# Patient Record
Sex: FEMALE | Race: BLACK OR AFRICAN AMERICAN | NOT HISPANIC OR LATINO | Employment: OTHER | URBAN - METROPOLITAN AREA
[De-identification: names, ages, dates, MRNs, and addresses within clinical notes are randomized per-mention and may not be internally consistent; named-entity substitution may affect disease eponyms.]

---

## 2017-01-01 RX ORDER — SPIRONOLACTONE 25 MG/1
TABLET ORAL
Qty: 90 TABLET | Refills: 4 | Status: SHIPPED | OUTPATIENT
Start: 2017-01-01 | End: 2017-01-10

## 2017-01-01 RX ORDER — LOSARTAN POTASSIUM 50 MG/1
TABLET ORAL
Qty: 90 TABLET | Refills: 3 | Status: SHIPPED | OUTPATIENT
Start: 2017-01-01 | End: 2017-02-13

## 2017-01-03 ENCOUNTER — TELEPHONE (OUTPATIENT)
Dept: INTERNAL MEDICINE | Facility: CLINIC | Age: 72
End: 2017-01-03

## 2017-01-06 ENCOUNTER — SURGERY (OUTPATIENT)
Age: 72
End: 2017-01-06

## 2017-01-06 ENCOUNTER — TELEPHONE (OUTPATIENT)
Dept: ENDOSCOPY | Facility: HOSPITAL | Age: 72
End: 2017-01-06

## 2017-01-06 ENCOUNTER — ANESTHESIA (OUTPATIENT)
Dept: ENDOSCOPY | Facility: HOSPITAL | Age: 72
End: 2017-01-06
Payer: MEDICARE

## 2017-01-06 DIAGNOSIS — D50.9 IRON DEFICIENCY ANEMIA: Primary | ICD-10-CM

## 2017-01-06 PROCEDURE — 63600175 PHARM REV CODE 636 W HCPCS: Performed by: NURSE ANESTHETIST, CERTIFIED REGISTERED

## 2017-01-06 PROCEDURE — 25000003 PHARM REV CODE 250: Performed by: NURSE ANESTHETIST, CERTIFIED REGISTERED

## 2017-01-06 PROCEDURE — D9220A PRA ANESTHESIA: Mod: CRNA,,, | Performed by: NURSE ANESTHETIST, CERTIFIED REGISTERED

## 2017-01-06 PROCEDURE — D9220A PRA ANESTHESIA: Mod: ANES,,, | Performed by: ANESTHESIOLOGY

## 2017-01-06 RX ORDER — SODIUM CHLORIDE 9 MG/ML
INJECTION, SOLUTION INTRAVENOUS CONTINUOUS PRN
Status: DISCONTINUED | OUTPATIENT
Start: 2017-01-06 | End: 2017-01-06

## 2017-01-06 RX ORDER — GLYCOPYRROLATE 0.2 MG/ML
INJECTION INTRAMUSCULAR; INTRAVENOUS
Status: DISCONTINUED | OUTPATIENT
Start: 2017-01-06 | End: 2017-01-06

## 2017-01-06 RX ORDER — ONDANSETRON 2 MG/ML
INJECTION INTRAMUSCULAR; INTRAVENOUS
Status: DISCONTINUED | OUTPATIENT
Start: 2017-01-06 | End: 2017-01-06

## 2017-01-06 RX ORDER — LIDOCAINE HCL/PF 100 MG/5ML
SYRINGE (ML) INTRAVENOUS
Status: DISCONTINUED | OUTPATIENT
Start: 2017-01-06 | End: 2017-01-06

## 2017-01-06 RX ORDER — PROPOFOL 10 MG/ML
VIAL (ML) INTRAVENOUS CONTINUOUS PRN
Status: DISCONTINUED | OUTPATIENT
Start: 2017-01-06 | End: 2017-01-06

## 2017-01-06 RX ORDER — PROPOFOL 10 MG/ML
VIAL (ML) INTRAVENOUS
Status: DISCONTINUED | OUTPATIENT
Start: 2017-01-06 | End: 2017-01-06

## 2017-01-06 RX ADMIN — GLYCOPYRROLATE 0.2 MG: 0.2 INJECTION, SOLUTION INTRAMUSCULAR; INTRAVENOUS at 08:01

## 2017-01-06 RX ADMIN — PROPOFOL 150 MCG/KG/MIN: 10 INJECTION, EMULSION INTRAVENOUS at 08:01

## 2017-01-06 RX ADMIN — BENZOCAINE 1 EACH: 220 SPRAY, METERED PERIODONTAL at 08:01

## 2017-01-06 RX ADMIN — PROPOFOL 60 MG: 10 INJECTION, EMULSION INTRAVENOUS at 08:01

## 2017-01-06 RX ADMIN — ONDANSETRON 4 MG: 2 INJECTION INTRAMUSCULAR; INTRAVENOUS at 08:01

## 2017-01-06 RX ADMIN — LIDOCAINE HYDROCHLORIDE 50 MG: 20 INJECTION, SOLUTION INTRAVENOUS at 08:01

## 2017-01-09 ENCOUNTER — LAB VISIT (OUTPATIENT)
Dept: LAB | Facility: OTHER | Age: 72
End: 2017-01-09
Attending: INTERNAL MEDICINE
Payer: MEDICARE

## 2017-01-09 DIAGNOSIS — D50.9 IRON DEFICIENCY ANEMIA: ICD-10-CM

## 2017-01-09 LAB
FERRITIN SERPL-MCNC: 24 NG/ML
HGB BLD-MCNC: 10.6 G/DL
IGA SERPL-MCNC: 150 MG/DL
IRON SERPL-MCNC: 88 UG/DL
SATURATED IRON: 25 %
TOTAL IRON BINDING CAPACITY: 346 UG/DL
TRANSFERRIN SERPL-MCNC: 234 MG/DL

## 2017-01-09 PROCEDURE — 82784 ASSAY IGA/IGD/IGG/IGM EACH: CPT

## 2017-01-09 PROCEDURE — 83516 IMMUNOASSAY NONANTIBODY: CPT

## 2017-01-09 PROCEDURE — 83540 ASSAY OF IRON: CPT

## 2017-01-09 PROCEDURE — 82728 ASSAY OF FERRITIN: CPT

## 2017-01-09 PROCEDURE — 85018 HEMOGLOBIN: CPT

## 2017-01-09 PROCEDURE — 36415 COLL VENOUS BLD VENIPUNCTURE: CPT

## 2017-01-09 RX ORDER — ALLOPURINOL 100 MG/1
TABLET ORAL
Qty: 60 TABLET | Refills: 12 | Status: SHIPPED | OUTPATIENT
Start: 2017-01-09 | End: 2017-12-19 | Stop reason: SDUPTHER

## 2017-01-09 NOTE — TELEPHONE ENCOUNTER
----- Message from Sushila Diaz sent at 1/9/2017  9:43 AM CST -----  Contact: [phone]  Pt called to request refill on allopurinol (ZYLOPRIM) 100 MG tablet. Pt still uses [facility] on [facility]    Thank you

## 2017-01-10 ENCOUNTER — OFFICE VISIT (OUTPATIENT)
Dept: GASTROENTEROLOGY | Facility: CLINIC | Age: 72
End: 2017-01-10
Payer: MEDICARE

## 2017-01-10 ENCOUNTER — LAB VISIT (OUTPATIENT)
Dept: LAB | Facility: HOSPITAL | Age: 72
End: 2017-01-10
Attending: INTERNAL MEDICINE
Payer: MEDICARE

## 2017-01-10 VITALS
BODY MASS INDEX: 28.99 KG/M2 | HEART RATE: 90 BPM | SYSTOLIC BLOOD PRESSURE: 149 MMHG | DIASTOLIC BLOOD PRESSURE: 72 MMHG | HEIGHT: 65 IN | WEIGHT: 174 LBS

## 2017-01-10 DIAGNOSIS — Z83.79 FAMILY HISTORY OF CROHN'S DISEASE: ICD-10-CM

## 2017-01-10 DIAGNOSIS — R10.9 ABDOMINAL CRAMPS: ICD-10-CM

## 2017-01-10 DIAGNOSIS — Z92.89 HISTORY OF BLOOD TRANSFUSION: ICD-10-CM

## 2017-01-10 DIAGNOSIS — D50.9 IRON DEFICIENCY ANEMIA, UNSPECIFIED IRON DEFICIENCY ANEMIA TYPE: ICD-10-CM

## 2017-01-10 DIAGNOSIS — D50.9 IRON DEFICIENCY ANEMIA, UNSPECIFIED IRON DEFICIENCY ANEMIA TYPE: Primary | ICD-10-CM

## 2017-01-10 LAB
ALBUMIN SERPL BCP-MCNC: 3.7 G/DL
ALP SERPL-CCNC: 99 U/L
ALT SERPL W/O P-5'-P-CCNC: 18 U/L
ANION GAP SERPL CALC-SCNC: 7 MMOL/L
AST SERPL-CCNC: 20 U/L
BILIRUB SERPL-MCNC: 0.4 MG/DL
BUN SERPL-MCNC: 14 MG/DL
CALCIUM SERPL-MCNC: 10.2 MG/DL
CHLORIDE SERPL-SCNC: 104 MMOL/L
CO2 SERPL-SCNC: 30 MMOL/L
CREAT SERPL-MCNC: 0.9 MG/DL
EST. GFR  (AFRICAN AMERICAN): >60 ML/MIN/1.73 M^2
EST. GFR  (NON AFRICAN AMERICAN): >60 ML/MIN/1.73 M^2
GLUCOSE SERPL-MCNC: 78 MG/DL
LIPASE SERPL-CCNC: 50 U/L
POTASSIUM SERPL-SCNC: 4.1 MMOL/L
PROT SERPL-MCNC: 7.2 G/DL
SODIUM SERPL-SCNC: 141 MMOL/L

## 2017-01-10 PROCEDURE — 99214 OFFICE O/P EST MOD 30 MIN: CPT | Mod: S$PBB,,, | Performed by: INTERNAL MEDICINE

## 2017-01-10 PROCEDURE — 36415 COLL VENOUS BLD VENIPUNCTURE: CPT

## 2017-01-10 PROCEDURE — 80053 COMPREHEN METABOLIC PANEL: CPT

## 2017-01-10 PROCEDURE — 99999 PR PBB SHADOW E&M-EST. PATIENT-LVL III: CPT | Mod: PBBFAC,,, | Performed by: INTERNAL MEDICINE

## 2017-01-10 PROCEDURE — 83690 ASSAY OF LIPASE: CPT

## 2017-01-10 RX ORDER — SPIRONOLACTONE 25 MG/1
25 TABLET ORAL DAILY
COMMUNITY
End: 2018-04-07 | Stop reason: SDUPTHER

## 2017-01-11 LAB — TTG IGA SER IA-ACNC: 4 UNITS

## 2017-01-11 NOTE — PROGRESS NOTES
CHIEF COMPLAINT:  Iron deficiency anemia.    HISTORY OF PRESENT ILLNESS:  A 71-year-old female who had an EGD and colonoscopy   unrevealing for the cause of her iron deficiency anemia.  She does take 81 mg   aspirin a day.  She does take Mobic occasionally.  She is on Prilosec 20 mg once   daily.  Recent EGD and colonoscopy unrevealing.  Biopsies of the stomach showed   no H. pylori, no dysplasia.  The patient is brought in to talk about the risks,   benefits and alternatives to small bowel video capsule endoscopy.    REVIEW OF SYSTEMS:  CONSTITUTIONAL:  No fever, fatigue or weight loss.  ENT:  No difficulty swallowing or sore throat.  CARDIOVASCULAR:  No chest pain or palpitations.  RESPIRATORY:  No shortness of breath or cough.  GENITOURINARY:  No dysuria, urgency, frequency or hematuria.  MUSCULOSKELETAL:  No arthritis.  SKIN:  No itching or rash.  NEUROLOGIC:  No syncope, but she has had a TIA in the past on 81 mg aspirin.  PSYCHIATRIC:  No uncontrolled depression or anxiety.  ENDOCRINE:  No cold or heat intolerance.  LYMPHATICS:  No lymphadenopathy.  ALLERGIES:  Latex, penicillin, sulfa, Cipro, Avelox, codeine and Norco.  GASTROINTESTINAL:  No nausea or vomiting.  Heartburn well controlled on her PPI,   Prilosec.  No bloating, no early satiety.  No constipation, no diarrhea, no   blood in stool.  Averages one bowel movement every other day.    RISK FACTORS FOR LIVER DISEASE:  History of blood transfusion in the past.  No   IV drugs.  No tattoos.  No nasal drug use.    PAST MEDICAL HISTORY:  Positive for hyperlipidemia, hypertension and TIA.    PAST SURGICAL HISTORY:  Hysterectomy, breast reduction and right carotid   endarterectomy.    FAMILY HISTORY:  Brother with Crohn disease.  Mom with pancreatic cancer at age   89.  Brother with bone cancer at 77.  Nobody else with pancreatic cancer.    Nobody with colon cancer.  Nobody with advanced colon adenomatous polyps before   the age of 60.  No FAP, no attenuated  FAP, no MAP and no Jenkins syndrome.  Nobody   with inflammatory bowel disease or celiac sprue.    SOCIAL HISTORY:  Quit smoking in .  She does not drink.  She is a retired   manager of [employer].  They have 40 horses or mules.  Only the   mules are allowed to pull carriages in the [place] due to the heat.  She   is on her first marriage for 53 years.  Her  is a mortician at [employer].  She has one daughter of 52 years of age who works in the   business in the  home.    PHYSICAL EXAMINATION:  VITAL SIGNS:  With chaperone in the room, Yanira, blood pressure is 149/72,   pulse 90, 5 feet 5 inches tall, 174 pounds.  GENERAL APPEARANCE:  Well nourished, well developed, not in any acute distress.  ABDOMEN:  Soft, no guarding, no rebound.  No tenderness.  No palpable   organomegaly.  No bruits.  No pulsatile masses.  No stigmata of chronic liver   disease.  No appreciative ascites or hernias.  Normoactive bowel sounds.  EYES:  Conjunctivae are nonicteric.  CARDIOVASCULAR:  S1 and S2 without murmurs.  RESPIRATORY:  Clear to auscultation bilaterally.  SKIN:  No petechiae or rash on exposed skin areas.  NEUROLOGIC:  Alert and oriented x4.  PSYCHIATRIC:  Normal speech, mentation and affect.  LYMPHATICS:  No cervical or supraclavicular lymphadenopathy.    MEDICAL DECISION MAKING:  As above.  Labs have been reviewed.  The patient's   ferritin is currently 24.  Hemoglobin is 10.6.  Lipase is normal.  Comprehensive   metabolic panel is normal except for mildly elevated serum CO2 of 30, upper   limits normal 29.  EGD and colonoscopy reports have been reviewed.  Images were   reviewed.  Risks, benefits and alternatives of small bowel video capsule   endoscopy including bowel prep and retained video capsule needing surgical or   endoscopic removal discussed with the patient.    IMPRESSION AND PLAN:  Iron deficiency anemia in a brother with Crohn disease.    We will set up the  patient for small bowel imaging to rule out stricture or   lesion or IBD.  If negative, the patient has signed informed written consent in   clinic for video capsule procedure.  Call for study results.  Return to GI   Clinic in three months.  She will continue to take her iron and follow up with   her primary care doctor.      SEC/HN  dd: 01/10/2017 19:13:37 (CST)  td: 01/11/2017 14:07:58 (CST)  Doc ID   #[id number]  Job ID #[id number]    CC:

## 2017-01-13 ENCOUNTER — HOSPITAL ENCOUNTER (OUTPATIENT)
Dept: RADIOLOGY | Facility: HOSPITAL | Age: 72
Discharge: HOME | End: 2017-01-13
Attending: INTERNAL MEDICINE
Payer: MEDICARE

## 2017-01-13 DIAGNOSIS — R10.9 ABDOMINAL CRAMPS: ICD-10-CM

## 2017-01-13 DIAGNOSIS — D50.9 IRON DEFICIENCY ANEMIA, UNSPECIFIED IRON DEFICIENCY ANEMIA TYPE: ICD-10-CM

## 2017-01-13 DIAGNOSIS — Z83.79 FAMILY HISTORY OF CROHN'S DISEASE: ICD-10-CM

## 2017-01-13 LAB
CREAT SERPL-MCNC: 1.1 MG/DL (ref 0.5–1.4)
SAMPLE: NORMAL

## 2017-01-13 PROCEDURE — 74177 CT ABD & PELVIS W/CONTRAST: CPT | Mod: TC

## 2017-01-13 PROCEDURE — 74177 CT ABD & PELVIS W/CONTRAST: CPT | Mod: 26,GC,, | Performed by: RADIOLOGY

## 2017-01-13 PROCEDURE — 25500020 PHARM REV CODE 255: Performed by: INTERNAL MEDICINE

## 2017-01-13 RX ADMIN — IOHEXOL 125 ML: 350 INJECTION, SOLUTION INTRAVENOUS at 03:01

## 2017-01-16 ENCOUNTER — TELEPHONE (OUTPATIENT)
Dept: OPHTHALMOLOGY | Facility: CLINIC | Age: 72
End: 2017-01-16

## 2017-01-17 ENCOUNTER — PATIENT MESSAGE (OUTPATIENT)
Dept: INTERNAL MEDICINE | Facility: CLINIC | Age: 72
End: 2017-01-17

## 2017-01-17 ENCOUNTER — OFFICE VISIT (OUTPATIENT)
Dept: OPHTHALMOLOGY | Facility: CLINIC | Age: 72
End: 2017-01-17
Payer: MEDICARE

## 2017-01-17 DIAGNOSIS — G45.3 AMAUROSIS FUGAX: Primary | ICD-10-CM

## 2017-01-17 PROCEDURE — 92014 COMPRE OPH EXAM EST PT 1/>: CPT | Mod: S$PBB,,, | Performed by: OPHTHALMOLOGY

## 2017-01-17 PROCEDURE — 99212 OFFICE O/P EST SF 10 MIN: CPT | Mod: PBBFAC | Performed by: OPHTHALMOLOGY

## 2017-01-17 PROCEDURE — 99999 PR PBB SHADOW E&M-EST. PATIENT-LVL II: CPT | Mod: PBBFAC,,, | Performed by: OPHTHALMOLOGY
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
## 2017-01-17 NOTE — PATIENT INSTRUCTIONS
Follow up with Dr. Young regarding stroke risk.  Blood tests today; I will call you with the results.

## 2017-01-17 NOTE — MR AVS SNAPSHOT
[facility] - Ophthalmology  [address] [facility] [address]  Phone: [phone]  Fax: [phone]                  Isael Salmon   2017 1:30 PM   Office Visit    Description:  Female : 1945   Provider:  Justin An MD   Department:  [facility] - Ophthalmology           Reason for Visit     Concerns About Ocular Health           Diagnoses this Visit        Comments    Amaurosis fugax    -  Primary            To Do List           Future Appointments        Provider Department Dept Phone    2017 2:45 PM Lani Young MD [facility] - Neurology [phone]    3/28/2017 1:30 PM Janett Montoya MD [facility] - Internal Medicine [phone]    2017 10:30 AM Manuel Alanis MD [facility] - Gastroenterology [phone]      Goals (5 Years of Data)     None      Follow-Up and Disposition     Return if symptoms worsen or fail to improve.      [facility] On Call     [facility] On Call Nurse [facility] Line -  Assistance  Registered nurses in the [facility] On Call Center provide clinical advisement, health education, appointment booking, and other advisory services.  Call for this free service at [phone].             Medications           Message regarding Medications     Verify the changes and/or additions to your medication regime listed below are the same as discussed with your clinician today.  If any of these changes or additions are incorrect, please notify your healthcare provider.             Verify that the below list of medications is an accurate representation of the medications you are currently taking.  If none reported, the list may be blank. If incorrect, please contact your healthcare provider. Carry this list with you in case of emergency.           Current Medications     allopurinol (ZYLOPRIM) 100 MG tablet take 2 tablets by mouth once daily    ascorbic acid (VITAMIN C) 500 MG tablet Take 500 mg by mouth. 1 Tablet Oral Every day.  Take with fergon     aspirin (ECOTRIN) 81 MG EC tablet Take 81 mg by mouth once daily.    atorvastatin (LIPITOR) 10 MG tablet Take 1 tablet (10 mg total) by mouth once daily.    betamethasone dipropionate (DIPROLENE) 0.05 % cream     cholecalciferol, vitamin D3, 1,000 unit capsule Take 1 capsule (1,000 Units total) by mouth once daily.    co-enzyme Q-10 30 mg capsule Take 200 mg by mouth once daily.    colchicine 0.6 mg tablet One tablet every hour during gouty flare until have diarrhea. Max 6 tablets daily    dicyclomine (BENTYL) 10 MG capsule Take 1 capsule (10 mg total) by mouth 4 (four) times daily as needed. 1 Capsule Oral Four times a day    escitalopram oxalate (LEXAPRO) 10 MG tablet take 1/2 tablet by mouth once daily    estradiol (ESTRACE) 0.01 % (0.1 mg/gram) vaginal cream Place vaginally once daily.    fluticasone (FLONASE) 50 mcg/actuation nasal spray 2 sprays by Each Nare route daily as needed. 2 Aerosol, Spray Nasal Every day    FOLBIC 2.5-25-2 mg Tab take 1 tablet by mouth once daily    indomethacin (INDOCIN) 50 MG capsule Take 1 capsule (50 mg total) by mouth 3 (three) times daily as needed.    losartan (COZAAR) 50 MG tablet take 1 tablet by mouth once daily    MULTIVITS-MIN/FA/CA CARB/VIT K (ONE-A-DAY WOMEN'S 50+ ORAL) Take 1 tablet by mouth once daily.    neomycin-polymyxin-hydrocortisone (CORTISPORIN) 3.5-10,000-1 mg/mL-unit/mL-% otic suspension Use bid for nails    omeprazole (PRILOSEC) 20 MG capsule take 1 capsule by mouth twice a day    spironolactone (ALDACTONE) 25 MG tablet Take 25 mg by mouth once daily.    meloxicam (MOBIC) 7.5 MG tablet Take 1 tablet (7.5 mg total) by mouth once daily.           Clinical Reference Information           Vital Signs - Last Recorded     LMP                   (LMP Unknown)           Allergies as of 1/17/2017     Latex    Pcn [Penicillins]    Sulfa (Sulfonamide Antibiotics)    Avelox [Moxifloxacin]    Ciprofloxacin    Codeine    Norco [Hydrocodone-acetaminophen]      Immunizations  Administered on Date of Encounter - 1/17/2017     None      Orders Placed During Today's Visit     Future Labs/Procedures Expected by Expires    C-REACTIVE PROTEIN  1/17/2017 3/18/2018    CBC auto differential  1/17/2017 3/18/2018    SEDIMENTATION RATE, MANUAL  1/17/2017 3/18/2018      Instructions    Follow up with Dr. Young regarding stroke risk.  Blood tests today; I will call you with the results.

## 2017-01-18 ENCOUNTER — TELEPHONE (OUTPATIENT)
Dept: OPHTHALMOLOGY | Facility: CLINIC | Age: 72
End: 2017-01-18

## 2017-01-18 ENCOUNTER — TELEPHONE (OUTPATIENT)
Dept: GASTROENTEROLOGY | Facility: CLINIC | Age: 72
End: 2017-01-18

## 2017-01-18 DIAGNOSIS — G45.3 AMAUROSIS FUGAX: Primary | ICD-10-CM

## 2017-01-18 NOTE — TELEPHONE ENCOUNTER
----- Message from Katy Meeks sent at 1/18/2017  1:29 PM CST -----  Contact: Pt# [phone]  Pt states she was returning the nurse phone call back

## 2017-01-18 NOTE — TELEPHONE ENCOUNTER
Platelet count, ESR, and CRP all normal for age. The results militate against giant cell arteritis. I informed Ms. Salmon of the results. She indicated that she understood my explanation. She will check in with her PCP regarding need for additional diagnostic testing or therapy.

## 2017-01-20 DIAGNOSIS — D64.9 LOW HEMOGLOBIN: Primary | ICD-10-CM

## 2017-01-20 RX ORDER — FERROUS SULFATE 325(65) MG
325 TABLET ORAL EVERY 12 HOURS
Qty: 60 TABLET | Refills: 4 | Status: SHIPPED | OUTPATIENT
Start: 2017-01-20 | End: 2017-05-23 | Stop reason: SDUPTHER

## 2017-01-25 ENCOUNTER — TELEPHONE (OUTPATIENT)
Dept: GASTROENTEROLOGY | Facility: CLINIC | Age: 72
End: 2017-01-25

## 2017-01-25 NOTE — TELEPHONE ENCOUNTER
----- Message from Manuel Alanis MD sent at 1/20/2017  5:11 PM CST -----  Yanira - please tell patient that they are iron deficient and anaemic and recommend that she take ferrous sulfate one 325mg pill every 12 hours for next 3 months and repeat fasting hemoglobin and Ferritin in 8 weeks - Orders placed.    Please schedule Isael for GI clinic apt to discuss small bowel VCE

## 2017-01-25 NOTE — TELEPHONE ENCOUNTER
Pr informed of test results and lab appt scheduled and mailed. Pt will call when she is ready to schedule VCE.

## 2017-02-07 ENCOUNTER — TELEPHONE (OUTPATIENT)
Dept: GASTROENTEROLOGY | Facility: CLINIC | Age: 72
End: 2017-02-07

## 2017-02-07 NOTE — TELEPHONE ENCOUNTER
----- Message from Manuel Alanis MD sent at 2/5/2017  2:11 PM CST -----  Yanira - please order small bowel VCE for iron deficiency anemia.    Please tell Isael that her CT scan was read as follows:    Impression                1.  No bowel abnormality is identified to suggest evidence for small bowel Crohn's disease.    2. Scattered colonic diverticula without evidence for diverticulitis.    3. Probable left hepatic lobe cyst and a nonspecific subcentimeter blush of arterial enhancement along the anterior peripheral edge of the liver possibly a small vascular malformation or flash filling hemangioma.    4. Right renal cysts and multiple subcentimeter hypodensities in the left kidney that are too small to characterize but may represent additional cysts.    5. Hysterectomy.    6. Ectatic abdominal aorta with significant calcific atherosclerotic plaque.

## 2017-02-08 ENCOUNTER — OFFICE VISIT (OUTPATIENT)
Dept: NEUROLOGY | Facility: CLINIC | Age: 72
End: 2017-02-08
Payer: MEDICARE

## 2017-02-08 ENCOUNTER — LAB VISIT (OUTPATIENT)
Dept: LAB | Facility: HOSPITAL | Age: 72
End: 2017-02-08
Attending: INTERNAL MEDICINE
Payer: MEDICARE

## 2017-02-08 VITALS
DIASTOLIC BLOOD PRESSURE: 61 MMHG | SYSTOLIC BLOOD PRESSURE: 121 MMHG | WEIGHT: 170 LBS | HEART RATE: 86 BPM | BODY MASS INDEX: 27.32 KG/M2 | HEIGHT: 66 IN

## 2017-02-08 DIAGNOSIS — D50.9 IRON DEFICIENCY ANEMIA, UNSPECIFIED IRON DEFICIENCY ANEMIA TYPE: ICD-10-CM

## 2017-02-08 DIAGNOSIS — R10.9 ABDOMINAL CRAMPS: ICD-10-CM

## 2017-02-08 DIAGNOSIS — Z92.89 HISTORY OF BLOOD TRANSFUSION: ICD-10-CM

## 2017-02-08 DIAGNOSIS — Z83.79 FAMILY HISTORY OF CROHN'S DISEASE: ICD-10-CM

## 2017-02-08 DIAGNOSIS — H34.11 CENTRAL RETINAL ARTERY OCCLUSION OF RIGHT EYE: Primary | ICD-10-CM

## 2017-02-08 LAB — HCV AB SERPL QL IA: NEGATIVE

## 2017-02-08 PROCEDURE — 99213 OFFICE O/P EST LOW 20 MIN: CPT | Mod: PBBFAC | Performed by: STUDENT IN AN ORGANIZED HEALTH CARE EDUCATION/TRAINING PROGRAM

## 2017-02-08 PROCEDURE — 99213 OFFICE O/P EST LOW 20 MIN: CPT | Mod: S$PBB,GC,, | Performed by: STUDENT IN AN ORGANIZED HEALTH CARE EDUCATION/TRAINING PROGRAM

## 2017-02-08 PROCEDURE — 99999 PR PBB SHADOW E&M-EST. PATIENT-LVL III: CPT | Mod: PBBFAC,GC,, | Performed by: STUDENT IN AN ORGANIZED HEALTH CARE EDUCATION/TRAINING PROGRAM

## 2017-02-08 RX ORDER — ROSUVASTATIN CALCIUM 20 MG/1
20 TABLET, COATED ORAL DAILY
Qty: 90 TABLET | Refills: 3 | Status: SHIPPED | OUTPATIENT
Start: 2017-02-08 | End: 2018-02-22 | Stop reason: SDUPTHER
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
## 2017-02-24 ENCOUNTER — HOSPITAL ENCOUNTER (OUTPATIENT)
Dept: RADIOLOGY | Facility: HOSPITAL | Age: 72
Discharge: HOME | End: 2017-02-24
Attending: STUDENT IN AN ORGANIZED HEALTH CARE EDUCATION/TRAINING PROGRAM
Payer: MEDICARE

## 2017-02-24 ENCOUNTER — HOSPITAL ENCOUNTER (OUTPATIENT)
Dept: CARDIOLOGY | Facility: CLINIC | Age: 72
Discharge: HOME | End: 2017-02-24
Payer: MEDICARE

## 2017-02-24 DIAGNOSIS — H34.11 CENTRAL RETINAL ARTERY OCCLUSION OF RIGHT EYE: ICD-10-CM

## 2017-02-24 LAB
DIASTOLIC DYSFUNCTION: YES
ESTIMATED PA SYSTOLIC PRESSURE: 39
RETIRED EF AND QEF - SEE NOTES: 65 (ref 55–65)
TRICUSPID VALVE REGURGITATION: ABNORMAL

## 2017-02-24 PROCEDURE — 70551 MRI BRAIN STEM W/O DYE: CPT | Mod: TC

## 2017-02-24 PROCEDURE — 93306 TTE W/DOPPLER COMPLETE: CPT | Mod: 26,S$PBB,, | Performed by: INTERNAL MEDICINE

## 2017-02-24 PROCEDURE — 70551 MRI BRAIN STEM W/O DYE: CPT | Mod: 26,GC,, | Performed by: RADIOLOGY

## 2017-03-07 DIAGNOSIS — D50.9 IRON DEFICIENCY ANEMIA, UNSPECIFIED IRON DEFICIENCY ANEMIA TYPE: Primary | ICD-10-CM

## 2017-03-08 ENCOUNTER — OFFICE VISIT (OUTPATIENT)
Dept: NEUROLOGY | Facility: CLINIC | Age: 72
End: 2017-03-08
Payer: MEDICARE

## 2017-03-08 VITALS
BODY MASS INDEX: 29.02 KG/M2 | HEART RATE: 83 BPM | DIASTOLIC BLOOD PRESSURE: 65 MMHG | SYSTOLIC BLOOD PRESSURE: 138 MMHG | HEIGHT: 65 IN | WEIGHT: 174.19 LBS

## 2017-03-08 DIAGNOSIS — G45.3 AMAUROSIS FUGAX: Primary | ICD-10-CM

## 2017-03-08 PROCEDURE — 3078F DIAST BP <80 MM HG: CPT | Mod: GC,,, | Performed by: STUDENT IN AN ORGANIZED HEALTH CARE EDUCATION/TRAINING PROGRAM

## 2017-03-08 PROCEDURE — 1125F AMNT PAIN NOTED PAIN PRSNT: CPT | Mod: GC,,, | Performed by: STUDENT IN AN ORGANIZED HEALTH CARE EDUCATION/TRAINING PROGRAM

## 2017-03-08 PROCEDURE — 3075F SYST BP GE 130 - 139MM HG: CPT | Mod: GC,,, | Performed by: STUDENT IN AN ORGANIZED HEALTH CARE EDUCATION/TRAINING PROGRAM

## 2017-03-08 PROCEDURE — 1159F MED LIST DOCD IN RCRD: CPT | Mod: GC,,, | Performed by: STUDENT IN AN ORGANIZED HEALTH CARE EDUCATION/TRAINING PROGRAM

## 2017-03-08 PROCEDURE — 99214 OFFICE O/P EST MOD 30 MIN: CPT | Mod: PBBFAC | Performed by: STUDENT IN AN ORGANIZED HEALTH CARE EDUCATION/TRAINING PROGRAM

## 2017-03-08 PROCEDURE — 99213 OFFICE O/P EST LOW 20 MIN: CPT | Mod: S$PBB,GC,, | Performed by: STUDENT IN AN ORGANIZED HEALTH CARE EDUCATION/TRAINING PROGRAM

## 2017-03-08 PROCEDURE — 99999 PR PBB SHADOW E&M-EST. PATIENT-LVL IV: CPT | Mod: PBBFAC,GC,, | Performed by: STUDENT IN AN ORGANIZED HEALTH CARE EDUCATION/TRAINING PROGRAM
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
## 2017-03-09 ENCOUNTER — PATIENT MESSAGE (OUTPATIENT)
Dept: RHEUMATOLOGY | Facility: CLINIC | Age: 72
End: 2017-03-09

## 2017-03-10 ENCOUNTER — PATIENT MESSAGE (OUTPATIENT)
Dept: INTERNAL MEDICINE | Facility: CLINIC | Age: 72
End: 2017-03-10

## 2017-03-10 DIAGNOSIS — M79.606 PAIN OF LOWER EXTREMITY, UNSPECIFIED LATERALITY: Primary | ICD-10-CM

## 2017-03-14 ENCOUNTER — PATIENT MESSAGE (OUTPATIENT)
Dept: INTERNAL MEDICINE | Facility: CLINIC | Age: 72
End: 2017-03-14

## 2017-03-17 ENCOUNTER — HOSPITAL ENCOUNTER (OUTPATIENT)
Dept: RADIOLOGY | Facility: HOSPITAL | Age: 72
Discharge: HOME | End: 2017-03-17
Attending: NURSE PRACTITIONER
Payer: MEDICARE

## 2017-03-17 ENCOUNTER — OFFICE VISIT (OUTPATIENT)
Dept: ORTHOPEDICS | Facility: CLINIC | Age: 72
End: 2017-03-17
Payer: MEDICARE

## 2017-03-17 VITALS — HEIGHT: 65 IN | BODY MASS INDEX: 28.83 KG/M2 | WEIGHT: 173.06 LBS

## 2017-03-17 DIAGNOSIS — M25.551 PAIN OF RIGHT HIP JOINT: ICD-10-CM

## 2017-03-17 DIAGNOSIS — R10.31 GROIN PAIN, RIGHT: ICD-10-CM

## 2017-03-17 DIAGNOSIS — T14.8XXA MUSCLE STRAIN: Primary | ICD-10-CM

## 2017-03-17 DIAGNOSIS — M79.604 RIGHT LEG PAIN: Primary | ICD-10-CM

## 2017-03-17 DIAGNOSIS — M89.8X5 PAIN OF RIGHT FEMUR: ICD-10-CM

## 2017-03-17 DIAGNOSIS — Z91.81 STATUS POST FALL: ICD-10-CM

## 2017-03-17 DIAGNOSIS — M25.551 RIGHT HIP PAIN: ICD-10-CM

## 2017-03-17 DIAGNOSIS — M79.604 RIGHT LEG PAIN: ICD-10-CM

## 2017-03-17 DIAGNOSIS — M79.661 PAIN IN RIGHT LOWER LEG: ICD-10-CM

## 2017-03-17 PROCEDURE — 73590 X-RAY EXAM OF LOWER LEG: CPT | Mod: TC,RT

## 2017-03-17 PROCEDURE — 99999 PR PBB SHADOW E&M-EST. PATIENT-LVL IV: CPT | Mod: PBBFAC,,, | Performed by: NURSE PRACTITIONER

## 2017-03-17 PROCEDURE — 73502 X-RAY EXAM HIP UNI 2-3 VIEWS: CPT | Mod: 26,RT,, | Performed by: RADIOLOGY

## 2017-03-17 PROCEDURE — 73552 X-RAY EXAM OF FEMUR 2/>: CPT | Mod: TC,RT

## 2017-03-17 PROCEDURE — 72195 MRI PELVIS W/O DYE: CPT | Mod: 26,GC,, | Performed by: RADIOLOGY

## 2017-03-17 PROCEDURE — 99213 OFFICE O/P EST LOW 20 MIN: CPT | Mod: S$PBB,,, | Performed by: NURSE PRACTITIONER

## 2017-03-17 PROCEDURE — 72195 MRI PELVIS W/O DYE: CPT | Mod: TC

## 2017-03-17 PROCEDURE — 73590 X-RAY EXAM OF LOWER LEG: CPT | Mod: 26,RT,, | Performed by: RADIOLOGY

## 2017-03-17 PROCEDURE — 73502 X-RAY EXAM HIP UNI 2-3 VIEWS: CPT | Mod: TC,RT

## 2017-03-17 PROCEDURE — 73552 X-RAY EXAM OF FEMUR 2/>: CPT | Mod: 26,RT,, | Performed by: RADIOLOGY

## 2017-03-17 RX ORDER — DICLOFENAC SODIUM 75 MG/1
75 TABLET, DELAYED RELEASE ORAL 2 TIMES DAILY PRN
Qty: 30 TABLET | Refills: 0 | Status: SHIPPED | OUTPATIENT
Start: 2017-03-17 | End: 2017-04-12 | Stop reason: ALTCHOICE

## 2017-03-17 NOTE — PROGRESS NOTES
SUBJECTIVE:     Chief Complaint & History of Present Illness:  Isael Salmon is a 71 y.o. year old female presenting today for intermittent right hip pain, groin pain which radiates to the lower leg which started a few weeks ago after slipped in the bathroom. She states that she doesn't recall hitting anything specifically but that she stretched the leg really far. The pain is located in the groin aspect of the hip and anterior leg from femur to mid-tib/fib.  The pain is described as achy. She states that the pain is getting worse. It is is worse with weight bearing. Previous treatments include rest and Mobic which have provided minimal relief.  There is not a history of previous injury or surgery to the hip.  The patient does not use an assistive device.     Past Medical History:   Diagnosis Date    Adjustment disorder 7/26/2012    Anemia 7/26/2012    AR (allergic rhinitis) 9/5/2014    Bronchitis     Fibrocystic breast disease in female 7/26/2012    GERD (gastroesophageal reflux disease) 7/26/2012    Gout     Gout, arthritis 7/26/2012    History of blood transfusion 7/26/2012    History of colonic polyps 7/26/2012    Hypercalcemia 9/20/2012    Hyperlipidemia 7/26/2012    Hypertension 7/26/2012    Nuclear sclerosis 5/2/2014    Osteoarthritis 7/26/2012    Other hyperparathyroidism 9/20/2012    Postmenopausal status 7/26/2012    PVD (peripheral vascular disease) 7/26/2012    left leg laser of vein with injection    Stroke 1997    TIA    Superficial vein thrombosis     Transient ischemic attack 7/26/2012    Varicose veins 7/26/2012       Past Surgical History:   Procedure Laterality Date    BREAST SURGERY      breast reduction    broken second finger Right 12/2015    pins placed    carotid endarterectomy  1997    right    CAROTID ENDARTERECTOMY Right 1997    COLONOSCOPY N/A 10/26/2015    Procedure: COLONOSCOPY;  Surgeon: Manuel Alanis MD;  Location: [facility] ([address] [place] [address]);  Service:  Endoscopy;  Laterality: N/A;    HYSTERECTOMY      ORIF FINGER FRACTURE  12/18/15    TIA      VASCULAR SURGERY      left leg vein laser       Family History   Problem Relation Age of Onset    Heart failure Father     Cancer Mother      pancreas    Cancer Brother      bladder    Bone cancer Brother     Breast cancer Neg Hx     Ovarian cancer Neg Hx     Allergies Neg Hx     Asthma Neg Hx     Eczema Neg Hx     Cervical cancer Neg Hx     Endometrial cancer Neg Hx     Vaginal cancer Neg Hx        Review of patient's allergies indicates:   Allergen Reactions    Latex      Other reaction(s): Rash    Pcn [penicillins]      Other reaction(s): rash    Sulfa (sulfonamide antibiotics)      Other reaction(s): blisters    Avelox [moxifloxacin]      Other reaction(s): racing heart  Other reaction(s): shakes    Ciprofloxacin Other (See Comments)     Room starts to spin , nausea and dizziness    Codeine      Other reaction(s): nausea  Other reaction(s): weakness    Norco [hydrocodone-acetaminophen] Other (See Comments)         Current Outpatient Prescriptions:     allopurinol (ZYLOPRIM) 100 MG tablet, take 2 tablets by mouth once daily (Patient taking differently: take 3 tablets by mouth once daily), Disp: 60 tablet, Rfl: 12    aspirin (ECOTRIN) 81 MG EC tablet, Take 325 mg by mouth once daily., Disp: , Rfl:     co-enzyme Q-10 30 mg capsule, Take 200 mg by mouth once daily., Disp: , Rfl:     colchicine 0.6 mg tablet, One tablet every hour during gouty flare until have diarrhea. Max 6 tablets daily (Patient taking differently: as needed. One tablet every hour during gouty flare until have diarrhea. Max 6 tablets daily), Disp: 30 tablet, Rfl: 11    dicyclomine (BENTYL) 10 MG capsule, Take 1 capsule (10 mg total) by mouth 4 (four) times daily as needed. 1 Capsule Oral Four times a day, Disp: 60 capsule, Rfl: 3    escitalopram oxalate (LEXAPRO) 10 MG tablet, take 1/2 tablet by mouth once daily, Disp: 45  tablet, Rfl: 3    estradiol (ESTRACE) 0.01 % (0.1 mg/gram) vaginal cream, Place vaginally twice a week. , Disp: , Rfl:     ferrous sulfate 325 mg (65 mg iron) Tab tablet, Take 1 tablet (325 mg total) by mouth every 12 (twelve) hours., Disp: 60 tablet, Rfl: 4    fluticasone (FLONASE) 50 mcg/actuation nasal spray, 2 sprays by Each Nare route daily as needed. 2 Aerosol, Spray Nasal Every day, Disp: 16 g, Rfl: 2    FOLBIC 2.5-25-2 mg Tab, take 1 tablet by mouth once daily, Disp: 30 tablet, Rfl: 11    indomethacin (INDOCIN) 50 MG capsule, Take 1 capsule (50 mg total) by mouth 3 (three) times daily as needed., Disp: 90 capsule, Rfl: 6    losartan (COZAAR) 50 MG tablet, Take 1 tablet (50 mg total) by mouth 2 (two) times daily., Disp: 60 tablet, Rfl: 3    omeprazole (PRILOSEC) 20 MG capsule, take 1 capsule by mouth twice a day, Disp: 60 capsule, Rfl: 6    rosuvastatin (CRESTOR) 20 MG tablet, Take 1 tablet (20 mg total) by mouth once daily., Disp: 90 tablet, Rfl: 3    spironolactone (ALDACTONE) 25 MG tablet, Take 25 mg by mouth once daily., Disp: , Rfl:     diclofenac (VOLTAREN) 75 MG EC tablet, Take 1 tablet (75 mg total) by mouth 2 (two) times daily as needed. For pain., Disp: 30 tablet, Rfl: 0    [DISCONTINUED] salsalate (DISALCID) 750 MG Tab, Take 1 tablet (750 mg total) by mouth 3 (three) times daily., Disp: 90 tablet, Rfl: 9    Current Facility-Administered Medications:     albuterol nebulizer solution 2.5 mg, 2.5 mg, Nebulization, 1 time in Clinic/HOD, Kimberlee Duque PA-C    Review of Systems:  Review of Systems   Constitution: Negative for chills, decreased appetite, fever, weakness and malaise/fatigue.   Eyes: Negative for visual disturbance.   Cardiovascular: Negative for chest pain and palpitations.   Hematologic/Lymphatic: Negative for bleeding problem. Does not bruise/bleed easily.   Skin: Negative for color change, dry skin, flushing, itching, poor wound healing and rash.   Musculoskeletal:  "Positive for arthritis, falls, joint pain and stiffness. Negative for gout, joint swelling, muscle cramps, muscle weakness and myalgias.   Neurological: Negative for dizziness, light-headedness, loss of balance, numbness and paresthesias.   Psychiatric/Behavioral: Negative for altered mental status.         OBJECTIVE:     PHYSICAL EXAM:  Height: 5' 5" (165.1 cm) Weight: 78.5 kg (173 lb 1 oz)  General    Nursing note reviewed.  Constitutional: She is oriented to person, place, and time. She appears well-developed and well-nourished. No distress.   HENT:   Head: Atraumatic.   Nose: Nose normal.   Eyes: Conjunctivae and EOM are normal. Right eye exhibits no discharge. Left eye exhibits no discharge.   Cardiovascular: Normal rate.    Pulmonary/Chest: Effort normal. No respiratory distress.   Neurological: She is alert and oriented to person, place, and time.   Psychiatric: She has a normal mood and affect. Her behavior is normal. Judgment and thought content normal.           Right Knee Exam     Inspection   Erythema: absent  Scars: absent  Swelling: absent  Effusion: effusion  Deformity: deformity  Bruising: absent    Range of Motion   Extension: normal   Flexion: normal     Other   Sensation: normal    Comments:  No tenderness at the knee    Right Hip Exam     Inspection   Scars: absent  Swelling: absent  Bruising: absent  No deformity of hip.  Erythema: absent    Tests   Pain w/ forced internal rotation (GINA): present  Pain w/ forced external rotation (FADIR): present  Circumduction test: positive  Log Roll: positive    Other   Sensation: normal    Comments:  Dec. Strength s/t to pain  No tenderness to palpation   Pain with resisted straight leg raise      Vascular Exam     Right Pulses    Posterior Tibial:      2+        Capillary Refill  Right Hand: normal capillary refill    Edema  Right Upper Leg: absent    RADIOGRAPHS:  Xray of right tib/fib no fracture or dislocation- DJD at ankle  Xray of the right femur no " fracture or dislocation  Xray of the right hip showing mild DJD of the right hip, there is slightly elongated femoral neck with sclerosis line vs. Possible fx     ASSESSMENT/PLAN:     Plan:   - Urgent MRI to r/o fracture  - Non weightbearing- wheelchair to MRI  - If MRI with fx, pt to be transported to ED for trtmt  - If MRI with no fx, pending results, consider R.I.C.E, PT and/or NSAID

## 2017-03-17 NOTE — PROGRESS NOTES
Spoke with pt re: MRI results, showing greater troch gluteal strain. No Fx. She states mobic not working. Recommend changing to short term Diclofenac BID, rest, Ice/heat alternating, and start PT. RTC/call if symptoms to not resolve, worsen, or any new sx. Pt is agreement with plan.

## 2017-03-17 NOTE — LETTER
March 20, 2017      Janett Montoya MD  [address]  [place] [address]           [facility]  [address]  [place] [address]  Phone: [phone]          Patient: Isael Salmon   MR Number: [id number]   YOB: 1945   Date of Visit: 3/17/2017       Dear Dr. Janett Montoya:    Thank you for referring Isael Salmon to me for evaluation. Attached you will find relevant portions of my assessment and plan of care.    If you have questions, please do not hesitate to call me. I look forward to following Isael Salmon along with you.    Sincerely,    Sania Mittal, NP    Enclosure  CC:  No Recipients    If you would like to receive this communication electronically, please contact [email or web address] or [phone] to request more information on [employer] Link access.    For providers and/or their staff who would like to refer a patient to [facility], please contact us through our one-stop-shop provider referral line, [facility] , at [phone].    If you feel you have received this communication in error or would no longer like to receive these types of communications, please e-mail [email or web address]

## 2017-03-24 ENCOUNTER — LAB VISIT (OUTPATIENT)
Dept: LAB | Facility: OTHER | Age: 72
End: 2017-03-24
Attending: INTERNAL MEDICINE
Payer: MEDICARE

## 2017-03-24 DIAGNOSIS — D64.9 LOW HEMOGLOBIN: ICD-10-CM

## 2017-03-24 LAB
FERRITIN SERPL-MCNC: 45 NG/ML
HGB BLD-MCNC: 10.8 G/DL

## 2017-03-24 PROCEDURE — 36415 COLL VENOUS BLD VENIPUNCTURE: CPT

## 2017-03-24 PROCEDURE — 85018 HEMOGLOBIN: CPT

## 2017-03-24 PROCEDURE — 82728 ASSAY OF FERRITIN: CPT

## 2017-03-28 ENCOUNTER — PATIENT MESSAGE (OUTPATIENT)
Dept: INTERNAL MEDICINE | Facility: CLINIC | Age: 72
End: 2017-03-28

## 2017-03-29 ENCOUNTER — TELEPHONE (OUTPATIENT)
Dept: GASTROENTEROLOGY | Facility: CLINIC | Age: 72
End: 2017-03-29

## 2017-03-29 NOTE — TELEPHONE ENCOUNTER
----- Message from Iram Dong sent at 3/29/2017  3:02 PM CDT -----  Contact: self - [phone]  fara - is asking to reschedule vegd - please call patient at

## 2017-04-05 ENCOUNTER — PATIENT MESSAGE (OUTPATIENT)
Dept: INTERNAL MEDICINE | Facility: CLINIC | Age: 72
End: 2017-04-05

## 2017-04-09 RX ORDER — ESCITALOPRAM OXALATE 10 MG/1
TABLET ORAL
Qty: 45 TABLET | Refills: 3 | Status: SHIPPED | OUTPATIENT
Start: 2017-04-09 | End: 2018-05-13 | Stop reason: SDUPTHER

## 2017-04-11 ENCOUNTER — TELEPHONE (OUTPATIENT)
Dept: GASTROENTEROLOGY | Facility: CLINIC | Age: 72
End: 2017-04-11

## 2017-04-11 ENCOUNTER — CLINICAL SUPPORT (OUTPATIENT)
Dept: GASTROENTEROLOGY | Facility: CLINIC | Age: 72
End: 2017-04-11
Payer: MEDICARE

## 2017-04-11 DIAGNOSIS — D50.9 IRON DEFICIENCY ANEMIA, UNSPECIFIED IRON DEFICIENCY ANEMIA TYPE: ICD-10-CM

## 2017-04-11 PROCEDURE — 91110 GI TRC IMG INTRAL ESOPH-ILE: CPT

## 2017-04-11 PROCEDURE — 99212 OFFICE O/P EST SF 10 MIN: CPT | Mod: PBBFAC

## 2017-04-11 PROCEDURE — 99999 PR PBB SHADOW E&M-EST. PATIENT-LVL II: CPT | Mod: PBBFAC,,,

## 2017-04-12 ENCOUNTER — OFFICE VISIT (OUTPATIENT)
Dept: ORTHOPEDICS | Facility: CLINIC | Age: 72
End: 2017-04-12
Payer: MEDICARE

## 2017-04-12 ENCOUNTER — HOSPITAL ENCOUNTER (OUTPATIENT)
Dept: RADIOLOGY | Facility: HOSPITAL | Age: 72
Discharge: HOME | End: 2017-04-12
Attending: NURSE PRACTITIONER
Payer: MEDICARE

## 2017-04-12 VITALS — HEIGHT: 65 IN | BODY MASS INDEX: 28.54 KG/M2 | WEIGHT: 171.31 LBS

## 2017-04-12 DIAGNOSIS — M54.31 SCIATICA OF RIGHT SIDE: ICD-10-CM

## 2017-04-12 DIAGNOSIS — M79.604 RIGHT LEG PAIN: ICD-10-CM

## 2017-04-12 DIAGNOSIS — M79.604 RIGHT LEG PAIN: Primary | ICD-10-CM

## 2017-04-12 DIAGNOSIS — M54.41 RIGHT-SIDED LOW BACK PAIN WITH RIGHT-SIDED SCIATICA, UNSPECIFIED CHRONICITY: ICD-10-CM

## 2017-04-12 DIAGNOSIS — M54.50 LUMBAR SPINE PAIN: Primary | ICD-10-CM

## 2017-04-12 DIAGNOSIS — R10.31 RIGHT GROIN PAIN: ICD-10-CM

## 2017-04-12 PROCEDURE — 73502 X-RAY EXAM HIP UNI 2-3 VIEWS: CPT | Mod: 26,RT,, | Performed by: RADIOLOGY

## 2017-04-12 PROCEDURE — 73552 X-RAY EXAM OF FEMUR 2/>: CPT | Mod: TC,RT

## 2017-04-12 PROCEDURE — 99999 PR PBB SHADOW E&M-EST. PATIENT-LVL IV: CPT | Mod: PBBFAC,,, | Performed by: NURSE PRACTITIONER

## 2017-04-12 PROCEDURE — 73552 X-RAY EXAM OF FEMUR 2/>: CPT | Mod: 26,RT,, | Performed by: RADIOLOGY

## 2017-04-12 PROCEDURE — 99213 OFFICE O/P EST LOW 20 MIN: CPT | Mod: S$PBB,,, | Performed by: NURSE PRACTITIONER

## 2017-04-12 PROCEDURE — 73502 X-RAY EXAM HIP UNI 2-3 VIEWS: CPT | Mod: TC,RT

## 2017-04-12 RX ORDER — METHYLPREDNISOLONE 4 MG/1
TABLET ORAL
Qty: 1 PACKAGE | Refills: 0 | Status: SHIPPED | OUTPATIENT
Start: 2017-04-12 | End: 2017-05-23

## 2017-04-12 NOTE — LETTER
April 12, 2017      Janett Montoya MD  [address]  [place] [address]           [facility]  [address]  [place] [address]  Phone: [phone]          Patient: Isael Salmon   MR Number: [id number]   YOB: 1945   Date of Visit: 4/12/2017       Dear Dr. Janett Montoya:    Thank you for referring Isael Salmon to me for evaluation. Attached you will find relevant portions of my assessment and plan of care.    If you have questions, please do not hesitate to call me. I look forward to following Isael Salmon along with you.    Sincerely,    Sania Mittal, NP    Enclosure  CC:  No Recipients    If you would like to receive this communication electronically, please contact [email or web address] or [phone] to request more information on [employer] Link access.    For providers and/or their staff who would like to refer a patient to [facility], please contact us through our one-stop-shop provider referral line, [facility] , at [phone].    If you feel you have received this communication in error or would no longer like to receive these types of communications, please e-mail [email or web address]

## 2017-04-12 NOTE — PROGRESS NOTES
SUBJECTIVE:     Chief Complaint & History of Present Illness:  Isael Salmon is a 71 y.o. year old female presenting today for improved but not resolved intermittent right hip pain, groin pain which radiates to the lower leg which started a few weeks ago after slipped in the bathroom. She reports however that what really brings her in is that she woke up last night with 10/10 shooting pain down the right leg starting at the lower back/hip area. She states she couldn't even walk this morning but now- still terrible pain but able to walk. She states that she did slip at [employer] yesterday, didn't hit the floor she caught herself. She states she did feel pain then but nothing like this. She reports tingling down the leg as well. She states that the diclofenac and mobic did not fully resolve the previous pain and she missed her PT apt. She states that she is rescheduled for next week.    Past Medical History:   Diagnosis Date    Adjustment disorder 7/26/2012    Anemia 7/26/2012    AR (allergic rhinitis) 9/5/2014    Bronchitis     Fibrocystic breast disease in female 7/26/2012    GERD (gastroesophageal reflux disease) 7/26/2012    Gout     Gout, arthritis 7/26/2012    History of blood transfusion 7/26/2012    History of colonic polyps 7/26/2012    Hypercalcemia 9/20/2012    Hyperlipidemia 7/26/2012    Hypertension 7/26/2012    Nuclear sclerosis 5/2/2014    Osteoarthritis 7/26/2012    Other hyperparathyroidism 9/20/2012    Postmenopausal status 7/26/2012    PVD (peripheral vascular disease) 7/26/2012    left leg laser of vein with injection    Stroke 1997    TIA    Superficial vein thrombosis     Transient ischemic attack 7/26/2012    Varicose veins 7/26/2012       Past Surgical History:   Procedure Laterality Date    BREAST SURGERY      breast reduction    broken second finger Right 12/2015    pins placed    carotid endarterectomy  1997    right    CAROTID ENDARTERECTOMY Right 1997     COLONOSCOPY N/A 10/26/2015    Procedure: COLONOSCOPY;  Surgeon: Manuel Alanis MD;  Location: [facility] ([address] [place] [address]);  Service: Endoscopy;  Laterality: N/A;    HYSTERECTOMY      ORIF FINGER FRACTURE  12/18/15    TIA      VASCULAR SURGERY      left leg vein laser       Family History   Problem Relation Age of Onset    Heart failure Father     Cancer Mother      pancreas    Cancer Brother      bladder    Bone cancer Brother     Breast cancer Neg Hx     Ovarian cancer Neg Hx     Allergies Neg Hx     Asthma Neg Hx     Eczema Neg Hx     Cervical cancer Neg Hx     Endometrial cancer Neg Hx     Vaginal cancer Neg Hx        Review of patient's allergies indicates:   Allergen Reactions    Latex      Other reaction(s): Rash    Pcn [penicillins]      Other reaction(s): rash    Sulfa (sulfonamide antibiotics)      Other reaction(s): blisters    Avelox [moxifloxacin]      Other reaction(s): racing heart  Other reaction(s): shakes    Ciprofloxacin Other (See Comments)     Room starts to spin , nausea and dizziness    Codeine      Other reaction(s): nausea  Other reaction(s): weakness    Norco [hydrocodone-acetaminophen] Other (See Comments)         Current Outpatient Prescriptions:     allopurinol (ZYLOPRIM) 100 MG tablet, take 2 tablets by mouth once daily (Patient taking differently: take 3 tablets by mouth once daily), Disp: 60 tablet, Rfl: 12    aspirin (ECOTRIN) 81 MG EC tablet, Take 325 mg by mouth once daily., Disp: , Rfl:     co-enzyme Q-10 30 mg capsule, Take 200 mg by mouth once daily., Disp: , Rfl:     colchicine 0.6 mg tablet, One tablet every hour during gouty flare until have diarrhea. Max 6 tablets daily (Patient taking differently: as needed. One tablet every hour during gouty flare until have diarrhea. Max 6 tablets daily), Disp: 30 tablet, Rfl: 11    dicyclomine (BENTYL) 10 MG capsule, Take 1 capsule (10 mg total) by mouth 4 (four) times daily as needed. 1 Capsule Oral Four times  a day, Disp: 60 capsule, Rfl: 3    escitalopram oxalate (LEXAPRO) 10 MG tablet, take 1/2 tablet by mouth once daily, Disp: 45 tablet, Rfl: 3    estradiol (ESTRACE) 0.01 % (0.1 mg/gram) vaginal cream, Place vaginally twice a week. , Disp: , Rfl:     ferrous sulfate 325 mg (65 mg iron) Tab tablet, Take 1 tablet (325 mg total) by mouth every 12 (twelve) hours., Disp: 60 tablet, Rfl: 4    fluticasone (FLONASE) 50 mcg/actuation nasal spray, 2 sprays by Each Nare route daily as needed. 2 Aerosol, Spray Nasal Every day, Disp: 16 g, Rfl: 2    FOLBIC 2.5-25-2 mg Tab, take 1 tablet by mouth once daily, Disp: 30 tablet, Rfl: 11    indomethacin (INDOCIN) 50 MG capsule, Take 1 capsule (50 mg total) by mouth 3 (three) times daily as needed., Disp: 90 capsule, Rfl: 6    losartan (COZAAR) 50 MG tablet, Take 1 tablet (50 mg total) by mouth 2 (two) times daily., Disp: 60 tablet, Rfl: 3    omeprazole (PRILOSEC) 20 MG capsule, take 1 capsule by mouth twice a day, Disp: 60 capsule, Rfl: 6    rosuvastatin (CRESTOR) 20 MG tablet, Take 1 tablet (20 mg total) by mouth once daily., Disp: 90 tablet, Rfl: 3    spironolactone (ALDACTONE) 25 MG tablet, Take 25 mg by mouth once daily., Disp: , Rfl:     methylPREDNISolone (MEDROL DOSEPACK) 4 mg tablet, use as directed, Disp: 1 Package, Rfl: 0    [DISCONTINUED] salsalate (DISALCID) 750 MG Tab, Take 1 tablet (750 mg total) by mouth 3 (three) times daily., Disp: 90 tablet, Rfl: 9    Current Facility-Administered Medications:     albuterol nebulizer solution 2.5 mg, 2.5 mg, Nebulization, 1 time in Clinic/HOD, Kimberlee Duque PA-C    Review of Systems:  Review of Systems   Constitution: Negative for chills, decreased appetite, fever, weakness and malaise/fatigue.   Eyes: Negative for visual disturbance.   Cardiovascular: Negative for chest pain and palpitations.   Hematologic/Lymphatic: Negative for bleeding problem. Does not bruise/bleed easily.   Skin: Negative for color change, dry  "skin, flushing, itching, poor wound healing and rash.   Musculoskeletal: Positive for arthritis, falls, joint pain and stiffness. Negative for gout, joint swelling, muscle cramps, muscle weakness and myalgias.   Neurological: Negative for dizziness, light-headedness, loss of balance, numbness and paresthesias.   Psychiatric/Behavioral: Negative for altered mental status.         OBJECTIVE:     PHYSICAL EXAM:  Height: 5' 5" (165.1 cm) Weight: 77.7 kg (171 lb 4.8 oz)  General    Nursing note reviewed.  Constitutional: She is oriented to person, place, and time. She appears well-developed and well-nourished. No distress.   HENT:   Head: Atraumatic.   Nose: Nose normal.   Eyes: Conjunctivae and EOM are normal. Right eye exhibits no discharge. Left eye exhibits no discharge.   Cardiovascular: Normal rate.    Pulmonary/Chest: Effort normal. No respiratory distress.   Neurological: She is alert and oriented to person, place, and time.   Psychiatric: She has a normal mood and affect. Her behavior is normal. Judgment and thought content normal.           Right Knee Exam     Inspection   Erythema: absent  Scars: absent  Swelling: absent  Effusion: effusion  Deformity: deformity  Bruising: absent    Range of Motion   Extension: normal   Flexion: normal     Other   Sensation: normal    Comments:  No tenderness at the knee    Right Hip Exam     Inspection   Scars: absent  Swelling: absent  Bruising: absent  No deformity of hip.  Erythema: absent    Range of Motion   The patient has normal right hip ROM.    Muscle Strength   The patient has normal right hip strength.    Tests   Pain w/ forced internal rotation (GINA): absent  Pain w/ forced external rotation (FADIR): absent  Circumduction test: negative  Log Roll: negative    Other   Sensation: normal    Comments:  No tenderness to palpation   Pain with resisted straight leg raise at the low back      Vascular Exam     Right Pulses    Posterior Tibial:      2+        Capillary " Refill  Right Hand: normal capillary refill    Edema  Right Upper Leg: absent    RADIOGRAPHS:  Xray of the right femur no fracture or dislocation  Xray of the right hip showing mild DJD of the right hip  MRI 3/17 shows muscle strain of the gluteus medius muscle involving the attachment upon the right greater trochanter.    ASSESSMENT/PLAN:     Plan:   - I do not suspect hip involved based on her negative hip exam and HPI of shooting tingling pain down the leg with leg weakness (although the weakness improved now). She has low back pain on exam.  - Suspect sciatic/low back etiology  - Medrol dose pack   - F/U with spine specialist for eval   - Still start PT next week  - Pt in agreement with plan

## 2017-04-13 ENCOUNTER — DOCUMENTATION ONLY (OUTPATIENT)
Dept: GASTROENTEROLOGY | Facility: CLINIC | Age: 72
End: 2017-04-13

## 2017-04-13 ENCOUNTER — TELEPHONE (OUTPATIENT)
Dept: GASTROENTEROLOGY | Facility: CLINIC | Age: 72
End: 2017-04-13

## 2017-04-13 DIAGNOSIS — D50.9 IRON DEFICIENCY ANEMIA, UNSPECIFIED IRON DEFICIENCY ANEMIA TYPE: Primary | ICD-10-CM

## 2017-04-13 RX ORDER — FERROUS SULFATE 325(65) MG
325 TABLET ORAL EVERY 12 HOURS
Qty: 60 TABLET | Refills: 4 | Status: SHIPPED | OUTPATIENT
Start: 2017-04-13 | End: 2020-03-29

## 2017-04-13 NOTE — PROGRESS NOTES
Yanira - please tell Isael that her small bowel Video Capsule study was complete to cecum and no bleeding seen.    There was one small non-bleeding AVM (collection of superficial blood vessles) which could be the cause of her anemia and could bleed if she take mobic or other NSAIDs not needed for cardiovascular protection.    Two options since no evidence of bleeding and can;t prove that her AVM is the cause of her anemia and since not needing blood transfusions.    1. Stay on iron and avoid NSAIDs not needed for cardiovascular protection - apirin 81mg ok and get periodic hemoglobin and iron check with GI or your primary care MD. If bleeding or hemoglobin dropping  Refer to Dr. Oral Kingsley who can do an deep insertion ante-grade small bowel enteroscopy to try to find your small AVM (not guaranteed that he will be able to find this) to try to cauterize it and ablate the blood vessels.    2. Refer now  to Dr. Oral Kingsley who can do an deep insertion ante-grade small bowel enteroscopy to try to find your small AVM (not guaranteed that he will be able to find this) to try to cauterize it and ablate the blood vessels.

## 2017-04-18 ENCOUNTER — CLINICAL SUPPORT (OUTPATIENT)
Dept: REHABILITATION | Facility: HOSPITAL | Age: 72
End: 2017-04-18
Attending: INTERNAL MEDICINE
Payer: MEDICARE

## 2017-04-18 ENCOUNTER — TELEPHONE (OUTPATIENT)
Dept: GASTROENTEROLOGY | Facility: CLINIC | Age: 72
End: 2017-04-18

## 2017-04-18 DIAGNOSIS — M54.41 ACUTE RIGHT-SIDED LOW BACK PAIN WITH RIGHT-SIDED SCIATICA: ICD-10-CM

## 2017-04-18 PROBLEM — M54.40 ACUTE RIGHT-SIDED LOW BACK PAIN WITH SCIATICA: Status: ACTIVE | Noted: 2017-04-18

## 2017-04-18 PROCEDURE — G8978 MOBILITY CURRENT STATUS: HCPCS | Mod: CL,PO

## 2017-04-18 PROCEDURE — 97112 NEUROMUSCULAR REEDUCATION: CPT | Mod: PO

## 2017-04-18 PROCEDURE — G8979 MOBILITY GOAL STATUS: HCPCS | Mod: CJ,PO

## 2017-04-18 PROCEDURE — 97140 MANUAL THERAPY 1/> REGIONS: CPT | Mod: PO

## 2017-04-18 PROCEDURE — 97161 PT EVAL LOW COMPLEX 20 MIN: CPT | Mod: PO

## 2017-04-18 NOTE — PROGRESS NOTES
Name: Isael Salmon  Clinic Number: [id number]  04/18/2017.     Diagnosis: hip pain  Physician: Sania Mittal NP  Treatment Orders: PT Eval and Treat    Past Medical History:   Diagnosis Date    Adjustment disorder 7/26/2012    Anemia 7/26/2012    AR (allergic rhinitis) 9/5/2014    Bronchitis     Fibrocystic breast disease in female 7/26/2012    GERD (gastroesophageal reflux disease) 7/26/2012    Gout     Gout, arthritis 7/26/2012    History of blood transfusion 7/26/2012    History of colonic polyps 7/26/2012    Hypercalcemia 9/20/2012    Hyperlipidemia 7/26/2012    Hypertension 7/26/2012    Nuclear sclerosis 5/2/2014    Osteoarthritis 7/26/2012    Other hyperparathyroidism 9/20/2012    Postmenopausal status 7/26/2012    PVD (peripheral vascular disease) 7/26/2012    left leg laser of vein with injection    Stroke 1997    TIA    Superficial vein thrombosis     Transient ischemic attack 7/26/2012    Varicose veins 7/26/2012     Current Outpatient Prescriptions   Medication Sig    allopurinol (ZYLOPRIM) 100 MG tablet take 2 tablets by mouth once daily (Patient taking differently: take 3 tablets by mouth once daily)    aspirin (ECOTRIN) 81 MG EC tablet Take 325 mg by mouth once daily.    co-enzyme Q-10 30 mg capsule Take 200 mg by mouth once daily.    colchicine 0.6 mg tablet One tablet every hour during gouty flare until have diarrhea. Max 6 tablets daily (Patient taking differently: as needed. One tablet every hour during gouty flare until have diarrhea. Max 6 tablets daily)    dicyclomine (BENTYL) 10 MG capsule Take 1 capsule (10 mg total) by mouth 4 (four) times daily as needed. 1 Capsule Oral Four times a day    escitalopram oxalate (LEXAPRO) 10 MG tablet take 1/2 tablet by mouth once daily    estradiol (ESTRACE) 0.01 % (0.1 mg/gram) vaginal cream Place vaginally twice a week.     ferrous sulfate 325 mg (65 mg iron) Tab tablet Take 1 tablet (325 mg total) by mouth every 12  (twelve) hours.    ferrous sulfate 325 mg (65 mg iron) Tab tablet Take 1 tablet (325 mg total) by mouth every 12 (twelve) hours.    fluticasone (FLONASE) 50 mcg/actuation nasal spray 2 sprays by Each Nare route daily as needed. 2 Aerosol, Spray Nasal Every day    FOLBIC 2.5-25-2 mg Tab take 1 tablet by mouth once daily    indomethacin (INDOCIN) 50 MG capsule Take 1 capsule (50 mg total) by mouth 3 (three) times daily as needed.    losartan (COZAAR) 50 MG tablet Take 1 tablet (50 mg total) by mouth 2 (two) times daily.    methylPREDNISolone (MEDROL DOSEPACK) 4 mg tablet use as directed    omeprazole (PRILOSEC) 20 MG capsule take 1 capsule by mouth twice a day    rosuvastatin (CRESTOR) 20 MG tablet Take 1 tablet (20 mg total) by mouth once daily.    spironolactone (ALDACTONE) 25 MG tablet Take 25 mg by mouth once daily.    [DISCONTINUED] salsalate (DISALCID) 750 MG Tab Take 1 tablet (750 mg total) by mouth 3 (three) times daily.     Current Facility-Administered Medications   Medication    albuterol nebulizer solution 2.5 mg     Review of patient's allergies indicates:   Allergen Reactions    Latex      Other reaction(s): Rash    Pcn [penicillins]      Other reaction(s): rash    Sulfa (sulfonamide antibiotics)      Other reaction(s): blisters    Avelox [moxifloxacin]      Other reaction(s): racing heart  Other reaction(s): shakes    Ciprofloxacin Other (See Comments)     Room starts to spin , nausea and dizziness    Codeine      Other reaction(s): nausea  Other reaction(s): weakness    Norco [hydrocodone-acetaminophen] Other (See Comments)     Precautions: fall risk      Subjective:    Previous PT: yes; for bladder incontinence;   Work history: retired; 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 female referred to PT with back/hip pain. About 6 weeks ago was slipping on  the bathroom floor, but foot was stopped by the bathtub. Went to orthopedics, they did imagining, think it is a pinched nerve.  Reports sxs in leg, even with not walking. Last week had pain that prevented her from walking. Pain is located on the back of the leg. Current sxs are to the knee, not below. sxs will increase when she tries to stand after prolonged sitting. Uses a cane in case knee gives out. Cannot sit in the bathtub because it is so low. Then had trouble getting up from the tub. Can decrease sxs with advil. Tried oitments that haven't helped. sxs seem to decrease after walking for awhile.     Pain is rated on a 0-10 scale with 0 being no pain and 10 being pain requiring emergency room visit.    Diagnostic Tests: n/a    Patient Goals for PT: dec pain,       Objective:    FOTO Low back Survey 60% limitations  Current -XT Goal -PY    Category: Mobility     G-Code Modifiers  CH  0% Impaired, limited, or restricted    CI  19% - 1%  Impaired, limited, or restricted    CJ  20% - 39% Impaired, limited, or restricted    CK  40% - 59% Impaired, limited, or restricted    CL  60% - 79% Impaired, limited, or restricted    CM  80% - 99% Impaired, limited, or restricted    CN  100% Impaired, limited, or restricted        Visit # 1  Time In: 1020  Time Out: 1110  Treatment time: 60  Date of eval/re-eval: 4/18/2017    DTR R/L: Biceps C5/6 ABS/ NL Patella L4 aBS / NL Achilles S2 NL / NL DF NL / NL   NL 1+, 2+, 3+; ABS 0; HYP 4+; ISAI 5+    Light touch R/L: Ant thigh L2 NL / NL Med knee L3 NL / NL Med malleolus L4 NL / NL Med heel L5 NL / NL Lat foot S1 NL / NL Med foot S2 NL / NL    NL intact; DIM diminished     Alignment/appearance:   Thoracic spine:  --> nl --> inc   Lumbar spine:  --> nl --> inc  Pelvic tilt:  --> nl --> ant    Pelvic rotation:   nl     Strength R/L wfl throughout except    Hip ABD L5 nt / nt   Hip ER L5 nt / nt    Hip ext S2 nt / nt    AROM tests R/L   Multi-segmental flexion -    Return from  flexion -  Multi-segmental extension +    Multi-segmental rotation + / +  dec sxs with block to hips   Trunk side-bend + / + dec sxs with block to iliac crest    Single leg balance nt / nt   Deep squat nt  SL squat nt / nt        Findings R/L    Slump test + / -   SLR test - / -    ASLR/ Hamstring mobility nl / nl    GINA nt / nt   FADIR nt / nt   Modified Westley Test nt / nt   Hip ER  Supine 90E/90E  AROM nl / nl     Prone    AROM nl / nl   Hip IR   Supine 90E/90E  AROM comer / comer    Prone    AROM comer / comer  Hip ext ROM nt / nt Prone hip extension test nt / nt   L/S PA pressures nt   Prone instability test nt  Multifidus lift test (modified)  nt        PT Evaluation Complete? YES  Discussed Plan of Care with patient: YES    [1] Manual therapy x 15'   Lumbar gapping R in SL with overpressure   PPT PNF in quadruped      [1] Neuromuscular re-ed x 15'  Bed mobility - rolling, SL <> sitting  Quadruped rock back 10x   Lumbar rotation 5x 5 breath cycles   RLE sciatic n glides 2 x 5      Written HEP Provided: yes  Patient education: HEP  Isael demo good understanding of the education provided. Patient demo good return demo of skill of exercises.    Problem List: muscle length impairments, decreased motor control and mobility, impaired ADLs, activity and participation restrictions.       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  More flexible into lumbar motion than hip motion. Worked on log rolling to help reduce irritation in l/s.    Isael will benefit from skilled PT services to address these impairments and progress towards the below listed functional goals.  Isael is in agreement with the goals that will be addressed  in the treatment plan.Isael demonstrates no additional cultural, spiritual or educational needs and currently has no barriers to learning.      Medical necessity: see problem list -  indicates low / mod / high complexity based on clinical presentation that is stable / evolving / unstable.       Functional Goals  Time frame     1.   The pt will ambulate > 300' without increase in sxs for improved community ambulation.   6 weeks     2.   The pt will perform AROM of l/s with 50% reported reduction in sxs for improved functional mobility.   6 weeks     3.            4.   The pt will be independent in performance and progression of HEP.     2-3 visits            Plan:      Proceed/Continue with POC.     Pt will be treated by physical therapy 1-2x/week during the certification period. Treatment will consist of therapeutic exercise, manual therapy, neuromuscular re-education, heat and cold modalities, electrical stimulation for pain relief and/or muscle activation, and any other types of treatment hat may be deemed medically necessary. Isael may at times be seen by a PTA as part of the Rehab Team.       Physical Therapist: DAVID Arcos, PT, DPT, CSCS    I certify the need for these services furnished under this plan of treatment and while under my care.       ___________________________________  Physician/Referring Practitioner        _________________  Date of Signature

## 2017-04-18 NOTE — TELEPHONE ENCOUNTER
----- Message from Manuel Alanis MD sent at 4/13/2017  7:57 AM CDT -----  Yanira - please tell patient that they are iron deficient and anaemic and recommend that she take ferrous sulfate one 325mg pill every 12 hours for next 4 months and repeat fasting hemoglobin and Ferritin in 8 weeks - Orders placed.

## 2017-04-19 ENCOUNTER — TELEPHONE (OUTPATIENT)
Dept: ORTHOPEDICS | Facility: CLINIC | Age: 72
End: 2017-04-19

## 2017-04-19 NOTE — TELEPHONE ENCOUNTER
Pt did not answer. Left message regarding appt Thursday 4/20/17 at 130p with Sasha Chiang PA-C. Pt was informed to arrive on the 2nd floor at 1p, then up to floor 5 to see Sasha. Phone number was provided for any further questions.     Romi LAMBERT

## 2017-04-20 ENCOUNTER — HOSPITAL ENCOUNTER (OUTPATIENT)
Dept: RADIOLOGY | Facility: HOSPITAL | Age: 72
Discharge: HOME | End: 2017-04-20
Attending: ORTHOPAEDIC SURGERY
Payer: MEDICARE

## 2017-04-20 ENCOUNTER — OFFICE VISIT (OUTPATIENT)
Dept: ORTHOPEDICS | Facility: CLINIC | Age: 72
End: 2017-04-20
Payer: MEDICARE

## 2017-04-20 VITALS
SYSTOLIC BLOOD PRESSURE: 129 MMHG | WEIGHT: 171.44 LBS | BODY MASS INDEX: 28.56 KG/M2 | DIASTOLIC BLOOD PRESSURE: 63 MMHG | HEART RATE: 95 BPM | HEIGHT: 65 IN

## 2017-04-20 DIAGNOSIS — M25.551 RIGHT HIP PAIN: Primary | ICD-10-CM

## 2017-04-20 DIAGNOSIS — M54.50 LUMBAR SPINE PAIN: ICD-10-CM

## 2017-04-20 DIAGNOSIS — M54.41 RIGHT-SIDED LOW BACK PAIN WITH RIGHT-SIDED SCIATICA, UNSPECIFIED CHRONICITY: ICD-10-CM

## 2017-04-20 DIAGNOSIS — S76.011A STRAIN OF GLUTEUS MEDIUS, RIGHT, INITIAL ENCOUNTER: ICD-10-CM

## 2017-04-20 PROCEDURE — 72100 X-RAY EXAM L-S SPINE 2/3 VWS: CPT | Mod: 26,,, | Performed by: RADIOLOGY

## 2017-04-20 PROCEDURE — 99999 PR PBB SHADOW E&M-EST. PATIENT-LVL IV: CPT | Mod: PBBFAC,,, | Performed by: PHYSICIAN ASSISTANT

## 2017-04-20 PROCEDURE — 99214 OFFICE O/P EST MOD 30 MIN: CPT | Mod: PBBFAC | Performed by: PHYSICIAN ASSISTANT

## 2017-04-20 PROCEDURE — 99214 OFFICE O/P EST MOD 30 MIN: CPT | Mod: S$PBB,,, | Performed by: PHYSICIAN ASSISTANT

## 2017-04-20 PROCEDURE — 72120 X-RAY BEND ONLY L-S SPINE: CPT | Mod: 26,,, | Performed by: RADIOLOGY

## 2017-04-20 RX ORDER — METHOCARBAMOL 750 MG/1
750 TABLET, FILM COATED ORAL 3 TIMES DAILY
Qty: 60 TABLET | Refills: 0 | Status: SHIPPED | OUTPATIENT
Start: 2017-04-20 | End: 2017-05-10

## 2017-04-20 RX ORDER — POLYETHYLENE GLYCOL 3350, SODIUM SULFATE, SODIUM CHLORIDE, POTASSIUM CHLORIDE, ASCORBIC ACID, SODIUM ASCORBATE 7.5-2.691G
KIT ORAL
Refills: 0 | COMMUNITY
Start: 2017-03-09 | End: 2017-05-23

## 2017-04-20 RX ORDER — MELOXICAM 7.5 MG/1
TABLET ORAL
Refills: 0 | COMMUNITY
Start: 2017-03-13 | End: 2017-05-23

## 2017-04-20 NOTE — PROGRESS NOTES
DATE: 4/20/2017  PATIENT: Isael Salmon    Supervising Physician: Rajiv Razo M.D.    CHIEF COMPLAINT: back pain and right hip pain    HISTORY:  Isael Salmon is a 71 y.o. 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  The pain has been present for about a month.  She was seen by Jennifer Mittal NP for similar sym,ptoms 3/17/2017.  She slipped and fell in her bathroom and says she landed in a split.  She has pain in the right buttock and right groin that radiates into the leg.  She was prescribed a medrol dose pack which helped until she stopped taking it a few days ago.  Now the pain is back and it is worse.  The patient describes the pain as throbbing.  The pain is worse with sitting, standing and walking and improved by nothing in particular. There is no associated numbness and tingling. There is subjective weakness. Prior treatments have included a steroid pack, advil and she started physical therapy a few days ago, but no ESIs or surgery.    The patient denies myelopathic symptoms such as handwriting changes or difficulty with buttons/coins/keys. Denies perineal paresthesias, bowel/bladder dysfunction.    PAST MEDICAL/SURGICAL HISTORY:  Past Medical History:   Diagnosis Date    Adjustment disorder 7/26/2012    Anemia 7/26/2012    AR (allergic rhinitis) 9/5/2014    Bronchitis     Fibrocystic breast disease in female 7/26/2012    GERD (gastroesophageal reflux disease) 7/26/2012    Gout     Gout, arthritis 7/26/2012    History of blood transfusion 7/26/2012    History of colonic polyps 7/26/2012    Hypercalcemia 9/20/2012    Hyperlipidemia 7/26/2012    Hypertension 7/26/2012    Nuclear sclerosis 5/2/2014    Osteoarthritis 7/26/2012    Other hyperparathyroidism 9/20/2012    Postmenopausal status 7/26/2012    PVD (peripheral vascular disease) 7/26/2012    left leg laser of vein with  injection    Stroke 1997    TIA    Superficial vein thrombosis     Transient ischemic attack 7/26/2012    Varicose veins 7/26/2012     Past Surgical History:   Procedure Laterality Date    BREAST SURGERY      breast reduction    broken second finger Right 12/2015    pins placed    carotid endarterectomy  1997    right    CAROTID ENDARTERECTOMY Right 1997    COLONOSCOPY N/A 10/26/2015    Procedure: COLONOSCOPY;  Surgeon: Manuel Alanis MD;  Location: [address]);  Service: Endoscopy;  Laterality: N/A;    HYSTERECTOMY      ORIF FINGER FRACTURE  12/18/15    TIA      VASCULAR SURGERY      left leg vein laser       Medications:   Current Outpatient Prescriptions on File Prior to Visit   Medication Sig Dispense Refill    allopurinol (ZYLOPRIM) 100 MG tablet take 2 tablets by mouth once daily (Patient taking differently: take 3 tablets by mouth once daily) 60 tablet 12    aspirin (ECOTRIN) 81 MG EC tablet Take 325 mg by mouth once daily.      co-enzyme Q-10 30 mg capsule Take 200 mg by mouth once daily.      colchicine 0.6 mg tablet One tablet every hour during gouty flare until have diarrhea. Max 6 tablets daily (Patient taking differently: as needed. One tablet every hour during gouty flare until have diarrhea. Max 6 tablets daily) 30 tablet 11    dicyclomine (BENTYL) 10 MG capsule Take 1 capsule (10 mg total) by mouth 4 (four) times daily as needed. 1 Capsule Oral Four times a day 60 capsule 3    escitalopram oxalate (LEXAPRO) 10 MG tablet take 1/2 tablet by mouth once daily 45 tablet 3    estradiol (ESTRACE) 0.01 % (0.1 mg/gram) vaginal cream Place vaginally twice a week.       ferrous sulfate 325 mg (65 mg iron) Tab tablet Take 1 tablet (325 mg total) by mouth every 12 (twelve) hours. 60 tablet 4    ferrous sulfate 325 mg (65 mg iron) Tab tablet Take 1 tablet (325 mg total) by mouth every 12 (twelve) hours. 60 tablet 4    fluticasone (FLONASE) 50 mcg/actuation nasal spray 2 sprays by  Each Nare route daily as needed. 2 Aerosol, Spray Nasal Every day 16 g 2    FOLBIC 2.5-25-2 mg Tab take 1 tablet by mouth once daily 30 tablet 11    indomethacin (INDOCIN) 50 MG capsule Take 1 capsule (50 mg total) by mouth 3 (three) times daily as needed. 90 capsule 6    losartan (COZAAR) 50 MG tablet Take 1 tablet (50 mg total) by mouth 2 (two) times daily. 60 tablet 3    methylPREDNISolone (MEDROL DOSEPACK) 4 mg tablet use as directed 1 Package 0    omeprazole (PRILOSEC) 20 MG capsule take 1 capsule by mouth twice a day 60 capsule 6    rosuvastatin (CRESTOR) 20 MG tablet Take 1 tablet (20 mg total) by mouth once daily. 90 tablet 3    spironolactone (ALDACTONE) 25 MG tablet Take 25 mg by mouth once daily.      [DISCONTINUED] salsalate (DISALCID) 750 MG Tab Take 1 tablet (750 mg total) by mouth 3 (three) times daily. 90 tablet 9     Current Facility-Administered Medications on File Prior to Visit   Medication Dose Route Frequency Provider Last Rate Last Dose    albuterol nebulizer solution 2.5 mg  2.5 mg Nebulization 1 time in Clinic/HOD Kimberlee Duque PA-C           Social History:   Social History     Social History    Marital status:      Spouse name: N/A    Number of children: 1    Years of education: N/A     Occupational History    Retired      Social History Main Topics    Smoking status: Former Smoker     Quit date: 12/20/1997    Smokeless tobacco: Never Used      Comment: quit 1997 - 1 pack per day since age 16    Alcohol use No    Drug use: No    Sexual activity: Not Currently     Birth control/ protection: Surgical     Other Topics Concern    Not on file     Social History Narrative       REVIEW OF SYSTEMS:  Constitution: Negative. Negative for chills, fever and night sweats.   Cardiovascular: Negative for chest pain and syncope.   Respiratory: Negative for cough and shortness of breath.   Gastrointestinal: See HPI. Negative for nausea/vomiting. Negative for abdominal  "pain.  Genitourinary: See HPI. Negative for discoloration or dysuria.  Skin: Negative for dry skin, itching and rash.   Hematologic/Lymphatic: Negative for bleeding problem. Does not bruise/bleed easily.   Musculoskeletal: Negative for falls and muscle weakness.   Neurological: See HPI. No seizures.   Endocrine: Negative for polydipsia, polyphagia and polyuria.   Allergic/Immunologic: Negative for hives and persistent infections.     EXAM:  /63 (BP Location: Right arm, Patient Position: Sitting, BP Method: Automatic)  Pulse 95  Ht 5' 5" (1.651 m)  Wt 77.7 kg (171 lb 6.5 oz)  LMP  (LMP Unknown)  BMI 28.52 kg/m2    General: The patient is a very pleasant 71 y.o. female in no apparent distress, the patient is oriented to person, place and time.  Psych: Normal mood and affect  HEENT: Vision grossly intact, hearing intact to the spoken word.  Lungs: Respirations unlabored.  Gait: Normal station and gait, no difficulty with toe or heel walk.   Skin: Dorsal lumbar skin negative for rashes, lesions, hairy patches and surgical scars. There is mild right sided gluteus tenderness to palpation.  Range of motion: Lumbar range of motion is acceptable.  Spinal Balance: Global saggital and coronal spinal balance acceptable, not significant for scoliosis and kyphosis.  Musculoskeletal: Mild pain with the range of motion of the right hip. No trochanteric tenderness to palpation.    Vascular: Bilateral lower extremities warm and well perfused, dorsalis pedis pulses 2+ bilaterally.  Neurological: Normal strength and tone in all major motor groups in the bilateral lower extremities. Normal sensation to light touch in the L2-S1 dermatomes bilaterally.  Deep tendon reflexes symmetric 2+ in the bilateral lower extremities.  Negative Babinski bilaterally. Straight leg raise negative bilaterally.    IMAGING:      Today I personally reviewed AP, Lat and Flex/Ex  upright L-spine films that demonstrate mild degenerative changes of " the lumbar spine.       MRI pelvis shows a muscle strain of the gluteus medius muscle involving the attachment upon the right greater trochanter.      ASSESSMENT/PLAN:    Diagnoses and all orders for this visit:    Right hip pain  -     Procedure Order to [facility]; Future    Strain of gluteus medius, right, initial encounter  -     Procedure Order to [facility]; Future    Right-sided low back pain with right-sided sciatica, unspecified chronicity  -     Procedure Order to [facility]; Future    Other orders  -     methocarbamol (ROBAXIN) 750 MG Tab; Take 1 tablet (750 mg total) by mouth 3 (three) times daily. As needed for muscle spasms    The patient is having right hip pain and right sided low back pain.  Order placed for diagnostic/therapeutic right hip joint injection.  Follow up after injection if symptoms persist.  Will consider MRI lumbar spine if no relief with injection.        Return if symptoms worsen or fail to improve.

## 2017-04-20 NOTE — LETTER
April 20, 2017      Sania Mittal, NP  [address] [facility] [address]           [facility]  [address]  [place] [address]  Phone: [phone]          Patient: Isael Salmon   MR Number: [id number]   YOB: 1945   Date of Visit: 4/20/2017       Dear Sania Mittal:    Thank you for referring Isael Salmon to me for evaluation. Attached you will find relevant portions of my assessment and plan of care.    If you have questions, please do not hesitate to call me. I look forward to following Isael Salmon along with you.    Sincerely,    Sasha Chiang PA-C    Enclosure  CC:  No Recipients    If you would like to receive this communication electronically, please contact [email or web address] or [phone] to request more information on [employer] Link access.    For providers and/or their staff who would like to refer a patient to [facility], please contact us through our one-stop-shop provider referral line, [facility] , at [phone].    If you feel you have received this communication in error or would no longer like to receive these types of communications, please e-mail [email or web address]

## 2017-04-24 NOTE — PROGRESS NOTES
I have personally seen and examined the patient along with Dr. Young , and agree with assessment and recommendations.    Sandra Pappas MD

## 2017-04-28 ENCOUNTER — OFFICE VISIT (OUTPATIENT)
Dept: GASTROENTEROLOGY | Facility: CLINIC | Age: 72
End: 2017-04-28
Payer: MEDICARE

## 2017-04-28 VITALS
HEART RATE: 90 BPM | SYSTOLIC BLOOD PRESSURE: 119 MMHG | DIASTOLIC BLOOD PRESSURE: 58 MMHG | WEIGHT: 171.5 LBS | BODY MASS INDEX: 28.57 KG/M2 | HEIGHT: 65 IN

## 2017-04-28 DIAGNOSIS — K55.20 AVM (ARTERIOVENOUS MALFORMATION) OF SMALL BOWEL, ACQUIRED: Primary | ICD-10-CM

## 2017-04-28 PROCEDURE — 99213 OFFICE O/P EST LOW 20 MIN: CPT | Mod: S$PBB,,, | Performed by: INTERNAL MEDICINE

## 2017-04-28 PROCEDURE — 99213 OFFICE O/P EST LOW 20 MIN: CPT | Mod: PBBFAC | Performed by: INTERNAL MEDICINE

## 2017-04-28 PROCEDURE — 99999 PR PBB SHADOW E&M-EST. PATIENT-LVL III: CPT | Mod: PBBFAC,,, | Performed by: INTERNAL MEDICINE

## 2017-04-28 NOTE — MR AVS SNAPSHOT
[facility] - Gastroenterology  [address]  [facility] [address]  Phone: [phone]  Fax: [phone]                  Isael Salmon   2017 1:30 PM   Office Visit    Description:  Female : 1945   Provider:  Manuel Alanis MD   Department:  [facility]           Reason for Visit     Follow-up                To Do List           Future Appointments        Provider Department Dept Phone    2017 1:30 PM Andrea Arcos, PT [facility] [phone]    2017 8:30 AM LAB, APPOINTMENT [place] [facility] [phone]      Your Future Surgeries/Procedures     May 12, 2017   Surgery with Calvin Saleh MD   [facility] ([facility])    [address]  [facility] [address] [phone]              Goals (5 Years of Data)     None      [facility]     [facility] Nurse Care Line -  Assistance  Unless otherwise directed by your provider, please contact [facility], our nurse care line that is available for  assistance.     Registered nurses in the [facility] provide: appointment scheduling, clinical advisement, health education, and other advisory services.  Call: [phone] (toll free)               Medications           Message regarding Medications     Verify the changes and/or additions to your medication regime listed below are the same as discussed with your clinician today.  If any of these changes or additions are incorrect, please notify your healthcare provider.             Verify that the below list of medications is an accurate representation of the medications you are currently taking.  If none reported, the list may be blank. If incorrect, please contact your healthcare provider. Carry this list with you in case of emergency.           Current Medications     allopurinol (ZYLOPRIM) 100 MG tablet take 2 tablets by mouth once  "daily    aspirin (ECOTRIN) 81 MG EC tablet Take 325 mg by mouth once daily.    co-enzyme Q-10 30 mg capsule Take 200 mg by mouth once daily.    colchicine 0.6 mg tablet One tablet every hour during gouty flare until have diarrhea. Max 6 tablets daily    dicyclomine (BENTYL) 10 MG capsule Take 1 capsule (10 mg total) by mouth 4 (four) times daily as needed. 1 Capsule Oral Four times a day    escitalopram oxalate (LEXAPRO) 10 MG tablet take 1/2 tablet by mouth once daily    estradiol (ESTRACE) 0.01 % (0.1 mg/gram) vaginal cream Place vaginally twice a week.     ferrous sulfate 325 mg (65 mg iron) Tab tablet Take 1 tablet (325 mg total) by mouth every 12 (twelve) hours.    ferrous sulfate 325 mg (65 mg iron) Tab tablet Take 1 tablet (325 mg total) by mouth every 12 (twelve) hours.    fluticasone (FLONASE) 50 mcg/actuation nasal spray 2 sprays by Each Nare route daily as needed. 2 Aerosol, Spray Nasal Every day    FOLBIC 2.5-25-2 mg Tab take 1 tablet by mouth once daily    indomethacin (INDOCIN) 50 MG capsule Take 1 capsule (50 mg total) by mouth 3 (three) times daily as needed.    losartan (COZAAR) 50 MG tablet Take 1 tablet (50 mg total) by mouth 2 (two) times daily.    meloxicam (MOBIC) 7.5 MG tablet     methocarbamol (ROBAXIN) 750 MG Tab Take 1 tablet (750 mg total) by mouth 3 (three) times daily. As needed for muscle spasms    methylPREDNISolone (MEDROL DOSEPACK) 4 mg tablet use as directed    MOVIPREP 100-7.5-2.691 gram solution     omeprazole (PRILOSEC) 20 MG capsule take 1 capsule by mouth twice a day    ranitidine (ZANTAC) 300 MG tablet     rosuvastatin (CRESTOR) 20 MG tablet Take 1 tablet (20 mg total) by mouth once daily.    spironolactone (ALDACTONE) 25 MG tablet Take 25 mg by mouth once daily.           Clinical Reference Information           Your Vitals Were     BP Pulse Height Weight Last Period BMI    119/58 90 5' 5" (1.651 m) 77.8 kg (171 lb 8.3 oz) (LMP Unknown) 28.54 kg/m2      Blood Pressure       "    Most Recent Value    BP  (!)  119/58      Allergies as of 4/28/2017     Latex    Pcn [Penicillins]    Sulfa (Sulfonamide Antibiotics)    Avelox [Moxifloxacin]    Ciprofloxacin    Codeine    Norco [Hydrocodone-acetaminophen]      Immunizations Administered on Date of Encounter - 4/28/2017     None      Language Assistance Services     ATTENTION: Language assistance services are available, free of charge. Please call [phone].      ATENCIÓN: Si habla español, tiene a bolton disposición servicios gratuitos de asistencia lingüística. Llame al [phone].     CHÚ Ý: N?u b?n nói Ti?ng Vi?t, có các d?ch v? h? tr? ngôn ng? mi?n phí dành cho b?n. G?i s? [phone].         [facility] complies with applicable Federal civil rights laws and does not discriminate on the basis of race, color, national origin, age, disability, or sex.

## 2017-04-28 NOTE — PROGRESS NOTES
CHIEF COMPLAINT: Iron deficiency anemia.     HISTORY OF PRESENT ILLNESS: A 71-year-old female who had an EGD and colonoscopy  unrevealing for the cause of her iron deficiency anemia. She does take 81 mg   aspirin a day. She does take Mobic occasionally. She is on Prilosec 20 mg once  daily. Recent EGD and colonoscopy unrevealing. Biopsies of the stomach showed  no H. pylori, no dysplasia. The patient is brought in to talk VCE results and options.     REVIEW OF SYSTEMS:  CONSTITUTIONAL: No fever, fatigue or weight loss.  ENT: No difficulty swallowing or sore throat.  CARDIOVASCULAR: No chest pain or palpitations.  RESPIRATORY: No shortness of breath or cough.  GENITOURINARY: No dysuria, urgency, frequency or hematuria.  MUSCULOSKELETAL: No arthritis.  SKIN: No itching or rash.  NEUROLOGIC: No syncope, but she has had a TIA in the past on 81 mg aspirin.  PSYCHIATRIC: No uncontrolled depression or anxiety.  ENDOCRINE: No cold or heat intolerance.  LYMPHATICS: No lymphadenopathy.  ALLERGIES: Latex, penicillin, sulfa, Cipro, Avelox, codeine and Norco.  GASTROINTESTINAL: No nausea or vomiting. Heartburn well controlled on her PPI,  Prilosec. No bloating, no early satiety. No constipation, no diarrhea, no   blood in stool. Averages one bowel movement every other day.     RISK FACTORS FOR LIVER DISEASE: History of blood transfusion in the past. No   IV drugs. No tattoos. No nasal drug use.     PAST MEDICAL HISTORY: Positive for hyperlipidemia, hypertension and TIA.     PAST SURGICAL HISTORY: Hysterectomy, breast reduction and right carotid   endarterectomy.     FAMILY HISTORY: Brother with Crohn disease. Mom with pancreatic cancer at age   89. Brother with bone cancer at 77. Nobody else with pancreatic cancer.   Nobody with colon cancer. Nobody with advanced colon adenomatous polyps before   the age of 60. No FAP, no attenuated FAP, no MAP and no Jenkins syndrome. Nobody  with inflammatory bowel disease or celiac  "zeynep.     SOCIAL HISTORY: Quit smoking in . She does not drink. 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 Only the   mules are allowed to pull carriages in the [place] due to the heat. She   is on her first marriage for 53.5 years. Her  is a mortician at [employer]. She has one daughter of 52.5 years of age who works in the   business in the  home.     PHYSICAL EXAMINATION:  BP (!) 119/58  Pulse 90  Ht 5' 5" (1.651 m)  Wt 77.8 kg (171 lb 8.3 oz)  LMP  (LMP Unknown)  BMI 28.54 kg/m2  GENERAL APPEARANCE: Well nourished, well developed, not in any acute distress.  ABDOMEN: Soft, no guarding, no rebound. No tenderness. No palpable   organomegaly. No bruits. No pulsatile masses. No stigmata of chronic liver   disease. No appreciative ascites or hernias. Normoactive bowel sounds.  EYES: Conjunctivae are nonicteric.  CARDIOVASCULAR: S1 and S2 without murmurs.  RESPIRATORY: Clear to auscultation bilaterally.  SKIN: No petechiae or rash on exposed skin areas.  NEUROLOGIC: Alert and oriented x4.  PSYCHIATRIC: Normal speech, mentation and affect.  LYMPHATICS: No cervical or supraclavicular lymphadenopathy.     MEDICAL DECISION MAKING: As above. Labs have been reviewed. The patient's   ferritin is currently 24. Hemoglobin is 10.6. Lipase is normal. Comprehensive  metabolic panel is normal except for mildly elevated serum CO2 of 30, upper   limits normal 29. EGD and colonoscopy reports have been reviewed. Images were   reviewed. Risks, benefits and alternatives of small bowel video capsule   endoscopy including bowel prep and retained video capsule needing surgical or   endoscopic removal discussed with the patient.    Findings:       Images of the esophagus, stomach and small bowel were obtained from        the swallowed capsule and reviewed.       On review of the recorded study, it was determined that the study        lasted 7-hours " 59-minutes (total recording time). The capsule        enteroscope entered the cecum at 0-hours 55-minutes.       complete study to cecum       No bleeding seen       No old blood       FGI: 2m 10s       FDI: 12m 47s       Non-bleeding small bowel AVM: 19m 53s       halfway: 55m 4s       LRI: 7h 59m 42s    Two options since no evidence of bleeding and can't                         prove that her AVM is the cause of her anemia and                         since she is not needing blood transfusions:                        1. Stay on iron and avoid NSAIDs not needed for                         cardiovascular protection - apirin 81mg ok and get                         periodic hemoglobin and iron check with GI or your                         primary care MD. If bleeding or hemoglobin dropping                         Refer to Dr. Oral Kingsley who can do an deep insertion                         ante-grade small bowel enteroscopy to try to find                         your small AVM (not guaranteed that he will be able                         to find this) to try to cauterize it and ablate the                         blood vessels.                        2. Refer now to Dr. Oral Kingsley who can do an deep                         insertion ante-grade small bowel enteroscopy to try                         find your small AVM (not guaranteed that he will be                         able to find this) to try to cauterize it and                         ablate the blood vessels     IMPRESSION AND PLAN:    1.  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  2. RTC in 6 months.

## 2017-05-12 ENCOUNTER — HOSPITAL ENCOUNTER (OUTPATIENT)
Facility: OTHER | Age: 72
Discharge: HOME | End: 2017-05-12
Attending: ANESTHESIOLOGY | Admitting: ANESTHESIOLOGY
Payer: MEDICARE

## 2017-05-12 ENCOUNTER — SURGERY (OUTPATIENT)
Age: 72
End: 2017-05-12

## 2017-05-12 VITALS
WEIGHT: 172 LBS | HEIGHT: 65 IN | SYSTOLIC BLOOD PRESSURE: 144 MMHG | RESPIRATION RATE: 18 BRPM | BODY MASS INDEX: 28.66 KG/M2 | DIASTOLIC BLOOD PRESSURE: 63 MMHG | HEART RATE: 79 BPM | OXYGEN SATURATION: 99 % | TEMPERATURE: 98 F

## 2017-05-12 DIAGNOSIS — G89.29 CHRONIC PAIN: ICD-10-CM

## 2017-05-12 DIAGNOSIS — M25.551 RIGHT HIP PAIN: ICD-10-CM

## 2017-05-12 DIAGNOSIS — M16.11 PRIMARY OSTEOARTHRITIS OF RIGHT HIP: Primary | ICD-10-CM

## 2017-05-12 PROCEDURE — 25500020 PHARM REV CODE 255: Performed by: ANESTHESIOLOGY

## 2017-05-12 PROCEDURE — 77002 NEEDLE LOCALIZATION BY XRAY: CPT | Mod: 26,59,, | Performed by: ANESTHESIOLOGY

## 2017-05-12 PROCEDURE — 20610 DRAIN/INJ JOINT/BURSA W/O US: CPT | Performed by: ANESTHESIOLOGY

## 2017-05-12 PROCEDURE — 25000003 PHARM REV CODE 250: Performed by: ANESTHESIOLOGY

## 2017-05-12 PROCEDURE — 63600175 PHARM REV CODE 636 W HCPCS: Performed by: ANESTHESIOLOGY

## 2017-05-12 PROCEDURE — 20610 DRAIN/INJ JOINT/BURSA W/O US: CPT | Mod: RT,,, | Performed by: ANESTHESIOLOGY

## 2017-05-12 PROCEDURE — 77002 NEEDLE LOCALIZATION BY XRAY: CPT | Performed by: ANESTHESIOLOGY

## 2017-05-12 RX ORDER — ALPRAZOLAM 0.5 MG/1
0.5 TABLET, ORALLY DISINTEGRATING ORAL
Status: DISCONTINUED | OUTPATIENT
Start: 2017-05-12 | End: 2017-05-12 | Stop reason: HOSPADM

## 2017-05-12 RX ORDER — LIDOCAINE HYDROCHLORIDE 10 MG/ML
INJECTION INFILTRATION; PERINEURAL
Status: DISCONTINUED | OUTPATIENT
Start: 2017-05-12 | End: 2017-05-12 | Stop reason: HOSPADM

## 2017-05-12 RX ORDER — TRIAMCINOLONE ACETONIDE 40 MG/ML
INJECTION, SUSPENSION INTRA-ARTICULAR; INTRAMUSCULAR
Status: DISCONTINUED | OUTPATIENT
Start: 2017-05-12 | End: 2017-05-12 | Stop reason: HOSPADM

## 2017-05-12 RX ORDER — BUPIVACAINE HYDROCHLORIDE 2.5 MG/ML
INJECTION, SOLUTION EPIDURAL; INFILTRATION; INTRACAUDAL
Status: DISCONTINUED | OUTPATIENT
Start: 2017-05-12 | End: 2017-05-12 | Stop reason: HOSPADM

## 2017-05-12 RX ADMIN — TRIAMCINOLONE ACETONIDE 40 MG: 40 INJECTION, SUSPENSION INTRA-ARTICULAR; INTRAMUSCULAR at 10:05

## 2017-05-12 RX ADMIN — IOHEXOL 50 ML: 300 INJECTION, SOLUTION INTRAVENOUS at 10:05

## 2017-05-12 RX ADMIN — BUPIVACAINE HYDROCHLORIDE 10 ML: 2.5 INJECTION, SOLUTION EPIDURAL; INFILTRATION; INTRACAUDAL; PERINEURAL at 10:05

## 2017-05-12 RX ADMIN — LIDOCAINE HYDROCHLORIDE 10 ML: 10 INJECTION, SOLUTION INFILTRATION; PERINEURAL at 10:05

## 2017-05-12 RX ADMIN — ALPRAZOLAM 1 MG: 0.5 TABLET, ORALLY DISINTEGRATING ORAL at 10:05

## 2017-05-12 NOTE — IP AVS SNAPSHOT
[facility] Location ([place])  [address] [place], [place] [address]  Phone: [phone]           Patient Discharge Instructions   Our goal is to set you up for success. This packet includes information on your condition, medications, and your home care.  It will help you care for yourself to prevent having to return to the hospital.     Please ask your nurse if you have any questions.      There are many details to remember when preparing to leave the hospital. Here is what you will need to do:    1. Take your medicine. If you are prescribed medications, review your Medication List on the following pages. You may have new medications to  at the pharmacy and others that you'll need to stop taking. Review the instructions for how and when to take your medications. Talk with your doctor or nurses if you are unsure of what to do.     2. Go to your follow-up appointments. Specific follow-up information is listed in the following pages. Your may be contacted by a nurse or clinical provider about future appointments. Be sure we have all of the phone numbers to reach you. Please contact your provider's office if you are unable to make an appointment.     3. Watch for warning signs. Your doctor or nurse will give you detailed warning signs to watch for and when to call for assistance. These instructions may also include educational information about your condition. If you experience any of warning signs to your health, call your doctor.           [facility]  Unless otherwise directed by your provider, please   contact [facility], our nurse care line   that is available for 24/7 assistance.     [phone] (toll-free)     Registered nurses in the [facility]   provide: appointment scheduling, clinical advisement, health education, and other advisory services.                  ** Verify the list of medication(s) below is accurate and up to date. Carry this with you in case of  emergency. If your medications have changed, please notify your healthcare provider.             Medication List      ASK your doctor about these medications        Additional Info                      allopurinol 100 MG tablet   Commonly known as:  ZYLOPRIM   Quantity:  60 tablet   Refills:  12    Instructions:  take 2 tablets by mouth once daily     Begin Date    AM    Noon    PM    Bedtime       aspirin 81 MG EC tablet   Commonly known as:  ECOTRIN   Refills:  0   Dose:  325 mg    Instructions:  Take 325 mg by mouth once daily.     Begin Date    AM    Noon    PM    Bedtime       co-enzyme Q-10 30 mg capsule   Refills:  0   Dose:  200 mg    Instructions:  Take 200 mg by mouth once daily.     Begin Date    AM    Noon    PM    Bedtime       colchicine 0.6 mg tablet   Quantity:  30 tablet   Refills:  11    Instructions:  One tablet every hour during gouty flare until have diarrhea. Max 6 tablets daily     Begin Date    AM    Noon    PM    Bedtime       dicyclomine 10 MG capsule   Commonly known as:  BENTYL   Quantity:  60 capsule   Refills:  3   Dose:  10 mg    Instructions:  Take 1 capsule (10 mg total) by mouth 4 (four) times daily as needed. 1 Capsule Oral Four times a day     Begin Date    AM    Noon    PM    Bedtime       escitalopram oxalate 10 MG tablet   Commonly known as:  LEXAPRO   Quantity:  45 tablet   Refills:  3    Instructions:  take 1/2 tablet by mouth once daily     Begin Date    AM    Noon    PM    Bedtime       estradiol 0.01 % (0.1 mg/gram) vaginal cream   Commonly known as:  ESTRACE   Refills:  0    Instructions:  Place vaginally twice a week.     Begin Date    AM    Noon    PM    Bedtime       * ferrous sulfate 325 mg (65 mg iron) Tab tablet   Quantity:  60 tablet   Refills:  4   Dose:  325 mg    Instructions:  Take 1 tablet (325 mg total) by mouth every 12 (twelve) hours.     Begin Date    AM    Noon    PM    Bedtime       * ferrous sulfate 325 mg (65 mg iron) Tab tablet   Quantity:  60 tablet    Refills:  4   Dose:  325 mg    Instructions:  Take 1 tablet (325 mg total) by mouth every 12 (twelve) hours.     Begin Date    AM    Noon    PM    Bedtime       fluticasone 50 mcg/actuation nasal spray   Commonly known as:  FLONASE   Quantity:  16 g   Refills:  2   Dose:  2 spray    Instructions:  2 sprays by Each Nare route daily as needed. 2 Aerosol, Spray Nasal Every day     Begin Date    AM    Noon    PM    Bedtime       FOLBIC 2.5-25-2 mg Tab   Quantity:  30 tablet   Refills:  11   Generic drug:  folic acid-vit B6-vit B12 2.5-25-2 mg    Instructions:  take 1 tablet by mouth once daily     Begin Date    AM    Noon    PM    Bedtime       indomethacin 50 MG capsule   Commonly known as:  INDOCIN   Quantity:  90 capsule   Refills:  6   Dose:  50 mg    Instructions:  Take 1 capsule (50 mg total) by mouth 3 (three) times daily as needed.     Begin Date    AM    Noon    PM    Bedtime       losartan 50 MG tablet   Commonly known as:  COZAAR   Quantity:  60 tablet   Refills:  3   Dose:  50 mg    Instructions:  Take 1 tablet (50 mg total) by mouth 2 (two) times daily.     Begin Date    AM    Noon    PM    Bedtime       meloxicam 7.5 MG tablet   Commonly known as:  MOBIC   Refills:  0      Begin Date    AM    Noon    PM    Bedtime       methylPREDNISolone 4 mg tablet   Commonly known as:  MEDROL DOSEPACK   Quantity:  1 Package   Refills:  0    Instructions:  use as directed     Begin Date    AM    Noon    PM    Bedtime       MOVIPREP 100-7.5-2.691 gram solution   Refills:  0   Generic drug:  polyethylene glycol      Begin Date    AM    Noon    PM    Bedtime       omeprazole 20 MG capsule   Commonly known as:  PRILOSEC   Quantity:  60 capsule   Refills:  6    Instructions:  take 1 capsule by mouth twice a day     Begin Date    AM    Noon    PM    Bedtime       ranitidine 300 MG tablet   Commonly known as:  ZANTAC   Refills:  0      Begin Date    AM    Noon    PM    Bedtime       rosuvastatin 20 MG tablet   Commonly known  as:  CRESTOR   Quantity:  90 tablet   Refills:  3   Dose:  20 mg    Instructions:  Take 1 tablet (20 mg total) by mouth once daily.     Begin Date    AM    Noon    PM    Bedtime       salsalate 750 MG Tab   Commonly known as:  DISALCID   Quantity:  90 tablet   Refills:  9   Dose:  750 mg    Instructions:  Take 1 tablet (750 mg total) by mouth 3 (three) times daily.     Begin Date    AM    Noon    PM    Bedtime       spironolactone 25 MG tablet   Commonly known as:  ALDACTONE   Refills:  0   Dose:  25 mg    Instructions:  Take 25 mg by mouth once daily.     Begin Date    AM    Noon    PM    Bedtime       * Notice:  This list has 2 medication(s) that are the same as other medications prescribed for you. Read the directions carefully, and ask your doctor or other care provider to review them with you.               Please bring to all follow up appointments:    1. A copy of your discharge instructions.  2. All medicines you are currently taking in their original bottles.  3. Identification and insurance card.    Please arrive 15 minutes ahead of scheduled appointment time.    Please call 24 hours in advance if you must reschedule your appointment and/or time.        Your Scheduled Appointments     May 23, 2017 10:45 AM CDT   Return with Sukhdeep Aguilar MD   [facility] ([facility])    [address] [place] [id number]   [phone]            Jun 13, 2017  8:30 AM CDT   Fasting Lab with LAB, APPOINTMENT [place] [facility] ([facility])    [address] [address]   [phone]            Jun 13, 2017 11:00 AM CDT   Established Patient Visit with MD [facility] ([facility])    [address]  [place] [address]   [phone]                  Discharge Instructions       Home Care Instructions Pain Management:    1. DIET:   You may resume your normal diet today.  "  2. BATHING:   You may shower with luke warm water. No soaking in tub.  3. DRESSING:   You may remove your bandage today.   4. ACTIVITY LEVEL:   You may resume your normal activities 24 hrs after your procedure.  5. MEDICATIONS:   You may resume your normal medications today.   6. SPECIAL INSTRUCTIONS:   No heat to the injection site for 24 hrs including, bath or shower, heating pad, moist heat, or hot tubs.    Use ice pack to injection site for any pain or discomfort.  Apply ice packs for 20 minute intervals as needed.   If you have received any sedatives by mouth today you may not drive for 12 hours.    If you have received any sedation through your IV, you may not drive for 24 hrs.     PLEASE CALL YOUR DOCTOR IF:  1. Redness or swelling around the injection site.  2. Fever of 101 degrees  3. Drainage (pus) from the injection site.  4. For any continuous bleeding (some dried blood over the incision is normal.)  5. For severe headache that is relieved when lying flat.    FOR EMERGENCIES:   If any unusual problems or difficulties occur during clinic hours, call [phone] or 361.         Admission Information     Date & Time Provider Department CSN    5/12/2017  9:01 AM Calvin Saleh MD [facility] [id number]      Care Providers     Provider Role Specialty Primary office phone    Calvin Saleh MD Attending Provider Pain Medicine [phone]    Calvin Saleh MD Surgeon  Pain Medicine [phone]      Your Vitals Were     BP Pulse Temp Resp Height Weight    154/71 90 97.6 °F (36.4 °C) (Oral) 18 5' 5" (1.651 m) 78 kg (172 lb)    Last Period SpO2 BMI          (LMP Unknown) 99% 28.62 kg/m2        Recent Lab Values        4/15/2010 11/3/2016                        9:14 AM  5:21 AM          A1C 6.0 5.8          Comment for A1C at  5:21 AM on 11/3/2016:  According to ADA guidelines, hemoglobin A1C <7.0% represents  optimal control in non-pregnant diabetic patients.  Different  metrics " may apply to specific populations.   Standards of Medical Care in Diabetes - 2016.  For the purpose of screening for the presence of diabetes:  <5.7%     Consistent with the absence of diabetes  5.7-6.4%  Consistent with increasing risk for diabetes   (prediabetes)  >or=6.5%  Consistent with diabetes  Currently no consensus exists for use of hemoglobin A1C  for diagnosis of diabetes for children.        Allergies as of 5/12/2017        Reactions    Latex     Other reaction(s): Rash    Pcn [Penicillins]     Other reaction(s): rash    Sulfa (Sulfonamide Antibiotics)     Other reaction(s): blisters    Avelox [Moxifloxacin]     Other reaction(s): racing heart  Other reaction(s): shakes    Ciprofloxacin Other (See Comments)    Room starts to spin , nausea and dizziness    Codeine     Other reaction(s): nausea  Other reaction(s): weakness    Norco [Hydrocodone-acetaminophen] Other (See Comments)      Advance Directives     An advance directive is a document which, in the event you are no longer able to make decisions for yourself, tells your healthcare team what kind of treatment you do or do not want to receive, or who you would like to make those decisions for you.  If you do not currently have an advance directive, [facility] encourages you to create one.  For more information call:  [phone]), [phone] ([phone]),  or log on to [email or web address].        Language Assistance Services     ATTENTION: Language assistance services are available, free of charge. Please call [phone].      ATENCIÓN: Si habla español, tiene a bolton disposición servicios gratuitos de asistencia lingüística. Llame al [phone].     [facility] Ý: N?u b?n nói Ti?ng Vi?t, có các d?ch v? h? tr? ngôn ng? mi?n phí dành cho b?n. G?i s? [phone].         [facility] complies with applicable Federal civil rights laws and does not discriminate on the basis of race, color, national origin, age,  disability, or sex.

## 2017-05-12 NOTE — DISCHARGE INSTRUCTIONS

## 2017-05-12 NOTE — OP NOTE
Patient Name: Isael Salmon  MRN: [id number]    INFORMED CONSENT: The procedure, risks, benefits and options were discussed with patient. There are no contraindications to the procedure. The patient expressed understanding and agreed to proceed. The personnel performing the procedure was discussed. I verify that I personally obtained Isael's consent prior to the start of the procedure and the signed consent can be found on the patient's chart.    Procedure Date: 05/12/2017    Anesthesia: Topical    Pre Procedure diagnosis:   1. Primary osteoarthritis of right hip    2. Right hip pain    3. Chronic pain      Post-Procedure diagnosis: same      Sedation: None    PROCEDURE: right ACETABULOFEMORAL JOINT INJECTION (HIP)      DESCRIPTION OF PROCEDURE: The patient was brought to the procedure room.  After performing time out. IV access was obtained prior to the procedure.  The patient was positioned rt on the fluoroscopy table.  Continuous hemodynamic monitoring was initiated including blood pressure, EKG, and pulse oximetry. .  The skin overlying the hip was prepped with chlorhexidine three times and draped in a sterile fashion.  The skin and subcutaneous tissue was anesthetized using 3 cc of lidocaine 1%.  A 22 gauge, 3 1/2 spinal needle was slowly advanced through under fluoroscopic guidance into the acetabulofemoral joint. The needle position was confirmed using oblique, AP and lateral fluoroscopic imaging.  Negative aspiration was confirmed. 2 cc of Omnipaque 300 was injected confirming intra-articular contrast spread. A combination of 5 cc of bupivacaine 0.25% and 40 mg kenalog was easily injected. The needle was removed and bleeding was nill.  A sterile dressing was applied. Isael was taken back to the recovery room for further observation.     Blood Loss: Nill  Specimen: None    Calvin Saleh MD\

## 2017-05-12 NOTE — DISCHARGE SUMMARY
Discharge Note  Short Stay      SUMMARY     Admit Date: 5/12/2017    Attending Physician: Calvin Saleh      Discharge Physician: Calvin Saleh      Discharge Date: 5/12/2017 10:44 AM    Final Diagnosis:   1. Primary osteoarthritis of right hip    2. Right hip pain    3. Chronic pain        Disposition: Home or self care    Patient Instructions:   Current Discharge Medication List      CONTINUE these medications which have NOT CHANGED    Details   aspirin (ECOTRIN) 81 MG EC tablet Take 325 mg by mouth once daily.      escitalopram oxalate (LEXAPRO) 10 MG tablet take 1/2 tablet by mouth once daily  Qty: 45 tablet, Refills: 3      fluticasone (FLONASE) 50 mcg/actuation nasal spray 2 sprays by Each Nare route daily as needed. 2 Aerosol, Spray Nasal Every day  Qty: 16 g, Refills: 2    Associated Diagnoses: Asthmatic bronchitis      FOLBIC 2.5-25-2 mg Tab take 1 tablet by mouth once daily  Qty: 30 tablet, Refills: 11      losartan (COZAAR) 50 MG tablet Take 1 tablet (50 mg total) by mouth 2 (two) times daily.  Qty: 60 tablet, Refills: 3    Associated Diagnoses: Transient cerebral ischemia, unspecified type; PVD (peripheral vascular disease); Gout, arthritis; History of hyperlipidemia      meloxicam (MOBIC) 7.5 MG tablet Refills: 0      omeprazole (PRILOSEC) 20 MG capsule take 1 capsule by mouth twice a day  Qty: 60 capsule, Refills: 6      ranitidine (ZANTAC) 300 MG tablet Refills: 0      rosuvastatin (CRESTOR) 20 MG tablet Take 1 tablet (20 mg total) by mouth once daily.  Qty: 90 tablet, Refills: 3      spironolactone (ALDACTONE) 25 MG tablet Take 25 mg by mouth once daily.      allopurinol (ZYLOPRIM) 100 MG tablet take 2 tablets by mouth once daily  Qty: 60 tablet, Refills: 12      co-enzyme Q-10 30 mg capsule Take 200 mg by mouth once daily.      colchicine 0.6 mg tablet One tablet every hour during gouty flare until have diarrhea. Max 6 tablets daily  Qty: 30 tablet, Refills: 11      dicyclomine (BENTYL) 10 MG  capsule Take 1 capsule (10 mg total) by mouth 4 (four) times daily as needed. 1 Capsule Oral Four times a day  Qty: 60 capsule, Refills: 3      estradiol (ESTRACE) 0.01 % (0.1 mg/gram) vaginal cream Place vaginally twice a week.       !! ferrous sulfate 325 mg (65 mg iron) Tab tablet Take 1 tablet (325 mg total) by mouth every 12 (twelve) hours.  Qty: 60 tablet, Refills: 4    Associated Diagnoses: Low hemoglobin      !! ferrous sulfate 325 mg (65 mg iron) Tab tablet Take 1 tablet (325 mg total) by mouth every 12 (twelve) hours.  Qty: 60 tablet, Refills: 4    Associated Diagnoses: Iron deficiency anemia, unspecified iron deficiency anemia type      indomethacin (INDOCIN) 50 MG capsule Take 1 capsule (50 mg total) by mouth 3 (three) times daily as needed.  Qty: 90 capsule, Refills: 6      methylPREDNISolone (MEDROL DOSEPACK) 4 mg tablet use as directed  Qty: 1 Package, Refills: 0    Associated Diagnoses: Right leg pain; Sciatica of right side; Right-sided low back pain with right-sided sciatica, unspecified chronicity; Right groin pain      MOVIPREP 100-7.5-2.691 gram solution Refills: 0       !! - Potential duplicate medications found. Please discuss with provider.      STOP taking these medications       salsalate (DISALCID) 750 MG Tab Comments:   Reason for Stopping:                   Discharge Diagnosis: Same as Pre and Post Procedure  Condition on Discharge: Stable.  Diet on Discharge: Same as before.  Activity: as per instruction sheet.  Discharge to: Home with a responsible adult.  Follow up: as per Discharge instructions.

## 2017-06-21 ENCOUNTER — TELEPHONE (OUTPATIENT)
Dept: ORTHOPEDICS | Facility: CLINIC | Age: 72
End: 2017-06-21

## 2017-06-21 NOTE — TELEPHONE ENCOUNTER
Spoke to pt and she states after injection, she is still having pain and it's radiating down her leg. Pt was given an appt to follow up with Sasha Chiang PA-C 6/23 at 1045a. Pt verbalized understanding.

## 2017-06-21 NOTE — TELEPHONE ENCOUNTER
Left message for pt regarding getting an appt set up with Sania Mittal NP per her request. Name and number provided.

## 2017-06-23 ENCOUNTER — OFFICE VISIT (OUTPATIENT)
Dept: ORTHOPEDICS | Facility: CLINIC | Age: 72
End: 2017-06-23
Payer: MEDICARE

## 2017-06-23 VITALS — HEIGHT: 65 IN | BODY MASS INDEX: 27.85 KG/M2 | WEIGHT: 167.13 LBS

## 2017-06-23 DIAGNOSIS — M79.604 RIGHT LEG PAIN: Primary | ICD-10-CM

## 2017-06-23 PROCEDURE — 1125F AMNT PAIN NOTED PAIN PRSNT: CPT | Mod: ,,, | Performed by: PHYSICIAN ASSISTANT

## 2017-06-23 PROCEDURE — 99214 OFFICE O/P EST MOD 30 MIN: CPT | Mod: PBBFAC | Performed by: PHYSICIAN ASSISTANT

## 2017-06-23 PROCEDURE — 1159F MED LIST DOCD IN RCRD: CPT | Mod: ,,, | Performed by: PHYSICIAN ASSISTANT

## 2017-06-23 PROCEDURE — 99999 PR PBB SHADOW E&M-EST. PATIENT-LVL IV: CPT | Mod: PBBFAC,,, | Performed by: PHYSICIAN ASSISTANT

## 2017-06-23 PROCEDURE — 99213 OFFICE O/P EST LOW 20 MIN: CPT | Mod: S$PBB,,, | Performed by: PHYSICIAN ASSISTANT

## 2017-06-23 RX ORDER — CELECOXIB 100 MG/1
100 CAPSULE ORAL 2 TIMES DAILY
Qty: 60 CAPSULE | Refills: 0 | Status: SHIPPED | OUTPATIENT
Start: 2017-06-23 | End: 2017-12-19

## 2017-06-23 NOTE — PROGRESS NOTES
DATE: 6/23/2017  PATIENT: Isael Salmon    Attending Physician: Rajiv Razo M.D.    HISTORY:  Isael Salmon is a 71 y.o. female who returns to me today for follow of right leg pain.  She was last seen by me 4/20/2017.  Today she is doing well but notes the diagnostic/therapeutic right hip injection gave her relief for a day.  She does not have any more groin pain.  She reports right leg pain that starts in the buttock and radiates to the knee and foot.  She says she started taking indomethacin with good relief but it is upsetting her stomach.     The Patient denies myelopathic symptoms such as handwriting changes or difficulty with buttons/coins/keys. Denies perineal paresthesias, bowel/bladder dysfunction.    PMH/PSH/FamHx/SocHx:  Unchanged from prior visit    ROS:  REVIEW OF SYSTEMS:  Constitution: Negative. Negative for chills, fever and night sweats.   HENT: Negative for congestion and headaches.    Eyes: Negative for blurred vision, left vision loss and right vision loss.   Cardiovascular: Negative for chest pain and syncope.   Respiratory: Negative for cough and shortness of breath.    Endocrine: Negative for polydipsia, polyphagia and polyuria.   Hematologic/Lymphatic: Negative for bleeding problem. Does not bruise/bleed easily.   Skin: Negative for dry skin, itching and rash.   Musculoskeletal: Negative for falls and muscle weakness.   Gastrointestinal: Negative for abdominal pain and bowel incontinence.   Allergic/Immunologic: Negative for hives and persistent infections.  Genitourinary: Negative for urinary retention/incontinence and nocturia.   Neurological: Negative for disturbances in coordination, no myelopathic symptoms such as handwriting changes or difficulty with buttons, coins, keys or small objects. No loss of balance and seizures.   Psychiatric/Behavioral: Negative for depression. The patient does not have insomnia.   Denies perineal paresthesias, bowel or bladder  "incontinence    EXAM:  Ht 5' 5" (1.651 m)   Wt 75.8 kg (167 lb 1.7 oz)   LMP  (LMP Unknown)   BMI 27.81 kg/m²     My physical examination was notable for the following findings:     Normal station and gait, no difficulty with toe or heel walk.   Dorsal lumbar skin negative for rashes, lesions, hairy patches and surgical scars. There is no lumbar tenderness to palpation.  Lumbar range of motion is acceptable.  Global saggital and coronal spinal balance acceptable, not significant for scoliosis and kyphosis.  No pain with the range of motion of the bilateral hips. No trochanteric tenderness to palpation.  Bilateral lower extremities warm and well perfused, dorsalis pedis pulses 2+ bilaterally.  Normal strength and tone in all major motor groups in the bilateral lower extremities. Normal sensation to light touch in the L2-S1 dermatomes bilaterally.  Deep tendon reflexes symmetric 2+ in the bilateral lower extremities.  Negative Babinski bilaterally. Straight leg raise negative bilaterally.      IMAGING:  No new imaging today.    Today I personally re- reviewed AP, Lat and Flex/Ex  upright L-spine that demonstrate mild degenerative changes.      MRI pelvis shows a muscle strain of the gluteus medius muscle involving the attachment upon the right greater trochanter.      ASSESSMENT/PLAN:    Diagnoses and all orders for this visit:    Right leg pain  -     Ambulatory Referral to Physical/Occupational Therapy    Other orders  -     celecoxib (CELEBREX) 100 MG capsule; Take 1 capsule (100 mg total) by mouth 2 (two) times daily.    It is difficult to determine if the patient's symptoms are originating from the back or the hip.  She had relief with the hip injection for a day.  She denies groin pain today but continues to have pain in the right buttock to the knee.  She denies any back pain.  She does not want to have an MRI of the lumbar spine at this time.  She would like to try some PT.  Referral placed.  Will prescribe " celebrex to see if that is better on her stomach than indomethacin.    She will follow up with me in 3-4 weeks to re-evaluate.       No Follow-up on file.

## 2017-07-06 ENCOUNTER — CLINICAL SUPPORT (OUTPATIENT)
Dept: REHABILITATION | Facility: HOSPITAL | Age: 72
End: 2017-07-06
Attending: ORTHOPAEDIC SURGERY
Payer: MEDICARE

## 2017-07-06 DIAGNOSIS — G57.01 PIRIFORMIS SYNDROME OF RIGHT SIDE: Primary | ICD-10-CM

## 2017-07-06 PROCEDURE — 97164 PT RE-EVAL EST PLAN CARE: CPT | Mod: PO

## 2017-07-06 PROCEDURE — G8979 MOBILITY GOAL STATUS: HCPCS | Mod: CK,PO

## 2017-07-06 PROCEDURE — G8978 MOBILITY CURRENT STATUS: HCPCS | Mod: CK,PO

## 2017-07-06 PROCEDURE — 97110 THERAPEUTIC EXERCISES: CPT | Mod: PO

## 2017-07-06 NOTE — PROGRESS NOTES
PHYSICAL THERAPY Re-EVALUATION      Date of Service: 7/6/2017  Name: Isael RAM [facility] #: [id number]  Date of initial evaluation:  Orders:    Priority Start Date Expiration Date Referral Entered By   Routine 06/23/2017 12/31/2017 Sasha Chiang PA-C   Visits Requested Visits Authorized Visits Completed Visits Scheduled   1 20 1 0   Procedure Information   Diagnosis   M79.604 (ICD-10-CM) - Right leg pain     Start Time:1018  End [id number]  Actual treatment time 57 minutes  Group therapy time:  Na    HISTORY:72yo WF   Isael is a 71 y.o. female referred to PT with right side buttock and RLE pain to the knee in posterior thigh.  . About 6 weeks ago slipped on the bathroom floor, but foot was stopped by the bathtub. Went to orthopedics, they did imagining,  She was seen once in PT for evaluation 4/18/17 but was in severe pain after session and did not return.    Surgery: no   Recent injection   yes  Previous Physical Therapy treatment for current condition:  1x  Past Medical History:   Diagnosis Date    Adjustment disorder 7/26/2012    Anemia 7/26/2012    AR (allergic rhinitis) 9/5/2014    Bronchitis     Fibrocystic breast disease in female 7/26/2012    GERD (gastroesophageal reflux disease) 7/26/2012    Gout     Gout, arthritis 7/26/2012    History of blood transfusion 7/26/2012    History of colonic polyps 7/26/2012    Hypercalcemia 9/20/2012    Hyperlipidemia 7/26/2012    Hypertension 7/26/2012    Nuclear sclerosis 5/2/2014    Osteoarthritis 7/26/2012    Other hyperparathyroidism 9/20/2012    Postmenopausal status 7/26/2012    PVD (peripheral vascular disease) 7/26/2012    left leg laser of vein with injection    Stroke 1997    TIA    Superficial vein thrombosis     Transient ischemic attack 7/26/2012    Varicose veins 7/26/2012     [unfilled]                                                                               Review of patient's allergies indicates:   Allergen  Reactions    Latex      Other reaction(s): Rash    Pcn [penicillins]      Other reaction(s): rash    Sulfa (sulfonamide antibiotics)      Other reaction(s): blisters    Avelox [moxifloxacin]      Other reaction(s): racing heart  Other reaction(s): shakes    Ciprofloxacin Other (See Comments)     Room starts to spin , nausea and dizziness    Codeine      Other reaction(s): nausea  Other reaction(s): weakness    Norco [hydrocodone-acetaminophen] Other (See Comments)         Precautions: Fall    Imaging reports: reviewed    Minimal multilevel marginal vertebral endplate spurring.  No evidence of compression fracture or of translational instability.  No significant detrimental interval change since the examination of 8/13/08 is appreciated.   Impression      No acute abnormality. Mild DJD of the hips or femur           SOCIAL SITUATION:   Assistance at home:yes spouse  Stairs to navigate at work or home:no  Work status:na  Occupation:na  Envoirnmental concerns: none  DME: na  Cultural, Spiritual, Developmental and Educational concerns:none  Abuse/Neglect, Nutritional concerns: none     SUBJECTIVE:    Current symptoms/Chief Complaints: Pt states she is in terrible pain most of the time since the fall.  She cannot tolerate sitting and has great difficulty getting comfortable at night.  Pain is in the right buttock with sharp pain down the posterior of the right leg to the knee   Symptoms are: not changing   The following activities increase symptoms:  Sitting , bed   The following activities decrease symptoms: standing and walking    Current exercise program:  None   Patient's sleep disturbed. yes when awakens in pain with roll over   Dizziness: no   Headaches: no     Coughing, sneezing, and straining do not affect symptoms    Bowel and Bladder function is normal   Numbness or tingling denies       BACK     Pt rates pain as5  out of 10 at best and 8  out of 10 at worst.       Currently pain is 8/10 pre-therapy and  0/10 post therapy     PATIENT'S GOALS: Decrease pain, be able to sit comfortably, be able to get comfortable in bed     OBJECTIVE:  General appearance:  Moderately obese F in NAD  Body regions effected: right buttock RLE, lumbar region  Does body habitus affect function and or movement ability?  no    MUSCULOSKELETAL  Postural examination in standing:rounded shoulders, decreased lumbar lordosis, Left PSIS slightly higher than righ    LUMBAR symmetry:  equal  Symmetry of LEs equal  AROM Hips: WFL   AROM Knees WFL   AROM Ankles WFL       LUMBAR  ROM: 100% of normal range                         LE MMT                    Not tested due to pain    FLEXIBILITY:  Piriformis:tight R>L     SPECIAL TESTS  SLR: neg  Bilateral bridge  Cannot tolerate  Unilateral bridge  Cannot tolerate    PALPATION: Tender to palpation at right PSIS< SIJ, piriformis mm                        Muscle spasms noted  In right piriformis  SENSATION: intact to light touch BUE       \  NEUROMUSCULAR  GAIT:normal without any deviation    TRANSFERS:   Sit<> stand painful and slow                            Sipine<>roll with min assist and pain                            sidelying to sit mod assist and pain      TREATMENT: Re-evaluation  Moist heat for 10 minutes prior tot herex to relax piriformis  THEREX: 15 minutes of one to one instruction , manual and/or verbal cues for exercises designed to stretch, strengthen and build endurance of the affected body parts  with manual and verbal cues for technique and to advance program    Shot gun maneuver adduction 3 x 30sec hold  Shot gun maneuver abduction 3 x 30 second hold  R LE AP glide hip  distraction 3 x 30 second hold  Piriformis stretch 3 x 30 second hold  AROM hips  In future will add:  5-10  reps of each with a 5 second hold time  In supine:  Pelvic tilt  Pelvic tilt > bridge   Pelvic tilt set > single knee to chest  Pelvic tilt > double knee to chest  Pelvic tilt > lateral lumbar rotation  Pelvic  tilt > unilateral clam  Sciatic nerve floss  Pelvic tilt > bridge with adduction ball  Pelvic tilt > single LE heal taps  Pelvic tilt > unilateral clam with theraband  Achilles stretch on step 5 x 10 sec  Gastroc stretch on step 5 x 10 sec          EDUCATION:   No barriers to learning were identified. No social or cultural barriers to therapy participation were identified.   Written Home Exercises Provided:   Isael ding good understanding of the education provided. Patient toña good return demo of skill of exercises.  Pt instructed in proper body mechanics and postures for decreasing lumbar pain and safe use of ice and heat for pain relief. Pt expressed understanding of above education.      ASSESSMENT:  Pt felt complete relief in symptoms post therapy and suffered no adverse effects with treatment.   She was able to sit without any pain and was extremely excited about this.  She has a poor core strength so return of symptoms is likely.      Pt presents with evolving clinical presentation with changing clinical characteristicsthat include:   Pain in right buttock  with radiation,  decreased strength, decreased ROM, decreased ability to perform normal activity such as sitting and sleeping without pain,  decreased tolerance to functional activity due to pain.       Patient can benefit from outpatient physical therapy and a home program  Prognosis to achieve below goals is good provided pt is compliant with home program and PT appointments..       G CODE TOOL:    CMS Impairment/Limitation/Restriction for FOTO Hip Survey  Status Limitation G-Code CMS Severity Modifier  Intake 50% 50% Current Status CK - At least 40 percent but less than 60 percent  Predicted 57% 43% Goal Status+ CK - At least 40 percent but less than 60 percent  D/C Status CK **only report if this is a one time visit    The following goals were discussed with the patient and patient  is in agreement with them as to be addressed in the treatment  plan.    SHORT TERM GOALS:( 8 weeks)  Decrease subjective complaints of pain by 2 points  0-10 scale 5/7 days  Perform home exercise program independently   Pt to be able to perform at least one technique for self management of symptoms        Pt to display increased flexibility in piriformis to allow improved posture during ADLS  Pt to display ability to maintain neutral pelvis in supine, sit and stand for postural/core control  Pt to display ability to maintain neutral pelvis with transverse abdominus activation in dynamic situations replicating normal ADL    Additional goals to be set at 8 weeks if therapy is still indicated.    PLAN:  Outpatient physical therapy 1-2  times weekly based on patient's schedule for up to  90 days may  include: Patient education, Home Exercise Program,  therapeutic exercises, neuromuscular re-education/ balance, manual therapy, ultra sound,   PTA may be involved in patient care as part of the Rehab team.

## 2017-07-10 DIAGNOSIS — L60.1 ONYCHOLYSIS: ICD-10-CM

## 2017-07-11 RX ORDER — NEOMYCIN SULFATE, POLYMYXIN B SULFATE AND HYDROCORTISONE 10; 3.5; 1 MG/ML; MG/ML; [USP'U]/ML
SUSPENSION/ DROPS AURICULAR (OTIC)
Qty: 10 ML | Refills: 6 | Status: SHIPPED | OUTPATIENT
Start: 2017-07-11 | End: 2020-03-29

## 2017-07-12 ENCOUNTER — CLINICAL SUPPORT (OUTPATIENT)
Dept: REHABILITATION | Facility: HOSPITAL | Age: 72
End: 2017-07-12
Attending: ORTHOPAEDIC SURGERY
Payer: MEDICARE

## 2017-07-12 DIAGNOSIS — G57.01 PIRIFORMIS SYNDROME OF RIGHT SIDE: Primary | ICD-10-CM

## 2017-07-12 PROCEDURE — 97014 ELECTRIC STIMULATION THERAPY: CPT | Mod: PO

## 2017-07-12 PROCEDURE — 97110 THERAPEUTIC EXERCISES: CPT | Mod: PO

## 2017-07-12 NOTE — PROGRESS NOTES
PHYSICAL THERAPY Date of Service: 7/12/2017  Name: Isael RAM [facility] #: [id number]  Date of initial evaluation:  Orders:    Priority Start Date Expiration Date Referral Entered By   Routine 06/23/2017 12/31/2017 Sasha Chiang PA-C   Visits Requested Visits Authorized Visits Completed Visits Scheduled   1 20 3 0   Procedure Information   Diagnosis   M79.604 (ICD-10-CM) - Right leg pain     Start Time:1000  End [id number]  Actual treatment time 65 minutes  Group therapy time:  Na    HISTORY:72yo WF   Isael is a 71 y.o. female referred to PT with right side buttock and RLE pain to the knee in posterior thigh.  . About 6 weeks ago slipped on the bathroom floor, but foot was stopped by the bathtub. Went to orthopedics, they did imagining,  She was seen once in PT for evaluation 4/18/17 but was in severe pain after session and did not return.      Precautions: Fall  SUBJECTIVE:    Current symptoms/Chief Complaints: Pt states she had 2 full days of no pain following least session..But then pain returned and She cannot tolerate sitting and has great difficulty getting comfortable at night.  Pain is in the right buttock with sharp pain down the posterior of the right leg to the knee     The following activities increase symptoms:  Sitting , bed   The following activities decrease symptoms: standing and walking     Patient's sleep disturbed. yes when awakens in pain with roll over     BACK     Pt rates pain as5  out of 10 at best and 8  out of 10 at worst.       Currently pain is 8/10 pre-therapy and 1/10 post therapy     PATIENT'S GOALS: Decrease pain, be able to sit comfortably, be able to get comfortable in bed     OBJECTIVE:  General appearance:  Moderately obese F in NAD  MUSCULOSKELETAL  Postural examination in standing:rounded shoulders, decreased lumbar lordosis, Left PSIS slightly higher than right initially but at neutral after session.    Piriformis:tight R>L     PALPATION: Tender to palpation  at right PSIS< SIJ, piriformis mm                        Muscle spasms noted  In right piriformis    TREATMENT:   Moist heat for 30 minutes p to relax piriformis while receiving TENS  Patient was instructed in use of TENS, precautions with use of TENS, contraindications to use of TENS and expressed understanding and agreed to treatment.  Patient received TENS for pain control applied to right SIJ, right piriformis mm, right posterior medial lower thigh, pulse rate = 80 Hz, pulse width = 220 uS, modulated current for 30 minutes while on moist heat in supine..    During MH/TENs treatment pt performed  THEREX: 30 minutes of one to one instruction , manual and/or verbal cues for exercises designed to stretch, strengthen and build endurance of the   with manual and verbal cues for technique and to advance program  Shot gun maneuver adduction 3 x 30sec hold  Shot gun maneuver abduction 3 x 30 second hold  R LE AP glide hip  distraction 3 x 30 second hold  Piriformis stretch 3 x 30 second hold  AROM hips  X10 each:  Pelvic tilt  Pelvic tilt > bridge ---aborted due to pain  Pelvic tilt set > single knee to chest  Pelvic tilt > double knee to chest  Pelvic tilt > lateral lumbar rotation  5 minutes on recumbant bike prior to onset of all treatment for gentle pelvic rotation    Will add in successive treatment  Pelvic tilt > unilateral clam  Sciatic nerve floss  Pelvic tilt > bridge with adduction ball  Pelvic tilt > single LE heal taps  Pelvic tilt > unilateral clam with theraband  Achilles stretch on step 5 x 10 sec  Gastroc stretch on step 5 x 10 sec  Reverse trendelenberg          EDUCATION:   No barriers to learning were identified. No social or cultural barriers to therapy participation were identified.   Updated Written Home Exercises:  Pt left before receiving this.  I showed pt where she can order a TENSunit for home from [employer].  She is eager to purchase  Isael ding good understanding of the education provided.  Patient demo good return demo of skill of exercises.  Pt instructed in proper body mechanics and postures for decreasing lumbar pain and safe use of ice and heat for pain relief. Pt expressed understanding of above education.      ASSESSMENT:  Pt felt almost complete relief in symptoms post therapy and suffered no adverse effects with treatment.   She was able to sit without any pain.after session.  She is eager to purchase a TENS and this will offer her one method of self management of pain.  .      PLAN:Advance therex as tolerated

## 2017-07-19 ENCOUNTER — CLINICAL SUPPORT (OUTPATIENT)
Dept: REHABILITATION | Facility: HOSPITAL | Age: 72
End: 2017-07-19
Attending: ORTHOPAEDIC SURGERY
Payer: MEDICARE

## 2017-07-19 DIAGNOSIS — G57.01 PIRIFORMIS SYNDROME OF RIGHT SIDE: Primary | ICD-10-CM

## 2017-07-19 DIAGNOSIS — M54.41 ACUTE RIGHT-SIDED LOW BACK PAIN WITH RIGHT-SIDED SCIATICA: ICD-10-CM

## 2017-07-19 PROCEDURE — 97110 THERAPEUTIC EXERCISES: CPT | Mod: PO

## 2017-07-19 NOTE — PROGRESS NOTES
Physical Therapy Daily Note     Name: Isael RAM [facility] Number: [id number]  Diagnosis:   Encounter Diagnoses   Name Primary?    Piriformis syndrome of right side Yes    Acute right-sided low back pain with right-sided sciatica      Physician: Rajiv Razo MD     Visit #: 3 of 20     Time In: 10:05  Time Out: 11:00  Treatment time: 55    Subjective     Pt reports: continued c/o (R)  si/glute pain that extends to her (R) knee. She states that she has purchased a TENS unit for home use that is temporarily helpful. She reports increased pain with prolonged sitting.  Pain Scale: Isael rates pain on a scale of 0-10 to be 7 currently.    Objective    Pelvic alignment/Leg length: (R)LE ~ 1/4 longer in supine    Isael received individual therapeutic exercises to develop lumbar/core/LE strength, endurance, ROM, flexibility and core stabilization for 45 minutes including:  Recumbent stationary bike x 5 minutes level 1  Patient instructed in and performs Muscle energy technique with resisted hip ext on(R)/Hip flex on (L)  ( 2 bouts 5 x 5 sec each) followed by shotgun maneuver with Resisted Hip adduction 5 x 5 sec. ( Slight improvement in leg length post MET )   Supine sciatic nerve glide on (R) x 20  Piriformis stretch 3 x 30 sec  SKTC stretch 3 x 30 sec  Pelvic tilt/TA set x 20 with 5 sec hold  Pelvic tilt with (U) clam ( bent knee fall out) x 10 each  Pelvic tilt with Hooklying clam with YTB x 15  Pelvic tilt with Hooklying hip add isometric x 15     Isael received the following manual therapy techniques: Soft tissue Mobilization were applied to the: (R) piriformis for 10 minutes including:  Long axis distraction (R)LE 5 x 15 sec, soft tissue massage (R) lower back/ piriformis ,       Patient receives moist heat applied to (R) piriformis (R) lateral leg/knee x 10 minutes for pain control/muscle relaxation.    Patient education: Patient educated in and  provided with an updated copy of HEP to include: sciatic nerve glide, piriformis stretch 3 x 20 sec, pelvic tilt, Bent knee fall out.   No spiritual or educational barriers to learning      Assessment     Patient tolerated shea Tx fairly well. Min report of (R) knee pain on stationary bike that resolved. She was able to perform the above ex's/activities without an increase in symptoms. Leg length/pelvic alignment appeared slight improved after muscle energy technique. Patient noting some relief of (R)LE symptoms with long axis distraction and reports improved pain control to slight post tx. SHe has purchased a TENS unit for home and will continue to use prn.  This is a 71 y.o. female referred to outpatient physical therapy and presents with a medical diagnosis of   Encounter Diagnoses   Name Primary?    Piriformis syndrome of right side Yes    Acute right-sided low back pain with right-sided sciatica     and demonstrates limitations as described in the problem list.   Pt will continue to benefit from skilled outpatient physical therapy to address the deficits listed in the problem list, provide pt/family education and to maximize pt's level of independence in the home and community environment.     SHORT TERM GOALS:( 8 weeks)  Decrease subjective complaints of pain by 2 points  0-10 scale 5/7 days  Perform home exercise program independently   Pt to be able to perform at least one technique for self management of symptoms        Pt to display increased flexibility in piriformis to allow improved posture during ADLS  Pt to display ability to maintain neutral pelvis in supine, sit and stand for postural/core control  Pt to display ability to maintain neutral pelvis with transverse abdominus activation in dynamic situations replicating normal ADL   Plan     Continue with established Plan of Care towards PT goals.    Therapist: Andrea Zhou, PTA  7/19/2017

## 2017-07-21 ENCOUNTER — CLINICAL SUPPORT (OUTPATIENT)
Dept: REHABILITATION | Facility: HOSPITAL | Age: 72
End: 2017-07-21
Attending: ORTHOPAEDIC SURGERY
Payer: MEDICARE

## 2017-07-21 DIAGNOSIS — G57.01 PIRIFORMIS SYNDROME OF RIGHT SIDE: Primary | ICD-10-CM

## 2017-07-21 DIAGNOSIS — M54.41 ACUTE RIGHT-SIDED LOW BACK PAIN WITH RIGHT-SIDED SCIATICA: ICD-10-CM

## 2017-07-21 PROCEDURE — 97140 MANUAL THERAPY 1/> REGIONS: CPT | Mod: PO

## 2017-07-21 PROCEDURE — 97110 THERAPEUTIC EXERCISES: CPT | Mod: PO

## 2017-07-21 NOTE — PROGRESS NOTES
Physical Therapy Daily Note     Name: Isael RAM [facility] Number: [id number]  Diagnosis:   Encounter Diagnoses   Name Primary?    Piriformis syndrome of right side Yes    Acute right-sided low back pain with right-sided sciatica      Physician: Rajiv Razo MD     Visit #: 4 of 20     Time In: 10:00  Time Out: 11:05  Treatment time: 55    Subjective     Pt reports: that she is not having much (R)  si/glute pain . Her chief c/o is related to her (R) knee ( lateral aspect).    She states that she feels that that pain moves up from her knee. She states that she felt pretty good after last Tx but some increased pain last night and with driving here.    Pain Scale: Isael rates pain on a scale of 0-10 to be 7 currently.    Objective    Pelvic alignment/Leg length: (R)LE ~ 1/4 longer in supine    Isael received individual therapeutic exercises to develop lumbar/core/LE strength, endurance, ROM, flexibility and core stabilization for 45 minutes including:  Recumbent stationary bike x 5 minutes level 1  Patient instructed in and performs Muscle energy technique with resisted hip ext on(R)/Hip flex on (L)  ( 2 bouts 5 x 5 sec each) followed by shotgun maneuver with Resisted Hip adduction 5 x 5 sec. ( Slight improvement in leg length post MET )   Supine sciatic nerve glide on (R) x 20  Piriformis stretch 3 x 30 sec  SKTC stretch 3 x 30 sec  Pelvic tilt/TA set x 20 with 5 sec hold  Pelvic tilt with (U) clam ( bent knee fall out) x 12 each  Pelvic tilt with Hooklying clam with YTB x 20  Pelvic tilt with Hooklying hip add isometric x 20     Isael received the following manual therapy techniques: Soft tissue Mobilization were applied to the: (R) piriformis for 10 minutes including:  Long axis distraction (R)LE several times during Tx with some relief, soft tissue massage (R) lower back/ piriformis/ (R) knee,       Patient receives moist heat applied to (R)  piriformis (R) lateral leg/knee x 10 minutes for pain control/muscle relaxation.    She receives application of biofreeze applied to (R) knee for pain control.    Patient education: Patient educated to continue with previously issued HEP to tolerance which she voices understanding.    No spiritual or educational barriers to learning      Assessment     Patient tolerated shea Tx fair . Patient presenting with increased (R) knee pain today which is actually more bothersome than lower back/piriformis pain..Patient noting some relief of (R)LE/Knee symptoms with long axis distraction. She will plan to contact MD office to discuss possible (R) knee pathology as primary source of knee pain vs radicular component or a combination of both. Leg length/pelvic alignment appeared slight improved after muscle energy technique.  No c/o piriformis or back pain post Tx, (R) knee pain = 3/10 after application of cryotherapy. Will monitor and attempt to progress as tolerated.  This is a 71 y.o. female referred to outpatient physical therapy and presents with a medical diagnosis of   Encounter Diagnoses   Name Primary?    Piriformis syndrome of right side Yes    Acute right-sided low back pain with right-sided sciatica     and demonstrates limitations as described in the problem list.   Pt will continue to benefit from skilled outpatient physical therapy to address the deficits listed in the problem list, provide pt/family education and to maximize pt's level of independence in the home and community environment.     SHORT TERM GOALS:( 8 weeks)  Decrease subjective complaints of pain by 2 points  0-10 scale 5/7 days  Perform home exercise program independently   Pt to be able to perform at least one technique for self management of symptoms        Pt to display increased flexibility in piriformis to allow improved posture during ADLS  Pt to display ability to maintain neutral pelvis in supine, sit and stand for postural/core  control  Pt to display ability to maintain neutral pelvis with transverse abdominus activation in dynamic situations replicating normal ADL   Plan     Continue with established Plan of Care towards PT goals.    Therapist: Andrea Zhou, PTA  7/21/2017

## 2017-08-29 ENCOUNTER — DOCUMENTATION ONLY (OUTPATIENT)
Dept: REHABILITATION | Facility: HOSPITAL | Age: 72
End: 2017-08-29

## 2017-08-29 NOTE — PROGRESS NOTES
PHYSICAL THERAPY  Discharge  Date of Discharge 8/29/2017    Name: Isael RAM [facility] #: [id number]    Pt was seen in Physical Therapy for initial evaluation on:7/6/17  Pt's last date of treatment in Physical therapy was: 7/21/17  Number of treatment sessions completed: 4  Reason for discharge:    self discharge/reason unnown  Status at Discharge:  Goals not met     Discharge update in functional G code not available due to unplanned discharge.

## 2017-09-20 ENCOUNTER — TELEPHONE (OUTPATIENT)
Dept: OPTOMETRY | Facility: CLINIC | Age: 72
End: 2017-09-20

## 2017-09-21 ENCOUNTER — OFFICE VISIT (OUTPATIENT)
Dept: OPTOMETRY | Facility: CLINIC | Age: 72
End: 2017-09-21
Payer: MEDICARE

## 2017-09-21 DIAGNOSIS — H25.13 NUCLEAR SCLEROSIS, BILATERAL: ICD-10-CM

## 2017-09-21 DIAGNOSIS — G45.3 AMAUROSIS FUGAX: ICD-10-CM

## 2017-09-21 DIAGNOSIS — H52.209 ASTIGMATISM WITH PRESBYOPIA, UNSPECIFIED LATERALITY: ICD-10-CM

## 2017-09-21 DIAGNOSIS — H52.4 ASTIGMATISM WITH PRESBYOPIA, UNSPECIFIED LATERALITY: ICD-10-CM

## 2017-09-21 DIAGNOSIS — H04.123 BILATERAL DRY EYES: Primary | ICD-10-CM

## 2017-09-21 PROCEDURE — 92014 COMPRE OPH EXAM EST PT 1/>: CPT | Mod: S$PBB,,, | Performed by: OPTOMETRIST

## 2017-09-21 PROCEDURE — 99212 OFFICE O/P EST SF 10 MIN: CPT | Mod: PBBFAC,PO | Performed by: OPTOMETRIST

## 2017-09-21 PROCEDURE — 99999 PR PBB SHADOW E&M-EST. PATIENT-LVL II: CPT | Mod: PBBFAC,,, | Performed by: OPTOMETRIST

## 2017-09-21 PROCEDURE — 92015 DETERMINE REFRACTIVE STATE: CPT | Mod: ,,, | Performed by: OPTOMETRIST

## 2017-09-21 RX ORDER — FLUOROMETHOLONE 1 MG/ML
1 SUSPENSION/ DROPS OPHTHALMIC 3 TIMES DAILY
Qty: 5 ML | Refills: 0 | Status: SHIPPED | OUTPATIENT
Start: 2017-09-21 | End: 2017-10-01

## 2017-09-21 NOTE — PROGRESS NOTES
HPI     ERYN 02/2016 OD ugalde more than OS, worse past few weeks.  Using ultra   systane 1-2 times a day.  Doing a lot of close work with numbers, trouble   distinguishing them.    Last edited by Guerda Baker on 9/21/2017  2:19 PM. (History)            Assessment /Plan     For exam results, see Encounter Report.    Bilateral dry eyes  -     fluorometholone 0.1% (FML) 0.1 % DrpS; Place 1 drop into both eyes 3 (three) times daily.  Dispense: 5 mL; Refill: 0    Nuclear sclerosis, bilateral    Amaurosis fugax    Astigmatism with presbyopia, unspecified laterality      1. Start FML drops 1 drop 3x/day x 10 days. If better can restart systane, IF no better, call back.  2. Educated pt on presence of cataracts and effects on vision. No surgery at this time. Recheck in one year.  3. On aspirin, no new symptoms.  4. Spec Rx given. Different lens options discussed with patient. RTC 1 year full exam.

## 2017-10-10 ENCOUNTER — PATIENT MESSAGE (OUTPATIENT)
Dept: OPTOMETRY | Facility: CLINIC | Age: 72
End: 2017-10-10

## 2017-10-13 ENCOUNTER — PATIENT MESSAGE (OUTPATIENT)
Dept: DERMATOLOGY | Facility: CLINIC | Age: 72
End: 2017-10-13

## 2017-11-03 ENCOUNTER — OFFICE VISIT (OUTPATIENT)
Dept: DERMATOLOGY | Facility: CLINIC | Age: 72
End: 2017-11-03
Payer: MEDICARE

## 2017-11-03 VITALS — WEIGHT: 167 LBS | BODY MASS INDEX: 27.79 KG/M2

## 2017-11-03 DIAGNOSIS — L53.1 ERYTHEMA ANNULARE CENTRIFUGUM: Primary | ICD-10-CM

## 2017-11-03 PROCEDURE — 11100 PR BIOPSY OF SKIN LESION: CPT | Mod: PBBFAC,PO | Performed by: DERMATOLOGY

## 2017-11-03 PROCEDURE — 99213 OFFICE O/P EST LOW 20 MIN: CPT | Mod: PBBFAC,PO | Performed by: DERMATOLOGY

## 2017-11-03 PROCEDURE — 11100 PR BIOPSY OF SKIN LESION: CPT | Mod: S$PBB,,, | Performed by: DERMATOLOGY

## 2017-11-03 PROCEDURE — 88312 SPECIAL STAINS GROUP 1: CPT | Mod: 26,,, | Performed by: PATHOLOGY

## 2017-11-03 PROCEDURE — 99999 PR PBB SHADOW E&M-EST. PATIENT-LVL III: CPT | Mod: PBBFAC,,, | Performed by: DERMATOLOGY

## 2017-11-03 PROCEDURE — 99213 OFFICE O/P EST LOW 20 MIN: CPT | Mod: 25,S$PBB,, | Performed by: DERMATOLOGY

## 2017-11-03 PROCEDURE — 88305 TISSUE EXAM BY PATHOLOGIST: CPT | Performed by: PATHOLOGY

## 2017-11-03 NOTE — PROGRESS NOTES
Subjective:       Patient ID:  Isael Salmon is a 72 y.o. female who presents for   Chief Complaint   Patient presents with    Rash     Bilateral arms     History of Present Illness: The patient presents with chief complaint of rash.  Location: arms and shoulder   Duration: off and on for years  Signs/Symptoms: none    Prior treatments: betamethasone    Relates had large lesions on arms several months ago used the diprolene and better, new lesion on left shoulder  Has some actinic damage/atrophy on dorsal arms now in area of former lesions.         Review of Systems   Constitutional: Negative for fever.   Skin: Negative for itching and rash.   Hematologic/Lymphatic: Does not bruise/bleed easily.        Objective:    Physical Exam   Constitutional: She appears well-developed and well-nourished. No distress.   Neurological: She is alert and oriented to person, place, and time. She is not disoriented.   Psychiatric: She has a normal mood and affect.   Skin:   Areas Examined (abnormalities noted in diagram):   Head / Face Inspection Performed  Neck Inspection Performed  Chest / Axilla Inspection Performed  RUE Inspected  LUE Inspection Performed  Nails and Digits Inspection Performed              Diagram Legend     Erythematous scaling macule/papule c/w actinic keratosis       Vascular papule c/w angioma      Pigmented verrucoid papule/plaque c/w seborrheic keratosis      Yellow umbilicated papule c/w sebaceous hyperplasia      Irregularly shaped tan macule c/w lentigo     1-2 mm smooth white papules consistent with Milia      Movable subcutaneous cyst with punctum c/w epidermal inclusion cyst      Subcutaneous movable cyst c/w pilar cyst      Firm pink to brown papule c/w dermatofibroma      Pedunculated fleshy papule(s) c/w skin tag(s)      Evenly pigmented macule c/w junctional nevus     Mildly variegated pigmented, slightly irregular-bordered macule c/w mildly atypical nevus      Flesh colored to evenly  pigmented papule c/w intradermal nevus       Pink pearly papule/plaque c/w basal cell carcinoma      Erythematous hyperkeratotic cursted plaque c/w SCC      Surgical scar with no sign of skin cancer recurrence      Open and closed comedones      Inflammatory papules and pustules      Verrucoid papule consistent consistent with wart     Erythematous eczematous patches and plaques     Dystrophic onycholytic nail with subungual debris c/w onychomycosis     Umbilicated papule    Erythematous-base heme-crusted tan verrucoid plaque consistent with inflamed seborrheic keratosis     Erythematous Silvery Scaling Plaque c/w Psoriasis     See annotation      Assessment / Plan:      Pathology Orders:      Normal Orders This Visit    Tissue Specimen To Pathology, Dermatology     Questions:    Directional Terms:  Other(comment)    Clinical information:  erythema annulare centrifugum    Specific Site:  left chest        Erythema annulare centrifugum  -     Tissue Specimen To Pathology, Dermatology  Punch bx performed after anesthesia 1% xylocaine with epi, patient tolerated procedure well.  Directions for post bx care given.    olux foam for new lesion on left shoulder  Dc diprolene on arms, use cetaphil lotion after baths and sunscreen am

## 2017-11-21 ENCOUNTER — PATIENT MESSAGE (OUTPATIENT)
Dept: INTERNAL MEDICINE | Facility: CLINIC | Age: 72
End: 2017-11-21

## 2017-11-21 RX ORDER — DOXYCYCLINE 100 MG/1
100 CAPSULE ORAL 2 TIMES DAILY
Qty: 20 CAPSULE | Refills: 0 | Status: SHIPPED | OUTPATIENT
Start: 2017-11-21 | End: 2017-12-01

## 2017-12-19 ENCOUNTER — OFFICE VISIT (OUTPATIENT)
Dept: RHEUMATOLOGY | Facility: CLINIC | Age: 72
End: 2017-12-19
Payer: MEDICARE

## 2017-12-19 ENCOUNTER — LAB VISIT (OUTPATIENT)
Dept: LAB | Facility: HOSPITAL | Age: 72
End: 2017-12-19
Attending: INTERNAL MEDICINE
Payer: MEDICARE

## 2017-12-19 VITALS
HEIGHT: 65 IN | BODY MASS INDEX: 28.66 KG/M2 | DIASTOLIC BLOOD PRESSURE: 64 MMHG | WEIGHT: 172 LBS | SYSTOLIC BLOOD PRESSURE: 139 MMHG | HEART RATE: 73 BPM

## 2017-12-19 DIAGNOSIS — Z79.899 LONG-TERM USE OF HIGH-RISK MEDICATION: ICD-10-CM

## 2017-12-19 DIAGNOSIS — M15.9 PRIMARY OSTEOARTHRITIS INVOLVING MULTIPLE JOINTS: ICD-10-CM

## 2017-12-19 DIAGNOSIS — M1A.09X0 IDIOPATHIC CHRONIC GOUT OF MULTIPLE SITES WITHOUT TOPHUS: ICD-10-CM

## 2017-12-19 DIAGNOSIS — M1A.09X0 IDIOPATHIC CHRONIC GOUT OF MULTIPLE SITES WITHOUT TOPHUS: Primary | ICD-10-CM

## 2017-12-19 LAB
ALBUMIN SERPL BCP-MCNC: 3.8 G/DL
ALP SERPL-CCNC: 89 U/L
ALT SERPL W/O P-5'-P-CCNC: 18 U/L
ANION GAP SERPL CALC-SCNC: 6 MMOL/L
AST SERPL-CCNC: 22 U/L
BASOPHILS # BLD AUTO: 0.03 K/UL
BASOPHILS NFR BLD: 0.6 %
BILIRUB SERPL-MCNC: 0.6 MG/DL
BUN SERPL-MCNC: 19 MG/DL
CALCIUM SERPL-MCNC: 10.6 MG/DL
CHLORIDE SERPL-SCNC: 103 MMOL/L
CO2 SERPL-SCNC: 30 MMOL/L
CREAT SERPL-MCNC: 0.9 MG/DL
DIFFERENTIAL METHOD: ABNORMAL
EOSINOPHIL # BLD AUTO: 0.1 K/UL
EOSINOPHIL NFR BLD: 2.8 %
ERYTHROCYTE [DISTWIDTH] IN BLOOD BY AUTOMATED COUNT: 13.2 %
EST. GFR  (AFRICAN AMERICAN): >60 ML/MIN/1.73 M^2
EST. GFR  (NON AFRICAN AMERICAN): >60 ML/MIN/1.73 M^2
GLUCOSE SERPL-MCNC: 84 MG/DL
HCT VFR BLD AUTO: 33 %
HGB BLD-MCNC: 10.8 G/DL
IMM GRANULOCYTES # BLD AUTO: 0.02 K/UL
IMM GRANULOCYTES NFR BLD AUTO: 0.4 %
LYMPHOCYTES # BLD AUTO: 1.1 K/UL
LYMPHOCYTES NFR BLD: 22.7 %
MCH RBC QN AUTO: 30.2 PG
MCHC RBC AUTO-ENTMCNC: 32.7 G/DL
MCV RBC AUTO: 92 FL
MONOCYTES # BLD AUTO: 0.6 K/UL
MONOCYTES NFR BLD: 11.2 %
NEUTROPHILS # BLD AUTO: 3.1 K/UL
NEUTROPHILS NFR BLD: 62.3 %
NRBC BLD-RTO: 0 /100 WBC
PLATELET # BLD AUTO: 208 K/UL
PMV BLD AUTO: 11.2 FL
POTASSIUM SERPL-SCNC: 4.2 MMOL/L
PROT SERPL-MCNC: 7.4 G/DL
RBC # BLD AUTO: 3.58 M/UL
SODIUM SERPL-SCNC: 139 MMOL/L
URATE SERPL-MCNC: 5.1 MG/DL
WBC # BLD AUTO: 4.93 K/UL

## 2017-12-19 PROCEDURE — 99213 OFFICE O/P EST LOW 20 MIN: CPT | Mod: S$PBB,,, | Performed by: INTERNAL MEDICINE

## 2017-12-19 PROCEDURE — 36415 COLL VENOUS BLD VENIPUNCTURE: CPT

## 2017-12-19 PROCEDURE — 84550 ASSAY OF BLOOD/URIC ACID: CPT

## 2017-12-19 PROCEDURE — 99999 PR PBB SHADOW E&M-EST. PATIENT-LVL III: CPT | Mod: PBBFAC,,, | Performed by: INTERNAL MEDICINE

## 2017-12-19 PROCEDURE — 99213 OFFICE O/P EST LOW 20 MIN: CPT | Mod: PBBFAC | Performed by: INTERNAL MEDICINE

## 2017-12-19 PROCEDURE — 85025 COMPLETE CBC W/AUTO DIFF WBC: CPT

## 2017-12-19 PROCEDURE — 80053 COMPREHEN METABOLIC PANEL: CPT

## 2017-12-19 RX ORDER — ALLOPURINOL 100 MG/1
200 TABLET ORAL DAILY
Qty: 60 TABLET | Refills: 12 | Status: SHIPPED | OUTPATIENT
Start: 2017-12-19 | End: 2019-01-23 | Stop reason: SDUPTHER

## 2017-12-21 NOTE — PROGRESS NOTES
History of present illness: 72-year-old female has a 20 year history of gout.  She has been on allopurinol 200 mg daily.  She has had no recent gout attacks.  She has no history of tophi.    In addition she has osteoarthritis.  This is affected primarily the back and legs.  I had placed her on meloxicam but this had not been helping.  She has been taking Aleve with some response.  She takes indomethacin only if she has a gout attack.  The pain is radiating down the leg.  She went to therapy but this did not help.  She has been seeing a chiropractor and this has been helping she states her knee nicole on occasion.  She has also had some pain in the thumbs.  She has no other peripheral joint complaints.    Physical examination:  Musculoskeletal: She has bony hypertrophy of the first CMC bilaterally.  She has no evidence of active synovitis.  She has no tophi.    Assessment:  1.  Intercritical gout  2.  Osteoarthritis    Plans: I will review her laboratory studies from today and adjust her allopurinol dosage if necessary.  I have no therapeutic recommendations as far as her osteoarthritis is concerned.  I will see her in follow-up in 12 months.

## 2018-02-17 RX ORDER — ROSUVASTATIN CALCIUM 20 MG/1
TABLET, COATED ORAL
Qty: 90 TABLET | Refills: 3 | Status: CANCELLED | OUTPATIENT
Start: 2018-02-17

## 2018-02-22 ENCOUNTER — PATIENT MESSAGE (OUTPATIENT)
Dept: INTERNAL MEDICINE | Facility: CLINIC | Age: 73
End: 2018-02-22

## 2018-02-22 DIAGNOSIS — G45.9 TRANSIENT CEREBRAL ISCHEMIA, UNSPECIFIED TYPE: ICD-10-CM

## 2018-02-22 DIAGNOSIS — I73.9 PVD (PERIPHERAL VASCULAR DISEASE): ICD-10-CM

## 2018-02-22 DIAGNOSIS — M10.9 GOUT, ARTHRITIS: ICD-10-CM

## 2018-02-22 DIAGNOSIS — Z86.39 HISTORY OF HYPERLIPIDEMIA: ICD-10-CM

## 2018-02-23 RX ORDER — LOSARTAN POTASSIUM 50 MG/1
50 TABLET ORAL 2 TIMES DAILY
Qty: 180 TABLET | Refills: 3 | Status: SHIPPED | OUTPATIENT
Start: 2018-02-23 | End: 2019-04-08 | Stop reason: SDUPTHER

## 2018-02-23 RX ORDER — LOSARTAN POTASSIUM 50 MG/1
50 TABLET ORAL 2 TIMES DAILY
Qty: 180 TABLET | Refills: 3 | Status: SHIPPED | OUTPATIENT
Start: 2018-02-23 | End: 2018-02-23 | Stop reason: SDUPTHER

## 2018-02-23 RX ORDER — ROSUVASTATIN CALCIUM 20 MG/1
20 TABLET, COATED ORAL DAILY
Qty: 90 TABLET | Refills: 3 | Status: SHIPPED | OUTPATIENT
Start: 2018-02-23 | End: 2019-04-08 | Stop reason: SDUPTHER

## 2018-02-23 RX ORDER — ROSUVASTATIN CALCIUM 20 MG/1
20 TABLET, COATED ORAL DAILY
Qty: 90 TABLET | Refills: 3 | Status: SHIPPED | OUTPATIENT
Start: 2018-02-23 | End: 2018-02-23 | Stop reason: SDUPTHER

## 2018-03-08 ENCOUNTER — OFFICE VISIT (OUTPATIENT)
Dept: INTERNAL MEDICINE | Facility: CLINIC | Age: 73
End: 2018-03-08
Payer: MEDICARE

## 2018-03-08 VITALS
WEIGHT: 174.38 LBS | OXYGEN SATURATION: 97 % | SYSTOLIC BLOOD PRESSURE: 128 MMHG | HEIGHT: 65 IN | BODY MASS INDEX: 29.05 KG/M2 | HEART RATE: 86 BPM | DIASTOLIC BLOOD PRESSURE: 70 MMHG

## 2018-03-08 DIAGNOSIS — K21.9 GASTROESOPHAGEAL REFLUX DISEASE WITHOUT ESOPHAGITIS: ICD-10-CM

## 2018-03-08 DIAGNOSIS — M10.9 GOUT, ARTHRITIS: ICD-10-CM

## 2018-03-08 DIAGNOSIS — G89.29 CHRONIC NECK PAIN: Primary | ICD-10-CM

## 2018-03-08 DIAGNOSIS — I67.2 CEREBRAL ATHEROSCLEROSIS: ICD-10-CM

## 2018-03-08 DIAGNOSIS — I77.9 BILATERAL CAROTID ARTERY DISEASE: ICD-10-CM

## 2018-03-08 DIAGNOSIS — I10 ESSENTIAL HYPERTENSION: ICD-10-CM

## 2018-03-08 DIAGNOSIS — M54.2 CHRONIC NECK PAIN: Primary | ICD-10-CM

## 2018-03-08 DIAGNOSIS — Z12.31 SCREENING MAMMOGRAM, ENCOUNTER FOR: ICD-10-CM

## 2018-03-08 PROCEDURE — 96372 THER/PROPH/DIAG INJ SC/IM: CPT | Mod: PBBFAC

## 2018-03-08 PROCEDURE — 99214 OFFICE O/P EST MOD 30 MIN: CPT | Mod: S$PBB,,, | Performed by: INTERNAL MEDICINE

## 2018-03-08 PROCEDURE — 99999 PR PBB SHADOW E&M-EST. PATIENT-LVL III: CPT | Mod: PBBFAC,,, | Performed by: INTERNAL MEDICINE

## 2018-03-08 PROCEDURE — 99213 OFFICE O/P EST LOW 20 MIN: CPT | Mod: PBBFAC,25 | Performed by: INTERNAL MEDICINE

## 2018-03-08 RX ORDER — PREDNISONE 10 MG/1
TABLET ORAL
Qty: 20 TABLET | Refills: 0 | Status: ON HOLD | OUTPATIENT
Start: 2018-03-08 | End: 2018-05-01 | Stop reason: CLARIF

## 2018-03-08 RX ORDER — KETOROLAC TROMETHAMINE 30 MG/ML
30 INJECTION, SOLUTION INTRAMUSCULAR; INTRAVENOUS
Status: COMPLETED | OUTPATIENT
Start: 2018-03-08 | End: 2018-03-08

## 2018-03-08 RX ORDER — DIAZEPAM 10 MG/1
TABLET ORAL
Refills: 0 | COMMUNITY
Start: 2018-02-21 | End: 2019-07-08

## 2018-03-08 RX ORDER — TIZANIDINE 2 MG/1
4 TABLET ORAL EVERY 6 HOURS PRN
Qty: 30 TABLET | Refills: 3 | Status: SHIPPED | OUTPATIENT
Start: 2018-03-08 | End: 2018-03-18

## 2018-03-08 RX ADMIN — KETOROLAC TROMETHAMINE 30 MG: 30 INJECTION, SOLUTION INTRAMUSCULAR; INTRAVENOUS at 12:03

## 2018-03-08 NOTE — PROGRESS NOTES
Two pt identifiers verified (name & ). Pt tolerated well.  Pt rated pain 10/10. Pt advised to remain in clinic for 15 minutes and report any difficulties. Pt will call back in 30 minutes for pain reassessment.

## 2018-03-08 NOTE — PROGRESS NOTES
CHIEF COMPLAINT: Neck pain     HISTORY OF PRESENT ILLNESS: 71-year-old woman presents due to 3 day history of neck pain. She woke up with neck pain 3 days ago. She has trouble moving her neck due to the pain. She has to keep her head still. Pain radiates to the back of her head.  Heat helps a little. She went to the chiropractor yesterday which helped a little. She has spasms in her neck. She took one tylenol extra strength and 2 advil every 8 hours for the pain which has not helped much.  Pain is 10/10.  Pain kept her awake last night.  She took one muscle relaxer which made her nauseated.  No numbness or weakness in arms or legs.        No headaches, visual issues, fever, chills, sinus congestion, sore throat, ear pain,      She is now taking Crestor 20 mg daily with co enzyme 10 200 mg daily for her hyperlipidemia. Rare muscle spasm        She has seen heme onc for her anemia. She is taking her iron supplement two times daily.      She had a normal cystoscope 12/17/14 due to recurrent UTI. No dysuria or hematuria now. She is using estrogen vaginal cream for vaginal atrophy three times weekly        Reflux is controlled on omeprazole 20 mg daily. No nausea, vomiting, constipation, diarrhea, emilio, bloody stools. She continues to take losartan 25 mg daily and spironolactone 25 mg 1 tablet daily for hypertension.      She continues to take lexapro 10 mg 1/2 tablet daily which controlls her mood with her stressors.        .She continues allopurinol 200 mg daily. No gouty flares      PAST MEDICAL HISTORY:   1. Hypertension.   2. Hyperlipidemia.   3. Gout.   4. History of rashes.   5. TIA in 1997  6. Fibrocystic breast disease.   7. Postmenopausal.   8. Osteoarthritis.   9. History of tobacco usage; quit in 1997.   10. History of colon polyps; normal colonoscopy in 10/07 and normal 4/2010, due 2015   11. Normal bone mineral density scan in 6/06.   12. Anxiety and depression - controlled on lorazepam very rarely.  "  13. Varicose veins.   14. Intermittent GERD.   15. Iron deficiency anemia 2010 - due to erosive gastropathy on EGD 2010     PAST SURGICAL HISTORY:   1. Right carotid endarterectomy in .   2. Breast reduction surgery.   3. Total abdominal hysterectomy.   4. Blood transfusion prior to .     SOCIAL HISTORY: Quit smoking in ; smoked 1 PPD since age 16. No   alcohol use.     FAMILY HISTORY:   Father had gout, hypertension and heart failure; is . Mother had hypertension and pancreatic cancer; is also . Has five brothers, one of whom  in a motor vehicle accident. All have gout and hypertension. One brother has Crohn's disease; another has bladder cancer.     MEDICATIONS and ALLERGIES: Updated on epic.     PHYSICAL EXAMINATION:     /70 (BP Location: Left arm, Patient Position: Sitting, BP Method: Medium (Manual))   Pulse 86   Ht 5' 5" (1.651 m)   Wt 79.1 kg (174 lb 6.4 oz)   LMP  (LMP Unknown)   SpO2 97%   BMI 29.02 kg/m²     General: Alert, oriented. No apparent distress. Affect within normal   limits.   Conjunctivae anicteric. Tympanic membranes red fluid. Oropharynx clear.   Neck supple.   Respiratory effort normal. Lungs clear  Heart: Regular rate and rhythm without murmurs, gallops or rubs.   No lower extremity edema.            ASSESSMENT AND PLAN:   1. Spasms of neck - toradol 30 mg IM.  Steroid taper. Tizanidine 2 mg three times daily as needed  4. Hyperlipidemia - On lipitor 10  mg daily and Co Enzyme Q 10 200 mg daily.    5. Erosive gastropathy - on omeprazole 20 mg daily. Off NSAIDS. EGD  6. Hypertension - Continue losartan 25 mg one tablet daily and spironolactone to 25 mg daily.    7. Hypercalcemia - stable  6. Gout - asymptomatic on allopurinol.   7. Situational stress - continue lexapro   8. Anemia - stable  9. Carotid artery disease - on risk factor modification. Check ultrasound  Screening - Colonscopy 10/15 with 4 polyps - it was incomplete. Repeat 16 " - divericulosis otherwise normal - due 2021. MMG 9/16, BMD 7/11.  I will see her 3 month, sooner if problems arise

## 2018-03-15 ENCOUNTER — HOSPITAL ENCOUNTER (OUTPATIENT)
Dept: RADIOLOGY | Facility: HOSPITAL | Age: 73
Discharge: HOME | End: 2018-03-15
Attending: INTERNAL MEDICINE
Payer: MEDICARE

## 2018-03-15 DIAGNOSIS — Z12.31 SCREENING MAMMOGRAM, ENCOUNTER FOR: ICD-10-CM

## 2018-03-15 PROCEDURE — 77067 SCR MAMMO BI INCL CAD: CPT | Mod: TC

## 2018-03-15 PROCEDURE — 77063 BREAST TOMOSYNTHESIS BI: CPT | Mod: 26,,, | Performed by: RADIOLOGY

## 2018-03-15 PROCEDURE — 77067 SCR MAMMO BI INCL CAD: CPT | Mod: 26,,, | Performed by: RADIOLOGY

## 2018-03-16 ENCOUNTER — HOSPITAL ENCOUNTER (OUTPATIENT)
Dept: RADIOLOGY | Facility: HOSPITAL | Age: 73
Discharge: HOME | End: 2018-03-16
Attending: INTERNAL MEDICINE
Payer: MEDICARE

## 2018-03-16 DIAGNOSIS — I67.2 CEREBRAL ATHEROSCLEROSIS: ICD-10-CM

## 2018-03-16 DIAGNOSIS — I77.9 BILATERAL CAROTID ARTERY DISEASE: ICD-10-CM

## 2018-03-16 PROCEDURE — 93880 EXTRACRANIAL BILAT STUDY: CPT | Mod: TC

## 2018-03-16 PROCEDURE — 93880 EXTRACRANIAL BILAT STUDY: CPT | Mod: 26,,, | Performed by: INTERNAL MEDICINE

## 2018-04-03 ENCOUNTER — TELEPHONE (OUTPATIENT)
Dept: OPTOMETRY | Facility: CLINIC | Age: 73
End: 2018-04-03

## 2018-04-05 ENCOUNTER — OFFICE VISIT (OUTPATIENT)
Dept: OPTOMETRY | Facility: CLINIC | Age: 73
End: 2018-04-05
Payer: MEDICARE

## 2018-04-05 DIAGNOSIS — H43.813 POSTERIOR VITREOUS DETACHMENT OF BOTH EYES: Primary | ICD-10-CM

## 2018-04-05 DIAGNOSIS — H25.13 NUCLEAR SCLEROSIS, BILATERAL: ICD-10-CM

## 2018-04-05 PROCEDURE — 99213 OFFICE O/P EST LOW 20 MIN: CPT | Mod: PBBFAC,PO | Performed by: OPTOMETRIST

## 2018-04-05 PROCEDURE — 92014 COMPRE OPH EXAM EST PT 1/>: CPT | Mod: S$PBB,,, | Performed by: OPTOMETRIST

## 2018-04-05 PROCEDURE — 99999 PR PBB SHADOW E&M-EST. PATIENT-LVL III: CPT | Mod: PBBFAC,,, | Performed by: OPTOMETRIST

## 2018-04-05 NOTE — PROGRESS NOTES
HPI     Floaters ou x 1 week, no flashes, stable, no increase  No eye pain  No eye drops  No assoc blur but has had difficulty seeing small print in last few mos.     Last edited by Jonathan Juarez, OD on 4/5/2018  1:50 PM. (History)            Assessment /Plan     For exam results, see Encounter Report.    Posterior vitreous detachment of both eyes    Nuclear sclerosis, bilateral      1. No evidence of holes, tears or detachment of retina. Negative Shafers sign in vitreous. 90 diopter lens exam negative in all quadrants. Patient educated to signs and symptoms of retinal detachment and return to clinic immediately if signs or symptoms arise.  2. Refer to Dr. Lester for cataract evaluation and possible removal.

## 2018-04-07 RX ORDER — SPIRONOLACTONE 25 MG/1
TABLET ORAL
Qty: 90 TABLET | Refills: 4 | Status: SHIPPED | OUTPATIENT
Start: 2018-04-07 | End: 2019-04-08 | Stop reason: SDUPTHER

## 2018-04-16 ENCOUNTER — OFFICE VISIT (OUTPATIENT)
Dept: OPHTHALMOLOGY | Facility: CLINIC | Age: 73
End: 2018-04-16
Payer: MEDICARE

## 2018-04-16 DIAGNOSIS — H43.813 VITREOUS DETACHMENT OF BOTH EYES: ICD-10-CM

## 2018-04-16 DIAGNOSIS — H25.13 NUCLEAR SCLEROSIS OF BOTH EYES: Primary | ICD-10-CM

## 2018-04-16 DIAGNOSIS — H52.7 REFRACTIVE ERROR: ICD-10-CM

## 2018-04-16 PROCEDURE — 92014 COMPRE OPH EXAM EST PT 1/>: CPT | Mod: S$PBB,,, | Performed by: OPHTHALMOLOGY

## 2018-04-16 PROCEDURE — 92136 OPHTHALMIC BIOMETRY: CPT | Mod: PBBFAC,PO | Performed by: OPHTHALMOLOGY

## 2018-04-16 PROCEDURE — 99999 PR PBB SHADOW E&M-EST. PATIENT-LVL II: CPT | Mod: PBBFAC,,, | Performed by: OPHTHALMOLOGY

## 2018-04-16 PROCEDURE — 99212 OFFICE O/P EST SF 10 MIN: CPT | Mod: PBBFAC,PO,25 | Performed by: OPHTHALMOLOGY

## 2018-04-16 RX ORDER — DIFLUPREDNATE OPHTHALMIC 0.5 MG/ML
1 EMULSION OPHTHALMIC 4 TIMES DAILY
Qty: 5 ML | Refills: 1 | Status: SHIPPED | OUTPATIENT
Start: 2018-05-01 | End: 2018-05-31

## 2018-04-16 RX ORDER — OFLOXACIN 3 MG/ML
1 SOLUTION/ DROPS OPHTHALMIC 4 TIMES DAILY
Qty: 5 ML | Refills: 1 | Status: SHIPPED | OUTPATIENT
Start: 2018-04-28 | End: 2018-05-08

## 2018-04-16 RX ORDER — NEPAFENAC 3 MG/ML
1 SUSPENSION/ DROPS OPHTHALMIC DAILY
Qty: 3 ML | Refills: 1 | Status: SHIPPED | OUTPATIENT
Start: 2018-04-28 | End: 2018-05-28

## 2018-04-16 NOTE — PROGRESS NOTES
Subjective:       Patient ID: Isael Salmon is a 72 y.o. female.    Chief Complaint: Cataract (Eval)    HPI     Cataract    Additional comments: Eval           Comments   DSL- 4/5/18     Pt is here for Cataract Eval per . Pt states Va is blurry OU.   Glare is a bother at night. Pt has teary eyes. Pt has floaters OU.    Eyemeds  Allergy Gtts OU  At's OU PRN       Last edited by Elvira Parker on 4/16/2018 10:46 AM. (History)             Assessment:       1. Nuclear sclerosis of both eyes    2. Vitreous detachment of both eyes    3. Refractive error        Plan:       Visually significant cataract OU -Pt. Wants Sx.     PVD's OU-Stable.  RE        Cataract Surgery Consent: Patient with a visually significant cataract with difficulties of ADLs, reading, driving, night vision, glare (any and all).  Discussed with Patient/Family/Caregiver: options, risks and benefits, expectations of cataract surgery, utilized an eye model with questions and answers to facilitate discussion.  Discussed lens options and patient understands that glasses may be required for optimal vision for distance and/or near vision after cataract surgery.  The Patient/Family/Caregiver  voice good understanding and patient wishes to proceed with surgery.  The patient will likely benefit from surgery and patient signed consent for Right Eye.  CE OD 5/1/18 SN60WF 20.0,        OS 5/15/18 SN60WF 20.0.

## 2018-04-17 ENCOUNTER — TELEPHONE (OUTPATIENT)
Dept: OPTOMETRY | Facility: CLINIC | Age: 73
End: 2018-04-17

## 2018-04-17 DIAGNOSIS — H25.11 NS (NUCLEAR SCLEROSIS), RIGHT: Primary | ICD-10-CM

## 2018-04-24 ENCOUNTER — TELEPHONE (OUTPATIENT)
Dept: OPTOMETRY | Facility: CLINIC | Age: 73
End: 2018-04-24

## 2018-04-24 DIAGNOSIS — H25.12 NUCLEAR SCLEROSIS, LEFT: Primary | ICD-10-CM

## 2018-04-29 NOTE — H&P
[facility]  History & Physical    Subjective:      Chief Complaint/Reason for Admission:     Isael Salmon is a 72 y.o. female.    Past Medical History:   Diagnosis Date    Adjustment disorder 7/26/2012    Anemia 7/26/2012    AR (allergic rhinitis) 9/5/2014    Bronchitis     Fibrocystic breast disease in female 7/26/2012    GERD (gastroesophageal reflux disease) 7/26/2012    Gout     Gout, arthritis 7/26/2012    History of blood transfusion 7/26/2012    History of colonic polyps 7/26/2012    Hypercalcemia 9/20/2012    Hyperlipidemia 7/26/2012    Hypertension 7/26/2012    Nuclear sclerosis 5/2/2014    Osteoarthritis 7/26/2012    Other hyperparathyroidism 9/20/2012    Postmenopausal status 7/26/2012    PVD (peripheral vascular disease) 7/26/2012    left leg laser of vein with injection    Stroke 1997    TIA    Superficial vein thrombosis     Transient ischemic attack 7/26/2012    Varicose veins 7/26/2012     Past Surgical History:   Procedure Laterality Date    BREAST SURGERY      breast reduction    broken second finger Right 12/2015    pins placed    carotid endarterectomy  1997    right    CAROTID ENDARTERECTOMY Right 1997    COLONOSCOPY N/A 10/26/2015    Procedure: COLONOSCOPY;  Surgeon: Manuel Alanis MD;  Location: [facility] ([address] [place] [address]);  Service: Endoscopy;  Laterality: N/A;    HYSTERECTOMY      ORIF FINGER FRACTURE  12/18/15    TIA      VASCULAR SURGERY      left leg vein laser     Family History   Problem Relation Age of Onset    Heart failure Father     Cancer Mother      pancreas    Cataracts Mother     Hypertension Mother     Cancer Brother      bladder    Cataracts Brother     Hypertension Brother     Cataracts Brother     Hypertension Brother     Cataracts Brother     Hypertension Brother     Bone cancer Brother     Cataracts Brother     Hypertension Brother     Breast cancer Neg Hx     Ovarian cancer Neg Hx     Allergies Neg Hx      Asthma Neg Hx     Eczema Neg Hx     Cervical cancer Neg Hx     Endometrial cancer Neg Hx     Vaginal cancer Neg Hx     Amblyopia Neg Hx     Blindness Neg Hx     Diabetes Neg Hx     Glaucoma Neg Hx     Macular degeneration Neg Hx     Retinal detachment Neg Hx     Strabismus Neg Hx     Stroke Neg Hx     Thyroid disease Neg Hx      Social History   Substance Use Topics    Smoking status: Former Smoker     Quit date: 12/20/1997    Smokeless tobacco: Never Used      Comment: quit 1997 - 1 pack per day since age 16    Alcohol use No       No prescriptions prior to admission.     Review of patient's allergies indicates:   Allergen Reactions    Latex      Other reaction(s): Rash    Pcn [penicillins]      Other reaction(s): rash    Sulfa (sulfonamide antibiotics)      Other reaction(s): blisters    Avelox [moxifloxacin]      Other reaction(s): racing heart  Other reaction(s): shakes    Ciprofloxacin Other (See Comments)     Room starts to spin , nausea and dizziness    Codeine      Other reaction(s): nausea  Other reaction(s): weakness    Norco [hydrocodone-acetaminophen] Other (See Comments)        Review of Systems   Eyes: Positive for blurred vision.   All other systems reviewed and are negative.      Objective:      Vital Signs (Most Recent)       Vital Signs Range (Last 24H):       Physical Exam   Constitutional: She is oriented to person, place, and time. She appears well-developed and well-nourished.   HENT:   Head: Normocephalic.   Eyes: Conjunctivae and EOM are normal. Pupils are equal, round, and reactive to light.   Neck: Normal range of motion. Neck supple.   Cardiovascular: Normal rate, regular rhythm and normal heart sounds.    Pulmonary/Chest: Effort normal and breath sounds normal.   Abdominal: Soft. Bowel sounds are normal.   Musculoskeletal: Normal range of motion.   Neurological: She is alert and oriented to person, place, and time.   Skin: Skin is warm.   Psychiatric: She has a  normal mood and affect.       Data Review:   ECG:      Assessment:      Cataract OD.    Plan:    CE OD.

## 2018-04-30 ENCOUNTER — ANESTHESIA EVENT (OUTPATIENT)
Dept: SURGERY | Facility: HOSPITAL | Age: 73
End: 2018-04-30
Payer: MEDICARE

## 2018-04-30 NOTE — PRE-PROCEDURE INSTRUCTIONS
PreOp Instructions given:     - Verbal medication information (what to hold and what to take)   - NPO guidelines   - Arrival place directions given;      - Bathing with antibacterial soap   - Don't wear any jewelry or bring any valuables AM of surgery   - No makeup or moisturizer to face   - No perfume/cologne, powder, lotions or aftershave     Pt. verbalized understanding.    Denies any history of side effects or issues with anesthesia or sedation.    INFORMED PATIENT THAT INSTRUCTIONS APPLY TO SECOND EYE SURGERY SCHEDULED 5/15/2018. PT VERBALIZED AN UNDERSTANDING.

## 2018-04-30 NOTE — ANESTHESIA PREPROCEDURE EVALUATION
04/30/2018  Isael Salmon is a 72 y.o., female.  Past Medical History:   Diagnosis Date    Adjustment disorder 7/26/2012    Anemia 7/26/2012    AR (allergic rhinitis) 9/5/2014    Bronchitis     Fibrocystic breast disease in female 7/26/2012    GERD (gastroesophageal reflux disease) 7/26/2012    Gout     Gout, arthritis 7/26/2012    History of blood transfusion 7/26/2012    History of colonic polyps 7/26/2012    Hypercalcemia 9/20/2012    Hyperlipidemia 7/26/2012    Hypertension 7/26/2012    Nuclear sclerosis 5/2/2014    Osteoarthritis 7/26/2012    Other hyperparathyroidism 9/20/2012    Postmenopausal status 7/26/2012    PVD (peripheral vascular disease) 7/26/2012    left leg laser of vein with injection    Stroke 1997    TIA    Superficial vein thrombosis     Transient ischemic attack 7/26/2012    Varicose veins 7/26/2012     Patient Active Problem List   Diagnosis    Essential hypertension    Hyperlipidemia    Gout, arthritis    Gastroesophageal reflux disease without esophagitis    Anemia, blood loss    Transient ischemic attack    Fibrocystic breast disease in female    Postmenopausal status    Osteoarthritis    History of colonic polyps    Adjustment disorder    Varicose veins    PVD (peripheral vascular disease)    History of blood transfusion    Hypercalcemia    Other hyperparathyroidism    Anemia, chronic disease    Rectocele    Mixed incontinence urge and stress (male)(female)    Urethral hypermobility    Chronic constipation    Chronic cough    Nuclear sclerosis    AR (allergic rhinitis)    Disorder of muscle    Recurrent UTI    Kidney stones    History of colon polyps    Idiopathic chronic gout of multiple sites without tophus    Primary osteoarthritis involving multiple joints    Crushing injury of right middle finger    Traumatic closed  displaced mallet fracture    Pain of right middle finger    Mallet finger    Mild chronic anemia    Screening for colon cancer    Post concussive syndrome    Chest pain    History of hyperlipidemia    Non-intractable vomiting with nausea    Iron deficiency anemia    Amaurosis fugax    Acute right-sided low back pain with sciatica    Chronic pain    Bilateral carotid artery disease    Vitreous detachment of both eyes    Refractive error    Senile nuclear sclerosis     Past Surgical History:   Procedure Laterality Date    BREAST SURGERY      breast reduction    broken second finger Right 12/2015    pins placed    carotid endarterectomy  1997    right    CAROTID ENDARTERECTOMY Right 1997    COLONOSCOPY N/A 10/26/2015    Procedure: COLONOSCOPY;  Surgeon: Manuel Alanis MD;  Location: [facility] ([address] [place] [address]);  Service: Endoscopy;  Laterality: N/A;    HYSTERECTOMY      ORIF FINGER FRACTURE  12/18/15    TIA      VASCULAR SURGERY      left leg vein laser     Review of patient's allergies indicates:   Allergen Reactions    Latex      Other reaction(s): Rash    Pcn [penicillins]      Other reaction(s): rash    Sulfa (sulfonamide antibiotics)      Other reaction(s): blisters    Avelox [moxifloxacin]      Other reaction(s): racing heart  Other reaction(s): shakes    Ciprofloxacin Other (See Comments)     Room starts to spin , nausea and dizziness    Codeine      Other reaction(s): nausea  Other reaction(s): weakness    Norco [hydrocodone-acetaminophen] Other (See Comments)       Anesthesia Evaluation    I have reviewed the Patient Summary Reports.    I have reviewed the Nursing Notes.   I have reviewed the Medications.     Review of Systems  Anesthesia Hx:  Personal Hx of Anesthesia complications, Post-Operative Nausea/Vomiting, with every anesthetic, treatment not known   Cardiovascular:   Hypertension    Renal/:   renal calculi    Hepatic/GI:   GERD    Neurological:   CVA        Physical  Exam  General:  Well nourished    Airway/Jaw/Neck:  Airway Findings:           Mental Status:  Mental Status Findings:  Cooperative         Anesthesia Plan  Type of Anesthesia, risks & benefits discussed:  Anesthesia Type:  general, MAC  Patient's Preference:   Intra-op Monitoring Plan:   Intra-op Monitoring Plan Comments:   Post Op Pain Control Plan: per primary service following discharge from PACU  Post Op Pain Control Plan Comments:   Induction:   IV  Beta Blocker:  Patient is not currently on a Beta-Blocker (No further documentation required).       Informed Consent: Patient understands risks and agrees with Anesthesia plan.  Questions answered. Anesthesia consent signed with patient.  ASA Score: 2     Day of Surgery Review of History & Physical:    H&P update referred to the surgeon.         Ready For Surgery From Anesthesia Perspective.

## 2018-05-01 ENCOUNTER — ANESTHESIA (OUTPATIENT)
Dept: SURGERY | Facility: HOSPITAL | Age: 73
End: 2018-05-01
Payer: MEDICARE

## 2018-05-01 ENCOUNTER — SURGERY (OUTPATIENT)
Age: 73
End: 2018-05-01

## 2018-05-01 ENCOUNTER — HOSPITAL ENCOUNTER (OUTPATIENT)
Facility: HOSPITAL | Age: 73
Discharge: HOME | End: 2018-05-01
Attending: OPHTHALMOLOGY | Admitting: OPHTHALMOLOGY
Payer: MEDICARE

## 2018-05-01 VITALS
RESPIRATION RATE: 16 BRPM | HEIGHT: 65 IN | TEMPERATURE: 98 F | BODY MASS INDEX: 28.66 KG/M2 | HEART RATE: 74 BPM | OXYGEN SATURATION: 100 % | WEIGHT: 172 LBS | DIASTOLIC BLOOD PRESSURE: 49 MMHG | SYSTOLIC BLOOD PRESSURE: 134 MMHG

## 2018-05-01 DIAGNOSIS — H25.10 SENILE NUCLEAR SCLEROSIS: ICD-10-CM

## 2018-05-01 PROCEDURE — 63600175 PHARM REV CODE 636 W HCPCS: Performed by: NURSE ANESTHETIST, CERTIFIED REGISTERED

## 2018-05-01 PROCEDURE — 71000015 HC POSTOP RECOV 1ST HR: Performed by: OPHTHALMOLOGY

## 2018-05-01 PROCEDURE — V2632 POST CHMBR INTRAOCULAR LENS: HCPCS | Performed by: OPHTHALMOLOGY

## 2018-05-01 PROCEDURE — D9220A PRA ANESTHESIA: Mod: CRNA,,, | Performed by: NURSE ANESTHETIST, CERTIFIED REGISTERED

## 2018-05-01 PROCEDURE — 66984 XCAPSL CTRC RMVL W/O ECP: CPT | Mod: RT,,, | Performed by: OPHTHALMOLOGY

## 2018-05-01 PROCEDURE — 37000009 HC ANESTHESIA EA ADD 15 MINS: Performed by: OPHTHALMOLOGY

## 2018-05-01 PROCEDURE — 36000707: Performed by: OPHTHALMOLOGY

## 2018-05-01 PROCEDURE — 36000706: Performed by: OPHTHALMOLOGY

## 2018-05-01 PROCEDURE — D9220A PRA ANESTHESIA: Mod: ANES,,, | Performed by: ANESTHESIOLOGY

## 2018-05-01 PROCEDURE — 25000003 PHARM REV CODE 250: Performed by: ANESTHESIOLOGY

## 2018-05-01 PROCEDURE — 37000008 HC ANESTHESIA 1ST 15 MINUTES: Performed by: OPHTHALMOLOGY

## 2018-05-01 PROCEDURE — 25000003 PHARM REV CODE 250: Performed by: OPHTHALMOLOGY

## 2018-05-01 PROCEDURE — 63600175 PHARM REV CODE 636 W HCPCS: Performed by: OPHTHALMOLOGY

## 2018-05-01 DEVICE — IMPLANTABLE DEVICE: Type: IMPLANTABLE DEVICE | Site: EYE | Status: FUNCTIONAL

## 2018-05-01 RX ORDER — LIDOCAINE HYDROCHLORIDE 40 MG/ML
INJECTION, SOLUTION RETROBULBAR
Status: DISCONTINUED | OUTPATIENT
Start: 2018-05-01 | End: 2018-05-01 | Stop reason: HOSPADM

## 2018-05-01 RX ORDER — EPINEPHRINE 1 MG/ML
INJECTION, SOLUTION INTRACARDIAC; INTRAMUSCULAR; INTRAVENOUS; SUBCUTANEOUS
Status: DISCONTINUED
Start: 2018-05-01 | End: 2018-05-01 | Stop reason: HOSPADM

## 2018-05-01 RX ORDER — PHENYLEPHRINE HYDROCHLORIDE 25 MG/ML
1 SOLUTION/ DROPS OPHTHALMIC
Status: DISCONTINUED | OUTPATIENT
Start: 2018-05-01 | End: 2018-05-01 | Stop reason: HOSPADM

## 2018-05-01 RX ORDER — CYCLOPENTOLATE HYDROCHLORIDE 10 MG/ML
1 SOLUTION/ DROPS OPHTHALMIC
Status: COMPLETED | OUTPATIENT
Start: 2018-05-01 | End: 2018-05-01

## 2018-05-01 RX ORDER — OFLOXACIN 3 MG/ML
1 SOLUTION/ DROPS OPHTHALMIC
Status: COMPLETED | OUTPATIENT
Start: 2018-05-01 | End: 2018-05-01

## 2018-05-01 RX ORDER — TROPICAMIDE 10 MG/ML
1 SOLUTION/ DROPS OPHTHALMIC
Status: DISCONTINUED | OUTPATIENT
Start: 2018-05-01 | End: 2018-05-01 | Stop reason: HOSPADM

## 2018-05-01 RX ORDER — EPINEPHRINE 1 MG/ML
INJECTION, SOLUTION INTRACARDIAC; INTRAMUSCULAR; INTRAVENOUS; SUBCUTANEOUS
Status: DISCONTINUED | OUTPATIENT
Start: 2018-05-01 | End: 2018-05-01 | Stop reason: HOSPADM

## 2018-05-01 RX ORDER — PROPARACAINE HYDROCHLORIDE 5 MG/ML
1 SOLUTION/ DROPS OPHTHALMIC
Status: DISCONTINUED | OUTPATIENT
Start: 2018-05-01 | End: 2018-05-01 | Stop reason: HOSPADM

## 2018-05-01 RX ORDER — PREDNISOLONE ACETATE 10 MG/ML
SUSPENSION/ DROPS OPHTHALMIC
Status: DISCONTINUED
Start: 2018-05-01 | End: 2018-05-01 | Stop reason: HOSPADM

## 2018-05-01 RX ORDER — HYDROCODONE BITARTRATE AND ACETAMINOPHEN 5; 325 MG/1; MG/1
1 TABLET ORAL EVERY 4 HOURS PRN
Status: DISCONTINUED | OUTPATIENT
Start: 2018-05-01 | End: 2018-05-01 | Stop reason: HOSPADM

## 2018-05-01 RX ORDER — OFLOXACIN 3 MG/ML
SOLUTION/ DROPS OPHTHALMIC
Status: DISCONTINUED | OUTPATIENT
Start: 2018-05-01 | End: 2018-05-01 | Stop reason: HOSPADM

## 2018-05-01 RX ORDER — PREDNISOLONE ACETATE 10 MG/ML
SUSPENSION/ DROPS OPHTHALMIC
Status: DISCONTINUED | OUTPATIENT
Start: 2018-05-01 | End: 2018-05-01 | Stop reason: HOSPADM

## 2018-05-01 RX ORDER — MIDAZOLAM HYDROCHLORIDE 1 MG/ML
INJECTION, SOLUTION INTRAMUSCULAR; INTRAVENOUS
Status: DISCONTINUED | OUTPATIENT
Start: 2018-05-01 | End: 2018-05-01

## 2018-05-01 RX ORDER — SODIUM CHLORIDE 9 MG/ML
INJECTION, SOLUTION INTRAVENOUS CONTINUOUS
Status: DISCONTINUED | OUTPATIENT
Start: 2018-05-01 | End: 2018-05-01 | Stop reason: HOSPADM

## 2018-05-01 RX ORDER — ACETAMINOPHEN 325 MG/1
650 TABLET ORAL EVERY 4 HOURS PRN
Status: DISCONTINUED | OUTPATIENT
Start: 2018-05-01 | End: 2018-05-01 | Stop reason: HOSPADM

## 2018-05-01 RX ORDER — LIDOCAINE HYDROCHLORIDE 40 MG/ML
INJECTION, SOLUTION RETROBULBAR
Status: DISCONTINUED
Start: 2018-05-01 | End: 2018-05-01 | Stop reason: HOSPADM

## 2018-05-01 RX ORDER — FENTANYL CITRATE 50 UG/ML
INJECTION, SOLUTION INTRAMUSCULAR; INTRAVENOUS
Status: DISCONTINUED | OUTPATIENT
Start: 2018-05-01 | End: 2018-05-01

## 2018-05-01 RX ORDER — LIDOCAINE HYDROCHLORIDE 10 MG/ML
1 INJECTION, SOLUTION EPIDURAL; INFILTRATION; INTRACAUDAL; PERINEURAL ONCE
Status: COMPLETED | OUTPATIENT
Start: 2018-05-01 | End: 2018-05-01

## 2018-05-01 RX ADMIN — MIDAZOLAM HYDROCHLORIDE 1 MG: 1 INJECTION, SOLUTION INTRAMUSCULAR; INTRAVENOUS at 08:05

## 2018-05-01 RX ADMIN — OFLOXACIN 1 DROP: 3 SOLUTION OPHTHALMIC at 07:05

## 2018-05-01 RX ADMIN — EPINEPHRINE 0.3 MG: 1 INJECTION, SOLUTION INTRAMUSCULAR; SUBCUTANEOUS at 08:05

## 2018-05-01 RX ADMIN — TROPICAMIDE 1 DROP: 10 SOLUTION/ DROPS OPHTHALMIC at 07:05

## 2018-05-01 RX ADMIN — LIDOCAINE HYDROCHLORIDE 5 ML: 40 INJECTION, SOLUTION RETROBULBAR; TOPICAL at 08:05

## 2018-05-01 RX ADMIN — CYCLOPENTOLATE HYDROCHLORIDE 1 DROP: 10 SOLUTION/ DROPS OPHTHALMIC at 07:05

## 2018-05-01 RX ADMIN — LIDOCAINE HYDROCHLORIDE 0.2 MG: 10 INJECTION, SOLUTION EPIDURAL; INFILTRATION; INTRACAUDAL; PERINEURAL at 07:05

## 2018-05-01 RX ADMIN — PHENYLEPHRINE HYDROCHLORIDE 1 DROP: 25 SOLUTION/ DROPS OPHTHALMIC at 07:05

## 2018-05-01 RX ADMIN — OFLOXACIN 2 DROP: 3 SOLUTION/ DROPS OPHTHALMIC at 08:05

## 2018-05-01 RX ADMIN — FENTANYL CITRATE 50 MCG: 50 INJECTION, SOLUTION INTRAMUSCULAR; INTRAVENOUS at 08:05

## 2018-05-01 RX ADMIN — PREDNISOLONE ACETATE 2 DROP: 10 SUSPENSION/ DROPS OPHTHALMIC at 08:05

## 2018-05-01 RX ADMIN — SODIUM CHLORIDE 1000 ML: 0.9 INJECTION, SOLUTION INTRAVENOUS at 07:05

## 2018-05-01 RX ADMIN — PROPARACAINE HYDROCHLORIDE 1 DROP: 5 SOLUTION/ DROPS OPHTHALMIC at 07:05

## 2018-05-01 NOTE — DISCHARGE INSTRUCTIONS
Discharge Instructions for Cataract Surgery  A surgeon removed the cloudy lens in your eye and replaced it with a clear man-made lens. Be sure to have an adult family member or friend drive you home after surgery. Heres what you can expect following surgery and tips for a healthy recovery.  It is normal to have the following:  · Bruised or bloodshot eye for 7 days  · Itching and mild discomfort for several days  · Some fluid discharge  · Sensitivity to light  · Scratchy, sandlike feeling in the eye for 2 weeks  · Feeling tired, especially during the first 24 hours  Activity level  · Do not drive for 2 days or as instructed by your doctor.  · Do not drink alcohol for at least 24 hours.  · Avoid bending at the waist to  objects or lifting anything heavy for 2 days.  · Relax for the first 24 hours after surgery. Watching TV and reading are OK and wont harm your eye.  Eye protection  · Do not rub or press on your eye.  · Sleep on your back or on your unoperated side for 2 nights.  · If instructed, wear a bandage over your eye for 2 days and 2 nights.  · If instructed, wear a shield to protect your eye for 2 days and 2 nights.  Using eyedrops  You may be given special eyedrops or ointment. Here is one way to use eyedrops:  · Tilt your head back.  · Pull your bottom eyelid down.  · Squeeze one drop into your eye. Do not touch your eye with the bottle tip.  · Close your eyes for a few seconds.  · If you need more than one drop, wait a few minutes before adding the next one.   Call your doctor right away if you have any of the following:  · Bleeding or discharge from the eye  · Your vision suddenly becomes worse  · Pain medicine you are told to take does not ease your pain  · Nausea or vomiting  · Chills or fever over 100.4°F (39.1°C)   Date Last Reviewed: 5/30/2015  © 6239-4155 [employer]. [address] [place] [address], [place] [address]. All rights reserved. This information is not intended as a  substitute for professional medical care. Always follow your healthcare professional's instructions.

## 2018-05-01 NOTE — ANESTHESIA POSTPROCEDURE EVALUATION
"Anesthesia Post Evaluation    Patient: Isael Salmon    Procedure(s) Performed: Procedure(s) (LRB):  PHACOEMULSIFICATION-ASPIRATION-CATARACT (Right)  INSERTION-INTRAOCULAR LENS (IOL) (Right)    Final Anesthesia Type: MAC  Patient location during evaluation: PACU  Patient participation: Yes- Able to Participate  Level of consciousness: awake and alert  Post-procedure vital signs: reviewed and stable  Pain management: adequate  Airway patency: patent  PONV status at discharge: No PONV  Anesthetic complications: no      Cardiovascular status: hemodynamically stable  Respiratory status: unassisted and spontaneous ventilation  Hydration status: euvolemic  Follow-up not needed.        Visit Vitals  BP (!) 131/54   Pulse 74   Temp 36.5 °C (97.7 °F) (Skin)   Resp 16   Ht 5' 5" (1.651 m)   Wt 78 kg (172 lb)   LMP  (LMP Unknown)   SpO2 98%   Breastfeeding? No   BMI 28.62 kg/m²       Pain/Alfonzo Score: Pain Assessment Performed: Yes (5/1/2018  8:56 AM)  Presence of Pain: complains of pain/discomfort (5/1/2018  8:56 AM)  Alfonzo Score: 10 (5/1/2018  8:56 AM)      "

## 2018-05-01 NOTE — ANESTHESIA RELEASE NOTE
"Anesthesia Release from PACU Note    Patient: Isael Salmon    Procedure(s) Performed: Procedure(s) (LRB):  PHACOEMULSIFICATION-ASPIRATION-CATARACT (Right)  INSERTION-INTRAOCULAR LENS (IOL) (Right)    Anesthesia type: MAC    Post pain: Adequate analgesia    Post assessment: no apparent anesthetic complications    Last Vitals:   Visit Vitals  BP (!) 131/54   Pulse 74   Temp 36.5 °C (97.7 °F) (Skin)   Resp 16   Ht 5' 5" (1.651 m)   Wt 78 kg (172 lb)   LMP  (LMP Unknown)   SpO2 98%   Breastfeeding? No   BMI 28.62 kg/m²       Post vital signs: stable    Level of consciousness: awake    Nausea/Vomiting: no nausea/no vomiting    Complications: none    Airway Patency: patent    Respiratory: unassisted    Cardiovascular: stable and blood pressure at baseline    Hydration: euvolemic  "

## 2018-05-01 NOTE — TRANSFER OF CARE
"Anesthesia Transfer of Care Note    Patient: Isael Salmon    Procedure(s) Performed: Procedure(s) (LRB):  PHACOEMULSIFICATION-ASPIRATION-CATARACT (Right)  INSERTION-INTRAOCULAR LENS (IOL) (Right)    Patient location: PACU    Anesthesia Type: MAC    Transport from OR: Transported from OR on room air with adequate spontaneous ventilation    Post pain: adequate analgesia    Post assessment: no apparent anesthetic complications and tolerated procedure well    Post vital signs: stable    Level of consciousness: awake, alert and oriented    Nausea/Vomiting: no nausea/vomiting    Complications: none    Transfer of care protocol was followed      Last vitals:   Visit Vitals  BP (!) 139/58   Pulse 76   Temp 36.5 °C (97.7 °F) (Skin)   Resp 16   Ht 5' 5" (1.651 m)   Wt 78 kg (172 lb)   LMP  (LMP Unknown)   SpO2 99%   Breastfeeding? No   BMI 28.62 kg/m²     "

## 2018-05-01 NOTE — PLAN OF CARE
Discharge instructions reviewed w/ pt, verbalized understanding. Pt in NADN. States burning tolerable and would not like pain medicine, tolerated liquids w/ no issues. To be d/c'd home w/ family.

## 2018-05-01 NOTE — OP NOTE
DATE OF PROCEDURE:  05/01/2018    SURGEON:  Jonathan Lester M.D.    PREOPERATIVE DIAGNOSIS:  Nuclear sclerotic cataract, right eye.     POSTOPERATIVE DIAGNOSIS:  Nuclear sclerotic cataract, right eye.     PROCEDURE:  Clear corneal phacoemulsification with posterior chamber intraocular   lens implant, right eye.     ANESTHESIA:  Monitored anesthesia care with 2% Xylocaine jelly topically, 1%   free lidocaine topically and intrachamberly.     PROCEDURE IN DETAIL:  After the appropriate preoperative consent was obtained,   the patient had the 2% Xylocaine jelly applied to the right cornea.  The patient   was then brought to the operating room and placed into the supine position.    The right periorbit was then prepped and draped in the usual sterile ophthalmic   fashion.  A lid speculum was then inserted into the right eye.  Several drops of   the 1% lidocaine were placed onto the right cornea.  The 1% lidocaine was also   applied to the perilimbal region with the Weck-ruby sponge.  Using the 0.12-mm   forceps and the Super Sharp blade, a paracentesis site was made at the 12   o'clock position.  Approximately 0.5 mL of the 1% lidocaine was injected into   the anterior chamber.  Next, Viscoat was injected into the anterior chamber   through the paracentesis site.  The 2.75-mm keratome and the cyclodialysis   spatula were used to create a 2.75-mm clear corneal temporal groove.  The   cystitomy needle and Utrata forceps were then used to create a continuous tear   360-degree capsulorrhexis.  BSS in a cannula was then used for hydrodissection.    Phacoemulsification then proceeded in a stop-and-chop fashion without any   complications.  Another 0.5 mL of the 1% lidocaine was injected into the   anterior chamber.  The curved tip irrigation aspiration handpiece was then used   to remove the residual cortical material from the capsular bag.  Again, the 1%   lidocaine was applied to the perilimbal region with the Weck-ruby  sponge.  Healon   GV was then injected into the anterior chamber and capsular bag.  An [employer] intraocular lens model AU00T0, 20.0 diopters in power, and serial   number [id number] was injected into the capsular bag with the lens injector.    The Sinskey hook was used to position the lens into its proper place.  The   residual viscoelastic material was removed from the anterior chamber and   capsular bag with the curved tip irrigation aspiration handpiece.  Both wounds   were hydrated with BSS on a cannula.  Both wounds were checked and found to be   watertight.  The lid speculum was then removed from the right eye.  The patient   then had 1 drop of Vigamox ophthalmic drop and 1 drop of Econopred +1%   ophthalmic drop instilled onto the right cornea.  The eye was then shielded.    The patient tolerated the procedure well and was then brought to the recovery   room in good condition.      MADI  dd: 05/01/2018 14:13:27 (CDT)  td: 05/01/2018 14:21:44 (CDT)  Doc ID   #[id number]  Job ID #[id number]    CC:

## 2018-05-01 NOTE — BRIEF OP NOTE
Operative Note     SUMMARY     Surgery Date: 5/1/2018    Surgeon(s) and Role:      Jonathan Lester MD - Primary    Pre-op Diagnosis:  Nuclear sclerosis     Post-op/ Diagnosis:  Same    Final Diagnosis: Cataract    Procedure(s) (LRB):  PHACOEMULSIFICATION-ASPIRATION-CATARACT   INSERTION-INTRAOCULAR LENS (IOL)     Anesthesia: Monitored Anesthesia Care    Findings/Key Components:  Cataract    Outcome: Tolerated procedure well    Estimated Blood Loss: None         Specimens     None          Follow-up:  Tomorrow in clinic      Discharge Note      SUMMARY     Admit Date: 5/1/2018    Attending Physician: Jonathan Lester MD    Discharge Physician: Jonathan Lester MD    Discharge Date: 5/1/2018    Final Diagnosis: Cataract    Outcome: Tolerated procedure well    Disposition: Discharge to Home.    Medications:       Isael Salmon   Home Medication Instructions JAYLEEN:[id number]    Printed on:05/01/18 0854   Medication Information                      allopurinol (ZYLOPRIM) 100 MG tablet  Take 2 tablets (200 mg total) by mouth once daily.             aspirin (ECOTRIN) 81 MG EC tablet  Take 81 mg by mouth once daily.              BIOTIN ORAL  Take 1 tablet by mouth once daily.             co-enzyme Q-10 30 mg capsule  Take 200 mg by mouth once daily.             colchicine 0.6 mg tablet  One tablet every hour during gouty flare until have diarrhea. Max 6 tablets daily             diazePAM (VALIUM) 10 MG Tab  take 1 tablet by mouth ONE HOUR BEFORE APPOINTMENT             diclofenac sodium 1 % Gel  Apply 2 g topically once daily.             dicyclomine (BENTYL) 10 MG capsule  Take 1 capsule (10 mg total) by mouth 4 (four) times daily as needed. 1 Capsule Oral Four times a day             difluprednate (DUREZOL) 0.05 % Drop ophthalmic solution  Place 1 drop into the right eye 4 (four) times daily. For 30 days             escitalopram oxalate (LEXAPRO) 10 MG tablet  take 1/2 tablet by mouth once daily              estradiol (ESTRACE) 0.01 % (0.1 mg/gram) vaginal cream  Place vaginally twice a week.              ferrous sulfate 325 mg (65 mg iron) Tab tablet  Take 1 tablet (325 mg total) by mouth every 12 (twelve) hours.             fluticasone (FLONASE) 50 mcg/actuation nasal spray  2 sprays by Each Nare route daily as needed. 2 Aerosol, Spray Nasal Every day             folic acid-vit B6-vit B12 2.5-25-2 mg (FOLBIC) 2.5-25-2 mg Tab  Take 1 tablet by mouth once daily.             ILEVRO 0.3 % DrpS  Place 1 drop into both eyes once daily. For 30 days             indomethacin (INDOCIN) 50 MG capsule  Take 1 capsule (50 mg total) by mouth 3 (three) times daily as needed.             L gasseri/B bifidum/B longum ([facility] ORAL)  Take by mouth.             losartan (COZAAR) 50 MG tablet  Take 1 tablet (50 mg total) by mouth 2 (two) times daily.             neomycin-polymyxin-hydrocortisone (CORTISPORIN) 3.5-10,000-1 mg/mL-unit/mL-% otic suspension  apply to affected area OF NAILS twice a day             ofloxacin (OCUFLOX) 0.3 % ophthalmic solution  Place 1 drop into the right eye 4 (four) times daily.             omeprazole (PRILOSEC) 20 MG capsule  take 1 capsule by mouth twice a day             ranitidine (ZANTAC) 300 MG tablet               rosuvastatin (CRESTOR) 20 MG tablet  Take 1 tablet (20 mg total) by mouth once daily.             spironolactone (ALDACTONE) 25 MG tablet  take 1 tablet by mouth once daily                   Patient Discharge Instructions:     Keep Connors Shield over operated eye when not using drops.     DIET:  Regular    Activity: No heavy lifting or bending X 1 week.    Follow-up:  Tomorrow in clinic

## 2018-05-02 ENCOUNTER — OFFICE VISIT (OUTPATIENT)
Dept: OPHTHALMOLOGY | Facility: CLINIC | Age: 73
End: 2018-05-02
Payer: MEDICARE

## 2018-05-02 VITALS — HEART RATE: 77 BPM | DIASTOLIC BLOOD PRESSURE: 71 MMHG | SYSTOLIC BLOOD PRESSURE: 143 MMHG

## 2018-05-02 DIAGNOSIS — Z98.890 POST-OPERATIVE STATE: Primary | ICD-10-CM

## 2018-05-02 PROCEDURE — 99999 PR PBB SHADOW E&M-EST. PATIENT-LVL III: CPT | Mod: PBBFAC,,, | Performed by: OPHTHALMOLOGY

## 2018-05-02 PROCEDURE — 99024 POSTOP FOLLOW-UP VISIT: CPT | Mod: POP,,, | Performed by: OPHTHALMOLOGY

## 2018-05-02 PROCEDURE — 99213 OFFICE O/P EST LOW 20 MIN: CPT | Mod: PBBFAC,PO | Performed by: OPHTHALMOLOGY

## 2018-05-02 NOTE — PROGRESS NOTES
Subjective:       Patient ID: Isael Salmon is a 72 y.o. female.    Chief Complaint: Post-op Evaluation (1 day po od)    HPI     Post-op Evaluation    Additional comments: 1 day po od           Comments   S/p Phaco w/IOL OD - 5/1/18    1 day po od    Pt states sx went well. Pt denies pain and discomfort.     Eyemeds  Ofloxacin QID OD  Ilevro QD OD  Durezol QID OD        Last edited by Elvira Parker on 5/2/2018 10:41 AM. (History)             Assessment:       1. Post-operative state        Plan:       S/p CE OD- Doing well.           CPM OD.  RTC 1 wk.

## 2018-05-09 ENCOUNTER — OFFICE VISIT (OUTPATIENT)
Dept: OPHTHALMOLOGY | Facility: CLINIC | Age: 73
End: 2018-05-09
Payer: MEDICARE

## 2018-05-09 DIAGNOSIS — Z98.890 POST-OPERATIVE STATE: Primary | ICD-10-CM

## 2018-05-09 DIAGNOSIS — H25.12 NUCLEAR SCLEROSIS OF LEFT EYE: ICD-10-CM

## 2018-05-09 DIAGNOSIS — H43.813 VITREOUS DETACHMENT OF BOTH EYES: ICD-10-CM

## 2018-05-09 PROCEDURE — 99024 POSTOP FOLLOW-UP VISIT: CPT | Mod: POP,,, | Performed by: OPHTHALMOLOGY

## 2018-05-09 PROCEDURE — 99212 OFFICE O/P EST SF 10 MIN: CPT | Mod: PBBFAC,PO | Performed by: OPHTHALMOLOGY

## 2018-05-09 PROCEDURE — 99999 PR PBB SHADOW E&M-EST. PATIENT-LVL II: CPT | Mod: PBBFAC,,, | Performed by: OPHTHALMOLOGY

## 2018-05-09 PROCEDURE — 92136 OPHTHALMIC BIOMETRY: CPT | Mod: 26,S$PBB,LT, | Performed by: OPHTHALMOLOGY

## 2018-05-09 PROCEDURE — 92136 OPHTHALMIC BIOMETRY: CPT | Mod: PBBFAC,PO | Performed by: OPHTHALMOLOGY

## 2018-05-09 NOTE — PROGRESS NOTES
Subjective:       Patient ID: Isael Salmon is a 72 y.o. female.    Chief Complaint: Post-op Evaluation (1 week PO OD )    HPI     Post-op Evaluation    Additional comments: 1 week PO OD            Comments   1 week PO OD   Denies eye pain and flashes. Increase in floaters which stated on Friday.   No blurred vision right eye. Slight itching right eye     Meds: No gtt on today           Last edited by LISA Florez on 5/9/2018 10:45 AM. (History)             Assessment:       1. Post-operative state    2. Vitreous detachment of both eyes    3. Nuclear sclerosis of left eye        Plan:       S/p CE OD- Doing well.     PVD OD-Causing floaters OD.  Visually significant cataract OS -Pt. Wants Sx.          Taper gtts OD.  Cataract Surgery Consent: Patient with a visually significant cataract with difficulties of ADLs, reading, driving, night vision, glare (any and all).  Discussed with Patient/Family/Caregiver: options, risks and benefits, expectations of cataract surgery, utilized an eye model with questions and answers to facilitate discussion.  Discussed lens options and patient understands that glasses may be required for optimal vision for distance and/or near vision after cataract surgery.  The Patient/Family/Caregiver  voice good understanding and patient wishes to proceed with surgery.  The patient will likely benefit from surgery and patient signed consent for Left Eye.  CE OS 5/15/18.

## 2018-05-10 ENCOUNTER — TELEPHONE (OUTPATIENT)
Dept: OPHTHALMOLOGY | Facility: CLINIC | Age: 73
End: 2018-05-10

## 2018-05-12 NOTE — H&P
[facility]  History & Physical    Subjective:      Chief Complaint/Reason for Admission:     Isael Salmon is a 72 y.o. female.    Past Medical History:   Diagnosis Date    Adjustment disorder 7/26/2012    Anemia 7/26/2012    AR (allergic rhinitis) 9/5/2014    Bronchitis     Fibrocystic breast disease in female 7/26/2012    GERD (gastroesophageal reflux disease) 7/26/2012    Gout     Gout, arthritis 7/26/2012    History of blood transfusion 7/26/2012    History of colonic polyps 7/26/2012    Hypercalcemia 9/20/2012    Hyperlipidemia 7/26/2012    Hypertension 7/26/2012    Nuclear sclerosis 5/2/2014    Osteoarthritis 7/26/2012    Other hyperparathyroidism 9/20/2012    Postmenopausal status 7/26/2012    PVD (peripheral vascular disease) 7/26/2012    left leg laser of vein with injection    Stroke 1997    TIA    Superficial vein thrombosis     Transient ischemic attack 7/26/2012    Varicose veins 7/26/2012     Past Surgical History:   Procedure Laterality Date    BREAST SURGERY      breast reduction    broken second finger Right 12/2015    pins placed    carotid endarterectomy  1997    right    CAROTID ENDARTERECTOMY Right 1997    CATARACT EXTRACTION W/  INTRAOCULAR LENS IMPLANT Right 05/01/2018    Dr. Yost    COLONOSCOPY N/A 10/26/2015    Procedure: COLONOSCOPY;  Surgeon: Manuel Alanis MD;  Location: [facility] ([address] [place] [address]);  Service: Endoscopy;  Laterality: N/A;    HYSTERECTOMY      ORIF FINGER FRACTURE  12/18/15    TIA      VASCULAR SURGERY      left leg vein laser     Family History   Problem Relation Age of Onset    Heart failure Father     Cancer Mother         pancreas    Cataracts Mother     Hypertension Mother     Cancer Brother         bladder    Cataracts Brother     Hypertension Brother     Cataracts Brother     Hypertension Brother     Cataracts Brother     Hypertension Brother     Bone cancer Brother     Cataracts Brother      Hypertension Brother     Breast cancer Neg Hx     Ovarian cancer Neg Hx     Allergies Neg Hx     Asthma Neg Hx     Eczema Neg Hx     Cervical cancer Neg Hx     Endometrial cancer Neg Hx     Vaginal cancer Neg Hx     Amblyopia Neg Hx     Blindness Neg Hx     Diabetes Neg Hx     Glaucoma Neg Hx     Macular degeneration Neg Hx     Retinal detachment Neg Hx     Strabismus Neg Hx     Stroke Neg Hx     Thyroid disease Neg Hx      Social History   Substance Use Topics    Smoking status: Former Smoker     Quit date: 12/20/1997    Smokeless tobacco: Never Used      Comment: quit 1997 - 1 pack per day since age 16    Alcohol use 0.6 oz/week     1 Glasses of wine per week      Comment: ocassional       No prescriptions prior to admission.     Review of patient's allergies indicates:   Allergen Reactions    Latex      Other reaction(s): Rash    Pcn [penicillins]      Other reaction(s): rash    Sulfa (sulfonamide antibiotics)      Other reaction(s): blisters    Avelox [moxifloxacin]      Other reaction(s): racing heart  Other reaction(s): shakes    Ciprofloxacin Other (See Comments)     Room starts to spin , nausea and dizziness    Codeine      Other reaction(s): nausea  Other reaction(s): weakness    Norco [hydrocodone-acetaminophen] Other (See Comments)        Review of Systems   Eyes: Positive for blurred vision.   All other systems reviewed and are negative.      Objective:      Vital Signs (Most Recent)       Vital Signs Range (Last 24H):       Physical Exam   Constitutional: She is oriented to person, place, and time. She appears well-developed and well-nourished.   HENT:   Head: Normocephalic.   Eyes: Conjunctivae and EOM are normal. Pupils are equal, round, and reactive to light.   Neck: Normal range of motion. Neck supple.   Cardiovascular: Normal rate, regular rhythm and normal heart sounds.    Pulmonary/Chest: Effort normal and breath sounds normal.   Abdominal: Soft. Bowel sounds are  normal.   Musculoskeletal: Normal range of motion.   Neurological: She is alert and oriented to person, place, and time.   Skin: Skin is warm.   Psychiatric: She has a normal mood and affect.       Data Review:    ECG:      Assessment:      Cataract OS.    Plan:    CE OS.

## 2018-05-13 RX ORDER — ESCITALOPRAM OXALATE 10 MG/1
TABLET ORAL
Qty: 45 TABLET | Refills: 3 | Status: SHIPPED | OUTPATIENT
Start: 2018-05-13 | End: 2019-06-14 | Stop reason: SDUPTHER

## 2018-05-15 ENCOUNTER — ANESTHESIA (OUTPATIENT)
Dept: SURGERY | Facility: HOSPITAL | Age: 73
End: 2018-05-15
Payer: MEDICARE

## 2018-05-15 ENCOUNTER — HOSPITAL ENCOUNTER (OUTPATIENT)
Facility: HOSPITAL | Age: 73
Discharge: HOME | End: 2018-05-15
Attending: OPHTHALMOLOGY | Admitting: OPHTHALMOLOGY
Payer: MEDICARE

## 2018-05-15 ENCOUNTER — ANESTHESIA EVENT (OUTPATIENT)
Dept: SURGERY | Facility: HOSPITAL | Age: 73
End: 2018-05-15
Payer: MEDICARE

## 2018-05-15 VITALS
OXYGEN SATURATION: 97 % | RESPIRATION RATE: 16 BRPM | HEIGHT: 66 IN | TEMPERATURE: 98 F | SYSTOLIC BLOOD PRESSURE: 135 MMHG | DIASTOLIC BLOOD PRESSURE: 52 MMHG | WEIGHT: 172 LBS | HEART RATE: 76 BPM | BODY MASS INDEX: 27.64 KG/M2

## 2018-05-15 DIAGNOSIS — H25.10 SENILE NUCLEAR SCLEROSIS: ICD-10-CM

## 2018-05-15 PROCEDURE — 63600175 PHARM REV CODE 636 W HCPCS: Performed by: NURSE ANESTHETIST, CERTIFIED REGISTERED

## 2018-05-15 PROCEDURE — 37000009 HC ANESTHESIA EA ADD 15 MINS: Performed by: OPHTHALMOLOGY

## 2018-05-15 PROCEDURE — 36000707: Performed by: OPHTHALMOLOGY

## 2018-05-15 PROCEDURE — 71000015 HC POSTOP RECOV 1ST HR: Performed by: OPHTHALMOLOGY

## 2018-05-15 PROCEDURE — D9220A PRA ANESTHESIA: Mod: CRNA,,, | Performed by: NURSE ANESTHETIST, CERTIFIED REGISTERED

## 2018-05-15 PROCEDURE — 63600175 PHARM REV CODE 636 W HCPCS: Performed by: OPHTHALMOLOGY

## 2018-05-15 PROCEDURE — 37000008 HC ANESTHESIA 1ST 15 MINUTES: Performed by: OPHTHALMOLOGY

## 2018-05-15 PROCEDURE — 25000003 PHARM REV CODE 250: Performed by: OPHTHALMOLOGY

## 2018-05-15 PROCEDURE — 36000706: Performed by: OPHTHALMOLOGY

## 2018-05-15 PROCEDURE — D9220A PRA ANESTHESIA: Mod: ANES,,, | Performed by: ANESTHESIOLOGY

## 2018-05-15 PROCEDURE — V2632 POST CHMBR INTRAOCULAR LENS: HCPCS | Performed by: OPHTHALMOLOGY

## 2018-05-15 PROCEDURE — 66984 XCAPSL CTRC RMVL W/O ECP: CPT | Mod: 79,LT,, | Performed by: OPHTHALMOLOGY

## 2018-05-15 DEVICE — LENS 20.0: Type: IMPLANTABLE DEVICE | Site: EYE | Status: FUNCTIONAL

## 2018-05-15 RX ORDER — LIDOCAINE HYDROCHLORIDE 40 MG/ML
INJECTION, SOLUTION RETROBULBAR
Status: DISCONTINUED
Start: 2018-05-15 | End: 2018-05-15 | Stop reason: HOSPADM

## 2018-05-15 RX ORDER — FENTANYL CITRATE 50 UG/ML
INJECTION, SOLUTION INTRAMUSCULAR; INTRAVENOUS
Status: DISCONTINUED | OUTPATIENT
Start: 2018-05-15 | End: 2018-05-15

## 2018-05-15 RX ORDER — EPINEPHRINE 1 MG/ML
INJECTION, SOLUTION INTRACARDIAC; INTRAMUSCULAR; INTRAVENOUS; SUBCUTANEOUS
Status: DISCONTINUED
Start: 2018-05-15 | End: 2018-05-15 | Stop reason: HOSPADM

## 2018-05-15 RX ORDER — SODIUM CHLORIDE 0.9 % (FLUSH) 0.9 %
3 SYRINGE (ML) INJECTION
Status: DISCONTINUED | OUTPATIENT
Start: 2018-05-15 | End: 2018-05-15 | Stop reason: HOSPADM

## 2018-05-15 RX ORDER — PROPARACAINE HYDROCHLORIDE 5 MG/ML
1 SOLUTION/ DROPS OPHTHALMIC
Status: DISCONTINUED | OUTPATIENT
Start: 2018-05-15 | End: 2018-05-15 | Stop reason: HOSPADM

## 2018-05-15 RX ORDER — LIDOCAINE HYDROCHLORIDE 40 MG/ML
INJECTION, SOLUTION RETROBULBAR
Status: DISCONTINUED | OUTPATIENT
Start: 2018-05-15 | End: 2018-05-15 | Stop reason: HOSPADM

## 2018-05-15 RX ORDER — SODIUM CHLORIDE 9 MG/ML
INJECTION, SOLUTION INTRAVENOUS CONTINUOUS
Status: DISCONTINUED | OUTPATIENT
Start: 2018-05-15 | End: 2018-05-15 | Stop reason: HOSPADM

## 2018-05-15 RX ORDER — CYCLOPENTOLATE HYDROCHLORIDE 10 MG/ML
1 SOLUTION/ DROPS OPHTHALMIC
Status: DISCONTINUED | OUTPATIENT
Start: 2018-05-15 | End: 2018-05-15 | Stop reason: HOSPADM

## 2018-05-15 RX ORDER — EPINEPHRINE 1 MG/ML
INJECTION, SOLUTION INTRACARDIAC; INTRAMUSCULAR; INTRAVENOUS; SUBCUTANEOUS
Status: DISCONTINUED | OUTPATIENT
Start: 2018-05-15 | End: 2018-05-15 | Stop reason: HOSPADM

## 2018-05-15 RX ORDER — HYDROCODONE BITARTRATE AND ACETAMINOPHEN 5; 325 MG/1; MG/1
1 TABLET ORAL EVERY 4 HOURS PRN
Status: DISCONTINUED | OUTPATIENT
Start: 2018-05-15 | End: 2018-05-15 | Stop reason: HOSPADM

## 2018-05-15 RX ORDER — PHENYLEPHRINE HYDROCHLORIDE 25 MG/ML
1 SOLUTION/ DROPS OPHTHALMIC
Status: DISCONTINUED | OUTPATIENT
Start: 2018-05-15 | End: 2018-05-15 | Stop reason: HOSPADM

## 2018-05-15 RX ORDER — ACETAMINOPHEN 325 MG/1
650 TABLET ORAL EVERY 4 HOURS PRN
Status: DISCONTINUED | OUTPATIENT
Start: 2018-05-15 | End: 2018-05-15 | Stop reason: HOSPADM

## 2018-05-15 RX ORDER — MIDAZOLAM HYDROCHLORIDE 1 MG/ML
INJECTION INTRAMUSCULAR; INTRAVENOUS
Status: DISCONTINUED | OUTPATIENT
Start: 2018-05-15 | End: 2018-05-15

## 2018-05-15 RX ORDER — TROPICAMIDE 10 MG/ML
1 SOLUTION/ DROPS OPHTHALMIC
Status: DISCONTINUED | OUTPATIENT
Start: 2018-05-15 | End: 2018-05-15 | Stop reason: HOSPADM

## 2018-05-15 RX ORDER — PREDNISOLONE ACETATE 10 MG/ML
SUSPENSION/ DROPS OPHTHALMIC
Status: DISCONTINUED
Start: 2018-05-15 | End: 2018-05-15 | Stop reason: HOSPADM

## 2018-05-15 RX ORDER — PREDNISOLONE ACETATE 10 MG/ML
SUSPENSION/ DROPS OPHTHALMIC
Status: DISCONTINUED | OUTPATIENT
Start: 2018-05-15 | End: 2018-05-15 | Stop reason: HOSPADM

## 2018-05-15 RX ORDER — OFLOXACIN 3 MG/ML
1 SOLUTION/ DROPS OPHTHALMIC
Status: COMPLETED | OUTPATIENT
Start: 2018-05-15 | End: 2018-05-15

## 2018-05-15 RX ADMIN — PHENYLEPHRINE HYDROCHLORIDE 1 DROP: 25 SOLUTION/ DROPS OPHTHALMIC at 06:05

## 2018-05-15 RX ADMIN — TROPICAMIDE 1 DROP: 10 SOLUTION/ DROPS OPHTHALMIC at 06:05

## 2018-05-15 RX ADMIN — FENTANYL CITRATE 50 MCG: 50 INJECTION, SOLUTION INTRAMUSCULAR; INTRAVENOUS at 07:05

## 2018-05-15 RX ADMIN — CYCLOPENTOLATE HYDROCHLORIDE 1 DROP: 10 SOLUTION/ DROPS OPHTHALMIC at 06:05

## 2018-05-15 RX ADMIN — OFLOXACIN 1 DROP: 3 SOLUTION OPHTHALMIC at 06:05

## 2018-05-15 RX ADMIN — SODIUM CHLORIDE 1000 ML: 0.9 INJECTION, SOLUTION INTRAVENOUS at 06:05

## 2018-05-15 RX ADMIN — MIDAZOLAM HYDROCHLORIDE 1 MG: 1 INJECTION, SOLUTION INTRAMUSCULAR; INTRAVENOUS at 07:05

## 2018-05-15 RX ADMIN — PROPARACAINE HYDROCHLORIDE 1 DROP: 5 SOLUTION/ DROPS OPHTHALMIC at 06:05

## 2018-05-15 RX ADMIN — FENTANYL CITRATE 50 MCG: 50 INJECTION, SOLUTION INTRAMUSCULAR; INTRAVENOUS at 08:05

## 2018-05-15 RX ADMIN — FENTANYL CITRATE 25 MCG: 50 INJECTION, SOLUTION INTRAMUSCULAR; INTRAVENOUS at 07:05

## 2018-05-15 NOTE — BRIEF OP NOTE
Operative Note     SUMMARY     Surgery Date: 5/15/2018    Surgeon(s) and Role:      Jonathan Lester MD - Primary    Pre-op Diagnosis:  Nuclear sclerosis     Post-op/ Diagnosis:  Same    Final Diagnosis: Cataract    Procedure(s) (LRB):  PHACOEMULSIFICATION-ASPIRATION-CATARACT   INSERTION-INTRAOCULAR LENS (IOL)     Anesthesia: Monitored Anesthesia Care    Findings/Key Components:  Cataract    Outcome: Tolerated procedure well    Estimated Blood Loss: None         Specimens     None          Follow-up:  Tomorrow in clinic      Discharge Note      SUMMARY     Admit Date: 5/15/2018    Attending Physician: Jonathan Lester MD    Discharge Physician: Jonathan Lester MD    Discharge Date: 5/15/2018    Final Diagnosis: Cataract    Outcome: Tolerated procedure well    Disposition: Discharge to Home.    Medications:       Isael Salmon   Home Medication Instructions JAYLEEN:[id number]    Printed on:05/15/18 0829   Medication Information                      allopurinol (ZYLOPRIM) 100 MG tablet  Take 2 tablets (200 mg total) by mouth once daily.             aspirin (ECOTRIN) 81 MG EC tablet  Take 81 mg by mouth once daily.              BIOTIN ORAL  Take 1 tablet by mouth once daily.             co-enzyme Q-10 30 mg capsule  Take 200 mg by mouth once daily.             colchicine 0.6 mg tablet  One tablet every hour during gouty flare until have diarrhea. Max 6 tablets daily             diazePAM (VALIUM) 10 MG Tab  take 1 tablet by mouth ONE HOUR BEFORE APPOINTMENT             diclofenac sodium 1 % Gel  Apply 2 g topically once daily.             dicyclomine (BENTYL) 10 MG capsule  Take 1 capsule (10 mg total) by mouth 4 (four) times daily as needed. 1 Capsule Oral Four times a day             difluprednate (DUREZOL) 0.05 % Drop ophthalmic solution  Place 1 drop into the right eye 4 (four) times daily. For 30 days             escitalopram oxalate (LEXAPRO) 10 MG tablet  take 1/2 tablet by mouth once  daily             estradiol (ESTRACE) 0.01 % (0.1 mg/gram) vaginal cream  Place vaginally twice a week.              ferrous sulfate 325 mg (65 mg iron) Tab tablet  Take 1 tablet (325 mg total) by mouth every 12 (twelve) hours.             fluticasone (FLONASE) 50 mcg/actuation nasal spray  2 sprays by Each Nare route daily as needed. 2 Aerosol, Spray Nasal Every day             folic acid-vit B6-vit B12 2.5-25-2 mg (FOLBIC) 2.5-25-2 mg Tab  Take 1 tablet by mouth once daily.             ILEVRO 0.3 % DrpS  Place 1 drop into both eyes once daily. For 30 days             indomethacin (INDOCIN) 50 MG capsule  Take 1 capsule (50 mg total) by mouth 3 (three) times daily as needed.             L gasseri/B bifidum/B longum ([facility] ORAL)  Take by mouth.             losartan (COZAAR) 50 MG tablet  Take 1 tablet (50 mg total) by mouth 2 (two) times daily.             neomycin-polymyxin-hydrocortisone (CORTISPORIN) 3.5-10,000-1 mg/mL-unit/mL-% otic suspension  apply to affected area OF NAILS twice a day             omeprazole (PRILOSEC) 20 MG capsule  take 1 capsule by mouth twice a day             ranitidine (ZANTAC) 300 MG tablet               rosuvastatin (CRESTOR) 20 MG tablet  Take 1 tablet (20 mg total) by mouth once daily.             spironolactone (ALDACTONE) 25 MG tablet  take 1 tablet by mouth once daily                   Patient Discharge Instructions:     Keep Connors Shield over operated eye when not using drops.     DIET:  Regular    Activity: No heavy lifting or bending X 1 week.    Follow-up:  Tomorrow in clinic

## 2018-05-15 NOTE — ANESTHESIA PREPROCEDURE EVALUATION
05/15/2018  Isael Salmon is a 72 y.o., female.  Pre-operative evaluation for Procedure(s) (LRB):  PHACOEMULSIFICATION-ASPIRATION-CATARACT (Left)  INSERTION-INTRAOCULAR LENS (IOL) (Left)    Isael Salmon is a 72 y.o. female     Patient Active Problem List   Diagnosis    Essential hypertension    Hyperlipidemia    Gout, arthritis    Gastroesophageal reflux disease without esophagitis    Anemia, blood loss    Transient ischemic attack    Fibrocystic breast disease in female    Postmenopausal status    Osteoarthritis    History of colonic polyps    Adjustment disorder    Varicose veins    PVD (peripheral vascular disease)    History of blood transfusion    Hypercalcemia    Other hyperparathyroidism    Anemia, chronic disease    Rectocele    Mixed incontinence urge and stress (male)(female)    Urethral hypermobility    Chronic constipation    Chronic cough    Nuclear sclerosis    AR (allergic rhinitis)    Disorder of muscle    Recurrent UTI    Kidney stones    History of colon polyps    Idiopathic chronic gout of multiple sites without tophus    Primary osteoarthritis involving multiple joints    Crushing injury of right middle finger    Traumatic closed displaced mallet fracture    Pain of right middle finger    Mallet finger    Mild chronic anemia    Screening for colon cancer    Post concussive syndrome    Chest pain    History of hyperlipidemia    Non-intractable vomiting with nausea    Iron deficiency anemia    Amaurosis fugax    Acute right-sided low back pain with sciatica    Chronic pain    Bilateral carotid artery disease    Vitreous detachment of both eyes    Refractive error    Senile nuclear sclerosis    Post-operative state       Review of patient's allergies indicates:   Allergen Reactions    Latex      Other reaction(s): Rash    Pcn  [penicillins]      Other reaction(s): rash    Sulfa (sulfonamide antibiotics)      Other reaction(s): blisters    Avelox [moxifloxacin]      Other reaction(s): racing heart  Other reaction(s): shakes    Ciprofloxacin Other (See Comments)     Room starts to spin , nausea and dizziness    Codeine      Other reaction(s): nausea  Other reaction(s): weakness    Norco [hydrocodone-acetaminophen] Other (See Comments)       No current facility-administered medications on file prior to encounter.      Current Outpatient Prescriptions on File Prior to Encounter   Medication Sig Dispense Refill    allopurinol (ZYLOPRIM) 100 MG tablet Take 2 tablets (200 mg total) by mouth once daily. 60 tablet 12    aspirin (ECOTRIN) 81 MG EC tablet Take 81 mg by mouth once daily.       BIOTIN ORAL Take 1 tablet by mouth once daily.      co-enzyme Q-10 30 mg capsule Take 200 mg by mouth once daily.      difluprednate (DUREZOL) 0.05 % Drop ophthalmic solution Place 1 drop into the right eye 4 (four) times daily. For 30 days 5 mL 1    estradiol (ESTRACE) 0.01 % (0.1 mg/gram) vaginal cream Place vaginally twice a week.       folic acid-vit B6-vit B12 2.5-25-2 mg (FOLBIC) 2.5-25-2 mg Tab Take 1 tablet by mouth once daily. 30 tablet 11    ILEVRO 0.3 % DrpS Place 1 drop into both eyes once daily. For 30 days 3 mL 1    losartan (COZAAR) 50 MG tablet Take 1 tablet (50 mg total) by mouth 2 (two) times daily. 180 tablet 3    rosuvastatin (CRESTOR) 20 MG tablet Take 1 tablet (20 mg total) by mouth once daily. 90 tablet 3    spironolactone (ALDACTONE) 25 MG tablet take 1 tablet by mouth once daily 90 tablet 4    colchicine 0.6 mg tablet One tablet every hour during gouty flare until have diarrhea. Max 6 tablets daily (Patient taking differently: as needed. One tablet every hour during gouty flare until have diarrhea. Max 6 tablets daily) 30 tablet 11    diazePAM (VALIUM) 10 MG Tab take 1 tablet by mouth ONE HOUR BEFORE APPOINTMENT  0     diclofenac sodium 1 % Gel Apply 2 g topically once daily. 1 Tube 1    dicyclomine (BENTYL) 10 MG capsule Take 1 capsule (10 mg total) by mouth 4 (four) times daily as needed. 1 Capsule Oral Four times a day 60 capsule 3    ferrous sulfate 325 mg (65 mg iron) Tab tablet Take 1 tablet (325 mg total) by mouth every 12 (twelve) hours. 60 tablet 4    fluticasone (FLONASE) 50 mcg/actuation nasal spray 2 sprays by Each Nare route daily as needed. 2 Aerosol, Spray Nasal Every day 16 g 2    indomethacin (INDOCIN) 50 MG capsule Take 1 capsule (50 mg total) by mouth 3 (three) times daily as needed. 90 capsule 6    L gasseri/B bifidum/B longum ([employer] ORAL) Take by mouth.      neomycin-polymyxin-hydrocortisone (CORTISPORIN) 3.5-10,000-1 mg/mL-unit/mL-% otic suspension apply to affected area OF NAILS twice a day 10 mL 6    omeprazole (PRILOSEC) 20 MG capsule take 1 capsule by mouth twice a day 60 capsule 6    ranitidine (ZANTAC) 300 MG tablet   0    [DISCONTINUED] salsalate (DISALCID) 750 MG Tab Take 1 tablet (750 mg total) by mouth 3 (three) times daily. 90 tablet 9       Past Surgical History:   Procedure Laterality Date    BREAST SURGERY      breast reduction    broken second finger Right 12/2015    pins placed    carotid endarterectomy  1997    right    CAROTID ENDARTERECTOMY Right 1997    CATARACT EXTRACTION W/  INTRAOCULAR LENS IMPLANT Right 05/01/2018    Dr. Yost    COLONOSCOPY N/A 10/26/2015    Procedure: COLONOSCOPY;  Surgeon: Manuel Alanis MD;  Location: [address];  Service: Endoscopy;  Laterality: N/A;    HYSTERECTOMY      ORIF FINGER FRACTURE  12/18/15    TIA      VASCULAR SURGERY      left leg vein laser       Social History     Social History    Marital status:      Spouse name: N/A    Number of children: 1    Years of education: N/A     Occupational History    Retired      Social History Main Topics    Smoking status: Former Smoker     Quit date:  12/20/1997    Smokeless tobacco: Never Used      Comment: quit 1997 - 1 pack per day since age 16    Alcohol use 0.6 oz/week     1 Glasses of wine per week      Comment: ocassional    Drug use: No    Sexual activity: Not Currently     Partners: Male     Birth control/ protection: Surgical     Other Topics Concern    Not on file     Social History Narrative    No narrative on file             2D Echo:  Results for orders placed or performed during the hospital encounter of 02/24/17   Echo doppler color flow   Result Value Ref Range    EF 65 55 - 65    Diastolic Dysfunction Yes (A)     Est. PA Systolic Pressure 39     Tricuspid Valve Regurgitation MILD        Anesthesia Evaluation    I have reviewed the Patient Summary Reports.     I have reviewed the Medications.     Review of Systems  Anesthesia Hx:  No problems with previous Anesthesia  History of prior surgery of interest to airway management or planning:  Denies Personal Hx of Anesthesia complications.   Social:  Former Smoker    Cardiovascular:   Hypertension    Pulmonary:  Pulmonary Normal    Renal/:  Renal/ Normal     Hepatic/GI:   GERD, well controlled    Musculoskeletal:   Arthritis     Neurological:   CVA    Endocrine:  Endocrine Normal        Physical Exam  General:  Well nourished    Airway/Jaw/Neck:  Airway Findings: Mouth Opening: Normal Tongue: Normal  General Airway Assessment: Adult  Mallampati: II  TM Distance: Normal, at least 6 cm      Dental:  Dental Findings: In tact        Mental Status:  Mental Status Findings:  Cooperative, Alert and Oriented         Anesthesia Plan  Type of Anesthesia, risks & benefits discussed:  Anesthesia Type:  MAC  Patient's Preference:   Intra-op Monitoring Plan: standard ASA monitors  Intra-op Monitoring Plan Comments:   Post Op Pain Control Plan:   Post Op Pain Control Plan Comments:   Induction:   IV  Beta Blocker:  Patient is not currently on a Beta-Blocker (No further documentation required).        Informed Consent: Patient understands risks and agrees with Anesthesia plan.  Questions answered. Anesthesia consent signed with patient.  ASA Score: 3     Day of Surgery Review of History & Physical:    H&P update referred to the surgeon.         Ready For Surgery From Anesthesia Perspective.

## 2018-05-15 NOTE — TRANSFER OF CARE
"Anesthesia Transfer of Care Note    Patient: Isael Salmon    Procedure(s) Performed: Procedure(s) (LRB):  PHACOEMULSIFICATION-ASPIRATION-CATARACT (Left)  INSERTION-INTRAOCULAR LENS (IOL) (Left)    Patient location: [facility]    Anesthesia Type: MAC    Transport from OR: Transported from OR on room air with adequate spontaneous ventilation    Post pain: adequate analgesia    Post assessment: no apparent anesthetic complications    Post vital signs: stable    Level of consciousness: awake    Nausea/Vomiting: no nausea/vomiting    Complications: none    Transfer of care protocol was followed      Last vitals:   Visit Vitals  BP (!) 134/50 (BP Location: Left arm, Patient Position: Sitting)   Pulse 76   Temp 36.3 °C (97.3 °F) (Temporal)   Resp 16   Ht 5' 5.5" (1.664 m)   Wt 78 kg (172 lb)   LMP  (LMP Unknown)   SpO2 99%   Breastfeeding? No   BMI 28.19 kg/m²     "

## 2018-05-15 NOTE — ANESTHESIA POSTPROCEDURE EVALUATION
"Anesthesia Post Evaluation    Patient: Isael Salmon    Procedure(s) Performed: Procedure(s) (LRB):  PHACOEMULSIFICATION-ASPIRATION-CATARACT (Left)  INSERTION-INTRAOCULAR LENS (IOL) (Left)    Final Anesthesia Type: MAC  Patient location during evaluation: PACU  Patient participation: Yes- Able to Participate  Level of consciousness: awake and alert  Post-procedure vital signs: reviewed and stable  Pain management: adequate  Airway patency: patent  PONV status at discharge: No PONV  Anesthetic complications: no      Cardiovascular status: stable  Respiratory status: unassisted and spontaneous ventilation  Hydration status: euvolemic  Follow-up not needed.        Visit Vitals  BP (!) 135/52 (BP Location: Left arm, Patient Position: Sitting)   Pulse 76   Temp 36.5 °C (97.7 °F) (Temporal)   Resp 16   Ht 5' 5.5" (1.664 m)   Wt 78 kg (172 lb)   LMP  (LMP Unknown)   SpO2 97%   Breastfeeding? No   BMI 28.19 kg/m²       Pain/Alfonzo Score: Pain Assessment Performed: Yes (5/15/2018  9:04 AM)  Presence of Pain: denies (5/15/2018  9:04 AM)  Alfonzo Score: 10 (5/15/2018  9:04 AM)      "

## 2018-05-16 ENCOUNTER — OFFICE VISIT (OUTPATIENT)
Dept: OPHTHALMOLOGY | Facility: CLINIC | Age: 73
End: 2018-05-16
Payer: MEDICARE

## 2018-05-16 VITALS — HEART RATE: 79 BPM | SYSTOLIC BLOOD PRESSURE: 113 MMHG | DIASTOLIC BLOOD PRESSURE: 61 MMHG

## 2018-05-16 DIAGNOSIS — Z98.890 POST-OPERATIVE STATE: Primary | ICD-10-CM

## 2018-05-16 PROCEDURE — 99213 OFFICE O/P EST LOW 20 MIN: CPT | Mod: PBBFAC,PO | Performed by: OPHTHALMOLOGY

## 2018-05-16 PROCEDURE — 99024 POSTOP FOLLOW-UP VISIT: CPT | Mod: POP,,, | Performed by: OPHTHALMOLOGY

## 2018-05-16 PROCEDURE — 99999 PR PBB SHADOW E&M-EST. PATIENT-LVL III: CPT | Mod: PBBFAC,,, | Performed by: OPHTHALMOLOGY

## 2018-05-16 RX ORDER — OFLOXACIN 3 MG/ML
SOLUTION/ DROPS OPHTHALMIC
Refills: 0 | COMMUNITY
Start: 2018-05-12 | End: 2018-06-13

## 2018-05-16 NOTE — PROGRESS NOTES
Subjective:       Patient ID: Isael Salmon is a 72 y.o. female.    Chief Complaint: Post-op Evaluation (1 day PO OS. CE IOL 5/15/2018 OS. )    HPI     Post-op Evaluation    Additional comments: 1 day PO OS. CE IOL 5/15/2018 OS.            Comments   72 y.o. Female is here today for 1 day PO OS. CE IOL 5/15/2018 OS. Denies   eye pain on today, but felt eye pain on yesterday. No f/f. Still notice   floaters right eye, floaters has decreased since last visit. No   discomfort.     Meds: Ofloxacin QID OS             Durezol QID OS             Ilevro qd OS        Last edited by LISA Florez on 5/16/2018  9:41 AM. (History)             Assessment:       1. Post-operative state        Plan:       S/p CE OU- Doing well.        CPM OS.  Taper gtts OD.  RTC 1 wk.

## 2018-05-16 NOTE — OP NOTE
DATE OF PROCEDURE:  05/15/2018.    SURGEON:  Jonathan Lester M.D.    PREOPERATIVE DIAGNOSIS:  Nuclear sclerotic cataract, left eye.     POSTOPERATIVE DIAGNOSIS:  Nuclear sclerotic cataract, left eye.     PROCEDURE:  Clear corneal phacoemulsification with posterior chamber intraocular   lens implant, left eye.     ANESTHESIA:  Monitored anesthesia care with 2% Xylocaine jelly topically, 1%   free lidocaine topically and intrachamberly.     PROCEDURE IN DETAIL:  After the appropriate preoperative consent was obtained,   the patient had the 2% Xylocaine jelly applied to the cornea.  The patient was   then brought to the operating room and placed into the supine position.  The   left periorbit was then prepped and draped in the usual sterile ophthalmic   fashion.  A lid speculum was then inserted into the left eye.  Several drops of   the 1% lidocaine were placed onto the left cornea.  The 1% lidocaine was also   applied to the perilimbal region with the Weck-ruby sponge.  Using the 0.12-mm   forceps and the Super Sharp blade, a paracentesis site was made at the 6 o'clock   position.  Approximately 0.5 mL of the 1% lidocaine was injected into the   anterior chamber.  Next, Viscoat was injected into the anterior chamber through   the paracentesis site.  The 2.75-mm keratome and the cyclodialysis spatula were   used to create a 2.75-mm clear corneal temporal groove.  The cystitomy needle   and Utrata forceps were then used to create a continuous tear 360-degree   capsulorrhexis.  BSS in a cannula was then used for hydrodissection.    Phacoemulsification then proceeded in a stop-and-chop fashion without any   complications.  Another 0.5 mL of the 1% lidocaine was injected into the   anterior chamber.  The curved tip irrigation aspiration handpiece was then used   to remove the residual cortical material from the capsular bag.  Again, the 1%   lidocaine was applied to the perilimbal region with the Weck-ruby sponge.   Healon   GV was then injected into the anterior chamber and capsular bag.  An [employer] intraocular lens model SN60WF, 20.0 diopters in power, and serial   #[id number] was injected into the capsular bag with the lens injector.  The   Sinskey hook was used to position the lens into its proper place.  The residual   viscoelastic material was removed from the anterior chamber and capsular bag   with the curved tip irrigation aspiration handpiece.  Both wounds were hydrated   with BSS on a cannula.  Both wounds were checked and found to be watertight.    The lid speculum was then removed from the left eye.  The patient then had 1   drop of Vigamox ophthalmic drop and 1 drop of Econopred +1% ophthalmic drop   instilled onto the left cornea.  The eye was then shielded.  The patient   tolerated the procedure well and was then brought to the recovery room in good   condition.      MADI  dd: 05/15/2018 17:16:26 (CDT)  td: 05/15/2018 19:10:54 (CDT)  Doc ID   #[id number]  Job ID #[id number]    CC:

## 2018-05-23 ENCOUNTER — OFFICE VISIT (OUTPATIENT)
Dept: OPHTHALMOLOGY | Facility: CLINIC | Age: 73
End: 2018-05-23
Payer: MEDICARE

## 2018-05-23 DIAGNOSIS — Z98.890 POST-OPERATIVE STATE: Primary | ICD-10-CM

## 2018-05-23 PROCEDURE — 99999 PR PBB SHADOW E&M-EST. PATIENT-LVL II: CPT | Mod: PBBFAC,,, | Performed by: OPHTHALMOLOGY

## 2018-05-23 PROCEDURE — 99024 POSTOP FOLLOW-UP VISIT: CPT | Mod: POP,,, | Performed by: OPHTHALMOLOGY

## 2018-05-23 PROCEDURE — 99212 OFFICE O/P EST SF 10 MIN: CPT | Mod: PBBFAC,PO | Performed by: OPHTHALMOLOGY

## 2018-05-23 NOTE — PROGRESS NOTES
Subjective:       Patient ID: Isael Salmon is a 72 y.o. female.    Chief Complaint: Post-op Evaluation    HPI     S/P Phaco w/IOL OS on 5/15/2018.    Pt states that the left eye is doing well, no problems or complaints. C/O   FB sensation, and tearing OD which is causing some irritation/pain (scale   2) starting yesterday.     Durezol BID OS  Ilevro QHS OS      Last edited by Dolores Beltran, PCT on 5/23/2018  8:36 AM. (History)             Assessment:       1. Post-operative state        Plan:       S/p CE OU- Doing well.        Taper gtts OU.  RTC 3 wks.

## 2018-05-29 ENCOUNTER — TELEPHONE (OUTPATIENT)
Dept: OPTOMETRY | Facility: CLINIC | Age: 73
End: 2018-05-29

## 2018-05-29 NOTE — TELEPHONE ENCOUNTER
----- Message from Abi Christy sent at 5/29/2018 12:06 PM CDT -----  Contact: Isael  Pt calling to inform that she is having problems with her OD after surgery and that she spoke to Dr. Lester and he told her to come in on tomorrow.  Please contact her to give her a time to come in.  She can be reached at [phone]

## 2018-05-30 ENCOUNTER — OFFICE VISIT (OUTPATIENT)
Dept: OPHTHALMOLOGY | Facility: CLINIC | Age: 73
End: 2018-05-30
Payer: MEDICARE

## 2018-05-30 DIAGNOSIS — Z98.890 POST-OPERATIVE STATE: Primary | ICD-10-CM

## 2018-05-30 PROCEDURE — 99212 OFFICE O/P EST SF 10 MIN: CPT | Mod: PBBFAC,PO | Performed by: OPHTHALMOLOGY

## 2018-05-30 PROCEDURE — 99024 POSTOP FOLLOW-UP VISIT: CPT | Mod: POP,,, | Performed by: OPHTHALMOLOGY

## 2018-05-30 PROCEDURE — 99999 PR PBB SHADOW E&M-EST. PATIENT-LVL II: CPT | Mod: PBBFAC,,, | Performed by: OPHTHALMOLOGY

## 2018-05-31 NOTE — PROGRESS NOTES
Subjective:       Patient ID: Isael Salmon is a 72 y.o. female.    Chief Complaint: Post-op Evaluation (Iritis )    HPI     Post-op Evaluation    Additional comments: Iritis            Comments   DSL- 5/23/18     Pt states OD has been red, irritated, painful , and light sensitive X 1   week.     Eyemeds  Durezol QID OD        Last edited by Elvira Parker on 5/30/2018  1:11 PM. (History)             Assessment:       1. Post-operative state        Plan:       S/p CE OU- Doing well butwith residual iritis OD.             PF qid OD & taper off over 4 wks.  D/c Durezol OD.  Cont taper OS.  RTC 2 wks.

## 2018-06-13 ENCOUNTER — OFFICE VISIT (OUTPATIENT)
Dept: OPHTHALMOLOGY | Facility: CLINIC | Age: 73
End: 2018-06-13
Payer: MEDICARE

## 2018-06-13 DIAGNOSIS — Z98.890 POST-OPERATIVE STATE: Primary | ICD-10-CM

## 2018-06-13 PROCEDURE — 99999 PR PBB SHADOW E&M-EST. PATIENT-LVL II: CPT | Mod: PBBFAC,,, | Performed by: OPHTHALMOLOGY

## 2018-06-13 PROCEDURE — 99212 OFFICE O/P EST SF 10 MIN: CPT | Mod: PBBFAC,PO | Performed by: OPHTHALMOLOGY

## 2018-06-13 PROCEDURE — 99024 POSTOP FOLLOW-UP VISIT: CPT | Mod: POP,,, | Performed by: OPHTHALMOLOGY

## 2018-06-13 NOTE — PROGRESS NOTES
Subjective:       Patient ID: Isael Salmon is a 72 y.o. female.    Chief Complaint: Post-op Evaluation (3 weeks po ou)    HPI     Post-op Evaluation    Additional comments: 3 weeks po ou           Comments   DSL- 5/30/18     Pt states Va OU has improved. Pt denies pain and discomfort.     Eyemeds  PF bid OD       Last edited by Jonathan Lester MD on 6/13/2018 11:34 AM. (History)               Assessment:       1. Post-operative state        Plan:       S/p CE OU- Doing well with resolved iritis OU.           Taper PF OD.  RTC 2 wks.

## 2018-06-27 ENCOUNTER — OFFICE VISIT (OUTPATIENT)
Dept: OPHTHALMOLOGY | Facility: CLINIC | Age: 73
End: 2018-06-27
Payer: MEDICARE

## 2018-06-27 DIAGNOSIS — H52.7 REFRACTIVE ERROR: ICD-10-CM

## 2018-06-27 DIAGNOSIS — Z98.890 POST-OPERATIVE STATE: Primary | ICD-10-CM

## 2018-06-27 PROCEDURE — 99999 PR PBB SHADOW E&M-EST. PATIENT-LVL II: CPT | Mod: PBBFAC,,, | Performed by: OPHTHALMOLOGY

## 2018-06-27 PROCEDURE — 99212 OFFICE O/P EST SF 10 MIN: CPT | Mod: PBBFAC,PO | Performed by: OPHTHALMOLOGY

## 2018-06-27 PROCEDURE — 99024 POSTOP FOLLOW-UP VISIT: CPT | Mod: POP,,, | Performed by: OPHTHALMOLOGY

## 2018-06-27 NOTE — PROGRESS NOTES
Subjective:       Patient ID: Isael Salmon is a 72 y.o. female.    Chief Complaint: Post-op Evaluation    HPI     DLS: 6/13/18    Pt here for Iritis follow up OD;  Pt states OD is feeling much better and she has finished all the drops   yesterday. Pt states vision seems to be doing fine.      Last edited by Erika Sheppard on 6/27/2018  9:39 AM. (History)             Assessment:       1. Post-operative state    2. Refractive error        Plan:       S/p CE OU- Doing well with resolved iritis OD.     RE-Pt wants MRx for computer/reders.      Give MRx for computer/readers.  RTC 6 mos with Dr Juarez.

## 2018-07-05 ENCOUNTER — PATIENT MESSAGE (OUTPATIENT)
Dept: DERMATOLOGY | Facility: CLINIC | Age: 73
End: 2018-07-05

## 2018-07-05 RX ORDER — BETAMETHASONE DIPROPIONATE 0.5 MG/G
CREAM TOPICAL 2 TIMES DAILY
COMMUNITY
End: 2018-07-05 | Stop reason: SDUPTHER

## 2018-07-05 NOTE — Clinical Note
History     Chief Complaint   Patient presents with     Pharyngitis     exposed     HPI  Romana Stafford is a 26 year old female who presents with 3 days sore throat and throat clearing. Slight headache yesterday. No fever at this time. Can chew, speak, and swallow. Known strep exposure, prompting visit.     Problem List:    Patient Active Problem List    Diagnosis Date Noted     Encounter for smoking cessation counseling 10/09/2017     Priority: Medium     Almost done       NO SHOW 12/13/2016     Priority: Medium     No showed Dr. Noel 12/13/16       ACP (advance care planning) 05/11/2016     Priority: Medium     Advance Care Planning 5/11/2016: ACP Review of Chart / Resources Provided:  Reviewed chart for advance care plan.  Romana Stafford has no plan or code status on file. Discussed available resources and provided with information. Confirmed code status reflects current choices pending further ACP discussions.  Confirmed/documented legally designated decision makers.  Added by Khalida Man             Well female exam with routine gynecological exam 11/17/2015     Priority: Medium     Smoker 11/17/2015     Priority: Medium     Family planning 10/15/2014     Priority: Medium     Status post vaginal delivery 10/01/2014     Priority: Medium     Noel  Atony and  sidewall       Need for prophylactic vaccination and inoculation against other viral diseases(V04.89) 02/17/2014     Priority: Medium     Gardasil #2 scheduled 12/15/14 @ 4pm. Gardasil #3 is due 4 months after #2          Past Medical History:    Past Medical History:   Diagnosis Date     Dermatitis 09/14/2012     Insomnia 11/14/2002     Unspecified disorder of the teeth and supporting s 03/08/2007       Past Surgical History:    No past surgical history on file.    Family History:    Family History   Problem Relation Age of Onset     HEART DISEASE Mother      heart valve prolapse       Social History:  Marital Status:  Single [1]  Social  Yanira - please order Hepatitis C Ab History   Substance Use Topics     Smoking status: Former Smoker     Packs/day: 0.30     Years: 5.00     Types: Cigarettes     Smokeless tobacco: Never Used      Comment: tried to quit yes, yr quit 2010 passive exposure yes     Alcohol use 0.0 oz/week     0 Standard drinks or equivalent per week      Comment: ocacsinally        Medications:      predniSONE (DELTASONE) 20 MG tablet         Review of Systems   Constitutional: Negative.  Negative for chills and fever.   HENT: Positive for sore throat. Negative for congestion, ear pain and sinus pressure.    Eyes: Negative.    Respiratory: Negative.  Negative for cough.    Cardiovascular: Negative.    Skin: Negative.    Neurological: Positive for headaches.   Psychiatric/Behavioral: Negative.        Physical Exam   Heart Rate: 99  Temp: 98.3  F (36.8  C)  Resp: 16  SpO2: 97 %      Physical Exam   Constitutional: She appears well-developed and well-nourished.   HENT:   Head: Normocephalic and atraumatic.   Right Ear: External ear normal. A middle ear effusion is present.   Left Ear: External ear normal. A middle ear effusion is present.   Nose: Nose normal.   Mouth/Throat: Posterior oropharyngeal erythema present. No posterior oropharyngeal edema.   Eyes: Conjunctivae are normal.   Cardiovascular: Normal rate and normal heart sounds.    Pulmonary/Chest: Effort normal and breath sounds normal.   Lymphadenopathy:     She has no cervical adenopathy.   Nursing note and vitals reviewed.      ED Course     ED Course     Procedures    Results for orders placed or performed during the hospital encounter of 07/05/18 (from the past 24 hour(s))   Rapid strep screen   Result Value Ref Range    Specimen Description Throat     Rapid Strep A Screen       NEGATIVE: No Group A streptococcal antigen detected by immunoassay, await culture report.       Medications - No data to display    Assessments & Plan (with Medical Decision Making)     I have reviewed the nursing notes.  I have  reviewed the findings, diagnosis, plan and need for follow up with the patient.      New Prescriptions    PREDNISONE (DELTASONE) 20 MG TABLET    Take 2 tablets (40 mg) by mouth daily for 2 days       Final diagnoses:   Pharyngitis, unspecified etiology   Rapid strep negative. Prednisone for pain/congestion.   Supportive Tx recommended.   Take OTC motrin or tylenol as directed on the bottle as needed.  Gargle, coat throat with oatmeal or honey/tea.   Patient/family verbally educated and given appropriate education sheets for each of the diagnoses and has no questions.    Follow up with your provider if symptoms increase or if concerns develop, return to the ER.      7/5/2018   [facility]     Rah Dickey PA  07/05/18 1139

## 2018-07-06 RX ORDER — BETAMETHASONE DIPROPIONATE 0.5 MG/G
CREAM TOPICAL 2 TIMES DAILY
Qty: 60 G | Refills: 3 | Status: SHIPPED | OUTPATIENT
Start: 2018-07-06 | End: 2019-07-08 | Stop reason: SDUPTHER

## 2018-07-18 ENCOUNTER — TELEPHONE (OUTPATIENT)
Dept: DERMATOLOGY | Facility: CLINIC | Age: 73
End: 2018-07-18

## 2018-07-18 ENCOUNTER — PATIENT MESSAGE (OUTPATIENT)
Dept: INTERNAL MEDICINE | Facility: CLINIC | Age: 73
End: 2018-07-18

## 2018-07-18 NOTE — TELEPHONE ENCOUNTER
----- Message from Rickie George sent at 7/18/2018 10:44 AM CDT -----  Contact:  Pt @ [phone]  Patient Requesting Sooner Appointment.     Reason for sooner appt.: Rash that's getting worse  When is the first available appointment? 09/14  Communication Preference: Call @ # Provided  Additional Information:

## 2018-07-26 ENCOUNTER — OFFICE VISIT (OUTPATIENT)
Dept: ORTHOPEDICS | Facility: CLINIC | Age: 73
End: 2018-07-26
Payer: MEDICARE

## 2018-07-26 ENCOUNTER — HOSPITAL ENCOUNTER (OUTPATIENT)
Dept: RADIOLOGY | Facility: HOSPITAL | Age: 73
Discharge: HOME | End: 2018-07-26
Attending: PHYSICIAN ASSISTANT
Payer: MEDICARE

## 2018-07-26 VITALS — BODY MASS INDEX: 29.82 KG/M2 | HEIGHT: 65 IN | WEIGHT: 179 LBS

## 2018-07-26 DIAGNOSIS — M17.11 PRIMARY OSTEOARTHRITIS OF RIGHT KNEE: Primary | ICD-10-CM

## 2018-07-26 DIAGNOSIS — M25.561 RIGHT KNEE PAIN, UNSPECIFIED CHRONICITY: ICD-10-CM

## 2018-07-26 PROCEDURE — 20610 DRAIN/INJ JOINT/BURSA W/O US: CPT | Mod: PBBFAC | Performed by: PHYSICIAN ASSISTANT

## 2018-07-26 PROCEDURE — 99999 PR PBB SHADOW E&M-EST. PATIENT-LVL IV: CPT | Mod: PBBFAC,,, | Performed by: PHYSICIAN ASSISTANT

## 2018-07-26 PROCEDURE — 20610 DRAIN/INJ JOINT/BURSA W/O US: CPT | Mod: S$PBB,RT,, | Performed by: PHYSICIAN ASSISTANT

## 2018-07-26 PROCEDURE — 73562 X-RAY EXAM OF KNEE 3: CPT | Mod: 26,XS,LT, | Performed by: RADIOLOGY

## 2018-07-26 PROCEDURE — 73564 X-RAY EXAM KNEE 4 OR MORE: CPT | Mod: 26,RT,, | Performed by: RADIOLOGY

## 2018-07-26 PROCEDURE — 73564 X-RAY EXAM KNEE 4 OR MORE: CPT | Mod: TC,LT

## 2018-07-26 PROCEDURE — 99214 OFFICE O/P EST MOD 30 MIN: CPT | Mod: PBBFAC,25 | Performed by: PHYSICIAN ASSISTANT

## 2018-07-26 PROCEDURE — 99213 OFFICE O/P EST LOW 20 MIN: CPT | Mod: 25,S$PBB,, | Performed by: PHYSICIAN ASSISTANT

## 2018-07-26 RX ORDER — MELOXICAM 15 MG/1
15 TABLET ORAL DAILY
Qty: 30 TABLET | Refills: 0 | Status: SHIPPED | OUTPATIENT
Start: 2018-07-26 | End: 2018-08-21 | Stop reason: SDUPTHER

## 2018-07-26 RX ORDER — METHYLPREDNISOLONE ACETATE 80 MG/ML
80 INJECTION, SUSPENSION INTRA-ARTICULAR; INTRALESIONAL; INTRAMUSCULAR; SOFT TISSUE
Status: COMPLETED | OUTPATIENT
Start: 2018-07-26 | End: 2018-07-26

## 2018-07-26 RX ADMIN — METHYLPREDNISOLONE ACETATE 80 MG: 80 INJECTION, SUSPENSION INTRALESIONAL; INTRAMUSCULAR; INTRASYNOVIAL; SOFT TISSUE at 11:07

## 2018-07-26 NOTE — PROGRESS NOTES
Subjective:      Patient ID: Isael Salmon is a 72 y.o. female.    Chief Complaint: No chief complaint on file.    HPI  72 year old female presents with chief complaint of right knee pain x several weeks. She denies trauma. Pain is worse with driving and sitting. She has difficulty going from sit to stand. She takes advil and tylenol with no relief. Her knee feels like it is going to give way. She sometimes uses a cane.   Review of Systems   Constitution: Negative for chills, fever and night sweats.   Cardiovascular: Negative for chest pain.   Respiratory: Negative for cough and shortness of breath.    Hematologic/Lymphatic: Does not bruise/bleed easily.   Skin: Negative for color change.   Gastrointestinal: Negative for heartburn.   Genitourinary: Negative for dysuria.   Neurological: Negative for numbness and paresthesias.   Psychiatric/Behavioral: Negative for altered mental status.   Allergic/Immunologic: Negative for persistent infections.         Objective:            General    Vitals reviewed.  Constitutional: She is oriented to person, place, and time. She appears well-developed and well-nourished.   Cardiovascular: Normal rate.    Neurological: She is alert and oriented to person, place, and time.             Right Knee Exam:  ROM 0-120 degrees  +crepitus  No effusion  TTP medial joint line      X-ray: ordered and reviewed by myself. Bilateral tibiofemoral arthritic changes, with medial tibiofemoral compartment joint space narrowing and bilateral chondrocalcinosis.      Assessment:       Encounter Diagnosis   Name Primary?    Primary osteoarthritis of right knee Yes          Plan:       Discussed treatment options with patient. She will take mobic x 2 weeks. She would like an injection. RTC prn.     PROCEDURE:  I have explained the risks, benefits, and alternatives of the procedure in detail.  The patient voices understanding and all questions have been answered.  The patient agrees to proceed as  planned. So after I performed a sterile prep of the skin in the normal fashion the right knee is injected using a 22 gauge needle from the anterolateral approach with a combination of 4cc 1% plain lidocaine and 80 mg of depo medrol.  The patient is cautioned and immediate relief of pain is secondary to the local anesthetic and will be temporary.  After the anesthetic wears off there may be a increase in pain that may last for a few hours or a few days and they should use ice to help alleviate this flair up of pain.

## 2018-08-16 ENCOUNTER — OFFICE VISIT (OUTPATIENT)
Dept: URGENT CARE | Facility: CLINIC | Age: 73
End: 2018-08-16
Payer: MEDICARE

## 2018-08-16 VITALS
OXYGEN SATURATION: 98 % | HEART RATE: 80 BPM | DIASTOLIC BLOOD PRESSURE: 60 MMHG | RESPIRATION RATE: 18 BRPM | TEMPERATURE: 98 F | WEIGHT: 179 LBS | SYSTOLIC BLOOD PRESSURE: 136 MMHG | HEIGHT: 65 IN | BODY MASS INDEX: 29.82 KG/M2

## 2018-08-16 DIAGNOSIS — M25.531 RIGHT WRIST PAIN: ICD-10-CM

## 2018-08-16 DIAGNOSIS — S63.501A RIGHT WRIST SPRAIN, INITIAL ENCOUNTER: Primary | ICD-10-CM

## 2018-08-16 PROCEDURE — 99214 OFFICE O/P EST MOD 30 MIN: CPT | Mod: S$GLB,,, | Performed by: NURSE PRACTITIONER

## 2018-08-16 NOTE — PATIENT INSTRUCTIONS
Wrist Sprain  A sprain is an injury to the ligaments or capsule that holds a joint together. There are no broken bones. Most sprains take about 3 to 6 weeks to heal. If it a severe sprain where the ligament is completely torn, it can take months to recover.     Most wrist sprains are treated with a splint, wrist brace, or elastic wrap for support. Severe sprains may require surgery.  Home care  · Keep your arm elevated to reduce pain and swelling. This is very important during the first 48 hours.  · Apply an ice pack over the injured area for 15 to 20 minutes every 3 to 6 hours. You should do this for the first 24 to 48 hours. You can make an ice pack by filling a plastic bag that seals at the top with ice cubes and then wrapping it with a thin towel. Continue to use ice packs for relief of pain and swelling as needed. As the ice melts, be careful to avoid getting your wrap, splint, or cast wet. After 48 hours, apply heat (warm shower or warm bath) for 15 to 20 minutes several times a day, or alternate ice and heat.   · You may use over-the-counter pain medicine to control pain, unless another pain medicine was prescribed. If you have chronic liver or kidney disease or ever had a stomach ulcer or GI bleeding, talk with your doctor before using these medicines.  · If you were given a splint or brace, wear it for the time advised by your doctor.  Follow-up care  Follow up with your healthcare provider as advised. Any X-rays you had today dont show any broken bones, breaks, or fractures. Sometimes fractures dont show up on the first X-ray. Bruises and sprains can sometimes hurt as much as a fracture. These injuries can take time to heal completely. If your symptoms dont improve or they get worse, talk with your doctor. You may need a repeat X-ray. If X-rays were taken, you will be told of any new findings that may affect your care.  When to seek medical advice  Call your healthcare provider right away if any of  these occur:  · Pain or swelling increases  · Fingers or hand becomes cold, blue, numb, or tingly  Date Last Reviewed: 11/20/2015  © 7926-3358 [employer]. [address] [place] [address], [place] [address]. All rights reserved. This information is not intended as a substitute for professional medical care. Always follow your healthcare professional's instructions.

## 2018-08-16 NOTE — PROGRESS NOTES
"Subjective:       Patient ID: Isael Salmon is a 72 y.o. female.    Vitals:  height is 5' 5" (1.651 m) and weight is 81.2 kg (179 lb). Her oral temperature is 98 °F (36.7 °C). Her blood pressure is 136/60 and her pulse is 80. Her respiration is 18 and oxygen saturation is 98%.     Chief Complaint: Arm Pain (right arm )    Right wrist pain after falling at home last night       Arm Pain    The incident occurred 12 to 24 hours ago. The incident occurred at home. The injury mechanism was a fall. The pain is present in the right wrist. The quality of the pain is described as aching. The pain radiates to the right arm. The pain is at a severity of 5/10. The pain is mild. The pain has been fluctuating since the incident. Pertinent negatives include no chest pain or numbness. The symptoms are aggravated by movement. She has tried nothing for the symptoms.     Review of Systems   Constitution: Negative for weakness and malaise/fatigue.   HENT: Negative for nosebleeds.    Cardiovascular: Negative for chest pain and syncope.   Respiratory: Negative for shortness of breath.    Musculoskeletal: Positive for joint pain. Negative for back pain and neck pain.        Right wrist pain      Gastrointestinal: Negative for abdominal pain.   Genitourinary: Negative for hematuria.   Neurological: Negative for dizziness and numbness.       Objective:      Physical Exam   Constitutional: She is oriented to person, place, and time. She appears well-developed and well-nourished. She is cooperative.  Non-toxic appearance. She does not appear ill. No distress.   HENT:   Head: Normocephalic and atraumatic.   Right Ear: Hearing, tympanic membrane, external ear and ear canal normal.   Left Ear: Hearing, tympanic membrane, external ear and ear canal normal.   Nose: Nose normal. No mucosal edema, rhinorrhea or nasal deformity. No epistaxis. Right sinus exhibits no maxillary sinus tenderness and no frontal sinus tenderness. Left sinus exhibits " no maxillary sinus tenderness and no frontal sinus tenderness.   Mouth/Throat: Uvula is midline, oropharynx is clear and moist and mucous membranes are normal. No trismus in the jaw. Normal dentition. No uvula swelling. No posterior oropharyngeal erythema.   Eyes: Conjunctivae and lids are normal. Right eye exhibits no discharge. Left eye exhibits no discharge. No scleral icterus.   Sclera clear bilat   Neck: Trachea normal, normal range of motion, full passive range of motion without pain and phonation normal. Neck supple.   Cardiovascular: Normal rate, regular rhythm, normal heart sounds, intact distal pulses and normal pulses.   Pulmonary/Chest: Effort normal and breath sounds normal. No respiratory distress.   Abdominal: Soft. Normal appearance and bowel sounds are normal. She exhibits no distension, no pulsatile midline mass and no mass. There is no tenderness.   Musculoskeletal: She exhibits edema and tenderness. She exhibits no deformity.        Right wrist: She exhibits tenderness.   Neurological: She is alert and oriented to person, place, and time. She exhibits normal muscle tone. Coordination normal.   Skin: Skin is warm, dry and intact. She is not diaphoretic. No pallor.   Psychiatric: She has a normal mood and affect. Her speech is normal and behavior is normal. Judgment and thought content normal. Cognition and memory are normal.   Nursing note and vitals reviewed.      Assessment:       1. Right wrist sprain, initial encounter    2. Right wrist pain        Plan:       Patient Instructions     Wrist Sprain  A sprain is an injury to the ligaments or capsule that holds a joint together. There are no broken bones. Most sprains take about 3 to 6 weeks to heal. If it a severe sprain where the ligament is completely torn, it can take months to recover.     Most wrist sprains are treated with a splint, wrist brace, or elastic wrap for support. Severe sprains may require surgery.  Home care  · Keep  your arm elevated to reduce pain and swelling. This is very important during the first 48 hours.  · Apply an ice pack over the injured area for 15 to 20 minutes every 3 to 6 hours. You should do this for the first 24 to 48 hours. You can make an ice pack by filling a plastic bag that seals at the top with ice cubes and then wrapping it with a thin towel. Continue to use ice packs for relief of pain and swelling as needed. As the ice melts, be careful to avoid getting your wrap, splint, or cast wet. After 48 hours, apply heat (warm shower or warm bath) for 15 to 20 minutes several times a day, or alternate ice and heat.   · You may use over-the-counter pain medicine to control pain, unless another pain medicine was prescribed. If you have chronic liver or kidney disease or ever had a stomach ulcer or GI bleeding, talk with your doctor before using these medicines.  · If you were given a splint or brace, wear it for the time advised by your doctor.  Follow-up care  Follow up with your healthcare provider as advised. Any X-rays you had today dont show any broken bones, breaks, or fractures. Sometimes fractures dont show up on the first X-ray. Bruises and sprains can sometimes hurt as much as a fracture. These injuries can take time to heal completely. If your symptoms dont improve or they get worse, talk with your doctor. You may need a repeat X-ray. If X-rays were taken, you will be told of any new findings that may affect your care.  When to seek medical advice  Call your healthcare provider right away if any of these occur:  · Pain or swelling increases  · Fingers or hand becomes cold, blue, numb, or tingly  Date Last Reviewed: 11/20/2015  © 6346-8552 [employer]. [address] [place] [address], [place] [address]. All rights reserved. This information is not intended as a substitute for professional medical care. Always follow your healthcare professional's instructions.            Right wrist sprain,  initial encounter  -     HME - OTHER    Right wrist pain  -     X-Ray Wrist Complete Right; Future; Expected date: 08/16/2018

## 2018-08-21 DIAGNOSIS — M17.11 PRIMARY OSTEOARTHRITIS OF RIGHT KNEE: ICD-10-CM

## 2018-08-21 RX ORDER — MELOXICAM 15 MG/1
15 TABLET ORAL DAILY
Qty: 30 TABLET | Refills: 1 | Status: SHIPPED | OUTPATIENT
Start: 2018-08-21 | End: 2018-09-17

## 2018-08-30 ENCOUNTER — OFFICE VISIT (OUTPATIENT)
Dept: ORTHOPEDICS | Facility: CLINIC | Age: 73
End: 2018-08-30
Payer: MEDICARE

## 2018-08-30 ENCOUNTER — HOSPITAL ENCOUNTER (OUTPATIENT)
Dept: RADIOLOGY | Facility: HOSPITAL | Age: 73
Discharge: HOME | End: 2018-08-30
Attending: PHYSICIAN ASSISTANT
Payer: MEDICARE

## 2018-08-30 VITALS
SYSTOLIC BLOOD PRESSURE: 115 MMHG | DIASTOLIC BLOOD PRESSURE: 62 MMHG | WEIGHT: 175.19 LBS | BODY MASS INDEX: 29.19 KG/M2 | HEIGHT: 65 IN

## 2018-08-30 DIAGNOSIS — S52.501A CLOSED FRACTURE OF DISTAL END OF RIGHT RADIUS, UNSPECIFIED FRACTURE MORPHOLOGY, INITIAL ENCOUNTER: Primary | ICD-10-CM

## 2018-08-30 DIAGNOSIS — M25.531 RIGHT WRIST PAIN: ICD-10-CM

## 2018-08-30 PROCEDURE — 99999 PR PBB SHADOW E&M-EST. PATIENT-LVL IV: CPT | Mod: PBBFAC,,, | Performed by: PHYSICIAN ASSISTANT

## 2018-08-30 PROCEDURE — 99213 OFFICE O/P EST LOW 20 MIN: CPT | Mod: S$PBB,,, | Performed by: PHYSICIAN ASSISTANT

## 2018-08-30 PROCEDURE — 73110 X-RAY EXAM OF WRIST: CPT | Mod: 26,RT,, | Performed by: RADIOLOGY

## 2018-08-30 PROCEDURE — 73110 X-RAY EXAM OF WRIST: CPT | Mod: TC,RT

## 2018-08-30 PROCEDURE — 99214 OFFICE O/P EST MOD 30 MIN: CPT | Mod: PBBFAC,25 | Performed by: PHYSICIAN ASSISTANT

## 2018-08-30 NOTE — PROGRESS NOTES
Subjective:      Patient ID: Isael Salmon is a 72 y.o. female.    Chief Complaint: No chief complaint on file.    HPI  72 year old female presents with chief complaint of right wrist pain x 2 weeks. She is RHD. Her dog knocked her down and she fell backwards in her yard. She had pain, swelling, and bruising at the wrist. She went to urgent care and x-rays were taken. It showed a possible distal radius fracture at the radial styloid region. She has been wearing a velcro wrist brace on and off. She says it helps. Pain is worse with overuse. She can't  any weight. She takes tylenol with some relief. She denies N/T. Her pain has gotten a little better.   Review of Systems   Constitution: Negative for chills, fever and night sweats.   Cardiovascular: Negative for chest pain.   Respiratory: Negative for cough and shortness of breath.    Hematologic/Lymphatic: Does not bruise/bleed easily.   Gastrointestinal: Negative for heartburn.   Genitourinary: Negative for dysuria.   Neurological: Negative for numbness and paresthesias.   Psychiatric/Behavioral: Negative for altered mental status.   Allergic/Immunologic: Negative for persistent infections.         Objective:            General    Vitals reviewed.  Constitutional: She is oriented to person, place, and time. She appears well-developed and well-nourished.   Cardiovascular: Normal rate.    Neurological: She is alert and oriented to person, place, and time.             Right Hand/Wrist Exam     Inspection   Bruising: Wrist - present     Tenderness   The patient is tender to palpation of the radial area and ulnar area.    Range of Motion     Wrist   Extension: abnormal   Flexion: abnormal   Pronation: abnormal   Supination: abnormal     Other     Neuorologic Exam    Median Distribution: normal  Ulnar Distribution: normal  Radial Distribution: normal    Comments:  Mild swelling at wrist.           Vascular Exam       Capillary Refill  Right Hand: normal  capillary refill      X-ray: ordered and reviewed by myself. Joint space narrowing, sclerosis and bony overgrowth is again demonstrated at the 1st metacarpal-carpal articulation along with soft tissue calcification between the scaphoid and lunate as well as between the ulna and carpal bones is again demonstrated.  There is distorted appearance of the distal radius in the region of the radial styloid process again demonstrated which may be related to prior healed fracture but clinical correlation is recommended.  There is overall stable radiographic appearance of the included osseous structures, joint spaces and the included soft tissues.        Assessment:       Encounter Diagnosis   Name Primary?    Closed fracture of distal end of right radius, unspecified fracture morphology, initial encounter Yes          Plan:       Discussed treatment options with patient. She was placed in a thumb spica exos splint. She will wear this full time, but she may remove for showering. Remain NWB right UE. Continue tylenol as needed. RTC in 4 weeks for repeat x-ray.

## 2018-09-17 ENCOUNTER — HOSPITAL ENCOUNTER (OUTPATIENT)
Dept: RADIOLOGY | Facility: HOSPITAL | Age: 73
Discharge: HOME | End: 2018-09-17
Attending: INTERNAL MEDICINE
Payer: MEDICARE

## 2018-09-17 ENCOUNTER — OFFICE VISIT (OUTPATIENT)
Dept: INTERNAL MEDICINE | Facility: CLINIC | Age: 73
End: 2018-09-17
Payer: MEDICARE

## 2018-09-17 VITALS
DIASTOLIC BLOOD PRESSURE: 66 MMHG | BODY MASS INDEX: 30.05 KG/M2 | HEIGHT: 65 IN | WEIGHT: 180.38 LBS | SYSTOLIC BLOOD PRESSURE: 124 MMHG | HEART RATE: 81 BPM

## 2018-09-17 DIAGNOSIS — I10 ESSENTIAL HYPERTENSION: ICD-10-CM

## 2018-09-17 DIAGNOSIS — J30.1 ALLERGIC RHINITIS DUE TO POLLEN, UNSPECIFIED SEASONALITY: ICD-10-CM

## 2018-09-17 DIAGNOSIS — K21.9 GASTROESOPHAGEAL REFLUX DISEASE WITHOUT ESOPHAGITIS: ICD-10-CM

## 2018-09-17 DIAGNOSIS — G44.53 HEADACHE, PRIMARY THUNDERCLAP: ICD-10-CM

## 2018-09-17 DIAGNOSIS — G44.53 HEADACHE, PRIMARY THUNDERCLAP: Primary | ICD-10-CM

## 2018-09-17 DIAGNOSIS — M10.9 GOUT, ARTHRITIS: ICD-10-CM

## 2018-09-17 DIAGNOSIS — E53.8 VITAMIN B12 DEFICIENCY: ICD-10-CM

## 2018-09-17 DIAGNOSIS — E55.9 VITAMIN D DEFICIENCY DISEASE: ICD-10-CM

## 2018-09-17 PROCEDURE — 70450 CT HEAD/BRAIN W/O DYE: CPT | Mod: 26,,, | Performed by: RADIOLOGY

## 2018-09-17 PROCEDURE — 70450 CT HEAD/BRAIN W/O DYE: CPT | Mod: TC

## 2018-09-17 PROCEDURE — 99999 PR PBB SHADOW E&M-EST. PATIENT-LVL III: CPT | Mod: PBBFAC,,, | Performed by: INTERNAL MEDICINE

## 2018-09-17 PROCEDURE — 99213 OFFICE O/P EST LOW 20 MIN: CPT | Mod: PBBFAC,25 | Performed by: INTERNAL MEDICINE

## 2018-09-17 PROCEDURE — 99214 OFFICE O/P EST MOD 30 MIN: CPT | Mod: S$PBB,,, | Performed by: INTERNAL MEDICINE

## 2018-09-17 RX ORDER — ERGOCALCIFEROL 1.25 MG/1
50000 CAPSULE ORAL
Qty: 24 CAPSULE | Refills: 4 | Status: SHIPPED | OUTPATIENT
Start: 2018-09-17 | End: 2020-01-07

## 2018-09-17 RX ORDER — PREDNISONE 10 MG/1
TABLET ORAL
Qty: 10 TABLET | Refills: 0 | Status: SHIPPED | OUTPATIENT
Start: 2018-09-17 | End: 2018-11-06

## 2018-09-17 RX ORDER — TIZANIDINE 4 MG/1
TABLET ORAL
Qty: 30 TABLET | Refills: 1 | Status: SHIPPED | OUTPATIENT
Start: 2018-09-17 | End: 2019-01-10 | Stop reason: SDUPTHER

## 2018-09-17 NOTE — PROGRESS NOTES
CHIEF COMPLAINT: headache     HISTORY OF PRESENT ILLNESS: 72-year-old woman presents due to headaches for the last 3 weeks. Her [place] knocked her down 3 weeks ago. She fell back and hit the back of her head.  Since then she has had a frontal headache. Pain is moderate in intensity.  She denies blurred vision, dizziness, nausea, vomiting, balance issues. She fractured her right wrist. The right wrist is in a brace.      No visual issues, fever, chills, sinus congestion, sore throat, ear pain,      She is now taking Crestor 20 mg daily with co enzyme 10 200 mg daily for her hyperlipidemia. Rare muscle spasm        She has seen heme onc for her anemia. She is taking her iron supplement two times daily.      She had a normal cystoscope 12/17/14 due to recurrent UTI. No dysuria or hematuria now. She is using estrogen vaginal cream for vaginal atrophy three times weekly        Reflux is controlled on omeprazole 20 mg daily. No nausea, vomiting, constipation, diarrhea, emilio, bloody stools. She continues to take losartan 50 mg twice daily and spironolactone 25 mg 1 tablet daily for hypertension.      She continues to take lexapro 10 mg 1/2 tablet daily which controlls her mood with her stressors.        .She continues allopurinol 200 mg daily. No gouty flares      PAST MEDICAL HISTORY:   1. Hypertension.   2. Hyperlipidemia.   3. Gout.   4. History of rashes.   5. TIA in 1997  6. Fibrocystic breast disease.   7. Postmenopausal.   8. Osteoarthritis.   9. History of tobacco usage; quit in 1997.   10. History of colon polyps; normal colonoscopy in 10/07 and normal 4/2010, due 2015   11. Normal bone mineral density scan in 6/06.   12. Anxiety and depression - controlled on lorazepam very rarely.   13. Varicose veins.   14. Intermittent GERD.   15. Iron deficiency anemia 4/2010 - due to erosive gastropathy on EGD 5/2010     PAST SURGICAL HISTORY:   1. Right carotid endarterectomy in 1997.   2. Breast reduction  "surgery.   3. Total abdominal hysterectomy.   4. Blood transfusion prior to .     SOCIAL HISTORY: Quit smoking in ; smoked 1 PPD since age 16. No   alcohol use.     FAMILY HISTORY:   Father had gout, hypertension and heart failure; is . Mother had hypertension and pancreatic cancer; is also . Has five brothers, one of whom  in a motor vehicle accident. All have gout and hypertension. One brother has Crohn's disease; another has bladder cancer.     MEDICATIONS and ALLERGIES: Updated on epic.     PHYSICAL EXAMINATION:       /66 (BP Location: Left arm, Patient Position: Sitting, BP Method: Medium (Manual))   Pulse 81   Ht 5' 5" (1.651 m)   Wt 81.8 kg (180 lb 6.4 oz)   LMP  (LMP Unknown)   BMI 30.02 kg/m²     General: Alert, oriented. No apparent distress. Affect within normal   limits.   Conjunctivae anicteric. Tympanic membranes red fluid. Oropharynx clear.   Neck supple.   Respiratory effort normal. Lungs clear  Heart: Regular rate and rhythm without murmurs, gallops or rubs.   No lower extremity edema.    Gait - no abnormality         ASSESSMENT AND PLAN:   1. HEadache- CT head to rule out intracranial pathology.  Could be tension headache or headache from concussion.  She is to rest.  She is to take tizanidine 4 mg 1/2-1 tablet three times daily as needed. If ct scan head, prednisone taper for muscle tension.   2. Fractured right wrist- follow up with ortho  4. Hyperlipidemia - On crestor 20 mg daily and Co Enzyme Q 10 200 mg daily.    5. Erosive gastropathy - on omeprazole 20 mg daily. Off NSAIDS. EGD  6. Hypertension - Continue losartan 25 mg one tablet daily and spironolactone to 25 mg daily.    7. Hypercalcemia - stable  6. Gout - asymptomatic on allopurinol.   7. Situational stress - continue lexapro   8. Anemia - stable  9. Carotid artery disease - on risk factor modification. Check ultrasound  Screening - Colonscopy 10/15 with 4 polyps - it was incomplete. Repeat " 5/26/16 - divericulosis otherwise normal - due 2021. MMG 3/18, BMD 7/11.  I will see her 3 month, sooner if problems arise

## 2018-09-19 ENCOUNTER — OFFICE VISIT (OUTPATIENT)
Dept: OPHTHALMOLOGY | Facility: CLINIC | Age: 73
End: 2018-09-19
Payer: MEDICARE

## 2018-09-19 DIAGNOSIS — Z96.1 PSEUDOPHAKIA: ICD-10-CM

## 2018-09-19 DIAGNOSIS — H04.123 DRY EYE SYNDROME OF BOTH EYES: ICD-10-CM

## 2018-09-19 DIAGNOSIS — H52.7 REFRACTIVE ERROR: ICD-10-CM

## 2018-09-19 DIAGNOSIS — G44.329 CHRONIC POST-TRAUMATIC HEADACHE, NOT INTRACTABLE: Primary | ICD-10-CM

## 2018-09-19 DIAGNOSIS — I10 ESSENTIAL HYPERTENSION: ICD-10-CM

## 2018-09-19 DIAGNOSIS — H43.813 VITREOUS DETACHMENT OF BOTH EYES: ICD-10-CM

## 2018-09-19 PROCEDURE — 99212 OFFICE O/P EST SF 10 MIN: CPT | Mod: PBBFAC,PO | Performed by: OPHTHALMOLOGY

## 2018-09-19 PROCEDURE — 92014 COMPRE OPH EXAM EST PT 1/>: CPT | Mod: S$PBB,,, | Performed by: OPHTHALMOLOGY

## 2018-09-19 PROCEDURE — 99999 PR PBB SHADOW E&M-EST. PATIENT-LVL II: CPT | Mod: PBBFAC,,, | Performed by: OPHTHALMOLOGY

## 2018-09-20 NOTE — PROGRESS NOTES
Subjective:       Patient ID: Isael Salmon is a 72 y.o. female.    Chief Complaint: Eye Problem    HPI     72 y.o. Female is concern about ocular health. Tearing right eye since   Cataract Sx. Sometimes have light sensitivity. Feel like she has blinders   on the side of vision. 4 weeks ago her [place] knock her down and   she hit her.MRI was negative. C/o of headaches every since the fall. H/o   stroke 1989. Both eyes has been twitching bilateral prior to the fall as   well. No blurred vision. Pt states that she has not purchased new glasses   still wear glasses prior to Cataract Sx.     Eye Meds: systane Complete BID to TID OU     Last edited by Jonathan Lester MD on 9/19/2018  7:06 PM. (History)               Assessment:       1. Chronic post-traumatic headache, not intractable    2. Dry eye syndrome of both eyes    3. Vitreous detachment of both eyes    4. Essential hypertension    5. Refractive error    6. Pseudophakia        Plan:       HA s/p head trauma from fall-No ocular pathology OU.  MARCIA-Doing well.  PVD's OU-Stable.  HTN-No retinopathy OU.  RE-Pt does not want MRx.        AT's.  Control HTN.  RTC in 3 mos with Dr Juarez as scheduled.

## 2018-09-27 ENCOUNTER — OFFICE VISIT (OUTPATIENT)
Dept: ORTHOPEDICS | Facility: CLINIC | Age: 73
End: 2018-09-27
Payer: MEDICARE

## 2018-09-27 ENCOUNTER — HOSPITAL ENCOUNTER (OUTPATIENT)
Dept: RADIOLOGY | Facility: HOSPITAL | Age: 73
Discharge: HOME | End: 2018-09-27
Attending: PHYSICIAN ASSISTANT
Payer: MEDICARE

## 2018-09-27 VITALS
DIASTOLIC BLOOD PRESSURE: 70 MMHG | BODY MASS INDEX: 30.03 KG/M2 | HEART RATE: 73 BPM | SYSTOLIC BLOOD PRESSURE: 155 MMHG | WEIGHT: 180.25 LBS | HEIGHT: 65 IN

## 2018-09-27 DIAGNOSIS — S52.501D CLOSED FRACTURE OF DISTAL END OF RIGHT RADIUS WITH ROUTINE HEALING, UNSPECIFIED FRACTURE MORPHOLOGY, SUBSEQUENT ENCOUNTER: Primary | ICD-10-CM

## 2018-09-27 DIAGNOSIS — S52.501D CLOSED FRACTURE OF DISTAL END OF RIGHT RADIUS WITH ROUTINE HEALING, UNSPECIFIED FRACTURE MORPHOLOGY, SUBSEQUENT ENCOUNTER: ICD-10-CM

## 2018-09-27 PROCEDURE — 99215 OFFICE O/P EST HI 40 MIN: CPT | Mod: PBBFAC,25 | Performed by: PHYSICIAN ASSISTANT

## 2018-09-27 PROCEDURE — 73110 X-RAY EXAM OF WRIST: CPT | Mod: 26,RT,, | Performed by: RADIOLOGY

## 2018-09-27 PROCEDURE — 99999 PR PBB SHADOW E&M-EST. PATIENT-LVL V: CPT | Mod: PBBFAC,,, | Performed by: PHYSICIAN ASSISTANT

## 2018-09-27 PROCEDURE — 99213 OFFICE O/P EST LOW 20 MIN: CPT | Mod: S$PBB,,, | Performed by: PHYSICIAN ASSISTANT

## 2018-09-27 PROCEDURE — 73110 X-RAY EXAM OF WRIST: CPT | Mod: TC,RT

## 2018-09-27 NOTE — PROGRESS NOTES
Subjective:      Patient ID: Isael Salmon is a 72 y.o. female.    Chief Complaint: No chief complaint on file.    HPI  Patient returns for 6 week f/u for her right distal radius fracture. She has been wearing the exos splint but removing it for showers. She says she has gotten much better. She reports some soreness at the ulnar forearm. She denies pain. She has been NWB. She denies N/T.   Review of Systems   Constitution: Negative for chills, fever and night sweats.   Cardiovascular: Negative for chest pain.   Respiratory: Negative for cough and shortness of breath.    Hematologic/Lymphatic: Does not bruise/bleed easily.   Skin: Negative for color change.   Gastrointestinal: Negative for heartburn.   Genitourinary: Negative for dysuria.   Neurological: Negative for numbness and paresthesias.   Psychiatric/Behavioral: Negative for altered mental status.   Allergic/Immunologic: Negative for persistent infections.         Objective:            General    Vitals reviewed.  Constitutional: She is oriented to person, place, and time. She appears well-developed and well-nourished.   Cardiovascular: Normal rate.    Neurological: She is alert and oriented to person, place, and time.             Right Hand/Wrist Exam     Tenderness   The patient is tender to palpation of the ulnar area.    Range of Motion     Wrist   Extension: abnormal   Flexion: abnormal   Pronation: normal   Supination: normal     Other     Neuorologic Exam    Median Distribution: normal  Ulnar Distribution: normal  Radial Distribution: normal    Comments:  No tenderness at the distal radius. ROM is a little stiff.           Vascular Exam       Capillary Refill  Right Hand: normal capillary refill      X-ray: ordered and reviewed by myself. Radial metaphyseal fracture in satisfactory position.        Assessment:       Encounter Diagnosis   Name Primary?    Closed fracture of distal end of right radius with routine healing, unspecified fracture  morphology, subsequent encounter Yes          Plan:       Patient will wean out of brace. Remain NWB. Work on ROM. Order placed for PT. RTC in 6 weeks for repeat x-ray.

## 2018-10-16 ENCOUNTER — PATIENT MESSAGE (OUTPATIENT)
Dept: INTERNAL MEDICINE | Facility: CLINIC | Age: 73
End: 2018-10-16

## 2018-10-22 RX ORDER — FOLIC ACID-PYRIDOXINE-CYANOCOBALAMIN TAB 2.5-25-2 MG 2.5-25-2 MG
TAB ORAL
Qty: 30 TABLET | Refills: 9 | Status: SHIPPED | OUTPATIENT
Start: 2018-10-22 | End: 2019-10-11 | Stop reason: SDUPTHER

## 2018-10-29 ENCOUNTER — HOSPITAL ENCOUNTER (OUTPATIENT)
Dept: RADIOLOGY | Facility: HOSPITAL | Age: 73
Discharge: HOME | End: 2018-10-29
Attending: ORTHOPAEDIC SURGERY
Payer: MEDICARE

## 2018-10-29 ENCOUNTER — CLINICAL SUPPORT (OUTPATIENT)
Dept: REHABILITATION | Facility: HOSPITAL | Age: 73
End: 2018-10-29
Payer: MEDICARE

## 2018-10-29 ENCOUNTER — OFFICE VISIT (OUTPATIENT)
Dept: ORTHOPEDICS | Facility: CLINIC | Age: 73
End: 2018-10-29
Payer: MEDICARE

## 2018-10-29 VITALS
DIASTOLIC BLOOD PRESSURE: 70 MMHG | SYSTOLIC BLOOD PRESSURE: 147 MMHG | BODY MASS INDEX: 29.86 KG/M2 | WEIGHT: 179.44 LBS | HEART RATE: 87 BPM

## 2018-10-29 DIAGNOSIS — M79.675 TOE PAIN, LEFT: ICD-10-CM

## 2018-10-29 DIAGNOSIS — M20.12 HALLUX VALGUS, LEFT: ICD-10-CM

## 2018-10-29 DIAGNOSIS — M20.42 HAMMER TOE OF LEFT FOOT: ICD-10-CM

## 2018-10-29 DIAGNOSIS — S52.501D CLOSED FRACTURE OF DISTAL END OF RIGHT RADIUS WITH ROUTINE HEALING, UNSPECIFIED FRACTURE MORPHOLOGY, SUBSEQUENT ENCOUNTER: ICD-10-CM

## 2018-10-29 DIAGNOSIS — M25.531 RIGHT WRIST PAIN: ICD-10-CM

## 2018-10-29 DIAGNOSIS — M79.675 TOE PAIN, LEFT: Primary | ICD-10-CM

## 2018-10-29 PROCEDURE — 73630 X-RAY EXAM OF FOOT: CPT | Mod: 26,LT,, | Performed by: RADIOLOGY

## 2018-10-29 PROCEDURE — 73630 X-RAY EXAM OF FOOT: CPT | Mod: TC,LT

## 2018-10-29 PROCEDURE — 99214 OFFICE O/P EST MOD 30 MIN: CPT | Mod: S$PBB,,, | Performed by: ORTHOPAEDIC SURGERY

## 2018-10-29 PROCEDURE — 99213 OFFICE O/P EST LOW 20 MIN: CPT | Mod: PBBFAC,25 | Performed by: ORTHOPAEDIC SURGERY

## 2018-10-29 PROCEDURE — 99999 PR PBB SHADOW E&M-EST. PATIENT-LVL III: CPT | Mod: PBBFAC,,, | Performed by: ORTHOPAEDIC SURGERY

## 2018-10-29 PROCEDURE — 97018 PARAFFIN BATH THERAPY: CPT | Mod: 59

## 2018-10-29 PROCEDURE — G8985 CARRY GOAL STATUS: HCPCS | Mod: CK

## 2018-10-29 PROCEDURE — G8984 CARRY CURRENT STATUS: HCPCS | Mod: CK

## 2018-10-29 PROCEDURE — 97165 OT EVAL LOW COMPLEX 30 MIN: CPT

## 2018-10-29 NOTE — PATIENT INSTRUCTIONS
[facility], OCCUPATIONAL THERAPY  HOME EXERCISE PROGRAM     Complete the following exercises with 10 repetitions each, 3-4x/day.     AROM: Supination / Pronation   With your elbow by your side, turn your palm up then turn your palm down.     AROM: Wrist Flexion / Extension               Bend your wrist forward and back as far as possible.      AROM: Wrist Radial / Ulnar Deviation  Bend your wrist from side to side as far as possible.    AROM: Wrist Flexion / Extension  Make a fist, then bend your wrist forward then back as far as possible.         AROM: Wrist Radial / Ulnar Deviation   Make a fist then bend your wrist toward your body, then away.         Prayer Stretch      Sitting with elbows on table and palms together, slowly lower wrists toward table until stretch is felt. Be sure to keep palms together throughout stretch. Hold 3-5 seconds. Relax.  Repeat 10 times. Do 3-4x per day.  Copyright © [employer]. All rights reserved.     Therapist: JULIAN Ledbetter CHT.

## 2018-10-29 NOTE — PROGRESS NOTES
"Occupational Therapy -Hand / Wrist  Evaluation    Patient: Isael Salmon  Date of Evaluation: 10/29/2018  Referring Physician:  Dr. ROCKY Bunn, SONIA Moody   Diagnosis: Closed fx of distal end of right radius   1. Closed fracture of distal end of right radius with routine healing, unspecified fracture morphology, subsequent encounter     2. Right wrist pain       MRN: [id number]    TO SEE MD NEXT WEEK    Referral Orders:   Eval and treat     Start Time: 215  End Time: 315  Total Time: 60     Hand dominance: Right    Occupation:  Self-employed -   Working presently:  yes  Workmen's Compensation:  no    Date of onset: 8 weeks   Involved area:  R wrist   Mechanism of Injury:   tripped over dog  Past Medical History/Physical Systems Review: vitreous detachment of both eyes, traumatic closed displaced mallet fx, TIA, senile nuclear sclerosis, PVD, primary osteoarthritis    Living status: unknown     Environmental Concerns/ Fall Risk:  None  Barriers to Learning: None   Cultural/Spiritual : None   Developmental/Education: None   Abuse/ Neglect: none   Nutritional Deficit: None   Language: None   Hearing/Vision Deficit: none  Other:     Subjective:  Pt reports I wear the brace when I type at work , but I have trouble with filing, holding things.     Pain   At rest: 8 (constant pain) out of 10  With work/ Activity: 9-10 out of 10  Sleepin out of 10  Location of Pain : R wrist, forearm     Patients goals for therapy are:  Just want to be able to use my hands like I was before."    Objective:   Observation  :swelling, arthritic deformities     Sensation: WNL  Scar / Wound: none   Edema:    MPs: 18.3 cm   PPC: 19.7 cm   PWC: 17.8 cm        Range of Motion:   Wrist, R    Ext/flex: 52/64       UD/RD: 30/10   Sup/pro: 70/WNL                                           Manual Muscle Test: TBA                                       Strength: (MIKAYLA Dynamometer in lbs.), Average 3 trials, Position II   R) " 15 L) 22        Pinch Strength: (Pinch Gauge in psis), Average 3 trials    Key: R) 1 w/p L) 2   3 pt: R) 0.5 w/p L) 4   2 pt: R) 0.5 w/p  L) 1          Functional Limitations:   Self Care / ADL: Limited use of R wrist for ADLs, grooming, dressing, hygiene, opening things, bathing, toileting  Work/Activities: Limited use of R wrist for gripping, lifting, typing, holding things, filing   Leisure: Limited use of R wrist for N/A    Focus on Therapeutic Outcomes (FOTO) Symptom Scale: Patient score indicates self perception of 50% impairment, limitation or restriction of function upon initial assessment.     Treatment Included:   OT evaluation and instruction in written HEP including  AROM sup/pro, wrist flex/ext making a fist, RD/UD making a fist, RD/UD with hand open, flex/ext with hand open for 10 reps, 3-4x/d and prayer stretch x 10 reps, holding for 3 secs, 3-4x/d. Paraffin bath x 8 min to R hand to increase blood flow, circulation, tissue elasticity and for pain management. Pt given information and instruction in use of paraffin bath at home. Pt instructed in use of MH prior to exercises for flexibility, blood flow, circulation, and for pain management for 5-10 min. Pt instructed in use of ice following exercises to decrease pain, inflammation, and swelling for 5-10 min.     Patient demonstrates good understanding of HEP/modality use for pain management.      Assessment:   Problem List :      Decreased ROM   Decreased  and pinch strength   Decreased muscle strength   Decreased functional hand use   Increased pain   Edema         Profile and History Assessment of Occupational Performance Level of Clinical Decision Making Complexity Score   Occupational Profile:   Isael Salmon is a 72 y.o. female who  is currently self-employed as . Isael Salmon has difficulty with  grooming and dressing, bathing, toileting  finance management, phone/computer use and housework/household chores  affecting  his/her daily functional abilities. His/her main goal for therapy is Just want to be able to use my hands like I was before..     Comorbidities:   vitreous detachment of both eyes, traumatic closed displaced mallet fx, TIA, senile nuclear sclerosis, PVD, primary osteoarthritis    Medical and Therapy History Review:   Brief               Performance Deficits    Physical:  Joint Mobility  Joint Stability  Muscle Power/Strength  Skin Integrity/Scar Formation  Edema   Strength  Pinch Strength  Fine Motor Coordination  Pain    Cognitive:  No Deficits    Psychosocial:    Habits  Routines     Clinical Decision Making:  low    Assessment Process:  Problem-Focused Assessments    Modification/Need for Assistance:  Not Necessary    Intervention Selection:  Limited Treatment Options       low  Based on PMHX, co morbidities , data from assessments and functional level of assistance required with task and clinical presentation directly impacting function.       Goals: ( 6 weeks)   1)  Patient to be IND with HEP and modalities for pain management  2)  Increase  strength 3-5 lbs. to grasp for lifting and holding things  3)   Increase pinch 1-3 psis for  Dressing, grooming, and hygiene  4)   Decrease edema .2-.3 mm to increase joint mobility /flexibility for functional hand use.   5 ) Patient to score at 30-45% or less on FOTO to demonstrate improved perception of functional hand Use.    Plan:   Patient to be treated by Occupational Therapy    1-2    times per week for   6-8                   weeks  during the certification period from   10/29/2018     to  12/31/2018   to achieve the established goals.  Treatment to include :  paraffin, fluidotherapy, manual therapy/joint mobilizations,  modalities for pain management, US 3mhz, therapeutic exercises/activities,        strengthening,    scar and edema management, as well as any other treatments deemed necessary based on the patient's needs or progress.               I  certify the need for these services furnished under this plan of treatment and while under my care    ____________________________________                         __________________  Physician/Referring Practitioner                                               Date of Signature

## 2018-10-29 NOTE — PROGRESS NOTES
DATE: 10/29/2018  PATIENT: Isael Salmon    CHIEF COMPLAINT: left second toe pain   HISTORY:  Isael Salmon is a 72 y.o. female who presents here for initial evaluation of left second toe pain. Toe has began to cross over the great toe and cause her pain. She can no longer wear close toed shoes. The pain has been present for over a year and has been progressive. The patient describes the pain as a dull ache.  The pain is worse with wearing shoes and improved by rest, wearing sandals.. There is no associated numbness and tingling.  Prior treatments have included splinting, toe separators, nsaids.       PAST MEDICAL/SURGICAL HISTORY:  Past Medical History:   Diagnosis Date    Adjustment disorder 7/26/2012    Anemia 7/26/2012    AR (allergic rhinitis) 9/5/2014    Bronchitis     Fibrocystic breast disease in female 7/26/2012    GERD (gastroesophageal reflux disease) 7/26/2012    Gout     Gout, arthritis 7/26/2012    History of blood transfusion 7/26/2012    History of colonic polyps 7/26/2012    Hypercalcemia 9/20/2012    Hyperlipidemia 7/26/2012    Hypertension 7/26/2012    Nuclear sclerosis 5/2/2014    Osteoarthritis 7/26/2012    Other hyperparathyroidism 9/20/2012    Postmenopausal status 7/26/2012    PVD (peripheral vascular disease) 7/26/2012    left leg laser of vein with injection    Stroke 1997    TIA    Superficial vein thrombosis     Transient ischemic attack 7/26/2012    Varicose veins 7/26/2012     Past Surgical History:   Procedure Laterality Date    BREAST SURGERY      breast reduction    broken second finger Right 12/2015    pins placed    carotid endarterectomy  1997    right    CAROTID ENDARTERECTOMY Right 1997    CATARACT EXTRACTION W/  INTRAOCULAR LENS IMPLANT Right 05/01/2018    Dr. Yost    CATARACT EXTRACTION W/  INTRAOCULAR LENS IMPLANT Left 05/15/2018    Dr. Lester    COLONOSCOPY N/A 10/26/2015    Procedure: COLONOSCOPY;  Surgeon: Manuel HOLGUIN  MD Zeke;  Location: [facility] ENDO (4TH FLR);  Service: Endoscopy;  Laterality: N/A;    COLONOSCOPY N/A 10/26/2015    Performed by Manuel Alanis MD at [facility] ENDO (4TH FLR)    COLPORRHAPHY, ANTERIOR N/A 1/24/2014    [facility] at [facility] OR    COLPORRHAPHY, POSTERIOR N/A 1/24/2014    [facility] at [facility] OR    CYSTOSCOPY N/A 1/24/2014    [facility] at [facility] OR    EGD and colonoscopy using a DBE scope N/A 5/26/2016    [facility]    ESOPHAGOGASTRODUODENOSCOPY (EGD) N/A 1/6/2017    Performed by Manuel Alanis MD at [facility] ENDO (2ND FLR)    HYSTERECTOMY      INJECTION-JOINT Right 5/12/2017    Performed by Calvin Saleh MD at [facility] PAIN MGT    INSERTION-INTRAOCULAR LENS (IOL) Left 5/15/2018    Performed by Jonathan Lester MD at [facility] OR 1ST FLR    INSERTION-INTRAOCULAR LENS (IOL) Right 5/1/2018    Performed by Jonathan Lester MD at [facility] OR 1ST FLR    ORIF FINGER FRACTURE  12/18/15    PERINEOPLASTY N/A 1/24/2014    [facility] at [facility]    PHACOEMULSIFICATION-ASPIRATION-CATARACT Left 5/15/2018    Performed by Jonathan Lester MD at [facility] OR 1ST FLR    PHACOEMULSIFICATION-ASPIRATION-CATARACT Right 5/1/2018    Performed by Jonathan Lester MD at [facility] OR 1ST FLR    REDUCTION WITH PINNING-CLOSED-FINGER right middle distal (ADD ON) Right 12/18/2015    Performed by Anastacio Geller Jr., [facility] at [facility] OR    REMOVAL-HARDWARE-HAND right middle finger Right 2/11/2016    Per[facility] by Anastacio [facility]eller Jr[facility] at [facility] OR    TIA      VASCULAR SURGERY      left leg vein laser       Current Medications:   Current Outpatient Medications:     allopurinol (ZYLOPRIM) 100 MG tablet, Take 2 tablets (200 mg total) by mouth once daily., Disp: 60 tablet, Rfl: 12    aspirin (ECOTRIN) 81 MG EC tablet, Take 81 mg by mouth once daily. , Disp: , Rfl:     augmented betamethasone  dipropionate (DIPROLENE-AF) 0.05 % cream, Apply topically 2 (two) times daily., Disp: 60 g, Rfl: 3    BIOTIN ORAL, Take 1 tablet by mouth once daily., Disp: , Rfl:     co-enzyme Q-10 30 mg capsule, Take 200 mg by mouth once daily., Disp: , Rfl:     colchicine 0.6 mg tablet, One tablet every hour during gouty flare until have diarrhea. Max 6 tablets daily (Patient taking differently: as needed. One tablet every hour during gouty flare until have diarrhea. Max 6 tablets daily), Disp: 30 tablet, Rfl: 11    diazePAM (VALIUM) 10 MG Tab, take 1 tablet by mouth ONE HOUR BEFORE APPOINTMENT, Disp: , Rfl: 0    diclofenac sodium 1 % Gel, Apply 2 g topically once daily., Disp: 1 Tube, Rfl: 1    dicyclomine (BENTYL) 10 MG capsule, Take 1 capsule (10 mg total) by mouth 4 (four) times daily as needed. 1 Capsule Oral Four times a day, Disp: 60 capsule, Rfl: 3    ergocalciferol (ERGOCALCIFEROL) 50,000 unit Cap, Take 1 capsule (50,000 Units total) by mouth twice a week., Disp: 24 capsule, Rfl: 4    escitalopram oxalate (LEXAPRO) 10 MG tablet, take 1/2 tablet by mouth once daily, Disp: 45 tablet, Rfl: 3    estradiol (ESTRACE) 0.01 % (0.1 mg/gram) vaginal cream, Place vaginally twice a week. , Disp: , Rfl:     ferrous sulfate 325 mg (65 mg iron) Tab tablet, Take 1 tablet (325 mg total) by mouth every 12 (twelve) hours., Disp: 60 tablet, Rfl: 4    fluticasone (FLONASE) 50 mcg/actuation nasal spray, 2 sprays by Each Nare route daily as needed. 2 Aerosol, Spray Nasal Every day, Disp: 16 g, Rfl: 2    FOLBIC 2.5-25-2 mg Tab, TAKE 1 TABLET BY MOUTH DAILY, Disp: 30 tablet, Rfl: 9    indomethacin (INDOCIN) 50 MG capsule, Take 1 capsule (50 mg total) by mouth 3 (three) times daily as needed., Disp: 90 capsule, Rfl: 6    L gasseri/B bifidum/B longum (Wrnch Roovyn ORAL), Take by mouth., Disp: , Rfl:     losartan (COZAAR) 50 MG tablet, Take 1 tablet (50 mg total) by mouth 2 (two) times daily., Disp: 180 tablet, Rfl: 3     neomycin-polymyxin-hydrocortisone (CORTISPORIN) 3.5-10,000-1 mg/mL-unit/mL-% otic suspension, apply to affected area OF NAILS twice a day, Disp: 10 mL, Rfl: 6    omeprazole (PRILOSEC) 20 MG capsule, take 1 capsule by mouth twice a day, Disp: 60 capsule, Rfl: 6    predniSONE (DELTASONE) 10 MG tablet, 2 tablets daily for 3 days then one tablet daily for 3 days, Disp: 10 tablet, Rfl: 0    rosuvastatin (CRESTOR) 20 MG tablet, Take 1 tablet (20 mg total) by mouth once daily., Disp: 90 tablet, Rfl: 3    spironolactone (ALDACTONE) 25 MG tablet, take 1 tablet by mouth once daily, Disp: 90 tablet, Rfl: 4    tiZANidine (ZANAFLEX) 4 MG tablet, 1/2-1 tablet every 8 hours as needed for muscle spasm, Disp: 30 tablet, Rfl: 1    Social History:   Social History     Socioeconomic History    Marital status:      Spouse name: Not on file    Number of children: 1    Years of education: Not on file    Highest education level: Not on file   Social Needs    Financial resource strain: Not on file    Food insecurity - worry: Not on file    Food insecurity - inability: Not on file    Transportation needs - medical: Not on file    Transportation needs - non-medical: Not on file   Occupational History    Occupation: Retired   Tobacco Use    Smoking status: Former Smoker     Last attempt to quit: 1997     Years since quittin.8    Smokeless tobacco: Never Used    Tobacco comment: quit  - 1 pack per day since age 16   Substance and Sexual Activity    Alcohol use: Yes     Alcohol/week: 0.6 oz     Types: 1 Glasses of wine per week     Comment: ocassional    Drug use: No    Sexual activity: Not Currently     Partners: Male     Birth control/protection: Surgical   Other Topics Concern    Are you pregnant or think you may be? Not Asked    Breast-feeding Not Asked   Social History Narrative    Not on file       REVIEW OF SYSTEMS:  Constitution: Negative. Negative for chills, fever and night sweats.    Cardiovascular: Negative for chest pain and syncope.   Respiratory: Negative for cough and shortness of breath.   Gastrointestinal: See HPI. Negative for nausea/vomiting. Negative for abdominal pain.  Genitourinary: See HPI. Negative for discoloration or dysuria.  Skin: Negative for dry skin, itching and rash.   Hematologic/Lymphatic: Negative for bleeding problem. Does not bruise/bleed easily.   Musculoskeletal: Negative for falls and muscle weakness.   Neurological: See HPI. No seizures.   Endocrine: Negative for polydipsia, polyphagia and polyuria.   Allergic/Immunologic: Negative for hives and persistent infections.    PHYSICAL EXAMINATION:    BP (!) 147/70   Pulse 87   Wt 81.4 kg (179 lb 7.3 oz)   LMP  (LMP Unknown)   BMI 29.86 kg/m²     General: The patient is a pleasant 72 y.o. female in no apparent distress, the patient is orientatied to person, place and time.   Psych: Normal mood and affect  HEENT:  NCAT, sclera nonicteric  Lungs:  Respirations are equal and unlabored.  CV:  2+ bilateral upper and lower extremity pulses.  Skin:  Intact throughout.  Musculoskeletal: No pain with the range of motion of the bilateral hips. No trochanteric tenderness to palpation. No pain with range of motion about the bilateral knees.    LEFT FOOT    Foot lies in normal alignment. There is a moderate hallux valgus deformity of the left great toe, Second toe with hammer toe deformity.   Full painless ROM  TA/EHL/Gastroc/FHL assessed in isolation without deficit  SILT throughout  DP and PT palpated  2+  Capillary Refill <3s      IMAGING:     Radiographs of the left foot were ordered and personally reviewed with the patient today.    ASSESSMENT/PLAN:    1. Toe pain, left  X-Ray Foot Complete Left   2. Hallux valgus, left     3. Hammer toe of left foot, 2nd toe         Isael Salmon is a 72 y.o. female with left hallux valgus and left second toe hammer toe. She is no longer to wear closed toe shoes and has failed non  operative management. She would like to schedule operative management. Would likely require distal osteotomy and second hammer toe correction. She will call to schedule a date when she is ready sometime after the holidays. Will return for pre op visit.       I have personally taken the history and examined this patient and agree with the residents note as stated above.

## 2018-10-29 NOTE — LETTER
October 29, 2018      Sheeba Vargas PA-C  [address]  [facility] [address]           [facility]  [address]  [facility] [address]  Phone: [phone]          Patient: Isael Salmon   MR Number: [id number]   YOB: 1945   Date of Visit: 10/29/2018       Dear Sheeba Vargas:    Thank you for referring Isael Salmon to me for evaluation. Attached you will find relevant portions of my assessment and plan of care.    If you have questions, please do not hesitate to call me. I look forward to following Isael Salmon along with you.    Sincerely,    Silver Bunn MD    Enclosure  CC:  No Recipients    If you would like to receive this communication electronically, please contact [email or web address] or [phone] to request more information on [facility] [employer] access.    For providers and/or their staff who would like to refer a patient to [facility], please contact us through our one-stop-shop provider referral line, [facility], at [phone].    If you feel you have received this communication in error or would no longer like to receive these types of communications, please e-mail [email or web address]          Post Partum      Patient is still struggling with Post partum anxiety  She is worried about everything and finds herself crying more then before  She states that she is worried about cleanliness in the house and cleans up alot    Is returning to work soon and will be able to take the baby with her   Not leaving the baby home is better for her and she feels comfortable returning to work      is very supportive and helps at night and day times as much as possible     Depression screen is 16   Very high risk   No SI/ HI thoughts   Baby is well cared for clean and together as well as linda     Offered some medication to help and pt does not wish to take it yet she states that she feels that things are getting better  The baby me info was given and strongly recommend her to go and get counseling there and the baby can go there so no need to worry about leaving her behind

## 2018-10-30 NOTE — PLAN OF CARE
"Occupational Therapy -Hand / Wrist  Evaluation    Patient: Isael Salmon  Date of Evaluation: 10/29/2018  Referring Physician:  Dr.R Bunn, SONIA Moody   Diagnosis: Closed fx of distal end of right radius   1. Closed fracture of distal end of right radius with routine healing, unspecified fracture morphology, subsequent encounter     2. Right wrist pain       MRN: [id number]    TO SEE MD NEXT WEEK    Referral Orders:   Eval and treat     Start Time: 215  End Time: 315  Total Time: 60     Hand dominance: Right    Occupation:  Self-employed -   Working presently:  yes  Workmen's Compensation:  no    Date of onset: 8 weeks   Involved area:  R wrist   Mechanism of Injury:   tripped over dog  Past Medical History/Physical Systems Review: vitreous detachment of both eyes, traumatic closed displaced mallet fx, TIA, senile nuclear sclerosis, PVD, primary osteoarthritis    Living status: unknown     Environmental Concerns/ Fall Risk:  None  Barriers to Learning: None   Cultural/Spiritual : None   Developmental/Education: None   Abuse/ Neglect: none   Nutritional Deficit: None   Language: None   Hearing/Vision Deficit: none  Other:     Subjective:  Pt reports I wear the brace when I type at work , but I have trouble with filing, holding things.     Pain   At rest: 8 (constant pain) out of 10  With work/ Activity: 9-10 out of 10  Sleepin out of 10  Location of Pain : R wrist, forearm     Patients goals for therapy are:  Just want to be able to use my hands like I was before."    Objective:   Observation  :swelling, arthritic deformities     Sensation: WNL  Scar / Wound: none   Edema:    MPs: 18.3 cm   PPC: 19.7 cm   PWC: 17.8 cm        Range of Motion:   Wrist, R    Ext/flex: 52/64       UD/RD: 30/10   Sup/pro: 70/WNL                                           Manual Muscle Test: TBA                                       Strength: (MIKAYLA Dynamometer in lbs.), Average 3 trials, Position II   R) 15 " L) 22        Pinch Strength: (Pinch Gauge in psis), Average 3 trials    Key: R) 1 w/p L) 2   3 pt: R) 0.5 w/p L) 4   2 pt: R) 0.5 w/p  L) 1          Functional Limitations:   Self Care / ADL: Limited use of R wrist for ADLs, grooming, dressing, hygiene, opening things, bathing, toileting  Work/Activities: Limited use of R wrist for gripping, lifting, typing, holding things, filing   Leisure: Limited use of R wrist for N/A    Focus on Therapeutic Outcomes (FOTO) Symptom Scale: Patient score indicates self perception of 50% impairment, limitation or restriction of function upon initial assessment.     Treatment Included:   OT evaluation and instruction in written HEP including  AROM sup/pro, wrist flex/ext making a fist, RD/UD making a fist, RD/UD with hand open, flex/ext with hand open for 10 reps, 3-4x/d and prayer stretch x 10 reps, holding for 3 secs, 3-4x/d. Paraffin bath x 8 min to R hand to increase blood flow, circulation, tissue elasticity and for pain management. Pt given information and instruction in use of paraffin bath at home. Pt instructed in use of MH prior to exercises for flexibility, blood flow, circulation, and for pain management for 5-10 min. Pt instructed in use of ice following exercises to decrease pain, inflammation, and swelling for 5-10 min.     Patient demonstrates good understanding of HEP/modality use for pain management.      Assessment:   Problem List :      Decreased ROM   Decreased  and pinch strength   Decreased muscle strength   Decreased functional hand use   Increased pain   Edema         Profile and History Assessment of Occupational Performance Level of Clinical Decision Making Complexity Score   Occupational Profile:   Isael Salmon is a 72 y.o. female who  is currently self-employed as . Isael Salmon has difficulty with  grooming and dressing, bathing, toileting  finance management, phone/computer use and housework/household chores  affecting  his/her daily functional abilities. His/her main goal for therapy is Just want to be able to use my hands like I was before..     Comorbidities:   vitreous detachment of both eyes, traumatic closed displaced mallet fx, TIA, senile nuclear sclerosis, PVD, primary osteoarthritis    Medical and Therapy History Review:   Brief               Performance Deficits    Physical:  Joint Mobility  Joint Stability  Muscle Power/Strength  Skin Integrity/Scar Formation  Edema   Strength  Pinch Strength  Fine Motor Coordination  Pain    Cognitive:  No Deficits    Psychosocial:    Habits  Routines     Clinical Decision Making:  low    Assessment Process:  Problem-Focused Assessments    Modification/Need for Assistance:  Not Necessary    Intervention Selection:  Limited Treatment Options       low  Based on PMHX, co morbidities , data from assessments and functional level of assistance required with task and clinical presentation directly impacting function.       Goals: ( 6 weeks)   1)  Patient to be IND with HEP and modalities for pain management  2)  Increase  strength 3-5 lbs. to grasp for lifting and holding things  3)   Increase pinch 1-3 psis for  Dressing, grooming, and hygiene  4)   Decrease edema .2-.3 mm to increase joint mobility /flexibility for functional hand use.   5 ) Patient to score at 30-45% or less on FOTO to demonstrate improved perception of functional hand Use.    Plan:   Patient to be treated by Occupational Therapy    1-2    times per week for   6-8                   weeks  during the certification period from   10/29/2018     to  12/31/2018   to achieve the established goals.  Treatment to include :  paraffin, fluidotherapy, manual therapy/joint mobilizations,  modalities for pain management, US 3mhz, therapeutic exercises/activities,        strengthening,    scar and edema management, as well as any other treatments deemed necessary based on the patient's needs or progress.               I  certify the need for these services furnished under this plan of treatment and while under my care    ____________________________________                         __________________  Physician/Referring Practitioner                                               Date of Signature

## 2018-11-05 ENCOUNTER — PATIENT MESSAGE (OUTPATIENT)
Dept: INTERNAL MEDICINE | Facility: CLINIC | Age: 73
End: 2018-11-05

## 2018-11-05 RX ORDER — DOXYCYCLINE HYCLATE 100 MG
100 TABLET ORAL 2 TIMES DAILY
Qty: 20 TABLET | Refills: 0 | Status: SHIPPED | OUTPATIENT
Start: 2018-11-05 | End: 2018-11-15

## 2018-11-06 ENCOUNTER — HOSPITAL ENCOUNTER (OUTPATIENT)
Dept: RADIOLOGY | Facility: HOSPITAL | Age: 73
Discharge: HOME | End: 2018-11-06
Attending: NURSE PRACTITIONER
Payer: MEDICARE

## 2018-11-06 ENCOUNTER — OFFICE VISIT (OUTPATIENT)
Dept: INTERNAL MEDICINE | Facility: CLINIC | Age: 73
End: 2018-11-06
Payer: MEDICARE

## 2018-11-06 VITALS
HEART RATE: 98 BPM | OXYGEN SATURATION: 95 % | DIASTOLIC BLOOD PRESSURE: 76 MMHG | HEIGHT: 65 IN | TEMPERATURE: 98 F | BODY MASS INDEX: 29.94 KG/M2 | WEIGHT: 179.69 LBS | SYSTOLIC BLOOD PRESSURE: 144 MMHG

## 2018-11-06 DIAGNOSIS — J40 BRONCHITIS: Primary | ICD-10-CM

## 2018-11-06 DIAGNOSIS — J84.10 CALCIFIED GRANULOMA OF LUNG: ICD-10-CM

## 2018-11-06 DIAGNOSIS — J40 BRONCHITIS: ICD-10-CM

## 2018-11-06 PROBLEM — J98.4 CALCIFIED GRANULOMA OF LUNG: Status: ACTIVE | Noted: 2018-11-06

## 2018-11-06 PROCEDURE — 94640 AIRWAY INHALATION TREATMENT: CPT | Mod: PBBFAC

## 2018-11-06 PROCEDURE — 99999 PR PBB SHADOW E&M-EST. PATIENT-LVL IV: CPT | Mod: PBBFAC,,, | Performed by: NURSE PRACTITIONER

## 2018-11-06 PROCEDURE — 71046 X-RAY EXAM CHEST 2 VIEWS: CPT | Mod: 26,,, | Performed by: RADIOLOGY

## 2018-11-06 PROCEDURE — 99214 OFFICE O/P EST MOD 30 MIN: CPT | Mod: PBBFAC,25 | Performed by: NURSE PRACTITIONER

## 2018-11-06 PROCEDURE — 71046 X-RAY EXAM CHEST 2 VIEWS: CPT | Mod: TC

## 2018-11-06 PROCEDURE — 99214 OFFICE O/P EST MOD 30 MIN: CPT | Mod: S$PBB,,, | Performed by: NURSE PRACTITIONER

## 2018-11-06 PROCEDURE — 96372 THER/PROPH/DIAG INJ SC/IM: CPT | Mod: PBBFAC,59

## 2018-11-06 RX ORDER — IPRATROPIUM BROMIDE AND ALBUTEROL SULFATE 2.5; .5 MG/3ML; MG/3ML
3 SOLUTION RESPIRATORY (INHALATION)
Status: COMPLETED | OUTPATIENT
Start: 2018-11-06 | End: 2018-11-06

## 2018-11-06 RX ORDER — ALBUTEROL SULFATE 90 UG/1
2 AEROSOL, METERED RESPIRATORY (INHALATION) EVERY 4 HOURS PRN
Qty: 1 INHALER | Refills: 0 | Status: SHIPPED | OUTPATIENT
Start: 2018-11-06 | End: 2019-09-11 | Stop reason: ALTCHOICE

## 2018-11-06 RX ORDER — BETAMETHASONE SODIUM PHOSPHATE AND BETAMETHASONE ACETATE 3; 3 MG/ML; MG/ML
12 INJECTION, SUSPENSION INTRA-ARTICULAR; INTRALESIONAL; INTRAMUSCULAR; SOFT TISSUE
Status: COMPLETED | OUTPATIENT
Start: 2018-11-06 | End: 2018-11-06

## 2018-11-06 RX ADMIN — IPRATROPIUM BROMIDE AND ALBUTEROL SULFATE 3 ML: .5; 2.5 SOLUTION RESPIRATORY (INHALATION) at 04:11

## 2018-11-06 RX ADMIN — BETAMETHASONE ACETATE AND BETAMETHASONE SODIUM PHOSPHATE 12 MG: 3; 3 INJECTION, SUSPENSION INTRA-ARTICULAR; INTRALESIONAL; INTRAMUSCULAR; SOFT TISSUE at 06:11

## 2018-11-06 NOTE — PROGRESS NOTES
Two identifiers were used Name and D.O.B pt tolerated treatment well pt was instructed to wait for further instructions   Name and  verified client was instructed to stay in clinic for 15 min and report any difficulties for steroid injection

## 2018-11-07 ENCOUNTER — OFFICE VISIT (OUTPATIENT)
Dept: INTERNAL MEDICINE | Facility: CLINIC | Age: 73
End: 2018-11-07
Payer: MEDICARE

## 2018-11-07 ENCOUNTER — TELEPHONE (OUTPATIENT)
Dept: INTERNAL MEDICINE | Facility: CLINIC | Age: 73
End: 2018-11-07

## 2018-11-07 ENCOUNTER — LAB VISIT (OUTPATIENT)
Dept: LAB | Facility: HOSPITAL | Age: 73
End: 2018-11-07
Payer: MEDICARE

## 2018-11-07 VITALS
OXYGEN SATURATION: 95 % | DIASTOLIC BLOOD PRESSURE: 64 MMHG | BODY MASS INDEX: 29.02 KG/M2 | SYSTOLIC BLOOD PRESSURE: 138 MMHG | HEART RATE: 98 BPM | HEIGHT: 66 IN | WEIGHT: 180.56 LBS | TEMPERATURE: 98 F

## 2018-11-07 DIAGNOSIS — R06.02 SOB (SHORTNESS OF BREATH): ICD-10-CM

## 2018-11-07 DIAGNOSIS — J40 BRONCHITIS: ICD-10-CM

## 2018-11-07 DIAGNOSIS — R06.02 SOB (SHORTNESS OF BREATH): Primary | ICD-10-CM

## 2018-11-07 LAB
ALBUMIN SERPL BCP-MCNC: 3.7 G/DL
ALP SERPL-CCNC: 85 U/L
ALT SERPL W/O P-5'-P-CCNC: 21 U/L
ANION GAP SERPL CALC-SCNC: 9 MMOL/L
AST SERPL-CCNC: 35 U/L
BASOPHILS # BLD AUTO: 0.01 K/UL
BASOPHILS NFR BLD: 0.1 %
BILIRUB SERPL-MCNC: 0.2 MG/DL
BNP SERPL-MCNC: 49 PG/ML
BUN SERPL-MCNC: 33 MG/DL
CALCIUM SERPL-MCNC: 10.5 MG/DL
CHLORIDE SERPL-SCNC: 106 MMOL/L
CO2 SERPL-SCNC: 25 MMOL/L
CREAT SERPL-MCNC: 1.1 MG/DL
DIFFERENTIAL METHOD: ABNORMAL
EOSINOPHIL # BLD AUTO: 0 K/UL
EOSINOPHIL NFR BLD: 0 %
ERYTHROCYTE [DISTWIDTH] IN BLOOD BY AUTOMATED COUNT: 13.1 %
EST. GFR  (AFRICAN AMERICAN): 56.8 ML/MIN/1.73 M^2
EST. GFR  (NON AFRICAN AMERICAN): 49.2 ML/MIN/1.73 M^2
GLUCOSE SERPL-MCNC: 125 MG/DL
HCT VFR BLD AUTO: 30.3 %
HGB BLD-MCNC: 9.6 G/DL
LYMPHOCYTES # BLD AUTO: 0.5 K/UL
LYMPHOCYTES NFR BLD: 3.9 %
MCH RBC QN AUTO: 29.7 PG
MCHC RBC AUTO-ENTMCNC: 31.7 G/DL
MCV RBC AUTO: 94 FL
MONOCYTES # BLD AUTO: 0.6 K/UL
MONOCYTES NFR BLD: 4.3 %
NEUTROPHILS # BLD AUTO: 12.2 K/UL
NEUTROPHILS NFR BLD: 91.4 %
NRBC BLD-RTO: 0 /100 WBC
PLATELET # BLD AUTO: 227 K/UL
PMV BLD AUTO: 11.3 FL
POTASSIUM SERPL-SCNC: 4.4 MMOL/L
PROT SERPL-MCNC: 7.4 G/DL
RBC # BLD AUTO: 3.23 M/UL
SODIUM SERPL-SCNC: 140 MMOL/L
WBC # BLD AUTO: 13.35 K/UL

## 2018-11-07 PROCEDURE — 99214 OFFICE O/P EST MOD 30 MIN: CPT | Mod: PBBFAC | Performed by: NURSE PRACTITIONER

## 2018-11-07 PROCEDURE — 85025 COMPLETE CBC W/AUTO DIFF WBC: CPT

## 2018-11-07 PROCEDURE — 83880 ASSAY OF NATRIURETIC PEPTIDE: CPT

## 2018-11-07 PROCEDURE — 99213 OFFICE O/P EST LOW 20 MIN: CPT | Mod: S$PBB,,, | Performed by: NURSE PRACTITIONER

## 2018-11-07 PROCEDURE — 80053 COMPREHEN METABOLIC PANEL: CPT

## 2018-11-07 PROCEDURE — 99999 PR PBB SHADOW E&M-EST. PATIENT-LVL IV: CPT | Mod: PBBFAC,,, | Performed by: NURSE PRACTITIONER

## 2018-11-07 PROCEDURE — 36415 COLL VENOUS BLD VENIPUNCTURE: CPT

## 2018-11-07 RX ORDER — IPRATROPIUM BROMIDE 0.5 MG/2.5ML
0.5 SOLUTION RESPIRATORY (INHALATION)
Status: COMPLETED | OUTPATIENT
Start: 2018-11-07 | End: 2018-11-07

## 2018-11-07 RX ORDER — IPRATROPIUM BROMIDE 0.5 MG/2.5ML
500 SOLUTION RESPIRATORY (INHALATION) 3 TIMES DAILY PRN
Qty: 62.5 ML | Refills: 0 | Status: SHIPPED | OUTPATIENT
Start: 2018-11-07

## 2018-11-07 RX ADMIN — IPRATROPIUM BROMIDE 0.5 MG: 0.5 SOLUTION RESPIRATORY (INHALATION) at 06:11

## 2018-11-07 NOTE — PROGRESS NOTES
Subjective:       Patient ID: Isael Salmon is a 73 y.o. female.    Chief Complaint: URI (cough, nasal chest congestion)    Ms. Salmon presents today with a severe cough since Saturday. She has taken promethazine DM, coricidin, and started doxycycline yesterday. Last night she did not sleep at all due to the cough.       Review of Systems   Constitutional: Positive for chills, diaphoresis and fatigue.   HENT: Positive for postnasal drip. Negative for facial swelling, rhinorrhea, sinus pressure and sore throat.    Eyes: Negative for visual disturbance.   Respiratory: Positive for cough, choking and chest tightness. Negative for shortness of breath.    Cardiovascular: Negative for chest pain.   Gastrointestinal: Negative for diarrhea, nausea and vomiting.   Genitourinary: Negative for dysuria.   Musculoskeletal: Negative for gait problem.   Skin: Negative for rash.   Neurological: Positive for headaches.   Psychiatric/Behavioral: Negative for confusion.       Objective:      Physical Exam   Constitutional: She is oriented to person, place, and time. She appears well-developed and well-nourished. No distress.   HENT:   Head: Atraumatic.   Eyes: No scleral icterus.   Neck: Normal range of motion. Neck supple.   Cardiovascular: Normal rate, regular rhythm and normal heart sounds. Exam reveals no gallop and no friction rub.   No murmur heard.  Pulmonary/Chest: Effort normal. No stridor. No respiratory distress. She has wheezes. She has rhonchi. She has no rales. She exhibits tenderness.   Abdominal: Bowel sounds are normal.   Musculoskeletal: She exhibits no edema.   Lymphadenopathy:     She has no cervical adenopathy.   Neurological: She is alert and oriented to person, place, and time.   Skin: Skin is warm and dry. She is not diaphoretic.   Psychiatric: She has a normal mood and affect. Her behavior is normal.   Nursing note and vitals reviewed.      Assessment:       1. Bronchitis    2. Calcified granuloma of  lung        Plan:   1. Bronchitis  - X-Ray Chest PA And Lateral; Future  - albuterol-ipratropium 2.5 mg-0.5 mg/3 mL nebulizer solution 3 mL  - albuterol-ipratropium 2.5 mg-0.5 mg/3 mL nebulizer solution 3 mL  - albuterol (PROVENTIL/VENTOLIN HFA) 90 mcg/actuation inhaler; Inhale 2 puffs into the lungs every 4 (four) hours as needed for Wheezing (or coughing fit).  Dispense: 1 Inhaler; Refill: 0  - betamethasone acetate-betamethasone sodium phosphate injection 12 mg    2. Calcified granuloma of lung  - Stable chronic finding seen again on xray today.       Pt has been given instructions populated from [employer] database and has verbalized understanding of the after visit summary and information contained wherein.    Follow up with a primary care provider. May go to ER for acute shortness of breath, lightheadedness, fever, or any other emergent complaints or changes in condition.

## 2018-11-08 ENCOUNTER — TELEPHONE (OUTPATIENT)
Dept: INTERNAL MEDICINE | Facility: CLINIC | Age: 73
End: 2018-11-08

## 2018-11-08 NOTE — TELEPHONE ENCOUNTER
----- Message from Lee Espinal sent at 11/7/2018  4:39 PM CST -----  Contact: Amalai  [phone]  Pt  will like speak to the doctor or nurse in regards to pt.

## 2018-11-08 NOTE — PROGRESS NOTES
Subjective:       Patient ID: Isael Salmon is a 73 y.o. female.    Chief Complaint: URI    Ms. Salmon presents today for severe cough. I saw her yesterday and she says she does not feel worse, except when she is having a coughing fit. She has used the albuterol inhaler but it has not helped much. She is on doxycycline.       Review of Systems   Constitutional: Positive for appetite change, chills, diaphoresis and fatigue. Negative for fever.   HENT: Negative for facial swelling.    Eyes: Negative for visual disturbance.   Respiratory: Positive for cough and choking. Negative for shortness of breath.    Cardiovascular: Negative for chest pain and leg swelling.   Gastrointestinal: Negative for diarrhea, nausea and vomiting.        Gagging due to cough   Genitourinary: Negative for dysuria.   Musculoskeletal: Negative for gait problem.   Skin: Negative for rash.   Neurological: Positive for headaches.   Psychiatric/Behavioral: Negative for confusion.       Objective:      Physical Exam   Constitutional: She is oriented to person, place, and time. She appears well-developed and well-nourished. No distress.   Eyes: No scleral icterus.   Neck: Normal range of motion. Neck supple.   Cardiovascular: Normal rate, regular rhythm and normal heart sounds.   Pulmonary/Chest: Effort normal. No respiratory distress. She has wheezes. She has rhonchi.   Abdominal: Soft. Bowel sounds are normal. She exhibits no distension and no mass. There is no tenderness. There is no rebound and no guarding.   Musculoskeletal: She exhibits no edema.   Lymphadenopathy:     She has cervical adenopathy.   Neurological: She is alert and oriented to person, place, and time.   Skin: Skin is warm and dry. She is not diaphoretic.   Psychiatric: She has a normal mood and affect. Her behavior is normal.   Nursing note and vitals reviewed.      Assessment:       1. SOB (shortness of breath)    2. Bronchitis        Plan:   1. SOB (shortness of  breath)  - Brain natriuretic peptide; Future  - CBC auto differential; Future  - Comprehensive metabolic panel; Future    2. Bronchitis  - ipratropium 0.02 % nebulizer solution 0.5 mg  - NEBULIZER FOR HOME USE  - ipratropium (ATROVENT) 0.02 % nebulizer solution; Take 2.5 mLs (500 mcg total) by nebulization 3 (three) times daily as needed for Wheezing.  Dispense: 62.5 mL; Refill: 0  - NEBULIZER KIT (SUPPLIES) FOR HOME USE      Pt has been given instructions populated from [employer] database and has verbalized understanding of the after visit summary and information contained wherein.    Follow up with a primary care provider. May go to ER for acute shortness of breath, lightheadedness, fever, or any other emergent complaints or changes in condition.

## 2018-11-08 NOTE — TELEPHONE ENCOUNTER
Spoke with Ms. Salmon about results. Will follow up in AM re: improvement on abx.  Considering adding azithromycin

## 2018-11-09 ENCOUNTER — OFFICE VISIT (OUTPATIENT)
Dept: INTERNAL MEDICINE | Facility: CLINIC | Age: 73
End: 2018-11-09
Payer: MEDICARE

## 2018-11-09 VITALS
HEIGHT: 65 IN | BODY MASS INDEX: 29.69 KG/M2 | SYSTOLIC BLOOD PRESSURE: 120 MMHG | TEMPERATURE: 98 F | HEART RATE: 65 BPM | DIASTOLIC BLOOD PRESSURE: 70 MMHG | OXYGEN SATURATION: 95 % | WEIGHT: 178.19 LBS

## 2018-11-09 DIAGNOSIS — R06.2 WHEEZING: Primary | ICD-10-CM

## 2018-11-09 DIAGNOSIS — J44.1 COPD EXACERBATION: ICD-10-CM

## 2018-11-09 PROCEDURE — 99215 OFFICE O/P EST HI 40 MIN: CPT | Mod: PBBFAC | Performed by: INTERNAL MEDICINE

## 2018-11-09 PROCEDURE — 94640 AIRWAY INHALATION TREATMENT: CPT | Mod: PBBFAC

## 2018-11-09 PROCEDURE — 99214 OFFICE O/P EST MOD 30 MIN: CPT | Mod: S$PBB,,, | Performed by: INTERNAL MEDICINE

## 2018-11-09 PROCEDURE — 99999 PR PBB SHADOW E&M-EST. PATIENT-LVL V: CPT | Mod: PBBFAC,,, | Performed by: INTERNAL MEDICINE

## 2018-11-09 RX ORDER — PREDNISONE 10 MG/1
TABLET ORAL
Qty: 18 TABLET | Refills: 0 | Status: SHIPPED | OUTPATIENT
Start: 2018-11-09 | End: 2019-01-10

## 2018-11-09 RX ORDER — ALBUTEROL SULFATE 0.83 MG/ML
2.5 SOLUTION RESPIRATORY (INHALATION)
Status: COMPLETED | OUTPATIENT
Start: 2018-11-09 | End: 2018-11-09

## 2018-11-09 RX ADMIN — ALBUTEROL SULFATE 2.5 MG: 2.5 SOLUTION RESPIRATORY (INHALATION) at 11:11

## 2018-11-09 NOTE — PROGRESS NOTES
CHIEF COMPLAINT:Sinus congestion, cough and wheezing     HISTORY OF PRESENT ILLNESS: 73-year-old woman presents due to a week history of sinus congestion, cough and wheezing. She has been taking doxycycline 100 mg twice daily. She has seen Sasha Ying NP twice in urgent care. She has had unremarkable cxr and labs. She has been doing albuterol three times daily with some relif. No fever, chills, chest pain, nausea, vomiting, diarrhea. She has a cough with some mild mucous production. .      She is now taking Crestor 20 mg daily with co enzyme 10 200 mg daily for her hyperlipidemia. Rare muscle spasm         She is taking her iron supplement two times daily.      She had a normal cystoscope 12/17/14 due to recurrent UTI. No dysuria or hematuria now. She is using estrogen vaginal cream for vaginal atrophy three times weekly        Reflux is controlled on omeprazole 20 mg daily. No nausea, vomiting, constipation, diarrhea, emilio, bloody stools. She continues to take losartan 50 mg twice daily and spironolactone 25 mg 1 tablet daily for hypertension.      She continues to take lexapro 10 mg 1/2 tablet daily which controlls her mood with her stressors.        .She continues allopurinol 200 mg daily. No gouty flares      PAST MEDICAL HISTORY:   1. Hypertension.   2. Hyperlipidemia.   3. Gout.   4. History of rashes.   5. TIA in 1997  6. Fibrocystic breast disease.   7. Postmenopausal.   8. Osteoarthritis.   9. History of tobacco usage; quit in 1997.   10. History of colon polyps; normal colonoscopy in 10/07 and normal 4/2010, due 2015   11. Normal bone mineral density scan in 6/06.   12. Anxiety and depression - controlled on lorazepam very rarely.   13. Varicose veins.   14. Intermittent GERD.   15. Iron deficiency anemia 4/2010 - due to erosive gastropathy on EGD 5/2010     PAST SURGICAL HISTORY:   1. Right carotid endarterectomy in 1997.   2. Breast reduction surgery.   3. Total abdominal hysterectomy.   4. Blood  "transfusion prior to .     SOCIAL HISTORY: Quit smoking in ; smoked 1 PPD since age 16. No   alcohol use.     FAMILY HISTORY:   Father had gout, hypertension and heart failure; is . Mother had hypertension and pancreatic cancer; is also . Has five brothers, one of whom  in a motor vehicle accident. All have gout and hypertension. One brother has Crohn's disease; another has bladder cancer.     MEDICATIONS and ALLERGIES: Updated on epic.     PHYSICAL EXAMINATION:      /70 (BP Location: Right arm, Patient Position: Sitting, BP Method: Medium (Manual))   Pulse 65   Temp 98.2 °F (36.8 °C)   Ht 5' 5" (1.651 m)   Wt 80.8 kg (178 lb 3.2 oz)   LMP  (LMP Unknown)   SpO2 95%   BMI 29.65 kg/m²   General: Alert, oriented. No apparent distress. Affect within normal   limits.   Conjunctivae anicteric. Tympanic membranes clear fluid. Oropharynx clear.   Neck supple.   Respiratory effort normal. Lungs expiratiory wheezing bilaterally  Heart: Regular rate and rhythm without murmurs, gallops or rubs.   No lower extremity edema.        ALbuterol neb given in office and wheezing improved.      ASSESSMENT AND PLAN:   1. COPD exacerbation - prednisone taper. Do albuterol 3 times daily at home.  Continue doxycycline  4. Hyperlipidemia - On crestor 20 mg daily and Co Enzyme Q 10 200 mg daily.    5. Erosive gastropathy - on omeprazole 20 mg daily. Off NSAIDS. EGD  6. Hypertension - Continue losartan 25 mg one tablet daily and spironolactone to 25 mg daily.    7. Hypercalcemia - stable  6. Gout - asymptomatic on allopurinol.   7. Situational stress - continue lexapro   8. Anemia - stable  9. Carotid artery disease - on risk factor modification. Check ultrasound  Screening - Colonscopy 10/15 with 4 polyps - it was incomplete. Repeat 16 - divericulosis otherwise normal - due . MMG 3/18, BMD .  I will see her as scheduled, sooner if problems arise   "

## 2018-11-12 ENCOUNTER — OFFICE VISIT (OUTPATIENT)
Dept: INTERNAL MEDICINE | Facility: CLINIC | Age: 73
End: 2018-11-12
Payer: MEDICARE

## 2018-11-12 VITALS
WEIGHT: 178.38 LBS | TEMPERATURE: 99 F | DIASTOLIC BLOOD PRESSURE: 60 MMHG | BODY MASS INDEX: 29.72 KG/M2 | HEIGHT: 65 IN | HEART RATE: 85 BPM | SYSTOLIC BLOOD PRESSURE: 132 MMHG | OXYGEN SATURATION: 96 %

## 2018-11-12 DIAGNOSIS — J44.1 COPD EXACERBATION: ICD-10-CM

## 2018-11-12 DIAGNOSIS — I10 ESSENTIAL HYPERTENSION: ICD-10-CM

## 2018-11-12 DIAGNOSIS — E78.5 HYPERLIPIDEMIA, UNSPECIFIED HYPERLIPIDEMIA TYPE: Primary | ICD-10-CM

## 2018-11-12 PROCEDURE — 99214 OFFICE O/P EST MOD 30 MIN: CPT | Mod: 25,S$PBB,, | Performed by: INTERNAL MEDICINE

## 2018-11-12 PROCEDURE — 99999 PR PBB SHADOW E&M-EST. PATIENT-LVL III: CPT | Mod: PBBFAC,,, | Performed by: INTERNAL MEDICINE

## 2018-11-12 PROCEDURE — 99213 OFFICE O/P EST LOW 20 MIN: CPT | Mod: PBBFAC | Performed by: INTERNAL MEDICINE

## 2018-11-12 RX ORDER — PROMETHAZINE HYDROCHLORIDE AND DEXTROMETHORPHAN HYDROBROMIDE 6.25; 15 MG/5ML; MG/5ML
10 SYRUP ORAL EVERY 8 HOURS PRN
Qty: 240 ML | Refills: 0 | Status: SHIPPED | OUTPATIENT
Start: 2018-11-12 | End: 2018-11-16

## 2018-11-12 RX ORDER — LEVOFLOXACIN 500 MG/1
500 TABLET, FILM COATED ORAL DAILY
Qty: 7 TABLET | Refills: 0 | Status: SHIPPED | OUTPATIENT
Start: 2018-11-12 | End: 2019-01-10

## 2018-11-12 NOTE — PROGRESS NOTES
CHIEF COMPLAINT: Continued cough     HISTORY OF PRESENT ILLNESS: 73-year-old woman presents due to continued cough. She continues to have a cough and wheezing despite prednisone 30 mg daily for the last 3 days and doxycycline 100 mg twice daily. She has some mild shortness of breath and yellow mucous production.   Cough is keeping her awake at night and she has not been able to sleep. No fever, chills, chest pain, nausea, vomiting, diarrhea. .      She is now taking Crestor 20 mg daily with co enzyme 10 200 mg daily for her hyperlipidemia. Rare muscle spasm         She is taking her iron supplement two times daily.      She had a normal cystoscope 12/17/14 due to recurrent UTI. No dysuria or hematuria now. She is using estrogen vaginal cream for vaginal atrophy three times weekly        Reflux is controlled on omeprazole 20 mg daily. No nausea, vomiting, constipation, diarrhea, emilio, bloody stools. She continues to take losartan 50 mg twice daily and spironolactone 25 mg 1 tablet daily for hypertension.      She continues to take lexapro 10 mg 1/2 tablet daily which controlls her mood with her stressors.        .She continues allopurinol 200 mg daily. No gouty flares      PAST MEDICAL HISTORY:   1. Hypertension.   2. Hyperlipidemia.   3. Gout.   4. History of rashes.   5. TIA in 1997  6. Fibrocystic breast disease.   7. Postmenopausal.   8. Osteoarthritis.   9. History of tobacco usage; quit in 1997.   10. History of colon polyps; normal colonoscopy in 10/07 and normal 4/2010, due 2015   11. Normal bone mineral density scan in 6/06.   12. Anxiety and depression - controlled on lorazepam very rarely.   13. Varicose veins.   14. Intermittent GERD.   15. Iron deficiency anemia 4/2010 - due to erosive gastropathy on EGD 5/2010     PAST SURGICAL HISTORY:   1. Right carotid endarterectomy in 1997.   2. Breast reduction surgery.   3. Total abdominal hysterectomy.   4. Blood transfusion prior to 1983.     SOCIAL HISTORY:  "Quit smoking in ; smoked 1 PPD since age 16. No   alcohol use.     FAMILY HISTORY:   Father had gout, hypertension and heart failure; is . Mother had hypertension and pancreatic cancer; is also . Has five brothers, one of whom  in a motor vehicle accident. All have gout and hypertension. One brother has Crohn's disease; another has bladder cancer.     MEDICATIONS and ALLERGIES: Updated on epic.     PHYSICAL EXAMINATION:      /60 (BP Location: Left arm, Patient Position: Sitting, BP Method: Medium (Manual))   Pulse 85   Temp 99.2 °F (37.3 °C) (Oral)   Ht 5' 5" (1.651 m)   Wt 80.9 kg (178 lb 5.6 oz)   LMP  (LMP Unknown)   SpO2 96%   BMI 29.68 kg/m²     General: Alert, oriented. No apparent distress. Affect within normal   limits.   Conjunctivae anicteric. Tympanic membranes clear fluid. Oropharynx clear.   Neck supple.   Respiratory effort normal. Lungs expiratiory wheezing bilaterally  Heart: Regular rate and rhythm without murmurs, gallops or rubs.   No lower extremity edema.          Two ALbuterol neb given in office and wheezing improved.      ASSESSMENT AND PLAN:   1. COPD exacerbation - Continue prednisone taper. Change doxycyline to levaquin 500 mg daily for 7 days. Promethazine DM cough syrup. Continue albuterol nebs at home.  4. Hyperlipidemia - On crestor 20 mg daily and Co Enzyme Q 10 200 mg daily.    5. Erosive gastropathy - on omeprazole 20 mg daily. Off NSAIDS. EGD  6. Hypertension - Continue losartan 25 mg one tablet daily and spironolactone to 25 mg daily.    7. Hypercalcemia - stable  6. Gout - asymptomatic on allopurinol.   7. Situational stress - continue lexapro   8. Anemia - stable  9. Carotid artery disease - on risk factor modification. Check ultrasound  Screening - Colonscopy 10/15 with 4 polyps - it was incomplete. Repeat 16 - divericulosis otherwise normal - due . MMG 3/18, BMD .  I will see her as scheduled, sooner if problems arise   "

## 2018-11-16 ENCOUNTER — TELEPHONE (OUTPATIENT)
Dept: INTERNAL MEDICINE | Facility: CLINIC | Age: 73
End: 2018-11-16

## 2018-11-16 ENCOUNTER — HOSPITAL ENCOUNTER (EMERGENCY)
Facility: HOSPITAL | Age: 73
Discharge: HOME | End: 2018-11-16
Attending: EMERGENCY MEDICINE
Payer: MEDICARE

## 2018-11-16 VITALS
OXYGEN SATURATION: 97 % | TEMPERATURE: 99 F | DIASTOLIC BLOOD PRESSURE: 69 MMHG | SYSTOLIC BLOOD PRESSURE: 139 MMHG | HEART RATE: 92 BPM | BODY MASS INDEX: 29.49 KG/M2 | RESPIRATION RATE: 18 BRPM | WEIGHT: 177 LBS | HEIGHT: 65 IN

## 2018-11-16 DIAGNOSIS — J06.9 URI WITH COUGH AND CONGESTION: ICD-10-CM

## 2018-11-16 DIAGNOSIS — R05.9 COUGH: ICD-10-CM

## 2018-11-16 DIAGNOSIS — J20.9 ACUTE BRONCHITIS, UNSPECIFIED ORGANISM: Primary | ICD-10-CM

## 2018-11-16 LAB
CTP QC/QA: YES
POC MOLECULAR INFLUENZA A AGN: NEGATIVE
POC MOLECULAR INFLUENZA B AGN: NEGATIVE

## 2018-11-16 PROCEDURE — 99283 EMERGENCY DEPT VISIT LOW MDM: CPT

## 2018-11-16 PROCEDURE — 99284 EMERGENCY DEPT VISIT MOD MDM: CPT | Mod: ,,, | Performed by: PHYSICIAN ASSISTANT

## 2018-11-16 PROCEDURE — 25000003 PHARM REV CODE 250: Performed by: PHYSICIAN ASSISTANT

## 2018-11-16 RX ORDER — BENZONATATE 200 MG/1
200 CAPSULE ORAL 3 TIMES DAILY PRN
Qty: 20 CAPSULE | Refills: 0 | Status: SHIPPED | OUTPATIENT
Start: 2018-11-16 | End: 2018-11-26

## 2018-11-16 RX ORDER — BENZONATATE 100 MG/1
200 CAPSULE ORAL
Status: COMPLETED | OUTPATIENT
Start: 2018-11-16 | End: 2018-11-16

## 2018-11-16 RX ADMIN — BENZONATATE 200 MG: 100 CAPSULE ORAL at 03:11

## 2018-11-16 NOTE — ED PROVIDER NOTES
Encounter Date: 11/16/2018       History     Chief Complaint   Patient presents with    Cough     x3 weeks, told to come to ED by PCP for possible bronchitis     The patient presents to the ER for an emergent evaluation due to persistent coughing. She reports having associated symptoms of nasal sinus pressure, and upper respiratory congestion. She states that she has been coughing up thick light green colored sputum or phlegm. She states that she has had this illness for the past 3 weeks. She states that the degree is moderate. She states that the course is constant. She has been to her PCP 4 times for this. She has had a negative chest x ray. She has completed Doxycycline and steroids. She is on her last day of Levaquin and prednisone. She has been using inhalers. She has been prescribed Phenergan DM syrup. She has tried taking Mucinex and Claritin. She states that despite all of the medications she is not well. She states that the inhaler has made her wheezes stop, but she that the steroids are interfering with her sleep and she is awake all night each night coughing. She states that she called her PCP again this morning and was advised to go to the ER. She states that she wants to have another chest x ray to make sure that she has not developed a pneumonia. She also wants to be tested for flu. She denies any additional symptoms or concerns. She denies any known sick contacts. She denies any fever, chills, chest pain or SOB.            Review of patient's allergies indicates:   Allergen Reactions    Latex      Other reaction(s): Rash    Pcn [penicillins]      Other reaction(s): rash    Sulfa (sulfonamide antibiotics)      Other reaction(s): blisters    Avelox [moxifloxacin]      Other reaction(s): racing heart  Other reaction(s): shakes    Ciprofloxacin Other (See Comments)     Room starts to spin , nausea and dizziness    Codeine      Other reaction(s): nausea  Other reaction(s): weakness    Norco  [hydrocodone-acetaminophen] Nausea And Vomiting and Other (See Comments)     Past Medical History:   Diagnosis Date    Adjustment disorder 7/26/2012    Anemia 7/26/2012    AR (allergic rhinitis) 9/5/2014    Bronchitis     Fibrocystic breast disease in female 7/26/2012    GERD (gastroesophageal reflux disease) 7/26/2012    Gout     Gout, arthritis 7/26/2012    History of blood transfusion 7/26/2012    History of colonic polyps 7/26/2012    Hypercalcemia 9/20/2012    Hyperlipidemia 7/26/2012    Hypertension 7/26/2012    Nuclear sclerosis 5/2/2014    Osteoarthritis 7/26/2012    Other hyperparathyroidism 9/20/2012    Postmenopausal status 7/26/2012    PVD (peripheral vascular disease) 7/26/2012    left leg laser of vein with injection    Stroke 1997    TIA    Superficial vein thrombosis     Transient ischemic attack 7/26/2012    Varicose veins 7/26/2012     Past Surgical History:   Procedure Laterality Date    BREAST SURGERY      breast reduction    broken second finger Right 12/2015    pins placed    carotid endarterectomy  1997    right    CAROTID ENDARTERECTOMY Right 1997    CATARACT EXTRACTION W/  INTRAOCULAR LENS IMPLANT Right 05/01/2018    Dr. Yost    CATARACT EXTRACTION W/  INTRAOCULAR LENS IMPLANT Left 05/15/2018    Dr. Lester    COLONOSCOPY N/A 10/26/2015    Procedure: COLONOSCOPY;  Surgeon: Manuel Alanis MD;  Location: [facility] ([facility]  Service: Endoscopy;  Laterality: N/A;    COLONOSCOPY N/A 10/26/2015    Performed by [facility] at [facility]    COLPORRHAPHY, ANTERIOR N/A 1/24/2014    Per[facility]kins [facility] at [facility] OR    COLPORRHAPHY, POSTERIOR N/A 1/24/2014    Per[facility] by [facility] at [facility] OR    CYSTOSCOPY N/A 1/24/2014    Per[facility] by [facility] at [facility] OR    EGD and colonoscopy using a DBE scope N/A 5/26/2016    [facility] at [facility] END[facility]    ESOPHAGOGASTRODUODENOSCOPY (EGD) N/A  1/6/2017    Performed by Manuel [facility]is [facility] at [facility] ENDO (2ND FLR)    HYSTERECTOMY      INJECTION-JOINT Right 5/12/2017    Performed by Calvin Saleh MD at [facility] PAIN MGT    INSERTION-INTRAOCULAR LENS (IOL) Left 5/15/2018    Performed by Jonathan Lester MD at [facility] OR 1ST FLR    INSERTION-INTRAOCULAR LENS (IOL) Right 5/1/2018    Performed by Jonathan [facility]ester MD at [facility] OR 1ST FLR    ORIF FINGER FRACTURE  12/18/15    PERINEOPLASTY N/A 1/24/2014    Performed by Arnaldo Adkins MD at [facility] OR    PHACOEMULSIFICATION-ASPIRATION-CATARACT Left 5/15/2018    Performed by Jonathan Lester MD at [facility] OR 1ST FLR    PHACOEMULSIFICATION-ASPIRATION-CATARACT Right 5/1/2018    Performed by Jonathan [facility]ester MD at [facility] OR 1ST FLR    REDUCTION WITH PINNING-CLOSED-FINGER right middle distal (ADD ON) Right 12/18/2015    Performed by Anastacio Geller Jr., MD at [facility] OR    REMOVAL-HARDWARE-HAND right middle finger Right 2/11/2016    Performed by Anastacio Geller Jr., MD at [facility] OR    TIA      VASCULAR SURGERY      left leg vein laser     Family History   Problem Relation Age of Onset    Heart failure Father     Cancer Mother         pancreas    Cataracts Mother     Hypertension Mother     Cancer Brother         bladder    Cataracts Brother     Hypertension Brother     Cataracts Brother     Hypertension Brother     Cataracts Brother     Hypertension Brother     Bone cancer Brother     Cataracts Brother     Hypertension Brother     Breast cancer Neg Hx     Ovarian cancer Neg Hx     Allergies Neg Hx     Asthma Neg Hx     Eczema Neg Hx     Cervical cancer Neg Hx     Endometrial cancer Neg Hx     Vaginal cancer Neg Hx     Amblyopia Neg Hx     Blindness Neg Hx     Diabetes Neg Hx     Glaucoma Neg Hx     Macular degeneration Neg Hx     Retinal detachment Neg Hx     Strabismus Neg Hx     Stroke Neg Hx     Thyroid disease Neg Hx      Social History     Tobacco Use     Smoking status: Former Smoker     Last attempt to quit: 1997     Years since quittin.9    Smokeless tobacco: Never Used    Tobacco comment: quit  - 1 pack per day since age 16   Substance Use Topics    Alcohol use: Yes     Alcohol/week: 0.6 oz     Types: 1 Glasses of wine per week     Comment: ocassional    Drug use: No     Review of Systems   Constitutional: Negative for activity change, appetite change, chills, diaphoresis, fatigue and fever.   HENT: Positive for congestion and rhinorrhea. Negative for ear pain, facial swelling, sinus pain, sore throat, tinnitus, trouble swallowing and voice change.    Eyes: Negative for discharge and redness.   Respiratory: Positive for cough and wheezing. Negative for apnea, choking, chest tightness, shortness of breath and stridor.    Cardiovascular: Negative for chest pain, palpitations and leg swelling.   Gastrointestinal: Negative for abdominal distention, abdominal pain, blood in stool, constipation, diarrhea, nausea and vomiting.   Genitourinary: Negative for decreased urine volume, dysuria, flank pain and frequency.   Musculoskeletal: Negative for arthralgias, back pain, gait problem, myalgias, neck pain and neck stiffness.   Skin: Negative for color change and rash.   Neurological: Negative for dizziness, seizures, syncope, facial asymmetry, speech difficulty, weakness, light-headedness, numbness and headaches.   Psychiatric/Behavioral: Negative for confusion.       Physical Exam     Initial Vitals [18 1453]   BP Pulse Resp Temp SpO2   (!) 161/70 99 18 97.4 °F (36.3 °C) 95 %      MAP       --         Physical Exam    Nursing note and vitals reviewed.  Constitutional: She appears well-developed and well-nourished. She is not diaphoretic.   She is alert and ambulatory. Non-toxic appearance. She is accompanied by a family member.    HENT:   Swollen nasal mucosa. Rhinorrhea. Clear oropharynx. Bilateral TM bulging w/o erythema. Very mild hoarseness  noted.    Eyes: Conjunctivae are normal. Pupils are equal, round, and reactive to light. Right eye exhibits no discharge. Left eye exhibits no discharge. No scleral icterus.   Neck: Normal range of motion. Neck supple.   Non-tender. No nuchal rigidity.    Cardiovascular: Normal rate and intact distal pulses.   Pulmonary/Chest: No respiratory distress. She has no wheezes. She has no rhonchi. She has no rales.   Frequent cough. Bilateral coarse breath sounds. No wheezes. No tachypnea. No hypoxia. Speaks in complete sentences.    Abdominal: Soft. There is no tenderness. There is no rebound and no guarding.   Benign abdomen.    Musculoskeletal: Normal range of motion. She exhibits no edema or tenderness.   No calf pain. No lower leg edema.    Lymphadenopathy:     She has no cervical adenopathy.   Neurological: She is alert and oriented to person, place, and time. She has normal strength. No sensory deficit.   AAO x 3. Normal speech. Normal gait. No focal deficits.    Skin: Skin is warm and dry. No rash noted.   Psychiatric: She has a normal mood and affect.         ED Course   Procedures  Labs Reviewed   POCT INFLUENZA A/B MOLECULAR     Results for orders placed or performed during the hospital encounter of 11/16/18   POCT Influenza A/B Molecular   Result Value Ref Range    POC Molecular Influenza A Ag Negative Negative, Not Reported    POC Molecular Influenza B Ag Negative Negative, Not Reported     Acceptable Yes             Imaging Results          X-Ray Chest PA And Lateral (Final result)  Result time 11/16/18 16:23:07    Final result by Jarrett Blancas MD (11/16/18 16:23:07)                 Impression:      1. No acute cardiopulmonary process.      Electronically signed by: Jarrett Blancas MD  Date:    11/16/2018  Time:    16:23             Narrative:    EXAMINATION:  XR CHEST PA AND LATERAL    CLINICAL HISTORY:  Cough    TECHNIQUE:  PA and lateral views of the chest were  performed.    COMPARISON:  11/06/2018    FINDINGS:  The cardiomediastinal silhouette is prominent, similar to the previous exam.  There is no pleural effusion.  The trachea is midline.  The lungs are symmetrically expanded bilaterally without evidence of acute parenchymal process. No large focal consolidation seen.  There is no pneumothorax.  There is a right lower lobe calcified granuloma.  The osseous structures are remarkable for degenerative changes..                                 Medical Decision Making:   History:   Old Medical Records: I decided to obtain old medical records.  Initial Assessment:   Persistent cough, congestion, and generalized malaise x 3 weeks despite antibiotic, inhaler, steroids, and cough medications.   Differential Diagnosis:   Influenza, Viral syndrome, Bronchitis, Pleural effusion, CHF, URI, Pneumonia, Etc   Clinical Tests:   Lab Tests: Ordered and Reviewed  Radiological Study: Ordered and Reviewed  ED Management:  Chest x ray unremarkable   Influenza negative   Medications and records reviewed - no ACE inhibitor and recent CHF testing negative   I discussed the case in detail with the ER attending physician, Dr Mann, who requests that the patient be given a Rx for cough medication, instructed to continue Levaquin and Prednisone as prescribed, and advised to follow up with her PCP closely. Pt states that she did get substantial relief of her cough with Tessalon she received in the ER and is requesting a Rx to take at home - provided.   Pt advised to follow up with her PCP in the next 2-3 days for re-evaluation and further management.   Pt advised to return to the ER promptly if worse in any way.   Pt verbalized understanding pf test results, diagnosis, discharge and follow up instructions and is comfortable going home.   Other:   I have discussed this case with another health care provider.                      Clinical Impression:   The primary encounter diagnosis was Acute  bronchitis, unspecified organism. Diagnoses of Cough and URI with cough and congestion were also pertinent to this visit.      Disposition:   Disposition: Discharged  Condition: Stable                        Dakota Farnsworth PA-C  11/16/18 [id number]

## 2018-11-16 NOTE — TELEPHONE ENCOUNTER
Pt is worse that she was earlier this week and 2 weeks ago.  would like something different done for his wife today. Please advise.

## 2018-11-16 NOTE — ED NOTES
"The patient was told to come to the ER by her pcp. States she has "been sick" for 3 weeks with a terrible cough. States she coughs to the point where she cant catch her breath. Having trouble "bringing the phlegm up". Pt is on an abx. Last dose tomorrow. Has been on cough medicine and Z david. Pt had chest xr and was "sent home on a breathing machine". Pt states she cannot taste or smell right now.   "

## 2019-01-07 ENCOUNTER — TELEPHONE (OUTPATIENT)
Dept: INTERNAL MEDICINE | Facility: CLINIC | Age: 74
End: 2019-01-07

## 2019-01-07 NOTE — TELEPHONE ENCOUNTER
Pt has nasal congestion. She states that she is blowing her nose constantly without relief. Pt has been taking chloraseaton   Hct over the counter with no relief. Pt would like a Rx called in for this. Pt has no other symptoms except she feels bad. Please advise.

## 2019-01-09 DIAGNOSIS — Z78.0 POSTMENOPAUSAL: Primary | ICD-10-CM

## 2019-01-10 ENCOUNTER — OFFICE VISIT (OUTPATIENT)
Dept: INTERNAL MEDICINE | Facility: CLINIC | Age: 74
End: 2019-01-10
Payer: MEDICARE

## 2019-01-10 VITALS
OXYGEN SATURATION: 97 % | HEART RATE: 90 BPM | HEIGHT: 65 IN | DIASTOLIC BLOOD PRESSURE: 60 MMHG | BODY MASS INDEX: 29.72 KG/M2 | WEIGHT: 178.38 LBS | SYSTOLIC BLOOD PRESSURE: 114 MMHG

## 2019-01-10 DIAGNOSIS — M10.9 GOUT, ARTHRITIS: ICD-10-CM

## 2019-01-10 DIAGNOSIS — E78.5 HYPERLIPIDEMIA, UNSPECIFIED HYPERLIPIDEMIA TYPE: ICD-10-CM

## 2019-01-10 DIAGNOSIS — K21.9 GASTROESOPHAGEAL REFLUX DISEASE WITHOUT ESOPHAGITIS: ICD-10-CM

## 2019-01-10 DIAGNOSIS — M1A.09X0 IDIOPATHIC CHRONIC GOUT OF MULTIPLE SITES WITHOUT TOPHUS: ICD-10-CM

## 2019-01-10 DIAGNOSIS — R07.81 RIB PAIN ON RIGHT SIDE: ICD-10-CM

## 2019-01-10 DIAGNOSIS — I10 ESSENTIAL HYPERTENSION: ICD-10-CM

## 2019-01-10 DIAGNOSIS — J30.1 ALLERGIC RHINITIS DUE TO POLLEN, UNSPECIFIED SEASONALITY: Primary | ICD-10-CM

## 2019-01-10 PROCEDURE — 99214 PR OFFICE/OUTPT VISIT, EST, LEVL IV, 30-39 MIN: ICD-10-PCS | Mod: S$PBB,,, | Performed by: INTERNAL MEDICINE

## 2019-01-10 PROCEDURE — 99214 OFFICE O/P EST MOD 30 MIN: CPT | Mod: S$PBB,,, | Performed by: INTERNAL MEDICINE

## 2019-01-10 PROCEDURE — 99213 OFFICE O/P EST LOW 20 MIN: CPT | Mod: PBBFAC | Performed by: INTERNAL MEDICINE

## 2019-01-10 PROCEDURE — 99999 PR PBB SHADOW E&M-EST. PATIENT-LVL III: CPT | Mod: PBBFAC,,, | Performed by: INTERNAL MEDICINE

## 2019-01-10 PROCEDURE — 99999 PR PBB SHADOW E&M-EST. PATIENT-LVL III: ICD-10-PCS | Mod: PBBFAC,,, | Performed by: INTERNAL MEDICINE

## 2019-01-10 RX ORDER — ALLOPURINOL 100 MG/1
TABLET ORAL
Qty: 60 TABLET | Refills: 0 | OUTPATIENT
Start: 2019-01-10

## 2019-01-10 RX ORDER — TIZANIDINE 4 MG/1
TABLET ORAL
Qty: 30 TABLET | Refills: 1 | Status: SHIPPED | OUTPATIENT
Start: 2019-01-10 | End: 2019-12-09

## 2019-01-10 NOTE — PROGRESS NOTES
CHIEF COMPLAINT: sinus congestion     HISTORY OF PRESENT ILLNESS: 73-year-old woman presents due to sinus congestion for the last 2 weeks. She has been taking mucinex twice daily and claritin 10 mg daily which has helped but she is still congested.  She has white drainage. No ear pain, sore throat, facial pain, headaches, cough, wheezing, shortness of breath, chest pain, nauseas, vomiting, constipation, diarrhea.      She is now taking Crestor 20 mg daily with co enzyme 10 200 mg daily for her hyperlipidemia. Rare muscle spasm         She is not taking her iron supplement currently      She had a normal cystoscope 12/17/14 due to recurrent UTI. No dysuria or hematuria now. She is using estrogen vaginal cream for vaginal atrophy three times weekly        Reflux is controlled on omeprazole 20 mg daily. No nausea, vomiting, constipation, diarrhea, emilio, bloody stools. She continues to take losartan 50 mg twice daily and spironolactone 25 mg 1 tablet daily for hypertension.      She continues to take lexapro 10 mg 1/2 tablet daily which controlls her mood with her stressors.        She continues allopurinol 200 mg daily. No gouty flares      She is taking vitamin D 50,000 units twice weekly    She has had riight sided back pain in the mid back for the last 5 days. Pain is moderate. Advil 2 tablets helped.     PAST MEDICAL HISTORY:   1. Hypertension.   2. Hyperlipidemia.   3. Gout.   4. History of rashes.   5. TIA in 1997  6. Fibrocystic breast disease.   7. Postmenopausal.   8. Osteoarthritis.   9. History of tobacco usage; quit in 1997.   10. History of colon polyps; normal colonoscopy in 10/07 and normal 4/2010, due 2015   11. Normal bone mineral density scan in 6/06.   12. Anxiety and depression - controlled on lorazepam very rarely.   13. Varicose veins.   14. Intermittent GERD.   15. Iron deficiency anemia 4/2010 - due to erosive gastropathy on EGD 5/2010     PAST SURGICAL HISTORY:   1. Right carotid  "endarterectomy in .   2. Breast reduction surgery.   3. Total abdominal hysterectomy.   4. Blood transfusion prior to .     SOCIAL HISTORY: Quit smoking in ; smoked 1 PPD since age 16. No   alcohol use.     FAMILY HISTORY:   Father had gout, hypertension and heart failure; is . Mother had hypertension and pancreatic cancer; is also . Has five brothers, one of whom  in a motor vehicle accident. All have gout and hypertension. One brother has Crohn's disease; another has bladder cancer.     MEDICATIONS and ALLERGIES: Updated on epic.     PHYSICAL EXAMINATION:      /60 (BP Location: Right arm, Patient Position: Sitting, BP Method: Medium (Manual))   Pulse 90   Ht 5' 5" (1.651 m)   Wt 80.9 kg (178 lb 5.6 oz)   LMP  (LMP Unknown)   SpO2 97%   BMI 29.68 kg/m²   General: Alert, oriented. No apparent distress. Affect within normal   limits.   Conjunctivae anicteric. Tympanic membranes clear fluid. Oropharynx clear.   Neck supple.   Respiratory effort normal. Lungs expiratiory wheezing bilaterally  Heart: Regular rate and rhythm without murmurs, gallops or rubs.   No lower extremity edema.    ABDOMEN: soft, non distended, non tender, bowel sounds present, no hepatosplenomgaly  BREAST: no abn seen, no nodules palpated, no axillary lad  Tender over the right lower ribs in the right mid back.      ASSESSMENT AND PLAN:   1. Allergic rhinitis - continue mucinex twice daily and loratadine.  4. Hyperlipidemia - On crestor 20 mg daily and Co Enzyme Q 10 200 mg daily.    5. Erosive gastropathy - on omeprazole 20 mg daily. Off NSAIDS. EGD  6. Hypertension - Continue losartan 25 mg one tablet daily and spironolactone to 25 mg daily.    7. Hypercalcemia - stable  6. Gout - asymptomatic on allopurinol.   7. Situational stress - continue lexapro   8. Anemia - stable  9. Carotid artery disease - on risk factor modification. Check ultrasound  10. Right sided back pain - could be rib pain, advil 2 " tablets twice daily and tizanidine.   Screening - Colonscopy 10/15 with 4 polyps - it was incomplete. Repeat 5/26/16 - divericulosis otherwise normal - due 2021. MMG 3/18, BMD 7/11.  I will see her as scheduled, sooner if problems arise

## 2019-01-24 ENCOUNTER — OFFICE VISIT (OUTPATIENT)
Dept: ORTHOPEDICS | Facility: CLINIC | Age: 74
End: 2019-01-24
Payer: MEDICARE

## 2019-01-24 VITALS — BODY MASS INDEX: 30.16 KG/M2 | WEIGHT: 181 LBS | HEIGHT: 65 IN

## 2019-01-24 DIAGNOSIS — M17.0 PRIMARY OSTEOARTHRITIS OF BOTH KNEES: Primary | ICD-10-CM

## 2019-01-24 PROCEDURE — 99214 OFFICE O/P EST MOD 30 MIN: CPT | Mod: PBBFAC,25 | Performed by: PHYSICIAN ASSISTANT

## 2019-01-24 PROCEDURE — 99213 OFFICE O/P EST LOW 20 MIN: CPT | Mod: 25,S$PBB,, | Performed by: PHYSICIAN ASSISTANT

## 2019-01-24 PROCEDURE — 20610 DRAIN/INJ JOINT/BURSA W/O US: CPT | Mod: 50,S$PBB,, | Performed by: PHYSICIAN ASSISTANT

## 2019-01-24 PROCEDURE — 99999 PR PBB SHADOW E&M-EST. PATIENT-LVL IV: CPT | Mod: PBBFAC,,, | Performed by: PHYSICIAN ASSISTANT

## 2019-01-24 PROCEDURE — 20610 DRAIN/INJ JOINT/BURSA W/O US: CPT | Mod: 50,PBBFAC | Performed by: PHYSICIAN ASSISTANT

## 2019-01-24 PROCEDURE — 99213 PR OFFICE/OUTPT VISIT, EST, LEVL III, 20-29 MIN: ICD-10-PCS | Mod: 25,S$PBB,, | Performed by: PHYSICIAN ASSISTANT

## 2019-01-24 PROCEDURE — 20610 PR DRAIN/INJECT LARGE JOINT/BURSA: ICD-10-PCS | Mod: 50,S$PBB,, | Performed by: PHYSICIAN ASSISTANT

## 2019-01-24 PROCEDURE — 99999 PR PBB SHADOW E&M-EST. PATIENT-LVL IV: ICD-10-PCS | Mod: PBBFAC,,, | Performed by: PHYSICIAN ASSISTANT

## 2019-01-24 RX ORDER — METHYLPREDNISOLONE ACETATE 80 MG/ML
80 INJECTION, SUSPENSION INTRA-ARTICULAR; INTRALESIONAL; INTRAMUSCULAR; SOFT TISSUE
Status: COMPLETED | OUTPATIENT
Start: 2019-01-24 | End: 2019-01-24

## 2019-01-24 RX ORDER — ALLOPURINOL 100 MG/1
200 TABLET ORAL DAILY
Qty: 60 TABLET | Refills: 0 | Status: SHIPPED | OUTPATIENT
Start: 2019-01-24 | End: 2019-01-24 | Stop reason: SDUPTHER

## 2019-01-24 RX ADMIN — METHYLPREDNISOLONE ACETATE 80 MG: 80 INJECTION, SUSPENSION INTRALESIONAL; INTRAMUSCULAR; INTRASYNOVIAL; SOFT TISSUE at 04:01

## 2019-01-24 NOTE — PROGRESS NOTES
Subjective:      Patient ID: Isael Salmon is a 73 y.o. female.    Chief Complaint: Pain and Swelling of the Left Knee and Pain and Swelling of the Right Knee    HPI  73 year old female presents with chief complaint of bilateral knee pain R>L. She has h/o OA. No trauma. Pain is worse going from sit to stand and vice versa. Advil gives some relief. Mobic gave her mild relief in the past. She had a right knee cortisone injection last July that gave her good relief until recently. She wears a knee sleeve prn.   Review of Systems   Constitution: Negative for chills, fever and night sweats.   Cardiovascular: Negative for chest pain.   Respiratory: Negative for cough and shortness of breath.    Hematologic/Lymphatic: Does not bruise/bleed easily.   Skin: Negative for color change.   Gastrointestinal: Negative for heartburn.   Genitourinary: Negative for dysuria.   Neurological: Negative for numbness and paresthesias.   Psychiatric/Behavioral: Negative for altered mental status.   Allergic/Immunologic: Negative for persistent infections.         Objective:            General    Vitals reviewed.  Constitutional: She is oriented to person, place, and time. She appears well-developed and well-nourished.   Cardiovascular: Normal rate.    Neurological: She is alert and oriented to person, place, and time.             Bilateral Knee Exam:  ROM 0-120 degrees  No effusion  +crepitus  TTP medial joint line      X-ray: reviewed by myself. Bilateral tibiofemoral arthritic changes, with medial tibiofemoral compartment joint space narrowing and bilateral chondrocalcinosis seen.      Assessment:       Encounter Diagnosis   Name Primary?    Primary osteoarthritis of both knees Yes          Plan:       Discussed treatment options with patient. She would like bilateral cortisone injections. RTC prn.     PROCEDURE:  I have explained the risks, benefits, and alternatives of the procedure in detail.  The patient voices understanding  and all questions have been answered.  The patient agrees to proceed as planned. So after I performed a sterile prep of the skin in the normal fashion the bilateral knee is injected using a 22 gauge needle from the anterolateral approach with a combination of 4cc 1% plain lidocaine and 80 mg of depo medrol.  The patient is cautioned and immediate relief of pain is secondary to the local anesthetic and will be temporary.  After the anesthetic wears off there may be a increase in pain that may last for a few hours or a few days and they should use ice to help alleviate this flair up of pain.

## 2019-01-25 RX ORDER — ALLOPURINOL 100 MG/1
TABLET ORAL
Qty: 180 TABLET | Refills: 0 | Status: SHIPPED | OUTPATIENT
Start: 2019-01-25 | End: 2019-04-08 | Stop reason: SDUPTHER

## 2019-01-28 ENCOUNTER — OFFICE VISIT (OUTPATIENT)
Dept: OPTOMETRY | Facility: CLINIC | Age: 74
End: 2019-01-28
Payer: MEDICARE

## 2019-01-28 DIAGNOSIS — H04.123 BILATERAL DRY EYES: ICD-10-CM

## 2019-01-28 DIAGNOSIS — H10.533 CONTACT BLEPHAROCONJUNCTIVITIS OF BOTH EYES: Primary | ICD-10-CM

## 2019-01-28 PROCEDURE — 99212 OFFICE O/P EST SF 10 MIN: CPT | Mod: PBBFAC,PO | Performed by: OPTOMETRIST

## 2019-01-28 PROCEDURE — 92012 INTRM OPH EXAM EST PATIENT: CPT | Mod: S$PBB,,, | Performed by: OPTOMETRIST

## 2019-01-28 PROCEDURE — 99999 PR PBB SHADOW E&M-EST. PATIENT-LVL II: CPT | Mod: PBBFAC,,, | Performed by: OPTOMETRIST

## 2019-01-28 PROCEDURE — 92012 PR EYE EXAM, EST PATIENT,INTERMED: ICD-10-PCS | Mod: S$PBB,,, | Performed by: OPTOMETRIST

## 2019-01-28 PROCEDURE — 99999 PR PBB SHADOW E&M-EST. PATIENT-LVL II: ICD-10-PCS | Mod: PBBFAC,,, | Performed by: OPTOMETRIST

## 2019-01-28 RX ORDER — NEOMYCIN SULFATE, POLYMYXIN B SULFATE AND DEXAMETHASONE 3.5; 10000; 1 MG/ML; [USP'U]/ML; MG/ML
1 SUSPENSION/ DROPS OPHTHALMIC 4 TIMES DAILY
Qty: 5 ML | Refills: 0 | Status: SHIPPED | OUTPATIENT
Start: 2019-01-28 | End: 2019-02-04

## 2019-01-28 NOTE — PROGRESS NOTES
BARI CERNA 09/2018 with Dr. Lester  Eyes have felt irritated and watering,   itching and crusting in the morning for the past week, OD >OS.  Patient   has been using Sysytane QID OU, but relief doesn't last. Patient never had   glasses changed after cataract surgery and would like updated RX today.    Last edited by Guerda Bkaer on 1/28/2019 10:04 AM. (History)            Assessment /Plan     For exam results, see Encounter Report.    Contact blepharoconjunctivitis of both eyes  -     neomycin-polymyxin-dexamethasone (MAXITROL) 3.5mg/mL-10,000 unit/mL-0.1 % DrpS; Place 1 drop into both eyes 4 (four) times daily. for 7 days  Dispense: 5 mL; Refill: 0    Bilateral dry eyes      1. Start Maxitrol drops 4x/day x 1 week, if better can restart tears, if no better call back or RTC,  2. Recommend artificial tears. 1 drop 3-4x per day. Chronicity of disease and treatment discussed.

## 2019-03-13 ENCOUNTER — TELEPHONE (OUTPATIENT)
Dept: INTERNAL MEDICINE | Facility: CLINIC | Age: 74
End: 2019-03-13

## 2019-03-13 DIAGNOSIS — Z12.31 SCREENING MAMMOGRAM, ENCOUNTER FOR: Primary | ICD-10-CM

## 2019-03-15 ENCOUNTER — TELEPHONE (OUTPATIENT)
Dept: INTERNAL MEDICINE | Facility: CLINIC | Age: 74
End: 2019-03-15

## 2019-03-15 RX ORDER — AZITHROMYCIN 250 MG/1
TABLET, FILM COATED ORAL
Qty: 6 TABLET | Refills: 0 | Status: SHIPPED | OUTPATIENT
Start: 2019-03-15 | End: 2019-04-08

## 2019-03-15 RX ORDER — DOXYCYCLINE HYCLATE 100 MG
100 TABLET ORAL 2 TIMES DAILY
Qty: 20 TABLET | Refills: 0 | Status: SHIPPED | OUTPATIENT
Start: 2019-03-15 | End: 2019-03-25

## 2019-03-15 NOTE — TELEPHONE ENCOUNTER
She is to start on doxycycline 100 mg twice daily. She is to continue xyzal and take mucinex twice daily as well

## 2019-03-15 NOTE — TELEPHONE ENCOUNTER
Pt states that she can't take doxycycline, she lizbeth like another rx called in. Preferably the rx that was called in the last time she had the same symptoms.

## 2019-03-15 NOTE — TELEPHONE ENCOUNTER
Pt has persistent cough, she states that she coughs until she vomits. Pt is coughing up thick dark colored mucus x yesterday. Pt has no fever, just coughing up thick mucus. Pt took a xyal yesterday which helped, however she would like to know what to do  Please advise

## 2019-03-15 NOTE — TELEPHONE ENCOUNTER
----- Message from Javon Jones sent at 3/15/2019 11:57 AM CDT -----  Contact: [phone]  Patient requesting a call from the office to discuss  getting an appointment today for upper respiratory infection and coughing . Please call and advise, Thanks

## 2019-04-08 ENCOUNTER — HOSPITAL ENCOUNTER (OUTPATIENT)
Dept: RADIOLOGY | Facility: HOSPITAL | Age: 74
Discharge: HOME | End: 2019-04-08
Attending: INTERNAL MEDICINE
Payer: MEDICARE

## 2019-04-08 ENCOUNTER — OFFICE VISIT (OUTPATIENT)
Dept: INTERNAL MEDICINE | Facility: CLINIC | Age: 74
End: 2019-04-08
Payer: MEDICARE

## 2019-04-08 VITALS
OXYGEN SATURATION: 97 % | HEART RATE: 75 BPM | SYSTOLIC BLOOD PRESSURE: 120 MMHG | DIASTOLIC BLOOD PRESSURE: 60 MMHG | BODY MASS INDEX: 29.32 KG/M2 | HEIGHT: 65 IN | WEIGHT: 176 LBS

## 2019-04-08 DIAGNOSIS — I73.9 PVD (PERIPHERAL VASCULAR DISEASE): ICD-10-CM

## 2019-04-08 DIAGNOSIS — E78.5 HYPERLIPIDEMIA, UNSPECIFIED HYPERLIPIDEMIA TYPE: ICD-10-CM

## 2019-04-08 DIAGNOSIS — R06.09 DYSPNEA ON EXERTION: ICD-10-CM

## 2019-04-08 DIAGNOSIS — G45.9 TRANSIENT CEREBRAL ISCHEMIA, UNSPECIFIED TYPE: ICD-10-CM

## 2019-04-08 DIAGNOSIS — R79.9 ABNORMAL BLOOD CHEMISTRY: ICD-10-CM

## 2019-04-08 DIAGNOSIS — E55.9 VITAMIN D DEFICIENCY DISEASE: ICD-10-CM

## 2019-04-08 DIAGNOSIS — Z12.31 SCREENING MAMMOGRAM, ENCOUNTER FOR: ICD-10-CM

## 2019-04-08 DIAGNOSIS — D50.0 ANEMIA, BLOOD LOSS: ICD-10-CM

## 2019-04-08 DIAGNOSIS — Z86.39 HISTORY OF HYPERLIPIDEMIA: ICD-10-CM

## 2019-04-08 DIAGNOSIS — M10.9 GOUT, ARTHRITIS: ICD-10-CM

## 2019-04-08 DIAGNOSIS — I10 ESSENTIAL HYPERTENSION: Primary | ICD-10-CM

## 2019-04-08 DIAGNOSIS — R07.9 CHEST PAIN, UNSPECIFIED TYPE: ICD-10-CM

## 2019-04-08 DIAGNOSIS — I77.9 BILATERAL CAROTID ARTERY DISEASE, UNSPECIFIED TYPE: ICD-10-CM

## 2019-04-08 PROCEDURE — 77063 BREAST TOMOSYNTHESIS BI: CPT | Mod: 26,,, | Performed by: RADIOLOGY

## 2019-04-08 PROCEDURE — 77067 SCR MAMMO BI INCL CAD: CPT | Mod: 26,,, | Performed by: RADIOLOGY

## 2019-04-08 PROCEDURE — 77067 SCR MAMMO BI INCL CAD: CPT | Mod: TC

## 2019-04-08 PROCEDURE — 77067 MAMMO DIGITAL SCREENING BILAT WITH TOMOSYNTHESIS_CAD: ICD-10-PCS | Mod: 26,,, | Performed by: RADIOLOGY

## 2019-04-08 PROCEDURE — 99214 PR OFFICE/OUTPT VISIT, EST, LEVL IV, 30-39 MIN: ICD-10-PCS | Mod: S$PBB,,, | Performed by: INTERNAL MEDICINE

## 2019-04-08 PROCEDURE — 99999 PR PBB SHADOW E&M-EST. PATIENT-LVL III: ICD-10-PCS | Mod: PBBFAC,,, | Performed by: INTERNAL MEDICINE

## 2019-04-08 PROCEDURE — 99214 OFFICE O/P EST MOD 30 MIN: CPT | Mod: S$PBB,,, | Performed by: INTERNAL MEDICINE

## 2019-04-08 PROCEDURE — 77063 MAMMO DIGITAL SCREENING BILAT WITH TOMOSYNTHESIS_CAD: ICD-10-PCS | Mod: 26,,, | Performed by: RADIOLOGY

## 2019-04-08 PROCEDURE — 99999 PR PBB SHADOW E&M-EST. PATIENT-LVL III: CPT | Mod: PBBFAC,,, | Performed by: INTERNAL MEDICINE

## 2019-04-08 PROCEDURE — 99213 OFFICE O/P EST LOW 20 MIN: CPT | Mod: PBBFAC | Performed by: INTERNAL MEDICINE

## 2019-04-08 RX ORDER — LOSARTAN POTASSIUM 50 MG/1
50 TABLET ORAL 2 TIMES DAILY
Qty: 180 TABLET | Refills: 3 | Status: SHIPPED | OUTPATIENT
Start: 2019-04-08 | End: 2020-04-15 | Stop reason: SDUPTHER

## 2019-04-08 RX ORDER — SPIRONOLACTONE 25 MG/1
25 TABLET ORAL DAILY
Qty: 90 TABLET | Refills: 4 | Status: SHIPPED | OUTPATIENT
Start: 2019-04-08 | End: 2019-07-14 | Stop reason: SDUPTHER

## 2019-04-08 RX ORDER — ROSUVASTATIN CALCIUM 20 MG/1
20 TABLET, COATED ORAL DAILY
Qty: 90 TABLET | Refills: 3 | Status: SHIPPED | OUTPATIENT
Start: 2019-04-08 | End: 2020-04-15 | Stop reason: SDUPTHER

## 2019-04-08 RX ORDER — ALLOPURINOL 100 MG/1
200 TABLET ORAL DAILY
Qty: 180 TABLET | Refills: 3 | Status: SHIPPED | OUTPATIENT
Start: 2019-04-08 | End: 2020-08-01 | Stop reason: SDUPTHER

## 2019-04-08 RX ORDER — OMEPRAZOLE 20 MG/1
20 CAPSULE, DELAYED RELEASE ORAL 2 TIMES DAILY
Qty: 90 CAPSULE | Refills: 4 | Status: SHIPPED | OUTPATIENT
Start: 2019-04-08 | End: 2020-04-15 | Stop reason: SDUPTHER

## 2019-04-08 NOTE — PROGRESS NOTES
CHIEF COMPLAINT: Follow up of allergic rhinitis, hypertension, hyperlipidemia, reflux.     HISTORY OF PRESENT ILLNESS: 73-year-old woman presents for follow up of above    She has had more sinus congestion lately. She took a Z pack and felt better. She has a change in hearing in her ears.      She gets winded and exhausted when she climbs steps or walks a distance. Carina has some mild chest pressure. f she sits down and rests, it passes. She has not been wheezing lately.  She has not been exercising.     She has had more indigestion when she eats. She takes a zantac which controls the in digestion. She has not been taking omeprazole lately.     She has had more stress. She is calculating and adding in her sleep.  She is not sleeping well.  She is taking lexapro 10 mg 1/2 tablet daily.      She is now taking Crestor 20 mg daily with co enzyme 10 200 mg daily for her hyperlipidemia. Rare muscle spasm         She is not taking her iron supplement currently      She had a normal cystoscope 12/17/14 due to recurrent UTI. No dysuria or hematuria now. She is using estrogen vaginal cream for vaginal atrophy three times weekly        No nausea, vomiting, constipation, diarrhea, emilio, bloody stools. She continues to take losartan 50 mg twice daily and spironolactone 25 mg 1 tablet daily for hypertension.      She continues to take lexapro 10 mg 1/2 tablet daily which controlls her mood with her stressors.        She continues allopurinol 200 mg daily. No gouty flares       She is taking vitamin D 50,000 units twice weekly     Back bothers her if she bends over a certain way. She has to watch her position.     PAST MEDICAL HISTORY:   1. Hypertension.   2. Hyperlipidemia.   3. Gout.   4. History of rashes.   5. TIA in 1997  6. Fibrocystic breast disease.   7. Postmenopausal.   8. Osteoarthritis.   9. History of tobacco usage; quit in 1997.   10. History of colon polyps; normal colonoscopy in 10/07 and normal 4/2010, due 2015  "  11. Normal bone mineral density scan in .   12. Anxiety and depression - controlled on lorazepam very rarely.   13. Varicose veins.   14. Intermittent GERD.   15. Iron deficiency anemia 2010 - due to erosive gastropathy on EGD 2010     PAST SURGICAL HISTORY:   1. Right carotid endarterectomy in .   2. Breast reduction surgery.   3. Total abdominal hysterectomy.   4. Blood transfusion prior to .     SOCIAL HISTORY: Quit smoking in ; smoked 1 PPD since age 16. No   alcohol use.     FAMILY HISTORY:   Father had gout, hypertension and heart failure; is . Mother had hypertension and pancreatic cancer; is also . Has five brothers, one of whom  in a motor vehicle accident. All have gout and hypertension. One brother has Crohn's disease; another has bladder cancer.     MEDICATIONS and ALLERGIES: Updated on epic.     PHYSICAL EXAMINATION:     /60 (BP Location: Right arm, Patient Position: Sitting, BP Method: Large (Manual))   Pulse 75   Ht 5' 5" (1.651 m)   Wt 79.8 kg (176 lb)   LMP  (LMP Unknown)   SpO2 97%   BMI 29.29 kg/m²     General: Alert, oriented. No apparent distress. Affect within normal   limits.   Conjunctivae anicteric. Tympanic membranes clear fluid. Oropharynx clear.   Neck supple.   Respiratory effort normal. Lungs expiratiory wheezing bilaterally  Heart: Regular rate and rhythm without murmurs, gallops or rubs.   No lower extremity edema.    ABDOMEN: soft, non distended, non tender, bowel sounds present, no hepatosplenomgaly  BREAST: no abn seen, no nodules palpated, no axillary lad    ASSESSMENT AND PLAN:   1. Allergic rhinitis -take loratadine.  4. Hyperlipidemia - On crestor 20 mg daily and Co Enzyme Q 10 200 mg daily.    5. Erosive gastropathy -get on omeprazole 20 mg daily. Off NSAIDS. EGD  6. Hypertension - Continue losartan 25 mg one tablet daily and spironolactone to 25 mg daily.  Labs and dobutamine stress echo due to dyspnea on exertion.   7. " Hypercalcemia - stable  6. Gout - asymptomatic on allopurinol.   7. Situational stress - continue lexapro   8. Anemia - stable  9. Carotid artery disease - on risk factor modification. US carotids 3/18/18 - 1 - 39% stenosis of the right and left internal carotid artery  10. Right sided back pain - could be rib pain, advil 2 tablets twice daily and tizanidine.   Screening - Colonscopy 10/15 with 4 polyps - it was incomplete. Repeat 5/26/16 - divericulosis otherwise normal - due 2021. MMG 3/18 - ordered, BMD 7/11.  I will see her back in 3 months , sooner if problems arise

## 2019-05-01 ENCOUNTER — HOSPITAL ENCOUNTER (OUTPATIENT)
Dept: CARDIOLOGY | Facility: CLINIC | Age: 74
Discharge: HOME | End: 2019-05-01
Attending: INTERNAL MEDICINE
Payer: MEDICARE

## 2019-05-01 VITALS
RESPIRATION RATE: 18 BRPM | HEIGHT: 65 IN | SYSTOLIC BLOOD PRESSURE: 110 MMHG | DIASTOLIC BLOOD PRESSURE: 64 MMHG | WEIGHT: 172 LBS | BODY MASS INDEX: 28.66 KG/M2

## 2019-05-01 DIAGNOSIS — R06.09 DYSPNEA ON EXERTION: ICD-10-CM

## 2019-05-01 DIAGNOSIS — I10 ESSENTIAL HYPERTENSION: ICD-10-CM

## 2019-05-01 DIAGNOSIS — R07.9 CHEST PAIN, UNSPECIFIED TYPE: ICD-10-CM

## 2019-05-01 LAB
ASCENDING AORTA: 2.33 CM
BSA FOR ECHO PROCEDURE: 1.89 M2
CV ECHO LV RWT: 0.39 CM
CV STRESS BASE HR: 89 BPM
DIASTOLIC BLOOD PRESSURE: 50 MMHG
DOP CALC LVOT AREA: 3.2 CM2
DOP CALC LVOT DIAMETER: 2.02 CM
DOP CALC LVOT PEAK VEL: 1.27 M/S
DOP CALC LVOT STROKE VOLUME: 86.39 CM3
DOP CALCLVOT PEAK VEL VTI: 26.97 CM
E WAVE DECELERATION TIME: 181.8 MSEC
E/A RATIO: 0.97
E/E' RATIO: 15.86
ECHO LV POSTERIOR WALL: 0.8 CM (ref 0.6–1.1)
FRACTIONAL SHORTENING: 30 % (ref 28–44)
INTERVENTRICULAR SEPTUM: 1.13 CM (ref 0.6–1.1)
IVRT: 0.1 MSEC
LA MAJOR: 5.4 CM
LA MINOR: 5.53 CM
LA WIDTH: 3.42 CM
LEFT ATRIUM SIZE: 3.41 CM
LEFT ATRIUM VOLUME INDEX: 29.2 ML/M2
LEFT ATRIUM VOLUME: 54.17 CM3
LEFT INTERNAL DIMENSION IN SYSTOLE: 2.83 CM (ref 2.1–4)
LEFT VENTRICLE DIASTOLIC VOLUME INDEX: 39.39 ML/M2
LEFT VENTRICLE DIASTOLIC VOLUME: 73.08 ML
LEFT VENTRICLE MASS INDEX: 67 G/M2
LEFT VENTRICLE SYSTOLIC VOLUME INDEX: 16.3 ML/M2
LEFT VENTRICLE SYSTOLIC VOLUME: 30.29 ML
LEFT VENTRICULAR INTERNAL DIMENSION IN DIASTOLE: 4.07 CM (ref 3.5–6)
LEFT VENTRICULAR MASS: 124.22 G
LV LATERAL E/E' RATIO: 15.86
LV SEPTAL E/E' RATIO: 15.86
MV PEAK A VEL: 1.14 M/S
MV PEAK E VEL: 1.11 M/S
OHS CV CPX 1 MINUTE RECOVERY HEART RATE: 126 BPM
OHS CV CPX 85 PERCENT MAX PREDICTED HEART RATE MALE: 119
OHS CV CPX MAX PREDICTED HEART RATE: 140
OHS CV CPX PATIENT IS FEMALE: 1
OHS CV CPX PATIENT IS MALE: 0
OHS CV CPX PEAK DIASTOLIC BLOOD PRESSURE: 56 MMHG
OHS CV CPX PEAK HEAR RATE: 131 BPM
OHS CV CPX PEAK RATE PRESSURE PRODUCT: ABNORMAL
OHS CV CPX PEAK SYSTOLIC BLOOD PRESSURE: 134 MMHG
OHS CV CPX PERCENT MAX PREDICTED HEART RATE ACHIEVED: 94
OHS CV CPX PERCENT TARGET HEART RATE ACHIEVED: 110.08
OHS CV CPX RATE PRESSURE PRODUCT PRESENTING: ABNORMAL
OHS CV CPX TARGET HEART RATE: 119
PISA TR MAX VEL: 2.3 M/S
PULM VEIN S/D RATIO: 2.17
PV PEAK D VEL: 0.36 M/S
PV PEAK S VEL: 0.78 M/S
RA MAJOR: 4.68 CM
RA PRESSURE: 3 MMHG
RA WIDTH: 2.75 CM
RIGHT VENTRICULAR END-DIASTOLIC DIMENSION: 2.93 CM
RV TISSUE DOPPLER FREE WALL SYSTOLIC VELOCITY 1 (APICAL 4 CHAMBER VIEW): 137.33 M/S
SINUS: 2.7 CM
STJ: 2.16 CM
SYSTOLIC BLOOD PRESSURE: 125 MMHG
TDI LATERAL: 0.07
TDI SEPTAL: 0.07
TDI: 0.07
TR MAX PG: 21.16 MMHG
TRICUSPID ANNULAR PLANE SYSTOLIC EXCURSION: 2.22 CM
TV REST PULMONARY ARTERY PRESSURE: 24 MMHG

## 2019-05-01 PROCEDURE — 93351 STRESS TTE COMPLETE: CPT | Mod: PBBFAC | Performed by: INTERNAL MEDICINE

## 2019-05-01 PROCEDURE — 93351 ECHOCARDIOGRAM STRESS TEST (CUPID ONLY): ICD-10-PCS | Mod: 26,S$PBB,, | Performed by: INTERNAL MEDICINE

## 2019-05-01 PROCEDURE — 96372 THER/PROPH/DIAG INJ SC/IM: CPT | Mod: PBBFAC

## 2019-05-01 RX ORDER — ATROPINE SULFATE 0.1 MG/ML
0.25 INJECTION INTRAVENOUS
Status: COMPLETED | OUTPATIENT
Start: 2019-05-01 | End: 2019-05-01

## 2019-05-01 RX ORDER — DOBUTAMINE HYDROCHLORIDE 200 MG/100ML
10 INJECTION INTRAVENOUS CONTINUOUS
Status: DISCONTINUED | OUTPATIENT
Start: 2019-05-01 | End: 2019-07-08

## 2019-05-01 RX ADMIN — DOBUTAMINE HYDROCHLORIDE 10 MCG/KG/MIN: 200 INJECTION INTRAVENOUS at 02:05

## 2019-05-01 RX ADMIN — HUMAN ALBUMIN MICROSPHERES AND PERFLUTREN 0.11 MG: 10; .22 INJECTION, SOLUTION INTRAVENOUS at 02:05

## 2019-05-01 RX ADMIN — ATROPINE SULFATE 0.25 MG: 0.1 INJECTION, SOLUTION ENDOTRACHEAL; INTRAMUSCULAR; INTRAVENOUS; SUBCUTANEOUS at 02:05

## 2019-06-14 RX ORDER — ESCITALOPRAM OXALATE 10 MG/1
5 TABLET ORAL DAILY
Qty: 45 TABLET | Refills: 3 | Status: SHIPPED | OUTPATIENT
Start: 2019-06-14 | End: 2020-04-15 | Stop reason: SDUPTHER

## 2019-07-08 ENCOUNTER — OFFICE VISIT (OUTPATIENT)
Dept: INTERNAL MEDICINE | Facility: CLINIC | Age: 74
End: 2019-07-08
Payer: MEDICARE

## 2019-07-08 ENCOUNTER — LAB VISIT (OUTPATIENT)
Dept: LAB | Facility: HOSPITAL | Age: 74
End: 2019-07-08
Attending: INTERNAL MEDICINE
Payer: MEDICARE

## 2019-07-08 VITALS
WEIGHT: 176.38 LBS | OXYGEN SATURATION: 99 % | DIASTOLIC BLOOD PRESSURE: 70 MMHG | TEMPERATURE: 98 F | BODY MASS INDEX: 29.38 KG/M2 | HEIGHT: 65 IN | SYSTOLIC BLOOD PRESSURE: 120 MMHG | HEART RATE: 81 BPM

## 2019-07-08 DIAGNOSIS — M25.569 KNEE PAIN, UNSPECIFIED CHRONICITY, UNSPECIFIED LATERALITY: ICD-10-CM

## 2019-07-08 DIAGNOSIS — D64.9 ANEMIA, UNSPECIFIED TYPE: ICD-10-CM

## 2019-07-08 DIAGNOSIS — I10 ESSENTIAL HYPERTENSION: Primary | ICD-10-CM

## 2019-07-08 DIAGNOSIS — I10 ESSENTIAL HYPERTENSION: ICD-10-CM

## 2019-07-08 DIAGNOSIS — M10.9 GOUT, ARTHRITIS: ICD-10-CM

## 2019-07-08 LAB
ALBUMIN SERPL BCP-MCNC: 3.9 G/DL (ref 3.5–5.2)
ALP SERPL-CCNC: 85 U/L (ref 55–135)
ALT SERPL W/O P-5'-P-CCNC: 14 U/L (ref 10–44)
ANION GAP SERPL CALC-SCNC: 6 MMOL/L (ref 8–16)
AST SERPL-CCNC: 18 U/L (ref 10–40)
BASOPHILS # BLD AUTO: 0.01 K/UL (ref 0–0.2)
BASOPHILS NFR BLD: 0.2 % (ref 0–1.9)
BILIRUB SERPL-MCNC: 0.5 MG/DL (ref 0.1–1)
BUN SERPL-MCNC: 22 MG/DL (ref 8–23)
CALCIUM SERPL-MCNC: 10.8 MG/DL (ref 8.7–10.5)
CHLORIDE SERPL-SCNC: 106 MMOL/L (ref 95–110)
CO2 SERPL-SCNC: 30 MMOL/L (ref 23–29)
CREAT SERPL-MCNC: 1 MG/DL (ref 0.5–1.4)
DIFFERENTIAL METHOD: ABNORMAL
EOSINOPHIL # BLD AUTO: 0.1 K/UL (ref 0–0.5)
EOSINOPHIL NFR BLD: 2.6 % (ref 0–8)
ERYTHROCYTE [DISTWIDTH] IN BLOOD BY AUTOMATED COUNT: 15.6 % (ref 11.5–14.5)
EST. GFR  (AFRICAN AMERICAN): >60 ML/MIN/1.73 M^2
EST. GFR  (NON AFRICAN AMERICAN): 55 ML/MIN/1.73 M^2
FERRITIN SERPL-MCNC: 29 NG/ML (ref 20–300)
GLUCOSE SERPL-MCNC: 89 MG/DL (ref 70–110)
HCT VFR BLD AUTO: 31.7 % (ref 37–48.5)
HGB BLD-MCNC: 10 G/DL (ref 12–16)
LYMPHOCYTES # BLD AUTO: 1.1 K/UL (ref 1–4.8)
LYMPHOCYTES NFR BLD: 19.9 % (ref 18–48)
MCH RBC QN AUTO: 29.7 PG (ref 27–31)
MCHC RBC AUTO-ENTMCNC: 31.5 G/DL (ref 32–36)
MCV RBC AUTO: 94 FL (ref 82–98)
MONOCYTES # BLD AUTO: 0.4 K/UL (ref 0.3–1)
MONOCYTES NFR BLD: 8.1 % (ref 4–15)
NEUTROPHILS # BLD AUTO: 3.8 K/UL (ref 1.8–7.7)
NEUTROPHILS NFR BLD: 69.2 % (ref 38–73)
PLATELET # BLD AUTO: 203 K/UL (ref 150–350)
PMV BLD AUTO: 11.2 FL (ref 9.2–12.9)
POTASSIUM SERPL-SCNC: 4.6 MMOL/L (ref 3.5–5.1)
PROT SERPL-MCNC: 7.1 G/DL (ref 6–8.4)
RBC # BLD AUTO: 3.37 M/UL (ref 4–5.4)
SODIUM SERPL-SCNC: 142 MMOL/L (ref 136–145)
URATE SERPL-MCNC: 4.9 MG/DL (ref 2.4–5.7)
WBC # BLD AUTO: 5.44 K/UL (ref 3.9–12.7)

## 2019-07-08 PROCEDURE — 99215 OFFICE O/P EST HI 40 MIN: CPT | Mod: PBBFAC | Performed by: INTERNAL MEDICINE

## 2019-07-08 PROCEDURE — 99999 PR PBB SHADOW E&M-EST. PATIENT-LVL V: ICD-10-PCS | Mod: PBBFAC,,, | Performed by: INTERNAL MEDICINE

## 2019-07-08 PROCEDURE — 82728 ASSAY OF FERRITIN: CPT

## 2019-07-08 PROCEDURE — 99999 PR PBB SHADOW E&M-EST. PATIENT-LVL V: CPT | Mod: PBBFAC,,, | Performed by: INTERNAL MEDICINE

## 2019-07-08 PROCEDURE — 36415 COLL VENOUS BLD VENIPUNCTURE: CPT

## 2019-07-08 PROCEDURE — 99214 OFFICE O/P EST MOD 30 MIN: CPT | Mod: S$PBB,,, | Performed by: INTERNAL MEDICINE

## 2019-07-08 PROCEDURE — 80053 COMPREHEN METABOLIC PANEL: CPT

## 2019-07-08 PROCEDURE — 84550 ASSAY OF BLOOD/URIC ACID: CPT

## 2019-07-08 PROCEDURE — 99214 PR OFFICE/OUTPT VISIT, EST, LEVL IV, 30-39 MIN: ICD-10-PCS | Mod: S$PBB,,, | Performed by: INTERNAL MEDICINE

## 2019-07-08 PROCEDURE — 85025 COMPLETE CBC W/AUTO DIFF WBC: CPT

## 2019-07-08 RX ORDER — BETAMETHASONE DIPROPIONATE 0.5 MG/G
CREAM TOPICAL 2 TIMES DAILY
Qty: 60 G | Refills: 3 | Status: SHIPPED | OUTPATIENT
Start: 2019-07-08 | End: 2020-11-05 | Stop reason: SDUPTHER

## 2019-07-08 NOTE — PROGRESS NOTES
CHIEF COMPLAINT: Follow up of allergic rhinitis, hypertension, hyperlipidemia, reflux.     HISTORY OF PRESENT ILLNESS: 73-year-old woman presents for follow up of above    She has had right elbow pain for the last week.  The medial aspect of the right elbow is tender to touch.  No pain with movement of the elbow joint.  NO redness. No injury.    Knees have been worse. She needs to see ortho for an injection    She has had some sinus congestion with clear drainage lately.    She had an episode of jitteriness and shakiness around 3 pm. She ate pancakes at 11 am.  She ate and apple and drank juice and the symptoms resolved. No lighteadness.      Reflux has been controlled with omeprazole 20 mg daily.      Mood has been better.  She is not sleeping well.  She is taking lexapro 10 mg 1 tablet daily. She thinks she is doing better.  She will decrease back to 1/2 tablet daily.      She is now taking Crestor 20 mg daily with co enzyme 10 200 mg daily for her hyperlipidemia. Rare muscle spasm         She is not taking her iron supplement currently      She had a normal cystoscope 12/17/14 due to recurrent UTI. No dysuria or hematuria now. She is no longer using estrogen vaginal cream for vaginal atrophy three times weekly        No nausea, vomiting, constipation, diarrhea, emilio, bloody stools. She continues to take losartan 50 mg twice daily and spironolactone 25 mg 1 tablet daily for hypertension.      She continues allopurinol 200 mg daily. No gouty flares       She is taking vitamin D 50,000 units twice weekly     Back bothers her if she bends over a certain way. She has to watch her position. She takes tizanidine as needed    PAST MEDICAL HISTORY:   1. Hypertension.   2. Hyperlipidemia.   3. Gout.   4. History of rashes.   5. TIA in 1997  6. Fibrocystic breast disease.   7. Postmenopausal.   8. Osteoarthritis.   9. History of tobacco usage; quit in 1997.   10. History of colon polyps; normal colonoscopy in 10/07 and  "normal 2010, due    11. Normal bone mineral density scan in .   12. Anxiety and depression - controlled on lorazepam very rarely.   13. Varicose veins.   14. Intermittent GERD.   15. Iron deficiency anemia 2010 - due to erosive gastropathy on EGD 2010     PAST SURGICAL HISTORY:   1. Right carotid endarterectomy in .   2. Breast reduction surgery.   3. Total abdominal hysterectomy.   4. Blood transfusion prior to .     SOCIAL HISTORY: Quit smoking in ; smoked 1 PPD since age 16. No   alcohol use.     FAMILY HISTORY:   Father had gout, hypertension and heart failure; is . Mother had hypertension and pancreatic cancer; is also . Has five brothers, one of whom  in a motor vehicle accident. All have gout and hypertension. One brother has Crohn's disease; another has bladder cancer.     MEDICATIONS and ALLERGIES: Updated on epic.     PHYSICAL EXAMINATION:     /70 (BP Location: Right arm, Patient Position: Sitting, BP Method: Large (Manual))   Pulse 81   Temp 97.7 °F (36.5 °C) (Oral)   Ht 5' 5" (1.651 m)   Wt 80 kg (176 lb 5.9 oz)   LMP  (LMP Unknown)   SpO2 99%   BMI 29.35 kg/m²     General: Alert, oriented. No apparent distress. Affect within normal   limits.   Conjunctivae anicteric. Tympanic membranes clear fluid. Oropharynx clear.   Neck supple.   Respiratory effort normal. Lungs clear  Heart: Regular rate and rhythm without murmurs, gallops or rubs.   No lower extremity edema.         ASSESSMENT AND PLAN:   1. Anemia - labs and stool for blood  2. Allergic rhinitis - stable.  4. Hyperlipidemia - On crestor 20 mg daily and Co Enzyme Q 10 200 mg daily.    5. Erosive gastropathy -get on omeprazole 20 mg daily. Off NSAIDS.  6. Hypertension - Continue losartan 25 mg one tablet daily and spironolactone to 25 mg daily.   7. Hypercalcemia - stable  6. Gout - asymptomatic on allopurinol.   7. Situational stress - continue lexapro   8.  Carotid artery disease - on risk " factor modification. US carotids 3/18/18 - 1 - 39% stenosis of the right and left internal carotid artery.   Screening - Colonscopy 10/15 with 4 polyps - it was incomplete. Repeat 5/26/16 - divericulosis otherwise normal - due 2021. MMG 4/19 BMD 7/11.  I will see her back in 3 months , sooner if problems arise

## 2019-07-14 RX ORDER — SPIRONOLACTONE 25 MG/1
25 TABLET ORAL DAILY
Qty: 90 TABLET | Refills: 4 | Status: SHIPPED | OUTPATIENT
Start: 2019-07-14 | End: 2019-07-25 | Stop reason: SDUPTHER

## 2019-07-16 ENCOUNTER — OFFICE VISIT (OUTPATIENT)
Dept: ORTHOPEDICS | Facility: CLINIC | Age: 74
End: 2019-07-16
Payer: MEDICARE

## 2019-07-16 VITALS
HEART RATE: 82 BPM | DIASTOLIC BLOOD PRESSURE: 60 MMHG | WEIGHT: 171.44 LBS | HEIGHT: 65 IN | SYSTOLIC BLOOD PRESSURE: 125 MMHG | BODY MASS INDEX: 28.56 KG/M2

## 2019-07-16 DIAGNOSIS — M17.0 PRIMARY OSTEOARTHRITIS OF BOTH KNEES: Primary | ICD-10-CM

## 2019-07-16 PROCEDURE — 99999 PR PBB SHADOW E&M-EST. PATIENT-LVL IV: ICD-10-PCS | Mod: PBBFAC,,, | Performed by: PHYSICIAN ASSISTANT

## 2019-07-16 PROCEDURE — 99213 PR OFFICE/OUTPT VISIT, EST, LEVL III, 20-29 MIN: ICD-10-PCS | Mod: 25,S$PBB,, | Performed by: PHYSICIAN ASSISTANT

## 2019-07-16 PROCEDURE — 20610 DRAIN/INJ JOINT/BURSA W/O US: CPT | Mod: 50,PBBFAC | Performed by: PHYSICIAN ASSISTANT

## 2019-07-16 PROCEDURE — 99213 OFFICE O/P EST LOW 20 MIN: CPT | Mod: 25,S$PBB,, | Performed by: PHYSICIAN ASSISTANT

## 2019-07-16 PROCEDURE — 20610 PR DRAIN/INJECT LARGE JOINT/BURSA: ICD-10-PCS | Mod: 50,S$PBB,, | Performed by: PHYSICIAN ASSISTANT

## 2019-07-16 PROCEDURE — 20610 DRAIN/INJ JOINT/BURSA W/O US: CPT | Mod: 50,S$PBB,, | Performed by: PHYSICIAN ASSISTANT

## 2019-07-16 PROCEDURE — 99214 OFFICE O/P EST MOD 30 MIN: CPT | Mod: PBBFAC,25 | Performed by: PHYSICIAN ASSISTANT

## 2019-07-16 PROCEDURE — 99999 PR PBB SHADOW E&M-EST. PATIENT-LVL IV: CPT | Mod: PBBFAC,,, | Performed by: PHYSICIAN ASSISTANT

## 2019-07-16 RX ORDER — METHYLPREDNISOLONE ACETATE 80 MG/ML
80 INJECTION, SUSPENSION INTRA-ARTICULAR; INTRALESIONAL; INTRAMUSCULAR; SOFT TISSUE
Status: COMPLETED | OUTPATIENT
Start: 2019-07-16 | End: 2019-07-16

## 2019-07-16 RX ADMIN — METHYLPREDNISOLONE ACETATE 80 MG: 80 INJECTION, SUSPENSION INTRALESIONAL; INTRAMUSCULAR; INTRASYNOVIAL; SOFT TISSUE at 03:07

## 2019-07-16 NOTE — PROGRESS NOTES
Subjective:      Patient ID: Isael Salmon is a 73 y.o. female.    Chief Complaint: Pain of the Left Knee and Pain of the Right Knee    HPI  Patient returns with chief complaint of bilateral knee pain R>L. No trauma. She has h/o OA. She received cortisone injections in January that gave her good relief until 1 month ago. She uses lidocaine cream and advil prn which helps.   Review of Systems   Constitution: Negative for chills, fever and night sweats.   Cardiovascular: Negative for chest pain.   Respiratory: Negative for cough and shortness of breath.    Hematologic/Lymphatic: Does not bruise/bleed easily.   Skin: Negative for color change.   Gastrointestinal: Negative for heartburn.   Genitourinary: Negative for dysuria.   Neurological: Negative for numbness and paresthesias.   Psychiatric/Behavioral: Negative for altered mental status.   Allergic/Immunologic: Negative for persistent infections.         Objective:            General    Vitals reviewed.  Constitutional: She is oriented to person, place, and time. She appears well-developed and well-nourished.   Cardiovascular: Normal rate.    Neurological: She is alert and oriented to person, place, and time.             Bilateral Knee Exam:  ROM 0-120 degrees  No effusion  +crepitus  No TTP      X-ray: reviewed by myself. Bilateral tibiofemoral arthritic changes, with medial tibiofemoral compartment joint space narrowing and bilateral chondrocalcinosis seen.      Assessment:       Encounter Diagnosis   Name Primary?    Primary osteoarthritis of both knees Yes          Plan:       Patient would like to repeat cortisone injections. Not interested in surgery at this time. RTC prn.     PROCEDURE:  I have explained the risks, benefits, and alternatives of the procedure in detail.  The patient voices understanding and all questions have been answered.  The patient agrees to proceed as planned. So after I performed a sterile prep of the skin in the normal fashion  the bilateral knee is injected using a 22 gauge needle from the anterolateral approach with a combination of 4cc 1% plain lidocaine and 80 mg of depo medrol.  The patient is cautioned and immediate relief of pain is secondary to the local anesthetic and will be temporary.  After the anesthetic wears off there may be a increase in pain that may last for a few hours or a few days and they should use ice to help alleviate this flair up of pain.

## 2019-07-16 NOTE — LETTER
July 16, 2019      Janett Montoya MD  [address]  [facility] [address]           [facility] - Orthoped[facility]  [address], [address]  [facility] [address]  Phone: [phone]          Patient: Isael Salmon   MR Number: [id number]   YOB: 1945   Date of Visit: 7/16/2019       Dear Dr. Janett Montoya:    Thank you for referring Isael Salmon to me for evaluation. Attached you will find relevant portions of my assessment and plan of care.    If you have questions, please do not hesitate to call me. I look forward to following Isael Salmon along with you.    Sincerely,    Sheeba Vargas PA-C    Enclosure  CC:  No Recipients    If you would like to receive this communication electronically, please contact [email or web address] or [phone] to request more information on [facility] [employer] [facility] [employer] access.    For providers and/or their staff who would like to refer a patient to [facility], please contact us through our one-stop-shop provider referral line, [facility], at [phone].    If you feel you have received this communication in error or would no longer like to receive these types of communications, please e-mail [email or web address]

## 2019-07-17 ENCOUNTER — TELEPHONE (OUTPATIENT)
Dept: INTERNAL MEDICINE | Facility: CLINIC | Age: 74
End: 2019-07-17

## 2019-07-17 DIAGNOSIS — R19.5 HEME POSITIVE STOOL: Primary | ICD-10-CM

## 2019-07-17 DIAGNOSIS — K55.20 AVM (ARTERIOVENOUS MALFORMATION) OF SMALL BOWEL, ACQUIRED: ICD-10-CM

## 2019-07-18 NOTE — TELEPHONE ENCOUNTER
"Dr Kingsley  Mrs Salmon has a chronic anemia that has gotten a little worse recently.   She had a full GI work up with DR Alanis in 2016/2017.  Video capsule endoscopy revealed  "one small non-bleeding AVM (collection of superficial blood vessles) which could be the cause of her anemia and could bleed if she takes mobic or other NSAIDs not needed for cardiovascular   Protection"     Dr Alanis recommended that she see you if the anemia becomes a problem.     No apts are available in Epic for scheduling.    Can you see her for further evaluation?  THanks  Janett Montoya M.D.      "

## 2019-07-25 ENCOUNTER — PATIENT MESSAGE (OUTPATIENT)
Dept: INTERNAL MEDICINE | Facility: CLINIC | Age: 74
End: 2019-07-25

## 2019-07-25 RX ORDER — SPIRONOLACTONE 25 MG/1
TABLET ORAL
Qty: 90 TABLET | Refills: 3 | Status: SHIPPED | OUTPATIENT
Start: 2019-07-25 | End: 2019-09-11 | Stop reason: SDUPTHER

## 2019-07-26 RX ORDER — SPIRONOLACTONE 25 MG/1
25 TABLET ORAL DAILY
Qty: 90 TABLET | Refills: 4 | Status: SHIPPED | OUTPATIENT
Start: 2019-07-26 | End: 2020-10-06 | Stop reason: SDUPTHER

## 2019-07-26 NOTE — TELEPHONE ENCOUNTER
"Manuel,  Mrs Salmon has been a little more anemic and her stool for occult blood is positive.    You did a full GI work up in 2016/2017.   She had a Video capsule endoscopy which revealed  "one small non-bleeding AVM (collection of superficial blood vessles) which could be the cause of her anemia and could bleed if she takes mobic or other NSAIDs not needed for cardiovascular   Protection"     You recommended that she see Dr Kingsley if this becomes more of an issue.    I have messaged DR Kignsley and have not heard back from him yet.  NO apts are available in epic for booking.    Can you assist in getting an apt with Dr Kingsley    Thanks    Janett  "

## 2019-07-30 ENCOUNTER — PATIENT OUTREACH (OUTPATIENT)
Dept: ADMINISTRATIVE | Facility: OTHER | Age: 74
End: 2019-07-30

## 2019-07-31 DIAGNOSIS — R52 PAIN: Primary | ICD-10-CM

## 2019-08-01 ENCOUNTER — HOSPITAL ENCOUNTER (OUTPATIENT)
Dept: RADIOLOGY | Facility: OTHER | Age: 74
Discharge: HOME | End: 2019-08-01
Attending: PLASTIC SURGERY
Payer: MEDICARE

## 2019-08-01 ENCOUNTER — OFFICE VISIT (OUTPATIENT)
Dept: ORTHOPEDICS | Facility: CLINIC | Age: 74
End: 2019-08-01
Payer: MEDICARE

## 2019-08-01 VITALS
HEIGHT: 65 IN | BODY MASS INDEX: 28.49 KG/M2 | DIASTOLIC BLOOD PRESSURE: 72 MMHG | SYSTOLIC BLOOD PRESSURE: 142 MMHG | HEART RATE: 71 BPM | WEIGHT: 171 LBS

## 2019-08-01 DIAGNOSIS — M18.11 ARTHRITIS OF CARPOMETACARPAL (CMC) JOINT OF RIGHT THUMB: Primary | ICD-10-CM

## 2019-08-01 DIAGNOSIS — R52 PAIN: ICD-10-CM

## 2019-08-01 PROCEDURE — 99202 PR OFFICE/OUTPT VISIT, NEW, LEVL II, 15-29 MIN: ICD-10-PCS | Mod: 25,S$PBB,, | Performed by: PLASTIC SURGERY

## 2019-08-01 PROCEDURE — 73130 X-RAY EXAM OF HAND: CPT | Mod: TC,FY,RT

## 2019-08-01 PROCEDURE — 73130 XR HAND COMPLETE 3 VIEW RIGHT: ICD-10-PCS | Mod: 26,RT,, | Performed by: RADIOLOGY

## 2019-08-01 PROCEDURE — 99999 PR PBB SHADOW E&M-EST. PATIENT-LVL IV: ICD-10-PCS | Mod: PBBFAC,,, | Performed by: PLASTIC SURGERY

## 2019-08-01 PROCEDURE — 73130 X-RAY EXAM OF HAND: CPT | Mod: 26,RT,, | Performed by: RADIOLOGY

## 2019-08-01 PROCEDURE — 20600 PR DRAIN/INJECT SMALL JOINT/BURSA: ICD-10-PCS | Mod: S$PBB,RT,, | Performed by: PLASTIC SURGERY

## 2019-08-01 PROCEDURE — 20600 DRAIN/INJ JOINT/BURSA W/O US: CPT | Mod: PBBFAC | Performed by: PLASTIC SURGERY

## 2019-08-01 PROCEDURE — 99202 OFFICE O/P NEW SF 15 MIN: CPT | Mod: 25,S$PBB,, | Performed by: PLASTIC SURGERY

## 2019-08-01 PROCEDURE — 99214 OFFICE O/P EST MOD 30 MIN: CPT | Mod: PBBFAC,25 | Performed by: PLASTIC SURGERY

## 2019-08-01 PROCEDURE — 20600 DRAIN/INJ JOINT/BURSA W/O US: CPT | Mod: S$PBB,RT,, | Performed by: PLASTIC SURGERY

## 2019-08-01 PROCEDURE — 99999 PR PBB SHADOW E&M-EST. PATIENT-LVL IV: CPT | Mod: PBBFAC,,, | Performed by: PLASTIC SURGERY

## 2019-08-01 RX ORDER — TRIAMCINOLONE ACETONIDE 40 MG/ML
40 INJECTION, SUSPENSION INTRA-ARTICULAR; INTRAMUSCULAR
Status: COMPLETED | OUTPATIENT
Start: 2019-08-01 | End: 2019-08-01

## 2019-08-01 RX ADMIN — TRIAMCINOLONE ACETONIDE 40 MG: 40 INJECTION, SUSPENSION INTRA-ARTICULAR; INTRAMUSCULAR at 03:08

## 2019-08-01 NOTE — PROGRESS NOTES
Subjective:      Patient ID: Isael Salmon is a 73 y.o. female.    Chief Complaint: Pain of the Right Hand    Isael Salmon returns to clinic today with a several month history of right hand and base of thumb pain. She complains of pain when performing activities such as gripping twisting or lifting objects.  She has attempted to use a brace however it is not very successful at reducing her discomfort.  She was recently treated for a right distal radius fracture in conservatively.  She denies any numbness or tingling no other complaints      Review of Systems   Musculoskeletal: Positive for arthritis, joint pain and joint swelling. Negative for muscle weakness and stiffness.   All other systems reviewed and are negative.        Objective:            General    Vitals reviewed.  Constitutional: She is oriented to person, place, and time. She appears well-nourished. No distress.   Pulmonary/Chest: Effort normal.   Neurological: She is alert and oriented to person, place, and time.   Psychiatric: She has a normal mood and affect.             Right Hand/Wrist Exam     Inspection   Effusion: Wrist - absent Hand -  absent  Deformity: Wrist - deformity Hand -  deformity    Pain   Wrist - The patient exhibits pain of the CMC.    Swelling   Wrist - The patient is swollen on the CMC.    Tenderness   The patient is tender to palpation of the snuff box.    Other     Neuorologic Exam    Median Distribution: normal  Ulnar Distribution: normal  Radial Distribution: normal    Comments:  Crepitus and pain with compression of the CMC joint at the base of the thumb         Radiology impression:  EXAMINATION:  XR HAND COMPLETE 3 VIEW RIGHT    CLINICAL HISTORY:  Pain, unspecified    TECHNIQUE:  PA, lateral, and oblique views of the right hand were performed.    COMPARISON:  None available    FINDINGS:  No acute fracture or dislocation.  No radiopaque foreign bodies.  There is air multifocal degenerative changes.  Moderate  degenerative changes at the basal joint.  Degenerative changes of the interphalangeal joints, most notable at the 2nd DIP joint.  Chondrocalcinosis of the wrist joint.      Impression       Multifocal degenerative change as above.         I have explained the risks, benefits, and alternatives of the procedure in detail.  The patient voices understanding and all questions have been answered.  The patient agrees to proceed as planned. After a sterile prep of the skin the right thumb CMC joint was injected from the dorsal approach using a 25 gauge needle with a combination of 1cc 1% plain xylocaine and 40 mg of Kenalog.  The patient is cautioned and immediate relief of pain is secondary to the local anesthetic and will be temporary.  After the anesthetic wears off there may be a increase in pain that may last for a few hours or a few days and they should use ice to help alleviate this flair up of pain.             Assessment:       Encounter Diagnosis   Name Primary?    Arthritis of carpometacarpal (CMC) joint of right thumb Yes          Plan:       Isael was seen today for pain.    Diagnoses and all orders for this visit:    Arthritis of carpometacarpal (CMC) joint of right thumb    Other orders  -     triamcinolone acetonide injection 40 mg     Based on the patient's complaints and x-ray findings she has right thumb CMC joint arthritis. I discussed the pathophysiology progression treatment of this.  She was advised to purchase a Cool Comfort splint to wear during activities and use NSAIDs for inflammation control.  She was also advised to avoid activities that elicit pain. She was provided with a corticosteroid injection to the CMC joint to help alleviate her symptoms. I discussed that if her symptoms fail improve worsen she may return to clinic otherwise she may follow up p.r.n.

## 2019-08-13 ENCOUNTER — OFFICE VISIT (OUTPATIENT)
Dept: GASTROENTEROLOGY | Facility: CLINIC | Age: 74
End: 2019-08-13
Payer: MEDICARE

## 2019-08-13 VITALS
BODY MASS INDEX: 27.67 KG/M2 | SYSTOLIC BLOOD PRESSURE: 132 MMHG | WEIGHT: 172.19 LBS | DIASTOLIC BLOOD PRESSURE: 76 MMHG | HEIGHT: 66 IN | HEART RATE: 70 BPM

## 2019-08-13 DIAGNOSIS — Z86.010 HISTORY OF COLON POLYPS: ICD-10-CM

## 2019-08-13 DIAGNOSIS — K55.20 ANGIODYSPLASIA OF SMALL INTESTINE: ICD-10-CM

## 2019-08-13 DIAGNOSIS — R19.5 OCCULT BLOOD IN STOOLS: ICD-10-CM

## 2019-08-13 DIAGNOSIS — K59.09 CHRONIC CONSTIPATION: ICD-10-CM

## 2019-08-13 DIAGNOSIS — D50.0 IRON DEFICIENCY ANEMIA DUE TO CHRONIC BLOOD LOSS: Primary | ICD-10-CM

## 2019-08-13 PROCEDURE — 99214 PR OFFICE/OUTPT VISIT, EST, LEVL IV, 30-39 MIN: ICD-10-PCS | Mod: S$PBB,,, | Performed by: INTERNAL MEDICINE

## 2019-08-13 PROCEDURE — 99214 OFFICE O/P EST MOD 30 MIN: CPT | Mod: S$PBB,,, | Performed by: INTERNAL MEDICINE

## 2019-08-13 PROCEDURE — 99999 PR PBB SHADOW E&M-EST. PATIENT-LVL III: ICD-10-PCS | Mod: PBBFAC,,, | Performed by: INTERNAL MEDICINE

## 2019-08-13 PROCEDURE — 99999 PR PBB SHADOW E&M-EST. PATIENT-LVL III: CPT | Mod: PBBFAC,,, | Performed by: INTERNAL MEDICINE

## 2019-08-13 PROCEDURE — 99213 OFFICE O/P EST LOW 20 MIN: CPT | Mod: PBBFAC,PO | Performed by: INTERNAL MEDICINE

## 2019-08-13 NOTE — PATIENT INSTRUCTIONS
Increase metamucil to 3 tabs twice daily.    Can take the Colace stool softener twice daily if still constipated after increasing metamucil.    Try taking your iron pill once daily.

## 2019-08-13 NOTE — PROGRESS NOTES
[facility] Established Patient Visit    Reason for Visit:    Chief Complaint   Patient presents with    GI Problem     postive blood in stool    Abdominal Pain    Anemia       PCP: Janett Montoya    HPI:  Isael Salmon is a 73 y.o. female here for follow-up of anemia.  Previously seen by Dr. Alanis in April of 2017.  More recently seen by her PCP.  She continues to report ongoing dark stools.  She is not taking her iron every day as it causes constipation. She really is only using it once every 2 weeks.  She denies other signs of overt GI bleeding.  She is occult stool positive for blood.  Most recently her ferritin on 07/08/2019 was 29.  Prior evaluation by Dr. Alanis as well as by Dr. Dillon.  Wireless PillCam endoscopy done 04/11/2017 during which a small AVM was seen in the proximal bowel.  Dr. Dillon intended to do a balloon assisted enteroscopy 05/26/2016; however, she had problems with hypoxia during the test therefore it was discontinued.  He was able to complete a device assisted colonoscopy and noted only sigmoid colon diverticulosis.  Dr. Alanis had previously attempted a colonoscopy 10/26/2015 due to a history of polyps, but this was incomplete due to restricted mobility of her colon.    She has had also constipation that has been present for a long time.  She uses Metamucil 2 tablets twice a day.  She also takes Colace.  She has had use glycerin suppositories at times.  She usually has a bowel movement on a daily basis, but lately has been every other day.            PMHX:  has a past medical history of Adjustment disorder (7/26/2012), Anemia (7/26/2012), AR (allergic rhinitis) (9/5/2014), Bronchitis, Fibrocystic breast disease in female (7/26/2012), GERD (gastroesophageal reflux disease) (7/26/2012), Gout, Gout, arthritis (7/26/2012), History of blood transfusion (7/26/2012), History of colonic polyps (7/26/2012), Hypercalcemia (9/20/2012), Hyperlipidemia  (7/26/2012), Hypertension (7/26/2012), Nuclear sclerosis (5/2/2014), Osteoarthritis (7/26/2012), Other hyperparathyroidism (9/20/2012), Postmenopausal status (7/26/2012), PVD (peripheral vascular disease) (7/26/2012), Stroke (1997), Superficial vein thrombosis, Transient ischemic attack (7/26/2012), and Varicose veins (7/26/2012).    PSHX:  has a past surgical history that includes carotid endarterectomy (1997); Carotid endarterectomy (Right, 1997); Vascular surgery; Hysterectomy; Colonoscopy (N/A, 10/26/2015); ORIF finger fracture (12/18/15); Breast surgery; broken second finger (Right, 12/2015); TIA; Cataract extraction w/  intraocular lens implant (Right, 05/01/2018); Cataract extraction w/  intraocular lens implant (Left, 05/15/2018); Breast cyst excision (Left); and Breast cyst aspiration (Bilateral).    The patient's social and family histories were reviewed by me and updated in the appropriate section of the electronic medical record.    Review of patient's allergies indicates:   Allergen Reactions    Latex      Other reaction(s): Rash    Pcn [penicillins]      Other reaction(s): rash    Sulfa (sulfonamide antibiotics)      Other reaction(s): blisters    Avelox [moxifloxacin]      Other reaction(s): racing heart  Other reaction(s): shakes    Ciprofloxacin Other (See Comments)     Room starts to spin , nausea and dizziness    Codeine      Other reaction(s): nausea  Other reaction(s): weakness    Norco [hydrocodone-acetaminophen] Nausea And Vomiting and Other (See Comments)       Prior to Admission medications    Medication Sig Start Date End Date Taking? Authorizing Provider   albuterol (PROVENTIL/VENTOLIN HFA) 90 mcg/actuation inhaler Inhale 2 puffs into the lungs every 4 (four) hours as needed for Wheezing (or coughing fit). 11/6/18  Yes Sasha Ying NP   allopurinol (ZYLOPRIM) 100 MG tablet Take 2 tablets (200 mg total) by mouth once daily. 4/8/19  Yes Janett Montoya MD   aspirin (ECOTRIN) 81  MG EC tablet Take 81 mg by mouth once daily.    Yes Historical Provider, MD   augmented betamethasone dipropionate (DIPROLENE-AF) 0.05 % cream Apply topically 2 (two) times daily. 7/8/19  Yes Janett Montoya MD   BIOTIN ORAL Take 1 tablet by mouth once daily.   Yes Historical Provider, MD   co-enzyme Q-10 30 mg capsule Take 200 mg by mouth once daily.   Yes Historical Provider, MD   colchicine 0.6 mg tablet One tablet every hour during gouty flare until have diarrhea. Max 6 tablets daily  Patient taking differently: as needed. One tablet every hour during gouty flare until have diarrhea. Max 6 tablets daily 8/31/15  Yes Janett Montoya MD   diclofenac sodium 1 % Gel Apply 2 g topically once daily. 5/23/17  Yes Sukhdeep Aguilar MD   dicyclomine (BENTYL) 10 MG capsule Take 1 capsule (10 mg total) by mouth 4 (four) times daily as needed. 1 Capsule Oral Four times a day 1/23/15  Yes Janett Montoya MD   ergocalciferol (ERGOCALCIFEROL) 50,000 unit Cap Take 1 capsule (50,000 Units total) by mouth twice a week. 9/17/18  Yes Janett Montoya MD   escitalopram oxalate (LEXAPRO) 10 MG tablet Take 0.5 tablets (5 mg total) by mouth once daily. 6/14/19  Yes Janett Montoya MD   estradiol (ESTRACE) 0.01 % (0.1 mg/gram) vaginal cream Place vaginally as needed.    Yes Historical Provider, MD   ferrous sulfate 325 mg (65 mg iron) Tab tablet Take 1 tablet (325 mg total) by mouth every 12 (twelve) hours.  Patient taking differently: Take 325 mg by mouth every 12 (twelve) hours.  4/13/17  Yes Manuel Alanis MD   fluticasone (FLONASE) 50 mcg/actuation nasal spray 2 sprays by Each Nare route daily as needed. 2 Aerosol, Spray Nasal Every day 2/24/14  Yes Kimberlee Duque PA-C   FOLBIC 2.5-25-2 mg Tab TAKE 1 TABLET BY MOUTH DAILY 10/22/18  Yes Janett Montoya MD   indomethacin (INDOCIN) 50 MG capsule Take 1 capsule (50 mg total) by mouth 3 (three) times daily as needed. 8/31/15  Yes Janett Montoya MD   ipratropium  "(ATROVENT) 0.02 % nebulizer solution Take 2.5 mLs (500 mcg total) by nebulization 3 (three) times daily as needed for Wheezing. 11/7/18  Yes Sasha Ying NP   losartan (COZAAR) 50 MG tablet Take 1 tablet (50 mg total) by mouth 2 (two) times daily. 4/8/19 4/7/20 Yes Janett Montoya MD   omeprazole (PRILOSEC) 20 MG capsule Take 1 capsule (20 mg total) by mouth 2 (two) times daily. 4/8/19  Yes Janett Montoya MD   rosuvastatin (CRESTOR) 20 MG tablet Take 1 tablet (20 mg total) by mouth once daily. 4/8/19 4/7/20 Yes Janett Montoya MD   spironolactone (ALDACTONE) 25 MG tablet TAKE 1 TABLET BY MOUTH ONCE DAILY 7/25/19  Yes Janett Montoya MD   neomycin-polymyxin-hydrocortisone (CORTISPORIN) 3.5-10,000-1 mg/mL-unit/mL-% otic suspension apply to affected area OF NAILS twice a day 7/11/17   Rebecca Herrera MD   spironolactone (ALDACTONE) 25 MG tablet Take 1 tablet (25 mg total) by mouth once daily. 7/26/19   Janett Montoya MD   tiZANidine (ZANAFLEX) 4 MG tablet 1/2-1 tablet every 8 hours as needed for muscle spasm 1/10/19   Janett Montoya MD   salsalate (DISALCID) 750 MG Tab Take 1 tablet (750 mg total) by mouth 3 (three) times daily. 11/4/13 3/14/16  Anil Gaytan MD         Objective Findings:  Vital Signs:  Ht 5' 5.5" (1.664 m)   Wt 78.1 kg (172 lb 2.9 oz)   LMP  (LMP Unknown)   BMI 28.22 kg/m²  Body mass index is 28.22 kg/m².    Physical Exam:  General Appearance:  Well appearing in no acute distress, appears stated age      Labs:  Lab Results   Component Value Date    WBC 5.44 07/08/2019    HGB 10.0 (L) 07/08/2019    HCT 31.7 (L) 07/08/2019    MCV 94 07/08/2019    RDW 15.6 (H) 07/08/2019     07/08/2019    GRAN 3.8 07/08/2019    GRAN 69.2 07/08/2019    LYMPH 1.1 07/08/2019    LYMPH 19.9 07/08/2019    MONO 0.4 07/08/2019    MONO 8.1 07/08/2019    EOS 0.1 07/08/2019    BASO 0.01 07/08/2019     Lab Results   Component Value Date     07/08/2019    K 4.6 07/08/2019     " 07/08/2019    CO2 30 (H) 07/08/2019    GLU 89 07/08/2019    BUN 22 07/08/2019    CREATININE 1.0 07/08/2019    CALCIUM 10.8 (H) 07/08/2019    PROT 7.1 07/08/2019    ALBUMIN 3.9 07/08/2019    BILITOT 0.5 07/08/2019    ALKPHOS 85 07/08/2019    AST 18 07/08/2019    ALT 14 07/08/2019                   Assessment:  Isael Salmon is a 73 y.o. female here with:  1. Iron deficiency anemia due to chronic blood loss    2. Angiodysplasia of small intestine    3. Occult blood in stools    4. Chronic constipation    5. History of colon polyps      Iron deficiency anemia has been present since around 2006.  She has a noted angiectasia in the proximal small bowel.  She is Hemoccult positive as well. Her blood counts dipped down for short period of time but her back up now.  Prior attempt at deep enteroscopy was unsuccessful due to hypoxia during the procedure. She has had multiple upper and lower endoscopic procedures in the past as well as a complete wireless pill camera endoscopy.  No concerning lesions found other than prior history of colon polyps.  She is not due for colonoscopy until 2021.  Prior colonoscopy was difficult and her last was completed using balloon over tube assisted technique.    Chronic constipation present for many years.        Recommendations:  Risks of endoscopy outweigh benefits at this time given her relatively stable blood counts.  I recommend that she restart taking iron at least once daily.  This may exacerbate her constipation; therefore, I recommend she increase her Metamucil to 3 tablets twice daily and may use Colace stool softener twice a day if needed.  Continue to monitor her blood counts.  If her blood counts do drop significantly, then I would consider antegrade double balloon enteroscopy verses repeat wireless PillCam endoscopy to help localize a treatable lesion.  Repeat colonoscopy in 2021 which may require the use of balloon over tube.    Follow-up as needed in GI  clinic      Medical decision making:  Visit time 30 min with more than 50% of time discussing evaluation and treatment of iron deficiency anemia as well as chronic constipation.        Jaziel Kingsley MD

## 2019-09-10 ENCOUNTER — TELEPHONE (OUTPATIENT)
Dept: INTERNAL MEDICINE | Facility: CLINIC | Age: 74
End: 2019-09-10

## 2019-09-11 ENCOUNTER — OFFICE VISIT (OUTPATIENT)
Dept: INTERNAL MEDICINE | Facility: CLINIC | Age: 74
End: 2019-09-11
Payer: MEDICARE

## 2019-09-11 VITALS
HEART RATE: 79 BPM | HEIGHT: 66 IN | DIASTOLIC BLOOD PRESSURE: 60 MMHG | BODY MASS INDEX: 28.31 KG/M2 | SYSTOLIC BLOOD PRESSURE: 120 MMHG | WEIGHT: 176.13 LBS

## 2019-09-11 DIAGNOSIS — Z86.73 HISTORY OF TRANSIENT ISCHEMIC ATTACK (TIA): ICD-10-CM

## 2019-09-11 DIAGNOSIS — I77.9 BILATERAL CAROTID ARTERY DISEASE, UNSPECIFIED TYPE: ICD-10-CM

## 2019-09-11 DIAGNOSIS — Z00.00 ENCOUNTER FOR PREVENTIVE HEALTH EXAMINATION: Primary | ICD-10-CM

## 2019-09-11 DIAGNOSIS — G44.329 CHRONIC POST-TRAUMATIC HEADACHE, NOT INTRACTABLE: ICD-10-CM

## 2019-09-11 DIAGNOSIS — Z86.010 HISTORY OF COLON POLYPS: ICD-10-CM

## 2019-09-11 DIAGNOSIS — M1A.09X0 IDIOPATHIC CHRONIC GOUT OF MULTIPLE SITES WITHOUT TOPHUS: ICD-10-CM

## 2019-09-11 DIAGNOSIS — I70.0 AORTIC ATHEROSCLEROSIS: ICD-10-CM

## 2019-09-11 DIAGNOSIS — D64.9 MILD CHRONIC ANEMIA: ICD-10-CM

## 2019-09-11 DIAGNOSIS — F43.20 ADJUSTMENT DISORDER, UNSPECIFIED TYPE: ICD-10-CM

## 2019-09-11 DIAGNOSIS — N39.46 MIXED INCONTINENCE URGE AND STRESS (MALE)(FEMALE): ICD-10-CM

## 2019-09-11 DIAGNOSIS — R05.3 CHRONIC COUGH: ICD-10-CM

## 2019-09-11 DIAGNOSIS — I73.9 PVD (PERIPHERAL VASCULAR DISEASE): ICD-10-CM

## 2019-09-11 DIAGNOSIS — E83.52 HYPERCALCEMIA: ICD-10-CM

## 2019-09-11 DIAGNOSIS — J84.10 CALCIFIED GRANULOMA OF LUNG: ICD-10-CM

## 2019-09-11 DIAGNOSIS — I10 ESSENTIAL HYPERTENSION: ICD-10-CM

## 2019-09-11 DIAGNOSIS — G89.29 OTHER CHRONIC PAIN: ICD-10-CM

## 2019-09-11 DIAGNOSIS — D50.8 OTHER IRON DEFICIENCY ANEMIA: ICD-10-CM

## 2019-09-11 DIAGNOSIS — I83.93 ASYMPTOMATIC VARICOSE VEINS OF BOTH LOWER EXTREMITIES: ICD-10-CM

## 2019-09-11 DIAGNOSIS — J44.9 CHRONIC OBSTRUCTIVE PULMONARY DISEASE, UNSPECIFIED COPD TYPE: ICD-10-CM

## 2019-09-11 DIAGNOSIS — K59.09 CHRONIC CONSTIPATION: ICD-10-CM

## 2019-09-11 DIAGNOSIS — E78.5 HYPERLIPIDEMIA, UNSPECIFIED HYPERLIPIDEMIA TYPE: ICD-10-CM

## 2019-09-11 DIAGNOSIS — K21.9 GASTROESOPHAGEAL REFLUX DISEASE WITHOUT ESOPHAGITIS: ICD-10-CM

## 2019-09-11 PROBLEM — Z98.890 POST-OPERATIVE STATE: Status: RESOLVED | Noted: 2018-05-02 | Resolved: 2019-09-11

## 2019-09-11 PROBLEM — M25.531 RIGHT WRIST PAIN: Status: RESOLVED | Noted: 2018-10-29 | Resolved: 2019-09-11

## 2019-09-11 PROCEDURE — G0439 PR MEDICARE ANNUAL WELLNESS SUBSEQUENT VISIT: ICD-10-PCS | Mod: S$GLB,,, | Performed by: NURSE PRACTITIONER

## 2019-09-11 PROCEDURE — 99215 OFFICE O/P EST HI 40 MIN: CPT | Mod: PBBFAC | Performed by: NURSE PRACTITIONER

## 2019-09-11 PROCEDURE — 99999 PR PBB SHADOW E&M-EST. PATIENT-LVL V: CPT | Mod: PBBFAC,,, | Performed by: NURSE PRACTITIONER

## 2019-09-11 PROCEDURE — 99999 PR PBB SHADOW E&M-EST. PATIENT-LVL V: ICD-10-PCS | Mod: PBBFAC,,, | Performed by: NURSE PRACTITIONER

## 2019-09-11 PROCEDURE — G0439 PPPS, SUBSEQ VISIT: HCPCS | Mod: S$GLB,,, | Performed by: NURSE PRACTITIONER

## 2019-09-11 RX ORDER — ASCORBIC ACID 500 MG
500 TABLET ORAL DAILY
COMMUNITY

## 2019-09-11 RX ORDER — VITAMIN B COMPLEX
1 CAPSULE ORAL DAILY
COMMUNITY

## 2019-09-11 NOTE — PROGRESS NOTES
"Isael Salmon presented for a  Medicare AWV and comprehensive Health Risk Assessment today. The following components were reviewed and updated:    · Medical history  · Family History  · Social history  · Allergies and Current Medications  · Health Risk Assessment  · Health Maintenance  · Care Team     ** See Completed Assessments for Annual Wellness Visit within the encounter summary.**       The following assessments were completed:  · Living Situation  · CAGE  · Depression Screening  · Timed Get Up and Go  · Whisper Test  · Cognitive Function Screening  ·   ·   ·   · Nutrition Screening  · ADL Screening  · PAQ Screening    Vitals:    09/11/19 1336   BP: 120/60   Pulse: 79   Weight: 79.9 kg (176 lb 2.4 oz)   Height: 5' 5.5" (1.664 m)     Body mass index is 28.87 kg/m².  Physical Exam   Constitutional: She is oriented to person, place, and time. She appears well-developed and well-nourished.   HENT:   Head: Normocephalic.   Cardiovascular: Normal rate and regular rhythm.   Pulmonary/Chest: Effort normal and breath sounds normal.   Abdominal: Soft. Bowel sounds are normal.   Musculoskeletal: Normal range of motion. She exhibits no edema.   Neurological: She is alert and oriented to person, place, and time.   Skin: Skin is warm and dry.   Psychiatric: She has a normal mood and affect.   Nursing note and vitals reviewed.        Diagnoses and health risks identified today and associated recommendations/orders:    1. Encounter for preventive health examination  Here for Health Risk Assessment/Annual Wellness Visit.  Health maintenance reviewed and updated. Follow up in one year.    2. Essential hypertension  Chronic, stable on current medications. Followed by PCP.    3. Aortic atherosclerosis  Chronic, stable on current medications. Noted CT Enterogram 1/13/17. Followed by PCP.    4. Bilateral carotid artery disease, unspecified type  Chronic, stable on current medications. 1-39% bilaterally noted on U/S 3/16/18. " Followed by PCP.    5. PVD (peripheral vascular disease)  Chronic, stable on current medications. Followed by PCP.    6. History of transient ischemic attack (TIA)  Stable on current medications. Followed by PCP.    7. Hyperlipidemia, unspecified hyperlipidemia type  Chronic, stable on current medications. Followed by PCP.    8. Asymptomatic varicose veins of both lower extremities  Chronic, stable. Followed by PCP.    9. Calcified granuloma of lung  Chronic, stable. Noted RLL CXR 11/16/18. . Followed by PCP.    10. Chronic cough  Chronic, stable. Followed by PCP.    11. Chronic obstructive pulmonary disease, unspecified COPD type  Chronic, stable. Followed by PCP.    12. Chronic post-traumatic headache, not intractable  Chronic, stable. Followed by PCP.    13. Other chronic pain  Chronic, stable. Followed by PCP.    14. Adjustment disorder, unspecified type  Chronic, stable. Followed by PCP.    15. Mixed incontinence urge and stress (male)(female)  Chronic, stable. Followed by PCP.    16. Hypercalcemia  Stable. Last WNL.. Followed by PCP.    17. Mild chronic anemia  Chronic, stable on current medication. Followed by PCP.    18. Other iron deficiency anemia  Chronic, stable on current medication. Followed by PCP.    19. Gastroesophageal reflux disease without esophagitis  Chronic, stable on current medication. Followed by PCP.    20. History of colon polyps  Stable. Followed by PCP.    21. Chronic constipation  Chronic, stable. Followed by PCP.    22. Idiopathic chronic gout of multiple sites without tophus  Chronic, stable on current medication. Followed by PCP.      Provided Isael with a 5-10 year written screening schedule and personal prevention plan. Recommendations were developed using the USPSTF age appropriate recommendations. Education, counseling, and referrals were provided as needed. After Visit Summary printed and given to patient which includes a list of additional screenings\tests needed.    Follow  up in 5 weeks (on 10/15/2019).with PCP    Jinny Martinez NP

## 2019-09-11 NOTE — PATIENT INSTRUCTIONS
Counseling and Referral of Other Preventative  (Italic type indicates deductible and co-insurance are waived)    Patient Name: Isael Salmon  Today's Date: 9/11/2019    Health Maintenance       Date Due Completion Date    Shingles Vaccine (2 of 3) 05/28/2008 4/2/2008 - Obtain new shingles vaccine - SHINGRIX - when available    DEXA SCAN 10/28/2017 10/28/2014 -Need to schedule    Override on 7/14/2011: Done    Pneumococcal Vaccine (65+ Low/Medium Risk) (2 of 2 - PPSV23) 11/03/2017 11/3/2016    TETANUS VACCINE 04/02/2018 4/2/2008    Lipid Panel 09/17/2019 9/17/2018    High Dose Statin 08/25/2020 8/25/2019    Colonoscopy 05/26/2021 5/26/2016        No orders of the defined types were placed in this encounter.    The following information is provided to all patients.  This information is to help you find resources for any of the problems found today that may be affecting your health:                Living healthy guide: www.[facility].[place].gov      Understanding Diabetes: www.diabetes.org      Eating healthy: www.cdc.gov/healthyweight      CDC home safety checklist: www.cdc.gov/steadi/patient.html      Agency on Aging: www.goea.[place].gov      Alcoholics anonymous (AA): www.aa.org      Physical Activity: www.perry.nih.gov/zo7cdgq      Tobacco use: www.quitwithusla.org

## 2019-09-12 ENCOUNTER — OFFICE VISIT (OUTPATIENT)
Dept: INTERNAL MEDICINE | Facility: CLINIC | Age: 74
End: 2019-09-12
Payer: MEDICARE

## 2019-09-12 VITALS
HEIGHT: 65 IN | BODY MASS INDEX: 29.27 KG/M2 | WEIGHT: 175.69 LBS | DIASTOLIC BLOOD PRESSURE: 58 MMHG | OXYGEN SATURATION: 98 % | HEART RATE: 84 BPM | SYSTOLIC BLOOD PRESSURE: 132 MMHG

## 2019-09-12 DIAGNOSIS — J32.9 SINUSITIS, UNSPECIFIED CHRONICITY, UNSPECIFIED LOCATION: Primary | ICD-10-CM

## 2019-09-12 PROCEDURE — 99213 OFFICE O/P EST LOW 20 MIN: CPT | Mod: S$PBB,,, | Performed by: INTERNAL MEDICINE

## 2019-09-12 PROCEDURE — 99999 PR PBB SHADOW E&M-EST. PATIENT-LVL III: CPT | Mod: PBBFAC,,, | Performed by: INTERNAL MEDICINE

## 2019-09-12 PROCEDURE — 99213 PR OFFICE/OUTPT VISIT, EST, LEVL III, 20-29 MIN: ICD-10-PCS | Mod: S$PBB,,, | Performed by: INTERNAL MEDICINE

## 2019-09-12 PROCEDURE — 99213 OFFICE O/P EST LOW 20 MIN: CPT | Mod: PBBFAC | Performed by: INTERNAL MEDICINE

## 2019-09-12 PROCEDURE — 96372 THER/PROPH/DIAG INJ SC/IM: CPT | Mod: PBBFAC

## 2019-09-12 PROCEDURE — 99999 PR PBB SHADOW E&M-EST. PATIENT-LVL III: ICD-10-PCS | Mod: PBBFAC,,, | Performed by: INTERNAL MEDICINE

## 2019-09-12 RX ORDER — LEVOFLOXACIN 500 MG/1
500 TABLET, FILM COATED ORAL DAILY
Qty: 10 TABLET | Refills: 0 | Status: SHIPPED | OUTPATIENT
Start: 2019-09-12 | End: 2019-09-22

## 2019-09-12 RX ORDER — BETAMETHASONE SODIUM PHOSPHATE AND BETAMETHASONE ACETATE 3; 3 MG/ML; MG/ML
6 INJECTION, SUSPENSION INTRA-ARTICULAR; INTRALESIONAL; INTRAMUSCULAR; SOFT TISSUE
Status: COMPLETED | OUTPATIENT
Start: 2019-09-12 | End: 2019-09-12

## 2019-09-12 RX ORDER — BENZONATATE 200 MG/1
200 CAPSULE ORAL 2 TIMES DAILY PRN
Qty: 20 CAPSULE | Refills: 1 | Status: SHIPPED | OUTPATIENT
Start: 2019-09-12 | End: 2019-09-19

## 2019-09-12 RX ADMIN — BETAMETHASONE ACETATE AND BETAMETHASONE SODIUM PHOSPHATE 6 MG: 3; 3 INJECTION, SUSPENSION INTRA-ARTICULAR; INTRALESIONAL; INTRAMUSCULAR; SOFT TISSUE at 11:09

## 2019-09-12 NOTE — PROGRESS NOTES
Subjective:       Patient ID: Isael Salmon is a 73 y.o. female.    Chief Complaint: Sinus Problem    73 year old lady reports that seh started with a cold about a month ago  The sore throat and runny nose are gone but now she has a periorbital headache that is terrible and sense of post nasal drip that makes her cough.  No fever or chills    Review of Systems   Constitutional: Negative for activity change, chills, fatigue and fever.   HENT: Negative for congestion, ear pain, nosebleeds, postnasal drip, sinus pressure and sore throat.    Eyes: Negative.  Negative for visual disturbance.   Respiratory: Negative for cough, chest tightness, shortness of breath and wheezing.    Cardiovascular: Negative for chest pain.   Gastrointestinal: Negative for abdominal pain, diarrhea, nausea and vomiting.   Genitourinary: Negative for difficulty urinating, dysuria, frequency and urgency.   Musculoskeletal: Negative for arthralgias and neck stiffness.   Skin: Negative for rash.   Neurological: Negative for dizziness, weakness and headaches.   Psychiatric/Behavioral: Negative for sleep disturbance. The patient is not nervous/anxious.        Objective:      Physical Exam   Constitutional: She is oriented to person, place, and time. She appears well-developed and well-nourished.  Non-toxic appearance. No distress.   HENT:   Head: Normocephalic and atraumatic.   Right Ear: Tympanic membrane, external ear and ear canal normal.   Left Ear: Tympanic membrane, external ear and ear canal normal.   Nose: Right sinus exhibits frontal sinus tenderness. Right sinus exhibits no maxillary sinus tenderness. Left sinus exhibits frontal sinus tenderness. Left sinus exhibits no maxillary sinus tenderness.   Mouth/Throat:       Eyes: Pupils are equal, round, and reactive to light. EOM are normal. No scleral icterus.   Neck: Normal range of motion. Neck supple. No thyromegaly present.   Cardiovascular: Normal rate, regular rhythm and normal  heart sounds.   Pulmonary/Chest: Effort normal and breath sounds normal.   Abdominal: Soft. Bowel sounds are normal. She exhibits no mass. There is no tenderness. There is no rebound.   Musculoskeletal: Normal range of motion.   Lymphadenopathy:     She has no cervical adenopathy.   Neurological: She is alert and oriented to person, place, and time. She has normal reflexes. She displays normal reflexes. No cranial nerve deficit. She exhibits normal muscle tone. Coordination normal.   Skin: Skin is warm and dry.   Psychiatric: She has a normal mood and affect. Her behavior is normal.       Assessment:       1. Sinusitis, unspecified chronicity, unspecified location        Plan:   Isael was seen today for sinus problem.    Diagnoses and all orders for this visit:    Sinusitis, unspecified chronicity, unspecified location    Other orders  -     levoFLOXacin (LEVAQUIN) 500 MG tablet; Take 1 tablet (500 mg total) by mouth once daily. for 10 days  -     betamethasone acetate-betamethasone sodium phosphate injection 6 mg  -     benzonatate (TESSALON) 200 MG capsule; Take 1 capsule (200 mg total) by mouth 2 (two) times daily as needed for Cough.

## 2019-09-18 ENCOUNTER — OFFICE VISIT (OUTPATIENT)
Dept: OPTOMETRY | Facility: CLINIC | Age: 74
End: 2019-09-18
Payer: MEDICARE

## 2019-09-18 DIAGNOSIS — H04.123 BILATERAL DRY EYES: Primary | ICD-10-CM

## 2019-09-18 DIAGNOSIS — Z96.1 PSEUDOPHAKIA OF BOTH EYES: ICD-10-CM

## 2019-09-18 DIAGNOSIS — G43.109 OCULAR MIGRAINE: ICD-10-CM

## 2019-09-18 DIAGNOSIS — H52.7 REFRACTIVE ERROR: ICD-10-CM

## 2019-09-18 PROCEDURE — 99212 OFFICE O/P EST SF 10 MIN: CPT | Mod: PBBFAC,PO | Performed by: OPTOMETRIST

## 2019-09-18 PROCEDURE — 92015 PR REFRACTION: ICD-10-PCS | Mod: ,,, | Performed by: OPTOMETRIST

## 2019-09-18 PROCEDURE — 92014 PR EYE EXAM, EST PATIENT,COMPREHESV: ICD-10-PCS | Mod: S$PBB,,, | Performed by: OPTOMETRIST

## 2019-09-18 PROCEDURE — 99999 PR PBB SHADOW E&M-EST. PATIENT-LVL II: ICD-10-PCS | Mod: PBBFAC,,, | Performed by: OPTOMETRIST

## 2019-09-18 PROCEDURE — 92014 COMPRE OPH EXAM EST PT 1/>: CPT | Mod: S$PBB,,, | Performed by: OPTOMETRIST

## 2019-09-18 PROCEDURE — 99999 PR PBB SHADOW E&M-EST. PATIENT-LVL II: CPT | Mod: PBBFAC,,, | Performed by: OPTOMETRIST

## 2019-09-18 PROCEDURE — 92015 DETERMINE REFRACTIVE STATE: CPT | Mod: ,,, | Performed by: OPTOMETRIST

## 2019-09-18 RX ORDER — NEOMYCIN SULFATE, POLYMYXIN B SULFATE AND DEXAMETHASONE 3.5; 10000; 1 MG/ML; [USP'U]/ML; MG/ML
1 SUSPENSION/ DROPS OPHTHALMIC 4 TIMES DAILY
Qty: 5 ML | Refills: 0 | Status: SHIPPED | OUTPATIENT
Start: 2019-09-18 | End: 2019-09-25

## 2019-09-18 NOTE — PROGRESS NOTES
BARI CERNA 01/2019 for contact blepharoconjunctivitis.  Here for annual exam   today.  Glasses about 3 yrs. Old and near is not as clear.  Patient states   eyes are dry, water OD>OS and uses AT's TID OU.  Patient had ocular   migraine yesterday and has a history of migraines . Patient is being   treated for a sinus infection and has had headaches for the past 3 weeks.       Last edited by Guerda Baker on 9/18/2019  1:43 PM. (History)            Assessment /Plan     For exam results, see Encounter Report.    Bilateral dry eyes    Ocular migraine    Pseudophakia of both eyes    Refractive error    Other orders  -     neomycin-polymyxin-dexamethasone (MAXITROL) 3.5mg/mL-10,000 unit/mL-0.1 % DrpS; Place 1 drop into both eyes 4 (four) times daily. for 7 days  Dispense: 5 mL; Refill: 0      1. Slight flare up, may restart Maxitrol 4x/day x 1 week. If better after 1 week restart tear drops.   2. Long history, Monitor condition. Patient to report any changes. RTC 1 year recheck.  3,4. Monitor condition. Patient to report any changes. RTC 1 year recheck.

## 2019-10-11 RX ORDER — FOLIC ACID-PYRIDOXINE-CYANOCOBALAMIN TAB 2.5-25-2 MG 2.5-25-2 MG
TAB ORAL
Qty: 30 TABLET | Refills: 4 | Status: SHIPPED | OUTPATIENT
Start: 2019-10-11 | End: 2020-08-01 | Stop reason: SDUPTHER

## 2019-10-14 ENCOUNTER — HOSPITAL ENCOUNTER (EMERGENCY)
Facility: OTHER | Age: 74
Discharge: HOME | End: 2019-10-14
Attending: EMERGENCY MEDICINE
Payer: MEDICARE

## 2019-10-14 VITALS
HEIGHT: 65 IN | RESPIRATION RATE: 17 BRPM | DIASTOLIC BLOOD PRESSURE: 70 MMHG | BODY MASS INDEX: 28.32 KG/M2 | HEART RATE: 98 BPM | OXYGEN SATURATION: 100 % | SYSTOLIC BLOOD PRESSURE: 158 MMHG | TEMPERATURE: 98 F | WEIGHT: 170 LBS

## 2019-10-14 DIAGNOSIS — R05.9 COUGH: ICD-10-CM

## 2019-10-14 DIAGNOSIS — S00.83XA FACIAL CONTUSION, INITIAL ENCOUNTER: ICD-10-CM

## 2019-10-14 DIAGNOSIS — S16.1XXA CERVICAL STRAIN, ACUTE, INITIAL ENCOUNTER: ICD-10-CM

## 2019-10-14 DIAGNOSIS — S09.90XA INJURY OF HEAD, INITIAL ENCOUNTER: Primary | ICD-10-CM

## 2019-10-14 DIAGNOSIS — T14.90XA TRAUMA: ICD-10-CM

## 2019-10-14 DIAGNOSIS — S63.501A RIGHT WRIST SPRAIN, INITIAL ENCOUNTER: ICD-10-CM

## 2019-10-14 DIAGNOSIS — S39.012A LUMBAR STRAIN, INITIAL ENCOUNTER: ICD-10-CM

## 2019-10-14 PROCEDURE — 29125 APPL SHORT ARM SPLINT STATIC: CPT | Mod: RT

## 2019-10-14 PROCEDURE — 90715 TDAP VACCINE 7 YRS/> IM: CPT | Performed by: EMERGENCY MEDICINE

## 2019-10-14 PROCEDURE — 99284 EMERGENCY DEPT VISIT MOD MDM: CPT | Mod: 25

## 2019-10-14 PROCEDURE — 90471 IMMUNIZATION ADMIN: CPT | Performed by: EMERGENCY MEDICINE

## 2019-10-14 PROCEDURE — 63600175 PHARM REV CODE 636 W HCPCS: Performed by: EMERGENCY MEDICINE

## 2019-10-14 RX ORDER — HYDROCODONE BITARTRATE AND ACETAMINOPHEN 5; 325 MG/1; MG/1
1 TABLET ORAL EVERY 6 HOURS PRN
Qty: 18 TABLET | Refills: 0 | Status: SHIPPED | OUTPATIENT
Start: 2019-10-14 | End: 2019-12-09

## 2019-10-14 RX ORDER — CYCLOBENZAPRINE HCL 10 MG
10 TABLET ORAL 3 TIMES DAILY PRN
Qty: 30 TABLET | Refills: 0 | Status: SHIPPED | OUTPATIENT
Start: 2019-10-14 | End: 2019-10-24

## 2019-10-14 RX ORDER — ONDANSETRON 4 MG/1
4 TABLET, ORALLY DISINTEGRATING ORAL EVERY 6 HOURS PRN
Qty: 20 TABLET | Refills: 0 | Status: SHIPPED | OUTPATIENT
Start: 2019-10-14 | End: 2020-03-29

## 2019-10-14 RX ADMIN — CLOSTRIDIUM TETANI TOXOID ANTIGEN (FORMALDEHYDE INACTIVATED), CORYNEBACTERIUM DIPHTHERIAE TOXOID ANTIGEN (FORMALDEHYDE INACTIVATED), BORDETELLA PERTUSSIS TOXOID ANTIGEN (GLUTARALDEHYDE INACTIVATED), BORDETELLA PERTUSSIS FILAMENTOUS HEMAGGLUTININ ANTIGEN (FORMALDEHYDE INACTIVATED), BORDETELLA PERTUSSIS PERTACTIN ANTIGEN, AND BORDETELLA PERTUSSIS FIMBRIAE 2/3 ANTIGEN 0.5 ML: 5; 2; 2.5; 5; 3; 5 INJECTION, SUSPENSION INTRAMUSCULAR at 04:10

## 2019-10-14 NOTE — ED TRIAGE NOTES
Pt reports trip and fall resulting in hitting R side of body on floor. Pt with bruising to R eyebrow and to lip. Pt denies LOC. Denies being on any blood thinners. Pt reports low back pain, R wrist pain, and  severe headache.

## 2019-10-14 NOTE — ED NOTES
Bed: Incoming ED Transfer 1  Expected date:   Expected time:   Means of arrival:   Comments:  Ems sob anxiety attack , coached down .

## 2019-10-14 NOTE — ED PROVIDER NOTES
Encounter Date: 10/14/2019    SCRIBE #1 NOTE: IJenna, am scribing for, and in the presence of, Dr. Novoa.       History     Chief Complaint   Patient presents with    Fall     machanical fall, hit lip and and forehead.  Bleeding from lip controlled.  Also c/o lower R flank pain.  Denies any LOC.  denies being on any blood thinners.     Time seen by provider: 4:25 PM    This is a 73 y.o. female who presents s/p trip and fall today. She states that she landed onto the right side oh her body and hit her head onto the ground. She states that she has pain to the head, right flank, right sided neck pain, and lower back, she also has a bleeding wound to the upper lip. She denies any LOC, numbness, headache, dizziness, weakness, chest pain, and N/V. Pt states that her tetanus is not UTD. She reports adverse reaction of nausea with norco use.    The history is provided by the patient.     Review of patient's allergies indicates:   Allergen Reactions    Latex      Other reaction(s): Rash    Pcn [penicillins]      Other reaction(s): rash    Sulfa (sulfonamide antibiotics)      Other reaction(s): blisters    Avelox [moxifloxacin]      Other reaction(s): racing heart  Other reaction(s): shakes    Ciprofloxacin Other (See Comments)     Room starts to spin , nausea and dizziness    Codeine      Other reaction(s): nausea  Other reaction(s): weakness    Norco [hydrocodone-acetaminophen] Nausea And Vomiting and Other (See Comments)     Past Medical History:   Diagnosis Date    Adjustment disorder 7/26/2012    Anemia 7/26/2012    AR (allergic rhinitis) 9/5/2014    Bronchitis     Cataract     Fibrocystic breast disease in female 7/26/2012    GERD (gastroesophageal reflux disease) 7/26/2012    Gout     Gout, arthritis 7/26/2012    History of blood transfusion 7/26/2012    History of colonic polyps 7/26/2012    Hypercalcemia 9/20/2012    Hyperlipidemia 7/26/2012    Hypertension 7/26/2012    Nuclear  sclerosis 5/2/2014    Osteoarthritis 7/26/2012    Other hyperparathyroidism 9/20/2012    Postmenopausal status 7/26/2012    PVD (peripheral vascular disease) 7/26/2012    left leg laser of vein with injection    Stroke 1997    TIA    Superficial vein thrombosis     Transient ischemic attack 7/26/2012    Varicose veins 7/26/2012     Past Surgical History:   Procedure Laterality Date    BREAST CYST ASPIRATION Bilateral     multiple ones over the years    BREAST CYST EXCISION Left     BREAST SURGERY      breast reduction    broken second finger Right 12/2015    pins placed    carotid endarterectomy  1997    right    CAROTID ENDARTERECTOMY Right 1997    CATARACT EXTRACTION W/  INTRAOCULAR LENS IMPLANT Right 05/01/2018    Dr. Yost    CATARACT EXTRACTION W/  INTRAOCULAR LENS IMPLANT Left 05/15/2018    Dr. Lester    COLONOSCOPY N/A 10/26/2015    Procedure: COLONOSCOPY;  Surgeon: Manuel Alanis MD;  Location: [facility] ([address] [place] [address]);  Service: Endoscopy;  Laterality: N/A;    EYE SURGERY      HYSTERECTOMY      ORIF FINGER FRACTURE  12/18/15    TIA      VASCULAR SURGERY      left leg vein laser     Family History   Problem Relation Age of Onset    Heart failure Father     Cancer Mother         pancreas    Cataracts Mother     Hypertension Mother     Cancer Brother         bladder    Cataracts Brother     Hypertension Brother     Cataracts Brother     Hypertension Brother     Cataracts Brother     Hypertension Brother     Bone cancer Brother     Cataracts Brother     Hypertension Brother     Breast cancer Neg Hx     Ovarian cancer Neg Hx     Allergies Neg Hx     Asthma Neg Hx     Eczema Neg Hx     Cervical cancer Neg Hx     Endometrial cancer Neg Hx     Vaginal cancer Neg Hx     Amblyopia Neg Hx     Blindness Neg Hx     Diabetes Neg Hx     Glaucoma Neg Hx     Macular degeneration Neg Hx     Retinal detachment Neg Hx     Strabismus Neg Hx     Stroke Neg Hx      Thyroid disease Neg Hx      Social History     Tobacco Use    Smoking status: Former Smoker     Last attempt to quit: 1989     Years since quittin.1    Smokeless tobacco: Never Used    Tobacco comment: quit  -  pack per day since age 16   Substance Use Topics    Alcohol use: Yes     Alcohol/week: 1.0 standard drinks     Types: 1 Glasses of wine per week     Comment: maybe one glass of wine monthly    Drug use: No     Review of Systems   HENT: Positive for congestion and rhinorrhea. Negative for ear discharge, ear pain and postnasal drip.         Positive for head pain.   Respiratory: Positive for cough. Negative for shortness of breath and wheezing.    Cardiovascular: Negative for chest pain.   Gastrointestinal: Negative for nausea and vomiting.   Genitourinary: Positive for flank pain (right).   Musculoskeletal: Positive for back pain and neck pain (right sided).   Skin: Positive for wound (upper lip).   Neurological: Negative for dizziness, syncope, weakness, light-headedness, numbness and headaches.   All other systems reviewed and are negative.      Physical Exam     Initial Vitals   BP Pulse Resp Temp SpO2   10/14/19 1610 10/14/19 1610 10/14/19 1610 10/14/19 1614 10/14/19 1610   (!) 177/75 100 18 97.7 °F (36.5 °C) 98 %      MAP       --                Physical Exam    Nursing note and vitals reviewed.  Constitutional: She appears well-developed and well-nourished. She is not diaphoretic. No distress. Cervical collar in place.   HENT:   Head: Normocephalic.   Right Ear: External ear normal.   Left Ear: External ear normal.   Nose: Nose normal.   Right periorbital edema, ecchymosis, and TTP along the superior orbital rim and inferior orbital rim. 0.5 laceration to the oral mucosa. Normal clench. Normal dentition.    Clear rhinorrhea  No TTP over sinuses   Eyes: Conjunctivae and EOM are normal. Pupils are equal, round, and reactive to light.   No hyphema. Pupils equal height.   Neck: Neck  supple. No tracheal deviation present. No JVD present.   No C-spine TTP, but pain with rotation so will leave c-collar on.    Cardiovascular: Normal rate, regular rhythm, normal heart sounds and intact distal pulses. Exam reveals no gallop and no friction rub.    No murmur heard.  Pulmonary/Chest: Breath sounds normal. No respiratory distress. She has no wheezes. She has no rhonchi. She has no rales. She exhibits no tenderness.   No chest wall TTP.   Abdominal: Soft. Bowel sounds are normal. She exhibits no distension and no mass. There is no tenderness. There is no rebound and no guarding.   Right flank TTP.   Musculoskeletal: Normal range of motion. She exhibits no tenderness.   No C, T-spine TTP.   Slight TTP along lumbar spine without stepoff or crepitus  Right wrist: TTP to the right wrist, abrasion distal dorsum of radial surface, NVID   Neurological: She is alert and oriented to person, place, and time. She has normal strength and normal reflexes. She displays normal reflexes. No cranial nerve deficit or sensory deficit. GCS score is 15. GCS eye subscore is 4. GCS verbal subscore is 5. GCS motor subscore is 6.   Skin: Skin is warm and dry. No rash noted. No erythema.   Psychiatric: She has a normal mood and affect. Her behavior is normal.         ED Course   Procedures  Labs Reviewed - No data to display       Imaging Results          CT Lumbar Spine Without Contrast (Final result)  Result time 10/14/19 17:44:31    Final result by Britney Godinez MD (10/14/19 17:44:31)                 Impression:      No acute lumbar spine abnormalities identified.      Electronically signed by: Britney Godinez MD  Date:    10/14/2019  Time:    17:44             Narrative:    EXAMINATION:  CT LUMBAR SPINE WITHOUT CONTRAST    CLINICAL HISTORY:  Spine fracture, traumatic, lumbar;    TECHNIQUE:  Low-dose axial, sagittal and coronal reformations are obtained through the lumbar spine.  Contrast was not  administered.    COMPARISON:  None.    FINDINGS:  Lumbar spine alignment is within normal limits.  No evidence of acute lumbar spine fracture or dislocation.  Mild multilevel degenerative changes and spurring are seen.  There is mild lower lumbar facet arthropathy.  Diffuse disc bulge is seen at the L4-5 level.  Sacrum is normal in appearance.  Partially visualized lower abdominal and intrapelvic contents show no acute abnormalities.  Right renal cysts are visualized with suspected small left hepatic cyst.  There is prominent atherosclerosis.  Colonic diverticulosis is noted.                               CT Cervical Spine Without Contrast (Final result)  Result time 10/14/19 17:52:13    Final result by Natalee Aguilar MD (10/14/19 17:52:13)                 Impression:      No fracture.    Spondylosis of the cervical spine as detailed above.      Electronically signed by: Natalee Aguilar MD  Date:    10/14/2019  Time:    17:52             Narrative:    EXAMINATION:  CT CERVICAL SPINE WITHOUT CONTRAST    CLINICAL HISTORY:  C-spine trauma, NEXUS/CCR positive, low risk;    TECHNIQUE:  Low dose axial images, sagittal and coronal reformations were performed though the cervical spine.  Contrast was not administered.    COMPARISON:  None    FINDINGS:  The alignment of the cervical spine appears normal.  The vertebral body heights are well maintained.    Mild disc space narrowing C4-C5.    Severe disc space narrowing C5-C6.    No definite fracture seen or osseous lesions.    There are sizable osteophyte from C3 through C7 which could cause symptoms of dysphagia.    Partially calcified pannus posterior to the dens do not appear to cause significant canal stenosis.    C2-C3: No canal or foraminal stenosis.    C3-C4: No canal stenosis or foraminal narrowing.    C4-C5: Right facet joint osseous hypertrophy, posterior disc osteophyte complex.  Mild central canal stenosis.  Mild bilateral foraminal narrowing.    C5-C6:  Posterior disc osteophyte complex and bilateral uncovertebral spur, mild central canal stenosis and mild to moderate bilateral foraminal narrowing.    C6-C7: No apparent canal or foraminal stenosis.    The upper thoracic spine appears normal.  The upper lung zones appear normal.  The airways are patent.  The upper and lower neck soft tissues appear normal.  There is atherosclerotic plaque at the bilateral carotid bulb region.  Several surgical clips adjacent to the right carotid.                               CT Maxillofacial Without Contrast (Final result)  Result time 10/14/19 17:40:59    Final result by Britney Godinez MD (10/14/19 17:40:59)                 Impression:      No acute intracranial abnormalities identified.    No acute facial fractures identified.      Electronically signed by: Britney Godinez MD  Date:    10/14/2019  Time:    17:40             Narrative:    EXAMINATION:  CT HEAD WITHOUT CONTRAST; CT MAXILLOFACIAL WITHOUT CONTRAST    CLINICAL HISTORY:  Head trauma, minor, GCS>=13, NOC/NEXUS/CCR positive, first study;; Facial fracture(s);    TECHNIQUE:  Low dose axial images were obtained through the head and maxillofacial region.  Coronal and sagittal reformations were also performed. Contrast was not administered.    COMPARISON:  CT head from September 2018.    FINDINGS:  There is mild generalized cerebral volume loss.  No evidence of acute/recent major vascular distribution cerebral infarction, intraparenchymal hemorrhage, or intra-axial space occupying lesion. The ventricular system is normal in size and configuration with no evidence of hydrocephalus. No effacement of the skull-base cisterns. No abnormal extra-axial fluid collections or blood products.    Right supraorbital and periorbital soft tissue swelling is seen.  Orbits otherwise show no significant abnormalities.  Globes appear symmetric and intact.  No evidence of retrobulbar hematoma.  No acute displaced facial fractures are  identified.  Visualized paranasal sinuses and mastoid air cells are clear. The calvarium shows no significant abnormality.                               CT Head Without Contrast (Final result)  Result time 10/14/19 17:40:59    Final result by Britney Godinez MD (10/14/19 17:40:59)                 Impression:      No acute intracranial abnormalities identified.    No acute facial fractures identified.      Electronically signed by: Britney Godinez MD  Date:    10/14/2019  Time:    17:40             Narrative:    EXAMINATION:  CT HEAD WITHOUT CONTRAST; CT MAXILLOFACIAL WITHOUT CONTRAST    CLINICAL HISTORY:  Head trauma, minor, GCS>=13, NOC/NEXUS/CCR positive, first study;; Facial fracture(s);    TECHNIQUE:  Low dose axial images were obtained through the head and maxillofacial region.  Coronal and sagittal reformations were also performed. Contrast was not administered.    COMPARISON:  CT head from September 2018.    FINDINGS:  There is mild generalized cerebral volume loss.  No evidence of acute/recent major vascular distribution cerebral infarction, intraparenchymal hemorrhage, or intra-axial space occupying lesion. The ventricular system is normal in size and configuration with no evidence of hydrocephalus. No effacement of the skull-base cisterns. No abnormal extra-axial fluid collections or blood products.    Right supraorbital and periorbital soft tissue swelling is seen.  Orbits otherwise show no significant abnormalities.  Globes appear symmetric and intact.  No evidence of retrobulbar hematoma.  No acute displaced facial fractures are identified.  Visualized paranasal sinuses and mastoid air cells are clear. The calvarium shows no significant abnormality.                               X-Ray Chest 1 View (Final result)  Result time 10/14/19 17:20:18    Final result by Mohsen Jj MD (10/14/19 17:20:18)                 Impression:      No detrimental change or radiographic acute intrathoracic process  seen.      Electronically signed by: Mohsen Jj MD  Date:    10/14/2019  Time:    17:20             Narrative:    EXAMINATION:  XR CHEST 1 VIEW    CLINICAL HISTORY:  Cough    TECHNIQUE:  Single frontal view of the chest was performed.    COMPARISON:  Chest radiograph 11/16/2018    FINDINGS:  Resolution is somewhat limited by body habitus with underpenetration.Cardiac silhouette is midline and mildly enlarged similar to prior without evidence of failure.  Mediastinal contours are within normal limits.  Pulmonary vasculature and hilar regions are within normal limits.  Right lung base calcified granuloma is stable.  Few scattered linear opacities consistent with subsegmental scarring versus atelectasis.  The lungs are otherwise symmetrically well expanded without large consolidation, pleural effusion or pneumothorax.  No acute osseous process seen.  PA and lateral views can be obtained.                               X-Ray Wrist Complete Right (Final result)  Result time 10/14/19 17:18:38    Final result by Mohsen Jj MD (10/14/19 17:18:38)                 Impression:      Distal radial interval healed fracture without acute displaced fracture-dislocation identified.      Electronically signed by: Mohsen Jj MD  Date:    10/14/2019  Time:    17:18             Narrative:    EXAMINATION:  XR WRIST COMPLETE 3 VIEWS RIGHT    CLINICAL HISTORY:  Injury, unspecified, initial encounter    TECHNIQUE:  PA, lateral, and oblique views of the right wrist were performed.    COMPARISON:  Right wrist series 09/27/2018    FINDINGS:  Interval healed fracture of the distal radial meta epiphyseal junction.  Carpus appears well aligned and intact.  No displaced fracture, dislocation or destructive osseous process.  Chondrocalcinosis at the radiocarpal and ulnocarpal intervals similar to prior.  Grossly similar degenerative changes noted about the wrist most prominent at the 1st CMC joint.  No subcutaneous emphysema or radiodense  retained foreign body.                              X-Rays:   Independently Interpreted Readings:   Chest X-Ray: No focal infiltrates. No PTX. No pleural effusions.    Other Readings:  X-ray wrist right: No acute fracture. No dislocation. Healing distal radial fracture. No foreign body.    Medical Decision Making:   History:   Old Medical Records: I decided to obtain old medical records.  Differential Diagnosis:   ICH, cerebral edema, TBI  Cervical strain / fracture, ligamentous injury, spinal cord injury / syndrome  Viral URI, pneumonia, aortic dissection, tension pneumothorax, pericardial tamponade, mediastinitis,  esophageal rupture, pericarditis  Lumbar strain / fracture, ligamentous injury, spinal cord injury / syndrome  Wrist fracture / dislocation, compartment syndrome, tendon injury, sprain, vascular injury    Independently Interpreted Test(s):   I have ordered and independently interpreted X-rays - see prior notes.  Clinical Tests:   Radiological Study: Ordered and Reviewed  ED Management:  GCS = 15, CT head ordered due to LOC, age greater than 65.     C-spine cleared after CT report was reviewed, patient had FROM of motion of neck without significant pain. I discussed with her that would provide pain medication which will help her with her cough. She needs to find decongestant that is approved to use with HBP.  We placed a removable / velcro splint over her wrist which she sprained in the fall. I educated patient on the warning signs and symptoms for which she must seek immediate medical attention. Patient improved with treatment in the emergency department and comfortable going home. Discussed reasons to return and importance of followup.  Patient understands that the emergency visit today is primarily to address immediate concerns and to rule out emergent cause of symptoms and that they may require further workup and evaluation as an outpatient. All questions addressed and patient given discharge  instructions and followup information.               Scribe Attestation:   Scribe #1: I performed the above scribed service and the documentation accurately describes the services I performed. I attest to the accuracy of the note.    Attending Attestation:           Physician Attestation for Scribe:  Physician Attestation Statement for Scribe #1: I, Dr. Novoa, reviewed documentation, as scribed by Jenna Slater in my presence, and it is both accurate and complete.                    Clinical Impression:     1. Injury of head, initial encounter    2. Trauma    3. Cough    4. Facial contusion, initial encounter    5. Right wrist sprain, initial encounter    6. Cervical strain, acute, initial encounter    7. Lumbar strain, initial encounter          Disposition:   Disposition: Discharged  Condition: Stable                        Raz Novoa MD  10/16/19 0909

## 2019-11-01 ENCOUNTER — OFFICE VISIT (OUTPATIENT)
Dept: INTERNAL MEDICINE | Facility: CLINIC | Age: 74
End: 2019-11-01
Payer: MEDICARE

## 2019-11-01 ENCOUNTER — HOSPITAL ENCOUNTER (OUTPATIENT)
Dept: RADIOLOGY | Facility: HOSPITAL | Age: 74
Discharge: HOME | End: 2019-11-01
Attending: INTERNAL MEDICINE
Payer: MEDICARE

## 2019-11-01 VITALS
HEIGHT: 65 IN | SYSTOLIC BLOOD PRESSURE: 130 MMHG | OXYGEN SATURATION: 99 % | DIASTOLIC BLOOD PRESSURE: 70 MMHG | HEART RATE: 78 BPM | TEMPERATURE: 98 F | BODY MASS INDEX: 29.29 KG/M2 | WEIGHT: 175.81 LBS

## 2019-11-01 DIAGNOSIS — I67.2 CEREBRAL ATHEROSCLEROSIS: ICD-10-CM

## 2019-11-01 DIAGNOSIS — I10 ESSENTIAL HYPERTENSION: ICD-10-CM

## 2019-11-01 DIAGNOSIS — D50.9 IRON DEFICIENCY ANEMIA, UNSPECIFIED IRON DEFICIENCY ANEMIA TYPE: ICD-10-CM

## 2019-11-01 DIAGNOSIS — K21.9 GASTROESOPHAGEAL REFLUX DISEASE WITHOUT ESOPHAGITIS: ICD-10-CM

## 2019-11-01 DIAGNOSIS — I65.29 STENOSIS OF CAROTID ARTERY, UNSPECIFIED LATERALITY: Primary | ICD-10-CM

## 2019-11-01 DIAGNOSIS — I65.29 STENOSIS OF CAROTID ARTERY, UNSPECIFIED LATERALITY: ICD-10-CM

## 2019-11-01 DIAGNOSIS — E78.5 HYPERLIPIDEMIA, UNSPECIFIED HYPERLIPIDEMIA TYPE: ICD-10-CM

## 2019-11-01 PROBLEM — S52.501D CLOSED FRACTURE OF DISTAL END OF RIGHT RADIUS WITH ROUTINE HEALING: Status: RESOLVED | Noted: 2018-10-29 | Resolved: 2019-11-01

## 2019-11-01 PROCEDURE — 93880 US CAROTID BILATERAL: ICD-10-PCS | Mod: 26,,, | Performed by: INTERNAL MEDICINE

## 2019-11-01 PROCEDURE — 99999 PR PBB SHADOW E&M-EST. PATIENT-LVL III: ICD-10-PCS | Mod: PBBFAC,,, | Performed by: INTERNAL MEDICINE

## 2019-11-01 PROCEDURE — 93880 EXTRACRANIAL BILAT STUDY: CPT | Mod: TC

## 2019-11-01 PROCEDURE — 93880 EXTRACRANIAL BILAT STUDY: CPT | Mod: 26,,, | Performed by: INTERNAL MEDICINE

## 2019-11-01 PROCEDURE — 99214 PR OFFICE/OUTPT VISIT, EST, LEVL IV, 30-39 MIN: ICD-10-PCS | Mod: S$PBB,,, | Performed by: INTERNAL MEDICINE

## 2019-11-01 PROCEDURE — 99999 PR PBB SHADOW E&M-EST. PATIENT-LVL III: CPT | Mod: PBBFAC,,, | Performed by: INTERNAL MEDICINE

## 2019-11-01 PROCEDURE — 99214 OFFICE O/P EST MOD 30 MIN: CPT | Mod: S$PBB,,, | Performed by: INTERNAL MEDICINE

## 2019-11-01 PROCEDURE — 99213 OFFICE O/P EST LOW 20 MIN: CPT | Mod: PBBFAC,25 | Performed by: INTERNAL MEDICINE

## 2019-11-01 NOTE — PROGRESS NOTES
CHIEF COMPLAINT: ER follow up     HISTORY OF PRESENT ILLNESS: 74-year-old woman presents for ER follow up.    She tripped and fell over a door sill and flew forward.   Landed on the right side side of her face. She is not sure if she blacked out.  She came to the ED via ambulance.  She was bleeding from the mouth. CT scans and xrays were negative.      She has had right elbow pain for the last week.  The medial aspect of the right elbow is tender to touch.  No pain with movement of the elbow joint.  NO redness. No injury.    She sprained her right wrist - she wore a brace for awhile and the wrist is better.     She had a bruise and swelling on the right eyebrow that is still swollen and tender. She has 2 black eyes that are slowly getting better.  She has swelling on the right cheek that is slowly getting better.      She had severe constipation from the hydrocodone.  She was impacted and was able to manually disimpact the area. SHe is now taking colace daily and more water.     Knee pain is controlled with diclofenac and brace. She gets an injection fi needed.      She has had some sinus congestion with clear drainage lately.      Reflux has been controlled with omeprazole 20 mg daily.      Mood has been better. She is taking lexapro 10 mg 1/2 tablet daily. She thinks she is doing better.       She is now taking Crestor 20 mg daily with co enzyme 10 200 mg daily for her hyperlipidemia. Rare muscle spasm         She is taking her iron supplement once daily      She had a normal cystoscope 12/17/14 due to recurrent UTI. No dysuria or hematuria now. She is no longer using estrogen vaginal cream for vaginal atrophy three times weekly        No nausea, vomiting, diarrhea, emilio, bloody stools. She continues to take losartan 50 mg twice daily and spironolactone 25 mg 1 tablet daily for hypertension.      She continues allopurinol 200 mg daily. No gouty flares       She is taking vitamin D 50,000 units twice  "weekly     She has some back pain.     PAST MEDICAL HISTORY:   1. Hypertension.   2. Hyperlipidemia.   3. Gout.   4. History of rashes.   5. TIA in   6. Fibrocystic breast disease.   7. Postmenopausal.   8. Osteoarthritis.   9. History of tobacco usage; quit in .   10. History of colon polyps; normal colonoscopy in 10/07 and normal 2010, due 2015   11. Normal bone mineral density scan in .   12. Anxiety and depression - controlled on lorazepam very rarely.   13. Varicose veins.   14. Intermittent GERD.   15. Iron deficiency anemia 2010 - due to erosive gastropathy on EGD 2010     PAST SURGICAL HISTORY:   1. Right carotid endarterectomy in .   2. Breast reduction surgery.   3. Total abdominal hysterectomy.   4. Blood transfusion prior to .     SOCIAL HISTORY: Quit smoking in ; smoked 1 PPD since age 16. No   alcohol use.     FAMILY HISTORY:   Father had gout, hypertension and heart failure; is . Mother had hypertension and pancreatic cancer; is also . Has five brothers, one of whom  in a motor vehicle accident. All have gout and hypertension. One brother has Crohn's disease; another has bladder cancer.     MEDICATIONS and ALLERGIES: Updated on epic.     PHYSICAL EXAMINATION:     BP (!) 130/50 (BP Location: Right arm, Patient Position: Sitting, BP Method: Large (Manual))   Pulse 93   Temp 97.6 °F (36.4 °C) (Oral)   Ht 5' 5" (1.651 m)   Wt 79.8 kg (175 lb 13.1 oz)   LMP  (LMP Unknown)   SpO2 99%   BMI 29.26 kg/m²     General: Alert, oriented. No apparent distress. Affect within normal   limits. Swelling under the right eye brow.   Bruising under both eyes, right greater than left.  Bruising on the right cheek  Conjunctivae anicteric. Tympanic membranes clear. Oropharynx clear.   Neck supple.   Respiratory effort normal. Lungs clear  Heart: Regular rate and rhythm without murmurs, gallops or rubs.   No lower extremity edema.          ASSESSMENT AND PLAN: "   1. Bruising right cheek - heat to the area. Reassured  2 . Anemia - labs   3. Heme positive stools - saw Dr Kingsley 8/13/19 - if blood counts drop, will work up GI tract again  2. Allergic rhinitis - stable.  4. Hyperlipidemia - On crestor 20 mg daily and Co Enzyme Q 10 200 mg daily.    5. Erosive gastropathy - on omeprazole 20 mg daily. Off NSAIDS.  6. Hypertension - Continue losartan 25 mg one tablet daily and spironolactone to 25 mg daily.   7. Hypercalcemia - stable  6. Gout - asymptomatic on allopurinol.   7. Situational stress - continue lexapro   8.  Carotid artery disease - on risk factor modification. US carotids 3/18/18 - 1 - 39% stenosis of the right and left internal carotid artery. US carotids  Screening - Colonscopy 10/15 with 4 polyps - it was incomplete. Repeat 5/26/16 - divericulosis otherwise normal - due 2021. MMG 4/19 BMD 7/11.  I will see her back in 3 months , sooner if problems arise     Answers for HPI/ROS submitted by the patient on 10/30/2019   activity change: No  unexpected weight change: No  neck pain: Yes  hearing loss: No  rhinorrhea: No  trouble swallowing: No  eye discharge: No  visual disturbance: No  chest tightness: No  wheezing: No  chest pain: No  palpitations: No  blood in stool: No  constipation: Yes  vomiting: No  diarrhea: No  polydipsia: No  polyuria: No  difficulty urinating: No  hematuria: No  menstrual problem: No  dysuria: No  joint swelling: Yes  arthralgias: Yes  headaches: Yes  weakness: No  confusion: No  dysphoric mood: No

## 2019-11-06 ENCOUNTER — HOSPITAL ENCOUNTER (EMERGENCY)
Facility: OTHER | Age: 74
Discharge: HOME | End: 2019-11-06
Attending: EMERGENCY MEDICINE
Payer: MEDICARE

## 2019-11-06 VITALS
BODY MASS INDEX: 28.45 KG/M2 | HEIGHT: 66 IN | SYSTOLIC BLOOD PRESSURE: 175 MMHG | HEART RATE: 80 BPM | RESPIRATION RATE: 17 BRPM | WEIGHT: 177 LBS | DIASTOLIC BLOOD PRESSURE: 74 MMHG | OXYGEN SATURATION: 99 % | TEMPERATURE: 98 F

## 2019-11-06 DIAGNOSIS — M54.2 NECK PAIN: ICD-10-CM

## 2019-11-06 DIAGNOSIS — I10 HYPERTENSION, UNSPECIFIED TYPE: ICD-10-CM

## 2019-11-06 DIAGNOSIS — M62.838 MUSCLE SPASM: Primary | ICD-10-CM

## 2019-11-06 PROCEDURE — 63600175 PHARM REV CODE 636 W HCPCS: Performed by: PHYSICIAN ASSISTANT

## 2019-11-06 PROCEDURE — 25000003 PHARM REV CODE 250: Performed by: PHYSICIAN ASSISTANT

## 2019-11-06 PROCEDURE — 96372 THER/PROPH/DIAG INJ SC/IM: CPT

## 2019-11-06 PROCEDURE — 99284 EMERGENCY DEPT VISIT MOD MDM: CPT | Mod: 25

## 2019-11-06 RX ORDER — KETOROLAC TROMETHAMINE 30 MG/ML
10 INJECTION, SOLUTION INTRAMUSCULAR; INTRAVENOUS
Status: COMPLETED | OUTPATIENT
Start: 2019-11-06 | End: 2019-11-06

## 2019-11-06 RX ORDER — NAPROXEN 500 MG/1
500 TABLET ORAL 2 TIMES DAILY WITH MEALS
Qty: 10 TABLET | Refills: 0 | Status: SHIPPED | OUTPATIENT
Start: 2019-11-06 | End: 2019-11-11

## 2019-11-06 RX ORDER — METHOCARBAMOL 750 MG/1
1500 TABLET, FILM COATED ORAL 3 TIMES DAILY
Qty: 42 TABLET | Refills: 0 | Status: SHIPPED | OUTPATIENT
Start: 2019-11-06 | End: 2019-11-13

## 2019-11-06 RX ORDER — DIAZEPAM 2 MG/1
2 TABLET ORAL
Status: DISCONTINUED | OUTPATIENT
Start: 2019-11-06 | End: 2019-11-07 | Stop reason: HOSPADM

## 2019-11-06 RX ORDER — METHOCARBAMOL 750 MG/1
1500 TABLET, FILM COATED ORAL
Status: COMPLETED | OUTPATIENT
Start: 2019-11-06 | End: 2019-11-06

## 2019-11-06 RX ORDER — DIAZEPAM 2 MG/1
2 TABLET ORAL
Status: COMPLETED | OUTPATIENT
Start: 2019-11-06 | End: 2019-11-06

## 2019-11-06 RX ADMIN — METHOCARBAMOL 1500 MG: 750 TABLET, FILM COATED ORAL at 09:11

## 2019-11-06 RX ADMIN — DIAZEPAM 2 MG: 2 TABLET ORAL at 09:11

## 2019-11-06 RX ADMIN — KETOROLAC TROMETHAMINE 10 MG: 30 INJECTION, SOLUTION INTRAMUSCULAR; INTRAVENOUS at 09:11

## 2019-11-07 NOTE — DISCHARGE INSTRUCTIONS
You should take Naproxen and Robaxin for your muscle spasms. It is also important to stay well hydrated. Please contact your PCP tomorrow to see if you can schedule a follow up appointment within the next 3-5 days. Return to the ER if your symptoms worsen or if any other concerning symptoms develop.

## 2019-11-07 NOTE — ED PROVIDER NOTES
Encounter Date: 11/6/2019       History     Chief Complaint   Patient presents with    Spasms     muscle spasms in right side of neck and right shoulder     74-year-old female with PMHx of HTN, hyperlipidemia, gout, GERD, osteoarthritis, PVD, SVT, carotid stenosis (s/p endarterectomy), and TIA who presents to the ED with c/o spasms. Per pt, she developed mild right neck/shoulder pain 2 days ago, which acutely worsened this afternoon and has been persistent since. She also reports associated hoarseness.  She has had similar symptoms in the past and was told it was related to a muscle spasm.  She describes her pain as a constant tightness, preventing her from turning her head to the right, rated 10/10. She took acetaminophen and ibuprofen, which provided temporary relief yesterday but was ineffective today. She took Flexeril this afternoon, but it was also without relief. Denies fevers, chills, chest pain, SOB, numbness, weakness, abdominal pain, n/v/d, dysuria, urinary frequency, congestion, vision changes, sore throat, or any other medical complaints.     The history is provided by the patient.     Review of patient's allergies indicates:   Allergen Reactions    Latex      Other reaction(s): Rash    Pcn [penicillins]      Other reaction(s): rash    Sulfa (sulfonamide antibiotics)      Other reaction(s): blisters    Avelox [moxifloxacin]      Other reaction(s): racing heart  Other reaction(s): shakes    Ciprofloxacin Other (See Comments)     Room starts to spin , nausea and dizziness    Codeine      Other reaction(s): nausea  Other reaction(s): weakness    Norco [hydrocodone-acetaminophen] Nausea And Vomiting and Other (See Comments)     Past Medical History:   Diagnosis Date    Adjustment disorder 7/26/2012    Anemia 7/26/2012    AR (allergic rhinitis) 9/5/2014    Bronchitis     Cataract     Fibrocystic breast disease in female 7/26/2012    GERD (gastroesophageal reflux disease) 7/26/2012    Gout      Gout, arthritis 7/26/2012    History of blood transfusion 7/26/2012    History of colonic polyps 7/26/2012    Hypercalcemia 9/20/2012    Hyperlipidemia 7/26/2012    Hypertension 7/26/2012    Nuclear sclerosis 5/2/2014    Osteoarthritis 7/26/2012    Other hyperparathyroidism 9/20/2012    Postmenopausal status 7/26/2012    PVD (peripheral vascular disease) 7/26/2012    left leg laser of vein with injection    Stroke 1997    TIA    Superficial vein thrombosis     Transient ischemic attack 7/26/2012    Varicose veins 7/26/2012     Past Surgical History:   Procedure Laterality Date    BREAST CYST ASPIRATION Bilateral     multiple ones over the years    BREAST CYST EXCISION Left     BREAST SURGERY      breast reduction    broken second finger Right 12/2015    pins placed    carotid endarterectomy  1997    right    CAROTID ENDARTERECTOMY Right 1997    CATARACT EXTRACTION W/  INTRAOCULAR LENS IMPLANT Right 05/01/2018    Dr. Yost    CATARACT EXTRACTION W/  INTRAOCULAR LENS IMPLANT Left 05/15/2018    Dr. Lester    COLONOSCOPY N/A 10/26/2015    Procedure: COLONOSCOPY;  Surgeon: Manuel Alanis MD;  Location: [facility] ([address] [place] [address]);  Service: Endoscopy;  Laterality: N/A;    EYE SURGERY      HYSTERECTOMY      ORIF FINGER FRACTURE  12/18/15    TIA      VASCULAR SURGERY      left leg vein laser     Family History   Problem Relation Age of Onset    Heart failure Father     Cancer Mother         pancreas    Cataracts Mother     Hypertension Mother     Cancer Brother         bladder    Cataracts Brother     Hypertension Brother     Cataracts Brother     Hypertension Brother     Cataracts Brother     Hypertension Brother     Bone cancer Brother     Cataracts Brother     Hypertension Brother     Breast cancer Neg Hx     Ovarian cancer Neg Hx     Allergies Neg Hx     Asthma Neg Hx     Eczema Neg Hx     Cervical cancer Neg Hx     Endometrial cancer Neg Hx     Vaginal cancer  Neg Hx     Amblyopia Neg Hx     Blindness Neg Hx     Diabetes Neg Hx     Glaucoma Neg Hx     Macular degeneration Neg Hx     Retinal detachment Neg Hx     Strabismus Neg Hx     Stroke Neg Hx     Thyroid disease Neg Hx      Social History     Tobacco Use    Smoking status: Former Smoker     Last attempt to quit: 1989     Years since quittin.1    Smokeless tobacco: Never Used    Tobacco comment: quit  -  pack per day since age 16   Substance Use Topics    Alcohol use: Yes     Alcohol/week: 1.0 standard drinks     Types: 1 Glasses of wine per week     Frequency: Monthly or less     Drinks per session: 1 or 2     Binge frequency: Never     Comment: maybe one glass of wine monthly    Drug use: No     Review of Systems   Constitutional: Negative for chills and fever.   HENT: Positive for voice change. Negative for congestion, ear pain, nosebleeds, rhinorrhea, sinus pressure, sinus pain and sore throat.    Eyes: Negative for visual disturbance.   Respiratory: Negative for shortness of breath.    Cardiovascular: Negative for chest pain.   Gastrointestinal: Negative for abdominal pain, blood in stool, constipation, diarrhea and vomiting.   Genitourinary: Negative for dysuria and frequency.   Musculoskeletal: Positive for neck pain. Negative for back pain.        +right neck/shoulder pain   Neurological: Negative for dizziness, weakness, light-headedness, numbness and headaches.   Psychiatric/Behavioral: Negative for confusion.       Physical Exam     Initial Vitals [19 1945]   BP Pulse Resp Temp SpO2   (!) 204/86 86 16 97.4 °F (36.3 °C) 100 %      MAP       --         Physical Exam    Nursing note and vitals reviewed.  Constitutional: She appears well-developed and well-nourished.   HENT:   Head: Normocephalic and atraumatic.   Eyes: Conjunctivae and EOM are normal. Pupils are equal, round, and reactive to light.   Neck: Normal range of motion. Neck supple.   Cardiovascular: Normal rate,  regular rhythm and normal heart sounds.   Pulses:       Carotid pulses are 2+ on the right side, and 2+ on the left side.  Pulmonary/Chest: Breath sounds normal. She has no wheezes. She has no rhonchi. She has no rales.   Abdominal: Soft. There is no tenderness. There is no rebound and no guarding.   Musculoskeletal: Normal range of motion. She exhibits no edema or tenderness.   Right sided cervical paraspinal and right shoulder TTP Decreased ROM of cervical spine 2/2 to pain. No midline C, T, or L spinal tenderness to palpation.    Neurological: She is alert and oriented to person, place, and time. She has normal strength.   Strength and sensation intact and symmetric in upper and lower extremities. No facial asymmetry. Speech clear. Gait normal.    Skin: Skin is warm.   Psychiatric: She has a normal mood and affect.         ED Course   Procedures  Labs Reviewed - No data to display       Imaging Results    None          Medical Decision Making:   History:   Old Medical Records: I decided to obtain old medical records.  Old Records Summarized: records from clinic visits.       <> Summary of Records: Carotid US done 5 days ago with no evidence of a hemodynamically significant carotid bifurcation stenosis. Calculated stenosis at the bilateral carotid bifurcations measure 1-39%.    Cervical CT 10/14/2019 with spondylosis of the cervical spine.   Initial Assessment:   74-year-old female with PMHx of HTN, hyperlipidemia, gout, GERD, osteoarthritis, PVD, SVT, carotid stenosis (s/p endarterectomy), and TIA who presents to the ED with c/o spasms. Per pt, she developed mild right neck/shoulder pain 2 days ago, which acutely worsened this afternoon and has been persistent since. She also reports associated hoarseness. Elevated BP of 204/86, which the pt contributes to pain. Afebrile. RRR. Lungs CTA bilaterally. Abdomen soft and nontender. Right sided cervical paraspinal and right shoulder TTP Decreased ROM of cervical spine  2/2 to pain. No midline C, T, or L spinal tenderness to palpation. Neurovascularly intact throughout. No focal neurologic deficits.   Differential Diagnosis:   DDx includes but is not limited to musculoskeletal spasm/strain, torticollis. Considered but do no suspect carotid artery dissection or TIA/CVA.   ED Management:  Pt's presentation is consistent with musculoskeletal spasm, which she has a history of in the past. Will give pt 1500 mg of Robaxin, 10 mg IM Toradol, and 2 mg PO Valium.     Upon reassessment, pt reports improved symptoms. She has improved ROM of her cervical spine. Her BP also improved to 175/74. She is stable for discharge with instructions to follow up with her PCP within a week to discuss her visit today and also to have her BP re-evaluated. Strict ER return precautions given. All questions answered. Pt expressed understanding and is agreeable with the plan.     I have discussed the treatment and management of this patient with my supervising physician, and we agree on the plan of care.      Meghna Painting PA-C                                   Clinical Impression:       ICD-10-CM ICD-9-CM   1. Muscle spasm M62.838 728.85   2. Hypertension, unspecified type I10 401.9   3. Neck pain M54.2 723.1         Disposition:   Disposition: Discharged  Condition: Stable                     Meghna Painting PA-C  11/07/19 0037

## 2019-11-07 NOTE — ED NOTES
Pt reporting R sided pain anterior shoulder pain x 3 days, reporting worsening of pain x this PM. Denies recent trauma, injury or heavy lifting. PMH of Carotid atherosclerosis in which she had carotid stents placed per pt report. Pt AAOx4 and appropriate at this time. Respirations even and unlabored. No acute distress noted. Denies SOB, CP, nausea, or vomiting, reporting pain intense with movement and is having spasms to affected location.

## 2019-11-20 ENCOUNTER — OFFICE VISIT (OUTPATIENT)
Dept: INTERNAL MEDICINE | Facility: CLINIC | Age: 74
End: 2019-11-20
Payer: MEDICARE

## 2019-11-20 VITALS
BODY MASS INDEX: 28.81 KG/M2 | OXYGEN SATURATION: 98 % | DIASTOLIC BLOOD PRESSURE: 62 MMHG | HEIGHT: 66 IN | SYSTOLIC BLOOD PRESSURE: 130 MMHG | HEART RATE: 84 BPM | WEIGHT: 179.25 LBS

## 2019-11-20 DIAGNOSIS — E78.5 HYPERLIPIDEMIA, UNSPECIFIED HYPERLIPIDEMIA TYPE: ICD-10-CM

## 2019-11-20 DIAGNOSIS — I10 ESSENTIAL HYPERTENSION: ICD-10-CM

## 2019-11-20 DIAGNOSIS — M62.838 MUSCLE SPASMS OF NECK: Primary | ICD-10-CM

## 2019-11-20 PROCEDURE — 1125F AMNT PAIN NOTED PAIN PRSNT: CPT | Mod: ,,, | Performed by: INTERNAL MEDICINE

## 2019-11-20 PROCEDURE — 99215 OFFICE O/P EST HI 40 MIN: CPT | Mod: PBBFAC | Performed by: INTERNAL MEDICINE

## 2019-11-20 PROCEDURE — 99214 PR OFFICE/OUTPT VISIT, EST, LEVL IV, 30-39 MIN: ICD-10-PCS | Mod: S$PBB,,, | Performed by: INTERNAL MEDICINE

## 2019-11-20 PROCEDURE — 96372 THER/PROPH/DIAG INJ SC/IM: CPT | Mod: PBBFAC

## 2019-11-20 PROCEDURE — 1159F PR MEDICATION LIST DOCUMENTED IN MEDICAL RECORD: ICD-10-PCS | Mod: ,,, | Performed by: INTERNAL MEDICINE

## 2019-11-20 PROCEDURE — 99999 PR PBB SHADOW E&M-EST. PATIENT-LVL V: CPT | Mod: PBBFAC,,, | Performed by: INTERNAL MEDICINE

## 2019-11-20 PROCEDURE — 1159F MED LIST DOCD IN RCRD: CPT | Mod: ,,, | Performed by: INTERNAL MEDICINE

## 2019-11-20 PROCEDURE — 99214 OFFICE O/P EST MOD 30 MIN: CPT | Mod: S$PBB,,, | Performed by: INTERNAL MEDICINE

## 2019-11-20 PROCEDURE — 1125F PR PAIN SEVERITY QUANTIFIED, PAIN PRESENT: ICD-10-PCS | Mod: ,,, | Performed by: INTERNAL MEDICINE

## 2019-11-20 PROCEDURE — 99999 PR PBB SHADOW E&M-EST. PATIENT-LVL V: ICD-10-PCS | Mod: PBBFAC,,, | Performed by: INTERNAL MEDICINE

## 2019-11-20 RX ORDER — TRIAMCINOLONE ACETONIDE 40 MG/ML
40 INJECTION, SUSPENSION INTRA-ARTICULAR; INTRAMUSCULAR ONCE
Status: COMPLETED | OUTPATIENT
Start: 2019-11-20 | End: 2019-11-20

## 2019-11-20 RX ORDER — KETOROLAC TROMETHAMINE 30 MG/ML
30 INJECTION, SOLUTION INTRAMUSCULAR; INTRAVENOUS
Status: COMPLETED | OUTPATIENT
Start: 2019-11-20 | End: 2019-11-20

## 2019-11-20 RX ORDER — PREDNISONE 10 MG/1
TABLET ORAL
Qty: 9 TABLET | Refills: 0 | Status: SHIPPED | OUTPATIENT
Start: 2019-11-20 | End: 2019-12-09

## 2019-11-20 RX ORDER — DIAZEPAM 5 MG/1
TABLET ORAL
Qty: 30 TABLET | Refills: 1 | Status: SHIPPED | OUTPATIENT
Start: 2019-11-20 | End: 2020-03-29

## 2019-11-20 RX ADMIN — TRIAMCINOLONE ACETONIDE 40 MG: 40 INJECTION, SUSPENSION INTRA-ARTICULAR; INTRAMUSCULAR at 01:11

## 2019-11-20 RX ADMIN — KETOROLAC TROMETHAMINE 30 MG: 30 INJECTION, SOLUTION INTRAMUSCULAR at 01:11

## 2019-11-20 NOTE — PROGRESS NOTES
CHIEF COMPLAINT: ER follow up     HISTORY OF PRESENT ILLNESS: 74-year-old woman presents for ER follow up.    She presented to the ED on 11/6/19 due to right neck pain.  She had severe right neck pain for several days.  She took methocarbamol 1500 mg twice daily which helped some. RIght sided neck pain resolved and she is now having left sided neck and shoulder pain for the last 3 days. She has taken tylenol and ibuprofen which has helped some.  Pain is currently 5/10.     No facial pain.  THe bruise on the right cheek is better.     Reflux has been controlled with omeprazole 20 mg daily.      Mood has been stable. She is taking lexapro 10 mg 1/2 tablet daily. She thinks she is doing better.       She is now taking Crestor 20 mg daily with co enzyme 10 200 mg daily for her hyperlipidemia. Rare muscle spasm         She is taking her iron supplement once daily      She had a normal cystoscope 12/17/14 due to recurrent UTI. No dysuria or hematuria now. She is no longer using estrogen vaginal cream for vaginal atrophy three times weekly        No nausea, vomiting, diarrhea, emilio, bloody stools. She continues to take losartan 50 mg twice daily and spironolactone 25 mg 1 tablet daily for hypertension.      She continues allopurinol 200 mg daily. No gouty flares       She is taking vitamin D 50,000 units twice weekly     She has some back pain.     PAST MEDICAL HISTORY:   1. Hypertension.   2. Hyperlipidemia.   3. Gout.   4. History of rashes.   5. TIA in 1997  6. Fibrocystic breast disease.   7. Postmenopausal.   8. Osteoarthritis.   9. History of tobacco usage; quit in 1997.   10. History of colon polyps; normal colonoscopy in 10/07 and normal 4/2010, due 2015   11. Normal bone mineral density scan in 6/06.   12. Anxiety and depression - controlled on lorazepam very rarely.   13. Varicose veins.   14. Intermittent GERD.   15. Iron deficiency anemia 4/2010 - due to erosive gastropathy on EGD 5/2010     PAST SURGICAL  "HISTORY:   1. Right carotid endarterectomy in .   2. Breast reduction surgery.   3. Total abdominal hysterectomy.   4. Blood transfusion prior to .     SOCIAL HISTORY: Quit smoking in ; smoked 1 PPD since age 16. No   alcohol use.     FAMILY HISTORY:   Father had gout, hypertension and heart failure; is . Mother had hypertension and pancreatic cancer; is also . Has five brothers, one of whom  in a motor vehicle accident. All have gout and hypertension. One brother has Crohn's disease; another has bladder cancer.     MEDICATIONS and ALLERGIES: Updated on epic.     PHYSICAL EXAMINATION:      /62   Pulse 84   Ht 5' 5.5" (1.664 m)   Wt 81.3 kg (179 lb 3.7 oz)   LMP  (LMP Unknown)   SpO2 98%   BMI 29.37 kg/m²     General: Alert, oriented. No apparent distress. Affect within normal   limits. Swelling under the right eye brow.   Bruising under both eyes, right greater than left.  Bruising on the right cheek  Conjunctivae anicteric. Tympanic membranes clear. Oropharynx clear.   Neck supple.   Respiratory effort normal. Lungs clear  Heart: Regular rate and rhythm without murmurs, gallops or rubs.   No lower extremity edema.    Muscle spasm of the left trapezius muscle.     Us carotid 19 reviewed  Labs 19 reviewed        ASSESSMENT AND PLAN:   1. muscle spasm of neck - toradol 30 mg IM, kenalog 40 mg IM. Prednisone taper. Diazepam 5 mg 1/2-1 tablet at bedtime. Healthy back program  2 . Anemia - better   3. Heme positive stools - saw Dr Kingsley 19 - if blood counts drop, will work up GI tract again  2. Allergic rhinitis - stable.  4. Hyperlipidemia - On crestor 20 mg daily and Co Enzyme Q 10 200 mg daily.    5. Erosive gastropathy - on omeprazole 20 mg daily. Off NSAIDS.  6. Hypertension - Continue losartan 25 mg one tablet daily and spironolactone to 25 mg daily.   7. Hypercalcemia - stable  6. Gout - asymptomatic on allopurinol.   7. Situational stress - continue " lexapro   8.  Carotid artery disease - on risk factor modification. US carotids 3/18/18 - 1 - 39% stenosis of the right and left internal carotid artery. US carotids  Screening - Colonscopy 10/15 with 4 polyps - it was incomplete. Repeat 5/26/16 - divericulosis otherwise normal - due 2021. MMG 4/19 BMD 7/11.  I will see her back in 3 months , sooner if problems arise

## 2019-11-20 NOTE — PROGRESS NOTES
Patient two identifiers used. Pt tolerated both injections well, asked to wait 15 minutes post injections

## 2019-11-21 ENCOUNTER — TELEPHONE (OUTPATIENT)
Dept: INTERNAL MEDICINE | Facility: CLINIC | Age: 74
End: 2019-11-21

## 2019-11-21 NOTE — TELEPHONE ENCOUNTER
----- Message from Skylar Fox sent at 11/21/2019  8:16 AM CST -----  Contact: Pt request via [facility]  Message     Appointment Request From: Isael Salmon    With Provider: Janett Montoya MD [[facility] - Internal Medicine]    Preferred Date Range: 11/19/2019 - 11/22/2019    Preferred Times: Any time    Reason for visit: still having issues from fall had er visit    Comments:  had severe muscle strain since last visit had to go to er. Still having muscle strain.  May need follow up test or Physical Therapy

## 2019-11-21 NOTE — TELEPHONE ENCOUNTER
Called and left a mssg for pt to call back advised in [facility] to ask for a a same day appt today

## 2019-12-05 ENCOUNTER — PATIENT OUTREACH (OUTPATIENT)
Dept: ADMINISTRATIVE | Facility: OTHER | Age: 74
End: 2019-12-05

## 2019-12-05 NOTE — PROGRESS NOTES
Subjective:      Patient ID: Isael Salmon is a 74 y.o. female.    Chief Complaint: Neck Pain    Ms Salmon is a 73 yo female sent in consultation by Dr. Montoya for evaluation of neck pain.  She went to ER 10/14 s/p trip and fall the same day. She states that she landed onto the right side oh her body and hit her head onto the ground. A couple of weeks after on 11/6 she had some spasms on right side of neck and head and any movement was terrible.  She went to ER and took robaxin and it helped.  Then a couple of weeks later the same thing happened on left on 11/20, she went to Dr. Montoya and then had shots and medrol dose david and valium.  She feels like her neck gets tight with driving.  She has had neck pain prior to the accident. She has had other severe episodes and does not know what causes them.  The pain can be on either side.  She would use heat aleve and tylenol and it usually helps.  The pain 3/10 at worst at night, best 1/10 now today.  She has a ahrd time getting comfortable at night, and hard to pull her head off pillow.      CT cervical 10/14/2019  The alignment of the cervical spine appears normal.  The vertebral body heights are well maintained.    Mild disc space narrowing C4-C5.    Severe disc space narrowing C5-C6.    No definite fracture seen or osseous lesions.    There are sizable osteophyte from C3 through C7 which could cause symptoms of dysphagia.    Partially calcified pannus posterior to the dens do not appear to cause significant canal stenosis.    C2-C3: No canal or foraminal stenosis.    C3-C4: No canal stenosis or foraminal narrowing.    C4-C5: Right facet joint osseous hypertrophy, posterior disc osteophyte complex.  Mild central canal stenosis.  Mild bilateral foraminal narrowing.    C5-C6: Posterior disc osteophyte complex and bilateral uncovertebral spur, mild central canal stenosis and mild to moderate bilateral foraminal narrowing.    C6-C7: No apparent canal or  foraminal stenosis.    The upper thoracic spine appears normal.  The upper lung zones appear normal.  The airways are patent.  The upper and lower neck soft tissues appear normal.  There is atherosclerotic plaque at the bilateral carotid bulb region.  Several surgical clips adjacent to the right carotid.    Impression      No fracture.    Spondylosis of the cervical spine as detailed above.    Ct lumbar 10/14/2019  Lumbar spine alignment is within normal limits.  No evidence of acute lumbar spine fracture or dislocation.  Mild multilevel degenerative changes and spurring are seen.  There is mild lower lumbar facet arthropathy.  Diffuse disc bulge is seen at the L4-5 level.  Sacrum is normal in appearance.  Partially visualized lower abdominal and intrapelvic contents show no acute abnormalities.  Right renal cysts are visualized with suspected small left hepatic cyst.  There is prominent atherosclerosis.  Colonic diverticulosis is noted.    Impression      No acute lumbar spine abnormalities identified.      Past Medical History:  7/26/2012: Adjustment disorder  7/26/2012: Anemia  9/5/2014: AR (allergic rhinitis)  No date: Bronchitis  No date: Cataract  7/26/2012: Fibrocystic breast disease in female  7/26/2012: GERD (gastroesophageal reflux disease)  No date: Gout  7/26/2012: Gout, arthritis  7/26/2012: History of blood transfusion  7/26/2012: History of colonic polyps  9/20/2012: Hypercalcemia  7/26/2012: Hyperlipidemia  7/26/2012: Hypertension  5/2/2014: Nuclear sclerosis  7/26/2012: Osteoarthritis  9/20/2012: Other hyperparathyroidism  7/26/2012: Postmenopausal status  7/26/2012: PVD (peripheral vascular disease)      Comment:  left leg laser of vein with injection  1997: Stroke      Comment:  TIA  No date: Superficial vein thrombosis  7/26/2012: Transient ischemic attack  7/26/2012: Varicose veins    Past Surgical History:  No date: BREAST CYST ASPIRATION; Bilateral      Comment:  multiple ones over the  years  No date: BREAST CYST EXCISION; Left  No date: BREAST SURGERY      Comment:  breast reduction  12/2015: broken second finger; Right      Comment:  pins placed  1997: carotid endarterectomy      Comment:  right  1997: CAROTID ENDARTERECTOMY; Right  05/01/2018: CATARACT EXTRACTION W/  INTRAOCULAR LENS IMPLANT; Right      Comment:  Dr. Yost  05/15/2018: CATARACT EXTRACTION W/  INTRAOCULAR LENS IMPLANT; Left      Comment:  Dr. Lester  10/26/2015: COLONOSCOPY; N/A      Comment:  Procedure: COLONOSCOPY;  Surgeon: Manuel Alanis MD;                Location: [address]);  Service: Endoscopy;                 Laterality: N/A;  No date: EYE SURGERY  No date: HYSTERECTOMY  12/18/15: ORIF FINGER FRACTURE  No date: TIA  No date: VASCULAR SURGERY      Comment:  left leg vein laser    Review of patient's family history indicates:  Problem: Heart failure      Relation: Father          Age of Onset: (Not Specified)  Problem: Cancer      Relation: Mother          Age of Onset: (Not Specified)          Comment: pancreas  Problem: Cataracts      Relation: Mother          Age of Onset: (Not Specified)  Problem: Hypertension      Relation: Mother          Age of Onset: (Not Specified)  Problem: Cancer      Relation: Brother          Age of Onset: (Not Specified)          Comment: bladder  Problem: Cataracts      Relation: Brother          Age of Onset: (Not Specified)  Problem: Hypertension      Relation: Brother          Age of Onset: (Not Specified)  Problem: Cataracts      Relation: Brother          Age of Onset: (Not Specified)  Problem: Hypertension      Relation: Brother          Age of Onset: (Not Specified)  Problem: Cataracts      Relation: Brother          Age of Onset: (Not Specified)  Problem: Hypertension      Relation: Brother          Age of Onset: (Not Specified)  Problem: Bone cancer      Relation: Brother          Age of Onset: (Not Specified)  Problem: Cataracts      Relation: Brother           Age of Onset: (Not Specified)  Problem: Hypertension      Relation: Brother          Age of Onset: (Not Specified)  Problem: Breast cancer      Relation: Neg Hx          Age of Onset: (Not Specified)  Problem: Ovarian cancer      Relation: Neg Hx          Age of Onset: (Not Specified)  Problem: Allergies      Relation: Neg Hx          Age of Onset: (Not Specified)  Problem: Asthma      Relation: Neg Hx          Age of Onset: (Not Specified)  Problem: Eczema      Relation: Neg Hx          Age of Onset: (Not Specified)  Problem: Cervical cancer      Relation: Neg Hx          Age of Onset: (Not Specified)  Problem: Endometrial cancer      Relation: Neg Hx          Age of Onset: (Not Specified)  Problem: Vaginal cancer      Relation: Neg Hx          Age of Onset: (Not Specified)  Problem: Amblyopia      Relation: Neg Hx          Age of Onset: (Not Specified)  Problem: Blindness      Relation: Neg Hx          Age of Onset: (Not Specified)  Problem: Diabetes      Relation: Neg Hx          Age of Onset: (Not Specified)  Problem: Glaucoma      Relation: Neg Hx          Age of Onset: (Not Specified)  Problem: Macular degeneration      Relation: Neg Hx          Age of Onset: (Not Specified)  Problem: Retinal detachment      Relation: Neg Hx          Age of Onset: (Not Specified)  Problem: Strabismus      Relation: Neg Hx          Age of Onset: (Not Specified)  Problem: Stroke      Relation: Neg Hx          Age of Onset: (Not Specified)  Problem: Thyroid disease      Relation: Neg Hx          Age of Onset: (Not Specified)      Social History    Socioeconomic History      Marital status:       Spouse name: Not on file      Number of children: 1      Years of education: Not on file      Highest education level: Not on file    Occupational History      Occupation: Retired    Social Needs      Financial resource strain: Not hard at all      Food insecurity:        Worry: Never true        Inability: Never true       Transportation needs:        Medical: No        Non-medical: No    Tobacco Use      Smoking status: Former Smoker        Quit date: 1989        Years since quittin.2      Smokeless tobacco: Never Used      Tobacco comment: quit  -  pack per day since age 16    Substance and Sexual Activity      Alcohol use: Yes        Alcohol/week: 1.0 standard drinks        Types: 1 Glasses of wine per week        Frequency: Monthly or less        Drinks per session: 1 or 2        Binge frequency: Never        Comment: maybe one glass of wine monthly      Drug use: No      Sexual activity: Not Currently        Partners: Male        Birth control/protection: Surgical    Lifestyle      Physical activity:        Days per week: 0 days        Minutes per session: 0 min      Stress: To some extent    Relationships      Social connections:        Talks on phone: Three times a week        Gets together: Once a week        Attends Zoroastrian service: Not on file        Active member of club or organization: Yes        Attends meetings of clubs or organizations: 1 to 4 times per year        Relationship status:     Other Topics      Concerns:        Are you pregnant or think you may be?: Not Asked        Breast-feeding: Not Asked    Social History Narrative      Not on file      Current Outpatient Medications:  allopurinol (ZYLOPRIM) 100 MG tablet, Take 2 tablets (200 mg total) by mouth once daily., Disp: 180 tablet, Rfl: 3  ascorbic acid, vitamin C, (VITAMIN C) 500 MG tablet, Take 500 mg by mouth once daily., Disp: , Rfl:   aspirin (ECOTRIN) 81 MG EC tablet, Take 81 mg by mouth once daily. , Disp: , Rfl:   augmented betamethasone dipropionate (DIPROLENE-AF) 0.05 % cream, Apply topically 2 (two) times daily. (Patient taking differently: Apply topically 2 (two) times daily as needed. ), Disp: 60 g, Rfl: 3  b complex vitamins capsule, Take 1 capsule by mouth once daily., Disp: , Rfl:   BIOTIN ORAL, Take 1 tablet by mouth  once daily., Disp: , Rfl:   co-enzyme Q-10 30 mg capsule, Take 200 mg by mouth once daily., Disp: , Rfl:   colchicine 0.6 mg tablet, One tablet every hour during gouty flare until have diarrhea. Max 6 tablets daily (Patient taking differently: as needed. One tablet every hour during gouty flare until have diarrhea. Max 6 tablets daily), Disp: 30 tablet, Rfl: 11  diazePAM (VALIUM) 5 MG tablet, 1/2-1 tablet at bedtime, Disp: 30 tablet, Rfl: 1  diclofenac sodium 1 % Gel, Apply 2 g topically once daily., Disp: 1 Tube, Rfl: 1  dicyclomine (BENTYL) 10 MG capsule, Take 1 capsule (10 mg total) by mouth 4 (four) times daily as needed. 1 Capsule Oral Four times a day, Disp: 60 capsule, Rfl: 3  ergocalciferol (ERGOCALCIFEROL) 50,000 unit Cap, Take 1 capsule (50,000 Units total) by mouth twice a week., Disp: 24 capsule, Rfl: 4  escitalopram oxalate (LEXAPRO) 10 MG tablet, Take 0.5 tablets (5 mg total) by mouth once daily., Disp: 45 tablet, Rfl: 3  estradiol (ESTRACE) 0.01 % (0.1 mg/gram) vaginal cream, Place vaginally as needed. , Disp: , Rfl:   ferrous sulfate 325 mg (65 mg iron) Tab tablet, Take 1 tablet (325 mg total) by mouth every 12 (twelve) hours. (Patient taking differently: Take 325 mg by mouth every 12 (twelve) hours. ), Disp: 60 tablet, Rfl: 4  fluticasone (FLONASE) 50 mcg/actuation nasal spray, 2 sprays by Each Nare route daily as needed. 2 Aerosol, Spray Nasal Every day, Disp: 16 g, Rfl: 2  FOLBIC 2.5-25-2 mg Tab, TAKE 1 TABLET BY MOUTH DAILY, Disp: 30 tablet, Rfl: 4  HYDROcodone-acetaminophen (NORCO) 5-325 mg per tablet, Take 1 tablet by mouth every 6 (six) hours as needed for Pain., Disp: 18 tablet, Rfl: 0  indomethacin (INDOCIN) 50 MG capsule, Take 1 capsule (50 mg total) by mouth 3 (three) times daily as needed., Disp: 90 capsule, Rfl: 6  ipratropium (ATROVENT) 0.02 % nebulizer solution, Take 2.5 mLs (500 mcg total) by nebulization 3 (three) times daily as needed for Wheezing., Disp: 62.5 mL, Rfl: 0  losartan  (COZAAR) 50 MG tablet, Take 1 tablet (50 mg total) by mouth 2 (two) times daily., Disp: 180 tablet, Rfl: 3  multivit,calc,mins/iron/folic (ONE-A-DAY WOMENS FORMULA ORAL), Take 1 tablet by mouth once daily., Disp: , Rfl:   neomycin-polymyxin-hydrocortisone (CORTISPORIN) 3.5-10,000-1 mg/mL-unit/mL-% otic suspension, apply to affected area OF NAILS twice a day, Disp: 10 mL, Rfl: 6  omeprazole (PRILOSEC) 20 MG capsule, Take 1 capsule (20 mg total) by mouth 2 (two) times daily., Disp: 90 capsule, Rfl: 4  ondansetron (ZOFRAN-ODT) 4 MG TbDL, Take 1 tablet (4 mg total) by mouth every 6 (six) hours as needed., Disp: 20 tablet, Rfl: 0  predniSONE (DELTASONE) 10 MG tablet, 2 tablets daily for 3 days then one tablet daily for 3 days, Disp: 9 tablet, Rfl: 0  rosuvastatin (CRESTOR) 20 MG tablet, Take 1 tablet (20 mg total) by mouth once daily., Disp: 90 tablet, Rfl: 3  spironolactone (ALDACTONE) 25 MG tablet, Take 1 tablet (25 mg total) by mouth once daily., Disp: 90 tablet, Rfl: 4  tiZANidine (ZANAFLEX) 4 MG tablet, 1/2-1 tablet every 8 hours as needed for muscle spasm, Disp: 30 tablet, Rfl: 1    No current facility-administered medications for this visit.       Review of patient's allergies indicates:   -- Latex     --  Other reaction(s): Rash   -- Pcn (penicillins)     --  Other reaction(s): rash   -- Sulfa (sulfonamide antibiotics)     --  Other reaction(s): blisters   -- Avelox (moxifloxacin)     --  Other reaction(s): racing heart             Other reaction(s): shakes   -- Ciprofloxacin -- Other (See Comments)    --  Room starts to spin , nausea and dizziness   -- Codeine     --  Other reaction(s): nausea             Other reaction(s): weakness   -- Norco (hydrocodone-acetaminophen) -- Nausea And Vomiting and Other (See                            Comments)        Review of Systems   Constitution: Negative for weight gain and weight loss.   Cardiovascular: Negative for chest pain.   Respiratory: Negative for shortness of  breath.    Musculoskeletal: Positive for joint pain and neck pain. Negative for back pain and joint swelling.   Gastrointestinal: Negative for abdominal pain, bowel incontinence, nausea and vomiting.   Genitourinary: Negative for bladder incontinence.   Neurological: Positive for headaches. Negative for numbness and paresthesias.         Objective:        General: Isael is well-developed, well-nourished, appears stated age, in no acute distress, alert and oriented to time, place and person.     General    Vitals reviewed.  Constitutional: She is oriented to person, place, and time. She appears well-developed and well-nourished.   HENT:   Head: Normocephalic and atraumatic.   Pulmonary/Chest: Effort normal.   Neurological: She is alert and oriented to person, place, and time.   Psychiatric: She has a normal mood and affect. Her behavior is normal. Judgment and thought content normal.     General Musculoskeletal Exam   Gait: normal     Right Ankle/Foot Exam     Tests   Heel Walk: able to perform  Tiptoe Walk: able to perform    Left Ankle/Foot Exam     Tests   Heel Walk: able to perform  Tiptoe Walk: able to perform  Back (L-Spine & T-Spine) / Neck (C-Spine) Exam     Tenderness Right paramedian tenderness of the Upper C-Spine and Lower C-Spine. Left paramedian tenderness of the Upper C-Spine and Lower C-Spine.     Neck (C-Spine) Range of Motion   Flexion:     30  Extension: 30Right Lateral Bend: 10 Left Lateral Bend: 10 Right Rotation: 20 Left Rotation: 20     Spinal Sensation   Right Side Sensation  C-Spine Level: normal   L-Spine Level: normal  S-Spine Level: normal  Left Side Sensation  C-Spine Level: normal  L-Spine Level: normal  S-Spine Level: normal    Back (L-Spine & T-Spine) Tests   Right Side Tests  Straight leg raise:      Sitting SLR: > 70 degrees      Left Side Tests  Straight leg raise:     Sitting SLR: > 70 degrees          Other She has no scoliosis .  Spinal Kyphosis:  Absent      Muscle Strength    Right Upper Extremity   Biceps: 5/5/5   Deltoid:  5/5  Triceps:  5/5  Wrist extension: 5/5/5   Finger Flexors:  5/5  Left Upper Extremity  Biceps: 5/5/5   Deltoid:  5/5  Triceps:  5/5  Wrist extension: 5/5/5   Finger Flexors:  5/5  Right Lower Extremity   Hip Flexion: 5/5   Quadriceps:  5/5   Anterior tibial:  5/5/5  EHL:  5/5  Left Lower Extremity   Hip Flexion: 5/5   Quadriceps:  5/5   Anterior tibial:  5/5/5   EHL:  5/5    Reflexes     Left Side  Biceps:  2+  Triceps:  2+  Brachioradialis:  2+  Quadriceps:  2+  Achilles:  2+  Left Ambrocio's Sign:  Absent  Babinski Sign:  absent    Right Side   Biceps:  2+  Triceps:  2+  Brachioradialis:  2+  Quadriceps:  2+  Achilles:  2+  Right Ambrocio's Sign:  absent  Babinski Sign:  absent    Vascular Exam     Right Pulses        Carotid:                  2+    Left Pulses        Carotid:                  2+              Assessment:       1. Neck pain    2. Spondylosis of cervical region without myelopathy or radiculopathy    3. Muscle spasm           Plan:       Orders Placed This Encounter    Ambulatory consult to [facility]    tiZANidine (ZANAFLEX) 2 MG tablet     1. We discussed neck pain and the nature of neck pain.  We discussed that it will likely improve and has improved and that it is not one thing that causes the pain but an accumulation of multiple things that we do.  We discussed Ct scan and arthritis.  We also discussed muscle spasms she has had flares of pain.    2. We discussed posture sitting and the importance of trying to sit better.  We discussed getting head over spine  3. We discussed the benefits of therapy and exercise and continuing to move.  4. We discussed advil as needed, she takes advil and tylenol.  She takes indocin as needed for gout.  She does not take advil while taking indocin  5. Tizanidine 2-4mg po QHs and then she can use the valium if really needs it.  She does not want to take it daily, and so has not taken it.  We discussed  taking at night to help her rest better  6. Healthy back pattern 1 cervical  7. RTC 4 months    More than 50% of the total time  of 45 minutes was spent face to face in counseling on diagnosis and treatment options. I also counseled patient  on common and most usual side effect of prescribed medications.  I reviewed Primary care , and other specialty's notes to better coordinate patient's care. All questions were answered, and patient voiced understanding.       Follow-up: Follow up in about 3 months (around 3/9/2020). If there are any questions prior to this, the patient was instructed to contact the office.

## 2019-12-09 ENCOUNTER — OFFICE VISIT (OUTPATIENT)
Dept: SPINE | Facility: CLINIC | Age: 74
End: 2019-12-09
Attending: PHYSICAL MEDICINE & REHABILITATION
Payer: MEDICARE

## 2019-12-09 VITALS
BODY MASS INDEX: 29.82 KG/M2 | SYSTOLIC BLOOD PRESSURE: 135 MMHG | WEIGHT: 179 LBS | HEART RATE: 79 BPM | DIASTOLIC BLOOD PRESSURE: 72 MMHG | HEIGHT: 65 IN

## 2019-12-09 DIAGNOSIS — M62.838 MUSCLE SPASM: ICD-10-CM

## 2019-12-09 DIAGNOSIS — M54.2 NECK PAIN: Primary | ICD-10-CM

## 2019-12-09 DIAGNOSIS — M47.812 SPONDYLOSIS OF CERVICAL REGION WITHOUT MYELOPATHY OR RADICULOPATHY: ICD-10-CM

## 2019-12-09 PROCEDURE — 99213 OFFICE O/P EST LOW 20 MIN: CPT | Mod: PBBFAC | Performed by: PHYSICAL MEDICINE & REHABILITATION

## 2019-12-09 PROCEDURE — 99999 PR PBB SHADOW E&M-EST. PATIENT-LVL III: CPT | Mod: PBBFAC,,, | Performed by: PHYSICAL MEDICINE & REHABILITATION

## 2019-12-09 PROCEDURE — 1125F AMNT PAIN NOTED PAIN PRSNT: CPT | Mod: ,,, | Performed by: PHYSICAL MEDICINE & REHABILITATION

## 2019-12-09 PROCEDURE — 99204 PR OFFICE/OUTPT VISIT, NEW, LEVL IV, 45-59 MIN: ICD-10-PCS | Mod: S$PBB,,, | Performed by: PHYSICAL MEDICINE & REHABILITATION

## 2019-12-09 PROCEDURE — 1159F PR MEDICATION LIST DOCUMENTED IN MEDICAL RECORD: ICD-10-PCS | Mod: ,,, | Performed by: PHYSICAL MEDICINE & REHABILITATION

## 2019-12-09 PROCEDURE — 1159F MED LIST DOCD IN RCRD: CPT | Mod: ,,, | Performed by: PHYSICAL MEDICINE & REHABILITATION

## 2019-12-09 PROCEDURE — 99999 PR PBB SHADOW E&M-EST. PATIENT-LVL III: ICD-10-PCS | Mod: PBBFAC,,, | Performed by: PHYSICAL MEDICINE & REHABILITATION

## 2019-12-09 PROCEDURE — 99204 OFFICE O/P NEW MOD 45 MIN: CPT | Mod: S$PBB,,, | Performed by: PHYSICAL MEDICINE & REHABILITATION

## 2019-12-09 PROCEDURE — 1125F PR PAIN SEVERITY QUANTIFIED, PAIN PRESENT: ICD-10-PCS | Mod: ,,, | Performed by: PHYSICAL MEDICINE & REHABILITATION

## 2019-12-09 RX ORDER — TIZANIDINE 2 MG/1
2-4 TABLET ORAL NIGHTLY PRN
Qty: 60 TABLET | Refills: 2 | Status: SHIPPED | OUTPATIENT
Start: 2019-12-09 | End: 2020-03-29

## 2019-12-09 NOTE — LETTER
December 9, 2019      Janett Montoya MD  [address]  [facility] [address]           [address]  [address], [address]  [facility] [address]  Phone: [phone]  Fax: [phone]          Patient: Isael Salmon   MR Number: [id number]   YOB: 1945   Date of Visit: 12/9/2019       Dear Dr. Janett Montoya:    Thank you for referring Isael Salmon to me for evaluation. Attached you will find relevant portions of my assessment and plan of care.    If you have questions, please do not hesitate to call me. I look forward to following Isael Salmon along with you.    Sincerely,    Guerda Mercado MD    Enclosure  CC:  No Recipients    If you would like to receive this communication electronically, please contact [email or web address] or [phone] to request more information on [employer] access.    For providers and/or their staff who would like to refer a patient to [facility], please contact us through our one-stop-shop provider referral line, [facility], at [phone].    If you feel you have received this communication in error or would no longer like to receive these types of communications, please e-mail [email or web address]

## 2020-01-07 RX ORDER — ERGOCALCIFEROL 1.25 MG/1
CAPSULE ORAL
Qty: 24 CAPSULE | Refills: 0 | Status: SHIPPED | OUTPATIENT
Start: 2020-01-07 | End: 2020-07-12

## 2020-01-07 NOTE — PROGRESS NOTES
Refill Authorization Note     is requesting a refill authorization.    Brief assessment and rationale for refill: APPROVE: prr  Name and strength of medication: ergocalciferol (ERGOCALCIFEROL) 50,000 unit Cap       Medication Therapy Plan: Ca > 10.5 but near pt baseline,  Hyper calacemia- stable, LCO(LOV); approve 3 more months     Medication reconciliation completed: No                         Comments:   Requested Prescriptions   Pending Prescriptions Disp Refills    ergocalciferol (ERGOCALCIFEROL) 50,000 unit Cap [Pharmacy Med Name: VITAMIN D2 50,000IU (ERGO) CAP RX] 24 capsule 0     Sig: TAKE 1 CAPSULE BY MOUTH 2 TIMES A WEEK       Endocrinology:  Vitamins - Vitamin D Supplementation Failed - 1/7/2020 10:33 AM        Failed - Hypercalcemia is not present on problem list        Failed - Ca in normal range and within 360 days     Calcium   Date Value Ref Range Status   11/01/2019 10.7 (H) 8.7 - 10.5 mg/dL Final   07/08/2019 10.8 (H) 8.7 - 10.5 mg/dL Final   04/08/2019 10.6 (H) 8.7 - 10.5 mg/dL Final              Passed - Patient is at least 18 years old        Passed - Office visit in past 12 months or future 90 days     Recent Outpatient Visits            4 weeks ago Neck pain    [facility][1] [address] Guerda Mercado MD    1 month ago Muscle spasms of neck    [facility] Internal Medicine Janett Montoya MD    2 months ago Stenosis of carotid artery, unspecified laterality    [facility] Medicine Janett Montoya MD    3 months ago Bilateral dry eyes    [place] [facility] Jonathan Juarez, OD    3 months ago Sinusitis, unspecified chronicity, unspecified location    [facility] Internal Medicine Kerrie Priest MD          Future Appointments              In 2 weeks Janett Montoya MD [facility], [facility]    In 3 months [facility]erda Mercado MD [facility] [address], [facility]                Passed - Vit D is 20 or  above and within 360 days     Vit D, 25-Hydroxy   Date Value Ref Range Status   04/08/2019 45 30 - 96 ng/mL Final     Comment:     Vitamin D deficiency.........<10 ng/mL                              Vitamin D insufficiency......10-29 ng/mL       Vitamin D sufficiency........> or equal to 30 ng/mL  Vitamin D toxicity............>100 ng/mL     09/17/2018 15 (L) 30 - 96 ng/mL Final     Comment:     Vitamin D deficiency.........<10 ng/mL                              Vitamin D insufficiency......10-29 ng/mL       Vitamin D sufficiency........> or equal to 30 ng/mL  Vitamin D toxicity............>100 ng/mL     09/05/2014 30 30 - 96 ng/mL Final     Comment:     Vitamin D deficiency.........<10 ng/mL                              Vitamin D insufficiency......10-29 ng/mL       Vitamin D sufficiency........> or equal to 30 ng/mL  Vitamin D toxicity............>100 ng/mL                Electrolytes Ca   Latest Ref Rng & Units 8.7 - 10.5 mg/dL   9/17/2018 10.4   11/7/2018 10.5   4/8/2019 10.6 (H)   7/8/2019 10.8 (H)   11/1/2019 10.7 (H)         Appointments  past 12m or future 3m with PCP    Date Provider   Last Visit   11/20/2019 Janett Montoya MD   Next Visit   1/22/2020 Janett Montoya MD

## 2020-01-14 ENCOUNTER — OFFICE VISIT (OUTPATIENT)
Dept: OPTOMETRY | Facility: CLINIC | Age: 75
End: 2020-01-14
Payer: MEDICARE

## 2020-01-14 ENCOUNTER — PATIENT OUTREACH (OUTPATIENT)
Dept: ADMINISTRATIVE | Facility: OTHER | Age: 75
End: 2020-01-14

## 2020-01-14 DIAGNOSIS — H04.123 BILATERAL DRY EYES: ICD-10-CM

## 2020-01-14 DIAGNOSIS — H10.533 CONTACT BLEPHAROCONJUNCTIVITIS OF BOTH EYES: Primary | ICD-10-CM

## 2020-01-14 PROCEDURE — 92012 INTRM OPH EXAM EST PATIENT: CPT | Mod: S$PBB,,, | Performed by: OPTOMETRIST

## 2020-01-14 PROCEDURE — 99999 PR PBB SHADOW E&M-EST. PATIENT-LVL II: CPT | Mod: PBBFAC,,, | Performed by: OPTOMETRIST

## 2020-01-14 PROCEDURE — 99212 OFFICE O/P EST SF 10 MIN: CPT | Mod: PBBFAC,PO | Performed by: OPTOMETRIST

## 2020-01-14 PROCEDURE — 99999 PR PBB SHADOW E&M-EST. PATIENT-LVL II: ICD-10-PCS | Mod: PBBFAC,,, | Performed by: OPTOMETRIST

## 2020-01-14 PROCEDURE — 92012 PR EYE EXAM, EST PATIENT,INTERMED: ICD-10-PCS | Mod: S$PBB,,, | Performed by: OPTOMETRIST

## 2020-01-14 RX ORDER — NEOMYCIN SULFATE, POLYMYXIN B SULFATE AND DEXAMETHASONE 3.5; 10000; 1 MG/ML; [USP'U]/ML; MG/ML
SUSPENSION/ DROPS OPHTHALMIC
COMMUNITY
Start: 2020-01-07 | End: 2020-01-14 | Stop reason: ALTCHOICE

## 2020-01-14 RX ORDER — TOBRAMYCIN AND DEXAMETHASONE 3; 1 MG/ML; MG/ML
1 SUSPENSION/ DROPS OPHTHALMIC 4 TIMES DAILY
Qty: 2.5 ML | Refills: 0 | Status: SHIPPED | OUTPATIENT
Start: 2020-01-14 | End: 2020-01-24

## 2020-01-14 NOTE — PROGRESS NOTES
BARI CERNA 09/2019 Patient has noticed redness inside RLL, irritation, and stuck   together in the morning for the past 2 weeks.  Patient has used maxitrol   QID for the past with only a slight improvement.   Using AT's BID OU.    Last edited by Guerda Baker on 1/14/2020  2:30 PM. (History)            Assessment /Plan     For exam results, see Encounter Report.    Contact blepharoconjunctivitis of both eyes  -     tobramycin-dexamethasone 0.3-0.1% (TOBRADEX) 0.3-0.1 % DrpS; Place 1 drop into both eyes 4 (four) times daily. for 10 days  Dispense: 2.5 mL; Refill: 0    Bilateral dry eyes      1. No help with Maxitrol drops, stop and start Ocusoft lid wipes 1-2x/day x 1 week and start Tobradex drops 4x/day x 1 week,. RTC or call if no better in 1 week.

## 2020-02-21 ENCOUNTER — PATIENT OUTREACH (OUTPATIENT)
Dept: ADMINISTRATIVE | Facility: HOSPITAL | Age: 75
End: 2020-02-21

## 2020-02-21 DIAGNOSIS — E78.5 HYPERLIPIDEMIA, UNSPECIFIED HYPERLIPIDEMIA TYPE: ICD-10-CM

## 2020-02-21 DIAGNOSIS — M81.0 OSTEOPOROSIS, UNSPECIFIED OSTEOPOROSIS TYPE, UNSPECIFIED PATHOLOGICAL FRACTURE PRESENCE: Primary | ICD-10-CM

## 2020-02-27 ENCOUNTER — TELEPHONE (OUTPATIENT)
Dept: INTERNAL MEDICINE | Facility: CLINIC | Age: 75
End: 2020-02-27

## 2020-02-27 ENCOUNTER — LAB VISIT (OUTPATIENT)
Dept: LAB | Facility: HOSPITAL | Age: 75
End: 2020-02-27
Attending: INTERNAL MEDICINE
Payer: MEDICARE

## 2020-02-27 ENCOUNTER — OFFICE VISIT (OUTPATIENT)
Dept: INTERNAL MEDICINE | Facility: CLINIC | Age: 75
End: 2020-02-27
Payer: MEDICARE

## 2020-02-27 VITALS
DIASTOLIC BLOOD PRESSURE: 64 MMHG | HEIGHT: 65 IN | SYSTOLIC BLOOD PRESSURE: 112 MMHG | WEIGHT: 175.5 LBS | HEART RATE: 82 BPM | BODY MASS INDEX: 29.24 KG/M2 | OXYGEN SATURATION: 98 %

## 2020-02-27 DIAGNOSIS — R10.9 ABDOMINAL CRAMPING: Primary | ICD-10-CM

## 2020-02-27 DIAGNOSIS — R10.9 ABDOMINAL CRAMPING: ICD-10-CM

## 2020-02-27 DIAGNOSIS — R19.4 CHANGE IN BOWEL HABITS: ICD-10-CM

## 2020-02-27 LAB
ALBUMIN SERPL BCP-MCNC: 3.5 G/DL (ref 3.5–5.2)
ALP SERPL-CCNC: 101 U/L (ref 55–135)
ALT SERPL W/O P-5'-P-CCNC: 19 U/L (ref 10–44)
ANION GAP SERPL CALC-SCNC: 6 MMOL/L (ref 8–16)
AST SERPL-CCNC: 20 U/L (ref 10–40)
BASOPHILS # BLD AUTO: 0.02 K/UL (ref 0–0.2)
BASOPHILS NFR BLD: 0.4 % (ref 0–1.9)
BILIRUB SERPL-MCNC: 0.4 MG/DL (ref 0.1–1)
BUN SERPL-MCNC: 22 MG/DL (ref 8–23)
CALCIUM SERPL-MCNC: 10.4 MG/DL (ref 8.7–10.5)
CHLORIDE SERPL-SCNC: 106 MMOL/L (ref 95–110)
CO2 SERPL-SCNC: 32 MMOL/L (ref 23–29)
CREAT SERPL-MCNC: 1.3 MG/DL (ref 0.5–1.4)
DIFFERENTIAL METHOD: ABNORMAL
EOSINOPHIL # BLD AUTO: 0.1 K/UL (ref 0–0.5)
EOSINOPHIL NFR BLD: 1.8 % (ref 0–8)
ERYTHROCYTE [DISTWIDTH] IN BLOOD BY AUTOMATED COUNT: 12.6 % (ref 11.5–14.5)
EST. GFR  (AFRICAN AMERICAN): 46 ML/MIN/1.73 M^2
EST. GFR  (NON AFRICAN AMERICAN): 39.9 ML/MIN/1.73 M^2
GLUCOSE SERPL-MCNC: 103 MG/DL (ref 70–110)
HCT VFR BLD AUTO: 34.1 % (ref 37–48.5)
HGB BLD-MCNC: 10.6 G/DL (ref 12–16)
IMM GRANULOCYTES # BLD AUTO: 0.02 K/UL (ref 0–0.04)
IMM GRANULOCYTES NFR BLD AUTO: 0.4 % (ref 0–0.5)
LYMPHOCYTES # BLD AUTO: 1.1 K/UL (ref 1–4.8)
LYMPHOCYTES NFR BLD: 20.1 % (ref 18–48)
MCH RBC QN AUTO: 31.1 PG (ref 27–31)
MCHC RBC AUTO-ENTMCNC: 31.1 G/DL (ref 32–36)
MCV RBC AUTO: 100 FL (ref 82–98)
MONOCYTES # BLD AUTO: 0.5 K/UL (ref 0.3–1)
MONOCYTES NFR BLD: 8.6 % (ref 4–15)
NEUTROPHILS # BLD AUTO: 3.9 K/UL (ref 1.8–7.7)
NEUTROPHILS NFR BLD: 68.7 % (ref 38–73)
NRBC BLD-RTO: 0 /100 WBC
PLATELET # BLD AUTO: 213 K/UL (ref 150–350)
PMV BLD AUTO: 11 FL (ref 9.2–12.9)
POTASSIUM SERPL-SCNC: 4.2 MMOL/L (ref 3.5–5.1)
PROT SERPL-MCNC: 7.2 G/DL (ref 6–8.4)
RBC # BLD AUTO: 3.41 M/UL (ref 4–5.4)
SODIUM SERPL-SCNC: 144 MMOL/L (ref 136–145)
WBC # BLD AUTO: 5.68 K/UL (ref 3.9–12.7)

## 2020-02-27 PROCEDURE — 99213 PR OFFICE/OUTPT VISIT, EST, LEVL III, 20-29 MIN: ICD-10-PCS | Mod: S$PBB,,, | Performed by: INTERNAL MEDICINE

## 2020-02-27 PROCEDURE — 85025 COMPLETE CBC W/AUTO DIFF WBC: CPT

## 2020-02-27 PROCEDURE — 99999 PR PBB SHADOW E&M-EST. PATIENT-LVL III: CPT | Mod: PBBFAC,,, | Performed by: INTERNAL MEDICINE

## 2020-02-27 PROCEDURE — 99213 OFFICE O/P EST LOW 20 MIN: CPT | Mod: PBBFAC | Performed by: INTERNAL MEDICINE

## 2020-02-27 PROCEDURE — 99213 OFFICE O/P EST LOW 20 MIN: CPT | Mod: S$PBB,,, | Performed by: INTERNAL MEDICINE

## 2020-02-27 PROCEDURE — 80053 COMPREHEN METABOLIC PANEL: CPT

## 2020-02-27 PROCEDURE — 99999 PR PBB SHADOW E&M-EST. PATIENT-LVL III: ICD-10-PCS | Mod: PBBFAC,,, | Performed by: INTERNAL MEDICINE

## 2020-02-27 PROCEDURE — 36415 COLL VENOUS BLD VENIPUNCTURE: CPT

## 2020-02-27 NOTE — TELEPHONE ENCOUNTER
----- Message from Aleja Wiley sent at 2/27/2020  6:34 AM CST -----  Contact: [employer] request  Message     Appointment Request From: Isael Salmon    With Provider[facility]    Preferred Date Range: 2/27/2020 - 3/3/2020    Preferred Times: Any time    Reason for visit: chronic  stomach/bowel upset    Comments:  irregular elimination

## 2020-02-28 NOTE — PROGRESS NOTES
"Subjective:       Patient ID: Isael Salmon is a 74 y.o. female.    Chief Complaint: Constipation    74 year old lady reports she has been having trouble passing stool.  Has become constipated and when she takes Miralax for 3 days she has very small diameter stools "like spaghetti."  Has minimal abd cramping.  No nausea or vomiting.   Stool softeners not helping.  No fever or chills, no urinary symptoms and no nausea or vomiting  Is easily full    Review of Systems   Constitutional: Negative for activity change, chills, fatigue and fever.   HENT: Negative for congestion, ear pain, nosebleeds, postnasal drip, sinus pressure and sore throat.    Eyes: Negative.  Negative for visual disturbance.   Respiratory: Negative for cough, chest tightness, shortness of breath and wheezing.    Cardiovascular: Negative for chest pain.   Gastrointestinal: Negative for abdominal pain, diarrhea, nausea and vomiting.   Genitourinary: Negative for difficulty urinating, dysuria, frequency and urgency.   Musculoskeletal: Negative for arthralgias and neck stiffness.   Skin: Negative for rash.   Neurological: Negative for dizziness, weakness and headaches.   Psychiatric/Behavioral: Negative for sleep disturbance. The patient is not nervous/anxious.        Objective:      Physical Exam   Constitutional: She is oriented to person, place, and time. She appears well-developed and well-nourished.  Non-toxic appearance. No distress.   HENT:   Head: Normocephalic and atraumatic.   Right Ear: Tympanic membrane, external ear and ear canal normal.   Left Ear: Tympanic membrane, external ear and ear canal normal.   Eyes: Pupils are equal, round, and reactive to light. EOM are normal. No scleral icterus.   Neck: Normal range of motion. Neck supple. No thyromegaly present.   Cardiovascular: Normal rate, regular rhythm and normal heart sounds.   Pulmonary/Chest: Effort normal and breath sounds normal.   Abdominal: Soft. Bowel sounds are normal. She " exhibits no mass. There is no tenderness. There is no rebound.   Musculoskeletal: Normal range of motion.   Lymphadenopathy:     She has no cervical adenopathy.   Neurological: She is alert and oriented to person, place, and time. She has normal reflexes. She displays normal reflexes. No cranial nerve deficit. She exhibits normal muscle tone. Coordination normal.   Skin: Skin is warm and dry.   Psychiatric: She has a normal mood and affect. Her behavior is normal.       Assessment:       1. Abdominal cramping    2. Change in bowel habits        Plan:   Isael was seen today for constipation.    Diagnoses and all orders for this visit:    Abdominal cramping  -     CBC auto differential; Future  -     Comprehensive metabolic panel; Future  -     CT Abdomen Pelvis With Contrast; Future  -     Ambulatory referral/consult to Gastroenterology; Future    Change in bowel habits  -     CBC auto differential; Future  -     Comprehensive metabolic panel; Future  -     CT Abdomen Pelvis With Contrast; Future  -     Ambulatory referral/consult to Gastroenterology; Future

## 2020-03-05 ENCOUNTER — OFFICE VISIT (OUTPATIENT)
Dept: GASTROENTEROLOGY | Facility: CLINIC | Age: 75
End: 2020-03-05
Payer: MEDICARE

## 2020-03-05 ENCOUNTER — TELEPHONE (OUTPATIENT)
Dept: GASTROENTEROLOGY | Facility: CLINIC | Age: 75
End: 2020-03-05

## 2020-03-05 VITALS
BODY MASS INDEX: 29.2 KG/M2 | HEART RATE: 77 BPM | DIASTOLIC BLOOD PRESSURE: 73 MMHG | HEIGHT: 65 IN | SYSTOLIC BLOOD PRESSURE: 138 MMHG | WEIGHT: 175.25 LBS

## 2020-03-05 DIAGNOSIS — Z86.010 HISTORY OF COLON POLYPS: ICD-10-CM

## 2020-03-05 DIAGNOSIS — K59.00 CONSTIPATION, UNSPECIFIED CONSTIPATION TYPE: ICD-10-CM

## 2020-03-05 DIAGNOSIS — R19.4 CHANGE IN BOWEL HABITS: ICD-10-CM

## 2020-03-05 DIAGNOSIS — R10.9 ABDOMINAL CRAMPING: Primary | ICD-10-CM

## 2020-03-05 DIAGNOSIS — D50.0 IRON DEFICIENCY ANEMIA DUE TO CHRONIC BLOOD LOSS: ICD-10-CM

## 2020-03-05 DIAGNOSIS — R14.1 ABDOMINAL GAS PAIN: ICD-10-CM

## 2020-03-05 DIAGNOSIS — K55.20 ANGIODYSPLASIA OF SMALL INTESTINE: ICD-10-CM

## 2020-03-05 PROCEDURE — 99999 PR PBB SHADOW E&M-EST. PATIENT-LVL IV: ICD-10-PCS | Mod: PBBFAC,,, | Performed by: NURSE PRACTITIONER

## 2020-03-05 PROCEDURE — 99215 OFFICE O/P EST HI 40 MIN: CPT | Mod: S$PBB,,, | Performed by: NURSE PRACTITIONER

## 2020-03-05 PROCEDURE — 99215 PR OFFICE/OUTPT VISIT, EST, LEVL V, 40-54 MIN: ICD-10-PCS | Mod: S$PBB,,, | Performed by: NURSE PRACTITIONER

## 2020-03-05 PROCEDURE — 99214 OFFICE O/P EST MOD 30 MIN: CPT | Mod: PBBFAC | Performed by: NURSE PRACTITIONER

## 2020-03-05 PROCEDURE — 99999 PR PBB SHADOW E&M-EST. PATIENT-LVL IV: CPT | Mod: PBBFAC,,, | Performed by: NURSE PRACTITIONER

## 2020-03-05 NOTE — PROGRESS NOTES
"    [facility] Consultation Note    Reason for Consult:  The primary encounter diagnosis was Abdominal cramping. Diagnoses of Change in bowel habits, Iron deficiency anemia due to chronic blood loss, Angiodysplasia of small intestine, History of colon polyps, Constipation, unspecified constipation type, and Abdominal gas pain were also pertinent to this visit.    PCP:   Janett Montoya   [address] [place] [address]    Referring MD:  Kerrie Priest Md  [address] [place] [address]    History of Present Illness (HPI):  This is a 74 y.o. female here for evaluation of abdominal cramping and change in bowel habits. She is an established pt last seen by Dr. Kingsley 8/2019 for constipation and POLO.   Clinic note from Dr. Kingsley "Isael Salmon is a 73 y.o. female here for follow-up of anemia.  Previously seen by Dr. Alanis in April of 2017.  More recently seen by her PCP.  She continues to report ongoing dark stools.  She is not taking her iron every day as it causes constipation. She really is only using it once every 2 weeks.  She denies other signs of overt GI bleeding.  She is occult stool positive for blood.  Most recently her ferritin on 07/08/2019 was 29.  Prior evaluation by Dr. Alanis as well as by Dr. Dillon.  Wireless PillCam endoscopy done 04/11/2017 during which a small AVM was seen in the proximal bowel.  Dr. Dillon intended to do a balloon assisted enteroscopy 05/26/2016; however, she had problems with hypoxia during the test therefore it was discontinued.  He was able to complete a device assisted colonoscopy and noted only sigmoid colon diverticulosis.  Dr. Alanis had previously attempted a colonoscopy 10/26/2015 due to a history of polyps, but this was incomplete due to restricted mobility of her colon.     She has had also constipation that has been present for a long time.  She uses Metamucil 2 tablets twice a day.  She also takes Colace.  She has " "had use glycerin suppositories at times.  She usually has a bowel movement on a daily basis, but lately has been every other day."     Interval hx 3/5/2020  New to me.  Referred for a onset change in her bowel habits and abd cramping.  She reports her bowel movements are thin/ribbon like for the past couple of weeks. Denies overt signs of GI bleeding. Has a lot Gas with this. No rectal pain but does report a deep lower pelvic pain. No abd pain  Has had to have manual removal sometimes of stool.  Has tried Miralax, colace, fiber . Fiber use to work but stopped working    ROS:  Constitutional: No fevers, chills, No weight loss  CV: No chest pain, no palpitation  Pulm: No cough, No shortness of breath  GI: see HPI  Derm: + rash, no itching has derm  Heme: No easy bruising  MSK: +GOUT, +arthritis  : No dysuria, No hematuria  Neuro: No syncope, No seizure  Psych: No uncontrolled anxiety, No uncontrolled depression    Medical History:  has a past medical history of Adjustment disorder (7/26/2012), Anemia (7/26/2012), AR (allergic rhinitis) (9/5/2014), Bronchitis, Cataract, Fibrocystic breast disease in female (7/26/2012), GERD (gastroesophageal reflux disease) (7/26/2012), Gout, Gout, arthritis (7/26/2012), History of blood transfusion (7/26/2012), History of colonic polyps (7/26/2012), Hypercalcemia (9/20/2012), Hyperlipidemia (7/26/2012), Hypertension (7/26/2012), Nuclear sclerosis (5/2/2014), Osteoarthritis (7/26/2012), Other hyperparathyroidism (9/20/2012), Postmenopausal status (7/26/2012), PVD (peripheral vascular disease) (7/26/2012), Stroke (1997), Superficial vein thrombosis, Transient ischemic attack (7/26/2012), and Varicose veins (7/26/2012).    Surgical History:  has a past surgical history that includes carotid endarterectomy (1997); Carotid endarterectomy (Right, 1997); Vascular surgery; Hysterectomy; Colonoscopy (N/A, 10/26/2015); ORIF finger fracture (12/18/15); Breast surgery; broken second finger " (Right, 12/2015); TIA; Cataract extraction w/  intraocular lens implant (Right, 05/01/2018); Cataract extraction w/  intraocular lens implant (Left, 05/15/2018); Breast cyst excision (Left); Breast cyst aspiration (Bilateral); and Eye surgery.    Family History: family history includes Bone cancer in her brother; Cancer in her brother and mother; Cataracts in her brother, brother, brother, brother, and mother; Crohn's disease in her brother; Heart failure in her father; Hypertension in her brother, brother, brother, brother, and mother..     Social History:  reports that she quit smoking about 30 years ago. She has never used smokeless tobacco. She reports that she drinks about 1.0 standard drinks of alcohol per week. She reports that she does not use drugs.    Review of patient's allergies indicates:   Allergen Reactions    Latex      Other reaction(s): Rash    Pcn [penicillins]      Other reaction(s): rash    Sulfa (sulfonamide antibiotics)      Other reaction(s): blisters    Avelox [moxifloxacin]      Other reaction(s): racing heart  Other reaction(s): shakes    Ciprofloxacin Other (See Comments)     Room starts to spin , nausea and dizziness    Codeine      Other reaction(s): nausea  Other reaction(s): weakness    Norco [hydrocodone-acetaminophen] Nausea And Vomiting and Other (See Comments)       Medication List with Changes/Refills   New Medications    LINACLOTIDE (LINZESS) 145 MCG CAP CAPSULE    Take 1 capsule (145 mcg total) by mouth once daily.   Current Medications    ALLOPURINOL (ZYLOPRIM) 100 MG TABLET    Take 2 tablets (200 mg total) by mouth once daily.    ASCORBIC ACID, VITAMIN C, (VITAMIN C) 500 MG TABLET    Take 500 mg by mouth once daily.    ASPIRIN (ECOTRIN) 81 MG EC TABLET    Take 81 mg by mouth once daily.     AUGMENTED BETAMETHASONE DIPROPIONATE (DIPROLENE-AF) 0.05 % CREAM    Apply topically 2 (two) times daily.    B COMPLEX VITAMINS CAPSULE    Take 1 capsule by mouth once daily.     BIOTIN ORAL    Take 1 tablet by mouth once daily.    CO-ENZYME Q-10 30 MG CAPSULE    Take 200 mg by mouth once daily.    COLCHICINE 0.6 MG TABLET    One tablet every hour during gouty flare until have diarrhea. Max 6 tablets daily    DIAZEPAM (VALIUM) 5 MG TABLET    1/2-1 tablet at bedtime    DICLOFENAC SODIUM 1 % GEL    Apply 2 g topically once daily.    DICYCLOMINE (BENTYL) 10 MG CAPSULE    Take 1 capsule (10 mg total) by mouth 4 (four) times daily as needed. 1 Capsule Oral Four times a day    ERGOCALCIFEROL (ERGOCALCIFEROL) 50,000 UNIT CAP    TAKE 1 CAPSULE BY MOUTH 2 TIMES A WEEK    ESCITALOPRAM OXALATE (LEXAPRO) 10 MG TABLET    Take 0.5 tablets (5 mg total) by mouth once daily.    ESTRADIOL (ESTRACE) 0.01 % (0.1 MG/GRAM) VAGINAL CREAM    Place vaginally as needed.     FERROUS SULFATE 325 MG (65 MG IRON) TAB TABLET    Take 1 tablet (325 mg total) by mouth every 12 (twelve) hours.    FLUTICASONE (FLONASE) 50 MCG/ACTUATION NASAL SPRAY    2 sprays by Each Nare route daily as needed. 2 Aerosol, Spray Nasal Every day    FOLBIC 2.5-25-2 MG TAB    TAKE 1 TABLET BY MOUTH DAILY    INDOMETHACIN (INDOCIN) 50 MG CAPSULE    Take 1 capsule (50 mg total) by mouth 3 (three) times daily as needed.    IPRATROPIUM (ATROVENT) 0.02 % NEBULIZER SOLUTION    Take 2.5 mLs (500 mcg total) by nebulization 3 (three) times daily as needed for Wheezing.    LOSARTAN (COZAAR) 50 MG TABLET    Take 1 tablet (50 mg total) by mouth 2 (two) times daily.    MULTIVIT,CALC,MINS/IRON/FOLIC (ONE-A-DAY WOMENS FORMULA ORAL)    Take 1 tablet by mouth once daily.    NEOMYCIN-POLYMYXIN-HYDROCORTISONE (CORTISPORIN) 3.5-10,000-1 MG/ML-UNIT/ML-% OTIC SUSPENSION    apply to affected area OF NAILS twice a day    OMEPRAZOLE (PRILOSEC) 20 MG CAPSULE    Take 1 capsule (20 mg total) by mouth 2 (two) times daily.    ONDANSETRON (ZOFRAN-ODT) 4 MG TBDL    Take 1 tablet (4 mg total) by mouth every 6 (six) hours as needed.    ROSUVASTATIN (CRESTOR) 20 MG TABLET    Take 1  "tablet (20 mg total) by mouth once daily.    SPIRONOLACTONE (ALDACTONE) 25 MG TABLET    Take 1 tablet (25 mg total) by mouth once daily.    TIZANIDINE (ZANAFLEX) 2 MG TABLET    Take 1-2 tablets (2-4 mg total) by mouth nightly as needed.         Objective Findings:    Vital Signs:  /73 (BP Location: Right arm)   Pulse 77   Ht 5' 5" (1.651 m)   Wt 79.5 kg (175 lb 4.3 oz)   LMP  (LMP Unknown)   BMI 29.17 kg/m²   Body mass index is 29.17 kg/m².    Physical Exam:  General Appearance: Well appearing, NAD noted  Eyes:    No scleral icterus  ENT:  No lesions or masses   Musculoskeletal:  No major joint deformities  Skin: No petechiae or rash on exposed skin areas  Neurologic:  Alert and oriented x4  Psychiatric:  Normal speech mentation and affect    Labs reviewed:  Lab Results   Component Value Date    WBC 5.68 02/27/2020    HGB 10.6 (L) 02/27/2020    HCT 34.1 (L) 02/27/2020     02/27/2020    CHOL 145 09/17/2018    TRIG 80 09/17/2018    HDL 58 09/17/2018    ALT 19 02/27/2020    AST 20 02/27/2020     02/27/2020    K 4.2 02/27/2020     02/27/2020    CREATININE 1.3 02/27/2020    BUN 22 02/27/2020    CO2 32 (H) 02/27/2020    TSH 1.123 04/08/2019    INR 1.0 01/14/2014    HGBA1C 5.6 04/08/2019           Imaging reviewed:  CT abd pelvis 2/28/2020 Diverticulosis in the colon. No mention of SB abnormalities    Endoscopy reviewed:    EGD 1/2017 Normal examined duodenum.                        - Erythematous mucosa in the cardia. Biopsied.                        - Granular gastric mucosa. Biopsied.                        - Z-line regular, 39 cm from the incisors.                        - Normal esophagus.    Balloon assisted colonoscopy w/ Dr. Dillon 5/2016   - Diverticulosis in the sigmoid colon, in the                         descending colon and in the transverse colon.                        - Complete examination of the entire colon using a                         double balloon enteroscope         "                - History of colon polyps on recent colonoscopy but                         only one was adenomatous.    EGD 5/2016 Incomplete EGD to the esophagus with interuption                         due to hypoxia    Colon 10/2015 incomplete due to restricted mobility of the colon.  Recommended device assisted colonoscopy.  Four polyps removed all less than 10 mm. Only one was a TA. Recommended repeat in 5 years.      Medical Decision Making:    Assessment:    Isael Salmon is a 74 y.o. female here for:    1. Abdominal cramping    2. Change in bowel habits    3. Iron deficiency anemia due to chronic blood loss    4. Angiodysplasia of small intestine    5. History of colon polyps    6. Constipation, unspecified constipation type    7. Abdominal gas pain       Does have chronic constipation since atleast 8/2019. Recently however she reports ribbon like stools and is concerned with this. She did not want rectal exam performed today.  Does have hx of difficult colonoscopy that require balloon scope. She also has issues with hypoxia w/ anesthesia in the past. Higher decision making skills used for closer anesthesia monitoring and need for 2nd floor procedure.    Recommendations:  1. Try Linzess for constipation.  2. Constipation prep for possible balloon colonoscopy w/ Dr. Kingsley.  3. Pt to let me know if she needs dose adjustments to the linzess.     F/u pending scope    Order summary:  Orders Placed This Encounter    linaCLOtide (LINZESS) 145 mcg Cap capsule         Thank you for allowing me to participate in the care of Isael Salmon    Marilee Cesar, JOSE DP-C

## 2020-03-05 NOTE — TELEPHONE ENCOUNTER
MA contacted pt to let her know if she wants to come a few min early she can. Pt said she will come as soon as she can.

## 2020-03-05 NOTE — LETTER
March 5, 2020      Kerrie Priest MD  [address]  [facility] [address]           [facility]  [address]  [facility] [address]  Phone: [phone]  Fax: [phone]          Patient: Isael Salmon   MR Number: [id number]   YOB: 1945   Date of Visit: 3/5/2020       Dear Dr. Kerrie Priest:    Thank you for referring Isael Salmon to me for evaluation. Attached you will find relevant portions of my assessment and plan of care.    If you have questions, please do not hesitate to call me. I look forward to following Isael Salmon along with you.    Sincerely,    Marilee Cesar, TYRELL    Enclosure  CC:  No Recipients    If you would like to receive this communication electronically, please contact [email or web address] or [phone] to request more information on [employer] Link access.    For providers and/or their staff who would like to refer a patient to [facility], please contact us through our one-stop-shop provider referral line, [facility], at [phone].    If you feel you have received this communication in error or would no longer like to receive these types of communications, please e-mail [email or web address]

## 2020-03-06 ENCOUNTER — OFFICE VISIT (OUTPATIENT)
Dept: INTERNAL MEDICINE | Facility: CLINIC | Age: 75
End: 2020-03-06
Payer: MEDICARE

## 2020-03-06 ENCOUNTER — TELEPHONE (OUTPATIENT)
Dept: ENDOSCOPY | Facility: HOSPITAL | Age: 75
End: 2020-03-06

## 2020-03-06 VITALS
HEART RATE: 77 BPM | SYSTOLIC BLOOD PRESSURE: 110 MMHG | DIASTOLIC BLOOD PRESSURE: 60 MMHG | OXYGEN SATURATION: 99 % | WEIGHT: 176.5 LBS | HEIGHT: 65 IN | BODY MASS INDEX: 29.41 KG/M2

## 2020-03-06 DIAGNOSIS — D50.8 OTHER IRON DEFICIENCY ANEMIA: ICD-10-CM

## 2020-03-06 DIAGNOSIS — J84.10 CALCIFIED GRANULOMA OF LUNG: ICD-10-CM

## 2020-03-06 DIAGNOSIS — I10 ESSENTIAL HYPERTENSION: Primary | ICD-10-CM

## 2020-03-06 DIAGNOSIS — J44.9 CHRONIC OBSTRUCTIVE PULMONARY DISEASE, UNSPECIFIED COPD TYPE: ICD-10-CM

## 2020-03-06 DIAGNOSIS — I70.0 AORTIC ATHEROSCLEROSIS: ICD-10-CM

## 2020-03-06 DIAGNOSIS — J30.1 ALLERGIC RHINITIS DUE TO POLLEN, UNSPECIFIED SEASONALITY: ICD-10-CM

## 2020-03-06 DIAGNOSIS — Z12.11 SPECIAL SCREENING FOR MALIGNANT NEOPLASMS, COLON: Primary | ICD-10-CM

## 2020-03-06 PROCEDURE — 99213 OFFICE O/P EST LOW 20 MIN: CPT | Mod: PBBFAC | Performed by: INTERNAL MEDICINE

## 2020-03-06 PROCEDURE — 99999 PR PBB SHADOW E&M-EST. PATIENT-LVL III: CPT | Mod: PBBFAC,,, | Performed by: INTERNAL MEDICINE

## 2020-03-06 PROCEDURE — 99999 PR PBB SHADOW E&M-EST. PATIENT-LVL III: ICD-10-PCS | Mod: PBBFAC,,, | Performed by: INTERNAL MEDICINE

## 2020-03-06 PROCEDURE — 99214 OFFICE O/P EST MOD 30 MIN: CPT | Mod: S$PBB,,, | Performed by: INTERNAL MEDICINE

## 2020-03-06 PROCEDURE — 99214 PR OFFICE/OUTPT VISIT, EST, LEVL IV, 30-39 MIN: ICD-10-PCS | Mod: S$PBB,,, | Performed by: INTERNAL MEDICINE

## 2020-03-06 RX ORDER — POLYETHYLENE GLYCOL 3350, SODIUM CHLORIDE, SODIUM BICARBONATE, POTASSIUM CHLORIDE 420; 11.2; 5.72; 1.48 G/4L; G/4L; G/4L; G/4L
1 POWDER, FOR SOLUTION ORAL ONCE
Qty: 4000 ML | Refills: 0 | Status: SHIPPED | OUTPATIENT
Start: 2020-03-06 | End: 2020-03-06

## 2020-03-06 NOTE — TELEPHONE ENCOUNTER
----- Message from Marilee Cesar NP sent at 3/5/2020 12:43 PM CST -----  Regarding: RE: Balloon colon  Okay thanks.    Bethany, please call pt to get this set up. Order has been placed. Thanks  ----- Message -----  From: Jaziel Kingsley MD  Sent: 3/5/2020  12:28 PM CST  To: Marilee Cesar NP  Subject: RE: Balloon colon                                Yes, I am happy to do this for her.  She had two completed colonoscopies prior to the one with Zeke.  I would schedule this as a colonoscopy, put in comments 60 minute slot and start with pediatric colonoscope, single-balloon scope as back-up.       ----- Message -----  From: Marilee Cesar NP  Sent: 3/5/2020  12:00 PM CST  To: Jaziel Kingsley MD  Subject: Balloon colon                                    Dr. Kingsley,     I saw this pt today for ribbon like stools. This does concern her and would like a colonoscopy.   She had to have a balloon c-scope with Santana in 2017 after incomplete colon w/ Zeke. Is this something you are okay doing?    ThanksAmy

## 2020-03-06 NOTE — PROGRESS NOTES
CHIEF COMPLAINT:  follow up multiple issues     HISTORY OF PRESENT ILLNESS: 74-year-old woman presents for follow up of above    She saw Dr araujo last week for constipation.   She has thin, stringy stools. She had a CT scan on 2/28/20 which reveals diverticulosis of the colon.   She is having a BM every 3 days.  She saw GI yesterday.  She is having a colonoscopy 3/30/20 - balloon colonoscopy with Dr Kingsley.  She had abdominal pain last week that has since resolved.  She was given a prescription of Linzess yesterday. She has not started yet.     She has been constipated the last several week. She has been  Taking stool softness, metamucil and eating prunes without relief. Diet has not been good. She does not drink a lot of water.      Reflux has been controlled with omeprazole 20 mg daily.      Mood has been stable. She is taking lexapro 10 mg 1/2 tablet daily. She thinks she is doing better.       She is now taking Crestor 20 mg daily with co enzyme 10 200 mg daily for her hyperlipidemia. Rare muscle spasm         She is not taking her iron supplement once daily      She had a normal cystoscope 12/17/14 due to recurrent UTI. No dysuria or hematuria now. She is no longer using estrogen vaginal cream for vaginal atrophy three times weekly        No nausea, vomiting, diarrhea, emilio, bloody stools. She continues to take losartan 50 mg twice daily and spironolactone 25 mg 1 tablet daily for hypertension.      She continues allopurinol 200 mg daily. No gouty flares       She is taking vitamin D 50,000 units twice weekly     She has some back pain.     PAST MEDICAL HISTORY:   1. Hypertension.   2. Hyperlipidemia.   3. Gout.   4. History of rashes.   5. TIA in 1997  6. Fibrocystic breast disease.   7. Postmenopausal.   8. Osteoarthritis.   9. History of tobacco usage; quit in 1997.   10. History of colon polyps; normal colonoscopy in 10/07 and normal 4/2010, due 2015   11. Normal bone mineral density scan in 6/06.   12.  "Anxiety and depression - controlled on lorazepam very rarely.   13. Varicose veins.   14. Intermittent GERD.   15. Iron deficiency anemia 2010 - due to erosive gastropathy on EGD 2010     PAST SURGICAL HISTORY:   1. Right carotid endarterectomy in .   2. Breast reduction surgery.   3. Total abdominal hysterectomy.   4. Blood transfusion prior to .     SOCIAL HISTORY: Quit smoking in ; smoked 1 PPD since age 16. No   alcohol use.     FAMILY HISTORY:   Father had gout, hypertension and heart failure; is . Mother had hypertension and pancreatic cancer; is also . Has five brothers, one of whom  in a motor vehicle accident. All have gout and hypertension. One brother has Crohn's disease; another has bladder cancer.     MEDICATIONS and ALLERGIES: Updated on epic.     PHYSICAL EXAMINATION:     /82   Pulse 77   Ht 5' 5" (1.651 m)   Wt 80 kg (176 lb 7.7 oz)   LMP  (LMP Unknown)   SpO2 99%   BMI 29.37 kg/m²        General: Alert, oriented. No apparent distress. Affect within normal   limits. Swelling under the right eye brow.   Bruising under both eyes, right greater than left.  Bruising on the right cheek  Conjunctivae anicteric. Tympanic membranes clear. Oropharynx clear.   Neck supple.   Respiratory effort normal. Lungs clear  Heart: Regular rate and rhythm without murmurs, gallops or rubs.   No lower extremity edema.       CT abdomen and pelvis 20-  reviewed     ASSESSMENT AND PLAN:   1. COnstipation - push fluids.  Warm prune juice. Get colonoscopy  2 . Anemia - better   3. Heme positive stools - saw Dr Kingsley 19 -to have colonoscopy  2. Allergic rhinitis - stable.  4. Hyperlipidemia - On crestor 20 mg daily and Co Enzyme Q 10 200 mg daily.    5. Erosive gastropathy - on omeprazole 20 mg daily. Off NSAIDS.  6. Hypertension - controlled  7. Hypercalcemia - stable  6. Gout - asymptomatic on allopurinol.   7. Situational stress - continue lexapro   8.  Carotid artery " disease - on risk factor modification.   9. 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 Repeat 5/26/16 - divericulosis otherwise normal - . MMG 4/19 BMD 7/11.  I will see her back in 3 months , sooner if problems arise

## 2020-03-09 ENCOUNTER — TELEPHONE (OUTPATIENT)
Dept: GASTROENTEROLOGY | Facility: CLINIC | Age: 75
End: 2020-03-09

## 2020-03-09 NOTE — TELEPHONE ENCOUNTER
MA contacted pt to give information from Marilee. pt did not answer,MA left  to give the clinic a call.           ----- Message from Marilee Cesar NP sent at 3/9/2020  8:38 AM CDT -----  Regarding: FW: CT abdomen  Please let pt know I spoke with radiologist and her bowels look okay  ----- Message -----  From: Bin Queen MD  Sent: 3/8/2020  10:01 AM CDT  To: Marilee Cesar NP  Subject: RE: CT abdomen                                   Diverticlosis of colon as mentioned in report....small bowel looks ok  ----- Message -----  From: Marilee Cesar NP  Sent: 3/5/2020  11:33 AM CDT  To: Bin Queen MD  Subject: CT abdomen                                       Dr. Queen,     Pt is concerned about what her colon and SB looked like on CT. I do not see an impression for that, do you mind looking at the scan and let me know what your impression is so I can relay to the patient as she asked about this in clinic today.

## 2020-03-09 NOTE — TELEPHONE ENCOUNTER
MA contacted pt to give results per Marilee, Please let pt know I spoke with radiologist and her bowels look okay . Pt verbalized understanding . Pt also asked was the Linzess suppose to make her have diarrhea  . MA told pt  Yes it does and usually they would joey for you to be on it a couple of weeks to see if anything changes , but if she goes an excessive amount of times to please reach out to us. Pt verbalized understanding and said she wanted to make sure that was normal.           ----- Message from Rudolph Lewis sent at 3/9/2020  9:01 AM CDT -----  Contact: Patient @ [phone]  Patient returning a missed call from janet Murcia return call

## 2020-03-20 ENCOUNTER — TELEPHONE (OUTPATIENT)
Dept: GASTROENTEROLOGY | Facility: CLINIC | Age: 75
End: 2020-03-20

## 2020-03-20 ENCOUNTER — TELEPHONE (OUTPATIENT)
Dept: ENDOSCOPY | Facility: HOSPITAL | Age: 75
End: 2020-03-20

## 2020-03-20 NOTE — TELEPHONE ENCOUNTER
----- Message from Anais Bright MA sent at 3/20/2020 11:43 AM CDT -----      ----- Message -----  From: Traci Walter  Sent: 3/20/2020  11:03 AM CDT  To: Sancho Gibbons      [place] -- pt is returning your call. [phone]

## 2020-03-20 NOTE — TELEPHONE ENCOUNTER
Patient scheduled for colonoscopy on March 30th to assess for abdominal cramping and constipation.  Seen by my nurse practitioner, Marilee Cesar, on March 5th to evaluate the symptoms at which time the colonoscopy was scheduled.  Recent CT scan did not indicate any specific causes of her pain.  She was given Linzess.    In light of the new guidelines from the Department of Health [place] regarding postponement non urgent procedures for the next 30 days, we will postpone this procedure to a later date.    I will have her scheduled for a video visit follow-up to assess for ongoing symptoms and response to Linzess.

## 2020-03-23 ENCOUNTER — DOCUMENTATION ONLY (OUTPATIENT)
Dept: REHABILITATION | Facility: OTHER | Age: 75
End: 2020-03-23

## 2020-03-23 ENCOUNTER — TELEPHONE (OUTPATIENT)
Dept: ENDOSCOPY | Facility: HOSPITAL | Age: 75
End: 2020-03-23

## 2020-03-23 NOTE — TELEPHONE ENCOUNTER
----- Message from Anais Bright MA sent at 3/23/2020 11:42 AM CDT -----      ----- Message -----  From: Sharmin Alcazar  Sent: 3/23/2020  11:40 AM CDT  To: [facility] Staff    Returning call to [facility] re: Attempted to contact patient to schedule [facility] Colonoscopy    Pt contact [phone]

## 2020-03-29 ENCOUNTER — OFFICE VISIT (OUTPATIENT)
Dept: INTERNAL MEDICINE | Facility: CLINIC | Age: 75
End: 2020-03-29
Payer: MEDICARE

## 2020-03-29 ENCOUNTER — TELEPHONE (OUTPATIENT)
Dept: INTERNAL MEDICINE | Facility: CLINIC | Age: 75
End: 2020-03-29

## 2020-03-29 DIAGNOSIS — R50.9 FEVER AND CHILLS: ICD-10-CM

## 2020-03-29 DIAGNOSIS — B34.9 ACUTE VIRAL DISEASE: ICD-10-CM

## 2020-03-29 DIAGNOSIS — Z71.89 ADVICE GIVEN ABOUT COVID-19 VIRUS BY TELEPHONE: ICD-10-CM

## 2020-03-29 DIAGNOSIS — Z20.822 SUSPECTED COVID-19 VIRUS INFECTION: Primary | ICD-10-CM

## 2020-03-29 PROCEDURE — 99213 PR OFFICE/OUTPT VISIT, EST, LEVL III, 20-29 MIN: ICD-10-PCS | Mod: 95,,, | Performed by: NURSE PRACTITIONER

## 2020-03-29 PROCEDURE — 99213 OFFICE O/P EST LOW 20 MIN: CPT | Mod: 95,,, | Performed by: NURSE PRACTITIONER

## 2020-03-29 NOTE — PROGRESS NOTES
The patient location is: home  The chief complaint leading to consultation is: continued fevers off and on, cough, diarrhea, some improvement with Tylenol and the Zofran and Loperamide however still feels weak. Trying to force fluids.  Visit type: Virtual visit with synchronous audio and video  Total time spent with patient: 15 minutes  Each patient to whom he or she provides medical services by telemedicine is:  (1) informed of the relationship between the physician and patient and the respective role of any other health care provider with respect to management of the patient; and (2) notified that he or she may decline to receive medical services by telemedicine and may withdraw from such care at any time.    Review of patient's allergies indicates:   Allergen Reactions    Latex      Other reaction(s): Rash    Pcn [penicillins]      Other reaction(s): rash    Sulfa (sulfonamide antibiotics)      Other reaction(s): blisters    Avelox [moxifloxacin]      Other reaction(s): racing heart  Other reaction(s): shakes    Ciprofloxacin Other (See Comments)     Room starts to spin , nausea and dizziness    Codeine      Other reaction(s): nausea  Other reaction(s): weakness       Current Outpatient Medications:     allopurinol (ZYLOPRIM) 100 MG tablet, Take 2 tablets (200 mg total) by mouth once daily., Disp: 180 tablet, Rfl: 3    ascorbic acid, vitamin C, (VITAMIN C) 500 MG tablet, Take 500 mg by mouth once daily., Disp: , Rfl:     aspirin (ECOTRIN) 81 MG EC tablet, Take 81 mg by mouth once daily. , Disp: , Rfl:     augmented betamethasone dipropionate (DIPROLENE-AF) 0.05 % cream, Apply topically 2 (two) times daily. (Patient taking differently: Apply topically 2 (two) times daily as needed. ), Disp: 60 g, Rfl: 3    b complex vitamins capsule, Take 1 capsule by mouth once daily., Disp: , Rfl:     BIOTIN ORAL, Take 1 tablet by mouth once daily., Disp: , Rfl:     co-enzyme Q-10 30 mg capsule, Take 200 mg by mouth  once daily., Disp: , Rfl:     colchicine 0.6 mg tablet, One tablet every hour during gouty flare until have diarrhea. Max 6 tablets daily, Disp: 30 tablet, Rfl: 11    ergocalciferol (ERGOCALCIFEROL) 50,000 unit Cap, TAKE 1 CAPSULE BY MOUTH 2 TIMES A WEEK, Disp: 24 capsule, Rfl: 0    escitalopram oxalate (LEXAPRO) 10 MG tablet, Take 0.5 tablets (5 mg total) by mouth once daily., Disp: 45 tablet, Rfl: 3    estradiol (ESTRACE) 0.01 % (0.1 mg/gram) vaginal cream, Place vaginally as needed. , Disp: , Rfl:     fluticasone (FLONASE) 50 mcg/actuation nasal spray, 2 sprays by Each Nare route daily as needed. 2 Aerosol, Spray Nasal Every day, Disp: 16 g, Rfl: 2    FOLBIC 2.5-25-2 mg Tab, TAKE 1 TABLET BY MOUTH DAILY, Disp: 30 tablet, Rfl: 4    indomethacin (INDOCIN) 50 MG capsule, Take 1 capsule (50 mg total) by mouth 3 (three) times daily as needed. (Patient not taking: Reported on 3/6/2020), Disp: 90 capsule, Rfl: 6    ipratropium (ATROVENT) 0.02 % nebulizer solution, Take 2.5 mLs (500 mcg total) by nebulization 3 (three) times daily as needed for Wheezing. (Patient not taking: Reported on 3/6/2020), Disp: 62.5 mL, Rfl: 0    linaCLOtide (LINZESS) 145 mcg Cap capsule, Take 1 capsule (145 mcg total) by mouth once daily., Disp: 30 capsule, Rfl: 1    loperamide (IMODIUM) 2 mg capsule, Take 1 capsule (2 mg total) by mouth 4 (four) times daily as needed for Diarrhea., Disp: 12 capsule, Rfl: 0    losartan (COZAAR) 50 MG tablet, Take 1 tablet (50 mg total) by mouth 2 (two) times daily., Disp: 180 tablet, Rfl: 3    multivit,calc,mins/iron/folic (ONE-A-DAY WOMENS FORMULA ORAL), Take 1 tablet by mouth once daily., Disp: , Rfl:     omeprazole (PRILOSEC) 20 MG capsule, Take 1 capsule (20 mg total) by mouth 2 (two) times daily., Disp: 90 capsule, Rfl: 4    ondansetron (ZOFRAN) 4 MG tablet, Take 1 tablet (4 mg total) by mouth every 6 (six) hours., Disp: 12 tablet, Rfl: 0    rosuvastatin (CRESTOR) 20 MG tablet, Take 1 tablet  (20 mg total) by mouth once daily., Disp: 90 tablet, Rfl: 3    spironolactone (ALDACTONE) 25 MG tablet, Take 1 tablet (25 mg total) by mouth once daily., Disp: 90 tablet, Rfl: 4    Patient Active Problem List   Diagnosis    Essential hypertension    Hyperlipidemia    Gout, arthritis    Gastroesophageal reflux disease without esophagitis    History of transient ischemic attack (TIA)    Fibrocystic breast disease in female    Postmenopausal status    Osteoarthritis    Adjustment disorder    Asymptomatic varicose veins of both lower extremities    PVD (peripheral vascular disease)    History of blood transfusion    Hypercalcemia    Rectocele    Mixed incontinence urge and stress (male)(female)    Urethral hypermobility    Chronic constipation    Chronic cough    Nuclear sclerosis    AR (allergic rhinitis)    Recurrent UTI    Kidney stones    History of colon polyps    Idiopathic chronic gout of multiple sites without tophus    Primary osteoarthritis involving multiple joints    Traumatic closed displaced mallet fracture    Mallet finger    Mild chronic anemia    Post concussive syndrome    Iron deficiency anemia    Amaurosis fugax    Acute right-sided low back pain with sciatica    Chronic pain    Bilateral carotid artery disease    Vitreous detachment of both eyes    Refractive error    Senile nuclear sclerosis    Chronic post-traumatic headache, not intractable    Pseudophakia    Dry eye syndrome of both eyes    Calcified granuloma of lung    Aortic atherosclerosis    COPD (chronic obstructive pulmonary disease)     Past Medical History:   Diagnosis Date    Adjustment disorder 7/26/2012    Anemia 7/26/2012    AR (allergic rhinitis) 9/5/2014    Bronchitis     Cataract     Fibrocystic breast disease in female 7/26/2012    GERD (gastroesophageal reflux disease) 7/26/2012    Gout     Gout, arthritis 7/26/2012    History of blood transfusion 7/26/2012    History of  colonic polyps 2012    Hypercalcemia 2012    Hyperlipidemia 2012    Hypertension 2012    Nuclear sclerosis 2014    Osteoarthritis 2012    Other hyperparathyroidism 2012    Postmenopausal status 2012    PVD (peripheral vascular disease) 2012    left leg laser of vein with injection    Stroke     TIA    Superficial vein thrombosis     Transient ischemic attack 2012    Varicose veins 2012     Past Surgical History:   Procedure Laterality Date    BREAST CYST ASPIRATION Bilateral     multiple ones over the years    BREAST CYST EXCISION Left     BREAST SURGERY      breast reduction    broken second finger Right 2015    pins placed    carotid endarterectomy      right    CAROTID ENDARTERECTOMY Right     CATARACT EXTRACTION W/  INTRAOCULAR LENS IMPLANT Right 2018    Dr. Yost    CATARACT EXTRACTION W/  INTRAOCULAR LENS IMPLANT Left 05/15/2018    Dr. Lester    COLONOSCOPY N/A 10/26/2015    Procedure: COLONOSCOPY;  Surgeon: Manuel Alanis MD;  Location: [address]);  Service: Endoscopy;  Laterality: N/A;    EYE SURGERY      HYSTERECTOMY      ORIF FINGER FRACTURE  12/18/15    TIA      VASCULAR SURGERY      left leg vein laser     Social History     Socioeconomic History    Marital status:      Spouse name: Not on file    Number of children: 1    Years of education: Not on file    Highest education level: Not on file   Occupational History    Occupation: Retired   Social Needs    Financial resource strain: Not hard at all    Food insecurity:     Worry: Never true     Inability: Never true    Transportation needs:     Medical: No     Non-medical: No   Tobacco Use    Smoking status: Former Smoker     Last attempt to quit: 1989     Years since quittin.5    Smokeless tobacco: Never Used    Tobacco comment: quit  - 1 pack per day since age 16   Substance and Sexual Activity     Alcohol use: Yes     Alcohol/week: 1.0 standard drinks     Types: 1 Glasses of wine per week     Frequency: Monthly or less     Drinks per session: 1 or 2     Binge frequency: Never     Comment: maybe one glass of wine monthly    Drug use: No    Sexual activity: Not Currently     Partners: Male     Birth control/protection: Surgical   Lifestyle    Physical activity:     Days per week: 0 days     Minutes per session: 0 min    Stress: To some extent   Relationships    Social connections:     Talks on phone: Three times a week     Gets together: Once a week     Attends [employer] service: Not on file     Active member of club or organization: Yes     Attends meetings of clubs or organizations: 1 to 4 times per year     Relationship status:    Other Topics Concern    Are you pregnant or think you may be? Not Asked    Breast-feeding Not Asked   Social History Narrative    Not on file     Family History   Problem Relation Age of Onset    Heart failure Father     Cancer Mother         pancreas    Cataracts Mother     Hypertension Mother     Cancer Brother         bladder    Cataracts Brother     Hypertension Brother     Crohn's disease Brother     Cataracts Brother     Hypertension Brother     Cataracts Brother     Hypertension Brother     Bone cancer Brother     Cataracts Brother     Hypertension Brother     Breast cancer Neg Hx     Ovarian cancer Neg Hx     Allergies Neg Hx     Asthma Neg Hx     Eczema Neg Hx     Cervical cancer Neg Hx     Endometrial cancer Neg Hx     Vaginal cancer Neg Hx     Amblyopia Neg Hx     Blindness Neg Hx     Diabetes Neg Hx     Glaucoma Neg Hx     Macular degeneration Neg Hx     Retinal detachment Neg Hx     Strabismus Neg Hx     Stroke Neg Hx     Thyroid disease Neg Hx     Celiac disease Neg Hx     Colon cancer Neg Hx     Esophageal cancer Neg Hx     Liver disease Neg Hx     Liver cancer Neg Hx     Rectal cancer Neg Hx     Stomach cancer  Neg Hx         Notes: Pt of Dr. Montoya, here for ER f/u on 3/27/20. Was evaluated in the ER, Dx with Cough Diarrhea, unspecified type, Fever, unspecified fever cause, Viral syndrome, Educated About Covid-19 Virus Infection. Discharged on zofran and loperamide. Mentioned in ER note she was tested for COVID on last Tuesday, results not back yet. Spoke with caretaker daughter Pam who is in home with pt. Pt is still weak, having fevers off and on, highest 100.4, was able to bring down to 99.0 with tylenol. States the Zofran and Loperamide are working however she still feels weak, diarrhea has slowed down somewhat. Still has a slight cough but not as bad, has tessalon and cough syrup on hand as needed. Pt was able to speak to me in full sentences during the call. Does appear pale and a little weak on the sofa. She does not complain of any pain. I have independently reviewed the ER notes from 3-.    Per Protocol to test: Community pt, daughter has tested positive, plus symptoms of fever, cough, lost of taste and smell, weakness, diarrhea. Will order drive through testing    Diagnoses and all orders for this visit:    Suspected Covid-19 Virus Infection  -     SARS- CoV-2 (COVID-19) QUALITATIVE PCR    Fever and chills  -     SARS- CoV-2 (COVID-19) QUALITATIVE PCR    Acute viral disease  -     SARS- CoV-2 (COVID-19) QUALITATIVE PCR    Advice Given About Covid-19 Virus by Telephone  -     SARS- CoV-2 (COVID-19) QUALITATIVE PCR    Continue prn Zofran and loperamide as prescribed    Continue to force liquids    Continue Tylenol as needed for fever    Will schedule drive through COVID testing for tomorrow    [place] [facility]  Preventing the Spread of Coronavirus Disease 2019 in Homes and Residential Communities    Prevention steps for people with confirmed or suspected COVID-19 (including persons under investigation) who do not need to be hospitalized and people with confirmed COVID-19 who  were hospitalized and determined to be medically stable to go home.    Your healthcare provider and public health staff will evaluate whether you can be cared for at home.  Stay home except to get medical care.  Separate yourself from other people and animals in your home  Call ahead before visiting your doctor.  Wear a facemask.  Cover your coughs and sneezes.  Clean your hands often.  Avoid sharing personal household items.  Clean all high-touch surfaces every day.  Monitor your symptoms. Seek prompt medical attention if your illness is worsening (e.g., difficulty breathing). Before seeking care, call your healthcare provider.  If you have a medical emergency and need to call 911, notify the dispatch personnel that you have, or are being evaluated for COVID-19. If possible, put on a facemask before emergency medical services arrive.  Discontinuing home isolation. Call your provider about guidance to discontinue home isolation.    Recommended precautions for household members, intimate partners, and caregivers in a nonhealthcare setting of a patient with symptomatic laboratory-confirmed COVID-19 or a patient under investigation.  Household members, intimate partners, and caregivers in a nonhealthcare setting may have close contact with a person with symptomatic, laboratory-confirmed COVID-19 or a person under investigation. Close contacts should monitor their health; they should call their healthcare provider right away if they develop symptoms suggestive of COVID-19 (e.g., fever, cough, shortness of breath).    Close contacts should also follow these recommendations:  Make sure that you understand and can help the patient follow their healthcare provider's instructions for medication(s) and care. You should help the patient with basic needs in the home and provide support for getting groceries, prescriptions, and other personal needs.  Monitor the patient's symptoms. If the patient is getting sicker, call his or  her healthcare provider and tell them that the patient has laboratory-confirmed COVID-19. This will help the healthcare provider's office take steps to keep other people in the office or waiting room from getting infected. Ask the healthcare provider to call the local or [facility] health department for additional guidance. If the patient has a medical emergency and you need to call 911, notify the dispatch personnel that the patient has, or is being evaluated for COVID-19.  Household members should stay in another room or be  from the patient as much as possible. Household members should use a separate bedroom and bathroom, if available.  Prohibit visitors who do not have an essential need to be in the home.  Household members should care for any pets in the home. Do not handle pets or other animals while sick.  Make sure that shared spaces in the home have good air flow, such as by an air conditioner or an opened window, weather permitting.  Perform hand hygiene frequently. Wash your hands often with soap and water for at least 20 seconds or use an alcohol-based hand  that contains 60 to 95% alcohol, covering all surfaces of your hands and rubbing them together until they feel dry. Soap and water should be used preferentially if hands are visibly dirty.  Avoid touching your eyes, nose, and mouth with unwashed hands.  The patient should wear a facemask when you are around other people. If the patient is not able to wear a facemask (for example, because it causes trouble breathing), you, as the caregiver should wear a mask when you are in the same room as the patient.  Wear a disposable facemask and gloves when you touch or have contact with the patient's blood, stool, or body fluids, such as saliva, sputum, nasal mucus, vomit, urine.  Throw out disposable facemasks and gloves after using them. Do not reuse.  When removing personal protective equipment, first remove and dispose of gloves. Then,  immediately clean your hands with soap and water or alcohol-based hand . Next, remove and dispose of facemask, and immediately clean your hands again with soap and water or alcohol-based hand .  Avoid sharing household items with the patient. You should not share dishes, drinking glasses, cups, eating utensils, towels, bedding, or other items. After the patient uses these items, you should wash them thoroughly (see below Wash laundry thoroughly).  Clean all high-touch surfaces, such as counters, tabletops, doorknobs, bathroom fixtures, toilets, phones, keyboards, tablets, and bedside tables, every day. Also, clean any surfaces that may have blood, stool, or body fluids on them.  Use a household cleaning spray or wipe, according to the label instructions. Labels contain instructions for safe and effective use of the cleaning product including precautions you should take when applying the product, such as wearing gloves and making sure you have good ventilation during use of the product.  Wash laundry thoroughly.  Immediately remove and wash clothes or bedding that have blood, stool, or body fluids on them.  Wear disposable gloves while handling soiled items and keep soiled items away from your body. Clean your hands (with soap and water or an alcohol-based hand ) immediately after removing your gloves.  Read and follow directions on labels of laundry or clothing items and detergent. In general, using a normal laundry detergent according to washing machine instructions and dry thoroughly using the warmest temperatures recommended on the clothing label.  Place all used disposable gloves, facemasks, and other contaminated items in a lined container before disposing of them with other household waste. Clean your hands (with soap and water or an alcohol-based hand ) immediately after handling these items. Soap and water should be used preferentially if hands are visibly  dirty.  Discuss any additional questions with your state or local health department or healthcare provider. Check available hours when contacting your local health department.    For more information see CDC link below.    https://www.cdc.gov/coronavirus/2019-ncov/hcp/guidance-prevent-spread.html#precautions            If your symptoms worsen or if you have any other concerns, please contact [facility] at [phone].

## 2020-03-29 NOTE — PATIENT INSTRUCTIONS
Continue prn Zofran and loperamide as prescribed    Continue to force liquids    Continue Tylenol as needed for fever    Will schedule drive through COVID testing for tomorrow    [place] [facility]  Preventing the Spread of Coronavirus Disease 2019 in Homes and Residential Communities    Prevention steps for people with confirmed or suspected COVID-19 (including persons under investigation) who do not need to be hospitalized and people with confirmed COVID-19 who were hospitalized and determined to be medically stable to go home.    Your healthcare provider and public health staff will evaluate whether you can be cared for at home.  Stay home except to get medical care.  Separate yourself from other people and animals in your home  Call ahead before visiting your doctor.  Wear a facemask.  Cover your coughs and sneezes.  Clean your hands often.  Avoid sharing personal household items.  Clean all high-touch surfaces every day.  Monitor your symptoms. Seek prompt medical attention if your illness is worsening (e.g., difficulty breathing). Before seeking care, call your healthcare provider.  If you have a medical emergency and need to call 911, notify the dispatch personnel that you have, or are being evaluated for COVID-19. If possible, put on a facemask before emergency medical services arrive.  Discontinuing home isolation. Call your provider about guidance to discontinue home isolation.    Recommended precautions for household members, intimate partners, and caregivers in a nonhealthcare setting of a patient with symptomatic laboratory-confirmed COVID-19 or a patient under investigation.  Household members, intimate partners, and caregivers in a nonhealthcare setting may have close contact with a person with symptomatic, laboratory-confirmed COVID-19 or a person under investigation. Close contacts should monitor their health; they should call their healthcare provider right away if they  develop symptoms suggestive of COVID-19 (e.g., fever, cough, shortness of breath).    Close contacts should also follow these recommendations:  Make sure that you understand and can help the patient follow their healthcare provider's instructions for medication(s) and care. You should help the patient with basic needs in the home and provide support for getting groceries, prescriptions, and other personal needs.  Monitor the patient's symptoms. If the patient is getting sicker, call his or her healthcare provider and tell them that the patient has laboratory-confirmed COVID-19. This will help the healthcare provider's office take steps to keep other people in the office or waiting room from getting infected. Ask the healthcare provider to call the local or [facility] health department for additional guidance. If the patient has a medical emergency and you need to call 911, notify the dispatch personnel that the patient has, or is being evaluated for COVID-19.  Household members should stay in another room or be  from the patient as much as possible. Household members should use a separate bedroom and bathroom, if available.  Prohibit visitors who do not have an essential need to be in the home.  Household members should care for any pets in the home. Do not handle pets or other animals while sick.  Make sure that shared spaces in the home have good air flow, such as by an air conditioner or an opened window, weather permitting.  Perform hand hygiene frequently. Wash your hands often with soap and water for at least 20 seconds or use an alcohol-based hand  that contains 60 to 95% alcohol, covering all surfaces of your hands and rubbing them together until they feel dry. Soap and water should be used preferentially if hands are visibly dirty.  Avoid touching your eyes, nose, and mouth with unwashed hands.  The patient should wear a facemask when you are around other people. If the patient is not able to  wear a facemask (for example, because it causes trouble breathing), you, as the caregiver should wear a mask when you are in the same room as the patient.  Wear a disposable facemask and gloves when you touch or have contact with the patient's blood, stool, or body fluids, such as saliva, sputum, nasal mucus, vomit, urine.  Throw out disposable facemasks and gloves after using them. Do not reuse.  When removing personal protective equipment, first remove and dispose of gloves. Then, immediately clean your hands with soap and water or alcohol-based hand . Next, remove and dispose of facemask, and immediately clean your hands again with soap and water or alcohol-based hand .  Avoid sharing household items with the patient. You should not share dishes, drinking glasses, cups, eating utensils, towels, bedding, or other items. After the patient uses these items, you should wash them thoroughly (see below Wash laundry thoroughly).  Clean all high-touch surfaces, such as counters, tabletops, doorknobs, bathroom fixtures, toilets, phones, keyboards, tablets, and bedside tables, every day. Also, clean any surfaces that may have blood, stool, or body fluids on them.  Use a household cleaning spray or wipe, according to the label instructions. Labels contain instructions for safe and effective use of the cleaning product including precautions you should take when applying the product, such as wearing gloves and making sure you have good ventilation during use of the product.  Wash laundry thoroughly.  Immediately remove and wash clothes or bedding that have blood, stool, or body fluids on them.  Wear disposable gloves while handling soiled items and keep soiled items away from your body. Clean your hands (with soap and water or an alcohol-based hand ) immediately after removing your gloves.  Read and follow directions on labels of laundry or clothing items and detergent. In general, using a  normal laundry detergent according to washing machine instructions and dry thoroughly using the warmest temperatures recommended on the clothing label.  Place all used disposable gloves, facemasks, and other contaminated items in a lined container before disposing of them with other household waste. Clean your hands (with soap and water or an alcohol-based hand ) immediately after handling these items. Soap and water should be used preferentially if hands are visibly dirty.  Discuss any additional questions with your state or local health department or healthcare provider. Check available hours when contacting your local health department.    For more information see CDC link below.    https://www.cdc.gov/coronavirus/2019-ncov/hcp/guidance-prevent-spread.html#precautions            If your symptoms worsen or if you have any other concerns, please contact [facility] at [phone].

## 2020-03-29 NOTE — TELEPHONE ENCOUNTER
----- Message from Jolie Woodward DNP sent at 3/29/2020 10:57 AM CDT -----  Regarding: COVID testing  Please schedule pt tomorrow for drive through testing, orders are in.

## 2020-03-30 ENCOUNTER — HOSPITAL ENCOUNTER (INPATIENT)
Facility: OTHER | Age: 75
LOS: 3 days | Discharge: HOME | DRG: 177 | End: 2020-04-02
Attending: EMERGENCY MEDICINE | Admitting: INTERNAL MEDICINE
Payer: MEDICARE

## 2020-03-30 DIAGNOSIS — U07.1 COVID-19 VIRUS INFECTION: ICD-10-CM

## 2020-03-30 DIAGNOSIS — R53.81 DEBILITY: ICD-10-CM

## 2020-03-30 DIAGNOSIS — J96.01 ACUTE HYPOXEMIC RESPIRATORY FAILURE: ICD-10-CM

## 2020-03-30 DIAGNOSIS — Z20.822 SUSPECTED COVID-19 VIRUS INFECTION: Primary | ICD-10-CM

## 2020-03-30 DIAGNOSIS — R06.02 SOB (SHORTNESS OF BREATH): ICD-10-CM

## 2020-03-30 DIAGNOSIS — R09.02 HYPOXIA: ICD-10-CM

## 2020-03-30 DIAGNOSIS — R26.81 GAIT INSTABILITY: ICD-10-CM

## 2020-03-30 DIAGNOSIS — M10.9 GOUT, ARTHRITIS: ICD-10-CM

## 2020-03-30 PROBLEM — F33.0 MILD EPISODE OF RECURRENT MAJOR DEPRESSIVE DISORDER: Status: ACTIVE | Noted: 2020-03-30

## 2020-03-30 PROBLEM — E11.9 TYPE 2 DIABETES MELLITUS WITHOUT COMPLICATION, WITHOUT LONG-TERM CURRENT USE OF INSULIN: Status: ACTIVE | Noted: 2020-03-30

## 2020-03-30 LAB
ALBUMIN SERPL BCP-MCNC: 3.1 G/DL (ref 3.5–5.2)
ALP SERPL-CCNC: 78 U/L (ref 55–135)
ALT SERPL W/O P-5'-P-CCNC: 20 U/L (ref 10–44)
ANION GAP SERPL CALC-SCNC: 11 MMOL/L (ref 8–16)
AST SERPL-CCNC: 40 U/L (ref 10–40)
BASOPHILS # BLD AUTO: 0.01 K/UL (ref 0–0.2)
BASOPHILS NFR BLD: 0.2 % (ref 0–1.9)
BILIRUB SERPL-MCNC: 0.3 MG/DL (ref 0.1–1)
BNP SERPL-MCNC: 84 PG/ML (ref 0–99)
BUN SERPL-MCNC: 15 MG/DL (ref 8–23)
CALCIUM SERPL-MCNC: 9.2 MG/DL (ref 8.7–10.5)
CHLORIDE SERPL-SCNC: 107 MMOL/L (ref 95–110)
CO2 SERPL-SCNC: 21 MMOL/L (ref 23–29)
CREAT SERPL-MCNC: 1 MG/DL (ref 0.5–1.4)
CRP SERPL-MCNC: 183.9 MG/L (ref 0–8.2)
DIFFERENTIAL METHOD: ABNORMAL
EOSINOPHIL # BLD AUTO: 0 K/UL (ref 0–0.5)
EOSINOPHIL NFR BLD: 0 % (ref 0–8)
ERYTHROCYTE [DISTWIDTH] IN BLOOD BY AUTOMATED COUNT: 13.1 % (ref 11.5–14.5)
EST. GFR  (AFRICAN AMERICAN): >60 ML/MIN/1.73 M^2
EST. GFR  (NON AFRICAN AMERICAN): 55 ML/MIN/1.73 M^2
GLUCOSE SERPL-MCNC: 105 MG/DL (ref 70–110)
HCT VFR BLD AUTO: 32.5 % (ref 37–48.5)
HGB BLD-MCNC: 10.6 G/DL (ref 12–16)
IMM GRANULOCYTES # BLD AUTO: 0.05 K/UL (ref 0–0.04)
IMM GRANULOCYTES NFR BLD AUTO: 0.8 % (ref 0–0.5)
LACTATE SERPL-SCNC: 0.9 MMOL/L (ref 0.5–2.2)
LDH SERPL L TO P-CCNC: 342 U/L (ref 110–260)
LYMPHOCYTES # BLD AUTO: 0.5 K/UL (ref 1–4.8)
LYMPHOCYTES NFR BLD: 7.9 % (ref 18–48)
MCH RBC QN AUTO: 30.5 PG (ref 27–31)
MCHC RBC AUTO-ENTMCNC: 32.6 G/DL (ref 32–36)
MCV RBC AUTO: 94 FL (ref 82–98)
MONOCYTES # BLD AUTO: 0.3 K/UL (ref 0.3–1)
MONOCYTES NFR BLD: 5.1 % (ref 4–15)
NEUTROPHILS # BLD AUTO: 5.6 K/UL (ref 1.8–7.7)
NEUTROPHILS NFR BLD: 86 % (ref 38–73)
NRBC BLD-RTO: 0 /100 WBC
PLATELET # BLD AUTO: 236 K/UL (ref 150–350)
PMV BLD AUTO: 10 FL (ref 9.2–12.9)
POTASSIUM SERPL-SCNC: 4.5 MMOL/L (ref 3.5–5.1)
PROCALCITONIN SERPL IA-MCNC: 18.03 NG/ML
PROT SERPL-MCNC: 7.2 G/DL (ref 6–8.4)
RBC # BLD AUTO: 3.47 M/UL (ref 4–5.4)
SARS-COV-2 RNA RESP QL NAA+PROBE: DETECTED
SODIUM SERPL-SCNC: 139 MMOL/L (ref 136–145)
TROPONIN I SERPL DL<=0.01 NG/ML-MCNC: 0.02 NG/ML (ref 0–0.03)
WBC # BLD AUTO: 6.46 K/UL (ref 3.9–12.7)

## 2020-03-30 PROCEDURE — 87040 BLOOD CULTURE FOR BACTERIA: CPT | Mod: 59

## 2020-03-30 PROCEDURE — 99223 PR INITIAL HOSPITAL CARE,LEVL III: ICD-10-PCS | Mod: ,,, | Performed by: INTERNAL MEDICINE

## 2020-03-30 PROCEDURE — 99223 1ST HOSP IP/OBS HIGH 75: CPT | Mod: ,,, | Performed by: INTERNAL MEDICINE

## 2020-03-30 PROCEDURE — 96365 THER/PROPH/DIAG IV INF INIT: CPT

## 2020-03-30 PROCEDURE — 80053 COMPREHEN METABOLIC PANEL: CPT

## 2020-03-30 PROCEDURE — 11000001 HC ACUTE MED/SURG PRIVATE ROOM

## 2020-03-30 PROCEDURE — 99285 EMERGENCY DEPT VISIT HI MDM: CPT | Mod: 25

## 2020-03-30 PROCEDURE — 86140 C-REACTIVE PROTEIN: CPT

## 2020-03-30 PROCEDURE — 83880 ASSAY OF NATRIURETIC PEPTIDE: CPT

## 2020-03-30 PROCEDURE — 84484 ASSAY OF TROPONIN QUANT: CPT

## 2020-03-30 PROCEDURE — 85025 COMPLETE CBC W/AUTO DIFF WBC: CPT

## 2020-03-30 PROCEDURE — 25000003 PHARM REV CODE 250: Performed by: EMERGENCY MEDICINE

## 2020-03-30 PROCEDURE — 84145 PROCALCITONIN (PCT): CPT

## 2020-03-30 PROCEDURE — 63600175 PHARM REV CODE 636 W HCPCS: Performed by: INTERNAL MEDICINE

## 2020-03-30 PROCEDURE — 83615 LACTATE (LD) (LDH) ENZYME: CPT

## 2020-03-30 PROCEDURE — U0002 COVID-19 LAB TEST NON-CDC: HCPCS

## 2020-03-30 PROCEDURE — 83605 ASSAY OF LACTIC ACID: CPT

## 2020-03-30 PROCEDURE — 25000003 PHARM REV CODE 250: Performed by: INTERNAL MEDICINE

## 2020-03-30 PROCEDURE — 63600175 PHARM REV CODE 636 W HCPCS: Performed by: EMERGENCY MEDICINE

## 2020-03-30 RX ORDER — SODIUM CHLORIDE 0.9 % (FLUSH) 0.9 %
10 SYRINGE (ML) INJECTION
Status: DISCONTINUED | OUTPATIENT
Start: 2020-03-30 | End: 2020-04-02 | Stop reason: HOSPADM

## 2020-03-30 RX ORDER — LANOLIN ALCOHOL/MO/W.PET/CERES
800 CREAM (GRAM) TOPICAL
Status: CANCELLED | OUTPATIENT
Start: 2020-03-30

## 2020-03-30 RX ORDER — LOSARTAN POTASSIUM 50 MG/1
50 TABLET ORAL 2 TIMES DAILY
Status: DISCONTINUED | OUTPATIENT
Start: 2020-03-30 | End: 2020-04-02 | Stop reason: HOSPADM

## 2020-03-30 RX ORDER — POTASSIUM CHLORIDE 20 MEQ/15ML
40 SOLUTION ORAL
Status: CANCELLED | OUTPATIENT
Start: 2020-03-30

## 2020-03-30 RX ORDER — LOPERAMIDE HYDROCHLORIDE 2 MG/1
2 CAPSULE ORAL EVERY 6 HOURS PRN
Status: DISCONTINUED | OUTPATIENT
Start: 2020-03-30 | End: 2020-04-02 | Stop reason: HOSPADM

## 2020-03-30 RX ORDER — ENOXAPARIN SODIUM 100 MG/ML
40 INJECTION SUBCUTANEOUS EVERY 24 HOURS
Status: DISCONTINUED | OUTPATIENT
Start: 2020-03-30 | End: 2020-04-02 | Stop reason: HOSPADM

## 2020-03-30 RX ORDER — PANTOPRAZOLE SODIUM 40 MG/1
40 TABLET, DELAYED RELEASE ORAL DAILY PRN
Status: DISCONTINUED | OUTPATIENT
Start: 2020-03-30 | End: 2020-04-02 | Stop reason: HOSPADM

## 2020-03-30 RX ORDER — BENZONATATE 100 MG/1
100 CAPSULE ORAL 3 TIMES DAILY PRN
Status: DISCONTINUED | OUTPATIENT
Start: 2020-03-30 | End: 2020-04-02 | Stop reason: HOSPADM

## 2020-03-30 RX ORDER — ASPIRIN 81 MG/1
81 TABLET ORAL DAILY
Status: DISCONTINUED | OUTPATIENT
Start: 2020-03-31 | End: 2020-04-02 | Stop reason: HOSPADM

## 2020-03-30 RX ORDER — ONDANSETRON 2 MG/ML
4 INJECTION INTRAMUSCULAR; INTRAVENOUS EVERY 8 HOURS PRN
Status: DISCONTINUED | OUTPATIENT
Start: 2020-03-30 | End: 2020-04-02 | Stop reason: HOSPADM

## 2020-03-30 RX ORDER — AZITHROMYCIN 250 MG/1
500 TABLET, FILM COATED ORAL DAILY
Status: DISCONTINUED | OUTPATIENT
Start: 2020-03-31 | End: 2020-04-02 | Stop reason: HOSPADM

## 2020-03-30 RX ORDER — SPIRONOLACTONE 25 MG/1
25 TABLET ORAL DAILY
Status: DISCONTINUED | OUTPATIENT
Start: 2020-03-31 | End: 2020-04-02 | Stop reason: HOSPADM

## 2020-03-30 RX ORDER — SODIUM,POTASSIUM PHOSPHATES 280-250MG
2 POWDER IN PACKET (EA) ORAL
Status: CANCELLED | OUTPATIENT
Start: 2020-03-30

## 2020-03-30 RX ORDER — AZITHROMYCIN 250 MG/1
500 TABLET, FILM COATED ORAL
Status: COMPLETED | OUTPATIENT
Start: 2020-03-30 | End: 2020-03-30

## 2020-03-30 RX ORDER — ACETAMINOPHEN 325 MG/1
650 TABLET ORAL EVERY 8 HOURS PRN
Status: DISCONTINUED | OUTPATIENT
Start: 2020-03-30 | End: 2020-04-02 | Stop reason: HOSPADM

## 2020-03-30 RX ORDER — ACETAMINOPHEN 325 MG/1
650 TABLET ORAL EVERY 4 HOURS PRN
Status: DISCONTINUED | OUTPATIENT
Start: 2020-03-30 | End: 2020-04-02 | Stop reason: HOSPADM

## 2020-03-30 RX ORDER — AMOXICILLIN 250 MG
1 CAPSULE ORAL 2 TIMES DAILY
Status: DISCONTINUED | OUTPATIENT
Start: 2020-03-30 | End: 2020-04-02 | Stop reason: HOSPADM

## 2020-03-30 RX ORDER — ASCORBIC ACID 500 MG
1500 TABLET ORAL DAILY
Status: DISCONTINUED | OUTPATIENT
Start: 2020-03-31 | End: 2020-04-02 | Stop reason: HOSPADM

## 2020-03-30 RX ORDER — ROSUVASTATIN CALCIUM 10 MG/1
20 TABLET, COATED ORAL DAILY
Status: DISCONTINUED | OUTPATIENT
Start: 2020-03-31 | End: 2020-04-02 | Stop reason: HOSPADM

## 2020-03-30 RX ORDER — ALLOPURINOL 100 MG/1
200 TABLET ORAL DAILY
Status: DISCONTINUED | OUTPATIENT
Start: 2020-03-31 | End: 2020-04-02 | Stop reason: HOSPADM

## 2020-03-30 RX ORDER — ESCITALOPRAM OXALATE 5 MG/1
5 TABLET ORAL DAILY
Status: DISCONTINUED | OUTPATIENT
Start: 2020-03-31 | End: 2020-04-02 | Stop reason: HOSPADM

## 2020-03-30 RX ADMIN — ONDANSETRON 4 MG: 2 INJECTION INTRAMUSCULAR; INTRAVENOUS at 08:03

## 2020-03-30 RX ADMIN — SODIUM CHLORIDE 500 ML: 0.9 INJECTION, SOLUTION INTRAVENOUS at 02:03

## 2020-03-30 RX ADMIN — AZITHROMYCIN MONOHYDRATE 500 MG: 250 TABLET ORAL at 02:03

## 2020-03-30 RX ADMIN — STANDARDIZED SENNA CONCENTRATE AND DOCUSATE SODIUM 1 TABLET: 8.6; 5 TABLET, FILM COATED ORAL at 09:03

## 2020-03-30 RX ADMIN — CEFTRIAXONE 1 G: 1 INJECTION, SOLUTION INTRAVENOUS at 02:03

## 2020-03-30 RX ADMIN — LOSARTAN POTASSIUM 50 MG: 50 TABLET ORAL at 09:03

## 2020-03-30 RX ADMIN — ENOXAPARIN SODIUM 40 MG: 100 INJECTION SUBCUTANEOUS at 09:03

## 2020-03-30 NOTE — SUBJECTIVE & OBJECTIVE
Past Medical History:   Diagnosis Date    Adjustment disorder 7/26/2012    Anemia 7/26/2012    AR (allergic rhinitis) 9/5/2014    Bronchitis     Cataract     Fibrocystic breast disease in female 7/26/2012    GERD (gastroesophageal reflux disease) 7/26/2012    Gout     Gout, arthritis 7/26/2012    History of blood transfusion 7/26/2012    History of colonic polyps 7/26/2012    Hypercalcemia 9/20/2012    Hyperlipidemia 7/26/2012    Hypertension 7/26/2012    Nuclear sclerosis 5/2/2014    Osteoarthritis 7/26/2012    Other hyperparathyroidism 9/20/2012    Postmenopausal status 7/26/2012    PVD (peripheral vascular disease) 7/26/2012    left leg laser of vein with injection    Stroke 1997    TIA    Superficial vein thrombosis     Transient ischemic attack 7/26/2012    Type 2 diabetes mellitus without complication, without long-term current use of insulin 3/30/2020    Varicose veins 7/26/2012       Past Surgical History:   Procedure Laterality Date    BREAST CYST ASPIRATION Bilateral     multiple ones over the years    BREAST CYST EXCISION Left     BREAST SURGERY      breast reduction    broken second finger Right 12/2015    pins placed    carotid endarterectomy  1997    right    CAROTID ENDARTERECTOMY Right 1997    CATARACT EXTRACTION W/  INTRAOCULAR LENS IMPLANT Right 05/01/2018    Dr. Yost    CATARACT EXTRACTION W/  INTRAOCULAR LENS IMPLANT Left 05/15/2018    Dr. Lester    COLONOSCOPY N/A 10/26/2015    Procedure: COLONOSCOPY;  Surgeon: Manuel Alanis MD;  Location: [address]);  Service: Endoscopy;  Laterality: N/A;    EYE SURGERY      HYSTERECTOMY      ORIF FINGER FRACTURE  12/18/15    TIA      VASCULAR SURGERY      left leg vein laser       Review of patient's allergies indicates:   Allergen Reactions    Latex      Other reaction(s): Rash    Pcn [penicillins]      Other reaction(s): rash    Sulfa (sulfonamide antibiotics)      Other reaction(s): blisters     Avelox [moxifloxacin]      Other reaction(s): racing heart  Other reaction(s): shakes    Ciprofloxacin Other (See Comments)     Room starts to spin , nausea and dizziness    Codeine      Other reaction(s): nausea  Other reaction(s): weakness       No current facility-administered medications on file prior to encounter.      Current Outpatient Medications on File Prior to Encounter   Medication Sig    allopurinol (ZYLOPRIM) 100 MG tablet Take 2 tablets (200 mg total) by mouth once daily.    ascorbic acid, vitamin C, (VITAMIN C) 500 MG tablet Take 500 mg by mouth once daily.    aspirin (ECOTRIN) 81 MG EC tablet Take 81 mg by mouth once daily.     augmented betamethasone dipropionate (DIPROLENE-AF) 0.05 % cream Apply topically 2 (two) times daily. (Patient taking differently: Apply topically 2 (two) times daily as needed. )    b complex vitamins capsule Take 1 capsule by mouth once daily.    BIOTIN ORAL Take 1 tablet by mouth once daily.    co-enzyme Q-10 30 mg capsule Take 200 mg by mouth once daily.    colchicine 0.6 mg tablet One tablet every hour during gouty flare until have diarrhea. Max 6 tablets daily    ergocalciferol (ERGOCALCIFEROL) 50,000 unit Cap TAKE 1 CAPSULE BY MOUTH 2 TIMES A WEEK    escitalopram oxalate (LEXAPRO) 10 MG tablet Take 0.5 tablets (5 mg total) by mouth once daily.    estradiol (ESTRACE) 0.01 % (0.1 mg/gram) vaginal cream Place vaginally as needed.     fluticasone (FLONASE) 50 mcg/actuation nasal spray 2 sprays by Each Nare route daily as needed. 2 Aerosol, Spray Nasal Every day    FOLBIC 2.5-25-2 mg Tab TAKE 1 TABLET BY MOUTH DAILY    indomethacin (INDOCIN) 50 MG capsule Take 1 capsule (50 mg total) by mouth 3 (three) times daily as needed.    ipratropium (ATROVENT) 0.02 % nebulizer solution Take 2.5 mLs (500 mcg total) by nebulization 3 (three) times daily as needed for Wheezing.    linaCLOtide (LINZESS) 145 mcg Cap capsule Take 1 capsule (145 mcg total) by mouth  once daily.    loperamide (IMODIUM) 2 mg capsule Take 1 capsule (2 mg total) by mouth 4 (four) times daily as needed for Diarrhea.    losartan (COZAAR) 50 MG tablet Take 1 tablet (50 mg total) by mouth 2 (two) times daily.    multivit,calc,mins/iron/folic (ONE-A-DAY WOMENS FORMULA ORAL) Take 1 tablet by mouth once daily.    omeprazole (PRILOSEC) 20 MG capsule Take 1 capsule (20 mg total) by mouth 2 (two) times daily.    ondansetron (ZOFRAN) 4 MG tablet Take 1 tablet (4 mg total) by mouth every 6 (six) hours.    rosuvastatin (CRESTOR) 20 MG tablet Take 1 tablet (20 mg total) by mouth once daily.    spironolactone (ALDACTONE) 25 MG tablet Take 1 tablet (25 mg total) by mouth once daily.    [DISCONTINUED] salsalate (DISALCID) 750 MG Tab Take 1 tablet (750 mg total) by mouth 3 (three) times daily.     Family History     Problem Relation (Age of Onset)    Bone cancer Brother    Cancer Mother, Brother    Cataracts Mother, Brother, Brother, Brother, Brother    Crohn's disease Brother    Heart failure Father    Hypertension Mother, Brother, Brother, Brother, Brother        Tobacco Use    Smoking status: Former Smoker     Last attempt to quit: 1989     Years since quittin.5    Smokeless tobacco: Never Used    Tobacco comment: quit  -  pack per day since age 16   Substance and Sexual Activity    Alcohol use: Yes     Alcohol/week: 1.0 standard drinks     Types: 1 Glasses of wine per week     Frequency: Monthly or less     Drinks per session: 1 or 2     Binge frequency: Never     Comment: maybe one glass of wine monthly    Drug use: No    Sexual activity: Not Currently     Partners: Male     Birth control/protection: Surgical     Review of Systems   Constitutional: Positive for appetite change and fever.   HENT: Negative for rhinorrhea and sneezing.    Eyes: Negative for redness and itching.   Respiratory: Positive for cough. Negative for shortness of breath.    Cardiovascular: Negative for chest  pain and leg swelling.   Gastrointestinal: Positive for diarrhea. Negative for vomiting.   Genitourinary: Negative for dysuria and hematuria.   Musculoskeletal: Negative for gait problem and myalgias.   Skin: Negative for color change and rash.   Neurological: Negative for dizziness and light-headedness.   Hematological: Negative for adenopathy.   Psychiatric/Behavioral: Negative for confusion. The patient is not nervous/anxious.      Objective:     Vital Signs (Most Recent):  Temp: 98.9 °F (37.2 °C) (03/30/20 1456)  Pulse: 80 (03/30/20 1601)  Resp: 20 (03/30/20 1033)  BP: (!) 146/68 (03/30/20 1601)  SpO2: 100 % (03/30/20 1601) Vital Signs (24h Range):  Temp:  [98.9 °F (37.2 °C)-99.6 °F (37.6 °C)] 98.9 °F (37.2 °C)  Pulse:  [] 80  Resp:  [20] 20  SpO2:  [89 %-100 %] 100 %  BP: (131-160)/(60-68) 146/68        There is no height or weight on file to calculate BMI.    Physical Exam   Constitutional: She appears well-developed and well-nourished. No distress.   HENT:   Head: Normocephalic and atraumatic.   Nose: Nose normal.   Mouth/Throat: Oropharynx is clear and moist. No oropharyngeal exudate.   Eyes: Pupils are equal, round, and reactive to light. EOM are normal. Right eye exhibits no discharge. Left eye exhibits no discharge. No scleral icterus.   Neck: Neck supple. No tracheal deviation present.   Cardiovascular: Normal rate, regular rhythm, normal heart sounds and intact distal pulses.   No murmur heard.  Pulmonary/Chest: Effort normal. No respiratory distress. She has no wheezes. She has rales in the right upper field, the right middle field, the right lower field, the left upper field, the left middle field and the left lower field.   Abdominal: Soft. Bowel sounds are normal. She exhibits no distension. There is no tenderness.   Musculoskeletal: She exhibits no edema or deformity.   Neurological: She is alert. No cranial nerve deficit. Gait normal.   Skin: Skin is warm and dry. She is not diaphoretic. No  erythema.   Psychiatric: She has a normal mood and affect. Her behavior is normal.         CRANIAL NERVES     CN III, IV, VI   Pupils are equal, round, and reactive to light.  Extraocular motions are normal.        Significant Labs:   A1C: No results for input(s): HGBA1C in the last 4320 hours.  CBC:   Recent Labs   Lab 03/30/20  1126   WBC 6.46   HGB 10.6*   HCT 32.5*        CMP:   Recent Labs   Lab 03/30/20  1126      K 4.5      CO2 21*      BUN 15   CREATININE 1.0   CALCIUM 9.2   PROT 7.2   ALBUMIN 3.1*   BILITOT 0.3   ALKPHOS 78   AST 40   ALT 20   ANIONGAP 11   EGFRNONAA 55*     Cardiac Markers:   Recent Labs   Lab 03/30/20  1126   BNP 84       Significant Imaging: CXR: I have reviewed all pertinent results/findings within the past 24 hours and my personal findings are:  Worsened right-sided haziness

## 2020-03-30 NOTE — ASSESSMENT & PLAN NOTE
-desatted to 89% on room air.  Currently doing well on 2 L nasal cannula  -started Rocephin, azithromycin due to elevated procalcitonin and worsened right-sided opacity on CXR  -blood cultures pending  -continue isolation pending COVID results

## 2020-03-30 NOTE — ASSESSMENT & PLAN NOTE
-CT  with diverticulosis  -was planning on outpatient C scope 03-; will need to reschedule GI follow-up  -stool softeners p.r.n.

## 2020-03-30 NOTE — HPI
Ms. Salmon is a 73 yo F with PMHx HTN, HLD, well controlled DM2, POLO, constipation (was planning on outpatient cscope with Dr. Kingsley 3/30/20), depression, history of gout, h/o TIA who presented to the ED d/t worsened weakness.  One week ago, she did not have symptoms, but since her daughter was sick, her daughter insisted that she be tested for COVID at the [facility].  She has not received COVID test results yet.  Her daughter was not tested for COVID.  Three days ago, she presented to the ED due to decreased appetite without nausea or vomiting.  +Subjective fever and has been taking Tylenol for malaise.  No myalgia.  She has productive cough without difficulty expectorating sputum.  No SOB, CP.  Was discharged with Zofran and loperamide for diarrhea.  Chest x-ray in the ED was concerning for right middle and lower lobe opacity.  Decreased appetite and weakness has continued to worsen.  She had a virtual visit yesterday and was planning on drive-through COVID testing, but she presented to the ED today due to worsened weakness and decreased appetite.    She was afebrile in the ED, but was 89% on room air with improvement on 2 L nasal cannula.  Procalcitonin was elevated.  Lactic acid was negative.  Repeat CXR with worsened RLL infiltrate.  Repeat COVID testing in process.

## 2020-03-30 NOTE — ED NOTES
Pt reports c/o nausea related to Rocephin, ok's per Dr. Villar, pt given soda crackers with sips of water

## 2020-03-30 NOTE — ED PROVIDER NOTES
"Encounter Date: 3/30/2020       History     Chief Complaint   Patient presents with    Shortness of Breath     generalized weakness with diarrhea and fever     Patient is 74 year old female multiple co-morbidities who presents with significant weakness and diarrhea for one week. She reports self isolation but reports her  has similar symptoms. She was seen at [facility] "sometime last week" and was given meds for diarrhea. She reports these did not work. She has had productive cough and denies SOB but states that the fatigue is limiting her activity. She denies fever greater than 99 at home. She is accompanied by her  who is a patient in the ER as well.         Review of patient's allergies indicates:   Allergen Reactions    Latex      Other reaction(s): Rash    Pcn [penicillins]      Other reaction(s): rash    Sulfa (sulfonamide antibiotics)      Other reaction(s): blisters    Avelox [moxifloxacin]      Other reaction(s): racing heart  Other reaction(s): shakes    Ciprofloxacin Other (See Comments)     Room starts to spin , nausea and dizziness    Codeine      Other reaction(s): nausea  Other reaction(s): weakness     Past Medical History:   Diagnosis Date    Adjustment disorder 7/26/2012    Anemia 7/26/2012    AR (allergic rhinitis) 9/5/2014    Bronchitis     Cataract     Fibrocystic breast disease in female 7/26/2012    GERD (gastroesophageal reflux disease) 7/26/2012    Gout     Gout, arthritis 7/26/2012    History of blood transfusion 7/26/2012    History of colonic polyps 7/26/2012    Hypercalcemia 9/20/2012    Hyperlipidemia 7/26/2012    Hypertension 7/26/2012    Nuclear sclerosis 5/2/2014    Osteoarthritis 7/26/2012    Other hyperparathyroidism 9/20/2012    Postmenopausal status 7/26/2012    PVD (peripheral vascular disease) 7/26/2012    left leg laser of vein with injection    Stroke 1997    TIA    Superficial vein thrombosis     Transient ischemic attack 7/26/2012    " Type 2 diabetes mellitus without complication, without long-term current use of insulin 3/30/2020    Varicose veins 7/26/2012     Past Surgical History:   Procedure Laterality Date    BREAST CYST ASPIRATION Bilateral     multiple ones over the years    BREAST CYST EXCISION Left     BREAST SURGERY      breast reduction    broken second finger Right 12/2015    pins placed    carotid endarterectomy  1997    right    CAROTID ENDARTERECTOMY Right 1997    CATARACT EXTRACTION W/  INTRAOCULAR LENS IMPLANT Right 05/01/2018    Dr. Yost    CATARACT EXTRACTION W/  INTRAOCULAR LENS IMPLANT Left 05/15/2018    Dr. Lester    COLONOSCOPY N/A 10/26/2015    Procedure: COLONOSCOPY;  Surgeon: Manuel Alanis MD;  Location: [facility] ([address] [place] [address]);  Service: Endoscopy;  Laterality: N/A;    EYE SURGERY      HYSTERECTOMY      ORIF FINGER FRACTURE  12/18/15    TIA      VASCULAR SURGERY      left leg vein laser     Family History   Problem Relation Age of Onset    Heart failure Father     Cancer Mother         pancreas    Cataracts Mother     Hypertension Mother     Cancer Brother         bladder    Cataracts Brother     Hypertension Brother     Crohn's disease Brother     Cataracts Brother     Hypertension Brother     Cataracts Brother     Hypertension Brother     Bone cancer Brother     Cataracts Brother     Hypertension Brother     Breast cancer Neg Hx     Ovarian cancer Neg Hx     Allergies Neg Hx     Asthma Neg Hx     Eczema Neg Hx     Cervical cancer Neg Hx     Endometrial cancer Neg Hx     Vaginal cancer Neg Hx     Amblyopia Neg Hx     Blindness Neg Hx     Diabetes Neg Hx     Glaucoma Neg Hx     Macular degeneration Neg Hx     Retinal detachment Neg Hx     Strabismus Neg Hx     Stroke Neg Hx     Thyroid disease Neg Hx     Celiac disease Neg Hx     Colon cancer Neg Hx     Esophageal cancer Neg Hx     Liver disease Neg Hx     Liver cancer Neg Hx     Rectal cancer Neg Hx      Stomach cancer Neg Hx      Social History     Tobacco Use    Smoking status: Former Smoker     Last attempt to quit: 1989     Years since quittin.5    Smokeless tobacco: Never Used    Tobacco comment: quit  -  pack per day since age 16   Substance Use Topics    Alcohol use: Yes     Alcohol/week: 1.0 standard drinks     Types: 1 Glasses of wine per week     Frequency: Monthly or less     Drinks per session: 1 or 2     Binge frequency: Never     Comment: maybe one glass of wine monthly    Drug use: No     Review of Systems   Constitutional: Negative for fever.   HENT: Negative for sore throat.    Respiratory: Negative for shortness of breath.    Cardiovascular: Negative for chest pain.   Gastrointestinal: Positive for diarrhea. Negative for nausea.   Genitourinary: Negative for dysuria.   Musculoskeletal: Negative for back pain.   Skin: Negative for rash.   Neurological: Positive for weakness.   Hematological: Does not bruise/bleed easily.       Physical Exam     Initial Vitals   BP Pulse Resp Temp SpO2   20 1033 20 1033 20 1033 20 1020 20 1033   136/63 86 20 99.6 °F (37.6 °C) (!) 89 %      MAP       --                Physical Exam    Nursing note and vitals reviewed.  Constitutional: She appears well-developed and well-nourished.   Elderly female appears to be ill in NAD. She makes good eye contact, speaks in 2-3 word sentences and is cooperative. No respiratory distress noted though she is 89% on RA at initial presentation.    HENT:   Head: Normocephalic and atraumatic.   Eyes: EOM are normal. Pupils are equal, round, and reactive to light.   Neck: Normal range of motion. Neck supple.   Cardiovascular: Normal rate, regular rhythm, normal heart sounds and intact distal pulses.   Pulmonary/Chest: She has wheezes. She has rhonchi. She has rales.   Abn sounds at bases bilaterally    Abdominal: Soft. There is no tenderness.   Musculoskeletal: She exhibits no edema.    Neurological: She is alert and oriented to person, place, and time.   Skin: No erythema.         ED Course   Procedures  Labs Reviewed   CBC W/ AUTO DIFFERENTIAL - Abnormal; Notable for the following components:       Result Value    RBC 3.47 (*)     Hemoglobin 10.6 (*)     Hematocrit 32.5 (*)     Immature Granulocytes 0.8 (*)     Immature Grans (Abs) 0.05 (*)     Lymph # 0.5 (*)     Gran% 86.0 (*)     Lymph% 7.9 (*)     All other components within normal limits   COMPREHENSIVE METABOLIC PANEL - Abnormal; Notable for the following components:    CO2 21 (*)     Albumin 3.1 (*)     eGFR if non  55 (*)     All other components within normal limits   C-REACTIVE PROTEIN - Abnormal; Notable for the following components:    .9 (*)     All other components within normal limits   PROCALCITONIN - Abnormal; Notable for the following components:    Procalcitonin 18.03 (*)     All other components within normal limits   LACTATE DEHYDROGENASE - Abnormal; Notable for the following components:     (*)     All other components within normal limits   CULTURE, BLOOD   CULTURE, BLOOD   TROPONIN I   B-TYPE NATRIURETIC PEPTIDE   LACTIC ACID, PLASMA   LACTATE DEHYDROGENASE   SARS-COV-2 (COVID-19) QUALITATIVE PCR          Imaging Results          X-Ray Chest AP Portable (Final result)  Result time 03/30/20 12:00:24    Final result by Matias Gotti Jr., MD (03/30/20 12:00:24)                 Impression:      Worsening right lower lobe consolidation suspicious for pneumonia.      Electronically signed by: Matias Mckeon Jr  Date:    03/30/2020  Time:    12:00             Narrative:    EXAMINATION:  XR CHEST AP PORTABLE    CLINICAL HISTORY:  Shortness of breath    TECHNIQUE:  Single frontal view of the chest was performed.    COMPARISON:  03/27/2020    FINDINGS:  Cardiomediastinal silhouette within normal limits for age.    Compared with the prior study there is worsening coalescent consolidation in the  right mid and lower lung zone suspicious for pneumonia.  Left lung grossly clear within limitations of portable technique.    Pleura, diaphragm, and thoracic cage grossly unremarkable.                                 Medical Decision Making:   ED Management:  Patient is 74 year old female who presents likely with COVID19, testing ordered in outpatient setting but specimen has not yet been collected. Will collect today. CXR pending. Labx pending. O2 sat up to 97% on 2LNC. Low threshold to send to ED from . Charge aware.   11:30 AM Patient transferred to ER, care taken over by Dr. Zimmerman               Attending Attestation:     Physician Attestation Statement for NP/PA:   I have conducted a face to face encounter with this patient in addition to the NP/PA, due to Medical Complexity    Other NP/PA Attestation Additions:      Medical Decision Makin-year-old female with history of HTN/DM presents with worsening generalized weakness and diarrhea.  She started having malaise and cough with subjective fevers and poor appetite about a week ago, and was seen at [facility] 3 days ago with somewhat reassuring workup.  She was given Imodium for diarrhea with has not improved, she still having multiple nonbloody loose stools per day.  She reports no SOB but states she limited activities due to fatigue and weakness.  She was tested at drive-through outpatient site for COVID-19 but does not have test results yet; she is here with  with similar symptoms.  Arrival patient with O2 sat 89%, which quickly corrected to 97% with 2 L nasal cannula, she was moved from respiratory area to main ED due to this.  Exam otherwise with rales on lung exam and she appears dehydrated but afebrile with normal vitals otherwise.    Given hypoxia, high suspicion for COVID-19.  Labs with lymphopenia and elevated CRP typical of this, with normal cardiac markers.  However, it also shows elevated procalcitonin and chest x-ray shows  worsening right lower lobe consolidation that was somewhat evident on chest x-ray done on previous ED visit but worse now.  Given O2 requirement, she needs admission for supportive care and to insure no decompensation.  Will also cover for community-acquired pneumonia given elevated procalcitonin and lobar consolidation, as she may have bacterial pneumonia superimposed over COVID-19.  Patient complained of worsening fatigue and malaise while in ED but vitals remained stable and no additional O2 requirements.                 ED Course as of Mar 30 2022   Mon Mar 30, 2020   0357 Discussed case with Dr. Dodson.    [SF]      ED Course User Index  [SF] Sasha Mon                Clinical Impression:       ICD-10-CM ICD-9-CM   1. Suspected Covid-19 Virus Infection R68.89    2. SOB (shortness of breath) R06.02 786.05   3. Hypoxia R09.02 799.02             ED Disposition Condition    Admit                           Yennifer Patterson PA-C  03/30/20 1148       Fahad Zimmerman MD  03/30/20 2022

## 2020-03-30 NOTE — HOSPITAL COURSE
She was admitted for hypoxia requiring 3 L nasal cannula.  She was started on Rocephin and azithromycin on 03/30/2020 due to worsened infiltrate on CXR and elevated procalcitonin.  Curb 65 score 1.  COVID test positive.  Blood cultures NGTD.    PT was consulted due to generalized weakness and recommended rolling walker, bedside commode.  DME and home health were ordered upon discharge.  We were unable to get approval for HH PT/OT d/t COVID status, but a nurse will come to her home once a week and titrate her home O2.  She was discharged with 2-3 L NC.  Her CRP improved.  She will be discharged with 1 more day of Levaquin for 5 days total (end date 04/03/2020, h/o PCN allergy). She was instructed to isolate home for 2 weeks.    HTN was controlled on home losartan 50 b.i.d., losartan 25 daily.    Outpatient C scope was planned on 03/30/2020 with Dr. Kingsley due to constipation which had to be postponed due to admission for COVID.  Dr. Kingsley's office plans to call her to reschedule this.

## 2020-03-30 NOTE — ED NOTES
Rounding done on pt, siderails up x's 2, cardiac monitor with SR, SaO2 >98 % on NC, awaiting lab results, will continue to monitor

## 2020-03-31 ENCOUNTER — TELEPHONE (OUTPATIENT)
Dept: GASTROENTEROLOGY | Facility: CLINIC | Age: 75
End: 2020-03-31

## 2020-03-31 PROBLEM — R53.81 DEBILITY: Status: ACTIVE | Noted: 2020-03-31

## 2020-03-31 PROBLEM — J96.01 ACUTE HYPOXEMIC RESPIRATORY FAILURE: Status: ACTIVE | Noted: 2020-03-31

## 2020-03-31 LAB
ALBUMIN SERPL BCP-MCNC: 2.6 G/DL (ref 3.5–5.2)
ALP SERPL-CCNC: 70 U/L (ref 55–135)
ALT SERPL W/O P-5'-P-CCNC: 17 U/L (ref 10–44)
ANION GAP SERPL CALC-SCNC: 12 MMOL/L (ref 8–16)
AST SERPL-CCNC: 30 U/L (ref 10–40)
BASOPHILS # BLD AUTO: 0.01 K/UL (ref 0–0.2)
BASOPHILS NFR BLD: 0.2 % (ref 0–1.9)
BILIRUB SERPL-MCNC: 0.3 MG/DL (ref 0.1–1)
BUN SERPL-MCNC: 15 MG/DL (ref 8–23)
CALCIUM SERPL-MCNC: 9 MG/DL (ref 8.7–10.5)
CHLORIDE SERPL-SCNC: 108 MMOL/L (ref 95–110)
CO2 SERPL-SCNC: 19 MMOL/L (ref 23–29)
CREAT SERPL-MCNC: 1.2 MG/DL (ref 0.5–1.4)
DIFFERENTIAL METHOD: ABNORMAL
EOSINOPHIL # BLD AUTO: 0 K/UL (ref 0–0.5)
EOSINOPHIL NFR BLD: 0.2 % (ref 0–8)
ERYTHROCYTE [DISTWIDTH] IN BLOOD BY AUTOMATED COUNT: 13.2 % (ref 11.5–14.5)
EST. GFR  (AFRICAN AMERICAN): 51 ML/MIN/1.73 M^2
EST. GFR  (NON AFRICAN AMERICAN): 44 ML/MIN/1.73 M^2
ESTIMATED AVG GLUCOSE: 120 MG/DL (ref 68–131)
GLUCOSE SERPL-MCNC: 76 MG/DL (ref 70–110)
HBA1C MFR BLD HPLC: 5.8 % (ref 4–5.6)
HCT VFR BLD AUTO: 31.7 % (ref 37–48.5)
HGB BLD-MCNC: 10.3 G/DL (ref 12–16)
IMM GRANULOCYTES # BLD AUTO: 0.06 K/UL (ref 0–0.04)
IMM GRANULOCYTES NFR BLD AUTO: 0.9 % (ref 0–0.5)
LYMPHOCYTES # BLD AUTO: 0.8 K/UL (ref 1–4.8)
LYMPHOCYTES NFR BLD: 11.7 % (ref 18–48)
MCH RBC QN AUTO: 30.5 PG (ref 27–31)
MCHC RBC AUTO-ENTMCNC: 32.5 G/DL (ref 32–36)
MCV RBC AUTO: 94 FL (ref 82–98)
MONOCYTES # BLD AUTO: 0.4 K/UL (ref 0.3–1)
MONOCYTES NFR BLD: 6.3 % (ref 4–15)
NEUTROPHILS # BLD AUTO: 5.3 K/UL (ref 1.8–7.7)
NEUTROPHILS NFR BLD: 80.7 % (ref 38–73)
NRBC BLD-RTO: 0 /100 WBC
PLATELET # BLD AUTO: 263 K/UL (ref 150–350)
PLATELET BLD QL SMEAR: ABNORMAL
PMV BLD AUTO: 9.7 FL (ref 9.2–12.9)
POCT GLUCOSE: 88 MG/DL (ref 70–110)
POTASSIUM SERPL-SCNC: 4.5 MMOL/L (ref 3.5–5.1)
PROT SERPL-MCNC: 6.4 G/DL (ref 6–8.4)
RBC # BLD AUTO: 3.38 M/UL (ref 4–5.4)
SODIUM SERPL-SCNC: 139 MMOL/L (ref 136–145)
WBC # BLD AUTO: 6.52 K/UL (ref 3.9–12.7)

## 2020-03-31 PROCEDURE — 85025 COMPLETE CBC W/AUTO DIFF WBC: CPT

## 2020-03-31 PROCEDURE — 80053 COMPREHEN METABOLIC PANEL: CPT

## 2020-03-31 PROCEDURE — 83036 HEMOGLOBIN GLYCOSYLATED A1C: CPT

## 2020-03-31 PROCEDURE — 11000001 HC ACUTE MED/SURG PRIVATE ROOM

## 2020-03-31 PROCEDURE — 97110 THERAPEUTIC EXERCISES: CPT

## 2020-03-31 PROCEDURE — 36415 COLL VENOUS BLD VENIPUNCTURE: CPT

## 2020-03-31 PROCEDURE — 97162 PT EVAL MOD COMPLEX 30 MIN: CPT

## 2020-03-31 PROCEDURE — 25000003 PHARM REV CODE 250: Performed by: INTERNAL MEDICINE

## 2020-03-31 PROCEDURE — 25000003 PHARM REV CODE 250: Performed by: NURSE PRACTITIONER

## 2020-03-31 PROCEDURE — 63600175 PHARM REV CODE 636 W HCPCS: Performed by: INTERNAL MEDICINE

## 2020-03-31 PROCEDURE — 99233 SBSQ HOSP IP/OBS HIGH 50: CPT | Mod: ,,, | Performed by: INTERNAL MEDICINE

## 2020-03-31 PROCEDURE — 63700000 PHARM REV CODE 250 ALT 637 W/O HCPCS: Performed by: INTERNAL MEDICINE

## 2020-03-31 PROCEDURE — 99233 PR SUBSEQUENT HOSPITAL CARE,LEVL III: ICD-10-PCS | Mod: ,,, | Performed by: INTERNAL MEDICINE

## 2020-03-31 RX ORDER — TALC
6 POWDER (GRAM) TOPICAL NIGHTLY PRN
Status: DISCONTINUED | OUTPATIENT
Start: 2020-03-31 | End: 2020-04-02 | Stop reason: HOSPADM

## 2020-03-31 RX ADMIN — ROSUVASTATIN CALCIUM 20 MG: 10 TABLET, COATED ORAL at 09:03

## 2020-03-31 RX ADMIN — ESCITALOPRAM 5 MG: 5 TABLET, FILM COATED ORAL at 09:03

## 2020-03-31 RX ADMIN — STANDARDIZED SENNA CONCENTRATE AND DOCUSATE SODIUM 1 TABLET: 8.6; 5 TABLET, FILM COATED ORAL at 09:03

## 2020-03-31 RX ADMIN — FOLIC ACID-PYRIDOXINE-CYANOCOBALAMIN TAB 2.5-25-2 MG 1 TABLET: 2.5-25-2 TAB at 09:03

## 2020-03-31 RX ADMIN — LOSARTAN POTASSIUM 50 MG: 50 TABLET ORAL at 09:03

## 2020-03-31 RX ADMIN — CEFTRIAXONE 1 G: 1 INJECTION, SOLUTION INTRAVENOUS at 02:03

## 2020-03-31 RX ADMIN — OXYCODONE HYDROCHLORIDE AND ACETAMINOPHEN 1500 MG: 500 TABLET ORAL at 09:03

## 2020-03-31 RX ADMIN — SPIRONOLACTONE 25 MG: 25 TABLET ORAL at 09:03

## 2020-03-31 RX ADMIN — MELATONIN 6 MG: at 10:03

## 2020-03-31 RX ADMIN — ACETAMINOPHEN 650 MG: 325 TABLET ORAL at 12:03

## 2020-03-31 RX ADMIN — ASPIRIN 81 MG: 81 TABLET, COATED ORAL at 09:03

## 2020-03-31 RX ADMIN — ONDANSETRON 4 MG: 2 INJECTION INTRAMUSCULAR; INTRAVENOUS at 03:03

## 2020-03-31 RX ADMIN — AZITHROMYCIN 500 MG: 250 TABLET, FILM COATED ORAL at 01:03

## 2020-03-31 RX ADMIN — ENOXAPARIN SODIUM 40 MG: 100 INJECTION SUBCUTANEOUS at 04:03

## 2020-03-31 RX ADMIN — ALLOPURINOL 200 MG: 100 TABLET ORAL at 09:03

## 2020-03-31 NOTE — PLAN OF CARE
MARTIN called pt's daughter, Pam [phone], and completed discharge assessment, pt's PCP is Sasha Mack and pt uses [facility] on [address] and doesn't want bedside delivery.  Daughter not sure if pt has POA or LW.  Daughter will drive pt home upon discharge.  Daughter requested nebulizer for pt.    Pt's spouse is currently in hospital at [facility]; daughter brought both with similar compliant.     03/31/20 1051   Discharge Assessment   Assessment Type Discharge Planning Assessment   Confirmed/corrected address and phone number on facesheet? Yes   Assessment information obtained from? Other  (daughter, Pam)   Communicated expected length of stay with patient/caregiver no   Prior to hospitilization cognitive status: Alert/Oriented   Prior to hospitalization functional status: Independent   Current cognitive status: Unable to Assess   Lives With spouse   Able to Return to Prior Arrangements yes   Is patient able to care for self after discharge? Unable to determine at this time (comments)   Who are your caregiver(s) and their phone number(s)?   (daughterPam and family, if needed)   Readmission Within the Last 30 Days no previous admission in last 30 days   Patient currently being followed by outpatient case management? No   Patient currently receives any other outside agency services? No   Equipment Currently Used at Home none   Do you have any problems affording any of your prescribed medications? No   Is the patient taking medications as prescribed? yes   Does the patient have transportation home? Yes   Transportation Anticipated family or friend will provide   Does the patient receive services at the [facility]mad[facility] Clinic? No   Discharge Plan A Home   DME Needed Upon Discharge  nebulizer   Patient/Family in Agreement with Plan yes

## 2020-03-31 NOTE — PLAN OF CARE
Problem: Physical Therapy Goal  Goal: Physical Therapy Goal  Description  Goals to be met by: 20    Patient will increase functional independence with mobility by performin. Sit<>stand with mod(I) with LRAD.  2. Gait x 100 feet with sueprvision with LRAD.  3. Ascend/descend 4 step(s) with least restrictive assistive device and CGA.   4. [place] with HEP.     Outcome: Ongoing, Progressing     Patient evaluated by PT and goals established. Patient seen on 3 L NC and able to perform bed mobility with extra time to perform. Required CGA-SBA for sit<>stand and initial gait with RW, but after a few steps patient c/o extreme nausea and deferred continued ambulation. PT will continue to follow and progress as tolerated. If d/c to home, would require initial 24/7 assistance and RW with 3-in-1 commode with possible need for HH PT/OT if generalized weakness/fatigue does not improve as symptoms of COVID-19 improve. Please see progress note for detailed plan of care and recommendations.

## 2020-03-31 NOTE — PLAN OF CARE
Pt originally had endoscopy procedure scheduled for 3/30/20.      CM contacted Dr. Kingsley (GI) office to reschedule.  They will contact pt daughter with new date.  Information added to AVS.    PT/OT recommending DME: BSC and RW.  Orders added to Epic.  Daughter requested nebulizer, however MD states this is not necessary.      Spoke with DAVID Garcia at [facility], auth to pull both BSC and RW.  Information added to AVS.     Spoke with pt daughter Harini [phone] regarding recommendation for HH and BSC, RW.   Daughter in agreement.  Referral sent to [facility] per daughter request.  They can begin seeing pt on Thursday.  Information added to AVS.     Pt may also need new home O2, awaiting nurse testing tomorrow to see if pt qualifies for this equipment.      All family questions answered, verbalize understanding.  They are prepared to provide transportation home for pt tomorrow.     CM to continue to follow.

## 2020-03-31 NOTE — PT/OT/SLP EVAL
Physical Therapy Evaluation and Treatment    Patient Name:  Isael Salmon   MRN:  [id number]    Recommendations:     Discharge Recommendations:  (inital 24/7 assistance and possible HH PT/OT if weakness/fatigue does not improve with improved symptoms of COVID-19)   Discharge Equipment Recommendations: 3-in-1 commode, walker, rolling   Barriers to discharge: Inaccessible home and Decreased caregiver support    Assessment:     Isael Salmon is a 74 y.o. female admitted with a medical diagnosis of COVID-19 virus infection. Pmhx significant for HTN, DM-T2, depression, h/o gout. She presents with the following impairments/functional limitations:  impaired endurance, impaired functional mobilty, gait instability, impaired balance, impaired cardiopulmonary response to activity.    Initial screening for PT needs performed with discussion with RN at bedside and phone call to patient's room; pt reiterated complaint of weakness with difficulty with mobility and  not present at home to assist with mobility as he is hospitalized and PT continued assessment in patient room.     Patient evaluated by PT and goals established. 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 Patient seen on 3 L NC and able to perform bed mobility with extra time to perform. Required CGA-SBA for sit<>stand and initial gait with RW, but after a few steps patient c/o extreme nausea and deferred continued ambulation. PT will continue to follow and progress as tolerated. If d/c to home, would require initial 24/7 assistance and RW with 3-in-1 commode with possible need for HH PT/OT if generalized weakness/fatigue does not improve as symptoms of COVID-19 improve.     Rehab Prognosis: Good; patient would benefit from acute skilled PT services to address these deficits and reach maximum level of function.   "  Recent Surgery: * No surgery found *      Plan:     During this hospitalization, patient to be seen 3 x/week to address the identified rehab impairments via gait training, therapeutic activities, therapeutic exercises and progress toward the following goals:    · Plan of Care Expires:  20    Subjective     Chief Complaint: Feeling "weak" and gestures to abdomen, with further mobility c/o nausea and pt links nausea with "weakness"  Patient/Family Comments/goals: To return to her PLOF and have her company busy again; Patient agreeable to evaluation.  Pain/Comfort:  · Pain Rating 1: 0/10  · Pain Rating Post-Intervention 1: 0/10    Patients cultural, spiritual, Caodaism conflicts given the current situation: no    Living Environment:  · Pt lives with her  in a 1st or 2nd floor apartment with steps steps to enter and unilateral handrail(s).   · Pt has a walk in shower with no seat and a regular toilet.   · Upon discharge, patient will have assistance from her daughter PRN (currently working) and from her  when he is discharged (also in hospital with same complaints as patient).  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  DME owned (not currently used): single point cane.     Objective:     Communicated with QUIN Shukla prior to session.  Patient found HOB elevated with telemetry, peripheral IV, oxygen  upon PT entry to room.    General Precautions: Standard, fall, airborne, contact, droplet   Orthopedic Precautions:N/A   Braces: N/A     Patient donned red socks for OOB mobility.    Exams:  · Cognition:   · Patient is oriented x4.  · Pt follows approximately 100% of one and two step commands.    · Mood: Pleasant and cooperative.   · Safety Awareness: Intact  · Musculoskeletal:  · Posture:  Forward head  · LE ROM/Strength:   · R ROM: " WFL  · L ROM: WFL  · R Strength: WFL  · L Strength: WFL  · Neuromuscular:  · Tone/Reflexes: No impairments identified with functional mobility. No formal testing performed.   · Coordination: Grossly intact  · Balance:   · Static sitting: Normal  · Static standing: Fair, BUE support on RW  · Dynamic standing: Fair, BUE support on RW  · Visual-vestibular: No impairments identified with functional mobility. No formal testing performed.  · Integument: Visible skin intact  · Cardiopulmonary:  · Vital signs: Unable to assess with no SpO2 monitoring in room, pt maintained on set O2 levels  · Edema: None noted      Functional Mobility:  · Bed Mobility:     · Scooting: supervision  · Supine to Sit: modified independence  · Sit to Supine: modified independence  · Transfers:     · Sit to Stand:  stand by assistance and contact guard assistance with rolling walker  · Gait: x3 ft forwards and backwards with RW and CGA  · Pt defers further ambualtion due to onset of nausea; provided with emesis container but no active vomiting at this time and improved nausea with return to lying      Therapeutic Activities and Exercises:  ·  Pt performed supine therapeutic exercises including quad sets, glute sets, SLR, heel slides, abd/add, and UE flexion/extension x 5reps with verbal and tactile cues.   · Provided with exercise handout and instructed to perform in sets of 5-10 reps several times during the day to decrease risk of debility while sick  · Encouraged to get up to chair with assistance when feeling less nauseous  PT educated patient re:   PT plan of care/role of PT  Safety with OOB mobility  Anatomy/physiology and course of illness  Discharge disposition    Pt  verbalized understanding         AM-PAC 6 CLICK MOBILITY  Total Score:19     Patient left right sidelying with all lines intact, call button in reach and QUIN Shukla notified.    GOALS:   Multidisciplinary Problems     Physical Therapy Goals        Problem: Physical Therapy  Goal    Goal Priority Disciplines Outcome Goal Variances Interventions   Physical Therapy Goal     PT, PT/OT Ongoing, Progressing     Description:  Goals to be met by: 20    Patient will increase functional independence with mobility by performin. Sit<>stand with mod(I) with LRAD.  2. Gait x 100 feet with sueprvision with LRAD.  3. Ascend/descend 4 step(s) with least restrictive assistive device and CGA.   4. [place] with HEP.                      History:     Past Medical History:   Diagnosis Date    Adjustment disorder 2012    Anemia 2012    AR (allergic rhinitis) 2014    Bronchitis     Cataract     Fibrocystic breast disease in female 2012    GERD (gastroesophageal reflux disease) 2012    Gout     Gout, arthritis 2012    History of blood transfusion 2012    History of colonic polyps 2012    Hypercalcemia 2012    Hyperlipidemia 2012    Hypertension 2012    Nuclear sclerosis 2014    Osteoarthritis 2012    Other hyperparathyroidism 2012    Postmenopausal status 2012    PVD (peripheral vascular disease) 2012    left leg laser of vein with injection    Stroke     TIA    Superficial vein thrombosis     Transient ischemic attack 2012    Type 2 diabetes mellitus without complication, without long-term current use of insulin 3/30/2020    Varicose veins 2012       Past Surgical History:   Procedure Laterality Date    BREAST CYST ASPIRATION Bilateral     multiple ones over the years    BREAST CYST EXCISION Left     BREAST SURGERY      breast reduction    broken second finger Right 2015    pins placed    carotid endarterectomy  1997    right    CAROTID ENDARTERECTOMY Right     CATARACT EXTRACTION W/  INTRAOCULAR LENS IMPLANT Right 2018    Dr. Yost    CATARACT EXTRACTION W/  INTRAOCULAR LENS IMPLANT Left 05/15/2018    Dr. Lester    COLONOSCOPY N/A 10/26/2015     Procedure: COLONOSCOPY;  Surgeon: Manuel Alanis MD;  Location: [facility] ([address] [place] [address]);  Service: Endoscopy;  Laterality: N/A;    EYE SURGERY      HYSTERECTOMY      ORIF FINGER FRACTURE  12/18/15    TIA      VASCULAR SURGERY      left leg vein laser       Time Tracking:     PT Received On: 03/31/20  PT Start Time: 1130     PT Stop Time: 1200  PT Total Time (min): 30 min     Billable Minutes: Evaluation 15 and Therapeutic Exercise 10      Tammi De Anda, PT  03/31/2020

## 2020-03-31 NOTE — ASSESSMENT & PLAN NOTE
-desatted to 89% on room air.  On 3 L nasal cannula; wean as tolerated  -On Rocephin, azithromycin due to elevated procalcitonin and worsened right-sided opacity on CXR  -blood cultures NGTD  -COVID positive.  Continue isolation

## 2020-03-31 NOTE — SUBJECTIVE & OBJECTIVE
Interval History:  Low-grade temperature of 100° at night.  Received Tylenol due to weakness, malaise.  Persistent weakness, but improved.  Continue decreased p.o. intake.  Was able to eat fruit this morning.  Has cough, but no SOB.  On 3 L nasal cannula.    Review of Systems   Constitutional: Positive for appetite change. Negative for fever.   HENT: Negative for rhinorrhea and sneezing.    Eyes: Negative for redness and itching.   Respiratory: Positive for cough. Negative for shortness of breath.    Cardiovascular: Negative for chest pain and leg swelling.   Gastrointestinal: Negative for abdominal pain and vomiting.   Genitourinary: Negative for dysuria and hematuria.   Musculoskeletal: Negative for gait problem and joint swelling.   Skin: Negative for color change and rash.   Neurological: Positive for weakness. Negative for dizziness and light-headedness.   Hematological: Negative for adenopathy.   Psychiatric/Behavioral: Negative for confusion. The patient is not nervous/anxious.      Objective:     Vital Signs (Most Recent):  Temp: 97.5 °F (36.4 °C) (03/31/20 1001)  Pulse: 77 (03/31/20 1001)  Resp: 18 (03/31/20 1001)  BP: 108/67 (03/31/20 1001)  SpO2: 96 % (03/31/20 1001) Vital Signs (24h Range):  Temp:  [97.5 °F (36.4 °C)-100 °F (37.8 °C)] 97.5 °F (36.4 °C)  Pulse:  [] 77  Resp:  [18-24] 18  SpO2:  [89 %-100 %] 96 %  BP: (108-160)/(57-68) 108/67     Weight: 76.7 kg (169 lb)  Body mass index is 27.28 kg/m².    Intake/Output Summary (Last 24 hours) at 3/31/2020 1029  Last data filed at 3/30/2020 1606  Gross per 24 hour   Intake 550 ml   Output --   Net 550 ml      Physical Exam   Constitutional: She appears well-developed and well-nourished.   HENT:   Head: Normocephalic and atraumatic.   Nose: Nose normal.   Mouth/Throat: Oropharynx is clear and moist. No oropharyngeal exudate.   Eyes: EOM are normal. Right eye exhibits no discharge. Left eye exhibits no discharge. No scleral icterus.   Neck: Neck  supple. No tracheal deviation present.   Cardiovascular: Normal rate, regular rhythm, normal heart sounds and intact distal pulses.   No murmur heard.  Pulmonary/Chest: Effort normal. No respiratory distress. She has no wheezes. She has rales in the right lower field and the left lower field.   Abdominal: Soft. Bowel sounds are normal. She exhibits no distension. There is no tenderness.   Musculoskeletal: She exhibits no edema or deformity.   Neurological: She is alert. No cranial nerve deficit. Gait normal.   Skin: Skin is warm and dry. She is not diaphoretic. No erythema.   Psychiatric: She has a normal mood and affect. Her behavior is normal.       Significant Labs:   A1C:   Recent Labs   Lab 03/31/20  0541   HGBA1C 5.8*     CBC:   Recent Labs   Lab 03/30/20  1126 03/31/20  0541   WBC 6.46 6.52   HGB 10.6* 10.3*   HCT 32.5* 31.7*    263     CMP:   Recent Labs   Lab 03/30/20 1126 03/31/20  0541    139   K 4.5 4.5    108   CO2 21* 19*    76   BUN 15 15   CREATININE 1.0 1.2   CALCIUM 9.2 9.0   PROT 7.2 6.4   ALBUMIN 3.1* 2.6*   BILITOT 0.3 0.3   ALKPHOS 78 70   AST 40 30   ALT 20 17   ANIONGAP 11 12   EGFRNONAA 55* 44*       Significant Imaging: No new imaging

## 2020-03-31 NOTE — NURSING
Pt AOx3, slightly tachypneac, low grade fever. Otherwise VSS.  O2 sats remain stable on 3lnc. During second set of vitals, pt kept repeating she felt weak. Pt was unable to express further what exactly weak meant. But kept repeating it. Denied CP, denies increased SOB, denied HA. When asked pt to describe what feeling weak meant to her she was unable to articulate but stated she could feel it in her abd. When asked if she had pains in the area, she denies and kept repeating she felt weak. No tenderness upon abd palpation. Pt temp at this time was 100 so I gave her a dose of tylenol to hopefully alleviate temp and these vague abd symptoms.  I called JAVIER to inquire if pt normally had difficulty describing her symptoms, she said no, pt has a masters in education. JAVIER called pt on personal cell phone and called me back. JAVIER was satisfied there was nothing urgent occurring with pt and said she couldn't get pt to be more specific either. But did get pt to express she was feeling anxious and couldn't sleep. JAVIER asked if we could prescribe melatonin. Called Kareem, who ordered. Medication given with good effect. During next set of vitals, pt stated she was feeling much better and appeared less anxious. Bed low and locked. Call bell within reach. Will continue to monitor.

## 2020-03-31 NOTE — TELEPHONE ENCOUNTER
----- Message from Joie Ring MA sent at 3/31/2020  2:09 PM CDT -----  Contact:  liliane Orozco at [phone]  pt was supposed to have a procedure yesterday but was admitted to [facility] for another matter.  Please call liliane Orozco at [phone] to discuss when the appt can be rescheduled.

## 2020-03-31 NOTE — PLAN OF CARE
[facility] HOME HEALTH ORDERS  FACE TO FACE ENCOUNTER    Patient Name: Isael Salmon  YOB: 1945    PCP: Janett Montoya MD   PCP Address: [address] [place] [address]  PCP Phone Number: [phone]  PCP Fax: [phone]    Encounter Date: 04/02/2020    Admit to Home Health    Diagnoses:  Active Hospital Problems    Diagnosis  POA    *COVID-19 virus infection [U07.1]  Yes    Debility [R53.81]  Yes    Acute hypoxemic respiratory failure [J96.01]  Yes    Type 2 diabetes mellitus without complication, without long-term current use of insulin [E11.9]  Unknown    Mild episode of recurrent major depressive disorder [F33.0]  Yes    Chronic constipation [K59.09]  Yes    Essential hypertension [I10]  Yes    Hyperlipidemia [E78.5]  Yes    Gout, arthritis [M10.9]  Yes    History of transient ischemic attack (TIA) [Z86.73]  Not Applicable     1997        Resolved Hospital Problems   No resolved problems to display.       Future Appointments   Date Time Provider Department Center   4/9/2020  9:30 AM Guerda Mercado MD [facility]   6/2/2020  1:30 PM Janett Montoya MD [facility]     Follow-up Information     Janett Montoya MD.    Specialty:  Internal Medicine  Contact information:  [address] [place], [place] [id number] [phone]             Jaziel Kingsley MD In 1 month.    Specialty:  Gastroenterology  Why:  Colonoscopy  Contact information:  [facility] [id number] [phone]                     I have seen and examined this patient face to face today. My clinical findings that support the need for the home health skilled services and home bound status are the following:  Weakness/numbness causing balance and gait disturbance due to Infection making it taxing to leave home.    Allergies:  Review of patient's allergies indicates:   Allergen Reactions    Latex      Other reaction(s): Rash     Pcn [penicillins]      Other reaction(s): rash    Sulfa (sulfonamide antibiotics)      Other reaction(s): blisters    Avelox [moxifloxacin]      Other reaction(s): racing heart  Other reaction(s): shakes    Ciprofloxacin Other (See Comments)     Room starts to spin , nausea and dizziness    Codeine      Other reaction(s): nausea  Other reaction(s): weakness       Diet: cardiac diet    Activities: activity as tolerated    Nursing:   SN to complete comprehensive assessment including routine vital signs. Instruct on disease process and s/s of complications to report to MD. Review/verify medication list sent home with the patient at time of discharge  and instruct patient/caregiver as needed. Frequency may be adjusted depending on start of care date.    Notify MD if SBP > 160 or < 90; DBP > 90 or < 50; HR > 120 or < 50; Temp > 101;      CONSULTS:    Aide to provide assistance with personal care, ADLs, and vital signs.    MISCELLANEOUS CARE:  Home Oxygen:  Oxygen at 2-3 L/min nasal canula to be used:  Continuously.  To check pulse ox and titrate home O2 to maintain oxygen sats > or = 88%    Medications: Review discharge medications with patient and family and provide education.      Current Discharge Medication List      CONTINUE these medications which have NOT CHANGED    Details   allopurinol (ZYLOPRIM) 100 MG tablet Take 2 tablets (200 mg total) by mouth once daily.  Qty: 180 tablet, Refills: 3    Comments: **Patient requests 90 days supply**      ascorbic acid, vitamin C, (VITAMIN C) 500 MG tablet Take 500 mg by mouth once daily.      aspirin (ECOTRIN) 81 MG EC tablet Take 81 mg by mouth once daily.       augmented betamethasone dipropionate (DIPROLENE-AF) 0.05 % cream Apply topically 2 (two) times daily.  Qty: 60 g, Refills: 3      b complex vitamins capsule Take 1 capsule by mouth once daily.      BIOTIN ORAL Take 1 tablet by mouth once daily.      co-enzyme Q-10 30 mg capsule Take 200 mg by mouth once daily.       colchicine 0.6 mg tablet One tablet every hour during gouty flare until have diarrhea. Max 6 tablets daily  Qty: 30 tablet, Refills: 11      ergocalciferol (ERGOCALCIFEROL) 50,000 unit Cap TAKE 1 CAPSULE BY MOUTH 2 TIMES A WEEK  Qty: 24 capsule, Refills: 0      escitalopram oxalate (LEXAPRO) 10 MG tablet Take 0.5 tablets (5 mg total) by mouth once daily.  Qty: 45 tablet, Refills: 3      estradiol (ESTRACE) 0.01 % (0.1 mg/gram) vaginal cream Place vaginally as needed.       fluticasone (FLONASE) 50 mcg/actuation nasal spray 2 sprays by Each Nare route daily as needed. 2 Aerosol, Spray Nasal Every day  Qty: 16 g, Refills: 2    Associated Diagnoses: Asthmatic bronchitis      FOLBIC 2.5-25-2 mg Tab TAKE 1 TABLET BY MOUTH DAILY  Qty: 30 tablet, Refills: 4      indomethacin (INDOCIN) 50 MG capsule Take 1 capsule (50 mg total) by mouth 3 (three) times daily as needed.  Qty: 90 capsule, Refills: 6      ipratropium (ATROVENT) 0.02 % nebulizer solution Take 2.5 mLs (500 mcg total) by nebulization 3 (three) times daily as needed for Wheezing.  Qty: 62.5 mL, Refills: 0    Associated Diagnoses: Bronchitis      linaCLOtide (LINZESS) 145 mcg Cap capsule Take 1 capsule (145 mcg total) by mouth once daily.  Qty: 30 capsule, Refills: 1    Associated Diagnoses: Constipation, unspecified constipation type      loperamide (IMODIUM) 2 mg capsule Take 1 capsule (2 mg total) by mouth 4 (four) times daily as needed for Diarrhea.  Qty: 12 capsule, Refills: 0      losartan (COZAAR) 50 MG tablet Take 1 tablet (50 mg total) by mouth 2 (two) times daily.  Qty: 180 tablet, Refills: 3    Associated Diagnoses: Transient cerebral ischemia, unspecified type; PVD (peripheral vascular disease); Gout, arthritis; History of hyperlipidemia      multivit,calc,mins/iron/folic (ONE-A-DAY WOMENS FORMULA ORAL) Take 1 tablet by mouth once daily.      omeprazole (PRILOSEC) 20 MG capsule Take 1 capsule (20 mg total) by mouth 2 (two) times daily.  Qty: 90  capsule, Refills: 4      ondansetron (ZOFRAN) 4 MG tablet Take 1 tablet (4 mg total) by mouth every 6 (six) hours.  Qty: 12 tablet, Refills: 0      rosuvastatin (CRESTOR) 20 MG tablet Take 1 tablet (20 mg total) by mouth once daily.  Qty: 90 tablet, Refills: 3      spironolactone (ALDACTONE) 25 MG tablet Take 1 tablet (25 mg total) by mouth once daily.  Qty: 90 tablet, Refills: 4         STOP taking these medications       salsalate (DISALCID) 750 MG Tab Comments:   Reason for Stopping:               I certify that this patient is confined to her home and needs intermittent skilled nursing care.

## 2020-03-31 NOTE — PROGRESS NOTES
[facility] Hospital Medicine  Progress Note    Patient Name: Isael Salmon  MRN: [id number]  Patient Class: IP- Inpatient   Admission Date: 3/30/2020  Length of Stay: 1 days  Attending Physician: Halle Vides MD  Primary Care Provider: Janett Montoya MD        Subjective:     Principal Problem:COVID-19 virus infection        HPI:  Ms. Salmon is a 75 yo F with PMHx HTN, HLD, well controlled DM2, POLO, constipation (was planning on outpatient cscope with Dr. Kingsley 3/30/20), depression, history of gout, h/o TIA who presented to the ED d/t worsened weakness.  One week ago, she did not have symptoms, but since her daughter was sick, her daughter insisted that she be tested for COVID at the [facility].  She has not received COVID test results yet.  Her daughter was not tested for COVID.  Three days ago, she presented to the ED due to decreased appetite without nausea or vomiting.  +Subjective fever and has been taking Tylenol for malaise.  No myalgia.  She has productive cough without difficulty expectorating sputum.  No SOB, CP.  Was discharged with Zofran and loperamide for diarrhea.  Chest x-ray in the ED was concerning for right middle and lower lobe opacity.  Decreased appetite and weakness has continued to worsen.  She had a virtual visit yesterday and was planning on drive-through COVID testing, but she presented to the ED today due to worsened weakness and decreased appetite.    She was afebrile in the ED, but was 89% on room air with improvement on 2 L nasal cannula.  Procalcitonin was elevated.  Lactic acid was negative.  Repeat CXR with worsened RLL infiltrate.  Repeat COVID testing in process.    Overview/Hospital Course:  She was admitted for hypoxia requiring 3 L nasal cannula.  She was started on Rocephin and azithromycin on 03/30/2020 due to worsened infiltrate on CXR and elevated procalcitonin.  Curb 65 score 1.  COVID test positive.  Blood cultures NGTD.    Interval  History:  Low-grade temperature of 100° at night.  Received Tylenol due to weakness, malaise.  Persistent weakness, but improved.  Continue decreased p.o. intake.  Was able to eat fruit this morning.  Has cough, but no SOB.  On 3 L nasal cannula.    Review of Systems   Constitutional: Positive for appetite change. Negative for fever.   HENT: Negative for rhinorrhea and sneezing.    Eyes: Negative for redness and itching.   Respiratory: Positive for cough. Negative for shortness of breath.    Cardiovascular: Negative for chest pain and leg swelling.   Gastrointestinal: Negative for abdominal pain and vomiting.   Genitourinary: Negative for dysuria and hematuria.   Musculoskeletal: Negative for gait problem and joint swelling.   Skin: Negative for color change and rash.   Neurological: Positive for weakness. Negative for dizziness and light-headedness.   Hematological: Negative for adenopathy.   Psychiatric/Behavioral: Negative for confusion. The patient is not nervous/anxious.      Objective:     Vital Signs (Most Recent):  Temp: 97.5 °F (36.4 °C) (03/31/20 1001)  Pulse: 77 (03/31/20 1001)  Resp: 18 (03/31/20 1001)  BP: 108/67 (03/31/20 1001)  SpO2: 96 % (03/31/20 1001) Vital Signs (24h Range):  Temp:  [97.5 °F (36.4 °C)-100 °F (37.8 °C)] 97.5 °F (36.4 °C)  Pulse:  [] 77  Resp:  [18-24] 18  SpO2:  [89 %-100 %] 96 %  BP: (108-160)/(57-68) 108/67     Weight: 76.7 kg (169 lb)  Body mass index is 27.28 kg/m².    Intake/Output Summary (Last 24 hours) at 3/31/2020 1029  Last data filed at 3/30/2020 1606  Gross per 24 hour   Intake 550 ml   Output --   Net 550 ml      Physical Exam   Constitutional: She appears well-developed and well-nourished.   HENT:   Head: Normocephalic and atraumatic.   Nose: Nose normal.   Mouth/Throat: Oropharynx is clear and moist. No oropharyngeal exudate.   Eyes: EOM are normal. Right eye exhibits no discharge. Left eye exhibits no discharge. No scleral icterus.   Neck: Neck supple. No  tracheal deviation present.   Cardiovascular: Normal rate, regular rhythm, normal heart sounds and intact distal pulses.   No murmur heard.  Pulmonary/Chest: Effort normal. No respiratory distress. She has no wheezes. She has rales in the right lower field and the left lower field.   Abdominal: Soft. Bowel sounds are normal. She exhibits no distension. There is no tenderness.   Musculoskeletal: She exhibits no edema or deformity.   Neurological: She is alert. No cranial nerve deficit. Gait normal.   Skin: Skin is warm and dry. She is not diaphoretic. No erythema.   Psychiatric: She has a normal mood and affect. Her behavior is normal.       Significant Labs:   A1C:   Recent Labs   Lab 03/31/20  0541   HGBA1C 5.8*     CBC:   Recent Labs   Lab 03/30/20  1126 03/31/20  0541   WBC 6.46 6.52   HGB 10.6* 10.3*   HCT 32.5* 31.7*    263     CMP:   Recent Labs   Lab 03/30/20 1126 03/31/20  0541    139   K 4.5 4.5    108   CO2 21* 19*    76   BUN 15 15   CREATININE 1.0 1.2   CALCIUM 9.2 9.0   PROT 7.2 6.4   ALBUMIN 3.1* 2.6*   BILITOT 0.3 0.3   ALKPHOS 78 70   AST 40 30   ALT 20 17   ANIONGAP 11 12   EGFRNONAA 55* 44*       Significant Imaging: No new imaging      Assessment/Plan:      * COVID-19 virus infection  -desatted to 89% on room air.  On 3 L nasal cannula; wean as tolerated  -On Rocephin, azithromycin due to elevated procalcitonin and worsened right-sided opacity on CXR  -blood cultures NGTD  -COVID positive.  Continue isolation    Mild episode of recurrent major depressive disorder  -continue home Lexapro 5 daily    Type 2 diabetes mellitus without complication, without long-term current use of insulin  -diet controlled.  A1c 5.8.    Chronic constipation  -CT  with diverticulosis  -was planning on outpatient C scope 03-; will need to reschedule GI follow-up  -stool softeners p.r.n.    History of transient ischemic attack (TIA)  -continue home aspirin, Crestor  20      Gastroesophageal reflux disease without esophagitis  -continue omeprazole p.r.n.      Gout, arthritis  -no renal dysfunction  -continue home allopurinol, losartan    Hyperlipidemia  -given history of TIA, resume home Crestor 20.  No transaminitis.    Essential hypertension  -BV controlled on home losartan 50 b.i.d., spironolactone 25 daily      VTE Risk Mitigation (From admission, onward)         Ordered     enoxaparin injection 40 mg  Daily      03/30/20 1905     Place sequential compression device  Until discontinued      03/30/20 1905     IP VTE HIGH RISK PATIENT  Once      03/30/20 1905                      Solange Hernandes MD  Department of Hospital Medicine   [facility]

## 2020-04-01 LAB
ALBUMIN SERPL BCP-MCNC: 2.6 G/DL (ref 3.5–5.2)
ALP SERPL-CCNC: 71 U/L (ref 55–135)
ALT SERPL W/O P-5'-P-CCNC: 14 U/L (ref 10–44)
ANION GAP SERPL CALC-SCNC: 11 MMOL/L (ref 8–16)
ANISOCYTOSIS BLD QL SMEAR: SLIGHT
AST SERPL-CCNC: 26 U/L (ref 10–40)
BASOPHILS # BLD AUTO: 0.01 K/UL (ref 0–0.2)
BASOPHILS NFR BLD: 0.2 % (ref 0–1.9)
BILIRUB SERPL-MCNC: 0.3 MG/DL (ref 0.1–1)
BUN SERPL-MCNC: 19 MG/DL (ref 8–23)
CALCIUM SERPL-MCNC: 9.2 MG/DL (ref 8.7–10.5)
CHLORIDE SERPL-SCNC: 109 MMOL/L (ref 95–110)
CO2 SERPL-SCNC: 21 MMOL/L (ref 23–29)
CREAT SERPL-MCNC: 1.1 MG/DL (ref 0.5–1.4)
DIFFERENTIAL METHOD: ABNORMAL
EOSINOPHIL # BLD AUTO: 0 K/UL (ref 0–0.5)
EOSINOPHIL NFR BLD: 0.7 % (ref 0–8)
ERYTHROCYTE [DISTWIDTH] IN BLOOD BY AUTOMATED COUNT: 13.2 % (ref 11.5–14.5)
EST. GFR  (AFRICAN AMERICAN): 56 ML/MIN/1.73 M^2
EST. GFR  (NON AFRICAN AMERICAN): 49 ML/MIN/1.73 M^2
GIANT PLATELETS BLD QL SMEAR: PRESENT
GLUCOSE SERPL-MCNC: 77 MG/DL (ref 70–110)
HCT VFR BLD AUTO: 31.3 % (ref 37–48.5)
HGB BLD-MCNC: 10.1 G/DL (ref 12–16)
IMM GRANULOCYTES # BLD AUTO: 0.02 K/UL (ref 0–0.04)
IMM GRANULOCYTES NFR BLD AUTO: 0.5 % (ref 0–0.5)
LYMPHOCYTES # BLD AUTO: 0.7 K/UL (ref 1–4.8)
LYMPHOCYTES NFR BLD: 15.9 % (ref 18–48)
MCH RBC QN AUTO: 30.2 PG (ref 27–31)
MCHC RBC AUTO-ENTMCNC: 32.3 G/DL (ref 32–36)
MCV RBC AUTO: 94 FL (ref 82–98)
MONOCYTES # BLD AUTO: 0.5 K/UL (ref 0.3–1)
MONOCYTES NFR BLD: 10.7 % (ref 4–15)
NEUTROPHILS # BLD AUTO: 3.2 K/UL (ref 1.8–7.7)
NEUTROPHILS NFR BLD: 72 % (ref 38–73)
NRBC BLD-RTO: 0 /100 WBC
PLATELET # BLD AUTO: 310 K/UL (ref 150–350)
PLATELET BLD QL SMEAR: ABNORMAL
PMV BLD AUTO: 9.5 FL (ref 9.2–12.9)
POTASSIUM SERPL-SCNC: 4.3 MMOL/L (ref 3.5–5.1)
PROT SERPL-MCNC: 6.5 G/DL (ref 6–8.4)
RBC # BLD AUTO: 3.34 M/UL (ref 4–5.4)
SODIUM SERPL-SCNC: 141 MMOL/L (ref 136–145)
WBC # BLD AUTO: 4.39 K/UL (ref 3.9–12.7)

## 2020-04-01 PROCEDURE — 63700000 PHARM REV CODE 250 ALT 637 W/O HCPCS: Performed by: INTERNAL MEDICINE

## 2020-04-01 PROCEDURE — 80053 COMPREHEN METABOLIC PANEL: CPT

## 2020-04-01 PROCEDURE — 36415 COLL VENOUS BLD VENIPUNCTURE: CPT

## 2020-04-01 PROCEDURE — 99233 PR SUBSEQUENT HOSPITAL CARE,LEVL III: ICD-10-PCS | Mod: ,,, | Performed by: INTERNAL MEDICINE

## 2020-04-01 PROCEDURE — 25000003 PHARM REV CODE 250: Performed by: INTERNAL MEDICINE

## 2020-04-01 PROCEDURE — 99233 SBSQ HOSP IP/OBS HIGH 50: CPT | Mod: ,,, | Performed by: INTERNAL MEDICINE

## 2020-04-01 PROCEDURE — 85025 COMPLETE CBC W/AUTO DIFF WBC: CPT

## 2020-04-01 PROCEDURE — 63600175 PHARM REV CODE 636 W HCPCS: Performed by: INTERNAL MEDICINE

## 2020-04-01 PROCEDURE — 11000001 HC ACUTE MED/SURG PRIVATE ROOM

## 2020-04-01 RX ADMIN — LOSARTAN POTASSIUM 50 MG: 50 TABLET ORAL at 08:04

## 2020-04-01 RX ADMIN — AZITHROMYCIN 500 MG: 250 TABLET, FILM COATED ORAL at 08:04

## 2020-04-01 RX ADMIN — FOLIC ACID-PYRIDOXINE-CYANOCOBALAMIN TAB 2.5-25-2 MG 1 TABLET: 2.5-25-2 TAB at 08:04

## 2020-04-01 RX ADMIN — ONDANSETRON 4 MG: 2 INJECTION INTRAMUSCULAR; INTRAVENOUS at 04:04

## 2020-04-01 RX ADMIN — CEFTRIAXONE 1 G: 1 INJECTION, SOLUTION INTRAVENOUS at 02:04

## 2020-04-01 RX ADMIN — STANDARDIZED SENNA CONCENTRATE AND DOCUSATE SODIUM 1 TABLET: 8.6; 5 TABLET, FILM COATED ORAL at 08:04

## 2020-04-01 RX ADMIN — ROSUVASTATIN CALCIUM 20 MG: 10 TABLET, COATED ORAL at 08:04

## 2020-04-01 RX ADMIN — ESCITALOPRAM 5 MG: 5 TABLET, FILM COATED ORAL at 08:04

## 2020-04-01 RX ADMIN — OXYCODONE HYDROCHLORIDE AND ACETAMINOPHEN 1500 MG: 500 TABLET ORAL at 08:04

## 2020-04-01 RX ADMIN — ENOXAPARIN SODIUM 40 MG: 100 INJECTION SUBCUTANEOUS at 04:04

## 2020-04-01 RX ADMIN — ALLOPURINOL 200 MG: 100 TABLET ORAL at 08:04

## 2020-04-01 RX ADMIN — PROMETHAZINE HYDROCHLORIDE 6.25 MG: 25 INJECTION INTRAMUSCULAR; INTRAVENOUS at 06:04

## 2020-04-01 RX ADMIN — SPIRONOLACTONE 25 MG: 25 TABLET ORAL at 08:04

## 2020-04-01 RX ADMIN — ASPIRIN 81 MG: 81 TABLET, COATED ORAL at 08:04

## 2020-04-01 NOTE — PLAN OF CARE
[facility] cannot accept COVID positive or COVID pending patients at this time.      Referral sent to [employer], awaiting their response.      [employer] notified of approval to deliver RW and BSC.  DME to be delivered on date of discharge.     Pt to be tested for need for home O2 on date of DC.    CM to continue to follow.

## 2020-04-01 NOTE — PROGRESS NOTES
[facility] Hospital Medicine  Progress Note    Patient Name: Isael Salmon  MRN: [id number]  Patient Class: IP- Inpatient   Admission Date: 3/30/2020  Length of Stay: 2 days  Attending Physician: Halle Vides MD  Primary Care Provider: Janett Montoya MD        Subjective:     Principal Problem:COVID-19 virus infection        HPI:  Ms. Salmon is a 75 yo F with PMHx HTN, HLD, well controlled DM2, POLO, constipation (was planning on outpatient cscope with Dr. Kingsley 3/30/20), depression, history of gout, h/o TIA who presented to the ED d/t worsened weakness.  One week ago, she did not have symptoms, but since her daughter was sick, her daughter insisted that she be tested for COVID at the [facility].  She has not received COVID test results yet.  Her daughter was not tested for COVID.  Three days ago, she presented to the ED due to decreased appetite without nausea or vomiting.  +Subjective fever and has been taking Tylenol for malaise.  No myalgia.  She has productive cough without difficulty expectorating sputum.  No SOB, CP.  Was discharged with Zofran and loperamide for diarrhea.  Chest x-ray in the ED was concerning for right middle and lower lobe opacity.  Decreased appetite and weakness has continued to worsen.  She had a virtual visit yesterday and was planning on drive-through COVID testing, but she presented to the ED today due to worsened weakness and decreased appetite.    She was afebrile in the ED, but was 89% on room air with improvement on 2 L nasal cannula.  Procalcitonin was elevated.  Lactic acid was negative.  Repeat CXR with worsened RLL infiltrate.  Repeat COVID testing in process.    Overview/Hospital Course:  She was admitted for hypoxia requiring 3 L nasal cannula.  She was started on Rocephin and azithromycin on 03/30/2020 due to worsened infiltrate on CXR and elevated procalcitonin.  Curb 65 score 1.  COVID test positive.  Blood cultures NGTD.    PT was  consulted due to generalized weakness and recommended rolling walker, bedside commode.  Ordered DME and home health upon discharge.    HTN was controlled on home losartan 50 b.i.d., losartan 25 daily.    Outpatient C scope was planned on 03/30/2020 with Dr. Kingsley due to constipation which had to be postponed due to admission for COVID.  Dr. Kingsley's office plans to call her to reschedule this.    Interval History:  She still feels weak with decreased appetite.  No nausea.  No CP, SOB.  Has mild cough.    Review of Systems   Constitutional: Positive for activity change and appetite change.   HENT: Negative for rhinorrhea and sneezing.    Eyes: Negative for discharge and redness.   Respiratory: Positive for cough. Negative for shortness of breath.    Cardiovascular: Negative for chest pain and leg swelling.   Gastrointestinal: Negative for abdominal pain, nausea and vomiting.   Genitourinary: Negative for difficulty urinating and dysuria.   Musculoskeletal: Positive for gait problem and myalgias.   Skin: Negative for rash and wound.   Neurological: Negative for dizziness and light-headedness.   Hematological: Negative for adenopathy.   Psychiatric/Behavioral: Negative for confusion. The patient is not nervous/anxious.      Objective:     Vital Signs (Most Recent):  Temp: 96.3 °F (35.7 °C) (04/01/20 0908)  Pulse: 85 (04/01/20 0908)  Resp: 18 (04/01/20 0908)  BP: (!) 117/56 (04/01/20 0908)  SpO2: 95 % (04/01/20 0908) Vital Signs (24h Range):  Temp:  [96.3 °F (35.7 °C)-98.1 °F (36.7 °C)] 96.3 °F (35.7 °C)  Pulse:  [66-99] 85  Resp:  [18-28] 18  SpO2:  [94 %-100 %] 95 %  BP: (117-154)/(55-70) 117/56     Weight: 76.7 kg (169 lb)  Body mass index is 27.28 kg/m².    Intake/Output Summary (Last 24 hours) at 4/1/2020 1014  Last data filed at 3/31/2020 2252  Gross per 24 hour   Intake --   Output 355 ml   Net -355 ml      Physical Exam   Constitutional: She appears well-developed and well-nourished. No distress.   HENT:   Head:  Normocephalic and atraumatic.   Mouth/Throat: Oropharynx is clear and moist.   Eyes: Conjunctivae and EOM are normal. Right eye exhibits no discharge. Left eye exhibits no discharge.   Neck: No tracheal deviation present.   Cardiovascular: Normal rate, regular rhythm and normal heart sounds.   No murmur heard.  Pulmonary/Chest: Effort normal. No stridor. No respiratory distress. She has no wheezes. She has rales in the right lower field and the left lower field.   Very slight rales in lower lung fields, improved   Abdominal: Soft. Bowel sounds are normal. She exhibits no distension. There is no tenderness.   Musculoskeletal: She exhibits no edema or tenderness.   Neurological: She is alert. No cranial nerve deficit.   Skin: Skin is warm. No rash noted. She is not diaphoretic.   Psychiatric: She has a normal mood and affect. Her behavior is normal.       Significant Labs:   CBC:   Recent Labs   Lab 03/30/20  1126 03/31/20  0541 04/01/20  0345   WBC 6.46 6.52 4.39   HGB 10.6* 10.3* 10.1*   HCT 32.5* 31.7* 31.3*    263 310     CMP:   Recent Labs   Lab 03/30/20  1126 03/31/20  0541 04/01/20  0344    139 141   K 4.5 4.5 4.3    108 109   CO2 21* 19* 21*    76 77   BUN 15 15 19   CREATININE 1.0 1.2 1.1   CALCIUM 9.2 9.0 9.2   PROT 7.2 6.4 6.5   ALBUMIN 3.1* 2.6* 2.6*   BILITOT 0.3 0.3 0.3   ALKPHOS 78 70 71   AST 40 30 26   ALT 20 17 14   ANIONGAP 11 12 11   EGFRNONAA 55* 44* 49*       Significant Imaging: No new imaging      Assessment/Plan:      * COVID-19 virus infection  -desatted to 89% on room air.  On 3 L nasal cannula; wean as tolerated  -On Rocephin, azithromycin (started 3/30) due to elevated procalcitonin and worsened right-sided opacity on CXR  -blood cultures NGTD  -COVID positive.  Continue isolation    Acute hypoxemic respiratory failure  -2/2 COVID  -still requiring 3 L nasal cannula.  Wean as tolerated.  Will likely need home oxygen upon discharge      Debility  -d/t COVID  -PT  suggesting home health; ordered if able given COVID  -ordered home rolling walker, bedside commode  -ambulate as tolerated      Mild episode of recurrent major depressive disorder  -continue home Lexapro 5 daily    Type 2 diabetes mellitus without complication, without long-term current use of insulin  -diet controlled.  A1c 5.8.    Chronic constipation  -CT  with diverticulosis  -was planning on outpatient C scope 03-; GI will call to schedule follow-up  -stool softeners p.r.n.    History of transient ischemic attack (TIA)  -continue home aspirin, Crestor 20      Gastroesophageal reflux disease without esophagitis  -continue omeprazole p.r.n.      Gout, arthritis  -no renal dysfunction  -continue home allopurinol, losartan    Hyperlipidemia  -given history of TIA, resume home Crestor 20.  No transaminitis.    Essential hypertension  -BP controlled on home losartan 50 b.i.d., spironolactone 25 daily      VTE Risk Mitigation (From admission, onward)         Ordered     enoxaparin injection 40 mg  Daily      03/30/20 1905     Place sequential compression device  Until discontinued      03/30/20 1905     IP VTE HIGH RISK PATIENT  Once      03/30/20 1905                      Solange Hernandes MD  Department of Hospital Medicine   [facility]

## 2020-04-01 NOTE — PROGRESS NOTES
AAOx4. Remains on 3L NC. O2 Sats have been 94% or higher. Pt gets up to bedside commode with stand by assistance. No signs of respiratory distress. Occasionally complains of feeling weak. Call light within reach. Will continue to monitor.

## 2020-04-01 NOTE — SUBJECTIVE & OBJECTIVE
Interval History:  She still feels weak with decreased appetite.  No nausea.  No CP, SOB.  Has mild cough.    Review of Systems   Constitutional: Positive for activity change and appetite change.   HENT: Negative for rhinorrhea and sneezing.    Eyes: Negative for discharge and redness.   Respiratory: Positive for cough. Negative for shortness of breath.    Cardiovascular: Negative for chest pain and leg swelling.   Gastrointestinal: Negative for abdominal pain, nausea and vomiting.   Genitourinary: Negative for difficulty urinating and dysuria.   Musculoskeletal: Positive for gait problem and myalgias.   Skin: Negative for rash and wound.   Neurological: Negative for dizziness and light-headedness.   Hematological: Negative for adenopathy.   Psychiatric/Behavioral: Negative for confusion. The patient is not nervous/anxious.      Objective:     Vital Signs (Most Recent):  Temp: 96.3 °F (35.7 °C) (04/01/20 0908)  Pulse: 85 (04/01/20 0908)  Resp: 18 (04/01/20 0908)  BP: (!) 117/56 (04/01/20 0908)  SpO2: 95 % (04/01/20 0908) Vital Signs (24h Range):  Temp:  [96.3 °F (35.7 °C)-98.1 °F (36.7 °C)] 96.3 °F (35.7 °C)  Pulse:  [66-99] 85  Resp:  [18-28] 18  SpO2:  [94 %-100 %] 95 %  BP: (117-154)/(55-70) 117/56     Weight: 76.7 kg (169 lb)  Body mass index is 27.28 kg/m².    Intake/Output Summary (Last 24 hours) at 4/1/2020 1014  Last data filed at 3/31/2020 2252  Gross per 24 hour   Intake --   Output 355 ml   Net -355 ml      Physical Exam   Constitutional: She appears well-developed and well-nourished. No distress.   HENT:   Head: Normocephalic and atraumatic.   Mouth/Throat: Oropharynx is clear and moist.   Eyes: Conjunctivae and EOM are normal. Right eye exhibits no discharge. Left eye exhibits no discharge.   Neck: No tracheal deviation present.   Cardiovascular: Normal rate, regular rhythm and normal heart sounds.   No murmur heard.  Pulmonary/Chest: Effort normal. No stridor. No respiratory distress. She has no  wheezes. She has rales in the right lower field and the left lower field.   Very slight rales in lower lung fields, improved   Abdominal: Soft. Bowel sounds are normal. She exhibits no distension. There is no tenderness.   Musculoskeletal: She exhibits no edema or tenderness.   Neurological: She is alert. No cranial nerve deficit.   Skin: Skin is warm. No rash noted. She is not diaphoretic.   Psychiatric: She has a normal mood and affect. Her behavior is normal.       Significant Labs:   CBC:   Recent Labs   Lab 03/30/20  1126 03/31/20  0541 04/01/20  0345   WBC 6.46 6.52 4.39   HGB 10.6* 10.3* 10.1*   HCT 32.5* 31.7* 31.3*    263 310     CMP:   Recent Labs   Lab 03/30/20  1126 03/31/20  0541 04/01/20  0344    139 141   K 4.5 4.5 4.3    108 109   CO2 21* 19* 21*    76 77   BUN 15 15 19   CREATININE 1.0 1.2 1.1   CALCIUM 9.2 9.0 9.2   PROT 7.2 6.4 6.5   ALBUMIN 3.1* 2.6* 2.6*   BILITOT 0.3 0.3 0.3   ALKPHOS 78 70 71   AST 40 30 26   ALT 20 17 14   ANIONGAP 11 12 11   EGFRNONAA 55* 44* 49*       Significant Imaging: No new imaging

## 2020-04-01 NOTE — PLAN OF CARE
spoke with bedside nurse and provided reflective listening and prayer support for patient via phone call.

## 2020-04-01 NOTE — ASSESSMENT & PLAN NOTE
-CT  with diverticulosis  -was planning on outpatient C scope 03-; GI will call to schedule follow-up  -stool softeners p.r.n.

## 2020-04-01 NOTE — ASSESSMENT & PLAN NOTE
-2/2 COVID  -still requiring 3 L nasal cannula.  Wean as tolerated.  Will likely need home oxygen upon discharge

## 2020-04-01 NOTE — PT/OT/SLP PROGRESS
Physical Therapy  Not Seen    Patient Name:  Isael Salmon   MRN:  [id number]    Patient not seen today secondary to Patient fatigue. Patient ambulated with nurse to bathroom with handheld assist 20 min prior to PT attempt. Pt reports has been performing LE HEP as instructed. Will follow-up tomorrow, 4/2.    Tammi De Anda, PT

## 2020-04-01 NOTE — ASSESSMENT & PLAN NOTE
-d/t COVID  -PT suggesting home health; ordered if able given COVID  -ordered home rolling walker, bedside commode  -ambulate as tolerated

## 2020-04-01 NOTE — ASSESSMENT & PLAN NOTE
-desatted to 89% on room air.  On 3 L nasal cannula; wean as tolerated  -On Rocephin, azithromycin (started 3/30) due to elevated procalcitonin and worsened right-sided opacity on CXR  -blood cultures NGTD  -COVID positive.  Continue isolation

## 2020-04-02 VITALS
DIASTOLIC BLOOD PRESSURE: 88 MMHG | SYSTOLIC BLOOD PRESSURE: 134 MMHG | HEIGHT: 66 IN | WEIGHT: 169 LBS | HEART RATE: 84 BPM | BODY MASS INDEX: 27.16 KG/M2 | TEMPERATURE: 98 F | OXYGEN SATURATION: 92 % | RESPIRATION RATE: 18 BRPM

## 2020-04-02 LAB
ALBUMIN SERPL BCP-MCNC: 2.7 G/DL (ref 3.5–5.2)
ALP SERPL-CCNC: 76 U/L (ref 55–135)
ALT SERPL W/O P-5'-P-CCNC: 17 U/L (ref 10–44)
ANION GAP SERPL CALC-SCNC: 11 MMOL/L (ref 8–16)
AST SERPL-CCNC: 28 U/L (ref 10–40)
BASOPHILS # BLD AUTO: 0.01 K/UL (ref 0–0.2)
BASOPHILS NFR BLD: 0.2 % (ref 0–1.9)
BILIRUB SERPL-MCNC: 0.3 MG/DL (ref 0.1–1)
BUN SERPL-MCNC: 15 MG/DL (ref 8–23)
CALCIUM SERPL-MCNC: 9.7 MG/DL (ref 8.7–10.5)
CHLORIDE SERPL-SCNC: 108 MMOL/L (ref 95–110)
CO2 SERPL-SCNC: 24 MMOL/L (ref 23–29)
CREAT SERPL-MCNC: 0.9 MG/DL (ref 0.5–1.4)
CRP SERPL-MCNC: 110.6 MG/L (ref 0–8.2)
DIFFERENTIAL METHOD: ABNORMAL
EOSINOPHIL # BLD AUTO: 0 K/UL (ref 0–0.5)
EOSINOPHIL NFR BLD: 0.8 % (ref 0–8)
ERYTHROCYTE [DISTWIDTH] IN BLOOD BY AUTOMATED COUNT: 13 % (ref 11.5–14.5)
EST. GFR  (AFRICAN AMERICAN): >60 ML/MIN/1.73 M^2
EST. GFR  (NON AFRICAN AMERICAN): >60 ML/MIN/1.73 M^2
GLUCOSE SERPL-MCNC: 114 MG/DL (ref 70–110)
HCT VFR BLD AUTO: 30.8 % (ref 37–48.5)
HGB BLD-MCNC: 10 G/DL (ref 12–16)
IMM GRANULOCYTES # BLD AUTO: 0.05 K/UL (ref 0–0.04)
IMM GRANULOCYTES NFR BLD AUTO: 1 % (ref 0–0.5)
LYMPHOCYTES # BLD AUTO: 0.7 K/UL (ref 1–4.8)
LYMPHOCYTES NFR BLD: 14 % (ref 18–48)
MCH RBC QN AUTO: 30.3 PG (ref 27–31)
MCHC RBC AUTO-ENTMCNC: 32.5 G/DL (ref 32–36)
MCV RBC AUTO: 93 FL (ref 82–98)
MONOCYTES # BLD AUTO: 0.6 K/UL (ref 0.3–1)
MONOCYTES NFR BLD: 11.3 % (ref 4–15)
NEUTROPHILS # BLD AUTO: 3.5 K/UL (ref 1.8–7.7)
NEUTROPHILS NFR BLD: 72.7 % (ref 38–73)
NRBC BLD-RTO: 0 /100 WBC
PLATELET # BLD AUTO: 332 K/UL (ref 150–350)
PMV BLD AUTO: 9.4 FL (ref 9.2–12.9)
POTASSIUM SERPL-SCNC: 4.2 MMOL/L (ref 3.5–5.1)
PROT SERPL-MCNC: 6.9 G/DL (ref 6–8.4)
RBC # BLD AUTO: 3.3 M/UL (ref 4–5.4)
SODIUM SERPL-SCNC: 143 MMOL/L (ref 136–145)
WBC # BLD AUTO: 4.85 K/UL (ref 3.9–12.7)

## 2020-04-02 PROCEDURE — 63700000 PHARM REV CODE 250 ALT 637 W/O HCPCS: Performed by: INTERNAL MEDICINE

## 2020-04-02 PROCEDURE — 85025 COMPLETE CBC W/AUTO DIFF WBC: CPT

## 2020-04-02 PROCEDURE — 86140 C-REACTIVE PROTEIN: CPT

## 2020-04-02 PROCEDURE — 36415 COLL VENOUS BLD VENIPUNCTURE: CPT

## 2020-04-02 PROCEDURE — 99238 PR HOSPITAL DISCHARGE DAY,<30 MIN: ICD-10-PCS | Mod: ,,, | Performed by: INTERNAL MEDICINE

## 2020-04-02 PROCEDURE — 25000003 PHARM REV CODE 250: Performed by: INTERNAL MEDICINE

## 2020-04-02 PROCEDURE — 80053 COMPREHEN METABOLIC PANEL: CPT

## 2020-04-02 PROCEDURE — 63600175 PHARM REV CODE 636 W HCPCS: Performed by: INTERNAL MEDICINE

## 2020-04-02 PROCEDURE — 99238 HOSP IP/OBS DSCHRG MGMT 30/<: CPT | Mod: ,,, | Performed by: INTERNAL MEDICINE

## 2020-04-02 RX ORDER — BENZONATATE 100 MG/1
100 CAPSULE ORAL 3 TIMES DAILY PRN
Qty: 30 CAPSULE | Refills: 2 | Status: SHIPPED | OUTPATIENT
Start: 2020-04-02 | End: 2020-08-04 | Stop reason: ALTCHOICE

## 2020-04-02 RX ORDER — ONDANSETRON 4 MG/1
4 TABLET, ORALLY DISINTEGRATING ORAL EVERY 6 HOURS PRN
Qty: 30 TABLET | Refills: 2 | Status: SHIPPED | OUTPATIENT
Start: 2020-04-02 | End: 2022-05-26

## 2020-04-02 RX ORDER — LEVOFLOXACIN 750 MG/1
750 TABLET ORAL DAILY
Qty: 1 TABLET | Refills: 0 | Status: SHIPPED | OUTPATIENT
Start: 2020-04-03 | End: 2020-04-04

## 2020-04-02 RX ORDER — ACETAMINOPHEN 325 MG/1
650 TABLET ORAL EVERY 4 HOURS PRN
Qty: 30 TABLET | Refills: 1 | Status: SHIPPED | OUTPATIENT
Start: 2020-04-02

## 2020-04-02 RX ADMIN — STANDARDIZED SENNA CONCENTRATE AND DOCUSATE SODIUM 1 TABLET: 8.6; 5 TABLET, FILM COATED ORAL at 09:04

## 2020-04-02 RX ADMIN — OXYCODONE HYDROCHLORIDE AND ACETAMINOPHEN 1500 MG: 500 TABLET ORAL at 09:04

## 2020-04-02 RX ADMIN — LOSARTAN POTASSIUM 50 MG: 50 TABLET ORAL at 09:04

## 2020-04-02 RX ADMIN — FOLIC ACID-PYRIDOXINE-CYANOCOBALAMIN TAB 2.5-25-2 MG 1 TABLET: 2.5-25-2 TAB at 09:04

## 2020-04-02 RX ADMIN — CEFTRIAXONE 1 G: 1 INJECTION, SOLUTION INTRAVENOUS at 01:04

## 2020-04-02 RX ADMIN — ESCITALOPRAM 5 MG: 5 TABLET, FILM COATED ORAL at 09:04

## 2020-04-02 RX ADMIN — ASPIRIN 81 MG: 81 TABLET, COATED ORAL at 09:04

## 2020-04-02 RX ADMIN — ALLOPURINOL 200 MG: 100 TABLET ORAL at 09:04

## 2020-04-02 RX ADMIN — AZITHROMYCIN 500 MG: 250 TABLET, FILM COATED ORAL at 09:04

## 2020-04-02 RX ADMIN — ROSUVASTATIN CALCIUM 20 MG: 10 TABLET, COATED ORAL at 09:04

## 2020-04-02 RX ADMIN — SPIRONOLACTONE 25 MG: 25 TABLET ORAL at 09:04

## 2020-04-02 NOTE — SUBJECTIVE & OBJECTIVE
Interval History:  Still has generalized weakness, but improved.  Mild cough.  No fever.  Still has decreased appetite; no nausea or vomiting.  NAEON.  She would like to go home.    Review of Systems   Constitutional: Positive for appetite change. Negative for fever.   HENT: Negative for rhinorrhea and sneezing.    Eyes: Negative for redness and itching.   Respiratory: Positive for cough. Negative for shortness of breath.    Cardiovascular: Negative for chest pain and leg swelling.   Gastrointestinal: Negative for abdominal pain, nausea and vomiting.   Genitourinary: Negative for dysuria and hematuria.   Musculoskeletal: Negative for gait problem and joint swelling.   Skin: Negative for color change and rash.   Neurological: Negative for dizziness and light-headedness.   Hematological: Negative for adenopathy.   Psychiatric/Behavioral: Negative for confusion. The patient is not nervous/anxious.      Objective:     Vital Signs (Most Recent):  Temp: 96.5 °F (35.8 °C) (04/02/20 0913)  Pulse: 94 (04/02/20 0913)  Resp: 18 (04/02/20 0315)  BP: (!) 155/96 (04/02/20 0913)  SpO2: (!) 92 % (04/02/20 0913) Vital Signs (24h Range):  Temp:  [96.5 °F (35.8 °C)-98.6 °F (37 °C)] 96.5 °F (35.8 °C)  Pulse:  [66-94] 94  Resp:  [18] 18  SpO2:  [92 %-99 %] 92 %  BP: (133-155)/(63-96) 155/96     Weight: 76.7 kg (169 lb)  Body mass index is 27.28 kg/m².  No intake or output data in the 24 hours ending 04/02/20 0934   Physical Exam   Constitutional: She appears well-developed and well-nourished. No distress.   HENT:   Head: Normocephalic and atraumatic.   Mouth/Throat: Oropharynx is clear and moist.   Eyes: Conjunctivae and EOM are normal. Right eye exhibits no discharge. Left eye exhibits no discharge.   Neck: No tracheal deviation present.   Cardiovascular: Normal rate, regular rhythm and normal heart sounds.   No murmur heard.  Pulmonary/Chest: Effort normal and breath sounds normal. No stridor. No respiratory distress. She has no  wheezes.   Abdominal: Soft. Bowel sounds are normal. She exhibits no distension. There is no tenderness.   Musculoskeletal: She exhibits no edema or tenderness.   Neurological: She is alert. No cranial nerve deficit.   Skin: Skin is warm. No rash noted. She is not diaphoretic.   Psychiatric: She has a normal mood and affect. Her behavior is normal.       Significant Labs:   CBC:   Recent Labs   Lab 04/01/20  0345 04/02/20  0403   WBC 4.39 4.85   HGB 10.1* 10.0*   HCT 31.3* 30.8*    332     CMP:   Recent Labs   Lab 04/01/20  0344 04/02/20  0403    143   K 4.3 4.2    108   CO2 21* 24   GLU 77 114*   BUN 19 15   CREATININE 1.1 0.9   CALCIUM 9.2 9.7   PROT 6.5 6.9   ALBUMIN 2.6* 2.7*   BILITOT 0.3 0.3   ALKPHOS 71 76   AST 26 28   ALT 14 17   ANIONGAP 11 11   EGFRNONAA 49* >60     Cardiac Markers: No results for input(s): CKMB, MYOGLOBIN, BNP, TROPISTAT in the last 48 hours.    Significant Imaging: No new imaging

## 2020-04-02 NOTE — NURSING
Discharge instructions and medlist given.  Sent home with oxygen, bedside commode and rolling walker.  Off unit via wheelchair on o2 3L NC on portable oxygen tank to meet daughter who will drive her home.

## 2020-04-02 NOTE — PLAN OF CARE
Harriett to pull equipment from depot per Milly ([facility])    Delivered walker and bedside commode to patient hospital room door. Patient/caregiver equipment instructions provided to patient with equipment and placed on AVS.  Updated patient's nurse, Elyse Lorenzana to pull oxygen tank per Jodi ([facility])  Delivered one portable oxygen tank to patient's hospital room; Notified patient's nurseElyse equipment and patient instructions placed at hospital room door.   NotifiedDylan (RT) at [phone] patient need portable oxygen education for discharge a  Patient instruction added to AVS.  Patient okay to discharge from  perspective

## 2020-04-02 NOTE — PROGRESS NOTES
[facility] Hospital Medicine  Progress Note    Patient Name: Isael Salmon  MRN: [id number]  Patient Class: IP- Inpatient   Admission Date: 3/30/2020  Length of Stay: 3 days  Attending Physician: Halle Vides MD  Primary Care Provider: Janett Montoya MD        Subjective:     Principal Problem:COVID-19 virus infection        HPI:  Ms. Salmon is a 73 yo F with PMHx HTN, HLD, well controlled DM2, POLO, constipation (was planning on outpatient cscope with Dr. Kingsley 3/30/20), depression, history of gout, h/o TIA who presented to the ED d/t worsened weakness.  One week ago, she did not have symptoms, but since her daughter was sick, her daughter insisted that she be tested for COVID at the [facility].  She has not received COVID test results yet.  Her daughter was not tested for COVID.  Three days ago, she presented to the ED due to decreased appetite without nausea or vomiting.  +Subjective fever and has been taking Tylenol for malaise.  No myalgia.  She has productive cough without difficulty expectorating sputum.  No SOB, CP.  Was discharged with Zofran and loperamide for diarrhea.  Chest x-ray in the ED was concerning for right middle and lower lobe opacity.  Decreased appetite and weakness has continued to worsen.  She had a virtual visit yesterday and was planning on drive-through COVID testing, but she presented to the ED today due to worsened weakness and decreased appetite.    She was afebrile in the ED, but was 89% on room air with improvement on 2 L nasal cannula.  Procalcitonin was elevated.  Lactic acid was negative.  Repeat CXR with worsened RLL infiltrate.  Repeat COVID testing in process.    Overview/Hospital Course:  She was admitted for hypoxia requiring 3 L nasal cannula.  She was started on Rocephin and azithromycin on 03/30/2020 due to worsened infiltrate on CXR and elevated procalcitonin.  Curb 65 score 1.  COVID test positive.  Blood cultures NGTD.    PT was  consulted due to generalized weakness and recommended rolling walker, bedside commode.  DME and home health were ordered upon discharge.  We were unable to get approval for HH PT/OT d/t COVID status, but a nurse will come to her home once a week and titrate her home O2.  Her CRP improved.  She will be discharged with 1 more day of azithromycin and Augmentin for 5 days total (end date 04/03/2020). She was instructed to isolate home for 2 weeks.    HTN was controlled on home losartan 50 b.i.d., losartan 25 daily.    Outpatient C scope was planned on 03/30/2020 with Dr. Kingsley due to constipation which had to be postponed due to admission for COVID.  Dr. Kingsley's office plans to call her to reschedule this.    Interval History:  Still has generalized weakness, but improved.  Mild cough.  No fever.  Still has decreased appetite; no nausea or vomiting.  NAEON.  She would like to go home.    Review of Systems   Constitutional: Positive for appetite change. Negative for fever.   HENT: Negative for rhinorrhea and sneezing.    Eyes: Negative for redness and itching.   Respiratory: Positive for cough. Negative for shortness of breath.    Cardiovascular: Negative for chest pain and leg swelling.   Gastrointestinal: Negative for abdominal pain, nausea and vomiting.   Genitourinary: Negative for dysuria and hematuria.   Musculoskeletal: Negative for gait problem and joint swelling.   Skin: Negative for color change and rash.   Neurological: Negative for dizziness and light-headedness.   Hematological: Negative for adenopathy.   Psychiatric/Behavioral: Negative for confusion. The patient is not nervous/anxious.      Objective:     Vital Signs (Most Recent):  Temp: 96.5 °F (35.8 °C) (04/02/20 0913)  Pulse: 94 (04/02/20 0913)  Resp: 18 (04/02/20 0315)  BP: (!) 155/96 (04/02/20 0913)  SpO2: (!) 92 % (04/02/20 0913) Vital Signs (24h Range):  Temp:  [96.5 °F (35.8 °C)-98.6 °F (37 °C)] 96.5 °F (35.8 °C)  Pulse:  [66-94] 94  Resp:  [18]  18  SpO2:  [92 %-99 %] 92 %  BP: (133-155)/(63-96) 155/96     Weight: 76.7 kg (169 lb)  Body mass index is 27.28 kg/m².  No intake or output data in the 24 hours ending 04/02/20 0934   Physical Exam   Constitutional: She appears well-developed and well-nourished. No distress.   HENT:   Head: Normocephalic and atraumatic.   Mouth/Throat: Oropharynx is clear and moist.   Eyes: Conjunctivae and EOM are normal. Right eye exhibits no discharge. Left eye exhibits no discharge.   Neck: No tracheal deviation present.   Cardiovascular: Normal rate, regular rhythm and normal heart sounds.   No murmur heard.  Pulmonary/Chest: Effort normal and breath sounds normal. No stridor. No respiratory distress. She has no wheezes.   Abdominal: Soft. Bowel sounds are normal. She exhibits no distension. There is no tenderness.   Musculoskeletal: She exhibits no edema or tenderness.   Neurological: She is alert. No cranial nerve deficit.   Skin: Skin is warm. No rash noted. She is not diaphoretic.   Psychiatric: She has a normal mood and affect. Her behavior is normal.       Significant Labs:   CBC:   Recent Labs   Lab 04/01/20  0345 04/02/20  0403   WBC 4.39 4.85   HGB 10.1* 10.0*   HCT 31.3* 30.8*    332     CMP:   Recent Labs   Lab 04/01/20  0344 04/02/20  0403    143   K 4.3 4.2    108   CO2 21* 24   GLU 77 114*   BUN 19 15   CREATININE 1.1 0.9   CALCIUM 9.2 9.7   PROT 6.5 6.9   ALBUMIN 2.6* 2.7*   BILITOT 0.3 0.3   ALKPHOS 71 76   AST 26 28   ALT 14 17   ANIONGAP 11 11   EGFRNONAA 49* >60     Cardiac Markers: No results for input(s): CKMB, MYOGLOBIN, BNP, TROPISTAT in the last 48 hours.    Significant Imaging: No new imaging      Assessment/Plan:      * COVID-19 virus infection  -desatted to 89% on room air.  On 3 L nasal cannula.  Will arrange for home O2 with  nurse who will titrate  -CRP improved  -On Rocephin, azithromycin (started 3/30) due to elevated procalcitonin and worsened right-sided opacity on CXR.  Will d/c with 5 days of antibiotics  -blood cultures NGTD  -COVID positive.  Continue isolation    Acute hypoxemic respiratory failure  -2/2 COVID  -Will discharge with 3 L nasal cannula.   nurse will check pulse ox and titrate      Debility  -d/t COVID  -PT suggested home health. Was not approved for  PT/OT d/t COVID, but was able to arrange for nurse visits  -ordered home rolling walker, bedside commode  -ambulate as tolerated      Mild episode of recurrent major depressive disorder  -continue home Lexapro 5 daily    Type 2 diabetes mellitus without complication, without long-term current use of insulin  -diet controlled.  A1c 5.8.    Chronic constipation  -CT  with diverticulosis  -was planning on outpatient C scope 03-; GI will call to schedule follow-up  -stool softeners p.r.n.    History of transient ischemic attack (TIA)  -continue home aspirin, Crestor 20      Gastroesophageal reflux disease without esophagitis  -continue omeprazole p.r.n.      Gout, arthritis  -no renal dysfunction  -continue home allopurinol, losartan    Hyperlipidemia  -given history of TIA, continue home Crestor 20.  No transaminitis.    Essential hypertension  -BP controlled on home losartan 50 b.i.d., spironolactone 25 daily      VTE Risk Mitigation (From admission, onward)         Ordered     enoxaparin injection 40 mg  Daily      03/30/20 1905     Place sequential compression device  Until discontinued      03/30/20 1905     IP VTE HIGH RISK PATIENT  Once      03/30/20 1905                      Solange Hernandes MD  Department of Hospital Medicine   [facility]

## 2020-04-02 NOTE — ASSESSMENT & PLAN NOTE
-d/t COVID  -PT suggested home health. Was not approved for  PT/OT d/t COVID, but was able to arrange for nurse visits  -ordered home rolling walker, bedside commode  -ambulate as tolerated

## 2020-04-02 NOTE — DISCHARGE SUMMARY
[facility] Hospital Medicine  Discharge Summary      Patient Name: Isael Salmon  MRN: [id number]  Admission Date: 3/30/2020  Hospital Length of Stay: 3 days  Discharge Date and Time:  04/02/2020 11:07 AM  Attending Physician: Halle Vides MD   Discharging Provider: Solange Hernandes MD  Primary Care Provider: Janett Montoya MD      HPI:   Ms. Salmon is a 75 yo F with PMHx HTN, HLD, well controlled DM2, POLO, constipation (was planning on outpatient cscope with Dr. Kingsley 3/30/20), depression, history of gout, h/o TIA who presented to the ED d/t worsened weakness.  One week ago, she did not have symptoms, but since her daughter was sick, her daughter insisted that she be tested for COVID at the [facility].  She has not received COVID test results yet.  Her daughter was not tested for COVID.  Three days ago, she presented to the ED due to decreased appetite without nausea or vomiting.  +Subjective fever and has been taking Tylenol for malaise.  No myalgia.  She has productive cough without difficulty expectorating sputum.  No SOB, CP.  Was discharged with Zofran and loperamide for diarrhea.  Chest x-ray in the ED was concerning for right middle and lower lobe opacity.  Decreased appetite and weakness has continued to worsen.  She had a virtual visit yesterday and was planning on drive-through COVID testing, but she presented to the ED today due to worsened weakness and decreased appetite.    She was afebrile in the ED, but was 89% on room air with improvement on 2 L nasal cannula.  Procalcitonin was elevated.  Lactic acid was negative.  Repeat CXR with worsened RLL infiltrate.  Repeat COVID testing in process.    * No surgery found *      Hospital Course:   She was admitted for hypoxia requiring 3 L nasal cannula.  She was started on Rocephin and azithromycin on 03/30/2020 due to worsened infiltrate on CXR and elevated procalcitonin.  Curb 65 score 1.  COVID test positive.  Blood  cultures NGTD.    PT was consulted due to generalized weakness and recommended rolling walker, bedside commode.  DME and home health were ordered upon discharge.  We were unable to get approval for HH PT/OT d/t COVID status, but a nurse will come to her home once a week and titrate her home O2.  She was discharged with 2-3 L NC.  Her CRP improved.  She will be discharged with 1 more day of Levaquin for 5 days total (end date 04/03/2020, h/o PCN allergy). She was instructed to isolate home for 2 weeks.    HTN was controlled on home losartan 50 b.i.d., losartan 25 daily.    Outpatient C scope was planned on 03/30/2020 with Dr. Kingsley due to constipation which had to be postponed due to admission for COVID.  Dr. Kingsley's office plans to call her to reschedule this.     Consults:   Consults (From admission, onward)        Status Ordering Provider     Inpatient consult to Infection Control (Nurse)  Once     Provider:  (Not yet assigned)    Acknowledged SHON MATHIS          No new Assessment & Plan notes have been filed under this hospital service since the last note was generated.  Service: Hospital Medicine    Final Active Diagnoses:    Diagnosis Date Noted POA    PRINCIPAL PROBLEM:  COVID-19 virus infection [U07.1] 03/30/2020 Yes    Debility [R53.81] 03/31/2020 Yes    Acute hypoxemic respiratory failure [J96.01] 03/31/2020 Yes    Type 2 diabetes mellitus without complication, without long-term current use of insulin [E11.9] 03/30/2020 Yes    Mild episode of recurrent major depressive disorder [F33.0] 03/30/2020 Yes    Chronic constipation [K59.09] 12/01/2013 Yes    Essential hypertension [I10] 07/26/2012 Yes    Hyperlipidemia [E78.5] 07/26/2012 Yes    Gout, arthritis [M10.9] 07/26/2012 Yes    History of transient ischemic attack (TIA) [Z86.73] 07/26/2012 Not Applicable      Problems Resolved During this Admission:       Discharged Condition: good    Disposition: Home or Self Care    Follow Up:  Follow-up  "Information     Janett Montoya MD. Call in 1 month.    Specialty:  Internal Medicine  Contact information:  [address]  [facility] [id number] [phone]             Jaziel Kingsley MD In 1 month.    Specialty:  Gastroenterology  Why:  They will contact you to reschedule your colonoscopy.  [facility]:  [address] [facility] [id number] [phone]             [facility]    Specialty:  DME Provider  Why:  With questions regarding medical equipment - commode and walker  Contact information:  [address]  [facility] [id number] [phone]             [facility].    Specialty:  Home Health Services  Why:  they will call you to schedule first appointment for home health nurse  Contact information:  [address]  [place] [id number] [phone]                 Patient Instructions:      COMMODE FOR HOME USE     Order Specific Question Answer Comments   Type: Standard    Height: 5' 6" (1.676 m)    Weight: 76.7 kg (169 lb)    Does patient have medical equipment at home? none    Length of need (1-99 months): 99      WALKER FOR HOME USE     Order Specific Question Answer Comments   Type of Walker: Adult (5'4"-6'6")    With wheels? Yes    Height: 5' 6" (1.676 m)    Weight: 76.7 kg (169 lb)    Length of need (1-99 months): 99    Does patient have medical equipment at home? none    Please check all that apply: Patient's condition impairs ambulation.    Please check all that apply: Patient is unable to safely ambulate without equipment.    Please check all that apply: Walker will be used for gait training.      OXYGEN FOR HOME USE     Order Specific Question Answer Comments   Liter Flow 2    Duration Continuous    Qualifying SpO2: 86    Testing done at: Rest    Route nasal cannula    Portable mode: continuous    Device home concentrator with portable unit    Length of need (in months): 99 mos    Patient condition with qualifying saturation  COVID " "  Height: 5' 6" (1.676 m)    Weight: 76.7 kg (169 lb)    Does patient have medical equipment at home? none    Alternative treatment measures have been tried or considered and deemed clinically ineffective. Yes      Diet Cardiac     Activity as tolerated       Significant Diagnostic Studies: Labs:   CMP   Recent Labs   Lab 04/01/20  0344 04/02/20  0403    143   K 4.3 4.2    108   CO2 21* 24   GLU 77 114*   BUN 19 15   CREATININE 1.1 0.9   CALCIUM 9.2 9.7   PROT 6.5 6.9   ALBUMIN 2.6* 2.7*   BILITOT 0.3 0.3   ALKPHOS 71 76   AST 26 28   ALT 14 17   ANIONGAP 11 11   ESTGFRAFRICA 56* >60   EGFRNONAA 49* >60    and CBC   Recent Labs   Lab 04/01/20 0345 04/02/20  0403   WBC 4.39 4.85   HGB 10.1* 10.0*   HCT 31.3* 30.8*    332       Pending Diagnostic Studies:     None         Medications:  Reconciled Home Medications:      Medication List      START taking these medications    acetaminophen 325 MG tablet  Commonly known as:  TYLENOL  Take 2 tablets (650 mg total) by mouth every 4 (four) hours as needed for Pain (Fever).     benzonatate 100 MG capsule  Commonly known as:  TESSALON  Take 1 capsule (100 mg total) by mouth 3 (three) times daily as needed for Cough.     levoFLOXacin 750 MG tablet  Commonly known as:  LEVAQUIN  Take 1 tablet (750 mg total) by mouth once daily. for 1 day  Start taking on:  April 3, 2020     ondansetron 4 MG Tbdl  Commonly known as:  ZOFRAN-ODT  Take 1 tablet (4 mg total) by mouth every 6 (six) hours as needed (Nausea).        CHANGE how you take these medications    augmented betamethasone dipropionate 0.05 % cream  Commonly known as:  DIPROLENE-AF  Apply topically 2 (two) times daily.  What changed:    · when to take this  · reasons to take this        CONTINUE taking these medications    allopurinoL 100 MG tablet  Commonly known as:  ZYLOPRIM  Take 2 tablets (200 mg total) by mouth once daily.     aspirin 81 MG EC tablet  Commonly known as:  ECOTRIN  Take 81 mg by mouth " once daily.     b complex vitamins capsule  Take 1 capsule by mouth once daily.     BIOTIN ORAL  Take 1 tablet by mouth once daily.     co-enzyme Q-10 30 mg capsule  Take 200 mg by mouth once daily.     colchicine 0.6 mg tablet  Commonly known as:  COLCRYS  One tablet every hour during gouty flare until have diarrhea. Max 6 tablets daily     ergocalciferol 50,000 unit Cap  Commonly known as:  ERGOCALCIFEROL  TAKE 1 CAPSULE BY MOUTH 2 TIMES A WEEK     escitalopram oxalate 10 MG tablet  Commonly known as:  LEXAPRO  Take 0.5 tablets (5 mg total) by mouth once daily.     estradioL 0.01 % (0.1 mg/gram) vaginal cream  Commonly known as:  ESTRACE  Place vaginally as needed.     fluticasone propionate 50 mcg/actuation nasal spray  Commonly known as:  FLONASE  2 sprays by Each Nare route daily as needed. 2 Aerosol, Spray Nasal Every day     FOLBIC 2.5-25-2 mg Tab  Generic drug:  folic acid-vit B6-vit B12 2.5-25-2 mg  TAKE 1 TABLET BY MOUTH DAILY     indomethacin 50 MG capsule  Commonly known as:  INDOCIN  Take 1 capsule (50 mg total) by mouth 3 (three) times daily as needed.     ipratropium 0.02 % nebulizer solution  Commonly known as:  ATROVENT  Take 2.5 mLs (500 mcg total) by nebulization 3 (three) times daily as needed for Wheezing.     LINZESS 145 mcg Cap capsule  Generic drug:  linaCLOtide  Take 1 capsule (145 mcg total) by mouth once daily.     loperamide 2 mg capsule  Commonly known as:  IMODIUM  Take 1 capsule (2 mg total) by mouth 4 (four) times daily as needed for Diarrhea.     losartan 50 MG tablet  Commonly known as:  COZAAR  Take 1 tablet (50 mg total) by mouth 2 (two) times daily.     omeprazole 20 MG capsule  Commonly known as:  PRILOSEC  Take 1 capsule (20 mg total) by mouth 2 (two) times daily.     ondansetron 4 MG tablet  Commonly known as:  ZOFRAN  Take 1 tablet (4 mg total) by mouth every 6 (six) hours.     ONE-A-DAY WOMENS FORMULA ORAL  Take 1 tablet by mouth once daily.     rosuvastatin 20 MG  tablet  Commonly known as:  CRESTOR  Take 1 tablet (20 mg total) by mouth once daily.     spironolactone 25 MG tablet  Commonly known as:  ALDACTONE  Take 1 tablet (25 mg total) by mouth once daily.     VITAMIN C 500 MG tablet  Generic drug:  ascorbic acid (vitamin C)  Take 500 mg by mouth once daily.        ASK your doctor about these medications    salsalate 750 MG Tab  Commonly known as:  DISALCID  Take 1 tablet (750 mg total) by mouth 3 (three) times daily.            Indwelling Lines/Drains at time of discharge:   Lines/Drains/Airways     None                 Time spent on the discharge of patient: 20 minutes  Patient was seen and examined on the date of discharge and determined to be suitable for discharge.         Solange Hernandes MD  Department of Hospital Medicine  [facility]

## 2020-04-02 NOTE — PLAN OF CARE
Spoke with daughterHarini via phone.   Pt will be staying at her home address: [phone] / [address]  [address]. Temporary address added in [facility].     [facility] has accepted and will begin seeing pt tomorrow.  Spoke with Karly at [facility], provided updated pt address. Updated orders sent and referral completed in [facility].  Information added to AVS.     DME delivered to bedside: RW, BSC, and portable O2.    RT to educate on portable tank.     ADI Sousa at [facility] DME arranged delivery of home oxygen.      Daughter to provide transportation home.    No further DC needs from CM perspective.       04/02/20 1126   Final Note   Assessment Type Final Discharge Note   Anticipated Discharge Disposition Home-Health   What phone number can be called within the next 1-3 days to see how you are doing after discharge? [phone]   Hospital Follow Up  Appt(s) scheduled? Yes   Discharge plans and expectations educations in teach back method with documentation complete? Yes   Right Care Referral Info   Post Acute Recommendation Home-care

## 2020-04-02 NOTE — NURSING
Patient o2 NC removed and patient desatted to 86% on room air while sitting in bed.  o2 NC replaced at 2L NC and patient's sat increased to 92%.  Will continue to monitor.

## 2020-04-02 NOTE — ASSESSMENT & PLAN NOTE
-desatted to 89% on room air.  On 3 L nasal cannula.  Will arrange for home O2 with  nurse who will titrate  -CRP improved  -On Rocephin, azithromycin (started 3/30) due to elevated procalcitonin and worsened right-sided opacity on CXR. Will d/c with 5 days of antibiotics  -blood cultures NGTD  -COVID positive.  Continue isolation

## 2020-04-02 NOTE — DISCHARGE INSTRUCTIONS
Your test was POSITIVE for COVID-19 (coronavirus).     [facility]  Preventing the Spread of Coronavirus Disease 2019 in Homes and Residential Communities      Prevention steps for people with confirmed or suspected COVID-19 (including persons under investigation) who do not need to be hospitalized and people with confirmed COVID-19 who were hospitalized and determined to be medically stable to go home.    Your healthcare provider and public health staff will evaluate whether you can be cared for at home.   Stay home except to get medical care.   Separate yourself from other people and animals in your home   Call ahead before visiting your doctor.   Wear a facemask.   Cover your coughs and sneezes.   Clean your hands often.   Avoid sharing personal household items.   Clean all high-touch surfaces every day.   Monitor your symptoms. Seek prompt medical attention if your illness is worsening (e.g., difficulty breathing). Before seeking care, call your healthcare provider.   If you have a medical emergency and need to call 911, notify the dispatch personnel that you have, or are being evaluated for COVID-19. If possible, put on a facemask before emergency medical services arrive.   Discontinuing home isolation. Call your provider about guidance to discontinue home isolation.    Recommended precautions for household members, intimate partners, and caregivers in a nonhealthcare setting of a patient with symptomatic laboratory-confirmed COVID-19 or a patient under investigation.  Household members, intimate partners, and caregivers in a nonhealthcare setting may have close contact with a person with symptomatic, laboratory-confirmed COVID-19 or a person under investigation. Close contacts should monitor their health; they should call their healthcare provider right away if they develop symptoms suggestive of COVID-19 (e.g., fever, cough, shortness of breath).    Close contacts  should also follow these recommendations:   Make sure that you understand and can help the patient follow their healthcare provider's instructions for medication(s) and care. You should help the patient with basic needs in the home and provide support for getting groceries, prescriptions, and other personal needs.   Monitor the patient's symptoms. If the patient is getting sicker, call his or her healthcare provider and tell them that the patient has laboratory-confirmed COVID-19. This will help the healthcare provider's office take steps to keep other people in the office or waiting room from getting infected. Ask the healthcare provider to call the local or [facility] health department for additional guidance. If the patient has a medical emergency and you need to call 911, notify the dispatch personnel that the patient has, or is being evaluated for COVID-19.   Household members should stay in another room or be  from the patient as much as possible. Household members should use a separate bedroom and bathroom, if available.   Prohibit visitors who do not have an essential need to be in the home.   Household members should care for any pets in the home. Do not handle pets or other animals while sick.   Make sure that shared spaces in the home have good air flow, such as by an air conditioner or an opened window, weather permitting.   Perform hand hygiene frequently. Wash your hands often with soap and water for at least 20 seconds or use an alcohol-based hand  that contains 60 to 95% alcohol, covering all surfaces of your hands and rubbing them together until they feel dry. Soap and water should be used preferentially if hands are visibly dirty.   Avoid touching your eyes, nose, and mouth with unwashed hands.   The patient should wear a facemask when you are around other people. If the patient is not able to wear a facemask (for example, because it causes trouble breathing), you, as the  caregiver should wear a mask when you are in the same room as the patient.   Wear a disposable facemask and gloves when you touch or have contact with the patient's blood, stool, or body fluids, such as saliva, sputum, nasal mucus, vomit, urine.  o Throw out disposable facemasks and gloves after using them. Do not reuse.  o When removing personal protective equipment, first remove and dispose of gloves. Then, immediately clean your hands with soap and water or alcohol-based hand . Next, remove and dispose of facemask, and immediately clean your hands again with soap and water or alcohol-based hand .   Avoid sharing household items with the patient. You should not share dishes, drinking glasses, cups, eating utensils, towels, bedding, or other items. After the patient uses these items, you should wash them thoroughly (see below Wash laundry thoroughly).   Clean all high-touch surfaces, such as counters, tabletops, doorknobs, bathroom fixtures, toilets, phones, keyboards, tablets, and bedside tables, every day. Also, clean any surfaces that may have blood, stool, or body fluids on them.   Use a household cleaning spray or wipe, according to the label instructions. Labels contain instructions for safe and effective use of the cleaning product including precautions you should take when applying the product, such as wearing gloves and making sure you have good ventilation during use of the product.   Wash laundry thoroughly.  o Immediately remove and wash clothes or bedding that have blood, stool, or body fluids on them.  o Wear disposable gloves while handling soiled items and keep soiled items away from your body. Clean your hands (with soap and water or an alcohol-based hand ) immediately after removing your gloves.  o Read and follow directions on labels of laundry or clothing items and detergent. In general, using a normal laundry detergent according to washing machine  instructions and dry thoroughly using the warmest temperatures recommended on the clothing label.   Place all used disposable gloves, facemasks, and other contaminated items in a lined container before disposing of them with other household waste. Clean your hands (with soap and water or an alcohol-based hand ) immediately after handling these items. Soap and water should be used preferentially if hands are visibly dirty.   Discuss any additional questions with your state or local health department or healthcare provider. Check available hours when contacting your local health department.    For more information see CDC link below.      https://www.cdc.gov/coronavirus/2019-ncov/hcp/guidance-prevent-spread.html#precautions              If your symptoms worsen or if you have any other concerns, please contact [facility] at [phone].     Sincerely,     Solange Hernandes MD

## 2020-04-03 LAB
BACTERIA BLD CULT: NORMAL
BACTERIA BLD CULT: NORMAL

## 2020-04-06 ENCOUNTER — PATIENT OUTREACH (OUTPATIENT)
Dept: ADMINISTRATIVE | Facility: CLINIC | Age: 75
End: 2020-04-06

## 2020-04-07 NOTE — PROGRESS NOTES
Please forward this important TCC information to your provider in order to maximize the post discharge care delivery of this patient.    C3 nurse attempted to contact Isael Salmon for a TCC post hospital discharge follow up call. No answer. C3 nurse left a voice message and OOC # given. The patient does not have a HOSFU appointment with a Provider within 7-14 days post hospital discharge date 4/2/20. Please contact patient and schedule follow up appointment using HOSFU visit type on or before 04/13/20.    Respectfully,  Elyse Tafoya RN  [facility]  [email or web address]

## 2020-04-08 ENCOUNTER — OFFICE VISIT (OUTPATIENT)
Dept: INTERNAL MEDICINE | Facility: CLINIC | Age: 75
End: 2020-04-08
Payer: MEDICARE

## 2020-04-08 VITALS — SYSTOLIC BLOOD PRESSURE: 114 MMHG | DIASTOLIC BLOOD PRESSURE: 59 MMHG | HEART RATE: 91 BPM

## 2020-04-08 DIAGNOSIS — I10 ESSENTIAL HYPERTENSION: ICD-10-CM

## 2020-04-08 DIAGNOSIS — M10.9 GOUT, ARTHRITIS: ICD-10-CM

## 2020-04-08 DIAGNOSIS — U07.1 COVID-19 VIRUS INFECTION: Primary | ICD-10-CM

## 2020-04-08 PROCEDURE — 99214 OFFICE O/P EST MOD 30 MIN: CPT | Mod: 95,,, | Performed by: INTERNAL MEDICINE

## 2020-04-08 PROCEDURE — 99214 PR OFFICE/OUTPT VISIT, EST, LEVL IV, 30-39 MIN: ICD-10-PCS | Mod: 95,,, | Performed by: INTERNAL MEDICINE

## 2020-04-08 NOTE — PROGRESS NOTES
The patient location is: [facility]  The chief complaint leading to consultation is: covid pandemic  Visit type: Virtual visit with synchronous audio and video  Total time spent with patient:30 minutes  Each patient to whom he or she provides medical services by telemedicine is:  (1) informed of the relationship between the physician and patient and the respective role of any other health care provider with respect to management of the patient; and (2) notified that he or she may decline to receive medical services by telemedicine and may withdraw from such care at any time.      CHIEF COMPLAINT:  follow up of hospitalization for COVID.     HISTORY OF PRESENT ILLNESS: 74-year-old woman presents for hosptial follow up for COVID.     She went to the ED on 3/27/20 and was discharged then was hospitalized from 3/30/20 to 4/2/20.  She was discharged with Levaquin for one more day which she completed. She was also discharged with home oxygen due to low oxygen levels.  She was coughing up green mucous which has resolved.  No fever or chills. She is still very weak.  No shortness of breath. Cough has improved. She is still on home oxygen.  She is occasionally nauseated.  Diarrhea resolved. She was constipated and has been taking  Linzess and is having regular BM.  She has headaches that resolve with 2 tylenol.  Overall she feels that she is slowly improving.     She is very glad to be alive. She thought she was going to die.      Reflux has been controlled with omeprazole 20 mg daily.  She continues to take lexapro 10 mg 1/2 tablet daily, Crestor 20 mg daily with co enzyme 10 200 mg daily, losartan 50 mg twice daily, and spironolactone 25 mg once daily         She is not taking her iron supplement once daily      She had a normal cystoscope 12/17/14 due to recurrent UTI. No dysuria or hematuria now. She is no longer using estrogen vaginal cream for vaginal atrophy three times weekly        No nausea, vomiting,  "diarrhea, emilio, bloody stools. She continues to take losartan 50 mg twice daily and spironolactone 25 mg 1 tablet daily for hypertension.      She continues allopurinol 200 mg daily. No gouty flares       She is taking vitamin D 50,000 units twice weekly     Colonoscopy was cancelled on 3/30/20 due to COVID pandemic.     PAST MEDICAL HISTORY:   1. Hypertension.   2. Hyperlipidemia.   3. Gout.   4. History of rashes.   5. TIA in   6. Fibrocystic breast disease.   7. Postmenopausal.   8. Osteoarthritis.   9. History of tobacco usage; quit in .   10. History of colon polyps; normal colonoscopy in 10/07 and normal 2010, due    11. Normal bone mineral density scan in .   12. Anxiety and depression - controlled on lorazepam very rarely.   13. Varicose veins.   14. Intermittent GERD.   15. Iron deficiency anemia 2010 - due to erosive gastropathy on EGD 2010     PAST SURGICAL HISTORY:   1. Right carotid endarterectomy in .   2. Breast reduction surgery.   3. Total abdominal hysterectomy.   4. Blood transfusion prior to .     SOCIAL HISTORY: Quit smoking in ; smoked 1 PPD since age 16. No   alcohol use.     FAMILY HISTORY:   Father had gout, hypertension and heart failure; is . Mother had hypertension and pancreatic cancer; is also . Has five brothers, one of whom  in a motor vehicle accident. All have gout and hypertension. One brother has Crohn's disease; another has bladder cancer.     MEDICATIONS and ALLERGIES: Updated on epic.     PHYSICAL EXAMINATION:       BP (!) 114/59   Pulse 91   LMP  (LMP Unknown)           General: Alert, oriented. No apparent distress. Affect weak appearing. Oxygen nasal cannula in nose. Moving easily sitting in chair.  Respiratory effort normal. Speech clear.        ASSESSMENT AND PLAN:   1.  COVID infection - slowly improving. Get "Oximeter"ap. Home health is coming out.  Push fluids.  2. Essential hypertension - hold spironolactone " due to low BP.  Continue l osartan  3. Heme positive stools - saw Dr Kingsley 8/13/19 - colonoscopy will need to be rescheduled. .  4. Hyperlipidemia - On crestor 20 mg daily and Co Enzyme Q 10 200 mg daily.    5. Erosive gastropathy - on omeprazole 20 mg daily. Off NSAIDS.  6. Hypercalcemia - stable  7. Gout - asymptomatic on allopurinol.   8. Situational stress - continue lexapro   8.  Carotid artery disease - on risk factor modification.   9. 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 Repeat 5/26/16 - divericulosis otherwise normal - . MMG 4/19 BMD 7/11.  I will see her back in 1 week via virtual visit , sooner if problems arise

## 2020-04-13 DIAGNOSIS — U07.1 COVID-19 VIRUS DETECTED: ICD-10-CM

## 2020-04-14 NOTE — PHYSICIAN QUERY
PT Name: Isael Salmon  MR #: [id number]     Physician Query Form - Documentation Clarification      CDS/: Milly Cardenas RN              Contact information: [phone]    This form is a permanent document in the medical record.     Query Date: April 14, 2020    By submitting this query, we are merely seeking further clarification of documentation. Please utilize your independent clinical judgment when addressing the question(s) below.    The Medical record reflects the following:    Supporting Clinical Findings Location in Medical Record   CC:  Shortness of breath  Generalized weakness with diarrhea and fever  -She has had productive cough   -She has wheezes. She has rhonchi. She has rales  -Arrival patient with O2 sat 89%, which quickly corrected to 97% with 2 L nasal cannula  procalcitonin 18.03    Given hypoxia, high suspicion for COVID-19.  Labs with lymphopenia and elevated CRP typical of this, with normal cardiac markers.  However, it also shows elevated procalcitonin and chest x-ray shows worsening right lower lobe consolidation that was somewhat evident on chest x-ray done on previous ED visit but worse now.  Given O2 requirement, she needs admission for supportive care and to insure no decompensation.      Will also cover for community-acquired pneumonia given elevated procalcitonin and lobar consolidation, as she may have bacterial pneumonia superimposed over COVID-19.          Azithromycin IVPB    Indications of use:  Lower respiratory infection  Ceftriaxone IVPB      Indications of use:  Lower respiratory infection       3/30 ED MD PN                                3/30-4/2 MAR  3/30-4/2 MAR   Worsened right-sided haziness  -started Rocephin, azithromycin due to elevated procalcitonin and worsened right-sided opacity on CXR    COVID virus infection  Acute respiratory failure with hypoxia   approval for HH... nurse will come to her home once a week and titrate her home O2.    Reconciled Home  medications:   Levofloxacin 750mg daily    3/30 HP    4/2 DC summary   Compared with the prior study there is worsening coalescent consolidation in the right mid and lower lung zone suspicious for pneumonia.  Left lung grossly clear within limitations of portable technique.    Impression:  Worsening right lower lobe consolidation suspicious for pneumonia     3/30 CXR                                                                            Doctor, Please specify diagnosis or diagnoses associated with above clinical findings.    Provider Use Only      [   ] COVID pneumonia ruled in    [   ] bacterial pneumonia ruled in    [ X  ] Covid and Bacterial pneumonia ruled in    [   ] no pneumonia    [   ] other                                                                                                                     [  ] Clinically Undetermined

## 2020-04-15 ENCOUNTER — OFFICE VISIT (OUTPATIENT)
Dept: INTERNAL MEDICINE | Facility: CLINIC | Age: 75
End: 2020-04-15
Payer: MEDICARE

## 2020-04-15 VITALS
HEART RATE: 90 BPM | SYSTOLIC BLOOD PRESSURE: 132 MMHG | DIASTOLIC BLOOD PRESSURE: 68 MMHG | TEMPERATURE: 98 F | RESPIRATION RATE: 18 BRPM | OXYGEN SATURATION: 94 %

## 2020-04-15 DIAGNOSIS — E78.5 HYPERLIPIDEMIA, UNSPECIFIED HYPERLIPIDEMIA TYPE: ICD-10-CM

## 2020-04-15 DIAGNOSIS — Z86.39 HISTORY OF HYPERLIPIDEMIA: ICD-10-CM

## 2020-04-15 DIAGNOSIS — J30.1 ALLERGIC RHINITIS DUE TO POLLEN, UNSPECIFIED SEASONALITY: ICD-10-CM

## 2020-04-15 DIAGNOSIS — K59.09 CHRONIC CONSTIPATION: ICD-10-CM

## 2020-04-15 DIAGNOSIS — E11.9 TYPE 2 DIABETES MELLITUS WITHOUT COMPLICATION, WITHOUT LONG-TERM CURRENT USE OF INSULIN: ICD-10-CM

## 2020-04-15 DIAGNOSIS — K59.00 CONSTIPATION, UNSPECIFIED CONSTIPATION TYPE: ICD-10-CM

## 2020-04-15 DIAGNOSIS — K21.9 GASTROESOPHAGEAL REFLUX DISEASE WITHOUT ESOPHAGITIS: ICD-10-CM

## 2020-04-15 DIAGNOSIS — G45.9 TRANSIENT CEREBRAL ISCHEMIA, UNSPECIFIED TYPE: ICD-10-CM

## 2020-04-15 DIAGNOSIS — U07.1 COVID-19 VIRUS INFECTION: Primary | ICD-10-CM

## 2020-04-15 DIAGNOSIS — M10.9 GOUT, ARTHRITIS: ICD-10-CM

## 2020-04-15 DIAGNOSIS — I73.9 PVD (PERIPHERAL VASCULAR DISEASE): ICD-10-CM

## 2020-04-15 DIAGNOSIS — I10 ESSENTIAL HYPERTENSION: ICD-10-CM

## 2020-04-15 PROCEDURE — 99214 PR OFFICE/OUTPT VISIT, EST, LEVL IV, 30-39 MIN: ICD-10-PCS | Mod: 95,,, | Performed by: INTERNAL MEDICINE

## 2020-04-15 PROCEDURE — 99214 OFFICE O/P EST MOD 30 MIN: CPT | Mod: 95,,, | Performed by: INTERNAL MEDICINE

## 2020-04-15 RX ORDER — INDOMETHACIN 50 MG/1
50 CAPSULE ORAL 3 TIMES DAILY PRN
Qty: 30 CAPSULE | Refills: 3 | Status: SHIPPED | OUTPATIENT
Start: 2020-04-15

## 2020-04-15 RX ORDER — LOSARTAN POTASSIUM 50 MG/1
50 TABLET ORAL 2 TIMES DAILY
Qty: 180 TABLET | Refills: 3 | Status: SHIPPED | OUTPATIENT
Start: 2020-04-15 | End: 2021-04-05

## 2020-04-15 RX ORDER — ALPRAZOLAM 0.25 MG/1
0.25 TABLET ORAL NIGHTLY PRN
Qty: 30 TABLET | Refills: 2 | Status: SHIPPED | OUTPATIENT
Start: 2020-04-15 | End: 2021-07-22 | Stop reason: SDUPTHER

## 2020-04-15 RX ORDER — ROSUVASTATIN CALCIUM 20 MG/1
20 TABLET, COATED ORAL DAILY
Qty: 90 TABLET | Refills: 3 | Status: SHIPPED | OUTPATIENT
Start: 2020-04-15 | End: 2020-07-27

## 2020-04-15 RX ORDER — OMEPRAZOLE 20 MG/1
20 CAPSULE, DELAYED RELEASE ORAL 2 TIMES DAILY
Qty: 90 CAPSULE | Refills: 4 | Status: SHIPPED | OUTPATIENT
Start: 2020-04-15 | End: 2021-12-20 | Stop reason: SDUPTHER

## 2020-04-15 RX ORDER — ESCITALOPRAM OXALATE 10 MG/1
10 TABLET ORAL DAILY
Qty: 90 TABLET | Refills: 3 | Status: SHIPPED | OUTPATIENT
Start: 2020-04-15 | End: 2021-04-05

## 2020-04-15 NOTE — PROGRESS NOTES
The patient location is: [facility]  The chief complaint leading to consultation is: covid pandemic  Visit type: Virtual visit with synchronous audio and video  Total time spent with patient: 25 minutes  Each patient to whom he or she provides medical services by telemedicine is:  (1) informed of the relationship between the physician and patient and the respective role of any other health care provider with respect to management of the patient; and (2) notified that he or she may decline to receive medical services by telemedicine and may withdraw from such care at any time.        CHIEF COMPLAINT:  follow up of hospitalization for COVID.     HISTORY OF PRESENT ILLNESS: 74-year-old woman presents for hosptial follow up for COVID.     Over the last week, she has gotten stronger. Her appetite is getting better. She is about  50% better. She is not back to her old self. Breathing is better. Rare cough. She now uses oxygen as needed upon ambulation.     She has had some right hip pain the last several days. She feels that physical therapy may have flared her gout.  She got on the heating pad which may have helped.    Anxiety is much worse. She is afraid to go to sleep fearing she would die.  She is taking lexapro 10 mg 1/2 tablet daily. Her family gave her xanax 0.25 mg 1/2 tablet at bedtime the last several nights and she slept better.       She was constipated at discharge and has been taking  Linzess and is having regular BM.        No Reflux. Has not been taking  omeprazole 20 mg daily.  She continues to take lexapro 10 mg 1/2 tablet daily, Crestor 20 mg daily with co enzyme 10 200 mg daily, losartan 50 mg twice daily. BP has been higher since she is off spironolactone.              She had a normal cystoscope 12/17/14 due to recurrent UTI. No dysuria or hematuria now. She is no longer using estrogen vaginal cream for vaginal atrophy three times weekly          She continues allopurinol 200 mg daily.       She is taking vitamin D 50,000 units twice weekly     Colonoscopy was cancelled on 3/30/20 due to COVID pandemic.     No dysuria or hematuria.     PAST MEDICAL HISTORY:   1. Hypertension.   2. Hyperlipidemia.   3. Gout.   4. History of rashes.   5. TIA in   6. Fibrocystic breast disease.   7. Postmenopausal.   8. Osteoarthritis.   9. History of tobacco usage; quit in .   10. History of colon polyps; normal colonoscopy in 10/07 and normal 2010, due    11. Normal bone mineral density scan in .   12. Anxiety and depression - controlled on lorazepam very rarely.   13. Varicose veins.   14. Intermittent GERD.   15. Iron deficiency anemia 2010 - due to erosive gastropathy on EGD 2010     PAST SURGICAL HISTORY:   1. Right carotid endarterectomy in .   2. Breast reduction surgery.   3. Total abdominal hysterectomy.   4. Blood transfusion prior to .     SOCIAL HISTORY: Quit smoking in ; smoked 1 PPD since age 16. No   alcohol use.     FAMILY HISTORY:   Father had gout, hypertension and heart failure; is . Mother had hypertension and pancreatic cancer; is also . Has five brothers, one of whom  in a motor vehicle accident. All have gout and hypertension. One brother has Crohn's disease; another has bladder cancer.     MEDICATIONS and ALLERGIES: Updated on epic.     PHYSICAL EXAMINATION:      /68   Pulse 90   Temp 97.6 °F (36.4 °C)   Resp 18   LMP  (LMP Unknown)   SpO2 (!) 94%   Vitals done with home health on 20        General: Alert, oriented. No apparent distress. Affect weak appearing. Oxygen nasal cannula in nose. Moving easily sitting in chair.  Respiratory effort normal. Speech clear.         ASSESSMENT AND PLAN:   1.  COVID infection - slowly improving.  2. Essential hypertension - stable in losartan 50 mg twice daily  3. Heme positive stools - saw Dr Kingsley 19 - colonoscopy will need to be rescheduled. .  4. Hyperlipidemia - On crestor 20 mg  daily and Co Enzyme Q 10 200 mg daily.  Refilled  5. Erosive gastropathy - get back on omeprazole 20 mg daily. Off NSAIDS.  6. Hypercalcemia - stable  7. Gout -  on allopurinol. may take indomethacin for hip issue. Could be gouty flare.   8. Situational stress - increase lexapro to 10 mg 1 tablet daily. Xanax 0.25 mg 1/2-1 at bedtime.   8.  Carotid artery disease - on risk factor modification.   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 Constipation - L inzess refilled  Screening - Colonscopy 10/15 with 4 polyps - it was incomplete. Repeat 5/26/16 - divericulosis otherwise normal - . MMG 4/19 BMD 7/11.  I will see her back in 1 week via virtual visit , sooner if problems arise

## 2020-04-22 ENCOUNTER — OFFICE VISIT (OUTPATIENT)
Dept: INTERNAL MEDICINE | Facility: CLINIC | Age: 75
End: 2020-04-22
Payer: COMMERCIAL

## 2020-04-22 DIAGNOSIS — I10 ESSENTIAL HYPERTENSION: ICD-10-CM

## 2020-04-22 DIAGNOSIS — M10.9 GOUT, ARTHRITIS: ICD-10-CM

## 2020-04-22 DIAGNOSIS — E78.5 HYPERLIPIDEMIA, UNSPECIFIED HYPERLIPIDEMIA TYPE: ICD-10-CM

## 2020-04-22 DIAGNOSIS — U07.1 COVID-19 VIRUS INFECTION: Primary | ICD-10-CM

## 2020-04-22 DIAGNOSIS — F39 MOOD DISORDER: ICD-10-CM

## 2020-04-22 DIAGNOSIS — K21.9 GASTROESOPHAGEAL REFLUX DISEASE WITHOUT ESOPHAGITIS: ICD-10-CM

## 2020-04-22 PROCEDURE — 99214 PR OFFICE/OUTPT VISIT, EST, LEVL IV, 30-39 MIN: ICD-10-PCS | Mod: 95,,, | Performed by: INTERNAL MEDICINE

## 2020-04-22 PROCEDURE — 99214 OFFICE O/P EST MOD 30 MIN: CPT | Mod: 95,,, | Performed by: INTERNAL MEDICINE

## 2020-05-06 ENCOUNTER — TELEPHONE (OUTPATIENT)
Dept: INTERNAL MEDICINE | Facility: CLINIC | Age: 75
End: 2020-05-06

## 2020-05-06 ENCOUNTER — OFFICE VISIT (OUTPATIENT)
Dept: INTERNAL MEDICINE | Facility: CLINIC | Age: 75
End: 2020-05-06
Payer: MEDICARE

## 2020-05-06 VITALS — BODY MASS INDEX: 26.47 KG/M2 | WEIGHT: 164 LBS

## 2020-05-06 DIAGNOSIS — E78.5 HYPERLIPIDEMIA, UNSPECIFIED HYPERLIPIDEMIA TYPE: ICD-10-CM

## 2020-05-06 DIAGNOSIS — M10.9 GOUT, ARTHRITIS: ICD-10-CM

## 2020-05-06 DIAGNOSIS — K21.9 GASTROESOPHAGEAL REFLUX DISEASE WITHOUT ESOPHAGITIS: ICD-10-CM

## 2020-05-06 DIAGNOSIS — J44.9 CHRONIC OBSTRUCTIVE PULMONARY DISEASE, UNSPECIFIED COPD TYPE: ICD-10-CM

## 2020-05-06 DIAGNOSIS — U07.1 COVID-19 VIRUS INFECTION: Primary | ICD-10-CM

## 2020-05-06 PROCEDURE — 99214 PR OFFICE/OUTPT VISIT, EST, LEVL IV, 30-39 MIN: ICD-10-PCS | Mod: 95,,, | Performed by: INTERNAL MEDICINE

## 2020-05-06 PROCEDURE — 99214 OFFICE O/P EST MOD 30 MIN: CPT | Mod: 95,,, | Performed by: INTERNAL MEDICINE

## 2020-05-06 NOTE — TELEPHONE ENCOUNTER
----- Message from Janett Montoya MD sent at 5/6/2020  1:12 PM CDT -----  I  Have discontinued oxygen. She is better. Have [facility]  the oxygen.   Janett Montoya M.D.

## 2020-05-06 NOTE — PROGRESS NOTES
The patient location is: [facility]  The chief complaint leading to consultation is: covid   Visit type: Virtual visit with synchronous audio and video    Each patient to whom he or she provides medical services by telemedicine is:  (1) informed of the relationship between the physician and patient and the respective role of any other health care provider with respect to management of the patient; and (2) notified that he or she may decline to receive medical services by telemedicine and may withdraw from such care at any time.        CHIEF COMPLAINT:  follow up of hospitalization for COVID.     HISTORY OF PRESENT ILLNESS: 74-year-old woman presents for hosptial follow up for COVID.     She has been off oxygen the last week.  Energy level and breathing are much improved.  She is sleeping well. She has not needed xanax at bedtime.  Appetite is great.  Right hip is better.  She is not having to take indomethacin.      She continues allopurinol 200 mg daily for gout.      Anxiety is controlled since increasing Lexapro to 10 mg one tablet daily. S     Bowels are moving well with Linzess one tablet every other day. No nausea, vomiting, constipation, diarrhea.      Heart burn is congroled on omperazole 20 mg twice daily     She continues to take Crestor 20 mg daily with co enzyme 10 200 mg daily, losartan 50 mg twice daily. BP has been doing well.        She had a normal cystoscope 12/17/14 due to recurrent UTI. No dysuria or hematuria now. She is no longer using estrogen vaginal cream for vaginal atrophy three times weekly       She is taking vitamin D 50,000 units twice weekly     Colonoscopy was cancelled on 3/30/20 due to COVID pandemic.      No dysuria or hematuria.     PAST MEDICAL HISTORY:   1. Hypertension.   2. Hyperlipidemia.   3. Gout.   4. History of rashes.   5. TIA in 1997  6. Fibrocystic breast disease.   7. Postmenopausal.   8. Osteoarthritis.   9. History of tobacco usage; quit in 1997.   10.  History of colon polyps; normal colonoscopy in 10/07 and normal 2010, due 2015   11. Normal bone mineral density scan in .   12. Anxiety and depression - controlled on lorazepam very rarely.   13. Varicose veins.   14. Intermittent GERD.   15. Iron deficiency anemia 2010 - due to erosive gastropathy on EGD 2010     PAST SURGICAL HISTORY:   1. Right carotid endarterectomy in .   2. Breast reduction surgery.   3. Total abdominal hysterectomy.   4. Blood transfusion prior to .     SOCIAL HISTORY: Quit smoking in ; smoked 1 PPD since age 16. No   alcohol use.     FAMILY HISTORY:   Father had gout, hypertension and heart failure; is . Mother had hypertension and pancreatic cancer; is also . Has five brothers, one of whom  in a motor vehicle accident. All have gout and hypertension. One brother has Crohn's disease; another has bladder cancer.     MEDICATIONS and ALLERGIES: Updated on epic.     PHYSICAL EXAMINATION:           General: Alert, oriented. No apparent distress. Respiratory effort normal. Speech clear.    No oxgyen tubing. Color is better. Not weak appering. Looks good     ASSESSMENT AND PLAN:   1.  COVID infection -  improving. Discontinue oxygen. May  oxygen  2. Essential hypertension - stable in losartan 50 mg twice daily  3. Heme positive stools - saw Dr Kingsley 19 - colonoscopy will need to be rescheduled. .  4. Hyperlipidemia - On crestor 20 mg daily and Co Enzyme Q 10 200 mg daily.  Refilled  5. Erosive gastropathy -  on omeprazole 20 mg twice daily. Off NSAIDS.  6. Hypercalcemia - stable  7. Gout -  on allopurinol. Hip is better  8. Situational stress/anxiety/mood disorder -better with increasing lexapro to 10 mg 1 tablet daily. Sleeping better. Has not neeed xanax   8.  Carotid artery disease - on risk factor modification.   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 Constipation -stable on linzess  Screening - Colonscopy 10/15 with 4 polyps - it was  incomplete. Repeat 5/26/16 - divericulosis otherwise normal - . MMG 4/19 BMD 7/11.  I will see her back in 1 month in clinic , sooner if problems arise

## 2020-05-11 ENCOUNTER — RESEARCH ENCOUNTER (OUTPATIENT)
Dept: RESEARCH | Facility: HOSPITAL | Age: 75
End: 2020-05-11

## 2020-05-17 ENCOUNTER — PATIENT MESSAGE (OUTPATIENT)
Dept: RESEARCH | Facility: HOSPITAL | Age: 75
End: 2020-05-17

## 2020-05-18 ENCOUNTER — OUTPATIENT CASE MANAGEMENT (OUTPATIENT)
Dept: ADMINISTRATIVE | Facility: OTHER | Age: 75
End: 2020-05-18

## 2020-05-18 ENCOUNTER — TELEPHONE (OUTPATIENT)
Dept: INTERNAL MEDICINE | Facility: CLINIC | Age: 75
End: 2020-05-18

## 2020-05-18 NOTE — TELEPHONE ENCOUNTER
----- Message from Lilly Machuca RN sent at 5/18/2020  4:01 PM CDT -----  Dr. Montoya,     I am with the Outpatient Care/COVID Team and spoke with the pt today. Pt wanted know if she can restart her Spironolactone 25mg? Please contact Pt with your recommendations. Thank you for your time.     Kindes regards,     Lilly Machuca RN

## 2020-05-18 NOTE — PROGRESS NOTES
Outpatient Care Management  Initial Patient Assessment    Patient: Isael Salmon  MRN: [id number]  Date of Service: 05/18/2020  Completed by: Lilly Machuca RN  Referral Date:   Program:  COVID19    Reason for Visit   Patient presents with    COVID-19 Concerns     5/18/20    OPCM Chart Review     5/18/20    Initial Assessment     5/18/20    Plan Of Care     5/18/20    PHQ-9     5/18/20     Brief Summary: Chart reviewed. Pt is a 74yr old female who was hospitalized 3/30/20-4/2/20 for Covid19. Pt reports she was not on a ventilator while she was hospitalized. Pt reports she has not had fever since she was in the hospital but still has a slight cough. Pt denies difficulty breathing and was has not used O2 since it was d/c'd on 5/6/20 by Dr. Montoya. Pt reports her energy level is still lower than it was before she got the virus. Pt reports she stayed with her daughter after she was discharged from the hospital. Pt stated her daughter had covid as well but social distanced from one another and wore masks. Pt reports she did isolate to the bedroom and her daughter stayed in her own room. Discussed pt being on isolation for 14 days after discharge.  Pt verified she was told at discharge how long she needed to maintain social distancing of 6 feet and isolation. Pt was knowledgeable of handwashing with soap and water for 20 seconds, using hand  with at least 60% alcohol if no soap and water available. Pt was able to state if she coughs or sneezes she should do it into a Kleenex or the inside of her elbow and should avoid touching her eyes, mouth or nose. Pt denies any new issues at this time. Medication reconciliation performed, no discrepancies noted. Pt scored a 2 on PHQ-2. 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 Pt reports today is her first day back at work and she is only there for a short time. Pt reports she would like PCP to let her know if she  should restart Spirolactone 25mg   Pt reports she is very concerned about their business due to everything being shut down due to COVID19.     Education on COVID 19 provided to patient during call. Instructed patient to call [facility] first if experiencing fever greater than 100.4, coughing and SOB. Provided patient [facility] phone number [phone] as well as [facility] COVID 19 hotline [phone], verbalized understanding. Informed patient that they may contact their PCP for further guidance as well. Reviewed with patient precautions to take to help prevent the spread of this respiratory virus: Wash hands often (for 20 seconds singing Happy Birthday), cover your cough or sneeze into your elbow or a tissue, stay away from people who are sick, don't touch your eyes, nose or mouth with unwashed hands. Provided Instruction to stay home as directed by local government officials and only make trips that are of essential needs (Ex:grocery, pharmacy, medical appointments). Reviewed with patient supplies to have on hand while staying at home (food, water, medications, medical supplies and other essentials).      Assessment Documentation     OPCM Initial Assessment    Involvement of Care  Patient Reported Insurance  Current Health Status  Patient Health Rating  Patient Reported Labs & Vitals  Social Determinants  Advanced Care Planning  Support  Housing  Access to [employer]  Clinical Assessment  Medication Adherence  *Active medication list was reviewed and reconciled with patient and/or caregiver:    Nutritional Status  Labs  PHQ Depression Screen  Cognitive/Behavioral Health  Culture/Sikh  Communication  Health Literacy  Activities of Daily Living  Fall Risk  Equipment/Current Services  Community & Government Programs  Community Resource Assessment  Completion of Initial Assessment         Problem List and History     Patient Active Problem List   Diagnosis    Essential hypertension     Hyperlipidemia    Gout, arthritis    Gastroesophageal reflux disease without esophagitis    History of transient ischemic attack (TIA)    Fibrocystic breast disease in female    Postmenopausal status    Osteoarthritis    Adjustment disorder    Asymptomatic varicose veins of both lower extremities    PVD (peripheral vascular disease)    History of blood transfusion    Hypercalcemia    Rectocele    Mixed incontinence urge and stress (male)(female)    Urethral hypermobility    Chronic constipation    Chronic cough    Nuclear sclerosis    AR (allergic rhinitis)    Recurrent UTI    Kidney stones    History of colon polyps    Idiopathic chronic gout of multiple sites without tophus    Primary osteoarthritis involving multiple joints    Traumatic closed displaced mallet fracture    Mallet finger    Mild chronic anemia    Post concussive syndrome    Iron deficiency anemia    Amaurosis fugax    Acute right-sided low back pain with sciatica    Chronic pain    Bilateral carotid artery disease    Vitreous detachment of both eyes    Refractive error    Senile nuclear sclerosis    Chronic post-traumatic headache, not intractable    Pseudophakia    Dry eye syndrome of both eyes    Calcified granuloma of lung    Aortic atherosclerosis    COPD (chronic obstructive pulmonary disease)    COVID-19 virus infection    Type 2 diabetes mellitus without complication, without long-term current use of insulin    Mild episode of recurrent major depressive disorder    Debility    Acute hypoxemic respiratory failure    Mood disorder       Reviewed Active Problem List with patient and/or Caregiver. The following were identified as areas of need: COVID19    Medical History:  Reviewed medical history with patient and/or caregiver    Social History:  Reviewed social history with patient and/or caregiver    Complex Care Plan    Care plan was discussed and completed today with input from patient and/or  caregiver.        Patient Instructions     Instructions were provided via the [employer] patient resources and are available for the patient to view on the patient portal, if active.    Next steps:  S/s of Covid  Energy level  How is work going  Did Dr. Montoya reply about restarting spirolactone 25mg     No follow-ups on file.    Todays OPCM Self-Management Care Plan was developed with the patients/caregivers input and was based on identified barriers from todays assessment.  Goals were written today with the patient/caregiver and the patient has agreed to work towards these goals to improve his/her overall well-being. Patient verbalized understanding of the care plan, goals, and all of today's instructions. Encouraged patient/caregiver to communicate with his/her physician and health care team about health conditions and the treatment plan.  Provided my contact information today and encouraged patient/caregiver to call me with any questions as needed.

## 2020-05-21 NOTE — TELEPHONE ENCOUNTER
Regarding restarting the spironolactone, ask the patient the following -     How has your blood pressure been running?  Are you having any swelling in your legs?

## 2020-05-25 ENCOUNTER — TELEPHONE (OUTPATIENT)
Dept: ENDOSCOPY | Facility: HOSPITAL | Age: 75
End: 2020-05-25

## 2020-06-02 ENCOUNTER — OFFICE VISIT (OUTPATIENT)
Dept: INTERNAL MEDICINE | Facility: CLINIC | Age: 75
End: 2020-06-02
Payer: MEDICARE

## 2020-06-02 VITALS
DIASTOLIC BLOOD PRESSURE: 60 MMHG | BODY MASS INDEX: 27.56 KG/M2 | SYSTOLIC BLOOD PRESSURE: 130 MMHG | HEIGHT: 65 IN | HEART RATE: 72 BPM | OXYGEN SATURATION: 99 % | WEIGHT: 165.38 LBS

## 2020-06-02 DIAGNOSIS — K59.09 CHRONIC CONSTIPATION: ICD-10-CM

## 2020-06-02 DIAGNOSIS — E78.5 HYPERLIPIDEMIA, UNSPECIFIED HYPERLIPIDEMIA TYPE: ICD-10-CM

## 2020-06-02 DIAGNOSIS — E53.8 VITAMIN B12 DEFICIENCY: Primary | ICD-10-CM

## 2020-06-02 DIAGNOSIS — E55.9 VITAMIN D DEFICIENCY DISEASE: ICD-10-CM

## 2020-06-02 DIAGNOSIS — K21.9 GASTROESOPHAGEAL REFLUX DISEASE WITHOUT ESOPHAGITIS: ICD-10-CM

## 2020-06-02 DIAGNOSIS — I70.0 AORTIC ATHEROSCLEROSIS: ICD-10-CM

## 2020-06-02 DIAGNOSIS — Z12.31 SCREENING MAMMOGRAM, ENCOUNTER FOR: ICD-10-CM

## 2020-06-02 DIAGNOSIS — M10.9 GOUT, ARTHRITIS: ICD-10-CM

## 2020-06-02 DIAGNOSIS — J44.9 CHRONIC OBSTRUCTIVE PULMONARY DISEASE, UNSPECIFIED COPD TYPE: ICD-10-CM

## 2020-06-02 DIAGNOSIS — J30.1 ALLERGIC RHINITIS DUE TO POLLEN, UNSPECIFIED SEASONALITY: ICD-10-CM

## 2020-06-02 DIAGNOSIS — I10 ESSENTIAL HYPERTENSION: ICD-10-CM

## 2020-06-02 PROCEDURE — 99999 PR PBB SHADOW E&M-EST. PATIENT-LVL III: CPT | Mod: PBBFAC,,, | Performed by: INTERNAL MEDICINE

## 2020-06-02 PROCEDURE — 99214 PR OFFICE/OUTPT VISIT, EST, LEVL IV, 30-39 MIN: ICD-10-PCS | Mod: S$PBB,,, | Performed by: INTERNAL MEDICINE

## 2020-06-02 PROCEDURE — 99999 PR PBB SHADOW E&M-EST. PATIENT-LVL III: ICD-10-PCS | Mod: PBBFAC,,, | Performed by: INTERNAL MEDICINE

## 2020-06-02 PROCEDURE — 99214 OFFICE O/P EST MOD 30 MIN: CPT | Mod: S$PBB,,, | Performed by: INTERNAL MEDICINE

## 2020-06-02 PROCEDURE — 99213 OFFICE O/P EST LOW 20 MIN: CPT | Mod: PBBFAC | Performed by: INTERNAL MEDICINE

## 2020-06-02 RX ORDER — POLYETHYLENE GLYCOL 3350, SODIUM CHLORIDE, SODIUM BICARBONATE, POTASSIUM CHLORIDE 420; 11.2; 5.72; 1.48 G/4L; G/4L; G/4L; G/4L
POWDER, FOR SOLUTION ORAL
COMMUNITY
Start: 2020-03-08 | End: 2023-03-28

## 2020-06-02 NOTE — PROGRESS NOTES
CHIEF COMPLAINT:  follow up of COVID, constipation, hypertension, hyperlipidemia,     HISTORY OF PRESENT ILLNESS: 74-year-old woman presents for follow up of above    She has been taking Linzess 145 mg every other day lately. SHe has a watery stool when she takes the Linzess. For the last 2 days, yesterday and today, she has had cramping.  She had a liquid stool yesterday. She may not have evacuated enough stool yesterday. She has not been drinking a lot of water.     She has not been lightheaded. Energy level continues to improve. Breathing has been good.   She is sleeping well. She has not needed xanax at bedtime.  Appetite is good    Right hip is doing ok.  NO pain.  She is not having to take indomethacin.      She continues allopurinol 200 mg daily for gout.      Anxiety is controlled since increasing Lexapro to 10 mg one tablet daily.       Heartburn is congroled on omperazole 20 mg twice daily     She continues to take Crestor 20 mg daily with co enzyme 10 200 mg daily, losartan 50 mg twice daily. BP has been doing well.        She had a normal cystoscope 12/17/14 due to recurrent UTI. No dysuria or hematuria now. She is no longer using estrogen vaginal cream for vaginal atrophy three times weekly       She is taking vitamin D 50,000 units twice weekly     Colonoscopy was cancelled on 3/30/20 due to COVID pandemic.      No dysuria or hematuria.     PAST MEDICAL HISTORY:   1. Hypertension.   2. Hyperlipidemia.   3. Gout.   4. History of rashes.   5. TIA in 1997  6. Fibrocystic breast disease.   7. Postmenopausal.   8. Osteoarthritis.   9. History of tobacco usage; quit in 1997.   10. History of colon polyps; normal colonoscopy in 10/07 and normal 4/2010, due 2015   11. Normal bone mineral density scan in 6/06.   12. Anxiety and depression - controlled on lorazepam very rarely.   13. Varicose veins.   14. Intermittent GERD.   15. Iron deficiency anemia 4/2010 - due to erosive gastropathy on EGD 5/2010     PAST  SURGICAL HISTORY:   1. Right carotid endarterectomy in .   2. Breast reduction surgery.   3. Total abdominal hysterectomy.   4. Blood transfusion prior to .     SOCIAL HISTORY: Quit smoking in ; smoked 1 PPD since age 16. No   alcohol use.     FAMILY HISTORY:   Father had gout, hypertension and heart failure; is . Mother had hypertension and pancreatic cancer; is also . Has five brothers, one of whom  in a motor vehicle accident. All have gout and hypertension. One brother has Crohn's disease; another has bladder cancer.     MEDICATIONS and ALLERGIES: Updated on epic.     PHYSICAL EXAMINATION:         General: Alert, oriented. No apparent distress. Affect within normal   limits. Swelling under the right eye brow.   Bruising under both eyes, right greater than left.  Bruising on the right cheek  Conjunctivae anicteric. Tympanic membranes clear. Oropharynx clear.   Neck supple.   Respiratory effort normal. Lungs clear  Heart: Regular rate and rhythm without murmurs, gallops or rubs.   No lower extremity edema.    ABDOMEN: soft, non distended, non tender, bowel sounds present, no hepatosplenomgaly       ASSESSMENT AND PLAN:   1.  COVID infection -  resolved  2. Essential hypertension - stable in losartan 50 mg twice daily  3. Heme positive stools - saw Dr Kingsley 19 - colonoscopy will need to be rescheduled. .  4. Hyperlipidemia - On crestor 20 mg daily and Co Enzyme Q 10 200 mg daily.  Refilled  5. Erosive gastropathy -  on omeprazole 20 mg twice daily. Off NSAIDS.  6. Hypercalcemia - stable  7. Gout -  on allopurinol. Hip is better  8. Situational stress/anxiety/mood disorder -better with increasing lexapro to 10 mg 1 tablet daily. Sleeping better. Has not neeed xanax   8.  Carotid artery disease - on risk factor modification.   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 Constipation -stop linzess due to cramping- try milk of magnesia 2-3 tsp daily to keep bowels regular. To have  colonoscopy.   Screening - Colonscopy 10/15 with 4 polyps - it was incomplete. Repeat 5/26/16 - divericulosis otherwise normal - . MMG 4/19 BMD 7/11.  I will see her back in 1 month in clinic , sooner if problems arise     Answers for HPI/ROS submitted by the patient on 6/1/2020   activity change: No  unexpected weight change: No  neck pain: Yes  hearing loss: No  rhinorrhea: No  trouble swallowing: No  eye discharge: No  visual disturbance: Yes  chest tightness: No  wheezing: No  chest pain: No  palpitations: No  blood in stool: No  constipation: Yes  vomiting: No  diarrhea: No  polydipsia: No  polyuria: No  difficulty urinating: No  hematuria: No  menstrual problem: No  dysuria: No  joint swelling: No  arthralgias: Yes  headaches: No  weakness: No  confusion: No  dysphoric mood: No

## 2020-06-09 ENCOUNTER — HOSPITAL ENCOUNTER (OUTPATIENT)
Dept: RADIOLOGY | Facility: HOSPITAL | Age: 75
Discharge: HOME | End: 2020-06-09
Attending: INTERNAL MEDICINE
Payer: MEDICARE

## 2020-06-09 ENCOUNTER — OUTPATIENT CASE MANAGEMENT (OUTPATIENT)
Dept: ADMINISTRATIVE | Facility: OTHER | Age: 75
End: 2020-06-09

## 2020-06-09 DIAGNOSIS — Z12.31 SCREENING MAMMOGRAM, ENCOUNTER FOR: ICD-10-CM

## 2020-06-09 PROCEDURE — 77063 MAMMO DIGITAL SCREENING BILAT WITH TOMOSYNTHESIS_CAD: ICD-10-PCS | Mod: 26,,, | Performed by: RADIOLOGY

## 2020-06-09 PROCEDURE — 77067 MAMMO DIGITAL SCREENING BILAT WITH TOMOSYNTHESIS_CAD: ICD-10-PCS | Mod: 26,,, | Performed by: RADIOLOGY

## 2020-06-09 PROCEDURE — 77063 BREAST TOMOSYNTHESIS BI: CPT | Mod: 26,,, | Performed by: RADIOLOGY

## 2020-06-09 PROCEDURE — 77067 SCR MAMMO BI INCL CAD: CPT | Mod: TC

## 2020-06-09 PROCEDURE — 77067 SCR MAMMO BI INCL CAD: CPT | Mod: 26,,, | Performed by: RADIOLOGY

## 2020-06-11 NOTE — PROGRESS NOTES
"6/9/20-1st attempt to contact pt, message left on voicemail requesting a return call.   Outpatient Care Management  Plan of Care Follow Up Visit    Patient: Isael Salmon  MRN: [id number]  Date of Service: 06/09/2020  Completed by: Lilly Machuca RN  Referral Date:   Program:     Reason for Visit   Patient presents with    OPCM RN First Follow-Up Attempt     6/9/20    OPCM RN Second Follow-Up Attempt     6/11/20     Brief Summary:   Chart reviewed. Pt reports she is doing "much better". Pt reports she had an appt with PCP- Dr. Montoya on 6/2/20 that went well. Pt reports she discussed aldactone 25mg daily with PCP and is no longer taking the medication  Pt reports she is back on Linzess now and denies watery stools. Pt reports she completed HH and denies the needs for anyone to come out to the home to assess or assist her. Pt reports she is back at work as needed which was her schedule prior to COVID19. Pt denies cough, fever or sob. Pt stated "I feel a lot better, I think I am over it completely". Pt reports her energy and appetite are back to baseline and she has resumed going to visit her daughter now that they have all recovered from COVID. Pt reports daughter also had COVID at the same time as pt and they recovered together at her daughters away from other family members. Pt reports she uses her face mask, when out of the house and maintains social distancing. Pt denies any new issues or concerns at this time.     Education on COVID 19 provided to patient during call. Instructed patient to call [facility] first if experiencing fever greater than 100.4, coughing and SOB. Provided patient [facility] phone number [phone] as well as [facility] COVID 19 hotline [phone], verbalized understanding. Informed patient that they may contact their PCP for further guidance as well. Reviewed with patient precautions to take to help prevent the spread of this respiratory virus: Wash hands often " (for 20 seconds singing Happy Birthday), cover your cough or sneeze into your elbow or a tissue, stay away from people who are sick, don't touch your eyes, nose or mouth with unwashed hands. Provided Instruction to stay home as directed by local government officials and only make trips that are of essential needs (Ex:grocery, pharmacy, medical appointments). Reviewed with patient supplies to have on hand while staying at home (food, water, medications, medical supplies and other essentials).    Patient Summary     Involvement of Care:  Do I have permission to speak with other family members about your care?   No    Patient Reported Labs & Vitals:  1.  Any Patient Reported Labs & Vitals?     2.  Patient Reported Blood Pressure:     3.  Patient Reported Pulse:     4.  Patient Reported Weight (Kg):     5.  Patient Reported Blood Glucose (mg/dl):       Medical History:  Reviewed medical history with patient and/or caregiver    Social History:  Reviewed social history with patient and/or caregiver    Clinical Assessment     Reviewed and provided basic information on available community resources for mental health, transportation, wellness resources, and palliative care programs with patient and/or caregiver.    Complex Care Plan     Care plan was discussed and completed today with input from patient and/or caregiver.      Patient Instructions     Instructions were provided via the [facility] patient resources and are available for the patient to view on the patient portal.    Next Steps:   Covid education  New issues or concerns  Discuss case closure    Follow up in about 9 days (around 6/18/2020) for RN Follow.      Todays OPCM Self-Management Care Plan was developed with the patients/caregivers input and was based on identified barriers from todays assessment.  Goals were written today with the patient/caregiver and the patient has agreed to work towards these goals to improve his/her overall well-being. Patient  verbalized understanding of the care plan, goals, and all of today's instructions. Encouraged patient/caregiver to communicate with his/her physician and health care team about health conditions and the treatment plan.  Provided my contact information today and encouraged patient/caregiver to call me with any questions as needed.

## 2020-06-19 ENCOUNTER — PES CALL (OUTPATIENT)
Dept: ADMINISTRATIVE | Facility: CLINIC | Age: 75
End: 2020-06-19

## 2020-06-22 ENCOUNTER — OUTPATIENT CASE MANAGEMENT (OUTPATIENT)
Dept: ADMINISTRATIVE | Facility: OTHER | Age: 75
End: 2020-06-22

## 2020-06-22 NOTE — PROGRESS NOTES
Outpatient Care Management  COVID-19 Patient Assessment    Patient: Isael Salmon  MRN: [id number]  Date of Service: 06/22/2020  Completed by: Lilly Machuca RN  Program: COVID-19    Reason for Visit   Patient presents with    Update Plan Of Care     6/22/20    Case Closure     6/22/20     Brief Summary:     Chart reviewed. Pt reports she is doing fine and has her energy and appetite back. Pt denies any new issues. Discussed red flags of covid and wast to prevent spreading or acquiring covid, teach back method used. Reviewed upcoming appts with pt. 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 Pt denies any new issues. Discussed case closure with pt, pt in agreement. Caregiver/patient has contact information for OPCM in the event that needs develop as well as [facility] contact information. Will close case at this time. JOSTIN Machuca RN-OPCM       Assessment Documentation     COVID-19 Assessment    Nurse Assessment via Chart Review:  Nurse Assessment with Patient:  For Fever:  For Coughing:  For Difficulty Breathing:  Psycho/Social Assessment:         Problem List and History     Patient Active Problem List   Diagnosis    Essential hypertension    Hyperlipidemia    Gout, arthritis    Gastroesophageal reflux disease without esophagitis    History of transient ischemic attack (TIA)    Fibrocystic breast disease in female    Postmenopausal status    Osteoarthritis    Adjustment disorder    Asymptomatic varicose veins of both lower extremities    PVD (peripheral vascular disease)    History of blood transfusion    Hypercalcemia    Rectocele    Mixed incontinence urge and stress (male)(female)    Urethral hypermobility    Chronic constipation    Chronic cough    Nuclear sclerosis    AR (allergic rhinitis)    Recurrent UTI    Kidney stones    History of colon polyps    Idiopathic chronic gout of multiple sites without tophus    Primary osteoarthritis involving  multiple joints    Traumatic closed displaced mallet fracture    Mallet finger    Mild chronic anemia    Post concussive syndrome    Iron deficiency anemia    Amaurosis fugax    Acute right-sided low back pain with sciatica    Chronic pain    Bilateral carotid artery disease    Vitreous detachment of both eyes    Refractive error    Senile nuclear sclerosis    Chronic post-traumatic headache, not intractable    Pseudophakia    Dry eye syndrome of both eyes    Calcified granuloma of lung    Aortic atherosclerosis    COPD (chronic obstructive pulmonary disease)    COVID-19 virus infection    Type 2 diabetes mellitus without complication, without long-term current use of insulin    Mild episode of recurrent major depressive disorder    Debility    Acute hypoxemic respiratory failure    Mood disorder       Reviewed Active Problem List with patient and/or Caregiver. The following were identified as areas of need: [id number]    Medical History:  Reviewed medical history with patient and/or caregiver    Social History:  Reviewed social history with patient and/or caregiver    Complex Care Plan    Care plan was discussed and completed today with input from patient and/or caregiver.    Patient Instructions     Instructions were provided via the [facility] patient resources and are available for the patient to view on the patient portal, if active.    Next steps: Case closure    No follow-ups on file.    Todays OPCM Self-Management Care Plan was developed with the patients/caregivers input and was based on identified barriers from todays assessment.  Goals were written today with the patient/caregiver and the patient has agreed to work towards these goals to improve his/her overall well-being. Patient verbalized understanding of the care plan, goals, and all of today's instructions. Encouraged patient/caregiver to communicate with his/her physician and health care team about health conditions and the  treatment plan.  Provided my contact information today and encouraged patient/caregiver to call me with any questions as needed.

## 2020-07-12 RX ORDER — ERGOCALCIFEROL 1.25 MG/1
CAPSULE ORAL
Qty: 24 CAPSULE | Refills: 5 | Status: SHIPPED | OUTPATIENT
Start: 2020-07-12 | End: 2021-12-20 | Stop reason: SDUPTHER

## 2020-07-12 NOTE — PROGRESS NOTES
Refill Routing Note     Medication(s) are not appropriate for processing by [facility]:    Medication Outside of Protocol    Appointments  past 12m or future 3m with PCP    Date Provider   Last Visit   6/2/2020 Janett Montoya MD   Next Visit   10/6/2020 Janett Montoya MD           Automatic Epic Protocol Generated Data:    Requested Prescriptions   Pending Prescriptions Disp Refills    ergocalciferol (ERGOCALCIFEROL) 50,000 unit Cap [Pharmacy Med Name: VITAMIN D2 50,000IU (ERGO) CAP RX] 24 capsule 0     Sig: TAKE 1 CAPSULE BY MOUTH 2 TIMES A WEEK       Endocrinology:  Vitamins - Vitamin D Supplementation Failed - 7/10/2020  7:46 PM        Failed - Hypercalcemia is not present on problem list        Failed - Vitamin D is 20 or above and within 360 days     Vit D, 25-Hydroxy   Date Value Ref Range Status   04/08/2019 45 30 - 96 ng/mL Final     Comment:     Vitamin D deficiency.........<10 ng/mL                              Vitamin D insufficiency......10-29 ng/mL       Vitamin D sufficiency........> or equal to 30 ng/mL  Vitamin D toxicity............>100 ng/mL     09/17/2018 15 (L) 30 - 96 ng/mL Final     Comment:     Vitamin D deficiency.........<10 ng/mL                              Vitamin D insufficiency......10-29 ng/mL       Vitamin D sufficiency........> or equal to 30 ng/mL  Vitamin D toxicity............>100 ng/mL     09/05/2014 30 30 - 96 ng/mL Final     Comment:     Vitamin D deficiency.........<10 ng/mL                              Vitamin D insufficiency......10-29 ng/mL       Vitamin D sufficiency........> or equal to 30 ng/mL  Vitamin D toxicity............>100 ng/mL                Passed - Patient is at least 18 years old        Passed - Office visit in past 12 months or future 90 days.     Recent Outpatient Visits            1 month ago Vitamin B12 deficiency    [facility] - Internal Medicine Janett Montoya MD    2 months ago COVID-19 virus infection    [facility] Internal Medicine Janett Montoya MD    2 months ago COVID-19 virus infection    [facility] Internal [facility] Janett Montoya MD    2 months ago COVID-19 virus infection    [facility] Internal [facility] Janett Montoya MD    3 months ago COVID-19 virus infection    [facility] Internal [facility] Janett Montoya MD          Future Appointments              In 3 weeks ANNE Huff [facility] Internal Medicine, [facility] PC[facility]    In 2 months [facility], [facility] Lab-[address], [facility]    In 2 months MD [facility] Internal Medicine, [facility]                Passed - Ca in normal range and within 360 days     Calcium   Date Value Ref Range Status   04/02/2020 9.7 8.7 - 10.5 mg/dL Final   04/01/2020 9.2 8.7 - 10.5 mg/dL Final   03/31/2020 9.0 8.7 - 10.5 mg/dL Final                    Note composed:9:31 PM 07/11/2020

## 2020-07-15 DIAGNOSIS — Z71.89 COMPLEX CARE COORDINATION: ICD-10-CM

## 2020-07-25 NOTE — TELEPHONE ENCOUNTER
Care Due:                  Date            Visit Type   Department     Provider  --------------------------------------------------------------------------------                                ESTABLISHED   [facility] INTERNAL  Last Visit: 06-      PATIENT      MEDICINE       MITUL MARSHALL                              ESTABLISHED   [facility] INTERNAL  Next Visit: 10-      PATIENT      MEDICINE       MITUL MARSHLAL                                                            Last  Test          Frequency    Reason                     Performed    Due Date  --------------------------------------------------------------------------------    Cr..........  6 months...  spironolactone...........  04- 09-    HDL.........  12 months..  rosuvastatin.............  09- 09-    K...........  6 months...  spironolactone...........  04- 09-    LDL.........  12 months..  rosuvastatin.............  09- 09-    Na..........  6 months...  spironolactone...........  04- 09-    Total         12 months..  rosuvastatin.............  09- 09-  Cholesterol.    Triglyceride  12 months..  rosuvastatin.............  09- 09-  s...........    Uric Acid...  12 months..  allopurinol..............  07- 07-    Powered by [employer]. Reference number: [id number]. 7/25/2020 5:29:13 PM CDT

## 2020-07-27 RX ORDER — ROSUVASTATIN CALCIUM 20 MG/1
TABLET, COATED ORAL
Qty: 90 TABLET | Refills: 3 | Status: SHIPPED | OUTPATIENT
Start: 2020-07-27 | End: 2021-05-18 | Stop reason: SDUPTHER

## 2020-07-28 NOTE — PROGRESS NOTES
Refill Authorization Note    is requesting a refill authorization.    Brief assessment and rationale for refill: DEFER: DDI      Medication-related problems identified: Drug-disease interaction    Medication Therapy Plan: CDMR. FLOS(10/20); Your patient has diagnoses of Post concussive syndrome; Traumatic closed displaced mallet fracture, which are related to major traumatic injury. Patients with major traumatic injury should be carefully evaluated before initiating therapy and monitored closely while taking ROSUVASTATIN.; please advise; defr     Medication reconciliation completed: No                         Comments:          Requested Prescriptions   Pending Prescriptions Disp Refills    rosuvastatin (CRESTOR) 20 MG tablet [Pharmacy Med Name: ROSUVASTATIN 20MG TABLETS] 90 tablet 0     Sig: TAKE 1 TABLET BY MOUTH EVERY DAY       Cardiovascular:  Antilipid - Statins Failed - 7/27/2020  7:49 PM        Failed - Lipid Panel completed in last 360 days     Lab Results   Component Value Date    CHOL 145 09/17/2018    HDL 58 09/17/2018    LDLCALC 71.0 09/17/2018    TRIG 80 09/17/2018             Passed - Patient is at least 18 years old        Passed - Office visit in past 12 months or future 90 days.     Recent Outpatient Visits            1 month ago Vitamin B12 deficiency    [facility] - Internal Medicine Janett Montoya MD    2 months ago COVID-19 virus infection    [facility] Internal Medicine Janett Montoya MD    3 months ago COVID-19 virus infection    [facility] Medicine Janett Montoya MD    3 months ago COVID-19 virus infection    [facility] Internal Medicine Janett Montoya MD    3 months ago COVID-19 virus infection    [facility] Internal Medicine Janett Montoya MD          Future Appointments              In 1 week ANNE Huff [facility] - Internal Medicine, [facility]    In 2 months LAB, [facility] Lab-[address], [facility]    In 2 months Janett HOLGUIN  MD [facility] - Internal Medicine, [facility]                Passed - ALT is 94 or below and within 360 days     ALT   Date Value Ref Range Status   04/02/2020 17 10 - 44 U/L Final   04/01/2020 14 10 - 44 U/L Final   03/31/2020 17 10 - 44 U/L Final              Passed - AST is 54 or below and within 360 days     AST   Date Value Ref Range Status   04/02/2020 28 10 - 40 U/L Final   04/01/2020 26 10 - 40 U/L Final   03/31/2020 30 10 - 40 U/L Final                  Appointments  past 12m or future 3m with PCP    Date Provider   Last Visit   6/2/2020 Janett Montoya MD   Next Visit   10/6/2020 Janett Montoya MD   ED visits in past 90 days: 0     Note composed:7:50 PM 07/27/2020

## 2020-08-04 ENCOUNTER — OFFICE VISIT (OUTPATIENT)
Dept: INTERNAL MEDICINE | Facility: CLINIC | Age: 75
End: 2020-08-04
Payer: MEDICARE

## 2020-08-04 VITALS
OXYGEN SATURATION: 99 % | HEART RATE: 71 BPM | HEIGHT: 65 IN | WEIGHT: 165.13 LBS | DIASTOLIC BLOOD PRESSURE: 76 MMHG | SYSTOLIC BLOOD PRESSURE: 126 MMHG | BODY MASS INDEX: 27.51 KG/M2

## 2020-08-04 DIAGNOSIS — F39 MOOD DISORDER: ICD-10-CM

## 2020-08-04 DIAGNOSIS — Z86.16 HISTORY OF 2019 NOVEL CORONAVIRUS DISEASE (COVID-19): ICD-10-CM

## 2020-08-04 DIAGNOSIS — I73.9 PVD (PERIPHERAL VASCULAR DISEASE): ICD-10-CM

## 2020-08-04 DIAGNOSIS — I70.0 AORTIC ATHEROSCLEROSIS: ICD-10-CM

## 2020-08-04 DIAGNOSIS — M15.9 PRIMARY OSTEOARTHRITIS INVOLVING MULTIPLE JOINTS: ICD-10-CM

## 2020-08-04 DIAGNOSIS — I10 ESSENTIAL HYPERTENSION: ICD-10-CM

## 2020-08-04 DIAGNOSIS — D50.8 OTHER IRON DEFICIENCY ANEMIA: ICD-10-CM

## 2020-08-04 DIAGNOSIS — E78.5 HYPERLIPIDEMIA, UNSPECIFIED HYPERLIPIDEMIA TYPE: ICD-10-CM

## 2020-08-04 DIAGNOSIS — Z86.73 HISTORY OF TRANSIENT ISCHEMIC ATTACK (TIA): ICD-10-CM

## 2020-08-04 DIAGNOSIS — J84.10 CALCIFIED GRANULOMA OF LUNG: ICD-10-CM

## 2020-08-04 DIAGNOSIS — M10.9 GOUT, ARTHRITIS: ICD-10-CM

## 2020-08-04 DIAGNOSIS — E11.9 TYPE 2 DIABETES MELLITUS WITHOUT COMPLICATION, WITHOUT LONG-TERM CURRENT USE OF INSULIN: ICD-10-CM

## 2020-08-04 DIAGNOSIS — I77.9 BILATERAL CAROTID ARTERY DISEASE, UNSPECIFIED TYPE: ICD-10-CM

## 2020-08-04 DIAGNOSIS — N39.46 MIXED INCONTINENCE URGE AND STRESS (MALE)(FEMALE): ICD-10-CM

## 2020-08-04 DIAGNOSIS — J44.9 CHRONIC OBSTRUCTIVE PULMONARY DISEASE, UNSPECIFIED COPD TYPE: ICD-10-CM

## 2020-08-04 DIAGNOSIS — I77.819 ECTASIS AORTA: ICD-10-CM

## 2020-08-04 DIAGNOSIS — F33.0 MILD EPISODE OF RECURRENT MAJOR DEPRESSIVE DISORDER: ICD-10-CM

## 2020-08-04 DIAGNOSIS — K21.9 GASTROESOPHAGEAL REFLUX DISEASE WITHOUT ESOPHAGITIS: ICD-10-CM

## 2020-08-04 DIAGNOSIS — Z00.00 ENCOUNTER FOR PREVENTIVE HEALTH EXAMINATION: Primary | ICD-10-CM

## 2020-08-04 PROBLEM — J96.01 ACUTE HYPOXEMIC RESPIRATORY FAILURE: Status: RESOLVED | Noted: 2020-03-31 | Resolved: 2020-08-04

## 2020-08-04 PROCEDURE — G0439 PR MEDICARE ANNUAL WELLNESS SUBSEQUENT VISIT: ICD-10-PCS | Mod: S$GLB,,, | Performed by: NURSE PRACTITIONER

## 2020-08-04 PROCEDURE — 99215 OFFICE O/P EST HI 40 MIN: CPT | Mod: PBBFAC | Performed by: NURSE PRACTITIONER

## 2020-08-04 PROCEDURE — 99999 PR PBB SHADOW E&M-EST. PATIENT-LVL V: ICD-10-PCS | Mod: PBBFAC,,, | Performed by: NURSE PRACTITIONER

## 2020-08-04 PROCEDURE — 99999 PR PBB SHADOW E&M-EST. PATIENT-LVL V: CPT | Mod: PBBFAC,,, | Performed by: NURSE PRACTITIONER

## 2020-08-04 PROCEDURE — G0439 PPPS, SUBSEQ VISIT: HCPCS | Mod: S$GLB,,, | Performed by: NURSE PRACTITIONER

## 2020-08-04 NOTE — PATIENT INSTRUCTIONS
Counseling and Referral of Other Preventative  (Italic type indicates deductible and co-insurance are waived)    Patient Name: Isael Salmon  Today's Date: 8/4/2020    Health Maintenance       Date Due Completion Date    Shingles Vaccine (2 of 3) 05/28/2008 4/2/2008    DEXA SCAN 10/28/2017 10/28/2014    Override on 7/14/2011: Done    Lipid Panel 09/17/2019 9/17/2018    Influenza Vaccine (1) 09/01/2020 2/18/2020    Hemoglobin A1c 09/30/2020 3/31/2020    Eye Exam 01/14/2021 1/14/2020    Colonoscopy 05/26/2021 5/26/2016    Foot Exam 08/04/2021 8/4/2020 (Done)    Override on 8/4/2020: Done    TETANUS VACCINE 10/14/2029 10/14/2019        No orders of the defined types were placed in this encounter.    The following information is provided to all patients.  This information is to help you find resources for any of the problems found today that may be affecting your health:                Living healthy guide: www.[facility].[place].gov      Understanding Diabetes: www.diabetes.org      Eating healthy: www.cdc.gov/healthyweight      CDC home safety checklist: www.cdc.gov/steadi/patient.html      Agency on Aging: www.goea.[place].gov      Alcoholics anonymous (AA): www.aa.org      Physical Activity: www.perry.nih.gov/cm8ohde      Tobacco use: www.quitwithusla.org

## 2020-08-04 NOTE — PROGRESS NOTES
"Isael Salmon presented for a  Medicare AWV and comprehensive Health Risk Assessment today. The following components were reviewed and updated:    · Medical history  · Family History  · Social history  · Allergies and Current Medications  · Health Risk Assessment  · Health Maintenance  · Care Team     ** See Completed Assessments for Annual Wellness Visit within the encounter summary.**         The following assessments were completed:  · Living Situation  · CAGE  · Depression Screening  · Timed Get Up and Go  · Whisper Test  Cognitive Function Screening    ·   · Nutrition Screening  · ADL Screening  · PAQ Screening        Vitals:    08/04/20 1417   BP: 126/76   Pulse: 71   SpO2: 99%   Weight: 74.9 kg (165 lb 2 oz)   Height: 5' 5" (1.651 m)     Body mass index is 27.48 kg/m².  Physical Exam  Vitals signs and nursing note reviewed.   Constitutional:       General: She is not in acute distress.     Appearance: Normal appearance. She is well-developed. She is not ill-appearing, toxic-appearing or diaphoretic.   HENT:      Head: Normocephalic and atraumatic.      Nose: Nose normal.   Eyes:      Conjunctiva/sclera: Conjunctivae normal.   Cardiovascular:      Rate and Rhythm: Normal rate and regular rhythm.      Pulses:           Dorsalis pedis pulses are 2+ on the right side and 2+ on the left side.      Heart sounds: Normal heart sounds. No murmur.   Pulmonary:      Effort: Pulmonary effort is normal. No respiratory distress.      Breath sounds: Normal breath sounds. No wheezing or rales.   Musculoskeletal: Normal range of motion.      Right lower leg: No edema.      Left lower leg: No edema.      Right foot: Normal range of motion.      Left foot: Normal range of motion.   Feet:      Right foot:      Protective Sensation: 7 sites tested. 7 sites sensed.      Skin integrity: Callus and dry skin present. No ulcer, blister, skin breakdown, erythema or warmth.      Left foot:      Protective Sensation: 7 sites " tested. 7 sites sensed.      Skin integrity: Callus and dry skin present. No ulcer, blister, skin breakdown, erythema or warmth.   Skin:     General: Skin is warm and dry.   Neurological:      Mental Status: She is alert and oriented to person, place, and time.      Motor: No weakness.      Gait: Gait normal.   Psychiatric:         Behavior: Behavior normal.         Thought Content: Thought content normal.         Judgment: Judgment normal.               Diagnoses and health risks identified today and associated recommendations/orders:    1. Encounter for preventive health examination  Assessment performed. Health maintenance updated. Chart review completed.  Foot exam today.  Shingles and tetanus vaccine today.  Ms Kirkland reports that on Saturday she fell out of bed. She recalls being wrapped up in her blankets and falling out of bed. She reports hitting her head on the dresser. She does not endorse LOC.  was present. She does endorse headache although not severe. She did not seek care after injury. She is able to speak in full sentences, good balance, has no cognitive impairment today. Mini cog 5/5. Here with . No changes that he has noticed. No acute neurological changes. Able to completed wellness visit without difficulty.  Discussed the importance of seeking immediate care should this happen again.   She is currently on baby aspirin.  She has a dental exam in the morning she would like to schedule afterwards.  Scheduled same day visit for tomorrow.    2. Chronic obstructive pulmonary disease, unspecified COPD type  Chronic. Stable followed by PCP  Evaluated by Pulmonology 2015    3. 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 Chronic. Stable. Followed by PCP    4. Mild episode of recurrent major depressive disorder  Chronic. Stable. Followed by PCP    5. Mood disorder  Chronic. Stable. Followed by PCP    6. Calcified granuloma of lung  Noted on imaging. Chronic. Stable followed by  PCP  Evaluated by Pulmonology 2015    7. Aortic atherosclerosis  Noted on imaging. Chronic. Stable. Followed by PCP.    8. Bilateral carotid artery disease, unspecified type  Chronic. Continue blood pressure control and statin therapy. Followed by PCP    9. Essential hypertension  Chronic. Stable with current regimen. Followed by PCP    10. Hyperlipidemia, unspecified hyperlipidemia type  Chronic. Stable with current regimen. Followed by PCP    11. PVD (peripheral vascular disease)  Chronic. Continue blood pressure control and statin therapy. Followed by PCP    12. Type 2 diabetes mellitus without complication, without long-term current use of insulin  Chronic. Stable. Followed by PCP    13. Gastroesophageal reflux disease without esophagitis  Chronic. Stable. Followed by PCP    14. Gout, arthritis  Chronic. Stable. Followed by PCP  Continue current regimen.    15. Primary osteoarthritis involving multiple joints  Chronic. Stable. Followed by Orthopedics    16. History of 2019 novel coronavirus disease (COVID-19)  Noted. Hospitalized in March. Sent home on O2. Recovered.  Doing well. Functions independently without O2    17. Other iron deficiency anemia  Chronic. Continue current regimen as instructed. Followed by Hematology Oncology.    18. History of transient ischemic attack (TIA)  Noted.    19. Mixed incontinence urge and stress (male)(female)  Chronic. Stable. Followed by Urology    Provided Isael with a 5-10 year written screening schedule and personal prevention plan. Recommendations were developed using the USPSTF age appropriate recommendations. Education, counseling, and referrals were provided as needed. After Visit Summary printed and given to patient which includes a list of additional screenings\tests needed.    Follow up for Annual Wellness Visit in 1 year, F/U with PCP as instructed, ;sooner if problems.    ANNE Brunner      I offered to discuss end of life issues, including information on  how to make advance directives that the patient could use to name someone who would make medical decisions on their behalf if they became too ill to make themselves.    ___Patient declined  _X_Patient is interested, I provided paper work and offered to discuss.

## 2020-08-04 NOTE — Clinical Note
Ms Kirkland reports that on Saturday she fell out of bed. She recalls being wrapped up in her blankets and falling out of bed. She reports hitting her head on the dresser. She does not endorse LOC.  was present. She does endorse headache although not severe. She did not seek care after injury. She is able to speak in full sentences, good balance, has no cognitive impairment today. Mini cog 5/5. Here with . No changes that he has noticed. No acute neurological changes. Able to completed wellness visit without difficulty.  Discussed the importance of seeking immediate care should this happen again.   She is currently on baby aspirin.  Scheduled same day visit for tomorrow.

## 2020-08-05 ENCOUNTER — OFFICE VISIT (OUTPATIENT)
Dept: INTERNAL MEDICINE | Facility: CLINIC | Age: 75
End: 2020-08-05
Payer: MEDICARE

## 2020-08-05 VITALS
BODY MASS INDEX: 27.44 KG/M2 | HEART RATE: 72 BPM | SYSTOLIC BLOOD PRESSURE: 110 MMHG | WEIGHT: 164.69 LBS | DIASTOLIC BLOOD PRESSURE: 68 MMHG | HEIGHT: 65 IN

## 2020-08-05 DIAGNOSIS — S09.90XA TRAUMATIC INJURY OF HEAD, INITIAL ENCOUNTER: Primary | ICD-10-CM

## 2020-08-05 DIAGNOSIS — S09.90XA HEAD TRAUMA, INITIAL ENCOUNTER: ICD-10-CM

## 2020-08-05 PROCEDURE — 99215 OFFICE O/P EST HI 40 MIN: CPT | Mod: PBBFAC | Performed by: INTERNAL MEDICINE

## 2020-08-05 PROCEDURE — 99214 OFFICE O/P EST MOD 30 MIN: CPT | Mod: S$PBB,,, | Performed by: INTERNAL MEDICINE

## 2020-08-05 PROCEDURE — 99999 PR PBB SHADOW E&M-EST. PATIENT-LVL V: ICD-10-PCS | Mod: PBBFAC,,, | Performed by: INTERNAL MEDICINE

## 2020-08-05 PROCEDURE — 99214 PR OFFICE/OUTPT VISIT, EST, LEVL IV, 30-39 MIN: ICD-10-PCS | Mod: S$PBB,,, | Performed by: INTERNAL MEDICINE

## 2020-08-05 PROCEDURE — 99999 PR PBB SHADOW E&M-EST. PATIENT-LVL V: CPT | Mod: PBBFAC,,, | Performed by: INTERNAL MEDICINE

## 2020-08-05 NOTE — PATIENT INSTRUCTIONS
Understanding Traumatic Brain Injury (TBI)  Breathing, blood flow, and movement are all controlled by the brain. The brain also allows you to think, handle emotions, and make judgments. TBI can be due to a closed head injury or a penetrating injury. The most common causes are falls, blunt accidental trauma, motor vehicle accidents and assault. After an injury, certain parts of the brain (or the links between these parts) may not be working optimally. Some mental or physical skills may be altered. The altered function may be short- or long-term. The full effects of a brain injury may not appear for months or even years and typically depends on the severity and the number of brain injuries as well as the time in between these injuries. Long-term effects, initially not present, include post-traumatic stress disorder and cognitive decline (early dementia). TBI can range from mild concussions to fatal injuries.  How injury happens     The brain rebounds  from the impact. As a result, the brain may hit the opposite side of the skull or twist on the brain stem.        The brain strikes the skull. This may happen if the head hits a hard surface or if a person is severely shaken or jerked.   The skull does not have to be harmed for the brain to be injured (this is called a closed head injury). Injury can occur when the brain strikes the skull. In many cases, the brain rebounds from the first impact and hits the opposite side of the skull. Sometimes the brain twists on the brain stem.  Types of damage  When the brain strikes the skull or twists on the brain stem, brain tissue tears. This injury may then cause a second type of damage, such as bleeding or swelling in the brain. Health care providers try to control the second type of damage to help limit long-term problems.       Tearing  If nerve fibers in the brain tissue tear, signals cant pass between the brain and body. Lost signals mean altered skills or body functions.  Bleeding  A torn blood vessel may leak into nearby tissue. This kills brain cells and can lead to a buildup of blood (hematoma). If this blood presses on the brain, it can cut off blood to other cells. These cells also die. Swelling  The brain has almost no room to expand inside the skull. If the brain swells, it may press against the skull. As the pressure increases, the brain functions may be altered.   Date Last Reviewed: 10/19/2015  © 7628-0426 [employer]. [address] [place] [address], [place] [address]. All rights reserved. This information is not intended as a substitute for professional medical care. Always follow your healthcare professional's instructions.        Discharge Instructions for Concussion  You have been diagnosed with a concussion, a type of brain injury caused by a sudden impact to your head. It can also be caused by sudden movement of your brain inside your head, such as from forceful shaking. Some concussions are mild. Most people recover completely from mild concussions. But recovery may take days, weeks, or months. For some, symptoms may last even longer. Early care and monitoring are important to prevent long-term complications.  Home care  Do's and don'ts:   · Ask a friend or family member to stay with you for a few days. You should not be alone until you know how the injury has affected you.  · Tell your caregiver to wake you every 2 to 3 hours during the first night. Your caregiver should call 911 if he or she cant wake you, or if you are confused.  · Dont take any medicine--not even aspirin--unless your healthcare provider says it's OK. If you have a headache, try placing a cold, damp cloth on your forehead.  · Eat light. Clear liquids, such as broth or gelatin, are a good choice.  · Don't drink alcohol or use any recreational drugs.   · Don't return to sports or any activity that could cause you to hit your head until all symptoms are gone and you have been cleared by your  doctor. A second head injury before full recovery from the first one can lead to serious brain injury.  · Avoid activities that require a lot of concentration or attention. This will allow your brain to rest and heal more quickly.  The best way to recover is to discuss symptoms with your healthcare provider and your family. Work closely with your healthcare provider and give your brain time to heal.  Follow-up care  Follow up with your healthcare provider, or as advised.     When to call your healthcare provider  Your caregiver should call 911 right away if you have fallen asleep, cannot be awakened, or you are confused.  Otherwise, call your healthcare provider right away if any of these occur:  · Vomiting  · Clear or bloody drainage from your nose or ear  · Constant drowsiness or trouble waking up  · Confusion or memory loss  · Blurred vision  · Trouble walking, talking, or concentrating  · Increased weakness or problems with coordination  · Constant headache that cant be relieved or gets worse  · Changes in behavior or personality   Date Last Reviewed: 11/5/2015  © 4468-8678 [employer]. [address] [place] [address], [place] [address]. All rights reserved. This information is not intended as a substitute for professional medical care. Always follow your healthcare professional's instructions.

## 2020-08-05 NOTE — PROGRESS NOTES
"Isael Salmon presents today to urgent care for:  Fall (Saturday hit side of R side of head/ headache since) and Headache      Fall  The accident occurred 2 days ago. The fall occurred from a bed. She fell from a height of 3 to 5 ft. She landed on carpet. There was no blood loss. The point of impact was the head. The pain is present in the head. The pain is moderate. Exacerbated by: nothing. Associated symptoms include headaches and a visual change (she is seeing opthalmology today. ). Pertinent negatives include no bowel incontinence, hearing loss, loss of consciousness, nausea, numbness, tingling or vomiting. She has tried acetaminophen for the symptoms. The treatment provided mild relief.   Headache   This is a new problem. Episode onset: 2 days ago after fall from bed.  Pain location: right side behind right ear. The pain does not radiate. The quality of the pain is described as aching and dull. Associated symptoms include scalp tenderness (behind right ear) and a visual change (she is seeing opthalmology today. ). Pertinent negatives include no abnormal behavior, hearing loss, insomnia, loss of balance (she is "slightly dizzy"), nausea, numbness, tingling or vomiting. Nothing aggravates the symptoms. She has tried acetaminophen for the symptoms. The treatment provided mild relief.       Past medical, social, family and surgical history was reviewed and updated today as needed. See encounter for details.     Review of Systems   HENT: Negative for hearing loss.    Gastrointestinal: Negative for bowel incontinence, nausea and vomiting.   Neurological: Positive for headaches. Negative for tingling, loss of consciousness, numbness and loss of balance (she is "slightly dizzy").   Psychiatric/Behavioral: The patient does not have insomnia.        Vitals:    08/05/20 1118   BP: 110/68   Pulse: 72   Body mass index is 27.4 kg/m².   Physical Exam  Constitutional:       Appearance: Normal appearance.   HENT:      " Head: Normocephalic.        Right Ear: Tympanic membrane normal.      Left Ear: Tympanic membrane normal.   Neurological:      Mental Status: She is alert and oriented to person, place, and time. Mental status is at baseline.      Motor: Motor function is intact.      Coordination: Coordination is intact.      Gait: Gait is intact.      Comments: MMSE done yesterday was normal.           Assessment/plan:   1. Traumatic injury of head, initial encounter  Likely concussion syndrome. Needs eval with CT to r/o subdural.     2. Head trauma, initial encounter  - CT Head Without Contrast; Future    Pt. Educational info provided regarding concussion syndrome.  This was a 25 minute visit.  Greater than 50% of today's visit was time spent on counseling and coordination of care.

## 2020-08-07 ENCOUNTER — HOSPITAL ENCOUNTER (OUTPATIENT)
Dept: RADIOLOGY | Facility: HOSPITAL | Age: 75
Discharge: HOME | End: 2020-08-07
Attending: INTERNAL MEDICINE
Payer: MEDICARE

## 2020-08-07 DIAGNOSIS — S09.90XA HEAD TRAUMA, INITIAL ENCOUNTER: ICD-10-CM

## 2020-08-07 PROCEDURE — 70450 CT HEAD/BRAIN W/O DYE: CPT | Mod: TC

## 2020-08-07 PROCEDURE — 70450 CT HEAD/BRAIN W/O DYE: CPT | Mod: 26,,, | Performed by: RADIOLOGY

## 2020-08-07 PROCEDURE — 70450 CT HEAD WITHOUT CONTRAST: ICD-10-PCS | Mod: 26,,, | Performed by: RADIOLOGY

## 2020-08-25 ENCOUNTER — TELEPHONE (OUTPATIENT)
Dept: ENDOSCOPY | Facility: HOSPITAL | Age: 75
End: 2020-08-25

## 2020-09-19 DIAGNOSIS — K59.00 CONSTIPATION, UNSPECIFIED CONSTIPATION TYPE: ICD-10-CM

## 2020-09-21 RX ORDER — LINACLOTIDE 145 UG/1
CAPSULE, GELATIN COATED ORAL
Qty: 30 CAPSULE | Refills: 3 | Status: SHIPPED | OUTPATIENT
Start: 2020-09-21 | End: 2020-10-09

## 2020-09-21 NOTE — PROGRESS NOTES
Refill Routing Note   Medication(s) are not appropriate for processing by [facility]:       - Outside of protocol           Medication reconciliation completed: No      Automatic Epic Generated Protocol Data:        Requested Prescriptions   Pending Prescriptions Disp Refills    LINZESS 145 mcg Cap capsule [Pharmacy Med Name: LINZESS 145MCG CAPSULES] 30 capsule 3     Sig: TAKE 1 CAPSULE(145 MCG) BY MOUTH EVERY DAY       There is no refill protocol information for this order           Appointments  past 12m or future 3m with PCP    Date Provider   Last Visit   6/2/2020 Janett Montoya MD   Next Visit   10/6/2020 Janett Montoya MD   ED visits in past 90 days: 0     Note composed:2:27 PM 09/21/2020

## 2020-09-29 ENCOUNTER — PATIENT MESSAGE (OUTPATIENT)
Dept: OTHER | Facility: OTHER | Age: 75
End: 2020-09-29

## 2020-10-02 ENCOUNTER — LAB VISIT (OUTPATIENT)
Dept: LAB | Facility: OTHER | Age: 75
End: 2020-10-02
Attending: INTERNAL MEDICINE
Payer: MEDICARE

## 2020-10-02 DIAGNOSIS — E53.8 VITAMIN B12 DEFICIENCY: ICD-10-CM

## 2020-10-02 DIAGNOSIS — E55.9 VITAMIN D DEFICIENCY DISEASE: ICD-10-CM

## 2020-10-02 DIAGNOSIS — I10 ESSENTIAL HYPERTENSION: ICD-10-CM

## 2020-10-02 LAB
25(OH)D3+25(OH)D2 SERPL-MCNC: 46 NG/ML (ref 30–96)
ALBUMIN SERPL BCP-MCNC: 3.9 G/DL (ref 3.5–5.2)
ALP SERPL-CCNC: 78 U/L (ref 55–135)
ALT SERPL W/O P-5'-P-CCNC: 14 U/L (ref 10–44)
ANION GAP SERPL CALC-SCNC: 10 MMOL/L (ref 8–16)
AST SERPL-CCNC: 19 U/L (ref 10–40)
BASOPHILS # BLD AUTO: 0.03 K/UL (ref 0–0.2)
BASOPHILS NFR BLD: 0.6 % (ref 0–1.9)
BILIRUB SERPL-MCNC: 0.8 MG/DL (ref 0.1–1)
BUN SERPL-MCNC: 20 MG/DL (ref 8–23)
CALCIUM SERPL-MCNC: 10.5 MG/DL (ref 8.7–10.5)
CHLORIDE SERPL-SCNC: 105 MMOL/L (ref 95–110)
CHOLEST SERPL-MCNC: 144 MG/DL (ref 120–199)
CHOLEST/HDLC SERPL: 2.5 {RATIO} (ref 2–5)
CO2 SERPL-SCNC: 28 MMOL/L (ref 23–29)
CREAT SERPL-MCNC: 1 MG/DL (ref 0.5–1.4)
DIFFERENTIAL METHOD: ABNORMAL
EOSINOPHIL # BLD AUTO: 0.1 K/UL (ref 0–0.5)
EOSINOPHIL NFR BLD: 2.6 % (ref 0–8)
ERYTHROCYTE [DISTWIDTH] IN BLOOD BY AUTOMATED COUNT: 14.9 % (ref 11.5–14.5)
EST. GFR  (AFRICAN AMERICAN): >60 ML/MIN/1.73 M^2
EST. GFR  (NON AFRICAN AMERICAN): 55 ML/MIN/1.73 M^2
GLUCOSE SERPL-MCNC: 83 MG/DL (ref 70–110)
HCT VFR BLD AUTO: 31.8 % (ref 37–48.5)
HDLC SERPL-MCNC: 57 MG/DL (ref 40–75)
HDLC SERPL: 39.6 % (ref 20–50)
HGB BLD-MCNC: 10 G/DL (ref 12–16)
IMM GRANULOCYTES # BLD AUTO: 0.01 K/UL (ref 0–0.04)
IMM GRANULOCYTES NFR BLD AUTO: 0.2 % (ref 0–0.5)
LDLC SERPL CALC-MCNC: 65.4 MG/DL (ref 63–159)
LYMPHOCYTES # BLD AUTO: 1 K/UL (ref 1–4.8)
LYMPHOCYTES NFR BLD: 19.8 % (ref 18–48)
MCH RBC QN AUTO: 30.3 PG (ref 27–31)
MCHC RBC AUTO-ENTMCNC: 31.4 G/DL (ref 32–36)
MCV RBC AUTO: 96 FL (ref 82–98)
MONOCYTES # BLD AUTO: 0.4 K/UL (ref 0.3–1)
MONOCYTES NFR BLD: 8.5 % (ref 4–15)
NEUTROPHILS # BLD AUTO: 3.4 K/UL (ref 1.8–7.7)
NEUTROPHILS NFR BLD: 68.3 % (ref 38–73)
NONHDLC SERPL-MCNC: 87 MG/DL
NRBC BLD-RTO: 0 /100 WBC
PLATELET # BLD AUTO: 187 K/UL (ref 150–350)
PMV BLD AUTO: 11.6 FL (ref 9.2–12.9)
POTASSIUM SERPL-SCNC: 4 MMOL/L (ref 3.5–5.1)
PROT SERPL-MCNC: 7 G/DL (ref 6–8.4)
RBC # BLD AUTO: 3.3 M/UL (ref 4–5.4)
SODIUM SERPL-SCNC: 143 MMOL/L (ref 136–145)
TRIGL SERPL-MCNC: 108 MG/DL (ref 30–150)
TSH SERPL DL<=0.005 MIU/L-ACNC: 2 UIU/ML (ref 0.4–4)
VIT B12 SERPL-MCNC: 1640 PG/ML (ref 210–950)
WBC # BLD AUTO: 4.94 K/UL (ref 3.9–12.7)

## 2020-10-02 PROCEDURE — 82306 VITAMIN D 25 HYDROXY: CPT

## 2020-10-02 PROCEDURE — 84443 ASSAY THYROID STIM HORMONE: CPT

## 2020-10-02 PROCEDURE — 85025 COMPLETE CBC W/AUTO DIFF WBC: CPT

## 2020-10-02 PROCEDURE — 80053 COMPREHEN METABOLIC PANEL: CPT

## 2020-10-02 PROCEDURE — 82607 VITAMIN B-12: CPT

## 2020-10-02 PROCEDURE — 36415 COLL VENOUS BLD VENIPUNCTURE: CPT

## 2020-10-02 PROCEDURE — 80061 LIPID PANEL: CPT

## 2020-10-06 ENCOUNTER — IMMUNIZATION (OUTPATIENT)
Dept: INTERNAL MEDICINE | Facility: CLINIC | Age: 75
End: 2020-10-06
Payer: MEDICARE

## 2020-10-06 ENCOUNTER — HOSPITAL ENCOUNTER (OUTPATIENT)
Dept: RADIOLOGY | Facility: HOSPITAL | Age: 75
Discharge: HOME | End: 2020-10-06
Attending: INTERNAL MEDICINE
Payer: MEDICARE

## 2020-10-06 ENCOUNTER — OFFICE VISIT (OUTPATIENT)
Dept: INTERNAL MEDICINE | Facility: CLINIC | Age: 75
End: 2020-10-06
Payer: MEDICARE

## 2020-10-06 VITALS
SYSTOLIC BLOOD PRESSURE: 128 MMHG | HEIGHT: 65 IN | HEART RATE: 76 BPM | WEIGHT: 165.56 LBS | DIASTOLIC BLOOD PRESSURE: 60 MMHG | BODY MASS INDEX: 27.58 KG/M2

## 2020-10-06 DIAGNOSIS — J30.1 ALLERGIC RHINITIS DUE TO POLLEN, UNSPECIFIED SEASONALITY: ICD-10-CM

## 2020-10-06 DIAGNOSIS — I10 ESSENTIAL HYPERTENSION: ICD-10-CM

## 2020-10-06 DIAGNOSIS — M10.9 GOUT, ARTHRITIS: ICD-10-CM

## 2020-10-06 DIAGNOSIS — M25.552 LEFT HIP PAIN: ICD-10-CM

## 2020-10-06 DIAGNOSIS — M25.552 LEFT HIP PAIN: Primary | ICD-10-CM

## 2020-10-06 DIAGNOSIS — E78.5 HYPERLIPIDEMIA, UNSPECIFIED HYPERLIPIDEMIA TYPE: ICD-10-CM

## 2020-10-06 DIAGNOSIS — K21.9 GASTROESOPHAGEAL REFLUX DISEASE WITHOUT ESOPHAGITIS: ICD-10-CM

## 2020-10-06 PROBLEM — E11.9 TYPE 2 DIABETES MELLITUS WITHOUT COMPLICATION, WITHOUT LONG-TERM CURRENT USE OF INSULIN: Status: RESOLVED | Noted: 2020-03-30 | Resolved: 2020-10-06

## 2020-10-06 PROCEDURE — 99999 PR PBB SHADOW E&M-EST. PATIENT-LVL I: ICD-10-PCS | Mod: PBBFAC,,,

## 2020-10-06 PROCEDURE — 99999 PR PBB SHADOW E&M-EST. PATIENT-LVL III: CPT | Mod: PBBFAC,,, | Performed by: INTERNAL MEDICINE

## 2020-10-06 PROCEDURE — 99213 OFFICE O/P EST LOW 20 MIN: CPT | Mod: PBBFAC,25 | Performed by: INTERNAL MEDICINE

## 2020-10-06 PROCEDURE — 99214 OFFICE O/P EST MOD 30 MIN: CPT | Mod: S$PBB,,, | Performed by: INTERNAL MEDICINE

## 2020-10-06 PROCEDURE — 90694 VACC AIIV4 NO PRSRV 0.5ML IM: CPT | Mod: PBBFAC

## 2020-10-06 PROCEDURE — G0008 ADMIN INFLUENZA VIRUS VAC: HCPCS | Mod: PBBFAC

## 2020-10-06 PROCEDURE — 73502 X-RAY EXAM HIP UNI 2-3 VIEWS: CPT | Mod: TC,LT

## 2020-10-06 PROCEDURE — 73502 XR HIP 2 VIEW LEFT: ICD-10-PCS | Mod: 26,LT,, | Performed by: RADIOLOGY

## 2020-10-06 PROCEDURE — 99999 PR PBB SHADOW E&M-EST. PATIENT-LVL III: ICD-10-PCS | Mod: PBBFAC,,, | Performed by: INTERNAL MEDICINE

## 2020-10-06 PROCEDURE — 99211 OFF/OP EST MAY X REQ PHY/QHP: CPT | Mod: PBBFAC,27,25

## 2020-10-06 PROCEDURE — 99999 PR PBB SHADOW E&M-EST. PATIENT-LVL I: CPT | Mod: PBBFAC,,,

## 2020-10-06 PROCEDURE — 73502 X-RAY EXAM HIP UNI 2-3 VIEWS: CPT | Mod: 26,LT,, | Performed by: RADIOLOGY

## 2020-10-06 PROCEDURE — 99214 PR OFFICE/OUTPT VISIT, EST, LEVL IV, 30-39 MIN: ICD-10-PCS | Mod: S$PBB,,, | Performed by: INTERNAL MEDICINE

## 2020-10-06 RX ORDER — DESOXIMETASONE 2.5 MG/G
CREAM TOPICAL 2 TIMES DAILY
Qty: 100 G | Refills: 3 | Status: SHIPPED | OUTPATIENT
Start: 2020-10-06 | End: 2023-08-08

## 2020-10-06 RX ORDER — SPIRONOLACTONE 25 MG/1
25 TABLET ORAL DAILY
Qty: 90 TABLET | Refills: 4 | Status: SHIPPED | OUTPATIENT
Start: 2020-10-06 | End: 2021-10-27

## 2020-10-06 NOTE — PROGRESS NOTES
CHIEF COMPLAINT:  follow up of COVID, constipation, hypertension, hyperlipidemia,     HISTORY OF PRESENT ILLNESS: 74-year-old woman presents for follow up of above    She has pain in the left leg.  It is in the hip area. She has pain when she walks and she cannot lay on the left side. Pain has been moderate to severe. Has been going on the last month. Pain is not consitent with gout pain.      She has been taking Linzess 145 mg every day or other day. She has a formed stool at first. She will have 3-4 BM. She will then have watery stool. If she does not take the Linzess, she is severely constipated.  No cramping or pain with the LInzess      Energy level continues to improve.  Breathing has been good.   She is sleeping well. She has not needed xanax at bedtime.  Appetite is good     She continues allopurinol 200 mg daily for gout.      Anxiety is controlled on Lexapro to 10 mg one tablet daily.       Heartburn is congroled on omperazole 20 mg twice daily     She continues to take Crestor 20 mg daily with co enzyme 10 200 mg daily, losartan 50 mg twice daily, and spironolactone 25 mg daily. BP has been doing well.        She had a normal cystoscope 12/17/14 due to recurrent UTI. No dysuria or hematuria now. She is no longer using estrogen vaginal cream for vaginal atrophy three times weekly       She is taking vitamin D 50,000 units twice weekly     Colonoscopy was cancelled on 3/30/20 due to COVID pandemic.      No dysuria or hematuria.     PAST MEDICAL HISTORY:   1. Hypertension.   2. Hyperlipidemia.   3. Gout.   4. History of rashes.   5. TIA in 1997  6. Fibrocystic breast disease.   7. Postmenopausal.   8. Osteoarthritis.   9. History of tobacco usage; quit in 1997.   10. History of colon polyps; normal colonoscopy in 10/07 and normal 4/2010, due 2015   11. Normal bone mineral density scan in 6/06.   12. Anxiety and depression - controlled on lorazepam very rarely.   13. Varicose veins.   14. Intermittent GERD.    15. Iron deficiency anemia 2010 - due to erosive gastropathy on EGD 2010     PAST SURGICAL HISTORY:   1. Right carotid endarterectomy in .   2. Breast reduction surgery.   3. Total abdominal hysterectomy.   4. Blood transfusion prior to .     SOCIAL HISTORY: Quit smoking in ; smoked 1 PPD since age 16. No   alcohol use.     FAMILY HISTORY:   Father had gout, hypertension and heart failure; is . Mother had hypertension and pancreatic cancer; is also . Has five brothers, one of whom  in a motor vehicle accident. All have gout and hypertension. One brother has Crohn's disease; another has bladder cancer.     MEDICATIONS and ALLERGIES: Updated on epic.     PHYSICAL EXAMINATION:         General: Alert, oriented. No apparent distress. Affect within normal   limits. Swelling under the right eye brow.   Bruising under both eyes, right greater than left.  Bruising on the right cheek  Conjunctivae anicteric. Tympanic membranes clear. Oropharynx clear.   Neck supple.   Respiratory effort normal. Lungs clear  Heart: Regular rate and rhythm without murmurs, gallops or rubs.   No lower extremity edema.    ABDOMEN: soft, non distended, non tender, bowel sounds present, no hepatosplenomgaly    Labs 10/2/20        ASSESSMENT AND PLAN:   1.  Essential hypertension - stable   3. Heme positive stools - saw Dr Kingsley 19 - colonoscopy will need to be rescheduled. .  4. Hyperlipidemia - On crestor 20 mg daily and Co Enzyme Q 10 200 mg daily.  Refilled  5. Erosive gastropathy -  on omeprazole 20 mg twice daily. Off NSAIDS.  6. Hypercalcemia - stable  7. Gout -  on allopurinol.  8. Situational stress/anxiety/mood disorder -stable lexapro to 10 mg 1 tablet daily. Sleeping better. Has not neeed xanax   8.  Carotid artery disease - on risk factor modification.   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 Constipation - on linzess  11. Left hip pain - xray left hip.   Screening - Colonscopy 10/15 with  4 polyps - it was incomplete. Repeat 5/26/16 - divericulosis otherwise normal - . MMG 4/19 BMD 7/11.  I will see her back in 4 month in clinic , sooner if problems arise

## 2020-10-09 DIAGNOSIS — K59.00 CONSTIPATION, UNSPECIFIED CONSTIPATION TYPE: ICD-10-CM

## 2020-10-09 RX ORDER — LINACLOTIDE 145 UG/1
CAPSULE, GELATIN COATED ORAL
Qty: 30 CAPSULE | Refills: 3 | Status: SHIPPED | OUTPATIENT
Start: 2020-10-09 | End: 2021-05-18

## 2020-10-09 NOTE — PROGRESS NOTES
Refill Routing Note   Medication(s) are not appropriate for processing by [facility] for the following reason(s):     - Outside of protocol    [facility] actions taken in this encounter: Route          Medication reconciliation completed: No   Automatic Epic Generated Protocol Data:        Requested Prescriptions   Pending Prescriptions Disp Refills    LINZESS 145 mcg Cap capsule [Pharmacy Med Name: LINZESS 145MCG CAPSULES] 30 capsule 3     Sig: TAKE 1 CAPSULE(145 MCG) BY MOUTH EVERY DAY       There is no refill protocol information for this order           Appointments  past 12m or future 3m with PCP    Date Provider   Last Visit   10/6/2020 Janett Montoya MD   Next Visit   1/12/2021 Janett Montoya MD   ED visits in past 90 days: 0        Note composed:9:45 AM 10/09/2020

## 2020-10-13 ENCOUNTER — CLINICAL SUPPORT (OUTPATIENT)
Dept: REHABILITATION | Facility: OTHER | Age: 75
End: 2020-10-13
Payer: MEDICARE

## 2020-10-13 DIAGNOSIS — R29.898 WEAKNESS OF BOTH HIPS: ICD-10-CM

## 2020-10-13 DIAGNOSIS — M25.552 ACUTE PAIN OF LEFT HIP: ICD-10-CM

## 2020-10-13 DIAGNOSIS — M25.552 LEFT HIP PAIN: ICD-10-CM

## 2020-10-13 DIAGNOSIS — Z91.81 AT RISK FOR FALLS: ICD-10-CM

## 2020-10-13 DIAGNOSIS — R26.89 ANTALGIC GAIT: ICD-10-CM

## 2020-10-13 PROCEDURE — 97162 PT EVAL MOD COMPLEX 30 MIN: CPT | Mod: PN | Performed by: PHYSICAL THERAPIST

## 2020-10-13 PROCEDURE — 97110 THERAPEUTIC EXERCISES: CPT | Mod: PN | Performed by: PHYSICAL THERAPIST

## 2020-10-13 NOTE — PLAN OF CARE
[facility]  Physical Therapy Initial Evaluation    Name: Isael RAM [facility] Number: [id number]    Therapy Diagnosis:   Encounter Diagnoses   Name Primary?    Left hip pain     Acute pain of left hip     Antalgic gait     At risk for falls     Weakness of both hips      Physician: Janett Montoya MD    Physician Orders: PT Eval and Treat   Medical Diagnosis from Referral: M25.552 (ICD-10-CM) - Left hip pain   Evaluation Date: 10/13/2020  Authorization Period Expiration: 10/6/2021 (eval only)  Plan of Care Expiration: 1/8/2021  Visit # / Visits authorized: 1/ 1 (eval only)    Time In: 0920  Time Out: 1010  Total Billable Time: 50 minutes    Precautions: Standard and Fall    Subjective   Date of onset: 2-3 months  History of current condition - Isael reports: she's had several falls, last was in the beginning of the year. Falls have been caused by tripping or walking on uneven ground. She reports that she is no longer able to get into her bathtub due to difficulty getting in/out.  No MEET to onset of L hip. Significant pain in certain positions sleeping at night. Pain with initial steps on rising. Recently feeling some partial buckling of L LE. Pt with history of pain to hip 2* gout, but says this pain is very different.        Past Medical History:   Diagnosis Date    Adjustment disorder 7/26/2012    Anemia 7/26/2012    AR (allergic rhinitis) 9/5/2014    Bronchitis     Cataract     Fibrocystic breast disease in female 7/26/2012    GERD (gastroesophageal reflux disease) 7/26/2012    Gout     Gout, arthritis 7/26/2012    History of blood transfusion 7/26/2012    History of colonic polyps 7/26/2012    Hypercalcemia 9/20/2012    Hyperlipidemia 7/26/2012    Hypertension 7/26/2012    Nuclear sclerosis 5/2/2014    Osteoarthritis 7/26/2012    Other hyperparathyroidism 9/20/2012    Postmenopausal status 7/26/2012    PVD (peripheral vascular disease) 7/26/2012     left leg laser of vein with injection    Stroke 1997    TIA    Superficial vein thrombosis     Transient ischemic attack 7/26/2012    Type 2 diabetes mellitus without complication, without long-term current use of insulin 3/30/2020    Varicose veins 7/26/2012     Iseal Salmon  has a past surgical history that includes carotid endarterectomy (1997); Carotid endarterectomy (Right, 1997); Vascular surgery; Hysterectomy; Colonoscopy (N/A, 10/26/2015); ORIF finger fracture (12/18/15); Breast surgery; broken second finger (Right, 12/2015); TIA; Cataract extraction w/  intraocular lens implant (Right, 05/01/2018); Cataract extraction w/  intraocular lens implant (Left, 05/15/2018); Breast cyst excision (Left); Breast cyst aspiration (Bilateral); and Eye surgery.    Isael has a current medication list which includes the following prescription(s): acetaminophen, allopurinol, alprazolam, ascorbic acid (vitamin c), aspirin, augmented betamethasone dipropionate, b complex vitamins, biotin, co-enzyme q-10, desoximetasone, ergocalciferol, escitalopram oxalate, fluticasone propionate, folbic, indomethacin, ipratropium, linzess, losartan, multivit,calc,mins/iron/folic, omeprazole, ondansetron, peg-electrolyte soln, rosuvastatin, spironolactone, and salsalate.    Review of patient's allergies indicates:   Allergen Reactions    Latex      Other reaction(s): Rash    Pcn [penicillins]      Other reaction(s): rash    Sulfa (sulfonamide antibiotics)      Other reaction(s): blisters    Avelox [moxifloxacin]      Other reaction(s): racing heart  Other reaction(s): shakes    Ciprofloxacin Other (See Comments)     Room starts to spin , nausea and dizziness    Codeine      Other reaction(s): nausea  Other reaction(s): weakness        Imaging, bone scan films: FINDINGS:  Two views:     Left: No fracture dislocation bone destruction seen.  There is mild DJD.      Prior Therapy: none for c/c  Social History: Pt lives with  their spouse on second story  Occupation: retired  Prior Level of Function: I with ADL's and driving, no regular exercise  Current Level of Function: feels unsteady with lower body dressing, leans against wall/furniture, able to drive but modified technique with transfers    Pain:  Current 0/10, worst 8/10, best 0/10   Location: Left lateral hip  Description: Aching  Aggravating Factors: initial rising from sitting, lying on L side, ascending steps, limited in carrying items upstairs (has to use step to gait), prolonged walking, prolonged sitting  Easing Factors: lying on R side, voltaren gel, aleve/advil     Pts goals: be able to get into bathtub and be able to stoop down to get to low cabinets in kitchen    Objective     Observation: pt is alert and oriented, good historian    Gait: antalgic gait with increased EVELYN, decreased step length R, slow pace    Hip Range of Motion: grossly WFL and equal in all planes B          Lower Extremity Strength  Right LE  Left LE    Knee extension: 5/5 Knee extension: 5/5   Knee flexion: 5/5 Knee flexion: 5/5   Hip flexion: 4+/5 Hip flexion: 4/5   Hip Internal Rotation:  4+/5    Hip Internal Rotation: 4/5      Hip External Rotation: 4+/5    Hip External Rotation: 4/5      Hip extension:  4-/5 Hip extension: 4-/5   Hip abduction: 4/5 Hip abduction: 4-/5   Hip adduction: 3+/5 Hip adduction 3+/5   Ankle dorsiflexion: 5/5 Ankle dorsiflexion: 5/5         Special Tests:  GINA: tightness R  FADIR: -    Flexibility:    Hamstring: R = WFL; L = WFL   Quad: R = mod; L = mild   Piriformis: R: WFL; L slight   Mohan's test: R = slight ; L = mild        Joint Mobility: WFL    Palpation: tenderness to L ITB, TFL, glut medius    Sensation: grossly intact to light touch B LE        Pan Assessment    1. Sitting to Standing   4 - able to stand without using hands and stabilize independently  2. Standing Unsupported   4 - able to stand safely 2 minutes without hold  3. Sitting Unsupported   4 - able  to sit safely and securely 2 minutes  4. Standing to Sitting   3 - controls descent by using hands  5. Pivot Transfer   3 - able to transfer safely with definite use of hands  6. Standing with Eyes Closed   4 - albe to stand 10 seconds safely  7. Standing with Feet Together   4 - able to place feet together independently and stand 1 minute safely  8. Reaching Forward with Outstretched Arm   3 - can reach forward 12 cm/5 inches safely  9. Retrieving Object from Floor   4 - able to  slipper safely and easily  10. Turning to Look Behind   3 - looks behind one side only, other side less weight shift  11. Turning 360 Degrees   2 - able to turn 360 safely but slowly  12. Placing Alternate Foot on Step   4 - able to stand independently safely and complete 8 steps in 20 seconds  13. Standing with One Foot in Front   3 - able to place foot ahead of other independently and hold 30 seconds  14. Standing on One Foot   2 - able to lift leg indepentdently and hold = or < 3 seconds  Total: 47  Maximum: 56    Dynamic Gait Index  Gait, level surface: 2 - mind impairment: walks 20', uses AD, slower speed, mild gait deviations   Change in gait speed: 2 - mild impairment: is able to change speed but demonstrates mild gait deviations, or unable to achieve a significant change in velocity, or uses an AD   Gait with horizontal head turns: 3 - normal: performs head turns smoothly with no change in gait   Gait with vertical head turns: 2 - mild impairment: performs head turns smoothly with slight change in gait velocity, or minor disruption to smooth gait path, or uses AD   Gait and pivot turn: 2 - mild impairment: pivots turns safely in >3 seconds and stops with no loss of balance   Step over obstacle: 2 - mild impairment: is able to step over box, but must slow down and adjust steps to clear box safely   Step around obstacles: 2 - mild impairment: is able to step around both cones, but must slow down and adjust steps to clear cones    Steps: 2 - mild impairment: alternating feet, must use rail   TOTAL SCORE:  17/24           CMS Impairment/Limitation/Restriction for FOTO Hip Survey    Therapist reviewed FOTO scores for Isael Salmon on 10/13/2020.   FOTO documents entered into [employer] - see Media section.    Limitation Score: 33%    Goal: 29%         TREATMENT   Treatment Time In: 1000  Treatment Time Out: 1010  Total Treatment time separate from Evaluation: 10 minutes    Isael received therapeutic exercises to develop flexibility for 10 minutes including:  Pt education regarding therapy plan of care and recommendation of dry needling  Stretches for HEP reviewed (see pt instructions): piriformis stretch supine and seated      Home Exercises and Patient Education Provided    Education provided:   - Therapy rationale and plan of care    Written Home Exercises Provided: yes.  Exercises were reviewed and Isael was able to demonstrate them prior to the end of the session.  Isael demonstrated good  understanding of the education provided.     See EMR under Patient Instructions for exercises provided 10/13/2020.    Assessment   Isael is a 74 y.o. female referred to outpatient Physical Therapy with a medical diagnosis of M25.552 (ICD-10-CM) - Left hip pain. Pt presents with s/s consistent with referring dx. Tenderness to L ITB, TFL, and glut med with palpation. Weakness to B LE with MMT. Gait abnormality consistent with LE weakness and fall anxiety. Pan Balance score indicates low risk for fall. DGI score indicated of fall risk with ambulation.     Pt prognosis is Good.   Pt will benefit from skilled outpatient Physical Therapy to address the deficits stated above and in the chart below, provide pt/family education, and to maximize pt's level of independence.     Plan of care discussed with patient: Yes  Pt's spiritual, cultural and educational needs considered and patient is agreeable to the plan of care and goals as stated below:      Anticipated Barriers for therapy: none    Medical Necessity is demonstrated by the following  History  Co-morbidities and personal factors that may impact the plan of care Co-morbidities:   diabetes, difficulty sleeping, gout, history of CVA, HTN and level of undertstanding of current condition    Personal Factors:   age  lifestyle     moderate   Examination  Body Structures and Functions, activity limitations and participation restrictions that may impact the plan of care Body Regions:   lower extremities    Body Systems:    gross symmetry  strength  balance  gait  transfers  transitions  motor control  motor learning    Participation Restrictions:   Rising from sitting, tub transfers, walking, prolonged sitting, sleeping    Activity limitations:   Learning and applying knowledge  no deficits    General Tasks and Commands  no deficits    Communication  no deficits    Mobility  lifting and carrying objects  walking  driving (bike, car, motorcycle)    Self care  washing oneself (bathing, drying, washing hands)  toileting  dressing    Domestic Life  shopping  cooking  doing house work (cleaning house, washing dishes, laundry)    Interactions/Relationships  no deficits    Life Areas  no deficits    Community and Social Life  community life  recreation and leisure         high   Clinical Presentation unstable clinical presentation with unpredictable characteristics high   Decision Making/ Complexity Score: moderate     Goals:  Short Term Goals (4 Weeks):  1. Pt able to sit >=30 minutes with leisure activities with <6/10 pain.  2.  Pt able to transfer in/out of chairs of various heights with <6/10 pain.  3. Pt able to ascend/descend curb, independently, 1 handrail, with <6/10 pain.  4. Pt will demonstrate increased strength by a half grade to allow patient to ascend stairs to her home with increased ease.    Long Term Goals (12 Weeks):  1. Pt able to ascend/descend 1 flight of stairs, independently, 1 handrail, with  <2/10 pain.  2.Pt is independent with all bed mobility.  3. Pt able to sleep a full night without pain disturbance.  4. Pt demonstrates strength WFL / WNL to allow patient to perform home and community mobility with min to no difficulty.  5.Pt will be independent with HEP and self management of symptoms.   6. Pt to demonstrate improved functional ability with FOTO limitation <=29% disability.    Plan   Plan of care Certification: 10/13/2020 to 1/8/2021.    Outpatient Physical Therapy 2 times weekly for 12 weeks to include the following interventions: Gait Training, Manual Therapy, Moist Heat/ Ice, Neuromuscular Re-ed, Patient Education, Therapeutic Exercise and Dry Needling.     Triny Mead, PT

## 2020-10-15 ENCOUNTER — CLINICAL SUPPORT (OUTPATIENT)
Dept: REHABILITATION | Facility: OTHER | Age: 75
End: 2020-10-15
Payer: MEDICARE

## 2020-10-15 DIAGNOSIS — R26.89 ANTALGIC GAIT: ICD-10-CM

## 2020-10-15 DIAGNOSIS — M25.552 ACUTE PAIN OF LEFT HIP: ICD-10-CM

## 2020-10-15 DIAGNOSIS — Z91.81 AT RISK FOR FALLS: ICD-10-CM

## 2020-10-15 DIAGNOSIS — R29.898 WEAKNESS OF BOTH HIPS: ICD-10-CM

## 2020-10-15 PROCEDURE — 97140 MANUAL THERAPY 1/> REGIONS: CPT | Mod: PN | Performed by: PHYSICAL THERAPIST

## 2020-10-15 PROCEDURE — 97110 THERAPEUTIC EXERCISES: CPT | Mod: PN | Performed by: PHYSICAL THERAPIST

## 2020-10-15 NOTE — PROGRESS NOTES
"  Physical Therapy Treatment Note     Name: Isael RAM [facility] Number: [id number]    Therapy Diagnosis:   Encounter Diagnoses   Name Primary?    Acute pain of left hip     Antalgic gait     At risk for falls     Weakness of both hips      Physician: Janett Montoya MD    Visit Date: 10/15/2020    Physician Orders: PT Eval and Treat   Medical Diagnosis from Referral: M25.552 (ICD-10-CM) - Left hip pain   Evaluation Date: 10/13/2020  Authorization Period Expiration: 12/31/2020  Plan of Care Expiration: 1/8/2021  Visit # / Visits authorized: 1/ 20      Time In: 1505  Time Out: 1555  Total Billable Time: 50 minutes    Precautions: Standard and Fall    Subjective     Pt reports: she felt some relief with her stretches. No pain at rest today.  She was compliant with home exercise program.  Response to previous treatment: stretches feel good with HEP  Functional change: no change    Pain: 0/10 at rest  Location: Left lateral hip     Objective     Isael received therapeutic exercises to develop strength, endurance, ROM, flexibility, posture and core stabilization for 25 minutes including:  Manual ITB stretch x 2 min in sidelying  Rolling pin STM in sidelying to ITB/TFL x 5 min  Piriformis stretch 3 x 30"  BKFO 2 x 10    Isael received the following manual therapy techniques: Soft tissue Mobilization were applied to the: L for 15 minutes, including:    Application of TDN: Pt educated on benefits and potential side effects of dry needling. Educated pt on benefits, precautions, side effects following TDN. Educated pt to use heat following treatment sessions if pt is experiencing pain or soreness. Pt verbalized good understanding of education.  Pt signed written consent to dry needling. Pt gave verbal consent for DN    Pt received dry needling to the below listed muscles using 60 and 75 mm needles.  In sidelying, L TFL, proximal and distal piriformis, glut medius. Winding performed every 5 minutes during " treatment. - pt may benefit from prone positioning at next visit.         Isael received hot pack for 10 minutes to L hip in hooklying.          Home Exercises Provided and Patient Education Provided     Education provided:   - Therex rationale. Dry needling consent reviewed and signed prior to treatment    Written Home Exercises Provided: yes.  Exercises were reviewed and Isael was able to demonstrate them prior to the end of the session.  Isael demonstrated good  understanding of the education provided.     See EMR under Patient Instructions for exercises provided 10/13/2020.    Assessment     Good tolerance to initial tx session. Good soft tissue response to dry needling evident by increased grasp with unilateral winding at all insertion points. Winding performed every 5 minutes during treatment. No adverse effects following treatment. Some pain towards end of dry needling due to positioning with report of cramping through ITB. Pain resolves completely with manual ITB stretch. Pt may benefit from prone position for dry needling at next visit.     Isael is progressing well towards her goals.   Pt prognosis is Good.     Pt will continue to benefit from skilled outpatient physical therapy to address the deficits listed in the problem list box on initial evaluation, provide pt/family education and to maximize pt's level of independence in the home and community environment.     Pt's spiritual, cultural and educational needs considered and pt agreeable to plan of care and goals.     Anticipated barriers to physical therapy: none    Goals: IE 10/13/2020  Short Term Goals (4 Weeks):  1. Pt able to sit >=30 minutes with leisure activities with <6/10 pain. Progressing, not met  2.  Pt able to transfer in/out of chairs of various heights with <6/10 pain. Progressing, not met  3. Pt able to ascend/descend curb, independently, 1 handrail, with <6/10 pain. Progressing, not met  4. Pt will demonstrate increased strength by  a half grade to allow patient to ascend stairs to her home with increased ease. Progressing, not met     Long Term Goals (12 Weeks):  1. Pt able to ascend/descend 1 flight of stairs, independently, 1 handrail, with <2/10 pain. Progressing, not met  2.Pt is independent with all bed mobility. Progressing, not met  3. Pt able to sleep a full night without pain disturbance. Progressing, not met  4. Pt demonstrates strength WFL / WNL to allow patient to perform home and community mobility with min to no difficulty. Progressing, not met  5.Pt will be independent with HEP and self management of symptoms. Progressing, not met   6. Pt to demonstrate improved functional ability with FOTO limitation <=29% disability. Progressing, not met      Plan   Plan of care Certification: 10/13/2020 to 1/8/2021.     Continue plan of care with focus on B LE strengthening and manual interventions as needed for pain reduction.     Triny Mead, PT

## 2020-10-19 ENCOUNTER — CLINICAL SUPPORT (OUTPATIENT)
Dept: REHABILITATION | Facility: OTHER | Age: 75
End: 2020-10-19
Payer: MEDICARE

## 2020-10-19 DIAGNOSIS — Z91.81 AT RISK FOR FALLS: ICD-10-CM

## 2020-10-19 DIAGNOSIS — R26.89 ANTALGIC GAIT: ICD-10-CM

## 2020-10-19 DIAGNOSIS — M25.552 ACUTE PAIN OF LEFT HIP: ICD-10-CM

## 2020-10-19 DIAGNOSIS — R29.898 WEAKNESS OF BOTH HIPS: ICD-10-CM

## 2020-10-19 PROCEDURE — 97140 MANUAL THERAPY 1/> REGIONS: CPT | Mod: PN

## 2020-10-19 PROCEDURE — 97110 THERAPEUTIC EXERCISES: CPT | Mod: PN

## 2020-10-19 NOTE — PROGRESS NOTES
"  Physical Therapy Treatment Note     Name: Isael RAM [facility] Number: [id number]    Therapy Diagnosis:   Encounter Diagnoses   Name Primary?    Acute pain of left hip     Antalgic gait     At risk for falls     Weakness of both hips      Physician: Janett Montoya MD    Visit Date: 10/19/2020    Physician Orders: PT Eval and Treat   Medical Diagnosis from Referral: M25.552 (ICD-10-CM) - Left hip pain   Evaluation Date: 10/13/2020  Authorization Period Expiration: 12/31/2020  Plan of Care Expiration: 1/8/2021  Visit # / Visits authorized: 3/ 20      Time In: 10;00  Time Out: 10:45  Total Billable Time: 45 minutes    Precautions: Standard and Fall    Subjective     Pt reports: no soreness from dry needling. Would like to try it again today but in prone.   She was compliant with home exercise program.  Response to previous treatment: stretches feel good with HEP  Functional change: no change    Pain: 0/10 at rest  Location: Left lateral hip     Objective     Isael received therapeutic exercises to develop strength, endurance, ROM, flexibility, posture and core stabilization for 25 minutes including:  Manual ITB stretch x 2 min in sidelying  Rolling pin STM in sidelying to ITB/TFL x 5 min  Piriformis stretch 3 x 30"  BKFO 2 x 10  +supine clam 30 x YTB     Isael received the following manual therapy techniques: Soft tissue Mobilization were applied to the: L for 20 minutes, including:    Application of TDN: Pt educated on benefits and potential side effects of dry needling. Educated pt on benefits, precautions, side effects following TDN. Educated pt to use heat following treatment sessions if pt is experiencing pain or soreness. Pt verbalized good understanding of education.  Pt signed written consent to dry needling. Pt gave verbal consent for DN    Pt received dry needling to the below listed muscles using 60 and 75 mm needles.  In sidelying, L TFL, proximal and distal piriformis, glut medius. Winding " performed every 5 minutes during treatment. - pt may benefit from prone positioning at next visit.       Isael received hot pack for 10 minutes to L hip in hooklying.          Home Exercises Provided and Patient Education Provided     Education provided:   - Therex rationale. Dry needling consent reviewed and signed prior to treatment    Written Home Exercises Provided: yes.  Exercises were reviewed and Isael was able to demonstrate them prior to the end of the session.  Isael demonstrated good  understanding of the education provided.     See EMR under Patient Instructions for exercises provided 10/13/2020.    Assessment     Modified dry needling position today to prone with pillow under hips and tibias for increased ease with technique and without increasing c/o leg or back pain. Used winding technique today every 5 minutes. Good tolerance with increased TTP to lateral glutes and piriformis. Initiated early hip strengthening with supine clams today. Good tolerance with frequent rest breaks due to fatigue.      Isael is progressing well towards her goals.   Pt prognosis is Good.     Pt will continue to benefit from skilled outpatient physical therapy to address the deficits listed in the problem list box on initial evaluation, provide pt/family education and to maximize pt's level of independence in the home and community environment.     Pt's spiritual, cultural and educational needs considered and pt agreeable to plan of care and goals.     Anticipated barriers to physical therapy: none    Goals: IE 10/13/2020  Short Term Goals (4 Weeks):  1. Pt able to sit >=30 minutes with leisure activities with <6/10 pain. Progressing, not met  2.  Pt able to transfer in/out of chairs of various heights with <6/10 pain. Progressing, not met  3. Pt able to ascend/descend curb, independently, 1 handrail, with <6/10 pain. Progressing, not met  4. Pt will demonstrate increased strength by a half grade to allow patient to  ascend stairs to her home with increased ease. Progressing, not met     Long Term Goals (12 Weeks):  1. Pt able to ascend/descend 1 flight of stairs, independently, 1 handrail, with <2/10 pain. Progressing, not met  2.Pt is independent with all bed mobility. Progressing, not met  3. Pt able to sleep a full night without pain disturbance. Progressing, not met  4. Pt demonstrates strength WFL / WNL to allow patient to perform home and community mobility with min to no difficulty. Progressing, not met  5.Pt will be independent with HEP and self management of symptoms. Progressing, not met   6. Pt to demonstrate improved functional ability with FOTO limitation <=29% disability. Progressing, not met      Plan   Plan of care Certification: 10/13/2020 to 1/8/2021.     Continue plan of care with focus on B LE strengthening and manual interventions as needed for pain reduction.     Emily Suárez, PT

## 2020-11-02 ENCOUNTER — CLINICAL SUPPORT (OUTPATIENT)
Dept: REHABILITATION | Facility: OTHER | Age: 75
End: 2020-11-02
Payer: MEDICARE

## 2020-11-02 DIAGNOSIS — R26.89 ANTALGIC GAIT: ICD-10-CM

## 2020-11-02 DIAGNOSIS — R29.898 WEAKNESS OF BOTH HIPS: ICD-10-CM

## 2020-11-02 DIAGNOSIS — Z91.81 AT RISK FOR FALLS: ICD-10-CM

## 2020-11-02 DIAGNOSIS — M25.552 ACUTE PAIN OF LEFT HIP: ICD-10-CM

## 2020-11-02 PROCEDURE — 97140 MANUAL THERAPY 1/> REGIONS: CPT | Mod: PN

## 2020-11-02 PROCEDURE — 97110 THERAPEUTIC EXERCISES: CPT | Mod: PN

## 2020-11-02 NOTE — PROGRESS NOTES
"  Physical Therapy Treatment Note     Name: Isael RAM [facility]  Clinic Number: [id number]    Therapy Diagnosis:   Encounter Diagnoses   Name Primary?    Acute pain of left hip     Antalgic gait     At risk for falls     Weakness of both hips      Physician: Janett Montoya MD    Visit Date: 11/2/2020    Physician Orders: PT Eval and Treat   Medical Diagnosis from Referral: M25.552 (ICD-10-CM) - Left hip pain   Evaluation Date: 10/13/2020  Authorization Period Expiration: 12/31/2020  Plan of Care Expiration: 1/8/2021  Visit # / Visits authorized: 4/ 20      Time In: 1;45  Time Out: 2:30  Total Billable Time: 45 minutes    Precautions: Standard and Fall    Subjective     Pt reports: continues to have L glute pain with sleeping. Denies use of pillow between knees. Says that dry needling helped but it was only temporary for a couple hours.      She was compliant with home exercise program.  Response to previous treatment: stretches feel good with HEP  Functional change: no change    Pain: 0/10 at rest  Location: Left lateral hip     Objective     Isael received therapeutic exercises to develop strength, endurance, ROM, flexibility, posture and core stabilization for 35 minutes including:    Manual ITB stretch x 2 min in sidelying  Rolling pin STM in sidelying to ITB/TFL x 5 min  Piriformis stretch 3 x 30"  BKFO 2 x 10  supine clam 30 x YTB   +bridge w/ band 20 x YTB    +SL clams 20x   +SL reverse clams 20x    Isael received the following manual therapy techniques: Soft tissue Mobilization were applied to the: L for 10 minutes, including:    10 min x C/R with manual TrP release of glutes/piriformis/TFL    Defer dry needling today.    Application of TDN: Pt educated on benefits and potential side effects of dry needling. Educated pt on benefits, precautions, side effects following TDN. Educated pt to use heat following treatment sessions if pt is experiencing pain or soreness. Pt verbalized good understanding of " education.  Pt signed written consent to dry needling. Pt gave verbal consent for DN    Pt received dry needling to the below listed muscles using 60 and 75 mm needles.  In sidelying, L TFL, proximal and distal piriformis, glut medius. Winding performed every 5 minutes during treatment. - pt may benefit from prone positioning at next visit.       Isael received hot pack for 00 minutes to L hip in hooklying. -- defer per pt request          Home Exercises Provided and Patient Education Provided     Education provided:   - Therex rationale. Dry needling consent reviewed and signed prior to treatment    Written Home Exercises Provided: yes.  Exercises were reviewed and Isael was able to demonstrate them prior to the end of the session.  Isael demonstrated good  understanding of the education provided.     See EMR under Patient Instructions for exercises provided 10/13/2020.    Assessment     Defer dry needling today due to temporary relief. Pt notes marked relief in glute pain following manual TrP release with contract relax stretching. Able to progress hip strengthening today with good response following manual therapy. Heavy edu on use of pillow between knees and ankles while sleeping to reduce stress/strain on lateral structures.    Isael is progressing well towards her goals.   Pt prognosis is Good.     Pt will continue to benefit from skilled outpatient physical therapy to address the deficits listed in the problem list box on initial evaluation, provide pt/family education and to maximize pt's level of independence in the home and community environment.     Pt's spiritual, cultural and educational needs considered and pt agreeable to plan of care and goals.     Anticipated barriers to physical therapy: none    Goals: IE 10/13/2020  Short Term Goals (4 Weeks):  1. Pt able to sit >=30 minutes with leisure activities with <6/10 pain. Progressing, not met  2.  Pt able to transfer in/out of chairs of various  heights with <6/10 pain. Progressing, not met  3. Pt able to ascend/descend curb, independently, 1 handrail, with <6/10 pain. Progressing, not met  4. Pt will demonstrate increased strength by a half grade to allow patient to ascend stairs to her home with increased ease. Progressing, not met     Long Term Goals (12 Weeks):  1. Pt able to ascend/descend 1 flight of stairs, independently, 1 handrail, with <2/10 pain. Progressing, not met  2.Pt is independent with all bed mobility. Progressing, not met  3. Pt able to sleep a full night without pain disturbance. Progressing, not met  4. Pt demonstrates strength WFL / WNL to allow patient to perform home and community mobility with min to no difficulty. Progressing, not met  5.Pt will be independent with HEP and self management of symptoms. Progressing, not met   6. Pt to demonstrate improved functional ability with FOTO limitation <=29% disability. Progressing, not met      Plan   Plan of care Certification: 10/13/2020 to 1/8/2021.     Continue plan of care with focus on B LE strengthening and manual interventions as needed for pain reduction.     Emily Suárez, PT

## 2020-11-04 ENCOUNTER — CLINICAL SUPPORT (OUTPATIENT)
Dept: REHABILITATION | Facility: OTHER | Age: 75
End: 2020-11-04
Payer: MEDICARE

## 2020-11-04 DIAGNOSIS — M25.552 ACUTE PAIN OF LEFT HIP: Primary | ICD-10-CM

## 2020-11-04 DIAGNOSIS — Z91.81 AT RISK FOR FALLS: ICD-10-CM

## 2020-11-04 DIAGNOSIS — R26.89 ANTALGIC GAIT: ICD-10-CM

## 2020-11-04 DIAGNOSIS — R29.898 WEAKNESS OF BOTH HIPS: ICD-10-CM

## 2020-11-04 PROCEDURE — 97140 MANUAL THERAPY 1/> REGIONS: CPT | Mod: PN | Performed by: PHYSICAL THERAPIST

## 2020-11-04 PROCEDURE — 97110 THERAPEUTIC EXERCISES: CPT | Mod: PN | Performed by: PHYSICAL THERAPIST

## 2020-11-04 NOTE — PROGRESS NOTES
"  Physical Therapy Treatment Note     Name: Isael RAM [facility] Number: [id number]    Therapy Diagnosis:   Encounter Diagnoses   Name Primary?    Acute pain of left hip Yes    Antalgic gait     At risk for falls     Weakness of both hips      Physician: Janett Montoya MD    Visit Date: 11/4/2020    Physician Orders: PT Eval and Treat   Medical Diagnosis from Referral: M25.552 (ICD-10-CM) - Left hip pain   Evaluation Date: 10/13/2020  Authorization Period Expiration: 12/31/2020  Plan of Care Expiration: 1/8/2021  Visit # / Visits authorized: 5/ 20      Time In: 1445  Time Out: 1530  Total Billable Time: 45 minutes    Precautions: Standard and Fall    Subjective     Pt reports: pain this afternoon 2* driving longer than usual due to traffic.       She was compliant with home exercise program.  Response to previous treatment: stretches feel good with HEP  Functional change: no change    Pain: 3/10 at rest  Location: Left lateral hip     Objective     Isael received therapeutic exercises to develop strength, endurance, ROM, flexibility, posture and core stabilization for 30 minutes including:    Manual ITB stretch x 2 min in sidelying  Rolling pin STM in sidelying to ITB/TFL x 5 min  Piriformis stretch 3 x 30"  BKFO with YTB 2 x 10  supine clam 30 x YTB   +TrA 5" x 2 min  +PPT 5" x 2 min  bridge w/ band 20 x YTB    +SL clams 20x   +SL reverse clams 20x    Isael received the following manual therapy techniques: Soft tissue Mobilization were applied to the: L for 15 minutes, including:    15 min x C/R with manual TrP release of glutes/piriformis/TFL/psoas    Defer dry needling today.    Application of TDN: Pt educated on benefits and potential side effects of dry needling. Educated pt on benefits, precautions, side effects following TDN. Educated pt to use heat following treatment sessions if pt is experiencing pain or soreness. Pt verbalized good understanding of education.  Pt signed written consent to " dry needling. Pt gave verbal consent for DN    Pt received dry needling to the below listed muscles using 60 and 75 mm needles.  In sidelying, L TFL, proximal and distal piriformis, glut medius. Winding performed every 5 minutes during treatment. - pt may benefit from prone positioning at next visit.       Isael received hot pack for 00 minutes to L hip in hooklying. -- defer per pt request          Home Exercises Provided and Patient Education Provided     Education provided:   - Therex rationale. Dry needling consent reviewed and signed prior to treatment    Written Home Exercises Provided: yes.  Exercises were reviewed and Isael was able to demonstrate them prior to the end of the session.  Isael demonstrated good  understanding of the education provided.     See EMR under Patient Instructions for exercises provided 10/13/2020.    Assessment     Good tolerance to treatment today. Some improved motion and soft tissue mobility noted following manual intervention. Pt continues with difficulty with bed mobility. Initiated core stabilization today. Some difficulty isolating TrA, but good tolerance to therex.     Isael is progressing well towards her goals.   Pt prognosis is Good.     Pt will continue to benefit from skilled outpatient physical therapy to address the deficits listed in the problem list box on initial evaluation, provide pt/family education and to maximize pt's level of independence in the home and community environment.     Pt's spiritual, cultural and educational needs considered and pt agreeable to plan of care and goals.     Anticipated barriers to physical therapy: none    Goals: IE 10/13/2020  Short Term Goals (4 Weeks):  1. Pt able to sit >=30 minutes with leisure activities with <6/10 pain. Progressing, not met  2.  Pt able to transfer in/out of chairs of various heights with <6/10 pain. Progressing, not met  3. Pt able to ascend/descend curb, independently, 1 handrail, with <6/10 pain.  Progressing, not met  4. Pt will demonstrate increased strength by a half grade to allow patient to ascend stairs to her home with increased ease. Progressing, not met     Long Term Goals (12 Weeks):  1. Pt able to ascend/descend 1 flight of stairs, independently, 1 handrail, with <2/10 pain. Progressing, not met  2.Pt is independent with all bed mobility. Progressing, not met  3. Pt able to sleep a full night without pain disturbance. Progressing, not met  4. Pt demonstrates strength WFL / WNL to allow patient to perform home and community mobility with min to no difficulty. Progressing, not met  5.Pt will be independent with HEP and self management of symptoms. Progressing, not met   6. Pt to demonstrate improved functional ability with FOTO limitation <=29% disability. Progressing, not met      Plan   Plan of care Certification: 10/13/2020 to 1/8/2021.     Continue plan of care with focus on B LE strengthening and manual interventions as needed for pain reduction.     Triny Mead, PT

## 2020-11-05 RX ORDER — BETAMETHASONE DIPROPIONATE 0.5 MG/G
CREAM TOPICAL 2 TIMES DAILY
Qty: 60 G | Refills: 3 | Status: SHIPPED | OUTPATIENT
Start: 2020-11-05 | End: 2022-05-26 | Stop reason: SDUPTHER

## 2020-11-05 NOTE — TELEPHONE ENCOUNTER
Care Due:                  Date            Visit Type   Department     Provider  --------------------------------------------------------------------------------                                             [facility]  Last Visit: 10-      [facility]         VERONICA MARSHALL                                           [facility]  Next Visit: 01-      None         VERONICA MARSHALL                                                            Last  Test          Frequency    Reason                     Performed    Due Date  --------------------------------------------------------------------------------    Uric Acid...  12 months..  allopurinoL..............  07- 07-    Powered by [employer]. Reference number: [id number]. 11/05/2020 2:06:55 PM   CST

## 2020-11-06 NOTE — PROGRESS NOTES
"  Physical Therapy Treatment Note     Name: Isael RAM [facility] Number: [id number]    Therapy Diagnosis:   Encounter Diagnoses   Name Primary?    Acute pain of left hip     Antalgic gait     At risk for falls     Weakness of both hips      Physician: Janett Montoya MD    Visit Date: 11/9/2020    Physician Orders: PT Eval and Treat   Medical Diagnosis from Referral: M25.552 (ICD-10-CM) - Left hip pain   Evaluation Date: 10/13/2020  Authorization Period Expiration: 12/31/2020  Plan of Care Expiration: 1/8/2021  Visit # / Visits authorized: 6/ 20      Time In: 1:57  Time Out: 2:35  Total Billable Time: 38 minutes    Precautions: Standard and Fall    Subjective     Pt reports: "I'm sorry I'm late today, I was trying to get more done before I got here." Says her lateral and frontal L hip is hurting today with increased pain when attempting to lift the leg with stair climbing.     She was compliant with home exercise program.  Response to previous treatment: stretches feel good with HEP  Functional change: no change    Pain: 3/10 at rest  Location: Left lateral hip     Objective     Isael received therapeutic exercises to develop strength, endurance, ROM, flexibility, posture and core stabilization for 28 minutes including:    Manual ITB stretch x 2 min in sidelying  Rolling pin STM in sidelying to ITB/TFL x 5 min  Piriformis stretch 3 x 30"  BKFO with OTB 2 x 10  supine clam 30 x OTB   TrA 5" x 2 min  PPT 5" x 2 min  bridge w/ band 20 x OTB    SL clams 30x OTB   SL reverse clams 30x YT    Isael received the following manual therapy techniques: Soft tissue Mobilization were applied to the: L for 10 minutes, including:    10 min x C/R with manual TrP release of glutes/piriformis/TFL/psoas    Isael received hot pack for 00 minutes to L hip in hooklying. -- defer per pt request          Home Exercises Provided and Patient Education Provided     Education provided:   - Therex rationale. Dry needling consent " reviewed and signed prior to treatment    Written Home Exercises Provided: yes.  Exercises were reviewed and Isael was able to demonstrate them prior to the end of the session.  Isael demonstrated good  understanding of the education provided.     See EMR under Patient Instructions for exercises provided 10/13/2020.    Assessment     Increased fatigue with added resistance to clams, reverse clams, with frequent rest breaks required. Noted increased c/o LBP and hip pain with bed mobility, which reduced after performing PPT and core activation exercises. Progress core and hip strength and stability next visit.    Isael is progressing well towards her goals.   Pt prognosis is Good.     Pt will continue to benefit from skilled outpatient physical therapy to address the deficits listed in the problem list box on initial evaluation, provide pt/family education and to maximize pt's level of independence in the home and community environment.     Pt's spiritual, cultural and educational needs considered and pt agreeable to plan of care and goals.     Anticipated barriers to physical therapy: none    Goals: IE 10/13/2020  Short Term Goals (4 Weeks):  1. Pt able to sit >=30 minutes with leisure activities with <6/10 pain. Progressing, not met  2.  Pt able to transfer in/out of chairs of various heights with <6/10 pain. Progressing, not met  3. Pt able to ascend/descend curb, independently, 1 handrail, with <6/10 pain. Progressing, not met  4. Pt will demonstrate increased strength by a half grade to allow patient to ascend stairs to her home with increased ease. Progressing, not met     Long Term Goals (12 Weeks):  1. Pt able to ascend/descend 1 flight of stairs, independently, 1 handrail, with <2/10 pain. Progressing, not met  2.Pt is independent with all bed mobility. Progressing, not met  3. Pt able to sleep a full night without pain disturbance. Progressing, not met  4. Pt demonstrates strength WFL / WNL to allow  patient to perform home and community mobility with min to no difficulty. Progressing, not met  5.Pt will be independent with HEP and self management of symptoms. Progressing, not met   6. Pt to demonstrate improved functional ability with FOTO limitation <=29% disability. Progressing, not met      Plan   Plan of care Certification: 10/13/2020 to 1/8/2021.     Continue plan of care with focus on B LE strengthening and manual interventions as needed for pain reduction.     Emily Suárez, PT

## 2020-11-09 ENCOUNTER — CLINICAL SUPPORT (OUTPATIENT)
Dept: REHABILITATION | Facility: OTHER | Age: 75
End: 2020-11-09
Payer: MEDICARE

## 2020-11-09 DIAGNOSIS — Z91.81 AT RISK FOR FALLS: ICD-10-CM

## 2020-11-09 DIAGNOSIS — M25.552 ACUTE PAIN OF LEFT HIP: ICD-10-CM

## 2020-11-09 DIAGNOSIS — R29.898 WEAKNESS OF BOTH HIPS: ICD-10-CM

## 2020-11-09 DIAGNOSIS — R26.89 ANTALGIC GAIT: ICD-10-CM

## 2020-11-09 PROCEDURE — 97140 MANUAL THERAPY 1/> REGIONS: CPT | Mod: PN

## 2020-11-09 PROCEDURE — 97110 THERAPEUTIC EXERCISES: CPT | Mod: PN

## 2020-11-11 ENCOUNTER — CLINICAL SUPPORT (OUTPATIENT)
Dept: REHABILITATION | Facility: OTHER | Age: 75
End: 2020-11-11
Payer: MEDICARE

## 2020-11-11 DIAGNOSIS — R29.898 WEAKNESS OF BOTH HIPS: ICD-10-CM

## 2020-11-11 DIAGNOSIS — M25.552 ACUTE PAIN OF LEFT HIP: Primary | ICD-10-CM

## 2020-11-11 DIAGNOSIS — Z91.81 AT RISK FOR FALLS: ICD-10-CM

## 2020-11-11 DIAGNOSIS — R26.89 ANTALGIC GAIT: ICD-10-CM

## 2020-11-11 PROCEDURE — 97110 THERAPEUTIC EXERCISES: CPT | Mod: PN | Performed by: PHYSICAL THERAPIST

## 2020-11-11 NOTE — PROGRESS NOTES
"  Physical Therapy Treatment Note     Name: Isael RAM [facility] Number: [id number]    Therapy Diagnosis:   Encounter Diagnoses   Name Primary?    Acute pain of left hip Yes    Antalgic gait     At risk for falls     Weakness of both hips      Physician: Janett Montoya MD    Visit Date: 2020    Physician Orders: PT Eval and Treat   Medical Diagnosis from Referral: M25.552 (ICD-10-CM) - Left hip pain   Evaluation Date: 10/13/2020  Authorization Period Expiration: 2020  Plan of Care Expiration: 2021  Visit # / Visits authorized:       Time In: 1345  Time Out: 1430  Total Billable Time: 45 minutes    Precautions: Standard and Fall    Subjective     Pt reports: her low back was hurting a lot last night, but got good relief with heating pad. Hip is feeling stiff and sore today, but much better than yesterday.      She was compliant with home exercise program.  Response to previous treatment: stretches feel good with HEP  Functional change: no change    Pain: 6/10   Location: Left lateral hip     Objective     Isael received therapeutic exercises to develop strength, endurance, ROM, flexibility, posture and core stabilization for 45 minutes including:    +Hip hiking on 2" step 2 x 10  +Standing SLR flex/abd/ext (on 2" step with UE support) 2 x 10    Manual ITB stretch x 2 min in sidelying  Rolling pin STM in sidelying to ITB/TFL x 5 min  +BKTC on ball x 3 min  +LTR on ball x 3 min  Piriformis stretch 3 x 30"  BKFO with OTB 2 x 10  supine clam 30 x OTB   TrA 5" x 2 min  PPT 5" x 2 min  bridge w/ pilates [place] (abduction) 20x     SL clams 30x OTB   SL reverse clams 30x YTB    Isael received the following manual therapy techniques: Soft tissue Mobilization were applied to the: L for 00 minutes, includin min x C/R with manual TrP release of glutes/piriformis/TFL/psoas    Isael received hot pack for 00 minutes to L hip in hooklying. -- defer per pt request          Home Exercises " Provided and Patient Education Provided     Education provided:   - Therex rationale. Dry needling consent reviewed and signed prior to treatment    Written Home Exercises Provided: yes.  Exercises were reviewed and Isael was able to demonstrate them prior to the end of the session.  Isael demonstrated good  understanding of the education provided.     See EMR under Patient Instructions for exercises provided 10/13/2020.    Assessment     Good tolerance to treatment today. Added standing therex for strengthening as pt continues with difficulty with bed mobility. C/o mm fatigue, but able to perform. Added stretches for low back with good relief reported. Good tolerance to bridges with Pilates [place] today.     Isael is progressing well towards her goals.   Pt prognosis is Good.     Pt will continue to benefit from skilled outpatient physical therapy to address the deficits listed in the problem list box on initial evaluation, provide pt/family education and to maximize pt's level of independence in the home and community environment.     Pt's spiritual, cultural and educational needs considered and pt agreeable to plan of care and goals.     Anticipated barriers to physical therapy: none    Goals: IE 10/13/2020  Short Term Goals (4 Weeks):  1. Pt able to sit >=30 minutes with leisure activities with <6/10 pain. Progressing, not met  2.  Pt able to transfer in/out of chairs of various heights with <6/10 pain. Progressing, not met  3. Pt able to ascend/descend curb, independently, 1 handrail, with <6/10 pain. Progressing, not met  4. Pt will demonstrate increased strength by a half grade to allow patient to ascend stairs to her home with increased ease. Progressing, not met     Long Term Goals (12 Weeks):  1. Pt able to ascend/descend 1 flight of stairs, independently, 1 handrail, with <2/10 pain. Progressing, not met  2.Pt is independent with all bed mobility. Progressing, not met  3. Pt able to sleep a full night  without pain disturbance. Progressing, not met  4. Pt demonstrates strength WFL / WNL to allow patient to perform home and community mobility with min to no difficulty. Progressing, not met  5.Pt will be independent with HEP and self management of symptoms. Progressing, not met   6. Pt to demonstrate improved functional ability with FOTO limitation <=29% disability. Progressing, not met      Plan   Plan of care Certification: 10/13/2020 to 1/8/2021.     Continue plan of care with focus on B LE strengthening and manual interventions as needed for pain reduction.     Triny Mead, PT

## 2020-11-30 ENCOUNTER — CLINICAL SUPPORT (OUTPATIENT)
Dept: REHABILITATION | Facility: OTHER | Age: 75
End: 2020-11-30
Payer: MEDICARE

## 2020-11-30 DIAGNOSIS — R26.89 ANTALGIC GAIT: ICD-10-CM

## 2020-11-30 DIAGNOSIS — Z91.81 AT RISK FOR FALLS: ICD-10-CM

## 2020-11-30 DIAGNOSIS — M25.552 ACUTE PAIN OF LEFT HIP: Primary | ICD-10-CM

## 2020-11-30 DIAGNOSIS — R29.898 WEAKNESS OF BOTH HIPS: ICD-10-CM

## 2020-11-30 PROCEDURE — 97110 THERAPEUTIC EXERCISES: CPT | Mod: PN | Performed by: PHYSICAL THERAPIST

## 2020-11-30 NOTE — PROGRESS NOTES
"  Physical Therapy Treatment Note     Name: Isael RAM [facility] Number: [id number]    Therapy Diagnosis:   Encounter Diagnoses   Name Primary?    Acute pain of left hip Yes    Antalgic gait     At risk for falls     Weakness of both hips      Physician: Janett Montoya MD    Visit Date: 2020    Physician Orders: PT Eval and Treat   Medical Diagnosis from Referral: M25.552 (ICD-10-CM) - Left hip pain   Evaluation Date: 10/13/2020  Authorization Period Expiration: 2020  Plan of Care Expiration: 2021  Visit # / Visits authorized:       Time In: 1355  Time Out: 1435  Total Billable Time: 40 minutes    Precautions: Standard and Fall    Subjective     Pt reports: low back has been feeling good. She says "I'm feeling it today" about her hip pain.     She was compliant with home exercise program.  Response to previous treatment: she felt tired with the new standing exercises, but felt good relief with the ball.   Functional change: no change    Pain: 5/10   Location: Left lateral hip     Objective     Isael received therapeutic exercises to develop strength, endurance, ROM, flexibility, posture and core stabilization for 40 minutes including:    Hip hiking on 2" step 2 x 10  Standing SLR flex/abd/ext (on 2" step with UE support) 2 x 10    Manual ITB stretch x 2 min in sidelying  Rolling pin STM in sidelying to ITB/TFL x 5 min  BKTC on ball x 3 min  LTR on ball x 3 min  Piriformis stretch 3 x 30"  BKFO with OTB 2 x 10  supine clam 30 x OTB   TrA 5" x 2 min  PPT 5" x 2 min  bridge w/ pilates [place] (abduction) 20x     SL clams 30x OTB   SL reverse clams 30x YTB    Isael received the following manual therapy techniques: Soft tissue Mobilization were applied to the: L for 00 minutes, includin min x C/R with manual TrP release of glutes/piriformis/TFL/psoas    Isael received hot pack for 00 minutes to L hip in hooklying. -- defer per pt request          Home Exercises Provided and " Patient Education Provided     Education provided:   - Therex rationale. Dry needling consent reviewed and signed prior to treatment    Written Home Exercises Provided: yes.  Exercises were reviewed and Isael was able to demonstrate them prior to the end of the session.  Isael demonstrated good  understanding of the education provided.     See EMR under Patient Instructions for exercises provided 10/13/2020.    Assessment     Overall good tolerance to treatment. Pt reports mm fatigue with standing therex, but is able to complete. Verbal cuing to avoid hip ER with standing abd provided.     Isael is progressing well towards her goals.   Pt prognosis is Good.     Pt will continue to benefit from skilled outpatient physical therapy to address the deficits listed in the problem list box on initial evaluation, provide pt/family education and to maximize pt's level of independence in the home and community environment.     Pt's spiritual, cultural and educational needs considered and pt agreeable to plan of care and goals.     Anticipated barriers to physical therapy: none    Goals: IE 10/13/2020  Short Term Goals (4 Weeks):  1. Pt able to sit >=30 minutes with leisure activities with <6/10 pain. Progressing, not met  2.  Pt able to transfer in/out of chairs of various heights with <6/10 pain. Progressing, not met  3. Pt able to ascend/descend curb, independently, 1 handrail, with <6/10 pain. Progressing, not met  4. Pt will demonstrate increased strength by a half grade to allow patient to ascend stairs to her home with increased ease. Progressing, not met     Long Term Goals (12 Weeks):  1. Pt able to ascend/descend 1 flight of stairs, independently, 1 handrail, with <2/10 pain. Progressing, not met  2.Pt is independent with all bed mobility. Progressing, not met  3. Pt able to sleep a full night without pain disturbance. Progressing, not met  4. Pt demonstrates strength WFL / WNL to allow patient to perform home and  community mobility with min to no difficulty. Progressing, not met  5.Pt will be independent with HEP and self management of symptoms. Progressing, not met   6. Pt to demonstrate improved functional ability with FOTO limitation <=29% disability. Progressing, not met      Plan   Plan of care Certification: 10/13/2020 to 1/8/2021.     Continue plan of care with focus on B LE strengthening and manual interventions as needed for pain reduction.     Triny Mead, PT

## 2020-12-02 ENCOUNTER — CLINICAL SUPPORT (OUTPATIENT)
Dept: REHABILITATION | Facility: OTHER | Age: 75
End: 2020-12-02
Payer: MEDICARE

## 2020-12-02 DIAGNOSIS — Z91.81 AT RISK FOR FALLS: ICD-10-CM

## 2020-12-02 DIAGNOSIS — R29.898 WEAKNESS OF BOTH HIPS: ICD-10-CM

## 2020-12-02 DIAGNOSIS — M25.552 ACUTE PAIN OF LEFT HIP: ICD-10-CM

## 2020-12-02 DIAGNOSIS — R26.89 ANTALGIC GAIT: ICD-10-CM

## 2020-12-02 PROCEDURE — 97110 THERAPEUTIC EXERCISES: CPT | Mod: PN

## 2020-12-02 NOTE — PROGRESS NOTES
"  Physical Therapy Treatment Note     Name: Isael RAM [facility] Number: [id number]    Therapy Diagnosis:   Encounter Diagnoses   Name Primary?    Acute pain of left hip     Antalgic gait     At risk for falls     Weakness of both hips      Physician: Janett Montoya MD    Visit Date: 2020    Physician Orders: PT Eval and Treat   Medical Diagnosis from Referral: M25.552 (ICD-10-CM) - Left hip pain   Evaluation Date: 10/13/2020  Authorization Period Expiration: 2020  Plan of Care Expiration: 2021  Visit # / Visits authorized:       Time In: 1:55  Time Out: 2:42  Total Billable Time: 47 minutes    Precautions: Standard and Fall    Subjective     Pt reports: c/c is neck stiffness and pain today. "My leg feels better but it still bothers me a little bit at night, but not like before."    She was compliant with home exercise program.  Response to previous treatment: she felt tired with the new standing exercises, but felt good relief with the ball.   Functional change: no change    Pain: 10   Location: Left lateral hip     Objective     Isael received therapeutic exercises to develop strength, endurance, ROM, flexibility, posture and core stabilization for 47 minutes including:    Hip hiking on 2" step 2 x 10  Standing SLR flex/abd/ext (on 2" step with UE support) 2 x 10    Manual ITB stretch x 2 min in sidelying  Rolling pin STM in sidelying to ITB/TFL x 5 min  BKTC on ball x 3 min  LTR on ball x 3 min  Piriformis stretch 3 x 30"  BKFO with OTB 2 x 10  supine clam 30 x OTB   TrA 5" x 2 min  PPT 5" x 2 min  bridge w/ pilates [place] (abduction) 20x     SL clams 30x OTB   SL reverse clams 30x YTB    Isael received the following manual therapy techniques: Soft tissue Mobilization were applied to the: L for 00 minutes, includin min x C/R with manual TrP release of glutes/piriformis/TFL/psoas    Isael received hot pack for 00 minutes to L hip in hooklying. -- defer per pt " request          Home Exercises Provided and Patient Education Provided     Education provided:   - Therex rationale. Dry needling consent reviewed and signed prior to treatment    Written Home Exercises Provided: yes.  Exercises were reviewed and Isael was able to demonstrate them prior to the end of the session.  Isael demonstrated good  understanding of the education provided.     See EMR under Patient Instructions for exercises provided 10/13/2020.    Assessment     Able to resume clams and reverse clams to progress glute med strengthening. Good tolerance but quick to fatigue.    Isael is progressing well towards her goals.   Pt prognosis is Good.     Pt will continue to benefit from skilled outpatient physical therapy to address the deficits listed in the problem list box on initial evaluation, provide pt/family education and to maximize pt's level of independence in the home and community environment.     Pt's spiritual, cultural and educational needs considered and pt agreeable to plan of care and goals.     Anticipated barriers to physical therapy: none    Goals: IE 10/13/2020  Short Term Goals (4 Weeks):  1. Pt able to sit >=30 minutes with leisure activities with <6/10 pain. Progressing, not met  2.  Pt able to transfer in/out of chairs of various heights with <6/10 pain. Progressing, not met  3. Pt able to ascend/descend curb, independently, 1 handrail, with <6/10 pain. Progressing, not met  4. Pt will demonstrate increased strength by a half grade to allow patient to ascend stairs to her home with increased ease. Progressing, not met     Long Term Goals (12 Weeks):  1. Pt able to ascend/descend 1 flight of stairs, independently, 1 handrail, with <2/10 pain. Progressing, not met  2.Pt is independent with all bed mobility. Progressing, not met  3. Pt able to sleep a full night without pain disturbance. Progressing, not met  4. Pt demonstrates strength WFL / WNL to allow patient to perform home and  community mobility with min to no difficulty. Progressing, not met  5.Pt will be independent with HEP and self management of symptoms. Progressing, not met   6. Pt to demonstrate improved functional ability with FOTO limitation <=29% disability. Progressing, not met      Plan   Plan of care Certification: 10/13/2020 to 1/8/2021.     Continue plan of care with focus on B LE strengthening and manual interventions as needed for pain reduction.     Emily Suárez, PT

## 2020-12-07 ENCOUNTER — TELEPHONE (OUTPATIENT)
Dept: REHABILITATION | Facility: OTHER | Age: 75
End: 2020-12-07

## 2020-12-07 NOTE — TELEPHONE ENCOUNTER
Pt called to cancel appointment today as she is experiencing increased neck pain from a muscle spasm that started several days ago. She says that she just took a muscle relaxor and is not currently safe to drive to her 1:45 appointment with us today.     When offered her to come to clinic today for palliative care and a cervical screen she declined, stating that she would stay home today but contact her MD if symptoms do not resolve in another day or 2.    Emily Suárez, PT   12/7/2020

## 2020-12-10 ENCOUNTER — PATIENT OUTREACH (OUTPATIENT)
Dept: ADMINISTRATIVE | Facility: OTHER | Age: 75
End: 2020-12-10

## 2020-12-10 DIAGNOSIS — E11.9 TYPE 2 DIABETES MELLITUS WITHOUT COMPLICATION, UNSPECIFIED WHETHER LONG TERM INSULIN USE: Primary | ICD-10-CM

## 2020-12-11 ENCOUNTER — PATIENT MESSAGE (OUTPATIENT)
Dept: OTHER | Facility: OTHER | Age: 75
End: 2020-12-11

## 2020-12-11 NOTE — PROGRESS NOTES
Care Everywhere:   Immunization: updated  Health Maintenance: updated  Media Review:   Legacy Review:   Order placed: hemoglobin   Upcoming appts:

## 2020-12-14 ENCOUNTER — DOCUMENTATION ONLY (OUTPATIENT)
Dept: REHABILITATION | Facility: OTHER | Age: 75
End: 2020-12-14

## 2020-12-14 ENCOUNTER — OFFICE VISIT (OUTPATIENT)
Dept: OPTOMETRY | Facility: CLINIC | Age: 75
End: 2020-12-14
Payer: MEDICARE

## 2020-12-14 DIAGNOSIS — Z96.1 PSEUDOPHAKIA OF BOTH EYES: ICD-10-CM

## 2020-12-14 DIAGNOSIS — H52.7 REFRACTIVE ERROR: ICD-10-CM

## 2020-12-14 DIAGNOSIS — H04.123 BILATERAL DRY EYES: Primary | ICD-10-CM

## 2020-12-14 PROCEDURE — 99999 PR PBB SHADOW E&M-EST. PATIENT-LVL III: CPT | Mod: PBBFAC,,, | Performed by: OPTOMETRIST

## 2020-12-14 PROCEDURE — 99213 OFFICE O/P EST LOW 20 MIN: CPT | Mod: PBBFAC,PO | Performed by: OPTOMETRIST

## 2020-12-14 PROCEDURE — 92014 COMPRE OPH EXAM EST PT 1/>: CPT | Mod: S$PBB,,, | Performed by: OPTOMETRIST

## 2020-12-14 PROCEDURE — 92015 PR REFRACTION: ICD-10-PCS | Mod: ,,, | Performed by: OPTOMETRIST

## 2020-12-14 PROCEDURE — 92014 PR EYE EXAM, EST PATIENT,COMPREHESV: ICD-10-PCS | Mod: S$PBB,,, | Performed by: OPTOMETRIST

## 2020-12-14 PROCEDURE — 99999 PR PBB SHADOW E&M-EST. PATIENT-LVL III: ICD-10-PCS | Mod: PBBFAC,,, | Performed by: OPTOMETRIST

## 2020-12-14 PROCEDURE — 92015 DETERMINE REFRACTIVE STATE: CPT | Mod: ,,, | Performed by: OPTOMETRIST

## 2020-12-14 NOTE — PROGRESS NOTES
Physical Therapy Treatment Note    Patient was scheduled for a follow up appointment for physical therapy at [facility] for 12/14/2020. Pt failed to appear for appointment without prior notification for today.       Thank you for your referral.    Sincerely,    Emily Suárez, PT

## 2020-12-14 NOTE — PROGRESS NOTES
BARI CERNA 09/2021  Glasses about 2 yrs. Old and patient has noticed some   trouble near, distance seems fine.  Patient would like an updated RX for   new glasses today.  Using Systane BID OU with relief for dryness.       Last edited by Jonathan Juarez, OD on 12/14/2020 11:32 AM. (History)              Assessment /Plan     For exam results, see Encounter Report.    Bilateral dry eyes    Pseudophakia of both eyes    Refractive error      1. Recommend continue artificial tears. 1 drop 2x per day. Chronicity of disease and treatment discussed.  2. Monitor condition. Patient to report any changes. RTC 1 year recheck.  3. New Spec Rx given. Different lens options discussed with patient. RTC 1 year full exam.

## 2020-12-15 ENCOUNTER — PATIENT MESSAGE (OUTPATIENT)
Dept: OTHER | Facility: OTHER | Age: 75
End: 2020-12-15

## 2020-12-21 ENCOUNTER — CLINICAL SUPPORT (OUTPATIENT)
Dept: REHABILITATION | Facility: OTHER | Age: 75
End: 2020-12-21
Payer: MEDICARE

## 2020-12-21 DIAGNOSIS — M25.552 ACUTE PAIN OF LEFT HIP: Primary | ICD-10-CM

## 2020-12-21 DIAGNOSIS — Z91.81 AT RISK FOR FALLS: ICD-10-CM

## 2020-12-21 DIAGNOSIS — R26.89 ANTALGIC GAIT: ICD-10-CM

## 2020-12-21 DIAGNOSIS — R29.898 WEAKNESS OF BOTH HIPS: ICD-10-CM

## 2020-12-21 PROCEDURE — 97110 THERAPEUTIC EXERCISES: CPT | Mod: PN | Performed by: PHYSICAL THERAPIST

## 2020-12-21 NOTE — PROGRESS NOTES
"  Physical Therapy Treatment Note     Name: Isael RAM [facility] Number: [id number]    Therapy Diagnosis:   Encounter Diagnoses   Name Primary?    Acute pain of left hip Yes    Antalgic gait     At risk for falls     Weakness of both hips      Physician: Janett Montoya MD    Visit Date: 2020    Physician Orders: PT Eval and Treat   Medical Diagnosis from Referral: M25.552 (ICD-10-CM) - Left hip pain   Evaluation Date: 10/13/2020  Authorization Period Expiration: 2020  Plan of Care Expiration: 2021  Visit # / Visits authorized: 10/ 20      Time In: 1315 (pt late)  Time Out: 1345  Total Billable Time: 30 minutes    Precautions: Standard and Fall    Subjective     Pt reports: she's had a bad week with spasms in her neck. She says hip had been feeling better, but since she hasn't been able to do exercises because of her neck it's been creeping back up.     She was compliant with home exercise program.  Response to previous treatment: she felt tired with the new standing exercises, but felt good relief with the ball.   Functional change: no change    Pain: 10   Location: Left lateral hip     Objective     Isael received therapeutic exercises to develop strength, endurance, ROM, flexibility, posture and core stabilization for 30 minutes including:    Hip hiking on 2" step 2 x 10  Standing SLR flex/abd/ext (on 2" step with UE support) 2 x 10    Manual ITB stretch x 2 min in sidelying  Rolling pin STM in sidelying to ITB/TFL x 5 min  BKTC on ball x 3 min  LTR on ball x 3 min  Piriformis stretch 3 x 30"  BKFO with OTB 2 x 10  supine clam 30 x OTB   TrA 5" x 2 min  PPT 5" x 2 min  bridge w/ pilates [place] (abduction) 20x     SL clams 30x OTB   SL reverse clams 30x YTB    Isael received the following manual therapy techniques: Soft tissue Mobilization were applied to the: L for 00 minutes, includin min x C/R with manual TrP release of glutes/piriformis/TFL/psoas    Isael received hot " pack for 00 minutes to L hip in hooklying. -- defer per pt request          Home Exercises Provided and Patient Education Provided     Education provided:   - Therex rationale. Dry needling consent reviewed and signed prior to treatment    Written Home Exercises Provided: yes.  Exercises were reviewed and Isael was able to demonstrate them prior to the end of the session.  Isael demonstrated good  understanding of the education provided.     See EMR under Patient Instructions for exercises provided 10/13/2020.    Assessment     Pt returns after absence due to report of neck spasms. Increased verbal cuing indicative of poor compliance with HEP with pt acknowledges. Good tolerance to therex with report of decreased stiffness following treatment.      Isael is progressing well towards her goals.   Pt prognosis is Good.     Pt will continue to benefit from skilled outpatient physical therapy to address the deficits listed in the problem list box on initial evaluation, provide pt/family education and to maximize pt's level of independence in the home and community environment.     Pt's spiritual, cultural and educational needs considered and pt agreeable to plan of care and goals.     Anticipated barriers to physical therapy: none    Goals: IE 10/13/2020  Short Term Goals (4 Weeks):  1. Pt able to sit >=30 minutes with leisure activities with <6/10 pain. Progressing, not met  2.  Pt able to transfer in/out of chairs of various heights with <6/10 pain. Progressing, not met  3. Pt able to ascend/descend curb, independently, 1 handrail, with <6/10 pain. Progressing, not met  4. Pt will demonstrate increased strength by a half grade to allow patient to ascend stairs to her home with increased ease. Progressing, not met     Long Term Goals (12 Weeks):  1. Pt able to ascend/descend 1 flight of stairs, independently, 1 handrail, with <2/10 pain. Progressing, not met  2.Pt is independent with all bed mobility. Progressing,  not met  3. Pt able to sleep a full night without pain disturbance. Progressing, not met  4. Pt demonstrates strength WFL / WNL to allow patient to perform home and community mobility with min to no difficulty. Progressing, not met  5.Pt will be independent with HEP and self management of symptoms. Progressing, not met   6. Pt to demonstrate improved functional ability with FOTO limitation <=29% disability. Progressing, not met      Plan   Plan of care Certification: 10/13/2020 to 1/8/2021.     Continue plan of care with focus on B LE strengthening and manual interventions as needed for pain reduction.     Triny Mead, PT

## 2020-12-28 ENCOUNTER — CLINICAL SUPPORT (OUTPATIENT)
Dept: REHABILITATION | Facility: OTHER | Age: 75
End: 2020-12-28
Payer: MEDICARE

## 2020-12-28 DIAGNOSIS — Z91.81 AT RISK FOR FALLS: ICD-10-CM

## 2020-12-28 DIAGNOSIS — R29.898 WEAKNESS OF BOTH HIPS: ICD-10-CM

## 2020-12-28 DIAGNOSIS — R26.89 ANTALGIC GAIT: ICD-10-CM

## 2020-12-28 DIAGNOSIS — M25.552 ACUTE PAIN OF LEFT HIP: Primary | ICD-10-CM

## 2020-12-28 PROCEDURE — 97110 THERAPEUTIC EXERCISES: CPT | Mod: PN | Performed by: PHYSICAL THERAPIST

## 2020-12-28 NOTE — PROGRESS NOTES
"  Physical Therapy Treatment Note     Name: Isael Salmon  Clinic Number: [id number]    Therapy Diagnosis:   Encounter Diagnoses   Name Primary?    Acute pain of left hip Yes    Antalgic gait     At risk for falls     Weakness of both hips      Physician: Janett Montoya MD    Visit Date: 12/28/2020    Physician Orders: PT Eval and Treat   Medical Diagnosis from Referral: M25.552 (ICD-10-CM) - Left hip pain   Evaluation Date: 10/13/2020  Authorization Period Expiration: 12/31/2020  Plan of Care Expiration: 1/8/2021  Visit # / Visits authorized: 11/ 20      Time In: 1445 (pt arrived late)   Time Out: 1515  Total Billable Time: 30 minutes    Precautions: Standard and Fall    Subjective     Pt reports: "My leg is feeling better." She says she is now able to sleep much more easily. She says neck is her primary concern, and plans to reach out for referral to Healthy [facility]ck program.     She was compliant with home exercise program.  Response to previous treatment: she felt tired with the new standing exercises, but felt good relief with the ball.   Functional change: no change    Pain: 0/10, 5/10 at worst recently   Location: Left lateral hip     Objective     12/28/2020    Lower Extremity Strength  Right LE   Left LE     Hip flexion: 5/5 Hip flexion: 5/5   Hip Internal Rotation:  5/5 Hip Internal Rotation: 5/5   Hip External Rotation: 5/5 Hip External Rotation: 5/5   Hip extension:  4+/5 Hip extension: 4+/5   Hip abduction: 5/5 Hip abduction: 4+/5   Hip adduction: 4+/5 Hip adduction 4+/5     Flexibility:               Quad: R = mod; L = mild          CMS Impairment/Limitation/Restriction for FOTO Hip Survey    Therapist reviewed FOTO scores for Isael Salmon on 12/28/2020.   FOTO documents entered into [employer] - see Media section.    Evaluation: 33%    Current : 33%    Goal: 29%    Discharge: 33%           Isael received therapeutic exercises to develop strength, endurance, ROM, flexibility, posture and core " "stabilization for 30 minutes including:    Reassessment, review of HEP, discharge  Hip hiking on 2" step 2 x 10  Standing SLR flex/abd/ext (on 2" step with UE support) 2 x 10    Manual ITB stretch x 2 min in sidelying  Rolling pin STM in sidelying to ITB/TFL x 5 min  BKTC on ball x 3 min  LTR on ball x 3 min  Piriformis stretch 3 x 30"  BKFO with OTB 2 x 10  supine clam 30 x OTB   TrA 5" x 2 min  PPT 5" x 2 min  bridge w/ pilates [place] (abduction) 20x     SL clams 30x OTB   SL reverse clams 30x YTB    Isael received the following manual therapy techniques: Soft tissue Mobilization were applied to the: L for 00 minutes, includin min x C/R with manual TrP release of glutes/piriformis/TFL/psoas    Isael received hot pack for 00 minutes to L hip in hooklying. -- defer per pt request          Home Exercises Provided and Patient Education Provided     Education provided:   - Therex rationale. Dry needling consent reviewed and signed prior to treatment    Written Home Exercises Provided: yes.  Exercises were reviewed and Iseal was able to demonstrate them prior to the end of the session.  Isael demonstrated good  understanding of the education provided.     See EMR under Patient Instructions for exercises provided 10/13/2020.    Assessment     Overall pt has made excellent progress, and has met 9/10 therapeutic goals. She reports that she is now able to sleep through the night without being awoke by pain. Continues to report pain with rising after prolonged sitting. Recommend discharge to independent [facility] with recommendation of referral to Healthy Neck program to address chronic neck pain.       Pt's spiritual, cultural and educational needs considered and pt agreeable to plan of care and goals.     Anticipated barriers to physical therapy: none    Goals: IE 10/13/2020  Short Term Goals (4 Weeks):  1. Pt able to sit >=30 minutes with leisure activities with <6/10 pain. met  2.  Pt able to transfer in/out of " chairs of various heights with <6/10 pain. met  3. Pt able to ascend/descend curb, independently, 1 handrail, with <6/10 pain. met  4. Pt will demonstrate increased strength by a half grade to allow patient to ascend stairs to her home with increased ease.  met     Long Term Goals (12 Weeks):  1. Pt able to ascend/descend 1 flight of stairs, independently, 1 handrail, with <2/10 pain. met  2.Pt is independent with all bed mobility. met  3. Pt able to sleep a full night without pain disturbance. met  4. Pt demonstrates strength WFL / WNL to allow patient to perform home and community mobility with min to no difficulty. met  5.Pt will be independent with HEP and self management of symptoms. met   6. Pt to demonstrate improved functional ability with FOTO limitation <=29% disability. Progressing, not met      Plan   Plan of care Certification: 10/13/2020 to 1/8/2021.     Discharge to independent HEP.     Triny Mead, PT

## 2020-12-31 ENCOUNTER — EXTERNAL CHRONIC CARE MANAGEMENT (OUTPATIENT)
Dept: PRIMARY CARE CLINIC | Facility: CLINIC | Age: 75
End: 2020-12-31
Payer: MEDICARE

## 2020-12-31 PROCEDURE — 99490 PR CHRONIC CARE MGMT, 1ST 20 MIN: ICD-10-PCS | Mod: S$PBB,,, | Performed by: INTERNAL MEDICINE

## 2020-12-31 PROCEDURE — 99490 CHRNC CARE MGMT STAFF 1ST 20: CPT | Mod: PBBFAC | Performed by: INTERNAL MEDICINE

## 2020-12-31 PROCEDURE — G2058 CCM ADD 20MIN: HCPCS | Mod: S$PBB,,, | Performed by: INTERNAL MEDICINE

## 2020-12-31 PROCEDURE — G2058 PR CHRON CARE MGMT, EA ADDTL 20 MINS: ICD-10-PCS | Mod: S$PBB,,, | Performed by: INTERNAL MEDICINE

## 2020-12-31 PROCEDURE — 99490 CHRNC CARE MGMT STAFF 1ST 20: CPT | Mod: S$PBB,,, | Performed by: INTERNAL MEDICINE

## 2020-12-31 PROCEDURE — G2058 CCM ADD 20MIN: HCPCS | Mod: PBBFAC,25,27 | Performed by: INTERNAL MEDICINE

## 2021-01-10 ENCOUNTER — IMMUNIZATION (OUTPATIENT)
Dept: INTERNAL MEDICINE | Facility: CLINIC | Age: 76
End: 2021-01-10
Payer: MEDICARE

## 2021-01-10 DIAGNOSIS — Z23 NEED FOR VACCINATION: ICD-10-CM

## 2021-01-10 PROCEDURE — 91300 COVID-19, MRNA, LNP-S, PF, 30 MCG/0.3 ML DOSE VACCINE: CPT | Mod: PBBFAC

## 2021-01-12 ENCOUNTER — OFFICE VISIT (OUTPATIENT)
Dept: INTERNAL MEDICINE | Facility: CLINIC | Age: 76
End: 2021-01-12
Payer: MEDICARE

## 2021-01-12 VITALS
HEART RATE: 76 BPM | OXYGEN SATURATION: 98 % | BODY MASS INDEX: 27.92 KG/M2 | WEIGHT: 167.75 LBS | DIASTOLIC BLOOD PRESSURE: 60 MMHG | SYSTOLIC BLOOD PRESSURE: 110 MMHG

## 2021-01-12 DIAGNOSIS — E55.9 VITAMIN D DEFICIENCY DISEASE: ICD-10-CM

## 2021-01-12 DIAGNOSIS — I10 ESSENTIAL HYPERTENSION: ICD-10-CM

## 2021-01-12 DIAGNOSIS — M10.9 GOUT, ARTHRITIS: ICD-10-CM

## 2021-01-12 DIAGNOSIS — J30.1 ALLERGIC RHINITIS DUE TO POLLEN, UNSPECIFIED SEASONALITY: ICD-10-CM

## 2021-01-12 DIAGNOSIS — E78.5 HYPERLIPIDEMIA, UNSPECIFIED HYPERLIPIDEMIA TYPE: ICD-10-CM

## 2021-01-12 DIAGNOSIS — E53.8 VITAMIN B12 DEFICIENCY: ICD-10-CM

## 2021-01-12 DIAGNOSIS — G89.29 CHRONIC NECK PAIN: Primary | ICD-10-CM

## 2021-01-12 DIAGNOSIS — F39 MOOD DISORDER: ICD-10-CM

## 2021-01-12 DIAGNOSIS — R79.9 ABNORMAL BLOOD CHEMISTRY: ICD-10-CM

## 2021-01-12 DIAGNOSIS — J44.9 CHRONIC OBSTRUCTIVE PULMONARY DISEASE, UNSPECIFIED COPD TYPE: ICD-10-CM

## 2021-01-12 DIAGNOSIS — M54.2 CHRONIC NECK PAIN: Primary | ICD-10-CM

## 2021-01-12 PROBLEM — G44.329 CHRONIC POST-TRAUMATIC HEADACHE, NOT INTRACTABLE: Status: RESOLVED | Noted: 2018-09-19 | Resolved: 2021-01-12

## 2021-01-12 PROCEDURE — 99215 OFFICE O/P EST HI 40 MIN: CPT | Mod: PBBFAC | Performed by: INTERNAL MEDICINE

## 2021-01-12 PROCEDURE — 99999 PR PBB SHADOW E&M-EST. PATIENT-LVL V: CPT | Mod: PBBFAC,,, | Performed by: INTERNAL MEDICINE

## 2021-01-12 PROCEDURE — 99214 PR OFFICE/OUTPT VISIT, EST, LEVL IV, 30-39 MIN: ICD-10-PCS | Mod: S$PBB,,, | Performed by: INTERNAL MEDICINE

## 2021-01-12 PROCEDURE — 99999 PR PBB SHADOW E&M-EST. PATIENT-LVL V: ICD-10-PCS | Mod: PBBFAC,,, | Performed by: INTERNAL MEDICINE

## 2021-01-12 PROCEDURE — 99214 OFFICE O/P EST MOD 30 MIN: CPT | Mod: S$PBB,,, | Performed by: INTERNAL MEDICINE

## 2021-01-13 ENCOUNTER — PATIENT OUTREACH (OUTPATIENT)
Dept: ADMINISTRATIVE | Facility: OTHER | Age: 76
End: 2021-01-13

## 2021-01-14 ENCOUNTER — HOSPITAL ENCOUNTER (OUTPATIENT)
Dept: RADIOLOGY | Facility: HOSPITAL | Age: 76
Discharge: HOME | End: 2021-01-14
Attending: PHYSICIAN ASSISTANT
Payer: MEDICARE

## 2021-01-14 ENCOUNTER — OFFICE VISIT (OUTPATIENT)
Dept: ORTHOPEDICS | Facility: CLINIC | Age: 76
End: 2021-01-14
Payer: MEDICARE

## 2021-01-14 VITALS — HEIGHT: 65 IN | WEIGHT: 167.75 LBS | BODY MASS INDEX: 27.95 KG/M2

## 2021-01-14 DIAGNOSIS — M79.672 LEFT FOOT PAIN: ICD-10-CM

## 2021-01-14 DIAGNOSIS — M79.672 LEFT FOOT PAIN: Primary | ICD-10-CM

## 2021-01-14 DIAGNOSIS — M76.72 PERONEAL TENDINITIS OF LEFT LOWER EXTREMITY: ICD-10-CM

## 2021-01-14 PROCEDURE — 73630 X-RAY EXAM OF FOOT: CPT | Mod: TC,LT

## 2021-01-14 PROCEDURE — 73630 XR FOOT COMPLETE 3 VIEW LEFT: ICD-10-PCS | Mod: 26,LT,, | Performed by: RADIOLOGY

## 2021-01-14 PROCEDURE — 99214 OFFICE O/P EST MOD 30 MIN: CPT | Mod: PBBFAC,25 | Performed by: PHYSICIAN ASSISTANT

## 2021-01-14 PROCEDURE — 99999 PR PBB SHADOW E&M-EST. PATIENT-LVL IV: ICD-10-PCS | Mod: PBBFAC,,, | Performed by: PHYSICIAN ASSISTANT

## 2021-01-14 PROCEDURE — 99213 OFFICE O/P EST LOW 20 MIN: CPT | Mod: S$PBB,,, | Performed by: PHYSICIAN ASSISTANT

## 2021-01-14 PROCEDURE — 99213 PR OFFICE/OUTPT VISIT, EST, LEVL III, 20-29 MIN: ICD-10-PCS | Mod: S$PBB,,, | Performed by: PHYSICIAN ASSISTANT

## 2021-01-14 PROCEDURE — 99999 PR PBB SHADOW E&M-EST. PATIENT-LVL IV: CPT | Mod: PBBFAC,,, | Performed by: PHYSICIAN ASSISTANT

## 2021-01-14 PROCEDURE — 73630 X-RAY EXAM OF FOOT: CPT | Mod: 26,LT,, | Performed by: RADIOLOGY

## 2021-01-14 RX ORDER — NAPROXEN 500 MG/1
500 TABLET ORAL 2 TIMES DAILY WITH MEALS
Qty: 28 TABLET | Refills: 0 | Status: SHIPPED | OUTPATIENT
Start: 2021-01-14 | End: 2021-02-01 | Stop reason: SDUPTHER

## 2021-01-15 DIAGNOSIS — R52 PAIN: Primary | ICD-10-CM

## 2021-01-26 ENCOUNTER — TELEPHONE (OUTPATIENT)
Dept: ORTHOPEDICS | Facility: CLINIC | Age: 76
End: 2021-01-26

## 2021-01-27 ENCOUNTER — OFFICE VISIT (OUTPATIENT)
Dept: ORTHOPEDICS | Facility: CLINIC | Age: 76
End: 2021-01-27
Payer: MEDICARE

## 2021-01-27 ENCOUNTER — HOSPITAL ENCOUNTER (OUTPATIENT)
Dept: RADIOLOGY | Facility: OTHER | Age: 76
Discharge: HOME | End: 2021-01-27
Attending: PLASTIC SURGERY
Payer: MEDICARE

## 2021-01-27 VITALS — WEIGHT: 167 LBS | BODY MASS INDEX: 27.82 KG/M2 | HEIGHT: 65 IN

## 2021-01-27 DIAGNOSIS — M18.11 ARTHRITIS OF CARPOMETACARPAL (CMC) JOINT OF RIGHT THUMB: Primary | ICD-10-CM

## 2021-01-27 DIAGNOSIS — R52 PAIN: ICD-10-CM

## 2021-01-27 PROCEDURE — 73130 XR HAND COMPLETE 3 VIEW RIGHT: ICD-10-PCS | Mod: 26,RT,, | Performed by: RADIOLOGY

## 2021-01-27 PROCEDURE — 73130 X-RAY EXAM OF HAND: CPT | Mod: TC,FY,RT

## 2021-01-27 PROCEDURE — 20600 DRAIN/INJ JOINT/BURSA W/O US: CPT | Mod: PBBFAC | Performed by: PLASTIC SURGERY

## 2021-01-27 PROCEDURE — 20600 DRAIN/INJ JOINT/BURSA W/O US: CPT | Mod: S$PBB,RT,, | Performed by: PLASTIC SURGERY

## 2021-01-27 PROCEDURE — 99214 OFFICE O/P EST MOD 30 MIN: CPT | Mod: PBBFAC,25 | Performed by: PLASTIC SURGERY

## 2021-01-27 PROCEDURE — 99212 PR OFFICE/OUTPT VISIT, EST, LEVL II, 10-19 MIN: ICD-10-PCS | Mod: 25,S$PBB,, | Performed by: PLASTIC SURGERY

## 2021-01-27 PROCEDURE — 99999 PR PBB SHADOW E&M-EST. PATIENT-LVL IV: CPT | Mod: PBBFAC,,, | Performed by: PLASTIC SURGERY

## 2021-01-27 PROCEDURE — 99212 OFFICE O/P EST SF 10 MIN: CPT | Mod: 25,S$PBB,, | Performed by: PLASTIC SURGERY

## 2021-01-27 PROCEDURE — 73130 X-RAY EXAM OF HAND: CPT | Mod: 26,RT,, | Performed by: RADIOLOGY

## 2021-01-27 PROCEDURE — 20600 PR DRAIN/INJECT SMALL JOINT/BURSA: ICD-10-PCS | Mod: S$PBB,RT,, | Performed by: PLASTIC SURGERY

## 2021-01-27 PROCEDURE — 99999 PR PBB SHADOW E&M-EST. PATIENT-LVL IV: ICD-10-PCS | Mod: PBBFAC,,, | Performed by: PLASTIC SURGERY

## 2021-01-27 RX ORDER — TRIAMCINOLONE ACETONIDE 40 MG/ML
40 INJECTION, SUSPENSION INTRA-ARTICULAR; INTRAMUSCULAR
Status: COMPLETED | OUTPATIENT
Start: 2021-01-27 | End: 2021-01-27

## 2021-01-27 RX ADMIN — TRIAMCINOLONE ACETONIDE 40 MG: 40 INJECTION, SUSPENSION INTRA-ARTICULAR; INTRAMUSCULAR at 10:01

## 2021-01-31 ENCOUNTER — EXTERNAL CHRONIC CARE MANAGEMENT (OUTPATIENT)
Dept: PRIMARY CARE CLINIC | Facility: CLINIC | Age: 76
End: 2021-01-31
Payer: MEDICARE

## 2021-01-31 ENCOUNTER — IMMUNIZATION (OUTPATIENT)
Dept: INTERNAL MEDICINE | Facility: CLINIC | Age: 76
End: 2021-01-31
Payer: MEDICARE

## 2021-01-31 DIAGNOSIS — Z23 NEED FOR VACCINATION: Primary | ICD-10-CM

## 2021-01-31 PROCEDURE — 91300 PR SARS-COV- 2 COVID-19 VACCINE, NO PRSV, 30MCG/0.3ML, IM: CPT | Mod: PBBFAC

## 2021-01-31 PROCEDURE — 0002A PR IMMUNIZ ADMIN, SARS-COV-2 COVID-19 VACC, 30MCG/0.3ML, 2ND DOSE: CPT | Mod: PBBFAC

## 2021-01-31 PROCEDURE — 99490 CHRNC CARE MGMT STAFF 1ST 20: CPT | Mod: S$PBB,,, | Performed by: INTERNAL MEDICINE

## 2021-01-31 PROCEDURE — 99490 PR CHRONIC CARE MGMT, 1ST 20 MIN: ICD-10-PCS | Mod: S$PBB,,, | Performed by: INTERNAL MEDICINE

## 2021-01-31 PROCEDURE — 99490 CHRNC CARE MGMT STAFF 1ST 20: CPT | Mod: PBBFAC | Performed by: INTERNAL MEDICINE

## 2021-01-31 RX ADMIN — RNA INGREDIENT BNT-162B2 0.3 ML: 0.23 INJECTION, SUSPENSION INTRAMUSCULAR at 08:01

## 2021-02-01 ENCOUNTER — OFFICE VISIT (OUTPATIENT)
Dept: ORTHOPEDICS | Facility: CLINIC | Age: 76
End: 2021-02-01
Payer: MEDICARE

## 2021-02-01 VITALS — WEIGHT: 167.13 LBS | HEIGHT: 65 IN | BODY MASS INDEX: 27.85 KG/M2

## 2021-02-01 DIAGNOSIS — M79.672 LEFT FOOT PAIN: Primary | ICD-10-CM

## 2021-02-01 PROCEDURE — 99999 PR PBB SHADOW E&M-EST. PATIENT-LVL IV: ICD-10-PCS | Mod: PBBFAC,,, | Performed by: PHYSICIAN ASSISTANT

## 2021-02-01 PROCEDURE — 99213 OFFICE O/P EST LOW 20 MIN: CPT | Mod: S$PBB,,, | Performed by: PHYSICIAN ASSISTANT

## 2021-02-01 PROCEDURE — 99214 OFFICE O/P EST MOD 30 MIN: CPT | Mod: PBBFAC | Performed by: PHYSICIAN ASSISTANT

## 2021-02-01 PROCEDURE — 99213 PR OFFICE/OUTPT VISIT, EST, LEVL III, 20-29 MIN: ICD-10-PCS | Mod: S$PBB,,, | Performed by: PHYSICIAN ASSISTANT

## 2021-02-01 PROCEDURE — 99999 PR PBB SHADOW E&M-EST. PATIENT-LVL IV: CPT | Mod: PBBFAC,,, | Performed by: PHYSICIAN ASSISTANT

## 2021-02-01 RX ORDER — NAPROXEN 500 MG/1
500 TABLET ORAL 2 TIMES DAILY WITH MEALS
Qty: 28 TABLET | Refills: 0 | Status: SHIPPED | OUTPATIENT
Start: 2021-02-01 | End: 2021-03-03

## 2021-02-05 ENCOUNTER — CLINICAL SUPPORT (OUTPATIENT)
Dept: REHABILITATION | Facility: OTHER | Age: 76
End: 2021-02-05
Attending: INTERNAL MEDICINE
Payer: MEDICARE

## 2021-02-05 DIAGNOSIS — M54.2 CHRONIC NECK PAIN: ICD-10-CM

## 2021-02-05 DIAGNOSIS — R29.898 DECREASED STRENGTH OF TRUNK AND BACK: ICD-10-CM

## 2021-02-05 DIAGNOSIS — G89.29 CHRONIC NECK PAIN: ICD-10-CM

## 2021-02-05 DIAGNOSIS — R29.3 POOR POSTURE: ICD-10-CM

## 2021-02-05 DIAGNOSIS — M53.82 DECREASED ROM OF INTERVERTEBRAL DISCS OF CERVICAL SPINE: ICD-10-CM

## 2021-02-05 PROCEDURE — 97161 PT EVAL LOW COMPLEX 20 MIN: CPT

## 2021-02-05 PROCEDURE — 97110 THERAPEUTIC EXERCISES: CPT

## 2021-02-06 ENCOUNTER — HOSPITAL ENCOUNTER (OUTPATIENT)
Dept: RADIOLOGY | Facility: OTHER | Age: 76
Discharge: HOME | End: 2021-02-06
Attending: PHYSICIAN ASSISTANT
Payer: MEDICARE

## 2021-02-06 DIAGNOSIS — M79.672 LEFT FOOT PAIN: ICD-10-CM

## 2021-02-06 PROCEDURE — 73718 MRI FOOT (MIDFOOT) LEFT WITHOUT CONTRAST: ICD-10-PCS | Mod: 26,LT,, | Performed by: RADIOLOGY

## 2021-02-06 PROCEDURE — 73718 MRI LOWER EXTREMITY W/O DYE: CPT | Mod: 26,LT,, | Performed by: RADIOLOGY

## 2021-02-06 PROCEDURE — 73718 MRI LOWER EXTREMITY W/O DYE: CPT | Mod: TC,LT

## 2021-02-08 ENCOUNTER — TELEPHONE (OUTPATIENT)
Dept: ORTHOPEDICS | Facility: CLINIC | Age: 76
End: 2021-02-08

## 2021-02-08 DIAGNOSIS — M79.672 LEFT FOOT PAIN: Primary | ICD-10-CM

## 2021-02-08 DIAGNOSIS — M76.72 PERONEAL TENDINITIS OF LEFT LOWER EXTREMITY: ICD-10-CM

## 2021-02-08 PROBLEM — R29.898 DECREASED STRENGTH OF TRUNK AND BACK: Status: ACTIVE | Noted: 2021-02-08

## 2021-02-08 PROBLEM — R29.3 POOR POSTURE: Status: ACTIVE | Noted: 2021-02-08

## 2021-02-08 PROBLEM — M53.82 DECREASED ROM OF INTERVERTEBRAL DISCS OF CERVICAL SPINE: Status: ACTIVE | Noted: 2021-02-05

## 2021-02-09 ENCOUNTER — CLINICAL SUPPORT (OUTPATIENT)
Dept: REHABILITATION | Facility: OTHER | Age: 76
End: 2021-02-09
Attending: INTERNAL MEDICINE
Payer: MEDICARE

## 2021-02-09 DIAGNOSIS — R29.898 DECREASED STRENGTH OF TRUNK AND BACK: ICD-10-CM

## 2021-02-09 DIAGNOSIS — R29.3 POOR POSTURE: Primary | ICD-10-CM

## 2021-02-09 DIAGNOSIS — M53.82 DECREASED ROM OF INTERVERTEBRAL DISCS OF CERVICAL SPINE: ICD-10-CM

## 2021-02-09 PROCEDURE — 97110 THERAPEUTIC EXERCISES: CPT

## 2021-02-10 ENCOUNTER — CLINICAL SUPPORT (OUTPATIENT)
Dept: REHABILITATION | Facility: OTHER | Age: 76
End: 2021-02-10
Payer: MEDICARE

## 2021-02-10 DIAGNOSIS — M79.672 LEFT FOOT PAIN: ICD-10-CM

## 2021-02-10 DIAGNOSIS — M76.72 PERONEAL TENDINITIS OF LEFT LOWER EXTREMITY: ICD-10-CM

## 2021-02-10 PROCEDURE — 97161 PT EVAL LOW COMPLEX 20 MIN: CPT | Mod: PN

## 2021-02-10 PROCEDURE — 97530 THERAPEUTIC ACTIVITIES: CPT | Mod: PN

## 2021-02-12 ENCOUNTER — CLINICAL SUPPORT (OUTPATIENT)
Dept: REHABILITATION | Facility: OTHER | Age: 76
End: 2021-02-12
Attending: INTERNAL MEDICINE
Payer: MEDICARE

## 2021-02-12 DIAGNOSIS — M53.82 DECREASED ROM OF INTERVERTEBRAL DISCS OF CERVICAL SPINE: ICD-10-CM

## 2021-02-12 DIAGNOSIS — R29.3 POOR POSTURE: Primary | ICD-10-CM

## 2021-02-12 DIAGNOSIS — R29.898 DECREASED STRENGTH OF TRUNK AND BACK: ICD-10-CM

## 2021-02-12 PROCEDURE — 97110 THERAPEUTIC EXERCISES: CPT

## 2021-02-12 PROCEDURE — 97140 MANUAL THERAPY 1/> REGIONS: CPT

## 2021-02-19 ENCOUNTER — CLINICAL SUPPORT (OUTPATIENT)
Dept: REHABILITATION | Facility: OTHER | Age: 76
End: 2021-02-19
Attending: INTERNAL MEDICINE
Payer: MEDICARE

## 2021-02-19 DIAGNOSIS — R29.898 DECREASED STRENGTH OF TRUNK AND BACK: ICD-10-CM

## 2021-02-19 DIAGNOSIS — R29.3 POOR POSTURE: Primary | ICD-10-CM

## 2021-02-19 DIAGNOSIS — M53.82 DECREASED ROM OF INTERVERTEBRAL DISCS OF CERVICAL SPINE: ICD-10-CM

## 2021-02-19 PROCEDURE — 97140 MANUAL THERAPY 1/> REGIONS: CPT

## 2021-02-19 PROCEDURE — 97110 THERAPEUTIC EXERCISES: CPT

## 2021-02-26 ENCOUNTER — CLINICAL SUPPORT (OUTPATIENT)
Dept: REHABILITATION | Facility: OTHER | Age: 76
End: 2021-02-26
Attending: INTERNAL MEDICINE
Payer: MEDICARE

## 2021-02-26 DIAGNOSIS — M53.82 DECREASED ROM OF INTERVERTEBRAL DISCS OF CERVICAL SPINE: ICD-10-CM

## 2021-02-26 DIAGNOSIS — R29.898 DECREASED STRENGTH OF TRUNK AND BACK: ICD-10-CM

## 2021-02-26 DIAGNOSIS — R29.3 POOR POSTURE: Primary | ICD-10-CM

## 2021-02-26 PROCEDURE — 97110 THERAPEUTIC EXERCISES: CPT

## 2021-02-28 ENCOUNTER — EXTERNAL CHRONIC CARE MANAGEMENT (OUTPATIENT)
Dept: PRIMARY CARE CLINIC | Facility: CLINIC | Age: 76
End: 2021-02-28
Payer: MEDICARE

## 2021-02-28 PROCEDURE — 99490 CHRNC CARE MGMT STAFF 1ST 20: CPT | Mod: PBBFAC | Performed by: INTERNAL MEDICINE

## 2021-02-28 PROCEDURE — 99490 PR CHRONIC CARE MGMT, 1ST 20 MIN: ICD-10-PCS | Mod: S$PBB,,, | Performed by: INTERNAL MEDICINE

## 2021-02-28 PROCEDURE — 99490 CHRNC CARE MGMT STAFF 1ST 20: CPT | Mod: S$PBB,,, | Performed by: INTERNAL MEDICINE

## 2021-03-02 ENCOUNTER — CLINICAL SUPPORT (OUTPATIENT)
Dept: REHABILITATION | Facility: OTHER | Age: 76
End: 2021-03-02
Payer: MEDICARE

## 2021-03-02 DIAGNOSIS — R26.2 DIFFICULTY WALKING: ICD-10-CM

## 2021-03-02 DIAGNOSIS — M79.672 LEFT FOOT PAIN: ICD-10-CM

## 2021-03-02 PROCEDURE — 97110 THERAPEUTIC EXERCISES: CPT | Mod: PN

## 2021-03-02 PROCEDURE — 97140 MANUAL THERAPY 1/> REGIONS: CPT | Mod: PN

## 2021-03-08 ENCOUNTER — CLINICAL SUPPORT (OUTPATIENT)
Dept: REHABILITATION | Facility: OTHER | Age: 76
End: 2021-03-08
Attending: INTERNAL MEDICINE
Payer: MEDICARE

## 2021-03-08 ENCOUNTER — CLINICAL SUPPORT (OUTPATIENT)
Dept: REHABILITATION | Facility: OTHER | Age: 76
End: 2021-03-08
Payer: MEDICARE

## 2021-03-08 DIAGNOSIS — R29.898 DECREASED STRENGTH OF TRUNK AND BACK: ICD-10-CM

## 2021-03-08 DIAGNOSIS — M53.82 DECREASED ROM OF INTERVERTEBRAL DISCS OF CERVICAL SPINE: ICD-10-CM

## 2021-03-08 DIAGNOSIS — M79.672 LEFT FOOT PAIN: ICD-10-CM

## 2021-03-08 DIAGNOSIS — R26.2 DIFFICULTY WALKING: ICD-10-CM

## 2021-03-08 DIAGNOSIS — R29.3 POOR POSTURE: Primary | ICD-10-CM

## 2021-03-08 PROCEDURE — 97110 THERAPEUTIC EXERCISES: CPT | Mod: CQ

## 2021-03-08 PROCEDURE — 97110 THERAPEUTIC EXERCISES: CPT | Mod: PN,CQ

## 2021-03-08 PROCEDURE — 97140 MANUAL THERAPY 1/> REGIONS: CPT | Mod: PN,CQ

## 2021-03-10 ENCOUNTER — CLINICAL SUPPORT (OUTPATIENT)
Dept: REHABILITATION | Facility: OTHER | Age: 76
End: 2021-03-10
Attending: INTERNAL MEDICINE
Payer: MEDICARE

## 2021-03-10 DIAGNOSIS — R29.3 POOR POSTURE: Primary | ICD-10-CM

## 2021-03-10 DIAGNOSIS — R29.898 DECREASED STRENGTH OF TRUNK AND BACK: ICD-10-CM

## 2021-03-10 DIAGNOSIS — M53.82 DECREASED ROM OF INTERVERTEBRAL DISCS OF CERVICAL SPINE: ICD-10-CM

## 2021-03-10 PROCEDURE — 97110 THERAPEUTIC EXERCISES: CPT

## 2021-03-16 ENCOUNTER — CLINICAL SUPPORT (OUTPATIENT)
Dept: REHABILITATION | Facility: OTHER | Age: 76
End: 2021-03-16
Attending: INTERNAL MEDICINE
Payer: MEDICARE

## 2021-03-16 ENCOUNTER — CLINICAL SUPPORT (OUTPATIENT)
Dept: REHABILITATION | Facility: OTHER | Age: 76
End: 2021-03-16
Payer: MEDICARE

## 2021-03-16 DIAGNOSIS — R29.3 POOR POSTURE: Primary | ICD-10-CM

## 2021-03-16 DIAGNOSIS — M79.672 LEFT FOOT PAIN: ICD-10-CM

## 2021-03-16 DIAGNOSIS — M53.82 DECREASED ROM OF INTERVERTEBRAL DISCS OF CERVICAL SPINE: ICD-10-CM

## 2021-03-16 DIAGNOSIS — R26.2 DIFFICULTY WALKING: ICD-10-CM

## 2021-03-16 DIAGNOSIS — R29.898 DECREASED STRENGTH OF TRUNK AND BACK: ICD-10-CM

## 2021-03-16 PROCEDURE — 97110 THERAPEUTIC EXERCISES: CPT | Mod: PN,CQ

## 2021-03-16 PROCEDURE — 97140 MANUAL THERAPY 1/> REGIONS: CPT | Mod: PN,CQ

## 2021-03-16 PROCEDURE — 97110 THERAPEUTIC EXERCISES: CPT

## 2021-03-18 ENCOUNTER — CLINICAL SUPPORT (OUTPATIENT)
Dept: REHABILITATION | Facility: OTHER | Age: 76
End: 2021-03-18
Attending: INTERNAL MEDICINE
Payer: MEDICARE

## 2021-03-18 ENCOUNTER — PATIENT MESSAGE (OUTPATIENT)
Dept: RESEARCH | Facility: HOSPITAL | Age: 76
End: 2021-03-18

## 2021-03-18 DIAGNOSIS — R29.898 DECREASED STRENGTH OF TRUNK AND BACK: ICD-10-CM

## 2021-03-18 DIAGNOSIS — M53.82 DECREASED ROM OF INTERVERTEBRAL DISCS OF CERVICAL SPINE: ICD-10-CM

## 2021-03-18 DIAGNOSIS — R29.3 POOR POSTURE: Primary | ICD-10-CM

## 2021-03-18 PROCEDURE — 97110 THERAPEUTIC EXERCISES: CPT | Mod: CQ

## 2021-03-26 ENCOUNTER — PATIENT MESSAGE (OUTPATIENT)
Dept: RESEARCH | Facility: HOSPITAL | Age: 76
End: 2021-03-26

## 2021-03-30 ENCOUNTER — CLINICAL SUPPORT (OUTPATIENT)
Dept: REHABILITATION | Facility: OTHER | Age: 76
End: 2021-03-30
Payer: MEDICARE

## 2021-03-30 DIAGNOSIS — R26.2 DIFFICULTY WALKING: ICD-10-CM

## 2021-03-30 DIAGNOSIS — M79.672 LEFT FOOT PAIN: Primary | ICD-10-CM

## 2021-03-30 PROCEDURE — 97140 MANUAL THERAPY 1/> REGIONS: CPT | Mod: PN

## 2021-03-30 PROCEDURE — 97110 THERAPEUTIC EXERCISES: CPT | Mod: PN

## 2021-03-31 ENCOUNTER — EXTERNAL CHRONIC CARE MANAGEMENT (OUTPATIENT)
Dept: PRIMARY CARE CLINIC | Facility: CLINIC | Age: 76
End: 2021-03-31
Payer: MEDICARE

## 2021-03-31 DIAGNOSIS — I73.9 PVD (PERIPHERAL VASCULAR DISEASE): ICD-10-CM

## 2021-03-31 DIAGNOSIS — Z86.39 HISTORY OF HYPERLIPIDEMIA: ICD-10-CM

## 2021-03-31 DIAGNOSIS — G45.9 TRANSIENT CEREBRAL ISCHEMIA, UNSPECIFIED TYPE: ICD-10-CM

## 2021-03-31 DIAGNOSIS — M10.9 GOUT, ARTHRITIS: ICD-10-CM

## 2021-03-31 PROCEDURE — 99490 CHRNC CARE MGMT STAFF 1ST 20: CPT | Mod: PBBFAC | Performed by: INTERNAL MEDICINE

## 2021-03-31 PROCEDURE — 99490 CHRNC CARE MGMT STAFF 1ST 20: CPT | Mod: S$PBB,,, | Performed by: INTERNAL MEDICINE

## 2021-03-31 PROCEDURE — 99490 PR CHRONIC CARE MGMT, 1ST 20 MIN: ICD-10-PCS | Mod: S$PBB,,, | Performed by: INTERNAL MEDICINE

## 2021-04-05 ENCOUNTER — OFFICE VISIT (OUTPATIENT)
Dept: ORTHOPEDICS | Facility: CLINIC | Age: 76
End: 2021-04-05
Payer: MEDICARE

## 2021-04-05 DIAGNOSIS — M76.72 PERONEAL TENDINITIS OF LEFT LOWER EXTREMITY: Primary | ICD-10-CM

## 2021-04-05 PROCEDURE — 99999 PR PBB SHADOW E&M-EST. PATIENT-LVL II: CPT | Mod: PBBFAC,,, | Performed by: ORTHOPAEDIC SURGERY

## 2021-04-05 PROCEDURE — 99999 PR PBB SHADOW E&M-EST. PATIENT-LVL II: ICD-10-PCS | Mod: PBBFAC,,, | Performed by: ORTHOPAEDIC SURGERY

## 2021-04-05 PROCEDURE — 20551 PR INJECT TENDON ORIGIN/INSERT: ICD-10-PCS | Mod: S$PBB,LT,, | Performed by: ORTHOPAEDIC SURGERY

## 2021-04-05 PROCEDURE — 99212 OFFICE O/P EST SF 10 MIN: CPT | Mod: PBBFAC,25 | Performed by: ORTHOPAEDIC SURGERY

## 2021-04-05 PROCEDURE — 20551 NJX 1 TENDON ORIGIN/INSJ: CPT | Mod: PBBFAC | Performed by: ORTHOPAEDIC SURGERY

## 2021-04-05 PROCEDURE — 99213 PR OFFICE/OUTPT VISIT, EST, LEVL III, 20-29 MIN: ICD-10-PCS | Mod: 25,S$PBB,, | Performed by: ORTHOPAEDIC SURGERY

## 2021-04-05 PROCEDURE — 99213 OFFICE O/P EST LOW 20 MIN: CPT | Mod: 25,S$PBB,, | Performed by: ORTHOPAEDIC SURGERY

## 2021-04-05 PROCEDURE — 20551 NJX 1 TENDON ORIGIN/INSJ: CPT | Mod: S$PBB,LT,, | Performed by: ORTHOPAEDIC SURGERY

## 2021-04-05 RX ORDER — LOSARTAN POTASSIUM 50 MG/1
TABLET ORAL
Qty: 180 TABLET | Refills: 2 | Status: SHIPPED | OUTPATIENT
Start: 2021-04-05 | End: 2022-04-02

## 2021-04-05 RX ORDER — ESCITALOPRAM OXALATE 10 MG/1
TABLET ORAL
Qty: 90 TABLET | Refills: 3 | Status: SHIPPED | OUTPATIENT
Start: 2021-04-05 | End: 2022-04-12

## 2021-04-05 RX ORDER — METHYLPREDNISOLONE ACETATE 80 MG/ML
80 INJECTION, SUSPENSION INTRA-ARTICULAR; INTRALESIONAL; INTRAMUSCULAR; SOFT TISSUE
Status: COMPLETED | OUTPATIENT
Start: 2021-04-05 | End: 2021-04-05

## 2021-04-05 RX ADMIN — METHYLPREDNISOLONE ACETATE 80 MG: 80 INJECTION, SUSPENSION INTRA-ARTICULAR; INTRALESIONAL; INTRAMUSCULAR; SOFT TISSUE at 03:04

## 2021-04-09 ENCOUNTER — CLINICAL SUPPORT (OUTPATIENT)
Dept: REHABILITATION | Facility: OTHER | Age: 76
End: 2021-04-09
Attending: INTERNAL MEDICINE
Payer: MEDICARE

## 2021-04-09 DIAGNOSIS — R29.898 DECREASED STRENGTH OF TRUNK AND BACK: ICD-10-CM

## 2021-04-09 DIAGNOSIS — M53.82 DECREASED ROM OF INTERVERTEBRAL DISCS OF CERVICAL SPINE: ICD-10-CM

## 2021-04-09 DIAGNOSIS — R29.3 POOR POSTURE: Primary | ICD-10-CM

## 2021-04-12 ENCOUNTER — CLINICAL SUPPORT (OUTPATIENT)
Dept: REHABILITATION | Facility: OTHER | Age: 76
End: 2021-04-12
Attending: INTERNAL MEDICINE
Payer: MEDICARE

## 2021-04-12 DIAGNOSIS — M53.82 DECREASED ROM OF INTERVERTEBRAL DISCS OF CERVICAL SPINE: ICD-10-CM

## 2021-04-12 DIAGNOSIS — R29.3 POOR POSTURE: Primary | ICD-10-CM

## 2021-04-12 DIAGNOSIS — R29.898 DECREASED STRENGTH OF TRUNK AND BACK: ICD-10-CM

## 2021-04-12 PROCEDURE — 97110 THERAPEUTIC EXERCISES: CPT

## 2021-04-12 PROCEDURE — 97750 PHYSICAL PERFORMANCE TEST: CPT

## 2021-04-14 RX ORDER — FOLIC ACID-PYRIDOXINE-CYANOCOBALAMIN TAB 2.5-25-2 MG 2.5-25-2 MG
TAB ORAL
Qty: 30 TABLET | Refills: 4 | Status: SHIPPED | OUTPATIENT
Start: 2021-04-14 | End: 2021-05-11 | Stop reason: SDUPTHER

## 2021-04-16 ENCOUNTER — CLINICAL SUPPORT (OUTPATIENT)
Dept: REHABILITATION | Facility: OTHER | Age: 76
End: 2021-04-16
Attending: INTERNAL MEDICINE
Payer: MEDICARE

## 2021-04-16 DIAGNOSIS — R29.3 POOR POSTURE: Primary | ICD-10-CM

## 2021-04-16 DIAGNOSIS — M53.82 DECREASED ROM OF INTERVERTEBRAL DISCS OF CERVICAL SPINE: ICD-10-CM

## 2021-04-16 DIAGNOSIS — R29.898 DECREASED STRENGTH OF TRUNK AND BACK: ICD-10-CM

## 2021-04-16 PROCEDURE — 97110 THERAPEUTIC EXERCISES: CPT

## 2021-04-19 ENCOUNTER — CLINICAL SUPPORT (OUTPATIENT)
Dept: REHABILITATION | Facility: OTHER | Age: 76
End: 2021-04-19
Attending: INTERNAL MEDICINE
Payer: MEDICARE

## 2021-04-19 DIAGNOSIS — R29.3 POOR POSTURE: Primary | ICD-10-CM

## 2021-04-19 DIAGNOSIS — M53.82 DECREASED ROM OF INTERVERTEBRAL DISCS OF CERVICAL SPINE: ICD-10-CM

## 2021-04-19 DIAGNOSIS — R29.898 DECREASED STRENGTH OF TRUNK AND BACK: ICD-10-CM

## 2021-04-19 PROCEDURE — 97110 THERAPEUTIC EXERCISES: CPT | Mod: CQ

## 2021-04-26 ENCOUNTER — CLINICAL SUPPORT (OUTPATIENT)
Dept: REHABILITATION | Facility: OTHER | Age: 76
End: 2021-04-26
Attending: INTERNAL MEDICINE
Payer: MEDICARE

## 2021-04-26 DIAGNOSIS — R29.898 DECREASED STRENGTH OF TRUNK AND BACK: ICD-10-CM

## 2021-04-26 DIAGNOSIS — R29.3 POOR POSTURE: Primary | ICD-10-CM

## 2021-04-26 DIAGNOSIS — M53.82 DECREASED ROM OF INTERVERTEBRAL DISCS OF CERVICAL SPINE: ICD-10-CM

## 2021-04-26 PROCEDURE — 97110 THERAPEUTIC EXERCISES: CPT

## 2021-04-30 ENCOUNTER — CLINICAL SUPPORT (OUTPATIENT)
Dept: REHABILITATION | Facility: OTHER | Age: 76
End: 2021-04-30
Attending: INTERNAL MEDICINE
Payer: MEDICARE

## 2021-04-30 ENCOUNTER — EXTERNAL CHRONIC CARE MANAGEMENT (OUTPATIENT)
Dept: PRIMARY CARE CLINIC | Facility: CLINIC | Age: 76
End: 2021-04-30
Payer: MEDICARE

## 2021-04-30 DIAGNOSIS — M50.30 DDD (DEGENERATIVE DISC DISEASE), CERVICAL: Primary | ICD-10-CM

## 2021-04-30 PROCEDURE — 99490 PR CHRONIC CARE MGMT, 1ST 20 MIN: ICD-10-PCS | Mod: S$PBB,,, | Performed by: INTERNAL MEDICINE

## 2021-04-30 PROCEDURE — 99490 CHRNC CARE MGMT STAFF 1ST 20: CPT | Mod: S$PBB,,, | Performed by: INTERNAL MEDICINE

## 2021-04-30 PROCEDURE — 99490 CHRNC CARE MGMT STAFF 1ST 20: CPT | Mod: PBBFAC | Performed by: INTERNAL MEDICINE

## 2021-04-30 PROCEDURE — 97110 THERAPEUTIC EXERCISES: CPT | Mod: CQ

## 2021-05-04 ENCOUNTER — OFFICE VISIT (OUTPATIENT)
Dept: INTERNAL MEDICINE | Facility: CLINIC | Age: 76
End: 2021-05-04
Payer: MEDICARE

## 2021-05-04 VITALS
BODY MASS INDEX: 28.54 KG/M2 | SYSTOLIC BLOOD PRESSURE: 120 MMHG | OXYGEN SATURATION: 96 % | DIASTOLIC BLOOD PRESSURE: 60 MMHG | HEART RATE: 83 BPM | HEIGHT: 65 IN | WEIGHT: 171.31 LBS

## 2021-05-04 DIAGNOSIS — I10 ESSENTIAL HYPERTENSION: Primary | ICD-10-CM

## 2021-05-04 DIAGNOSIS — I65.21 OCCLUSION AND STENOSIS OF RIGHT CAROTID ARTERY: ICD-10-CM

## 2021-05-04 DIAGNOSIS — F39 MOOD DISORDER: ICD-10-CM

## 2021-05-04 DIAGNOSIS — E55.9 VITAMIN D DEFICIENCY DISEASE: ICD-10-CM

## 2021-05-04 DIAGNOSIS — I77.9 CAROTID ARTERY DISEASE, UNSPECIFIED LATERALITY, UNSPECIFIED TYPE: ICD-10-CM

## 2021-05-04 DIAGNOSIS — M85.89 OTHER SPECIFIED DISORDERS OF BONE DENSITY AND STRUCTURE, MULTIPLE SITES: ICD-10-CM

## 2021-05-04 DIAGNOSIS — J44.9 CHRONIC OBSTRUCTIVE PULMONARY DISEASE, UNSPECIFIED COPD TYPE: ICD-10-CM

## 2021-05-04 DIAGNOSIS — M89.9 BONE DISORDER: ICD-10-CM

## 2021-05-04 DIAGNOSIS — Z12.31 SCREENING MAMMOGRAM, ENCOUNTER FOR: ICD-10-CM

## 2021-05-04 DIAGNOSIS — E53.8 VITAMIN B12 DEFICIENCY: ICD-10-CM

## 2021-05-04 DIAGNOSIS — J30.1 ALLERGIC RHINITIS DUE TO POLLEN, UNSPECIFIED SEASONALITY: ICD-10-CM

## 2021-05-04 DIAGNOSIS — E78.5 HYPERLIPIDEMIA, UNSPECIFIED HYPERLIPIDEMIA TYPE: ICD-10-CM

## 2021-05-04 DIAGNOSIS — M10.9 GOUT, ARTHRITIS: ICD-10-CM

## 2021-05-04 DIAGNOSIS — K59.09 CHRONIC CONSTIPATION: ICD-10-CM

## 2021-05-04 PROCEDURE — 99213 OFFICE O/P EST LOW 20 MIN: CPT | Mod: PBBFAC | Performed by: INTERNAL MEDICINE

## 2021-05-04 PROCEDURE — 99999 PR PBB SHADOW E&M-EST. PATIENT-LVL III: CPT | Mod: PBBFAC,,, | Performed by: INTERNAL MEDICINE

## 2021-05-04 PROCEDURE — 99214 OFFICE O/P EST MOD 30 MIN: CPT | Mod: S$PBB,,, | Performed by: INTERNAL MEDICINE

## 2021-05-04 PROCEDURE — 99214 PR OFFICE/OUTPT VISIT, EST, LEVL IV, 30-39 MIN: ICD-10-PCS | Mod: S$PBB,,, | Performed by: INTERNAL MEDICINE

## 2021-05-04 PROCEDURE — 99999 PR PBB SHADOW E&M-EST. PATIENT-LVL III: ICD-10-PCS | Mod: PBBFAC,,, | Performed by: INTERNAL MEDICINE

## 2021-05-07 ENCOUNTER — HOSPITAL ENCOUNTER (OUTPATIENT)
Dept: RADIOLOGY | Facility: OTHER | Age: 76
Discharge: HOME | End: 2021-05-07
Attending: INTERNAL MEDICINE
Payer: MEDICARE

## 2021-05-07 ENCOUNTER — CLINICAL SUPPORT (OUTPATIENT)
Dept: REHABILITATION | Facility: OTHER | Age: 76
End: 2021-05-07
Attending: INTERNAL MEDICINE
Payer: MEDICARE

## 2021-05-07 DIAGNOSIS — R29.898 DECREASED STRENGTH OF TRUNK AND BACK: ICD-10-CM

## 2021-05-07 DIAGNOSIS — R29.3 POOR POSTURE: Primary | ICD-10-CM

## 2021-05-07 DIAGNOSIS — I77.9 CAROTID ARTERY DISEASE, UNSPECIFIED LATERALITY, UNSPECIFIED TYPE: ICD-10-CM

## 2021-05-07 DIAGNOSIS — I65.21 OCCLUSION AND STENOSIS OF RIGHT CAROTID ARTERY: ICD-10-CM

## 2021-05-07 DIAGNOSIS — M53.82 DECREASED ROM OF INTERVERTEBRAL DISCS OF CERVICAL SPINE: ICD-10-CM

## 2021-05-07 PROCEDURE — 93880 US CAROTID BILATERAL: ICD-10-PCS | Mod: 26,,, | Performed by: RADIOLOGY

## 2021-05-07 PROCEDURE — 93880 EXTRACRANIAL BILAT STUDY: CPT | Mod: 26,,, | Performed by: RADIOLOGY

## 2021-05-07 PROCEDURE — 93880 EXTRACRANIAL BILAT STUDY: CPT | Mod: TC

## 2021-05-07 PROCEDURE — 97110 THERAPEUTIC EXERCISES: CPT | Mod: CQ

## 2021-05-11 ENCOUNTER — PATIENT MESSAGE (OUTPATIENT)
Dept: INTERNAL MEDICINE | Facility: CLINIC | Age: 76
End: 2021-05-11

## 2021-05-11 RX ORDER — FOLIC ACID-PYRIDOXINE-CYANOCOBALAMIN TAB 2.5-25-2 MG 2.5-25-2 MG
1 TAB ORAL DAILY
Qty: 30 TABLET | Refills: 4 | Status: SHIPPED | OUTPATIENT
Start: 2021-05-11 | End: 2022-06-21

## 2021-05-12 ENCOUNTER — CLINICAL SUPPORT (OUTPATIENT)
Dept: REHABILITATION | Facility: OTHER | Age: 76
End: 2021-05-12
Attending: INTERNAL MEDICINE
Payer: MEDICARE

## 2021-05-12 DIAGNOSIS — R29.3 POOR POSTURE: Primary | ICD-10-CM

## 2021-05-12 DIAGNOSIS — M53.82 DECREASED ROM OF INTERVERTEBRAL DISCS OF CERVICAL SPINE: ICD-10-CM

## 2021-05-12 DIAGNOSIS — R29.898 DECREASED STRENGTH OF TRUNK AND BACK: ICD-10-CM

## 2021-05-12 PROCEDURE — 97110 THERAPEUTIC EXERCISES: CPT

## 2021-05-14 ENCOUNTER — CLINICAL SUPPORT (OUTPATIENT)
Dept: REHABILITATION | Facility: OTHER | Age: 76
End: 2021-05-14
Attending: INTERNAL MEDICINE
Payer: MEDICARE

## 2021-05-14 DIAGNOSIS — R29.898 DECREASED STRENGTH OF TRUNK AND BACK: ICD-10-CM

## 2021-05-14 DIAGNOSIS — M53.82 DECREASED ROM OF INTERVERTEBRAL DISCS OF CERVICAL SPINE: ICD-10-CM

## 2021-05-14 DIAGNOSIS — R29.3 POOR POSTURE: Primary | ICD-10-CM

## 2021-05-14 PROCEDURE — 97110 THERAPEUTIC EXERCISES: CPT

## 2021-05-17 ENCOUNTER — OFFICE VISIT (OUTPATIENT)
Dept: ORTHOPEDICS | Facility: CLINIC | Age: 76
End: 2021-05-17
Payer: MEDICARE

## 2021-05-17 DIAGNOSIS — M20.42 HAMMERTOE OF LEFT FOOT: ICD-10-CM

## 2021-05-17 DIAGNOSIS — M20.12 HALLUX VALGUS OF LEFT FOOT: Primary | ICD-10-CM

## 2021-05-17 PROCEDURE — 99213 PR OFFICE/OUTPT VISIT, EST, LEVL III, 20-29 MIN: ICD-10-PCS | Mod: S$PBB,,, | Performed by: ORTHOPAEDIC SURGERY

## 2021-05-17 PROCEDURE — 99213 OFFICE O/P EST LOW 20 MIN: CPT | Mod: S$PBB,,, | Performed by: ORTHOPAEDIC SURGERY

## 2021-05-17 PROCEDURE — 99999 PR PBB SHADOW E&M-EST. PATIENT-LVL I: ICD-10-PCS | Mod: PBBFAC,,, | Performed by: ORTHOPAEDIC SURGERY

## 2021-05-17 PROCEDURE — 99999 PR PBB SHADOW E&M-EST. PATIENT-LVL I: CPT | Mod: PBBFAC,,, | Performed by: ORTHOPAEDIC SURGERY

## 2021-05-17 PROCEDURE — 99211 OFF/OP EST MAY X REQ PHY/QHP: CPT | Mod: PBBFAC | Performed by: ORTHOPAEDIC SURGERY

## 2021-05-18 RX ORDER — ROSUVASTATIN CALCIUM 20 MG/1
TABLET, COATED ORAL
Qty: 90 TABLET | Refills: 3 | OUTPATIENT
Start: 2021-05-18

## 2021-05-19 ENCOUNTER — CLINICAL SUPPORT (OUTPATIENT)
Dept: REHABILITATION | Facility: OTHER | Age: 76
End: 2021-05-19
Attending: INTERNAL MEDICINE
Payer: MEDICARE

## 2021-05-19 DIAGNOSIS — R29.3 POOR POSTURE: Primary | ICD-10-CM

## 2021-05-19 DIAGNOSIS — R29.898 DECREASED STRENGTH OF TRUNK AND BACK: ICD-10-CM

## 2021-05-19 DIAGNOSIS — M53.82 DECREASED ROM OF INTERVERTEBRAL DISCS OF CERVICAL SPINE: ICD-10-CM

## 2021-05-19 PROCEDURE — 97110 THERAPEUTIC EXERCISES: CPT

## 2021-05-20 ENCOUNTER — PATIENT MESSAGE (OUTPATIENT)
Dept: INTERNAL MEDICINE | Facility: CLINIC | Age: 76
End: 2021-05-20

## 2021-05-21 ENCOUNTER — CLINICAL SUPPORT (OUTPATIENT)
Dept: REHABILITATION | Facility: OTHER | Age: 76
End: 2021-05-21
Attending: INTERNAL MEDICINE
Payer: MEDICARE

## 2021-05-21 DIAGNOSIS — M53.82 DECREASED ROM OF INTERVERTEBRAL DISCS OF CERVICAL SPINE: ICD-10-CM

## 2021-05-21 DIAGNOSIS — R29.898 DECREASED STRENGTH OF TRUNK AND BACK: ICD-10-CM

## 2021-05-21 DIAGNOSIS — R29.3 POOR POSTURE: Primary | ICD-10-CM

## 2021-05-21 PROCEDURE — 97110 THERAPEUTIC EXERCISES: CPT

## 2021-05-27 ENCOUNTER — CLINICAL SUPPORT (OUTPATIENT)
Dept: REHABILITATION | Facility: OTHER | Age: 76
End: 2021-05-27
Attending: INTERNAL MEDICINE
Payer: MEDICARE

## 2021-05-27 DIAGNOSIS — R29.898 DECREASED STRENGTH OF TRUNK AND BACK: ICD-10-CM

## 2021-05-27 DIAGNOSIS — R29.3 POOR POSTURE: Primary | ICD-10-CM

## 2021-05-27 DIAGNOSIS — M53.82 DECREASED ROM OF INTERVERTEBRAL DISCS OF CERVICAL SPINE: ICD-10-CM

## 2021-05-27 PROCEDURE — 97110 THERAPEUTIC EXERCISES: CPT

## 2021-05-27 PROCEDURE — 97750 PHYSICAL PERFORMANCE TEST: CPT

## 2021-06-14 ENCOUNTER — HOSPITAL ENCOUNTER (OUTPATIENT)
Dept: RADIOLOGY | Facility: HOSPITAL | Age: 76
Discharge: HOME | End: 2021-06-14
Attending: INTERNAL MEDICINE
Payer: MEDICARE

## 2021-06-14 VITALS — BODY MASS INDEX: 27.49 KG/M2 | WEIGHT: 165 LBS | HEIGHT: 65 IN

## 2021-06-14 DIAGNOSIS — Z12.31 SCREENING MAMMOGRAM, ENCOUNTER FOR: ICD-10-CM

## 2021-06-14 PROCEDURE — 77063 MAMMO DIGITAL SCREENING BILAT WITH TOMO: ICD-10-PCS | Mod: 26,,, | Performed by: RADIOLOGY

## 2021-06-14 PROCEDURE — 77067 SCR MAMMO BI INCL CAD: CPT | Mod: 26,,, | Performed by: RADIOLOGY

## 2021-06-14 PROCEDURE — 77063 BREAST TOMOSYNTHESIS BI: CPT | Mod: 26,,, | Performed by: RADIOLOGY

## 2021-06-14 PROCEDURE — 77067 MAMMO DIGITAL SCREENING BILAT WITH TOMO: ICD-10-PCS | Mod: 26,,, | Performed by: RADIOLOGY

## 2021-06-14 PROCEDURE — 77067 SCR MAMMO BI INCL CAD: CPT | Mod: TC

## 2021-06-15 ENCOUNTER — PATIENT MESSAGE (OUTPATIENT)
Dept: INTERNAL MEDICINE | Facility: CLINIC | Age: 76
End: 2021-06-15

## 2021-06-22 ENCOUNTER — TELEPHONE (OUTPATIENT)
Dept: REHABILITATION | Facility: OTHER | Age: 76
End: 2021-06-22

## 2021-06-22 ENCOUNTER — PES CALL (OUTPATIENT)
Dept: ADMINISTRATIVE | Facility: CLINIC | Age: 76
End: 2021-06-22

## 2021-06-30 DIAGNOSIS — M20.12 HALLUX VALGUS OF LEFT FOOT: ICD-10-CM

## 2021-06-30 DIAGNOSIS — M20.42 HAMMERTOE OF LEFT FOOT: Primary | ICD-10-CM

## 2021-06-30 RX ORDER — CLINDAMYCIN PHOSPHATE 900 MG/50ML
900 INJECTION, SOLUTION INTRAVENOUS
Status: CANCELLED | OUTPATIENT
Start: 2021-06-30

## 2021-07-02 ENCOUNTER — ANESTHESIA EVENT (OUTPATIENT)
Dept: SURGERY | Facility: HOSPITAL | Age: 76
End: 2021-07-02
Payer: MEDICARE

## 2021-07-06 ENCOUNTER — PATIENT OUTREACH (OUTPATIENT)
Dept: ADMINISTRATIVE | Facility: OTHER | Age: 76
End: 2021-07-06

## 2021-07-06 ENCOUNTER — OFFICE VISIT (OUTPATIENT)
Dept: ORTHOPEDICS | Facility: CLINIC | Age: 76
End: 2021-07-06
Payer: MEDICARE

## 2021-07-06 VITALS — WEIGHT: 164.88 LBS | BODY MASS INDEX: 27.47 KG/M2 | HEIGHT: 65 IN

## 2021-07-06 DIAGNOSIS — Z01.818 PRE-OP EVALUATION: Primary | ICD-10-CM

## 2021-07-06 DIAGNOSIS — M20.42 HAMMERTOE OF LEFT FOOT: ICD-10-CM

## 2021-07-06 DIAGNOSIS — G89.18 POST-OP PAIN: ICD-10-CM

## 2021-07-06 DIAGNOSIS — M20.12 HALLUX VALGUS OF LEFT FOOT: ICD-10-CM

## 2021-07-06 PROCEDURE — 99999 PR PBB SHADOW E&M-EST. PATIENT-LVL IV: CPT | Mod: PBBFAC,,, | Performed by: PHYSICIAN ASSISTANT

## 2021-07-06 PROCEDURE — 99999 PR PBB SHADOW E&M-EST. PATIENT-LVL IV: ICD-10-PCS | Mod: PBBFAC,,, | Performed by: PHYSICIAN ASSISTANT

## 2021-07-06 PROCEDURE — 99499 NO LOS: ICD-10-PCS | Mod: S$PBB,,, | Performed by: PHYSICIAN ASSISTANT

## 2021-07-06 PROCEDURE — 99499 UNLISTED E&M SERVICE: CPT | Mod: S$PBB,,, | Performed by: PHYSICIAN ASSISTANT

## 2021-07-06 PROCEDURE — 99214 OFFICE O/P EST MOD 30 MIN: CPT | Mod: PBBFAC | Performed by: PHYSICIAN ASSISTANT

## 2021-07-06 RX ORDER — PROMETHAZINE HYDROCHLORIDE 25 MG/1
25 TABLET ORAL EVERY 4 HOURS PRN
Qty: 42 TABLET | Refills: 0 | Status: SHIPPED | OUTPATIENT
Start: 2021-07-06 | End: 2021-07-15

## 2021-07-06 RX ORDER — OXYCODONE AND ACETAMINOPHEN 10; 325 MG/1; MG/1
1 TABLET ORAL EVERY 4 HOURS PRN
Qty: 42 TABLET | Refills: 0 | Status: SHIPPED | OUTPATIENT
Start: 2021-07-06 | End: 2021-07-15

## 2021-07-07 ENCOUNTER — TELEPHONE (OUTPATIENT)
Dept: PHARMACY | Facility: CLINIC | Age: 76
End: 2021-07-07

## 2021-07-07 ENCOUNTER — TELEPHONE (OUTPATIENT)
Dept: ORTHOPEDICS | Facility: CLINIC | Age: 76
End: 2021-07-07

## 2021-07-08 ENCOUNTER — HOSPITAL ENCOUNTER (OUTPATIENT)
Facility: HOSPITAL | Age: 76
Discharge: HOME | End: 2021-07-08
Attending: ORTHOPAEDIC SURGERY | Admitting: ORTHOPAEDIC SURGERY
Payer: MEDICARE

## 2021-07-08 ENCOUNTER — ANESTHESIA (OUTPATIENT)
Dept: SURGERY | Facility: HOSPITAL | Age: 76
End: 2021-07-08
Payer: MEDICARE

## 2021-07-08 VITALS
WEIGHT: 168 LBS | HEIGHT: 65 IN | OXYGEN SATURATION: 36 % | BODY MASS INDEX: 27.99 KG/M2 | SYSTOLIC BLOOD PRESSURE: 121 MMHG | DIASTOLIC BLOOD PRESSURE: 60 MMHG | RESPIRATION RATE: 14 BRPM | HEART RATE: 76 BPM | TEMPERATURE: 98 F

## 2021-07-08 DIAGNOSIS — M20.12 HALLUX VALGUS OF LEFT FOOT: Primary | ICD-10-CM

## 2021-07-08 DIAGNOSIS — M20.42 HAMMERTOE OF LEFT FOOT: ICD-10-CM

## 2021-07-08 PROCEDURE — 64447 NJX AA&/STRD FEMORAL NRV IMG: CPT | Mod: 59 | Performed by: STUDENT IN AN ORGANIZED HEALTH CARE EDUCATION/TRAINING PROGRAM

## 2021-07-08 PROCEDURE — 64450 NJX AA&/STRD OTHER PN/BRANCH: CPT | Mod: 59,LT,, | Performed by: ANESTHESIOLOGY

## 2021-07-08 PROCEDURE — 76942 ECHO GUIDE FOR BIOPSY: CPT | Performed by: STUDENT IN AN ORGANIZED HEALTH CARE EDUCATION/TRAINING PROGRAM

## 2021-07-08 PROCEDURE — 71000015 HC POSTOP RECOV 1ST HR: Performed by: ORTHOPAEDIC SURGERY

## 2021-07-08 PROCEDURE — 28295 PR CORRECT BUNION; W SESAMOIDECTOMY W PROX META OSTEOTOMY: ICD-10-PCS | Mod: TA,,, | Performed by: ORTHOPAEDIC SURGERY

## 2021-07-08 PROCEDURE — C1713 ANCHOR/SCREW BN/BN,TIS/BN: HCPCS | Performed by: ORTHOPAEDIC SURGERY

## 2021-07-08 PROCEDURE — 25000003 PHARM REV CODE 250: Performed by: STUDENT IN AN ORGANIZED HEALTH CARE EDUCATION/TRAINING PROGRAM

## 2021-07-08 PROCEDURE — D9220A PRA ANESTHESIA: ICD-10-PCS | Mod: CRNA,,, | Performed by: NURSE ANESTHETIST, CERTIFIED REGISTERED

## 2021-07-08 PROCEDURE — 37000008 HC ANESTHESIA 1ST 15 MINUTES: Performed by: ORTHOPAEDIC SURGERY

## 2021-07-08 PROCEDURE — 28285 PR REPAIR OF HAMMERTOE,ONE: ICD-10-PCS | Mod: 59,T1,, | Performed by: ORTHOPAEDIC SURGERY

## 2021-07-08 PROCEDURE — 64450 PR NERVE BLOCK INJ, ANES/STEROID, OTHER PERIPHERAL: ICD-10-PCS | Mod: 59,LT,, | Performed by: ANESTHESIOLOGY

## 2021-07-08 PROCEDURE — 63600175 PHARM REV CODE 636 W HCPCS: Performed by: STUDENT IN AN ORGANIZED HEALTH CARE EDUCATION/TRAINING PROGRAM

## 2021-07-08 PROCEDURE — 25000003 PHARM REV CODE 250: Performed by: NURSE ANESTHETIST, CERTIFIED REGISTERED

## 2021-07-08 PROCEDURE — 94761 N-INVAS EAR/PLS OXIMETRY MLT: CPT

## 2021-07-08 PROCEDURE — C1769 GUIDE WIRE: HCPCS | Performed by: ORTHOPAEDIC SURGERY

## 2021-07-08 PROCEDURE — 25000003 PHARM REV CODE 250: Performed by: ANESTHESIOLOGY

## 2021-07-08 PROCEDURE — 99900035 HC TECH TIME PER 15 MIN (STAT)

## 2021-07-08 PROCEDURE — 27800903 OPTIME MED/SURG SUP & DEVICES OTHER IMPLANTS: Performed by: ORTHOPAEDIC SURGERY

## 2021-07-08 PROCEDURE — 76942 PR U/S GUIDANCE FOR NEEDLE GUIDANCE: ICD-10-PCS | Mod: 26,,, | Performed by: ANESTHESIOLOGY

## 2021-07-08 PROCEDURE — 71000033 HC RECOVERY, INTIAL HOUR: Performed by: ORTHOPAEDIC SURGERY

## 2021-07-08 PROCEDURE — 36000707: Performed by: ORTHOPAEDIC SURGERY

## 2021-07-08 PROCEDURE — D9220A PRA ANESTHESIA: Mod: ANES,,, | Performed by: ANESTHESIOLOGY

## 2021-07-08 PROCEDURE — 64447 PR NERVE BLOCK INJ, ANES/STEROID, FEMORAL, INCL IMAG GUIDANCE: ICD-10-PCS | Mod: 59,LT,, | Performed by: ANESTHESIOLOGY

## 2021-07-08 PROCEDURE — 63600175 PHARM REV CODE 636 W HCPCS: Performed by: NURSE ANESTHETIST, CERTIFIED REGISTERED

## 2021-07-08 PROCEDURE — 28285 REPAIR OF HAMMERTOE: CPT | Mod: 59,T1,, | Performed by: ORTHOPAEDIC SURGERY

## 2021-07-08 PROCEDURE — 76942 ECHO GUIDE FOR BIOPSY: CPT | Mod: 26,,, | Performed by: ANESTHESIOLOGY

## 2021-07-08 PROCEDURE — 63600175 PHARM REV CODE 636 W HCPCS: Performed by: ANESTHESIOLOGY

## 2021-07-08 PROCEDURE — 25000003 PHARM REV CODE 250: Performed by: ORTHOPAEDIC SURGERY

## 2021-07-08 PROCEDURE — 36000706: Performed by: ORTHOPAEDIC SURGERY

## 2021-07-08 PROCEDURE — D9220A PRA ANESTHESIA: Mod: CRNA,,, | Performed by: NURSE ANESTHETIST, CERTIFIED REGISTERED

## 2021-07-08 PROCEDURE — D9220A PRA ANESTHESIA: ICD-10-PCS | Mod: ANES,,, | Performed by: ANESTHESIOLOGY

## 2021-07-08 PROCEDURE — 27200750 HC INSULATED NEEDLE/ STIMUPLEX: Performed by: STUDENT IN AN ORGANIZED HEALTH CARE EDUCATION/TRAINING PROGRAM

## 2021-07-08 PROCEDURE — 64447 NJX AA&/STRD FEMORAL NRV IMG: CPT | Mod: 59,LT,, | Performed by: ANESTHESIOLOGY

## 2021-07-08 PROCEDURE — 28295 COR HLX VLGS PRX MTAR OSTEOT: CPT | Mod: TA,,, | Performed by: ORTHOPAEDIC SURGERY

## 2021-07-08 PROCEDURE — 27201423 OPTIME MED/SURG SUP & DEVICES STERILE SUPPLY: Performed by: ORTHOPAEDIC SURGERY

## 2021-07-08 PROCEDURE — 37000009 HC ANESTHESIA EA ADD 15 MINS: Performed by: ORTHOPAEDIC SURGERY

## 2021-07-08 PROCEDURE — 64445 NJX AA&/STRD SCIATIC NRV IMG: CPT | Mod: 59 | Performed by: STUDENT IN AN ORGANIZED HEALTH CARE EDUCATION/TRAINING PROGRAM

## 2021-07-08 DEVICE — MATRIX BONE STIMUBLAST 1ML GEL: Type: IMPLANTABLE DEVICE | Site: FOOT | Status: FUNCTIONAL

## 2021-07-08 DEVICE — SCREW LP 2.4X14MM CORTEX: Type: IMPLANTABLE DEVICE | Site: FOOT | Status: FUNCTIONAL

## 2021-07-08 DEVICE — SCREW LP 2.4X16MM CORTEX: Type: IMPLANTABLE DEVICE | Site: FOOT | Status: FUNCTIONAL

## 2021-07-08 DEVICE — SCREW LP 2.4X20MM CORTEX: Type: IMPLANTABLE DEVICE | Site: FOOT | Status: FUNCTIONAL

## 2021-07-08 RX ORDER — FENTANYL CITRATE 50 UG/ML
INJECTION, SOLUTION INTRAMUSCULAR; INTRAVENOUS
Status: DISCONTINUED | OUTPATIENT
Start: 2021-07-08 | End: 2021-07-08

## 2021-07-08 RX ORDER — LIDOCAINE HCL/PF 100 MG/5ML
SYRINGE (ML) INTRAVENOUS
Status: DISCONTINUED | OUTPATIENT
Start: 2021-07-08 | End: 2021-07-08

## 2021-07-08 RX ORDER — PHENYLEPHRINE HYDROCHLORIDE 10 MG/ML
INJECTION INTRAVENOUS
Status: DISCONTINUED | OUTPATIENT
Start: 2021-07-08 | End: 2021-07-08

## 2021-07-08 RX ORDER — PROPOFOL 10 MG/ML
VIAL (ML) INTRAVENOUS
Status: DISCONTINUED | OUTPATIENT
Start: 2021-07-08 | End: 2021-07-08

## 2021-07-08 RX ORDER — HALOPERIDOL 5 MG/ML
0.5 INJECTION INTRAMUSCULAR EVERY 10 MIN PRN
Status: DISCONTINUED | OUTPATIENT
Start: 2021-07-08 | End: 2021-07-08 | Stop reason: HOSPADM

## 2021-07-08 RX ORDER — OXYCODONE HYDROCHLORIDE 5 MG/1
5 TABLET ORAL
Status: DISCONTINUED | OUTPATIENT
Start: 2021-07-08 | End: 2021-07-08 | Stop reason: HOSPADM

## 2021-07-08 RX ORDER — SODIUM CHLORIDE 0.9 % (FLUSH) 0.9 %
10 SYRINGE (ML) INJECTION
Status: DISCONTINUED | OUTPATIENT
Start: 2021-07-08 | End: 2021-07-08 | Stop reason: HOSPADM

## 2021-07-08 RX ORDER — LORATADINE 10 MG/1
10 TABLET ORAL DAILY
COMMUNITY
End: 2023-08-08 | Stop reason: SDUPTHER

## 2021-07-08 RX ORDER — HYDROCODONE BITARTRATE AND ACETAMINOPHEN 5; 325 MG/1; MG/1
1 TABLET ORAL EVERY 4 HOURS PRN
Status: DISCONTINUED | OUTPATIENT
Start: 2021-07-08 | End: 2021-07-08 | Stop reason: HOSPADM

## 2021-07-08 RX ORDER — FENTANYL CITRATE 50 UG/ML
25 INJECTION, SOLUTION INTRAMUSCULAR; INTRAVENOUS EVERY 5 MIN PRN
Status: DISCONTINUED | OUTPATIENT
Start: 2021-07-08 | End: 2021-07-08 | Stop reason: HOSPADM

## 2021-07-08 RX ORDER — CLINDAMYCIN PHOSPHATE 900 MG/50ML
900 INJECTION, SOLUTION INTRAVENOUS
Status: COMPLETED | OUTPATIENT
Start: 2021-07-08 | End: 2021-07-08

## 2021-07-08 RX ORDER — MIDAZOLAM HYDROCHLORIDE 1 MG/ML
0.5 INJECTION INTRAMUSCULAR; INTRAVENOUS
Status: DISCONTINUED | OUTPATIENT
Start: 2021-07-08 | End: 2021-07-08 | Stop reason: HOSPADM

## 2021-07-08 RX ORDER — PROPOFOL 10 MG/ML
VIAL (ML) INTRAVENOUS CONTINUOUS PRN
Status: DISCONTINUED | OUTPATIENT
Start: 2021-07-08 | End: 2021-07-08

## 2021-07-08 RX ORDER — BUPIVACAINE HYDROCHLORIDE AND EPINEPHRINE 5; 5 MG/ML; UG/ML
INJECTION, SOLUTION EPIDURAL; INTRACAUDAL; PERINEURAL
Status: COMPLETED | OUTPATIENT
Start: 2021-07-08 | End: 2021-07-08

## 2021-07-08 RX ORDER — KETAMINE HCL IN 0.9 % NACL 50 MG/5 ML
SYRINGE (ML) INTRAVENOUS
Status: DISCONTINUED | OUTPATIENT
Start: 2021-07-08 | End: 2021-07-08

## 2021-07-08 RX ORDER — ONDANSETRON 4 MG/1
8 TABLET, ORALLY DISINTEGRATING ORAL EVERY 8 HOURS PRN
Status: DISCONTINUED | OUTPATIENT
Start: 2021-07-08 | End: 2021-07-08 | Stop reason: HOSPADM

## 2021-07-08 RX ORDER — ACETAMINOPHEN 500 MG
1000 TABLET ORAL
Status: COMPLETED | OUTPATIENT
Start: 2021-07-08 | End: 2021-07-08

## 2021-07-08 RX ADMIN — Medication 10 MG: at 08:07

## 2021-07-08 RX ADMIN — BUPIVACAINE HYDROCHLORIDE AND EPINEPHRINE 7.5 ML: 5; 5 INJECTION, SOLUTION EPIDURAL; INTRACAUDAL; PERINEURAL at 06:07

## 2021-07-08 RX ADMIN — Medication 5 MG: at 08:07

## 2021-07-08 RX ADMIN — PHENYLEPHRINE HYDROCHLORIDE 100 MCG: 10 INJECTION INTRAVENOUS at 08:07

## 2021-07-08 RX ADMIN — MEPIVACAINE HYDROCHLORIDE 7.5 ML: 15 INJECTION, SOLUTION EPIDURAL; INFILTRATION at 06:07

## 2021-07-08 RX ADMIN — SODIUM CHLORIDE: 9 INJECTION, SOLUTION INTRAVENOUS at 06:07

## 2021-07-08 RX ADMIN — LIDOCAINE HYDROCHLORIDE 100 MG: 20 INJECTION, SOLUTION INTRAVENOUS at 06:07

## 2021-07-08 RX ADMIN — ACETAMINOPHEN 1000 MG: 500 TABLET ORAL at 06:07

## 2021-07-08 RX ADMIN — PROPOFOL 50 MCG/KG/MIN: 10 INJECTION, EMULSION INTRAVENOUS at 06:07

## 2021-07-08 RX ADMIN — PROPOFOL 30 MG: 10 INJECTION, EMULSION INTRAVENOUS at 07:07

## 2021-07-08 RX ADMIN — BUPIVACAINE HYDROCHLORIDE AND EPINEPHRINE BITARTRATE 15 ML: 5; .0091 INJECTION, SOLUTION EPIDURAL; INTRACAUDAL; PERINEURAL at 06:07

## 2021-07-08 RX ADMIN — FENTANYL CITRATE 25 MCG: 50 INJECTION, SOLUTION INTRAMUSCULAR; INTRAVENOUS at 08:07

## 2021-07-08 RX ADMIN — FENTANYL CITRATE 50 MCG: 50 INJECTION INTRAMUSCULAR; INTRAVENOUS at 06:07

## 2021-07-08 RX ADMIN — PROPOFOL 30 MG: 10 INJECTION, EMULSION INTRAVENOUS at 06:07

## 2021-07-08 RX ADMIN — MIDAZOLAM HYDROCHLORIDE 1 MG: 1 INJECTION, SOLUTION INTRAMUSCULAR; INTRAVENOUS at 06:07

## 2021-07-08 RX ADMIN — CLINDAMYCIN PHOSPHATE 900 MG: 18 INJECTION, SOLUTION INTRAVENOUS at 07:07

## 2021-07-16 ENCOUNTER — PES CALL (OUTPATIENT)
Dept: ADMINISTRATIVE | Facility: CLINIC | Age: 76
End: 2021-07-16

## 2021-07-19 ENCOUNTER — TELEPHONE (OUTPATIENT)
Dept: INTERNAL MEDICINE | Facility: CLINIC | Age: 76
End: 2021-07-19

## 2021-07-22 ENCOUNTER — TELEPHONE (OUTPATIENT)
Dept: INTERNAL MEDICINE | Facility: CLINIC | Age: 76
End: 2021-07-22

## 2021-07-22 RX ORDER — ALPRAZOLAM 0.25 MG/1
0.25 TABLET ORAL 2 TIMES DAILY PRN
Qty: 30 TABLET | Refills: 0 | Status: SHIPPED | OUTPATIENT
Start: 2021-07-22 | End: 2024-03-06

## 2021-07-26 ENCOUNTER — OFFICE VISIT (OUTPATIENT)
Dept: ORTHOPEDICS | Facility: CLINIC | Age: 76
End: 2021-07-26
Payer: MEDICARE

## 2021-07-26 VITALS
RESPIRATION RATE: 18 BRPM | DIASTOLIC BLOOD PRESSURE: 63 MMHG | HEART RATE: 98 BPM | SYSTOLIC BLOOD PRESSURE: 116 MMHG | TEMPERATURE: 98 F

## 2021-07-26 DIAGNOSIS — M20.12 HALLUX VALGUS OF LEFT FOOT: Primary | ICD-10-CM

## 2021-07-26 DIAGNOSIS — M20.42 HAMMERTOE OF LEFT FOOT: ICD-10-CM

## 2021-07-26 PROCEDURE — 99024 PR POST-OP FOLLOW-UP VISIT: ICD-10-PCS | Mod: POP,,, | Performed by: PHYSICIAN ASSISTANT

## 2021-07-26 PROCEDURE — 99999 PR PBB SHADOW E&M-EST. PATIENT-LVL V: CPT | Mod: PBBFAC,,, | Performed by: PHYSICIAN ASSISTANT

## 2021-07-26 PROCEDURE — 99999 PR PBB SHADOW E&M-EST. PATIENT-LVL V: ICD-10-PCS | Mod: PBBFAC,,, | Performed by: PHYSICIAN ASSISTANT

## 2021-07-26 PROCEDURE — 99024 POSTOP FOLLOW-UP VISIT: CPT | Mod: POP,,, | Performed by: PHYSICIAN ASSISTANT

## 2021-07-26 PROCEDURE — 99215 OFFICE O/P EST HI 40 MIN: CPT | Mod: PBBFAC | Performed by: PHYSICIAN ASSISTANT

## 2021-08-09 ENCOUNTER — OFFICE VISIT (OUTPATIENT)
Dept: ORTHOPEDICS | Facility: CLINIC | Age: 76
End: 2021-08-09
Payer: MEDICARE

## 2021-08-09 ENCOUNTER — HOSPITAL ENCOUNTER (OUTPATIENT)
Dept: RADIOLOGY | Facility: HOSPITAL | Age: 76
Discharge: HOME | End: 2021-08-09
Attending: ORTHOPAEDIC SURGERY
Payer: MEDICARE

## 2021-08-09 VITALS — BODY MASS INDEX: 27.99 KG/M2 | HEIGHT: 65 IN | WEIGHT: 168 LBS

## 2021-08-09 DIAGNOSIS — M79.672 LEFT FOOT PAIN: ICD-10-CM

## 2021-08-09 DIAGNOSIS — M20.12 HALLUX VALGUS OF LEFT FOOT: ICD-10-CM

## 2021-08-09 DIAGNOSIS — M79.672 LEFT FOOT PAIN: Primary | ICD-10-CM

## 2021-08-09 DIAGNOSIS — M20.42 HAMMERTOE OF LEFT FOOT: ICD-10-CM

## 2021-08-09 DIAGNOSIS — Z09 FOLLOW-UP EXAMINATION AFTER ORTHOPEDIC SURGERY: Primary | ICD-10-CM

## 2021-08-09 PROCEDURE — 99999 PR PBB SHADOW E&M-EST. PATIENT-LVL IV: ICD-10-PCS | Mod: PBBFAC,,, | Performed by: ORTHOPAEDIC SURGERY

## 2021-08-09 PROCEDURE — 99214 OFFICE O/P EST MOD 30 MIN: CPT | Mod: PBBFAC | Performed by: ORTHOPAEDIC SURGERY

## 2021-08-09 PROCEDURE — 99024 POSTOP FOLLOW-UP VISIT: CPT | Mod: POP,,, | Performed by: ORTHOPAEDIC SURGERY

## 2021-08-09 PROCEDURE — 99999 PR PBB SHADOW E&M-EST. PATIENT-LVL IV: CPT | Mod: PBBFAC,,, | Performed by: ORTHOPAEDIC SURGERY

## 2021-08-09 PROCEDURE — 73630 X-RAY EXAM OF FOOT: CPT | Mod: TC,LT

## 2021-08-09 PROCEDURE — 73630 XR FOOT COMPLETE 3 VIEW LEFT: ICD-10-PCS | Mod: 26,LT,, | Performed by: RADIOLOGY

## 2021-08-09 PROCEDURE — 99024 PR POST-OP FOLLOW-UP VISIT: ICD-10-PCS | Mod: POP,,, | Performed by: ORTHOPAEDIC SURGERY

## 2021-08-09 PROCEDURE — 73630 X-RAY EXAM OF FOOT: CPT | Mod: 26,LT,, | Performed by: RADIOLOGY

## 2021-08-16 DIAGNOSIS — M10.9 GOUT, ARTHRITIS: Primary | ICD-10-CM

## 2021-08-17 RX ORDER — ALLOPURINOL 100 MG/1
200 TABLET ORAL DAILY
Qty: 180 TABLET | Refills: 2 | Status: SHIPPED | OUTPATIENT
Start: 2021-08-17 | End: 2022-05-23

## 2021-08-23 ENCOUNTER — PES CALL (OUTPATIENT)
Dept: ADMINISTRATIVE | Facility: CLINIC | Age: 76
End: 2021-08-23

## 2021-08-31 ENCOUNTER — EXTERNAL CHRONIC CARE MANAGEMENT (OUTPATIENT)
Dept: PRIMARY CARE CLINIC | Facility: CLINIC | Age: 76
End: 2021-08-31
Payer: MEDICARE

## 2021-08-31 PROCEDURE — 99490 CHRNC CARE MGMT STAFF 1ST 20: CPT | Mod: PBBFAC | Performed by: INTERNAL MEDICINE

## 2021-08-31 PROCEDURE — 99490 CHRNC CARE MGMT STAFF 1ST 20: CPT | Mod: S$PBB,,, | Performed by: INTERNAL MEDICINE

## 2021-08-31 PROCEDURE — 99490 PR CHRONIC CARE MGMT, 1ST 20 MIN: ICD-10-PCS | Mod: S$PBB,,, | Performed by: INTERNAL MEDICINE

## 2021-09-07 ENCOUNTER — PATIENT OUTREACH (OUTPATIENT)
Dept: ADMINISTRATIVE | Facility: OTHER | Age: 76
End: 2021-09-07

## 2021-09-08 ENCOUNTER — HOSPITAL ENCOUNTER (OUTPATIENT)
Dept: RADIOLOGY | Facility: HOSPITAL | Age: 76
Discharge: HOME | End: 2021-09-08
Attending: ORTHOPAEDIC SURGERY
Payer: MEDICARE

## 2021-09-08 ENCOUNTER — OFFICE VISIT (OUTPATIENT)
Dept: ORTHOPEDICS | Facility: CLINIC | Age: 76
End: 2021-09-08
Payer: MEDICARE

## 2021-09-08 DIAGNOSIS — M79.672 LEFT FOOT PAIN: ICD-10-CM

## 2021-09-08 DIAGNOSIS — M79.672 LEFT FOOT PAIN: Primary | ICD-10-CM

## 2021-09-08 PROCEDURE — 99212 OFFICE O/P EST SF 10 MIN: CPT | Mod: PBBFAC | Performed by: ORTHOPAEDIC SURGERY

## 2021-09-08 PROCEDURE — 73630 X-RAY EXAM OF FOOT: CPT | Mod: TC,LT

## 2021-09-08 PROCEDURE — 99999 PR PBB SHADOW E&M-EST. PATIENT-LVL II: CPT | Mod: PBBFAC,,, | Performed by: ORTHOPAEDIC SURGERY

## 2021-09-08 PROCEDURE — 99024 POSTOP FOLLOW-UP VISIT: CPT | Mod: POP,,, | Performed by: ORTHOPAEDIC SURGERY

## 2021-09-08 PROCEDURE — 99024 PR POST-OP FOLLOW-UP VISIT: ICD-10-PCS | Mod: POP,,, | Performed by: ORTHOPAEDIC SURGERY

## 2021-09-08 PROCEDURE — 99999 PR PBB SHADOW E&M-EST. PATIENT-LVL II: ICD-10-PCS | Mod: PBBFAC,,, | Performed by: ORTHOPAEDIC SURGERY

## 2021-09-08 PROCEDURE — 73630 X-RAY EXAM OF FOOT: CPT | Mod: 26,LT,, | Performed by: RADIOLOGY

## 2021-09-08 PROCEDURE — 73630 XR FOOT COMPLETE 3 VIEW LEFT: ICD-10-PCS | Mod: 26,LT,, | Performed by: RADIOLOGY

## 2021-09-17 ENCOUNTER — TELEPHONE (OUTPATIENT)
Dept: INTERNAL MEDICINE | Facility: CLINIC | Age: 76
End: 2021-09-17

## 2021-09-22 ENCOUNTER — PES CALL (OUTPATIENT)
Dept: ADMINISTRATIVE | Facility: CLINIC | Age: 76
End: 2021-09-22

## 2021-09-29 ENCOUNTER — PES CALL (OUTPATIENT)
Dept: ADMINISTRATIVE | Facility: CLINIC | Age: 76
End: 2021-09-29

## 2021-09-30 ENCOUNTER — EXTERNAL CHRONIC CARE MANAGEMENT (OUTPATIENT)
Dept: PRIMARY CARE CLINIC | Facility: CLINIC | Age: 76
End: 2021-09-30
Payer: MEDICARE

## 2021-09-30 PROCEDURE — 99490 PR CHRONIC CARE MGMT, 1ST 20 MIN: ICD-10-PCS | Mod: S$PBB,,, | Performed by: INTERNAL MEDICINE

## 2021-09-30 PROCEDURE — 99490 CHRNC CARE MGMT STAFF 1ST 20: CPT | Mod: PBBFAC | Performed by: INTERNAL MEDICINE

## 2021-09-30 PROCEDURE — 99490 CHRNC CARE MGMT STAFF 1ST 20: CPT | Mod: S$PBB,,, | Performed by: INTERNAL MEDICINE

## 2021-10-01 ENCOUNTER — HOSPITAL ENCOUNTER (OUTPATIENT)
Dept: RADIOLOGY | Facility: HOSPITAL | Age: 76
Discharge: HOME | End: 2021-10-01
Attending: ORTHOPAEDIC SURGERY
Payer: MEDICARE

## 2021-10-01 ENCOUNTER — OFFICE VISIT (OUTPATIENT)
Dept: ORTHOPEDICS | Facility: CLINIC | Age: 76
End: 2021-10-01
Payer: MEDICARE

## 2021-10-01 VITALS — BODY MASS INDEX: 27.99 KG/M2 | HEIGHT: 65 IN | WEIGHT: 168 LBS

## 2021-10-01 DIAGNOSIS — M20.42 HAMMERTOE OF LEFT FOOT: ICD-10-CM

## 2021-10-01 DIAGNOSIS — M79.672 LEFT FOOT PAIN: ICD-10-CM

## 2021-10-01 DIAGNOSIS — M79.672 LEFT FOOT PAIN: Primary | ICD-10-CM

## 2021-10-01 DIAGNOSIS — Z09 FOLLOW-UP EXAMINATION AFTER ORTHOPEDIC SURGERY: Primary | ICD-10-CM

## 2021-10-01 DIAGNOSIS — M20.12 HALLUX VALGUS OF LEFT FOOT: ICD-10-CM

## 2021-10-01 PROCEDURE — 99024 PR POST-OP FOLLOW-UP VISIT: ICD-10-PCS | Mod: POP,,, | Performed by: ORTHOPAEDIC SURGERY

## 2021-10-01 PROCEDURE — 73630 X-RAY EXAM OF FOOT: CPT | Mod: TC,LT

## 2021-10-01 PROCEDURE — 99999 PR PBB SHADOW E&M-EST. PATIENT-LVL IV: ICD-10-PCS | Mod: PBBFAC,,, | Performed by: ORTHOPAEDIC SURGERY

## 2021-10-01 PROCEDURE — 99024 POSTOP FOLLOW-UP VISIT: CPT | Mod: POP,,, | Performed by: ORTHOPAEDIC SURGERY

## 2021-10-01 PROCEDURE — 73630 X-RAY EXAM OF FOOT: CPT | Mod: 26,LT,, | Performed by: RADIOLOGY

## 2021-10-01 PROCEDURE — 99214 OFFICE O/P EST MOD 30 MIN: CPT | Mod: PBBFAC | Performed by: ORTHOPAEDIC SURGERY

## 2021-10-01 PROCEDURE — 99999 PR PBB SHADOW E&M-EST. PATIENT-LVL IV: CPT | Mod: PBBFAC,,, | Performed by: ORTHOPAEDIC SURGERY

## 2021-10-01 PROCEDURE — 73630 XR FOOT COMPLETE 3 VIEW LEFT: ICD-10-PCS | Mod: 26,LT,, | Performed by: RADIOLOGY

## 2021-10-05 ENCOUNTER — IMMUNIZATION (OUTPATIENT)
Dept: INTERNAL MEDICINE | Facility: CLINIC | Age: 76
End: 2021-10-05
Payer: MEDICARE

## 2021-10-05 DIAGNOSIS — Z23 NEED FOR VACCINATION: Primary | ICD-10-CM

## 2021-10-05 PROCEDURE — 91300 COVID-19, MRNA, LNP-S, PF, 30 MCG/0.3 ML DOSE VACCINE: CPT | Mod: PBBFAC

## 2021-10-05 PROCEDURE — 0003A COVID-19, MRNA, LNP-S, PF, 30 MCG/0.3 ML DOSE VACCINE: CPT | Mod: CV19,PBBFAC | Performed by: INTERNAL MEDICINE

## 2021-10-12 ENCOUNTER — OFFICE VISIT (OUTPATIENT)
Dept: ORTHOPEDICS | Facility: CLINIC | Age: 76
End: 2021-10-12
Payer: MEDICARE

## 2021-10-12 ENCOUNTER — HOSPITAL ENCOUNTER (OUTPATIENT)
Dept: RADIOLOGY | Facility: HOSPITAL | Age: 76
Discharge: HOME | End: 2021-10-12
Attending: PHYSICIAN ASSISTANT
Payer: MEDICARE

## 2021-10-12 DIAGNOSIS — M25.562 PAIN IN BOTH KNEES, UNSPECIFIED CHRONICITY: ICD-10-CM

## 2021-10-12 DIAGNOSIS — M17.0 PRIMARY OSTEOARTHRITIS OF BOTH KNEES: Primary | ICD-10-CM

## 2021-10-12 DIAGNOSIS — M25.561 PAIN IN BOTH KNEES, UNSPECIFIED CHRONICITY: ICD-10-CM

## 2021-10-12 PROCEDURE — 99999 PR PBB SHADOW E&M-EST. PATIENT-LVL III: ICD-10-PCS | Mod: PBBFAC,,, | Performed by: PHYSICIAN ASSISTANT

## 2021-10-12 PROCEDURE — 99999 PR PBB SHADOW E&M-EST. PATIENT-LVL III: CPT | Mod: PBBFAC,,, | Performed by: PHYSICIAN ASSISTANT

## 2021-10-12 PROCEDURE — 73564 XR KNEE ORTHO BILAT WITH FLEXION: ICD-10-PCS | Mod: 26,50,, | Performed by: RADIOLOGY

## 2021-10-12 PROCEDURE — 99213 PR OFFICE/OUTPT VISIT, EST, LEVL III, 20-29 MIN: ICD-10-PCS | Mod: 25,S$PBB,, | Performed by: PHYSICIAN ASSISTANT

## 2021-10-12 PROCEDURE — 73564 X-RAY EXAM KNEE 4 OR MORE: CPT | Mod: 26,50,, | Performed by: RADIOLOGY

## 2021-10-12 PROCEDURE — 20610 DRAIN/INJ JOINT/BURSA W/O US: CPT | Mod: 50,PBBFAC | Performed by: PHYSICIAN ASSISTANT

## 2021-10-12 PROCEDURE — 20610 PR DRAIN/INJECT LARGE JOINT/BURSA: ICD-10-PCS | Mod: 50,S$PBB,, | Performed by: PHYSICIAN ASSISTANT

## 2021-10-12 PROCEDURE — 73564 X-RAY EXAM KNEE 4 OR MORE: CPT | Mod: TC,50

## 2021-10-12 PROCEDURE — 20610 DRAIN/INJ JOINT/BURSA W/O US: CPT | Mod: 50,S$PBB,, | Performed by: PHYSICIAN ASSISTANT

## 2021-10-12 PROCEDURE — 99213 OFFICE O/P EST LOW 20 MIN: CPT | Mod: PBBFAC,25 | Performed by: PHYSICIAN ASSISTANT

## 2021-10-12 PROCEDURE — 99213 OFFICE O/P EST LOW 20 MIN: CPT | Mod: 25,S$PBB,, | Performed by: PHYSICIAN ASSISTANT

## 2021-10-12 RX ORDER — MELOXICAM 15 MG/1
15 TABLET ORAL DAILY
Qty: 14 TABLET | Refills: 0 | Status: SHIPPED | OUTPATIENT
Start: 2021-10-12 | End: 2021-11-11

## 2021-10-12 RX ADMIN — METHYLPREDNISOLONE ACETATE 80 MG: 80 INJECTION, SUSPENSION INTRALESIONAL; INTRAMUSCULAR; INTRASYNOVIAL; SOFT TISSUE at 06:10

## 2021-10-13 ENCOUNTER — TELEPHONE (OUTPATIENT)
Dept: OPHTHALMOLOGY | Facility: CLINIC | Age: 76
End: 2021-10-13

## 2021-10-13 RX ORDER — METHYLPREDNISOLONE ACETATE 80 MG/ML
80 INJECTION, SUSPENSION INTRA-ARTICULAR; INTRALESIONAL; INTRAMUSCULAR; SOFT TISSUE
Status: COMPLETED | OUTPATIENT
Start: 2021-10-12 | End: 2021-10-12

## 2021-10-27 RX ORDER — SPIRONOLACTONE 25 MG/1
TABLET ORAL
Qty: 90 TABLET | Refills: 1 | Status: SHIPPED | OUTPATIENT
Start: 2021-10-27 | End: 2021-11-15 | Stop reason: SDUPTHER

## 2021-10-31 ENCOUNTER — EXTERNAL CHRONIC CARE MANAGEMENT (OUTPATIENT)
Dept: PRIMARY CARE CLINIC | Facility: CLINIC | Age: 76
End: 2021-10-31
Payer: MEDICARE

## 2021-10-31 PROCEDURE — 99490 CHRNC CARE MGMT STAFF 1ST 20: CPT | Mod: S$PBB,,, | Performed by: INTERNAL MEDICINE

## 2021-10-31 PROCEDURE — 99490 PR CHRONIC CARE MGMT, 1ST 20 MIN: ICD-10-PCS | Mod: S$PBB,,, | Performed by: INTERNAL MEDICINE

## 2021-10-31 PROCEDURE — 99490 CHRNC CARE MGMT STAFF 1ST 20: CPT | Mod: PBBFAC | Performed by: INTERNAL MEDICINE

## 2021-11-16 RX ORDER — SPIRONOLACTONE 25 MG/1
25 TABLET ORAL DAILY
Qty: 90 TABLET | Refills: 1 | Status: SHIPPED | OUTPATIENT
Start: 2021-11-16 | End: 2022-05-26 | Stop reason: SDUPTHER

## 2021-11-19 ENCOUNTER — OFFICE VISIT (OUTPATIENT)
Dept: OPTOMETRY | Facility: CLINIC | Age: 76
End: 2021-11-19
Payer: MEDICARE

## 2021-11-19 ENCOUNTER — TELEPHONE (OUTPATIENT)
Dept: OPHTHALMOLOGY | Facility: CLINIC | Age: 76
End: 2021-11-19
Payer: MEDICARE

## 2021-11-19 DIAGNOSIS — Z13.5 GLAUCOMA SCREENING: ICD-10-CM

## 2021-11-19 DIAGNOSIS — H52.4 PRESBYOPIA: ICD-10-CM

## 2021-11-19 DIAGNOSIS — H02.831 DERMATOCHALASIS OF BOTH UPPER EYELIDS: Primary | ICD-10-CM

## 2021-11-19 DIAGNOSIS — H02.834 DERMATOCHALASIS OF BOTH UPPER EYELIDS: Primary | ICD-10-CM

## 2021-11-19 PROCEDURE — 99213 OFFICE O/P EST LOW 20 MIN: CPT | Mod: PBBFAC | Performed by: OPTOMETRIST

## 2021-11-19 PROCEDURE — 99999 PR PBB SHADOW E&M-EST. PATIENT-LVL III: CPT | Mod: PBBFAC,,, | Performed by: OPTOMETRIST

## 2021-11-19 PROCEDURE — 99999 PR PBB SHADOW E&M-EST. PATIENT-LVL III: ICD-10-PCS | Mod: PBBFAC,,, | Performed by: OPTOMETRIST

## 2021-11-19 PROCEDURE — 92015 DETERMINE REFRACTIVE STATE: CPT | Mod: ,,, | Performed by: OPTOMETRIST

## 2021-11-19 PROCEDURE — 92015 PR REFRACTION: ICD-10-PCS | Mod: ,,, | Performed by: OPTOMETRIST

## 2021-11-19 PROCEDURE — 92014 PR EYE EXAM, EST PATIENT,COMPREHESV: ICD-10-PCS | Mod: S$PBB,,, | Performed by: OPTOMETRIST

## 2021-11-19 PROCEDURE — 92014 COMPRE OPH EXAM EST PT 1/>: CPT | Mod: S$PBB,,, | Performed by: OPTOMETRIST

## 2021-11-30 ENCOUNTER — EXTERNAL CHRONIC CARE MANAGEMENT (OUTPATIENT)
Dept: PRIMARY CARE CLINIC | Facility: CLINIC | Age: 76
End: 2021-11-30
Payer: MEDICARE

## 2021-11-30 PROCEDURE — 99490 PR CHRONIC CARE MGMT, 1ST 20 MIN: ICD-10-PCS | Mod: S$PBB,,, | Performed by: INTERNAL MEDICINE

## 2021-11-30 PROCEDURE — 99490 CHRNC CARE MGMT STAFF 1ST 20: CPT | Mod: PBBFAC | Performed by: INTERNAL MEDICINE

## 2021-11-30 PROCEDURE — 99490 CHRNC CARE MGMT STAFF 1ST 20: CPT | Mod: S$PBB,,, | Performed by: INTERNAL MEDICINE

## 2021-12-14 DIAGNOSIS — K59.00 CONSTIPATION, UNSPECIFIED CONSTIPATION TYPE: ICD-10-CM

## 2021-12-14 RX ORDER — LINACLOTIDE 145 UG/1
CAPSULE, GELATIN COATED ORAL
Qty: 30 CAPSULE | Refills: 3 | Status: SHIPPED | OUTPATIENT
Start: 2021-12-14 | End: 2022-03-18

## 2021-12-20 ENCOUNTER — OFFICE VISIT (OUTPATIENT)
Dept: INTERNAL MEDICINE | Facility: CLINIC | Age: 76
End: 2021-12-20
Payer: MEDICARE

## 2021-12-20 ENCOUNTER — IMMUNIZATION (OUTPATIENT)
Dept: INTERNAL MEDICINE | Facility: CLINIC | Age: 76
End: 2021-12-20
Payer: MEDICARE

## 2021-12-20 ENCOUNTER — LAB VISIT (OUTPATIENT)
Dept: LAB | Facility: HOSPITAL | Age: 76
End: 2021-12-20
Attending: INTERNAL MEDICINE
Payer: MEDICARE

## 2021-12-20 VITALS
OXYGEN SATURATION: 99 % | BODY MASS INDEX: 26.96 KG/M2 | SYSTOLIC BLOOD PRESSURE: 122 MMHG | HEART RATE: 88 BPM | HEIGHT: 66 IN | WEIGHT: 167.75 LBS | DIASTOLIC BLOOD PRESSURE: 60 MMHG

## 2021-12-20 DIAGNOSIS — M10.9 GOUT, ARTHRITIS: ICD-10-CM

## 2021-12-20 DIAGNOSIS — I10 ESSENTIAL HYPERTENSION: ICD-10-CM

## 2021-12-20 DIAGNOSIS — D64.9 ANEMIA, UNSPECIFIED TYPE: ICD-10-CM

## 2021-12-20 DIAGNOSIS — Z00.00 ROUTINE GENERAL MEDICAL EXAMINATION AT A HEALTH CARE FACILITY: Primary | ICD-10-CM

## 2021-12-20 DIAGNOSIS — K21.9 GASTROESOPHAGEAL REFLUX DISEASE, UNSPECIFIED WHETHER ESOPHAGITIS PRESENT: ICD-10-CM

## 2021-12-20 DIAGNOSIS — R79.9 ABNORMAL BLOOD CHEMISTRY: ICD-10-CM

## 2021-12-20 DIAGNOSIS — E53.8 VITAMIN B12 DEFICIENCY: ICD-10-CM

## 2021-12-20 DIAGNOSIS — K63.5 POLYP OF COLON, UNSPECIFIED PART OF COLON, UNSPECIFIED TYPE: ICD-10-CM

## 2021-12-20 DIAGNOSIS — E55.9 VITAMIN D DEFICIENCY DISEASE: ICD-10-CM

## 2021-12-20 LAB
ALBUMIN SERPL BCP-MCNC: 3.6 G/DL (ref 3.5–5.2)
ALP SERPL-CCNC: 89 U/L (ref 55–135)
ALT SERPL W/O P-5'-P-CCNC: 13 U/L (ref 10–44)
ANION GAP SERPL CALC-SCNC: 12 MMOL/L (ref 8–16)
AST SERPL-CCNC: 17 U/L (ref 10–40)
BASOPHILS # BLD AUTO: 0.04 K/UL (ref 0–0.2)
BASOPHILS NFR BLD: 0.7 % (ref 0–1.9)
BILIRUB SERPL-MCNC: 0.5 MG/DL (ref 0.1–1)
BUN SERPL-MCNC: 28 MG/DL (ref 8–23)
CALCIUM SERPL-MCNC: 11.1 MG/DL (ref 8.7–10.5)
CHLORIDE SERPL-SCNC: 104 MMOL/L (ref 95–110)
CO2 SERPL-SCNC: 23 MMOL/L (ref 23–29)
CREAT SERPL-MCNC: 1.2 MG/DL (ref 0.5–1.4)
CRP SERPL-MCNC: 46.7 MG/L (ref 0–8.2)
DIFFERENTIAL METHOD: ABNORMAL
EOSINOPHIL # BLD AUTO: 0.2 K/UL (ref 0–0.5)
EOSINOPHIL NFR BLD: 3.5 % (ref 0–8)
ERYTHROCYTE [DISTWIDTH] IN BLOOD BY AUTOMATED COUNT: 12.8 % (ref 11.5–14.5)
ERYTHROCYTE [SEDIMENTATION RATE] IN BLOOD BY WESTERGREN METHOD: 40 MM/HR (ref 0–36)
EST. GFR  (AFRICAN AMERICAN): 50 ML/MIN/1.73 M^2
EST. GFR  (NON AFRICAN AMERICAN): 43.4 ML/MIN/1.73 M^2
ESTIMATED AVG GLUCOSE: 111 MG/DL (ref 68–131)
FERRITIN SERPL-MCNC: 77 NG/ML (ref 20–300)
GLUCOSE SERPL-MCNC: 89 MG/DL (ref 70–110)
HBA1C MFR BLD: 5.5 % (ref 4–5.6)
HCT VFR BLD AUTO: 31.1 % (ref 37–48.5)
HGB BLD-MCNC: 9.6 G/DL (ref 12–16)
IMM GRANULOCYTES # BLD AUTO: 0.02 K/UL (ref 0–0.04)
IMM GRANULOCYTES NFR BLD AUTO: 0.3 % (ref 0–0.5)
IRON SERPL-MCNC: 38 UG/DL (ref 30–160)
LYMPHOCYTES # BLD AUTO: 1 K/UL (ref 1–4.8)
LYMPHOCYTES NFR BLD: 16.4 % (ref 18–48)
MCH RBC QN AUTO: 30.6 PG (ref 27–31)
MCHC RBC AUTO-ENTMCNC: 30.9 G/DL (ref 32–36)
MCV RBC AUTO: 99 FL (ref 82–98)
MONOCYTES # BLD AUTO: 0.7 K/UL (ref 0.3–1)
MONOCYTES NFR BLD: 12.2 % (ref 4–15)
NEUTROPHILS # BLD AUTO: 4.1 K/UL (ref 1.8–7.7)
NEUTROPHILS NFR BLD: 66.9 % (ref 38–73)
NRBC BLD-RTO: 0 /100 WBC
PLATELET # BLD AUTO: 263 K/UL (ref 150–450)
PMV BLD AUTO: 11.1 FL (ref 9.2–12.9)
POTASSIUM SERPL-SCNC: 4 MMOL/L (ref 3.5–5.1)
PROT SERPL-MCNC: 7.5 G/DL (ref 6–8.4)
RBC # BLD AUTO: 3.14 M/UL (ref 4–5.4)
SATURATED IRON: 11 % (ref 20–50)
SODIUM SERPL-SCNC: 139 MMOL/L (ref 136–145)
TOTAL IRON BINDING CAPACITY: 342 UG/DL (ref 250–450)
TRANSFERRIN SERPL-MCNC: 231 MG/DL (ref 200–375)
URATE SERPL-MCNC: 6.6 MG/DL (ref 2.4–5.7)
WBC # BLD AUTO: 6.05 K/UL (ref 3.9–12.7)

## 2021-12-20 PROCEDURE — 99397 PER PM REEVAL EST PAT 65+ YR: CPT | Mod: S$PBB,,, | Performed by: INTERNAL MEDICINE

## 2021-12-20 PROCEDURE — 83036 HEMOGLOBIN GLYCOSYLATED A1C: CPT | Performed by: INTERNAL MEDICINE

## 2021-12-20 PROCEDURE — 86140 C-REACTIVE PROTEIN: CPT | Performed by: INTERNAL MEDICINE

## 2021-12-20 PROCEDURE — 90694 VACC AIIV4 NO PRSRV 0.5ML IM: CPT | Mod: PBBFAC

## 2021-12-20 PROCEDURE — 99397 PR PREVENTIVE VISIT,EST,65 & OVER: ICD-10-PCS | Mod: S$PBB,,, | Performed by: INTERNAL MEDICINE

## 2021-12-20 PROCEDURE — 36415 COLL VENOUS BLD VENIPUNCTURE: CPT | Performed by: INTERNAL MEDICINE

## 2021-12-20 PROCEDURE — 85025 COMPLETE CBC W/AUTO DIFF WBC: CPT | Performed by: INTERNAL MEDICINE

## 2021-12-20 PROCEDURE — G0008 ADMIN INFLUENZA VIRUS VAC: HCPCS | Mod: PBBFAC

## 2021-12-20 PROCEDURE — 84550 ASSAY OF BLOOD/URIC ACID: CPT | Performed by: INTERNAL MEDICINE

## 2021-12-20 PROCEDURE — 80053 COMPREHEN METABOLIC PANEL: CPT | Performed by: INTERNAL MEDICINE

## 2021-12-20 PROCEDURE — 83540 ASSAY OF IRON: CPT | Performed by: INTERNAL MEDICINE

## 2021-12-20 PROCEDURE — 82728 ASSAY OF FERRITIN: CPT | Performed by: INTERNAL MEDICINE

## 2021-12-20 PROCEDURE — 99215 OFFICE O/P EST HI 40 MIN: CPT | Mod: PBBFAC,25 | Performed by: INTERNAL MEDICINE

## 2021-12-20 PROCEDURE — 99999 PR PBB SHADOW E&M-EST. PATIENT-LVL V: CPT | Mod: PBBFAC,,, | Performed by: INTERNAL MEDICINE

## 2021-12-20 PROCEDURE — 99999 PR PBB SHADOW E&M-EST. PATIENT-LVL V: ICD-10-PCS | Mod: PBBFAC,,, | Performed by: INTERNAL MEDICINE

## 2021-12-20 PROCEDURE — 85652 RBC SED RATE AUTOMATED: CPT | Performed by: INTERNAL MEDICINE

## 2021-12-20 RX ORDER — MELOXICAM 15 MG/1
15 TABLET ORAL DAILY
Qty: 14 TABLET | Refills: 0 | Status: SHIPPED | OUTPATIENT
Start: 2021-12-20 | End: 2022-05-26 | Stop reason: SDUPTHER

## 2021-12-20 RX ORDER — OMEPRAZOLE 40 MG/1
40 CAPSULE, DELAYED RELEASE ORAL 2 TIMES DAILY
Qty: 180 CAPSULE | Refills: 4 | Status: SHIPPED | OUTPATIENT
Start: 2021-12-20 | End: 2022-09-27 | Stop reason: SDUPTHER

## 2021-12-20 RX ORDER — ERGOCALCIFEROL 1.25 MG/1
CAPSULE ORAL
Qty: 24 CAPSULE | Refills: 5 | Status: SHIPPED | OUTPATIENT
Start: 2021-12-20 | End: 2022-09-27 | Stop reason: SDUPTHER

## 2021-12-20 RX ORDER — OMEPRAZOLE 20 MG/1
20 CAPSULE, DELAYED RELEASE ORAL 2 TIMES DAILY
Qty: 180 CAPSULE | Refills: 4 | Status: SHIPPED | OUTPATIENT
Start: 2021-12-20 | End: 2021-12-20 | Stop reason: SDUPTHER

## 2021-12-22 ENCOUNTER — TELEPHONE (OUTPATIENT)
Dept: INTERNAL MEDICINE | Facility: CLINIC | Age: 76
End: 2021-12-22
Payer: MEDICARE

## 2021-12-22 RX ORDER — DOXYCYCLINE HYCLATE 100 MG
100 TABLET ORAL 2 TIMES DAILY
Qty: 14 TABLET | Refills: 0 | Status: SHIPPED | OUTPATIENT
Start: 2021-12-22 | End: 2021-12-29

## 2021-12-24 ENCOUNTER — TELEPHONE (OUTPATIENT)
Dept: ENDOSCOPY | Facility: HOSPITAL | Age: 76
End: 2021-12-24
Payer: MEDICARE

## 2021-12-31 ENCOUNTER — EXTERNAL CHRONIC CARE MANAGEMENT (OUTPATIENT)
Dept: PRIMARY CARE CLINIC | Facility: CLINIC | Age: 76
End: 2021-12-31
Payer: MEDICARE

## 2021-12-31 PROCEDURE — 99490 CHRNC CARE MGMT STAFF 1ST 20: CPT | Mod: PBBFAC | Performed by: INTERNAL MEDICINE

## 2021-12-31 PROCEDURE — 99490 CHRNC CARE MGMT STAFF 1ST 20: CPT | Mod: S$PBB,,, | Performed by: INTERNAL MEDICINE

## 2021-12-31 PROCEDURE — 99490 PR CHRONIC CARE MGMT, 1ST 20 MIN: ICD-10-PCS | Mod: S$PBB,,, | Performed by: INTERNAL MEDICINE

## 2022-01-02 ENCOUNTER — TELEPHONE (OUTPATIENT)
Dept: INTERNAL MEDICINE | Facility: CLINIC | Age: 77
End: 2022-01-02
Payer: MEDICARE

## 2022-01-02 DIAGNOSIS — I10 ESSENTIAL HYPERTENSION: ICD-10-CM

## 2022-01-02 DIAGNOSIS — D64.9 ANEMIA, UNSPECIFIED TYPE: Primary | ICD-10-CM

## 2022-01-02 DIAGNOSIS — E83.52 HYPERCALCEMIA: ICD-10-CM

## 2022-01-02 RX ORDER — IRON,CARBONYL/ASCORBIC ACID 65MG-125MG
1 TABLET, DELAYED RELEASE (ENTERIC COATED) ORAL DAILY
COMMUNITY
Start: 2022-01-02 | End: 2022-09-27

## 2022-01-03 ENCOUNTER — HOSPITAL ENCOUNTER (OUTPATIENT)
Dept: RADIOLOGY | Facility: HOSPITAL | Age: 77
Discharge: HOME | End: 2022-01-03
Attending: ORTHOPAEDIC SURGERY
Payer: MEDICARE

## 2022-01-03 ENCOUNTER — OFFICE VISIT (OUTPATIENT)
Dept: ORTHOPEDICS | Facility: CLINIC | Age: 77
End: 2022-01-03
Payer: MEDICARE

## 2022-01-03 VITALS — WEIGHT: 167.75 LBS | BODY MASS INDEX: 26.96 KG/M2 | HEIGHT: 66 IN

## 2022-01-03 DIAGNOSIS — M20.12 HALLUX VALGUS OF LEFT FOOT: ICD-10-CM

## 2022-01-03 DIAGNOSIS — Z09 FOLLOW-UP EXAMINATION AFTER ORTHOPEDIC SURGERY: Primary | ICD-10-CM

## 2022-01-03 DIAGNOSIS — Z09 FOLLOW-UP EXAMINATION AFTER ORTHOPEDIC SURGERY: ICD-10-CM

## 2022-01-03 PROCEDURE — 99999 PR PBB SHADOW E&M-EST. PATIENT-LVL III: ICD-10-PCS | Mod: PBBFAC,,, | Performed by: ORTHOPAEDIC SURGERY

## 2022-01-03 PROCEDURE — 99999 PR PBB SHADOW E&M-EST. PATIENT-LVL III: CPT | Mod: PBBFAC,,, | Performed by: ORTHOPAEDIC SURGERY

## 2022-01-03 PROCEDURE — 99212 PR OFFICE/OUTPT VISIT, EST, LEVL II, 10-19 MIN: ICD-10-PCS | Mod: S$PBB,,, | Performed by: ORTHOPAEDIC SURGERY

## 2022-01-03 PROCEDURE — 73630 X-RAY EXAM OF FOOT: CPT | Mod: 26,LT,, | Performed by: RADIOLOGY

## 2022-01-03 PROCEDURE — 73630 X-RAY EXAM OF FOOT: CPT | Mod: TC,LT

## 2022-01-03 PROCEDURE — 99213 OFFICE O/P EST LOW 20 MIN: CPT | Mod: PBBFAC | Performed by: ORTHOPAEDIC SURGERY

## 2022-01-03 PROCEDURE — 99212 OFFICE O/P EST SF 10 MIN: CPT | Mod: S$PBB,,, | Performed by: ORTHOPAEDIC SURGERY

## 2022-01-03 PROCEDURE — 73630 XR FOOT COMPLETE 3 VIEW LEFT: ICD-10-PCS | Mod: 26,LT,, | Performed by: RADIOLOGY

## 2022-01-03 NOTE — PROGRESS NOTES
Isael Salmon  Returns today for follow-up.  She is now 6 months postop from her left hallux valgus reconstruction and 2nd hammertoe repair.  On her last visit she was still having significant swelling at 3 months and had been unable to get into any closed shoes.  She returns today and reports that her swelling is improved and she is not having any pain related to her big toe and her 2nd toe.  Her 2nd toe still impinges on her big toe but is not causing much pain.  She has been having more pain under the lateral 3 toes of her foot lately and states she has been trying to put more weight medially.      Examination:  The left foot reveals less swelling in her previous visit.  The 2nd toe is still somewhat medially deviated but the overall alignment is improved.  There is still some residual mild hallux valgus but her forefoot is much narrower at the metatarsal head level    Imaging:  I ordered reviewed standing x-ray of her left foot today.  The osteotomy site is well healed and there has been no change in alignment since the last x-ray.  I do not see any obvious bony abnormalities related to the lateral 3 toes.    Impression:  1. Follow-up examination after orthopedic surgery  X-Ray Foot Complete Left   2. Hallux valgus of left foot  X-Ray Foot Complete Left       Recommendation:  I would not recommend any further intervention at this time.  She can progress her shoe wear and activity as pain and swelling allow    Follow-up as needed

## 2022-01-03 NOTE — TELEPHONE ENCOUNTER
Please notify pt    Your blood counts are slightly lower.  Your inflammation rates are mildly elevated due to the recent gout attack.  Your calcium level is slightly elevated. Will repeat blood work in one month    You need to start on over the counter iron supplement called Vitron C - one tablet daily for your anemia    Dr DON wants to repeat blood work in one month    Janett Montoya M.D.

## 2022-01-19 ENCOUNTER — PES CALL (OUTPATIENT)
Dept: ADMINISTRATIVE | Facility: CLINIC | Age: 77
End: 2022-01-19
Payer: MEDICARE

## 2022-01-21 ENCOUNTER — HOSPITAL ENCOUNTER (OUTPATIENT)
Dept: RADIOLOGY | Facility: CLINIC | Age: 77
Discharge: HOME | End: 2022-01-21
Attending: INTERNAL MEDICINE
Payer: MEDICARE

## 2022-01-21 DIAGNOSIS — M89.9 BONE DISORDER: ICD-10-CM

## 2022-01-21 DIAGNOSIS — M85.89 OTHER SPECIFIED DISORDERS OF BONE DENSITY AND STRUCTURE, MULTIPLE SITES: ICD-10-CM

## 2022-01-21 PROCEDURE — 77080 DXA BONE DENSITY AXIAL: CPT | Mod: TC

## 2022-01-21 PROCEDURE — 77080 DXA BONE DENSITY AXIAL: CPT | Mod: 26,,, | Performed by: INTERNAL MEDICINE

## 2022-01-21 PROCEDURE — 77080 DEXA BONE DENSITY SPINE HIP: ICD-10-PCS | Mod: 26,,, | Performed by: INTERNAL MEDICINE

## 2022-01-28 ENCOUNTER — OFFICE VISIT (OUTPATIENT)
Dept: URGENT CARE | Facility: CLINIC | Age: 77
End: 2022-01-28
Payer: MEDICARE

## 2022-01-28 ENCOUNTER — TELEPHONE (OUTPATIENT)
Dept: INTERNAL MEDICINE | Facility: CLINIC | Age: 77
End: 2022-01-28
Payer: MEDICARE

## 2022-01-28 VITALS
HEIGHT: 65 IN | TEMPERATURE: 98 F | HEART RATE: 83 BPM | RESPIRATION RATE: 18 BRPM | OXYGEN SATURATION: 98 % | BODY MASS INDEX: 28.32 KG/M2 | WEIGHT: 170 LBS | SYSTOLIC BLOOD PRESSURE: 126 MMHG | DIASTOLIC BLOOD PRESSURE: 55 MMHG

## 2022-01-28 DIAGNOSIS — R05.9 COUGH: ICD-10-CM

## 2022-01-28 DIAGNOSIS — R11.0 NAUSEA: ICD-10-CM

## 2022-01-28 DIAGNOSIS — R19.7 DIARRHEA, UNSPECIFIED TYPE: Primary | ICD-10-CM

## 2022-01-28 LAB
CTP QC/QA: YES
SARS-COV-2 RDRP RESP QL NAA+PROBE: NEGATIVE

## 2022-01-28 PROCEDURE — 99213 PR OFFICE/OUTPT VISIT, EST, LEVL III, 20-29 MIN: ICD-10-PCS | Mod: S$GLB,,,

## 2022-01-28 PROCEDURE — U0002: ICD-10-PCS | Mod: QW,CR,S$GLB,

## 2022-01-28 PROCEDURE — U0002 COVID-19 LAB TEST NON-CDC: HCPCS | Mod: QW,CR,S$GLB,

## 2022-01-28 PROCEDURE — 99213 OFFICE O/P EST LOW 20 MIN: CPT | Mod: S$GLB,,,

## 2022-01-28 RX ORDER — ONDANSETRON 4 MG/1
4 TABLET, ORALLY DISINTEGRATING ORAL EVERY 8 HOURS PRN
Qty: 30 TABLET | Refills: 0 | Status: SHIPPED | OUTPATIENT
Start: 2022-01-28 | End: 2022-05-26

## 2022-01-28 NOTE — PATIENT INSTRUCTIONS
PLEASE READ YOUR DISCHARGE INSTRUCTIONS ENTIRELY AS IT CONTAINS IMPORTANT INFORMATION.    Take the zofran for nausea (it dissolves under your tongue)     Use gatorade/pedialyte or rehydration packets to help stay hydrated. Vitamin water and plain water do not contain rehydrating electrolytes.    Increase clear liquids (water, gatorade, pedialyte, broths, jello, etc) Hold off on solids for 12-18 hours. Then advance to BRAT diet (banana, rice, applesauce, tea, toast/crackers), then advance further as tolerated. Avoid spicy or fatty foods.     Use Peptobismol to help alleviate your diarrhea symptoms.     Avoid imodium unless you have more than 6 loose stools in 24 hours.       Wash hands frequently while sick. Avoid ibuprofen or other NSAIDS until you are well.     Please go to the ER if you experience worsening pain, blood in your vomit or stool, high fever, dizziness, fainting, swelling of your abdomen, inability to pass gas or stool.           Please arrange follow up with your primary medical clinic as soon as possible. You must understand that you've received an Urgent Care treatment only and that you may be released before all of your medical problems are known or treated. You, the patient, will arrange for follow up as instructed. If your symptoms worsen or fail to improve you should go to the Emergency Room.    WE CANNOT RULE OUT ALL POSSIBLE CAUSES OF YOUR SYMPTOMS IN THE URGENT CARE SETTING PLEASE GO TO THE ER IF YOU FEELS YOUR CONDITION IS WORSENING OR YOU WOULD LIKE EMERGENT EVALUATION.    Patient Education       Diarrhea, Adult ED   General Information   You came to the Emergency Department (ED) for diarrhea. Most doctors say you have diarrhea if you have 3 or more runny or watery stools or bowel movements in a day. The doctors feel that the risk of a serious cause for your diarrhea is low.  Diarrhea that starts all of a sudden is most often caused by a virus or bacteria. This will likely get better on its  own after a few days. Other things can cause you to have loose stools like:  · Side effects from the medicines you take.  · Problems digesting your food.  · Problems with your digestive system.  You may be waiting on some test results. The staff will contact you if there are concerning results.  What care is needed at home?   · Call your regular doctor to let them know you were in the ED. Make a follow-up appointment if you were told to.  · Drink small amounts of fluid every 15 to 30 minutes. Good fluids to drink are water, broth, and oral electrolyte solutions. Sugar-free or very low sugar sports drinks are also OK.  · Try to eat a small amount of food. Good foods to eat are potatoes, noodles, rice, oatmeal, crackers, soup, soft vegetables, and bananas. Avoid fatty foods and dairy products.  · Wash your hands often. This will help keep others healthy. It is easy to spread diarrhea from one person to the next.  · Stay home from school or work until you have stopped having diarrhea. Do not cook food for others while you have diarrhea.  · If you were given any medicines, make sure to take them as you were told.  When do I need to get emergency help?   · Return to the ED if:   ? You have signs of severe fluid loss, such as:  § No urine for more than 8 hours.  § Feel very light-headed or like you are going to pass out.  § Feel weak like you are going to fall.  ? Your stools have a large amount (more than 1 teaspoon or 5 mL) of blood in them.  ? You have very bad belly pain.  When do I need to call the doctor?   · You have more than 6 runny, watery stools in 24 hours.  · You have a fever of 101.3°F (38.5°C) or higher or chills that do not go away after a day.  · You have very bad belly pain.  · Your stools have a small amount (less than 1 teaspoon or 5 mL) of blood in them.  · You develop early signs of fluid loss, such as:  ? Your urine is very dark colored.  ? Your mouth is dry.  ? You have muscle cramps.  ? You have a  lack of energy.  ? You feel light-headed when you get up.  · You have new or worsening symptoms.  Last Reviewed Date   2021-05-21  Consumer Information Use and Disclaimer   This information is not specific medical advice and does not replace information you receive from your health care provider. This is only a brief summary of general information. It does NOT include all information about conditions, illnesses, injuries, tests, procedures, treatments, therapies, discharge instructions or life-style choices that may apply to you. You must talk with your health care provider for complete information about your health and treatment options. This information should not be used to decide whether or not to accept your health care providers advice, instructions or recommendations. Only your health care provider has the knowledge and training to provide advice that is right for you.  Copyright   Copyright © 2021 [employer] and its affiliates and/or licensors. All rights reserved.

## 2022-01-28 NOTE — TELEPHONE ENCOUNTER
----- Message from Saniya Toyadano Cook sent at 1/28/2022  9:56 AM CST -----  Contact: pt [phone]  Stated that she has been having diarrhea for the past 2 days.    [id number] [address]  [address] [facility] [address]  Phone: [phone] Fax: [phone]    Please call and advise.    Thank You

## 2022-01-28 NOTE — TELEPHONE ENCOUNTER
Pain 8/10 abdominal pain. Pt has been having diarrhea since yesterday. Pt statest hat she feels very fatigue, can;t function. Pt is in bed.

## 2022-01-28 NOTE — TELEPHONE ENCOUNTER
----- Message from Saniya Toyadano Cook sent at 1/28/2022  9:56 AM CST -----  Contact: pt [phone]  Stated that she has been having diarrhea for the past 2 days.    [id number] - [address]  [address] [facility] [address]  Phone: [phone] Fax: [phone]    Please call and advise.    Thank You

## 2022-01-28 NOTE — PROGRESS NOTES
"Subjective:       Patient ID: Isael Salmon is a 76 y.o. female.    Vitals:  height is 5' 5" (1.651 m) and weight is 77.1 kg (170 lb). Her temporal temperature is 97.9 °F (36.6 °C). Her blood pressure is 126/55 (abnormal) and her pulse is 83. Her respiration is 18 and oxygen saturation is 98%.     Chief Complaint: Diarrhea    75 yo female presents to urgent care for evaluation. Patient reports loose, watery stools that began Thursday evening. Patient states she had a pizza prior to the start of her symptoms and believes this to be the cause. She reports mild abdominal cramping and pain with the diarrhea. States her symptoms have slowly started to improve. She is taking Immodium, Pepto Bismol for her symptoms as well as drinking Gatorade. She denies fever, bloody stools, vomiting, abdominl distension.      Diarrhea   This is a new problem. The current episode started yesterday. The problem occurs more than 10 times per day. The problem has been gradually worsening. The stool consistency is described as watery. Associated symptoms include abdominal pain. Pertinent negatives include no bloating, fever, headaches or vomiting. Risk factors include suspect food intake. She has tried electrolyte solution for the symptoms. The treatment provided moderate relief.       Constitution: Negative for fever and generalized weakness.   HENT: Negative for postnasal drip, sinus pain, sinus pressure and sore throat.    Gastrointestinal: Positive for abdominal pain, nausea and diarrhea. Negative for vomiting, bright red blood in stool and rectal bleeding.   Neurological: Negative for headaches.       Objective:      Physical Exam   Constitutional: She is oriented to person, place, and time. She appears well-developed and well-nourished.   HENT:   Head: Normocephalic and atraumatic.   Ears:   Right Ear: External ear normal.   Left Ear: External ear normal.   Nose: Nose normal.   Mouth/Throat: Mucous membranes are normal.   Eyes: " Conjunctivae and lids are normal.   Neck: Trachea normal. Neck supple.   Cardiovascular: Normal rate, regular rhythm and normal heart sounds.   Pulmonary/Chest: Effort normal and breath sounds normal. No respiratory distress.   Abdominal: Normal appearance. She exhibits no distension, no abdominal bruit, no pulsatile midline mass and no mass. Soft. Bowel sounds are increased. There is abdominal tenderness (Mild ttp R/L lower quadrant). There is no guarding.   Musculoskeletal: Normal range of motion.         General: No edema. Normal range of motion.   Neurological: She is alert and oriented to person, place, and time. She has normal strength.   Skin: Skin is warm, dry, intact, not diaphoretic and not pale.   Psychiatric: She has a normal mood and affect. Her speech is normal and behavior is normal. Judgment and thought content normal. Cognition and memory  Nursing note and vitals reviewed.    Results for orders placed or performed in visit on 01/28/22   POCT COVID-19 Rapid Screening   Result Value Ref Range    POC Rapid COVID Negative Negative     Acceptable Yes      *Note: Due to a large number of results and/or encounters for the requested time period, some results have not been displayed. A complete set of results can be found in Results Review.           Assessment:       1. Diarrhea, unspecified type    2. Cough    3. Nausea          Plan:         Diarrhea, unspecified type    Cough  -     POCT COVID-19 Rapid Screening    Nausea  -     ondansetron (ZOFRAN-ODT) 4 MG TbDL; Take 1 tablet (4 mg total) by mouth every 8 (eight) hours as needed (nausea).  Dispense: 30 tablet; Refill: 0    Patient is not toxic appearing, afebrile, VSS. Discussed diagnosis and treatment plan with patient. We discussed over-the-counter medications to help treat symptoms as well as prescribed medication if any were prescribed.  Provided RTC and ER precautions. Patient educational handouts also included in discharge  paperwork and given to patient who verbalized understanding and agrees with plan of care.  Patient denies any further questions or concerns at this time.  Patient exits exam room in no acute distress.       Patient Instructions   PLEASE READ YOUR DISCHARGE INSTRUCTIONS ENTIRELY AS IT CONTAINS IMPORTANT INFORMATION.    Take the zofran for nausea (it dissolves under your tongue)     Use gatorade/pedialyte or rehydration packets to help stay hydrated. Vitamin water and plain water do not contain rehydrating electrolytes.    Increase clear liquids (water, gatorade, pedialyte, broths, jello, etc) Hold off on solids for 12-18 hours. Then advance to BRAT diet (banana, rice, applesauce, tea, toast/crackers), then advance further as tolerated. Avoid spicy or fatty foods.     Use Peptobismol to help alleviate your diarrhea symptoms.     Avoid imodium unless you have more than 6 loose stools in 24 hours.       Wash hands frequently while sick. Avoid ibuprofen or other NSAIDS until you are well.     Please go to the ER if you experience worsening pain, blood in your vomit or stool, high fever, dizziness, fainting, swelling of your abdomen, inability to pass gas or stool.           Please arrange follow up with your primary medical clinic as soon as possible. You must understand that you've received an Urgent Care treatment only and that you may be released before all of your medical problems are known or treated. You, the patient, will arrange for follow up as instructed. If your symptoms worsen or fail to improve you should go to the Emergency Room.    WE CANNOT RULE OUT ALL POSSIBLE CAUSES OF YOUR SYMPTOMS IN THE URGENT CARE SETTING PLEASE GO TO THE ER IF YOU FEELS YOUR CONDITION IS WORSENING OR YOU WOULD LIKE EMERGENT EVALUATION.    Patient Education       Diarrhea, Adult ED   General Information   You came to the Emergency Department (ED) for diarrhea. Most doctors say you have diarrhea if you have 3 or more runny or watery  stools or bowel movements in a day. The doctors feel that the risk of a serious cause for your diarrhea is low.  Diarrhea that starts all of a sudden is most often caused by a virus or bacteria. This will likely get better on its own after a few days. Other things can cause you to have loose stools like:  · Side effects from the medicines you take.  · Problems digesting your food.  · Problems with your digestive system.  You may be waiting on some test results. The staff will contact you if there are concerning results.  What care is needed at home?   · Call your regular doctor to let them know you were in the ED. Make a follow-up appointment if you were told to.  · Drink small amounts of fluid every 15 to 30 minutes. Good fluids to drink are water, broth, and oral electrolyte solutions. Sugar-free or very low sugar sports drinks are also OK.  · Try to eat a small amount of food. Good foods to eat are potatoes, noodles, rice, oatmeal, crackers, soup, soft vegetables, and bananas. Avoid fatty foods and dairy products.  · Wash your hands often. This will help keep others healthy. It is easy to spread diarrhea from one person to the next.  · Stay home from school or work until you have stopped having diarrhea. Do not cook food for others while you have diarrhea.  · If you were given any medicines, make sure to take them as you were told.  When do I need to get emergency help?   · Return to the ED if:   ? You have signs of severe fluid loss, such as:  § No urine for more than 8 hours.  § Feel very light-headed or like you are going to pass out.  § Feel weak like you are going to fall.  ? Your stools have a large amount (more than 1 teaspoon or 5 mL) of blood in them.  ? You have very bad belly pain.  When do I need to call the doctor?   · You have more than 6 runny, watery stools in 24 hours.  · You have a fever of 101.3°F (38.5°C) or higher or chills that do not go away after a day.  · You have very bad belly  pain.  · Your stools have a small amount (less than 1 teaspoon or 5 mL) of blood in them.  · You develop early signs of fluid loss, such as:  ? Your urine is very dark colored.  ? Your mouth is dry.  ? You have muscle cramps.  ? You have a lack of energy.  ? You feel light-headed when you get up.  · You have new or worsening symptoms.  Last Reviewed Date   2021-05-21  Consumer Information Use and Disclaimer   This information is not specific medical advice and does not replace information you receive from your health care provider. This is only a brief summary of general information. It does NOT include all information about conditions, illnesses, injuries, tests, procedures, treatments, therapies, discharge instructions or life-style choices that may apply to you. You must talk with your health care provider for complete information about your health and treatment options. This information should not be used to decide whether or not to accept your health care providers advice, instructions or recommendations. Only your health care provider has the knowledge and training to provide advice that is right for you.  Copyright   Copyright © 2021 [employer], [employer] and its affiliates and/or licensors. All rights reserved.

## 2022-01-28 NOTE — TELEPHONE ENCOUNTER
Spoke with Ms Isael moyer [facility] for her and Mr Woo [facility]/2022 [facility]...........

## 2022-01-31 ENCOUNTER — EXTERNAL CHRONIC CARE MANAGEMENT (OUTPATIENT)
Dept: PRIMARY CARE CLINIC | Facility: CLINIC | Age: 77
End: 2022-01-31
Payer: MEDICARE

## 2022-01-31 PROCEDURE — 99490 CHRNC CARE MGMT STAFF 1ST 20: CPT | Mod: S$PBB,,, | Performed by: INTERNAL MEDICINE

## 2022-01-31 PROCEDURE — 99490 CHRNC CARE MGMT STAFF 1ST 20: CPT | Mod: PBBFAC | Performed by: INTERNAL MEDICINE

## 2022-01-31 PROCEDURE — 99490 PR CHRONIC CARE MGMT, 1ST 20 MIN: ICD-10-PCS | Mod: S$PBB,,, | Performed by: INTERNAL MEDICINE

## 2022-02-02 ENCOUNTER — OFFICE VISIT (OUTPATIENT)
Dept: INTERNAL MEDICINE | Facility: CLINIC | Age: 77
End: 2022-02-02
Payer: MEDICARE

## 2022-02-02 VITALS
OXYGEN SATURATION: 98 % | HEIGHT: 65 IN | BODY MASS INDEX: 27.56 KG/M2 | DIASTOLIC BLOOD PRESSURE: 80 MMHG | HEART RATE: 77 BPM | SYSTOLIC BLOOD PRESSURE: 138 MMHG | WEIGHT: 165.38 LBS

## 2022-02-02 DIAGNOSIS — E78.5 HYPERLIPIDEMIA, UNSPECIFIED HYPERLIPIDEMIA TYPE: ICD-10-CM

## 2022-02-02 DIAGNOSIS — I10 ESSENTIAL HYPERTENSION: ICD-10-CM

## 2022-02-02 DIAGNOSIS — E86.0 DEHYDRATION: ICD-10-CM

## 2022-02-02 DIAGNOSIS — R19.7 DIARRHEA, UNSPECIFIED TYPE: Primary | ICD-10-CM

## 2022-02-02 PROCEDURE — 99212 OFFICE O/P EST SF 10 MIN: CPT | Mod: PBBFAC | Performed by: INTERNAL MEDICINE

## 2022-02-02 PROCEDURE — 99999 PR PBB SHADOW E&M-EST. PATIENT-LVL II: ICD-10-PCS | Mod: PBBFAC,,, | Performed by: INTERNAL MEDICINE

## 2022-02-02 PROCEDURE — 96361 PR IV INFUSION, HYDRATION, EA ADD HOUR: ICD-10-PCS | Mod: ,,, | Performed by: INTERNAL MEDICINE

## 2022-02-02 PROCEDURE — 99214 PR OFFICE/OUTPT VISIT, EST, LEVL IV, 30-39 MIN: ICD-10-PCS | Mod: 25,S$PBB,, | Performed by: INTERNAL MEDICINE

## 2022-02-02 PROCEDURE — 96361 HYDRATE IV INFUSION ADD-ON: CPT | Mod: ,,, | Performed by: INTERNAL MEDICINE

## 2022-02-02 PROCEDURE — 99214 OFFICE O/P EST MOD 30 MIN: CPT | Mod: 25,S$PBB,, | Performed by: INTERNAL MEDICINE

## 2022-02-02 PROCEDURE — 96360 PR IV INFUSION, HYDRATION, 31-60 MIN: ICD-10-PCS | Mod: ,,, | Performed by: INTERNAL MEDICINE

## 2022-02-02 PROCEDURE — 96360 HYDRATION IV INFUSION INIT: CPT | Mod: ,,, | Performed by: INTERNAL MEDICINE

## 2022-02-02 PROCEDURE — 99999 PR PBB SHADOW E&M-EST. PATIENT-LVL II: CPT | Mod: PBBFAC,,, | Performed by: INTERNAL MEDICINE

## 2022-02-02 RX ORDER — MAG HYDROX/ALUMINUM HYD/SIMETH 200-200-20
30 SUSPENSION, ORAL (FINAL DOSE FORM) ORAL
Status: COMPLETED | OUTPATIENT
Start: 2022-02-02 | End: 2022-02-02

## 2022-02-02 RX ORDER — LIDOCAINE HYDROCHLORIDE 20 MG/ML
10 SOLUTION OROPHARYNGEAL
Status: COMPLETED | OUTPATIENT
Start: 2022-02-02 | End: 2022-02-02

## 2022-02-02 RX ORDER — SODIUM CHLORIDE 9 MG/ML
INJECTION, SOLUTION INTRAVENOUS CONTINUOUS
Status: DISCONTINUED | OUTPATIENT
Start: 2022-02-02 | End: 2022-05-26

## 2022-02-02 RX ADMIN — LIDOCAINE HYDROCHLORIDE 10 ML: 20 SOLUTION OROPHARYNGEAL at 09:02

## 2022-02-02 RX ADMIN — SODIUM CHLORIDE: 9 INJECTION, SOLUTION INTRAVENOUS at 10:02

## 2022-02-02 RX ADMIN — Medication 30 ML: at 09:02

## 2022-02-02 NOTE — PROGRESS NOTES
GI cocktail administered per Dr. Montoya @ 9:45am. Pt IV started Normal Saline after 1 attempt. IV d/c no problems. Pt tolerated well.

## 2022-02-02 NOTE — PROGRESS NOTES
CHIEF COMPLAINT: Diarrhea     HISTORY OF PRESENT ILLNESS: 76-year-old woman presents for follow up of above    She had a peperoni and italian sausage pizza on the evening 1/26/22 then for lunch on 1/27/22.  On Friday morning at 4 am on 1/28/22, she started to have abdominal cramping and diarrhea.  She had 15 watery stools. She wnet to urgent care the evening on 1/28/22. She has been taking Immodium, Pepto Bismol for her symptoms as well as drinking Gatorade. She had some chills, and nausea. She denies fever, bloody stools, vomiting, abdominl distension.      She continues to have abdominal cramping. She has continue to have diarrhea. She did have a formed stool last night - she had a deep brown.  She has not been eating much.  She has been lightheaded.    She is not sleeping well due to the cramping.   She is trying to drink fluids.   She was weak last night. She took pepto bismol last night     started to have diarrhea last night.    No gout. No indomethacin.       She has only taken losartan 50 mg twice daily this week    No dysuria or hematuria, cough, chest pain, shortness of breath.    PAST MEDICAL HISTORY:   1. Hypertension.   2. Hyperlipidemia.   3. Gout.   4. History of rashes.   5. TIA in 1997  6. Fibrocystic breast disease.   7. Postmenopausal.   8. Osteoarthritis.   9. History of tobacco usage; quit in 1997.   10. History of colon polyps; normal colonoscopy in 10/07 and normal 4/2010, due 2015   11. Normal bone mineral density scan in 6/06.   12. Anxiety and depression - controlled on lorazepam very rarely.   13. Varicose veins.   14. Intermittent GERD.   15. Iron deficiency anemia 4/2010 - due to erosive gastropathy on EGD 5/2010     PAST SURGICAL HISTORY:   1. Right carotid endarterectomy in 1997.   2. Breast reduction surgery.   3. Total abdominal hysterectomy.   4. Blood transfusion prior to 1983.     SOCIAL HISTORY: Quit smoking in 1997; smoked 1 PPD since age 16. No   alcohol use.  "    FAMILY HISTORY:   Father had gout, hypertension and heart failure; is . Mother had hypertension and pancreatic cancer; is also . Has five brothers, one of whom  in a motor vehicle accident. All have gout and hypertension. One brother has Crohn's disease; another has bladder cancer.     MEDICATIONS and ALLERGIES: Updated on epic.     PHYSICAL EXAMINATION:      /80   Pulse 77   Ht 5' 5" (1.651 m)   Wt 75 kg (165 lb 5.5 oz)   LMP  (LMP Unknown)   SpO2 98%   BMI 27.51 kg/m²      General: Alert, oriented. No apparent distress. Affect within normal   Limits.   Conjunctivae anicteric. Tympanic membranes clear. Oropharynx clear.   Neck supple.   Respiratory effort normal. Lungs clear  Heart: Regular rate and rhythm without murmurs, gallops or rubs.   No lower extremity edema.   ABDOMEN: soft, non distended, non tender, bowel sounds present, no hepatosplenomgaly  Stool - heme negative.          Pt given GI cocktail - tolerated well. Helped abdominal cramping    Pt given one liter normal saline over 2 hours.  IV started at 9:45 am and completed 12:00 pm. Pt tolerated well and felt much better after IV fluids. Abdominal cramping resolved.     Did not have a BM while in clinic.      ASSESSMENT AND PLAN:    1.  Diarrhea - likely viral - resolving. Dehydration resolved after 1 liter normal saline. Feeling much better.  [place] diet and advance as tolerated  2. Essential hypertension - stable   3. Heme positive stools - saw Dr Kingsley 19 -Heme negative today  4. Hyperlipidemia - on crestor to 20 mg 1/2 tablet daily.  increase Co Enzyme Q 10 200 mg  Twice daily. No spasms  5. Erosive gastropathy - on omeprazole to 40 mg twice daily.  6. Hypercalcemia - stable  7. Gout -  on allopurinol.  8. Situational stress/anxiety/mood disorder -stable lexapro to 10 mg 1 tablet daily. Sleeping better. Has not neeed xanax   8.  Carotid artery disease - on risk factor modification.   9. 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 Constipation - on linzess  11. Neck pain -better  Screening - Colonscopy 10/15 with 4 polyps - it was incomplete. Repeat 5/26/16 - divericulosis otherwise normal  Needs repeat - ordered . MMG 6/21  BMD 7/11.  I will see her back in 3 month in clinic , sooner if problems arise

## 2022-02-08 ENCOUNTER — TELEPHONE (OUTPATIENT)
Dept: INTERNAL MEDICINE | Facility: CLINIC | Age: 77
End: 2022-02-08
Payer: MEDICARE

## 2022-02-08 DIAGNOSIS — D64.9 ANEMIA, UNSPECIFIED TYPE: Primary | ICD-10-CM

## 2022-02-08 RX ORDER — METRONIDAZOLE 500 MG/1
500 TABLET ORAL 2 TIMES DAILY WITH MEALS
Qty: 14 TABLET | Refills: 0 | Status: SHIPPED | OUTPATIENT
Start: 2022-02-08 | End: 2022-02-09 | Stop reason: SDUPTHER

## 2022-02-08 NOTE — TELEPHONE ENCOUNTER
Pt  is calling back to get advice and check the status of phone call earlier. He lizbeth like a call this afternoon before pcp leaves.

## 2022-02-08 NOTE — TELEPHONE ENCOUNTER
Pt ate a solid meal on Sunday for the first time since leaving the doctor on last week. Pt has had bowel movements all over herself all night. Pt is back on BRAT diet but concerned that she is dehydrated. Could pt have a blockage, what is causing diarrhea, massive and cramps when this happens. Please advise. Pt is drinking black tea and a half of banana. Pt stomach feels sore like someone has been kicking her. Please advise.

## 2022-02-09 ENCOUNTER — LAB VISIT (OUTPATIENT)
Dept: LAB | Facility: HOSPITAL | Age: 77
End: 2022-02-09
Attending: INTERNAL MEDICINE
Payer: MEDICARE

## 2022-02-09 ENCOUNTER — OFFICE VISIT (OUTPATIENT)
Dept: INTERNAL MEDICINE | Facility: CLINIC | Age: 77
End: 2022-02-09
Payer: MEDICARE

## 2022-02-09 VITALS
WEIGHT: 161.63 LBS | HEART RATE: 99 BPM | HEIGHT: 65 IN | OXYGEN SATURATION: 98 % | SYSTOLIC BLOOD PRESSURE: 130 MMHG | BODY MASS INDEX: 26.93 KG/M2 | DIASTOLIC BLOOD PRESSURE: 60 MMHG

## 2022-02-09 DIAGNOSIS — R19.7 DIARRHEA, UNSPECIFIED TYPE: Primary | ICD-10-CM

## 2022-02-09 DIAGNOSIS — I10 ESSENTIAL HYPERTENSION: ICD-10-CM

## 2022-02-09 DIAGNOSIS — D64.9 ANEMIA, UNSPECIFIED TYPE: ICD-10-CM

## 2022-02-09 DIAGNOSIS — E83.52 HYPERCALCEMIA: ICD-10-CM

## 2022-02-09 DIAGNOSIS — R10.9 ABDOMINAL PAIN, UNSPECIFIED ABDOMINAL LOCATION: ICD-10-CM

## 2022-02-09 DIAGNOSIS — E86.0 DEHYDRATION: ICD-10-CM

## 2022-02-09 LAB
ALBUMIN SERPL BCP-MCNC: 3.8 G/DL (ref 3.5–5.2)
ANION GAP SERPL CALC-SCNC: 8 MMOL/L (ref 8–16)
BASOPHILS # BLD AUTO: 0.02 K/UL (ref 0–0.2)
BASOPHILS NFR BLD: 0.4 % (ref 0–1.9)
BUN SERPL-MCNC: 9 MG/DL (ref 8–23)
CALCIUM SERPL-MCNC: 11.4 MG/DL (ref 8.7–10.5)
CHLORIDE SERPL-SCNC: 103 MMOL/L (ref 95–110)
CO2 SERPL-SCNC: 29 MMOL/L (ref 23–29)
CREAT SERPL-MCNC: 0.9 MG/DL (ref 0.5–1.4)
CRP SERPL-MCNC: 24.8 MG/L (ref 0–8.2)
DIFFERENTIAL METHOD: ABNORMAL
EOSINOPHIL # BLD AUTO: 0.1 K/UL (ref 0–0.5)
EOSINOPHIL NFR BLD: 1.8 % (ref 0–8)
ERYTHROCYTE [DISTWIDTH] IN BLOOD BY AUTOMATED COUNT: 13.1 % (ref 11.5–14.5)
ERYTHROCYTE [SEDIMENTATION RATE] IN BLOOD BY WESTERGREN METHOD: 36 MM/HR (ref 0–36)
EST. GFR  (AFRICAN AMERICAN): >60 ML/MIN/1.73 M^2
EST. GFR  (NON AFRICAN AMERICAN): >60 ML/MIN/1.73 M^2
FERRITIN SERPL-MCNC: 62 NG/ML (ref 20–300)
GLUCOSE SERPL-MCNC: 95 MG/DL (ref 70–110)
HCT VFR BLD AUTO: 34.4 % (ref 37–48.5)
HGB BLD-MCNC: 10.7 G/DL (ref 12–16)
IMM GRANULOCYTES # BLD AUTO: 0.01 K/UL (ref 0–0.04)
IMM GRANULOCYTES NFR BLD AUTO: 0.2 % (ref 0–0.5)
IRON SERPL-MCNC: 92 UG/DL (ref 30–160)
LYMPHOCYTES # BLD AUTO: 0.7 K/UL (ref 1–4.8)
LYMPHOCYTES NFR BLD: 16.2 % (ref 18–48)
MAGNESIUM SERPL-MCNC: 1.4 MG/DL (ref 1.6–2.6)
MCH RBC QN AUTO: 30.4 PG (ref 27–31)
MCHC RBC AUTO-ENTMCNC: 31.1 G/DL (ref 32–36)
MCV RBC AUTO: 98 FL (ref 82–98)
MONOCYTES # BLD AUTO: 0.4 K/UL (ref 0.3–1)
MONOCYTES NFR BLD: 8.1 % (ref 4–15)
NEUTROPHILS # BLD AUTO: 3.3 K/UL (ref 1.8–7.7)
NEUTROPHILS NFR BLD: 73.3 % (ref 38–73)
NRBC BLD-RTO: 0 /100 WBC
PHOSPHATE SERPL-MCNC: 2.8 MG/DL (ref 2.7–4.5)
PHOSPHATE SERPL-MCNC: 2.8 MG/DL (ref 2.7–4.5)
PLATELET # BLD AUTO: 247 K/UL (ref 150–450)
PMV BLD AUTO: 11.6 FL (ref 9.2–12.9)
POTASSIUM SERPL-SCNC: 3.9 MMOL/L (ref 3.5–5.1)
PTH-INTACT SERPL-MCNC: 77.6 PG/ML (ref 9–77)
RBC # BLD AUTO: 3.52 M/UL (ref 4–5.4)
SATURATED IRON: 27 % (ref 20–50)
SODIUM SERPL-SCNC: 140 MMOL/L (ref 136–145)
TOTAL IRON BINDING CAPACITY: 336 UG/DL (ref 250–450)
TRANSFERRIN SERPL-MCNC: 227 MG/DL (ref 200–375)
WBC # BLD AUTO: 4.45 K/UL (ref 3.9–12.7)

## 2022-02-09 PROCEDURE — 83970 ASSAY OF PARATHORMONE: CPT | Performed by: INTERNAL MEDICINE

## 2022-02-09 PROCEDURE — 99214 PR OFFICE/OUTPT VISIT, EST, LEVL IV, 30-39 MIN: ICD-10-PCS | Mod: 25,S$PBB,, | Performed by: INTERNAL MEDICINE

## 2022-02-09 PROCEDURE — 99999 PR PBB SHADOW E&M-EST. PATIENT-LVL III: ICD-10-PCS | Mod: PBBFAC,,, | Performed by: INTERNAL MEDICINE

## 2022-02-09 PROCEDURE — 96361 HYDRATE IV INFUSION ADD-ON: CPT | Mod: ,,, | Performed by: INTERNAL MEDICINE

## 2022-02-09 PROCEDURE — 85025 COMPLETE CBC W/AUTO DIFF WBC: CPT | Performed by: INTERNAL MEDICINE

## 2022-02-09 PROCEDURE — 99214 OFFICE O/P EST MOD 30 MIN: CPT | Mod: 25,S$PBB,, | Performed by: INTERNAL MEDICINE

## 2022-02-09 PROCEDURE — 96360 HYDRATION IV INFUSION INIT: CPT | Mod: ,,, | Performed by: INTERNAL MEDICINE

## 2022-02-09 PROCEDURE — 36415 COLL VENOUS BLD VENIPUNCTURE: CPT | Performed by: INTERNAL MEDICINE

## 2022-02-09 PROCEDURE — 99999 PR PBB SHADOW E&M-EST. PATIENT-LVL III: CPT | Mod: PBBFAC,,, | Performed by: INTERNAL MEDICINE

## 2022-02-09 PROCEDURE — 80069 RENAL FUNCTION PANEL: CPT | Performed by: INTERNAL MEDICINE

## 2022-02-09 PROCEDURE — 84466 ASSAY OF TRANSFERRIN: CPT | Performed by: INTERNAL MEDICINE

## 2022-02-09 PROCEDURE — 85652 RBC SED RATE AUTOMATED: CPT | Performed by: INTERNAL MEDICINE

## 2022-02-09 PROCEDURE — 99213 OFFICE O/P EST LOW 20 MIN: CPT | Mod: PBBFAC | Performed by: INTERNAL MEDICINE

## 2022-02-09 PROCEDURE — 96361 PR IV INFUSION, HYDRATION, EA ADD HOUR: ICD-10-PCS | Mod: ,,, | Performed by: INTERNAL MEDICINE

## 2022-02-09 PROCEDURE — 86140 C-REACTIVE PROTEIN: CPT | Performed by: INTERNAL MEDICINE

## 2022-02-09 PROCEDURE — 82728 ASSAY OF FERRITIN: CPT | Performed by: INTERNAL MEDICINE

## 2022-02-09 PROCEDURE — 96360 PR IV INFUSION, HYDRATION, 31-60 MIN: ICD-10-PCS | Mod: ,,, | Performed by: INTERNAL MEDICINE

## 2022-02-09 PROCEDURE — 83735 ASSAY OF MAGNESIUM: CPT | Performed by: INTERNAL MEDICINE

## 2022-02-09 RX ORDER — METRONIDAZOLE 500 MG/1
500 TABLET ORAL 2 TIMES DAILY WITH MEALS
Qty: 14 TABLET | Refills: 0 | Status: SHIPPED | OUTPATIENT
Start: 2022-02-09 | End: 2022-02-09 | Stop reason: SDUPTHER

## 2022-02-09 RX ORDER — SODIUM CHLORIDE 9 MG/ML
INJECTION, SOLUTION INTRAVENOUS
Status: COMPLETED | OUTPATIENT
Start: 2022-02-09 | End: 2022-02-09

## 2022-02-09 RX ORDER — MAG HYDROX/ALUMINUM HYD/SIMETH 200-200-20
30 SUSPENSION, ORAL (FINAL DOSE FORM) ORAL
Status: COMPLETED | OUTPATIENT
Start: 2022-02-09 | End: 2022-02-09

## 2022-02-09 RX ORDER — LIDOCAINE HYDROCHLORIDE 20 MG/ML
10 SOLUTION OROPHARYNGEAL
Status: COMPLETED | OUTPATIENT
Start: 2022-02-09 | End: 2022-02-09

## 2022-02-09 RX ORDER — METRONIDAZOLE 500 MG/1
500 TABLET ORAL 2 TIMES DAILY WITH MEALS
Qty: 14 TABLET | Refills: 0 | Status: SHIPPED | OUTPATIENT
Start: 2022-02-09 | End: 2022-02-16

## 2022-02-09 RX ADMIN — LIDOCAINE HYDROCHLORIDE 10 ML: 20 SOLUTION OROPHARYNGEAL at 05:02

## 2022-02-09 RX ADMIN — Medication 30 ML: at 11:02

## 2022-02-09 RX ADMIN — SODIUM CHLORIDE: 9 INJECTION, SOLUTION INTRAVENOUS at 02:02

## 2022-02-09 NOTE — PROGRESS NOTES
IV placed to pt R inner arm after 2 attempts. Pt tolerated well.1st bag hung at 11 am,nother bag hung @ 12:45 pm, pt tolerated well. Pt D/C @ 2:45 pm.

## 2022-02-09 NOTE — TELEPHONE ENCOUNTER
Spoke with patient.  Had watery diarrhea last night.  Had a slimy bowel movement as well  Will start on metronidazole 500 mg twice daily for infectious diarrhea.   To have labs in am.   Will see in am.

## 2022-02-09 NOTE — PROGRESS NOTES
CHIEF COMPLAINT: Continued Diarrhea     HISTORY OF PRESENT ILLNESS: 76-year-old woman presents for follow up of above    She followed a bland diet (bananas, oatmeal, toast, tea) all last week. She ate her first real meal on 22 (3 days ago).  She had baked chicken, string beans and carot souflee at [facility].  That evening she had tremendous abdominal cramping and very watery diarrhea for 12 hours.   She is still sore in her abdomen today.      I sent in a prescription of metronidazole to start last night. The pharmacy computer was down so she did tno get the prescription.  She notes that her dog had been ill recently and took metronidazole and got better.     No fever, chills, nausea, vomiting. She feels weak and light headed today. She has not taken any of her medicines for the last 3 days.      No gout. No indomethacin.    No dysuria or hematuria, cough, chest pain, shortness of breath.    PAST MEDICAL HISTORY:   1. Hypertension.   2. Hyperlipidemia.   3. Gout.   4. History of rashes.   5. TIA in   6. Fibrocystic breast disease.   7. Postmenopausal.   8. Osteoarthritis.   9. History of tobacco usage; quit in .   10. History of colon polyps; normal colonoscopy in 10/07 and normal 2010, due    11. Normal bone mineral density scan in .   12. Anxiety and depression - controlled on lorazepam very rarely.   13. Varicose veins.   14. Intermittent GERD.   15. Iron deficiency anemia 2010 - due to erosive gastropathy on EGD 2010     PAST SURGICAL HISTORY:   1. Right carotid endarterectomy in .   2. Breast reduction surgery.   3. Total abdominal hysterectomy.   4. Blood transfusion prior to .     SOCIAL HISTORY: Quit smoking in ; smoked 1 PPD since age 16. No   alcohol use.     FAMILY HISTORY:   Father had gout, hypertension and heart failure; is . Mother had hypertension and pancreatic cancer; is also . Has five brothers, one of whom  in a motor vehicle  "accident. All have gout and hypertension. One brother has Crohn's disease; another has bladder cancer.     MEDICATIONS and ALLERGIES: Updated on epic.     PHYSICAL EXAMINATION:      /60 (BP Location: Right arm, Patient Position: Sitting, BP Method: Medium (Manual))   Pulse 99   Ht 5' 5" (1.651 m)   Wt 73.3 kg (161 lb 9.6 oz)   LMP  (LMP Unknown)   SpO2 98%   BMI 26.89 kg/m²    General: Alert, oriented. No apparent distress. Affect within normal   Limits.   Conjunctivae anicteric. Tympanic membranes clear. Oropharynx clear.   Neck supple.   Respiratory effort normal. Lungs clear  Heart: Regular rate and rhythm without murmurs, gallops or rubs.   No lower extremity edema.   ABDOMEN: soft, non distended, non tender, bowel sounds present, no hepatosplenomgaly           Pt given GI cocktail - tolerated well. Helped abdominal cramping     Pt given 1.5  liters normal saline over 3 hours.  IV started at 11 am and completed 2 pm. Pt tolerated well and felt much better after IV fluids. Abdominal cramping resolved.      Did not have a BM while in clinic.      ASSESSMENT AND PLAN:    1.  Diarrhea with dehydration- suspect a parasite - dog responded to metronidazole so could be giardia - start on metronidazole 500 mg twice dialy for 7 days.  Push fluids and bland diet.   2. Essential hypertension - stable   3. Heme positive stools - saw Dr Kingsley 8/13/19 -Heme negative today  4. Hyperlipidemia - on crestor to 20 mg 1/2 tablet daily.  increase Co Enzyme Q 10 200 mg  Twice daily. No spasms  5. Erosive gastropathy - on omeprazole to 40 mg twice daily.  6. Hypercalcemia - stable  7. Gout -  on allopurinol.  8. Situational stress/anxiety/mood disorder -stable lexapro to 10 mg 1 tablet daily. Sleeping better. Has not neeed xanax   8.  Carotid artery disease - on risk factor modification.   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 Constipation - on linzess  11. Neck pain -better  Screening - Colonscopy 10/15 with 4 polyps " - it was incomplete. Repeat 5/26/16 - divericulosis otherwise normal  Needs repeat - ordered . MMG 6/21  BMD 7/11.  I will see her back in 3 month in clinic , sooner if problems arise

## 2022-02-28 ENCOUNTER — EXTERNAL CHRONIC CARE MANAGEMENT (OUTPATIENT)
Dept: PRIMARY CARE CLINIC | Facility: CLINIC | Age: 77
End: 2022-02-28
Payer: MEDICARE

## 2022-02-28 PROCEDURE — 99490 CHRNC CARE MGMT STAFF 1ST 20: CPT | Mod: S$PBB,,, | Performed by: INTERNAL MEDICINE

## 2022-02-28 PROCEDURE — 99490 CHRNC CARE MGMT STAFF 1ST 20: CPT | Mod: PBBFAC | Performed by: INTERNAL MEDICINE

## 2022-02-28 PROCEDURE — 99490 PR CHRONIC CARE MGMT, 1ST 20 MIN: ICD-10-PCS | Mod: S$PBB,,, | Performed by: INTERNAL MEDICINE

## 2022-03-18 DIAGNOSIS — K59.00 CONSTIPATION, UNSPECIFIED CONSTIPATION TYPE: ICD-10-CM

## 2022-03-18 RX ORDER — LINACLOTIDE 145 UG/1
CAPSULE, GELATIN COATED ORAL
Qty: 30 CAPSULE | Refills: 3 | Status: SHIPPED | OUTPATIENT
Start: 2022-03-18 | End: 2023-03-28 | Stop reason: DRUGHIGH

## 2022-03-31 ENCOUNTER — EXTERNAL CHRONIC CARE MANAGEMENT (OUTPATIENT)
Dept: PRIMARY CARE CLINIC | Facility: CLINIC | Age: 77
End: 2022-03-31
Payer: MEDICARE

## 2022-03-31 PROCEDURE — 99490 CHRNC CARE MGMT STAFF 1ST 20: CPT | Mod: S$PBB,,, | Performed by: INTERNAL MEDICINE

## 2022-03-31 PROCEDURE — 99490 PR CHRONIC CARE MGMT, 1ST 20 MIN: ICD-10-PCS | Mod: S$PBB,,, | Performed by: INTERNAL MEDICINE

## 2022-03-31 PROCEDURE — 99490 CHRNC CARE MGMT STAFF 1ST 20: CPT | Mod: PBBFAC | Performed by: INTERNAL MEDICINE

## 2022-04-11 NOTE — TELEPHONE ENCOUNTER
No new care gaps identified.  Powered by [employer]. Reference number: [id number].   4/11/2022 11:51:58 AM CDT

## 2022-04-12 RX ORDER — ESCITALOPRAM OXALATE 10 MG/1
TABLET ORAL
Qty: 90 TABLET | Refills: 3 | Status: SHIPPED | OUTPATIENT
Start: 2022-04-12 | End: 2023-02-02 | Stop reason: SDUPTHER

## 2022-04-12 NOTE — TELEPHONE ENCOUNTER
Refill Authorization Note   Isael Salmon  is requesting a refill authorization.  Brief Assessment and Rationale for Refill:  Approve          Medication Reconciliation Completed: No   Comments:     No Care Gaps recommended.     Note composed:12:26 PM 04/12/2022

## 2022-04-28 ENCOUNTER — PES CALL (OUTPATIENT)
Dept: ADMINISTRATIVE | Facility: CLINIC | Age: 77
End: 2022-04-28
Payer: MEDICARE

## 2022-04-30 ENCOUNTER — EXTERNAL CHRONIC CARE MANAGEMENT (OUTPATIENT)
Dept: PRIMARY CARE CLINIC | Facility: CLINIC | Age: 77
End: 2022-04-30
Payer: MEDICARE

## 2022-04-30 PROCEDURE — 99490 PR CHRONIC CARE MGMT, 1ST 20 MIN: ICD-10-PCS | Mod: S$PBB,,, | Performed by: INTERNAL MEDICINE

## 2022-04-30 PROCEDURE — 99439 CHRNC CARE MGMT STAF EA ADDL: CPT | Mod: S$PBB,,, | Performed by: INTERNAL MEDICINE

## 2022-04-30 PROCEDURE — 99439 PR CHRONIC CARE MGMT, EA ADDTL 20 MIN: ICD-10-PCS | Mod: S$PBB,,, | Performed by: INTERNAL MEDICINE

## 2022-04-30 PROCEDURE — 99490 CHRNC CARE MGMT STAFF 1ST 20: CPT | Mod: S$PBB,,, | Performed by: INTERNAL MEDICINE

## 2022-04-30 PROCEDURE — 99439 CHRNC CARE MGMT STAF EA ADDL: CPT | Mod: PBBFAC,27 | Performed by: INTERNAL MEDICINE

## 2022-04-30 PROCEDURE — 99490 CHRNC CARE MGMT STAFF 1ST 20: CPT | Mod: PBBFAC | Performed by: INTERNAL MEDICINE

## 2022-05-12 ENCOUNTER — OFFICE VISIT (OUTPATIENT)
Dept: OPHTHALMOLOGY | Facility: CLINIC | Age: 77
End: 2022-05-12
Payer: MEDICARE

## 2022-05-12 DIAGNOSIS — H02.59 LAXITY OF EYELID: ICD-10-CM

## 2022-05-12 DIAGNOSIS — H02.835 DERMATOCHALASIS OF BOTH LOWER EYELIDS: ICD-10-CM

## 2022-05-12 DIAGNOSIS — H02.834 DERMATOCHALASIS OF BOTH UPPER EYELIDS: Primary | ICD-10-CM

## 2022-05-12 DIAGNOSIS — H02.832 DERMATOCHALASIS OF BOTH LOWER EYELIDS: ICD-10-CM

## 2022-05-12 DIAGNOSIS — H04.123 DRY EYE SYNDROME OF BOTH EYES: ICD-10-CM

## 2022-05-12 DIAGNOSIS — Z96.1 PSEUDOPHAKIA: ICD-10-CM

## 2022-05-12 DIAGNOSIS — H02.831 DERMATOCHALASIS OF BOTH UPPER EYELIDS: Primary | ICD-10-CM

## 2022-05-12 PROCEDURE — 92083 GOLDMANN PERIMETRY - OU - EXTENDED - BOTH EYES: ICD-10-PCS | Mod: 26,S$PBB,, | Performed by: OPHTHALMOLOGY

## 2022-05-12 PROCEDURE — 99214 OFFICE O/P EST MOD 30 MIN: CPT | Mod: PBBFAC | Performed by: OPHTHALMOLOGY

## 2022-05-12 PROCEDURE — 92083 EXTENDED VISUAL FIELD XM: CPT | Mod: PBBFAC | Performed by: OPHTHALMOLOGY

## 2022-05-12 PROCEDURE — 92285 EXTERNAL OCULAR PHOTOGRAPHY: CPT | Mod: PBBFAC | Performed by: OPHTHALMOLOGY

## 2022-05-12 PROCEDURE — 92285 EXTERNAL PHOTOGRAPHY - OU - BOTH EYES: ICD-10-PCS | Mod: 26,S$PBB,, | Performed by: OPHTHALMOLOGY

## 2022-05-12 PROCEDURE — 99204 OFFICE O/P NEW MOD 45 MIN: CPT | Mod: S$PBB,,, | Performed by: OPHTHALMOLOGY

## 2022-05-12 PROCEDURE — 99999 PR PBB SHADOW E&M-EST. PATIENT-LVL IV: ICD-10-PCS | Mod: PBBFAC,,, | Performed by: OPHTHALMOLOGY

## 2022-05-12 PROCEDURE — 99204 PR OFFICE/OUTPT VISIT, NEW, LEVL IV, 45-59 MIN: ICD-10-PCS | Mod: S$PBB,,, | Performed by: OPHTHALMOLOGY

## 2022-05-12 PROCEDURE — 99999 PR PBB SHADOW E&M-EST. PATIENT-LVL IV: CPT | Mod: PBBFAC,,, | Performed by: OPHTHALMOLOGY
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
## 2022-05-17 ENCOUNTER — PES CALL (OUTPATIENT)
Dept: ADMINISTRATIVE | Facility: CLINIC | Age: 77
End: 2022-05-17
Payer: MEDICARE

## 2022-05-23 DIAGNOSIS — M10.9 GOUT, ARTHRITIS: ICD-10-CM

## 2022-05-23 RX ORDER — ALLOPURINOL 100 MG/1
TABLET ORAL
Qty: 180 TABLET | Refills: 1 | Status: SHIPPED | OUTPATIENT
Start: 2022-05-23 | End: 2023-01-16

## 2022-05-23 NOTE — TELEPHONE ENCOUNTER
Refill Authorization Note   Isael Salmon  is requesting a refill authorization.  Brief Assessment and Rationale for Refill:  Approve     Medication Therapy Plan:       Medication Reconciliation Completed: No   Comments:     No Care Gaps recommended.     Note composed:12:10 PM 05/23/2022

## 2022-05-23 NOTE — TELEPHONE ENCOUNTER
No new care gaps identified.  [facility] Embedded Care Gaps. Reference number: [id number]. 5/23/2022   3:15:32 AM CDT

## 2022-05-26 ENCOUNTER — IMMUNIZATION (OUTPATIENT)
Dept: INTERNAL MEDICINE | Facility: CLINIC | Age: 77
End: 2022-05-26
Payer: MEDICARE

## 2022-05-26 ENCOUNTER — OFFICE VISIT (OUTPATIENT)
Dept: INTERNAL MEDICINE | Facility: CLINIC | Age: 77
End: 2022-05-26
Payer: MEDICARE

## 2022-05-26 VITALS
DIASTOLIC BLOOD PRESSURE: 60 MMHG | OXYGEN SATURATION: 98 % | BODY MASS INDEX: 26.8 KG/M2 | WEIGHT: 166.75 LBS | SYSTOLIC BLOOD PRESSURE: 124 MMHG | HEIGHT: 66 IN | HEART RATE: 85 BPM

## 2022-05-26 DIAGNOSIS — I10 ESSENTIAL HYPERTENSION: Primary | ICD-10-CM

## 2022-05-26 DIAGNOSIS — E53.8 VITAMIN B12 DEFICIENCY: ICD-10-CM

## 2022-05-26 DIAGNOSIS — K21.9 GASTROESOPHAGEAL REFLUX DISEASE, UNSPECIFIED WHETHER ESOPHAGITIS PRESENT: ICD-10-CM

## 2022-05-26 DIAGNOSIS — K63.5 POLYP OF COLON, UNSPECIFIED PART OF COLON, UNSPECIFIED TYPE: ICD-10-CM

## 2022-05-26 DIAGNOSIS — M10.9 GOUT, ARTHRITIS: ICD-10-CM

## 2022-05-26 DIAGNOSIS — E55.9 VITAMIN D DEFICIENCY DISEASE: ICD-10-CM

## 2022-05-26 DIAGNOSIS — E78.5 HYPERLIPIDEMIA, UNSPECIFIED HYPERLIPIDEMIA TYPE: ICD-10-CM

## 2022-05-26 DIAGNOSIS — Z12.31 SCREENING MAMMOGRAM FOR BREAST CANCER: ICD-10-CM

## 2022-05-26 DIAGNOSIS — K59.00 CONSTIPATION, UNSPECIFIED CONSTIPATION TYPE: ICD-10-CM

## 2022-05-26 DIAGNOSIS — D64.9 ANEMIA, UNSPECIFIED TYPE: ICD-10-CM

## 2022-05-26 DIAGNOSIS — Z23 NEED FOR VACCINATION: Primary | ICD-10-CM

## 2022-05-26 PROCEDURE — 99999 PR PBB SHADOW E&M-EST. PATIENT-LVL IV: ICD-10-PCS | Mod: PBBFAC,,, | Performed by: INTERNAL MEDICINE

## 2022-05-26 PROCEDURE — 99214 OFFICE O/P EST MOD 30 MIN: CPT | Mod: S$PBB,,, | Performed by: INTERNAL MEDICINE

## 2022-05-26 PROCEDURE — 99214 PR OFFICE/OUTPT VISIT, EST, LEVL IV, 30-39 MIN: ICD-10-PCS | Mod: S$PBB,,, | Performed by: INTERNAL MEDICINE

## 2022-05-26 PROCEDURE — 99999 PR PBB SHADOW E&M-EST. PATIENT-LVL IV: CPT | Mod: PBBFAC,,, | Performed by: INTERNAL MEDICINE

## 2022-05-26 PROCEDURE — 99214 OFFICE O/P EST MOD 30 MIN: CPT | Mod: PBBFAC | Performed by: INTERNAL MEDICINE

## 2022-05-26 PROCEDURE — 91305 COVID-19, MRNA, LNP-S, PF, 30 MCG/0.3 ML DOSE VACCINE (PFIZER): CPT | Mod: PBBFAC

## 2022-05-26 RX ORDER — MELOXICAM 15 MG/1
15 TABLET ORAL DAILY
Qty: 14 TABLET | Refills: 0 | Status: SHIPPED | OUTPATIENT
Start: 2022-05-26 | End: 2022-09-27

## 2022-05-26 RX ORDER — SPIRONOLACTONE 25 MG/1
25 TABLET ORAL DAILY
Qty: 90 TABLET | Refills: 1 | Status: SHIPPED | OUTPATIENT
Start: 2022-05-26 | End: 2022-09-27 | Stop reason: SDUPTHER

## 2022-05-26 RX ORDER — ROSUVASTATIN CALCIUM 10 MG/1
10 TABLET, COATED ORAL DAILY
Qty: 90 TABLET | Refills: 3 | Status: SHIPPED | OUTPATIENT
Start: 2022-05-26 | End: 2023-05-26

## 2022-05-26 RX ORDER — BETAMETHASONE DIPROPIONATE 0.5 MG/G
CREAM TOPICAL 2 TIMES DAILY
Qty: 60 G | Refills: 3 | Status: SHIPPED | OUTPATIENT
Start: 2022-05-26 | End: 2023-08-08

## 2022-05-26 NOTE — PROGRESS NOTES
CHIEF COMPLAINT: Follow up of multiple problems     HISTORY OF PRESENT ILLNESS: 76-year-old woman presents for follow up of above    Diarrhea resolved with a course of metronidazole.      She is having left foot pain - in the 3rd, 4th and 5th toes.  She puts most of her weight on her left heel due to the pain. . She had a bunionectomy and hammertoe surgery on 7/8/21.  She will see Dr Bunn next week.     No gout attacks.  She takes allopurinol 100 mg 2 tablets nightly.     She has been taking Linzess 145 mg every day or other day. She has a formed stool at first then have watery stool. She has a BM every other day.  If she does not take the Linzess, she is severely constipated.  No cramping or pain with the LInzess.    She graduated from the healthy back program. Neck and back are ok. She has stiffness. She does her exercises which help when she does them    Energy level is ok.  Breathing has been good.   She is sleeping well. She has not needed xanax at bedtime.  Appetite is good    Anxiety is controlled on Lexapro to 10 mg one tablet daily.       Heartburn is congroled on omperazole 20 mg twice daily     She continues to take Crestor 20 mg 1/2 tablet daily with co enzyme 10 200 mg  daily - leg achintess is better with lower dose of crestor. She also takes losartan 50 mg twice daily, and spironolactone 25 mg daily. BP has been doing well.        She had a normal cystoscope 12/17/14 due to recurrent UTI. No dysuria or hematuria now. She is no longer using estrogen vaginal cream for vaginal atrophy three times weekly       She is taking vitamin D 50,000 units twice weekly, sporadically    SHe is taking Vitron C once dialy for her anemia. She takes one a day women's vitamin      Colonoscopy was cancelled on 3/30/20 due to COVID pandemic.      No dysuria or hematuria    She takes tylenol arthritis 650 mg 2 tablets twice daily which helps her joint pain.  She did have some meloxicam that did help. She cannot take  "NSAIDS long term due to her stomach issues.      No fever, chills, nausea, vomiting. She feels weak and light headed today. She has not taken any of her medicines for the last 3 days.      No gout. No indomethacin.    No dysuria or hematuria, cough, chest pain, shortness of breath.    PAST MEDICAL HISTORY:   1. Hypertension.   2. Hyperlipidemia.   3. Gout.   4. History of rashes.   5. TIA in   6. Fibrocystic breast disease.   7. Postmenopausal.   8. Osteoarthritis.   9. History of tobacco usage; quit in .   10. History of colon polyps; normal colonoscopy in 10/07 and normal 2010, due    11. Normal bone mineral density scan in .   12. Anxiety and depression - controlled on lorazepam very rarely.   13. Varicose veins.   14. Intermittent GERD.   15. Iron deficiency anemia 2010 - due to erosive gastropathy on EGD 2010     PAST SURGICAL HISTORY:   1. Right carotid endarterectomy in .   2. Breast reduction surgery.   3. Total abdominal hysterectomy.   4. Blood transfusion prior to .     SOCIAL HISTORY: Quit smoking in ; smoked 1 PPD since age 16. No   alcohol use.     FAMILY HISTORY:   Father had gout, hypertension and heart failure; is . Mother had hypertension and pancreatic cancer; is also . Has five brothers, one of whom  in a motor vehicle accident. All have gout and hypertension. One brother has Crohn's disease; another has bladder cancer.     MEDICATIONS and ALLERGIES: Updated on epic.     PHYSICAL EXAMINATION:     /60 (BP Location: Right arm, Patient Position: Sitting)   Pulse 85   Ht 5' 5.5" (1.664 m)   Wt 75.7 kg (166 lb 12.5 oz)   LMP  (LMP Unknown)   SpO2 98%   BMI 27.33 kg/m²       General: Alert, oriented. No apparent distress. Affect within normal   Limits.   Conjunctivae anicteric. Tympanic membranes clear. Oropharynx clear.   Neck supple.   Respiratory effort normal. Lungs clear  Heart: Regular rate and rhythm without murmurs, gallops or " rubs.   No lower extremity edema.   ABDOMEN: soft, non distended, non tender, bowel sounds present, no hepatosplenomgaly  BREAST: no abn seen, no nodules palpated, no axillary lad              ASSESSMENT AND PLAN:    1.  Essential hypertension - stable   3. Heme positive stools - saw Dr Kingsley 8/13/19   4. Hyperlipidemia - on crestor to 20 mg 1/2 tablet daily.    5. Erosive gastropathy - on omeprazole to 40 mg twice daily.  6. Hypercalcemia - stable  7. Gout -  on allopurinol.  8. Situational stress/anxiety/mood disorder -stable lexapro to 10 mg 1 tablet daily. Sleeping better. Has not neeed xanax   8.  Carotid artery disease - on risk factor modification.   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 Constipation - on linzess  11. Neck pain -better  Screening - Colonscopy 10/15 with 4 polyps - it was incomplete. Repeat 5/26/16 - divericulosis otherwise normal  Needs repeat - ordered . MMG 6/21  BMD normal January 2022.  I will see her back in 4 month in clinic , sooner if problems arise

## 2022-05-31 ENCOUNTER — EXTERNAL CHRONIC CARE MANAGEMENT (OUTPATIENT)
Dept: PRIMARY CARE CLINIC | Facility: CLINIC | Age: 77
End: 2022-05-31
Payer: MEDICARE

## 2022-05-31 PROCEDURE — 99490 CHRNC CARE MGMT STAFF 1ST 20: CPT | Mod: PBBFAC | Performed by: INTERNAL MEDICINE

## 2022-05-31 PROCEDURE — 99439 CHRNC CARE MGMT STAF EA ADDL: CPT | Mod: S$PBB,,, | Performed by: INTERNAL MEDICINE

## 2022-05-31 PROCEDURE — 99439 PR CHRONIC CARE MGMT, EA ADDTL 20 MIN: ICD-10-PCS | Mod: S$PBB,,, | Performed by: INTERNAL MEDICINE

## 2022-05-31 PROCEDURE — 99490 CHRNC CARE MGMT STAFF 1ST 20: CPT | Mod: S$PBB,,, | Performed by: INTERNAL MEDICINE

## 2022-05-31 PROCEDURE — 99439 CHRNC CARE MGMT STAF EA ADDL: CPT | Mod: PBBFAC | Performed by: INTERNAL MEDICINE

## 2022-05-31 PROCEDURE — 99490 PR CHRONIC CARE MGMT, 1ST 20 MIN: ICD-10-PCS | Mod: S$PBB,,, | Performed by: INTERNAL MEDICINE

## 2022-06-03 ENCOUNTER — TELEPHONE (OUTPATIENT)
Dept: ADMINISTRATIVE | Facility: CLINIC | Age: 77
End: 2022-06-03
Payer: MEDICARE

## 2022-06-03 ENCOUNTER — OFFICE VISIT (OUTPATIENT)
Dept: ORTHOPEDICS | Facility: CLINIC | Age: 77
End: 2022-06-03
Payer: MEDICARE

## 2022-06-03 VITALS — WEIGHT: 166.88 LBS | BODY MASS INDEX: 26.82 KG/M2 | HEIGHT: 66 IN

## 2022-06-03 DIAGNOSIS — M77.42 METATARSALGIA OF LEFT FOOT: ICD-10-CM

## 2022-06-03 DIAGNOSIS — M20.12 HALLUX VALGUS OF LEFT FOOT: Primary | ICD-10-CM

## 2022-06-03 DIAGNOSIS — M20.42 HAMMERTOE OF LEFT FOOT: ICD-10-CM

## 2022-06-03 PROCEDURE — 99499 UNLISTED E&M SERVICE: CPT | Mod: S$PBB,,, | Performed by: ORTHOPAEDIC SURGERY

## 2022-06-03 PROCEDURE — 99999 PR PBB SHADOW E&M-EST. PATIENT-LVL IV: CPT | Mod: PBBFAC,,, | Performed by: ORTHOPAEDIC SURGERY

## 2022-06-03 PROCEDURE — 99499 NO LOS: ICD-10-PCS | Mod: S$PBB,,, | Performed by: ORTHOPAEDIC SURGERY

## 2022-06-03 PROCEDURE — 99214 OFFICE O/P EST MOD 30 MIN: CPT | Mod: PBBFAC | Performed by: ORTHOPAEDIC SURGERY

## 2022-06-03 PROCEDURE — 99999 PR PBB SHADOW E&M-EST. PATIENT-LVL IV: ICD-10-PCS | Mod: PBBFAC,,, | Performed by: ORTHOPAEDIC SURGERY

## 2022-06-03 NOTE — PROGRESS NOTES
Isael Salmon  Returns today for follow-up.  She is now 11 months postop from her left hallux valgus reconstruction and 2nd hammertoe repair.  She reports dissatisfaction with outcome of her surgery.  She reports continued deformity of her big toe and still has some impingement of the 2nd toe on the big toe.  Her main complaint, however, is pain about the lateral 3 toes with weight-bearing activities.  The symptoms have been going on since her last visit in January.  She states she has to change her shoe several times a day and only shoe she can really wear for long periods are tennis shoes.  She does not report any significant pain related to her big toe or even the 2nd toe at this point.      Examination:  The left foot reveals fairly good alignment of the big toe.  There is still some medial deviation of the 2nd toe.  There are no abnormalities of the lateral 3 toes.  On sitting exam she has painless motion of her big toe MTP joint.  She does have some tenderness about the 2nd toe when I try to push it down.  She has diffuse tenderness under the metatarsal heads of her lateral 3rd toes.  She is neurovascularly intact.    Imaging:  I reviewed her last x-rays from January.  Her osteotomy site is well healed on the 1st metatarsal.  There is still some medial deviation of the 2nd toe.  There are no abnormalities of the lateral 3 toes.    Impression:  11 months postop hallux valgus reconstruction and 2nd hammertoe repair with continued symptoms    Recommendation:  I am not sure what I can offer Ms. Salmon at this point.  She inquired about an opinion from Podiatry and I told her I could arrange this.  I am going to make referral to Dr. Jasso for another opinion regarding her left foot.

## 2022-06-06 ENCOUNTER — TELEPHONE (OUTPATIENT)
Dept: PODIATRY | Facility: CLINIC | Age: 77
End: 2022-06-06
Payer: MEDICARE

## 2022-06-06 NOTE — TELEPHONE ENCOUNTER
Spoke with Ms Salmon to offer assistance with scheduling an appointment with Dr. Jasso. Appt time and date of 7/11/22 at 9:15 am accepted. No other needs voiced at this time. Encouraged to call if further assistance is needed.

## 2022-06-06 NOTE — TELEPHONE ENCOUNTER
----- Message from Marv Jasso DPM sent at 6/6/2022  7:37 AM CDT -----  Regarding: FW: Referral  Please assist with scheduling it is not an emergency.  Max.  ----- Message -----  From: Silver Bunn MD  Sent: 6/3/2022  11:44 AM CDT  To: Marv Jasso DPM  Subject: Referral                                         Dr. Jasso,      This is a patient I did a proximal metatarsal osteotomy and attempted correction of a 2nd hammertoe about a year ago and is requesting a Podiatry referral for another opinion.  So I recommended you to her.  She does not have significant deformity of her big toe but still has some medial deviation of her 2nd toe.  Her main complaints however have to do with her lateral 3rd toes and metatarsalgia.  She is actually very nice but not satisfied with her outcome at this point, and I told her I am not sure what more I could do.   Thank you  ROCKY Bunn

## 2022-06-07 ENCOUNTER — PES CALL (OUTPATIENT)
Dept: ADMINISTRATIVE | Facility: CLINIC | Age: 77
End: 2022-06-07
Payer: MEDICARE

## 2022-06-21 RX ORDER — FOLIC ACID-PYRIDOXINE-CYANOCOBALAMIN TAB 2.5-25-2 MG 2.5-25-2 MG
TAB ORAL
Qty: 30 TABLET | Refills: 4 | Status: SHIPPED | OUTPATIENT
Start: 2022-06-21 | End: 2022-06-29 | Stop reason: SDUPTHER

## 2022-06-23 ENCOUNTER — TELEPHONE (OUTPATIENT)
Dept: ADMINISTRATIVE | Facility: CLINIC | Age: 77
End: 2022-06-23
Payer: MEDICARE

## 2022-06-24 ENCOUNTER — HOSPITAL ENCOUNTER (OUTPATIENT)
Dept: RADIOLOGY | Facility: OTHER | Age: 77
Discharge: HOME | End: 2022-06-24
Attending: INTERNAL MEDICINE
Payer: MEDICARE

## 2022-06-24 ENCOUNTER — OFFICE VISIT (OUTPATIENT)
Dept: INTERNAL MEDICINE | Facility: CLINIC | Age: 77
End: 2022-06-24
Payer: MEDICARE

## 2022-06-24 VITALS
DIASTOLIC BLOOD PRESSURE: 66 MMHG | HEIGHT: 65 IN | SYSTOLIC BLOOD PRESSURE: 170 MMHG | HEART RATE: 79 BPM | BODY MASS INDEX: 28.17 KG/M2 | WEIGHT: 169.06 LBS

## 2022-06-24 DIAGNOSIS — J44.9 CHRONIC OBSTRUCTIVE PULMONARY DISEASE, UNSPECIFIED COPD TYPE: ICD-10-CM

## 2022-06-24 DIAGNOSIS — I77.819 ECTASIS AORTA: ICD-10-CM

## 2022-06-24 DIAGNOSIS — E78.2 MIXED HYPERLIPIDEMIA: ICD-10-CM

## 2022-06-24 DIAGNOSIS — F39 MOOD DISORDER: ICD-10-CM

## 2022-06-24 DIAGNOSIS — K21.9 GASTROESOPHAGEAL REFLUX DISEASE WITHOUT ESOPHAGITIS: ICD-10-CM

## 2022-06-24 DIAGNOSIS — Z00.00 ENCOUNTER FOR PREVENTIVE HEALTH EXAMINATION: Primary | ICD-10-CM

## 2022-06-24 DIAGNOSIS — I77.9 BILATERAL CAROTID ARTERY DISEASE, UNSPECIFIED TYPE: ICD-10-CM

## 2022-06-24 DIAGNOSIS — I10 ESSENTIAL HYPERTENSION: ICD-10-CM

## 2022-06-24 DIAGNOSIS — M1A.09X0 IDIOPATHIC CHRONIC GOUT OF MULTIPLE SITES WITHOUT TOPHUS: ICD-10-CM

## 2022-06-24 DIAGNOSIS — E66.3 OVERWEIGHT (BMI 25.0-29.9): ICD-10-CM

## 2022-06-24 DIAGNOSIS — J84.10 CALCIFIED GRANULOMA OF LUNG: ICD-10-CM

## 2022-06-24 DIAGNOSIS — I73.9 PVD (PERIPHERAL VASCULAR DISEASE): ICD-10-CM

## 2022-06-24 DIAGNOSIS — M15.9 PRIMARY OSTEOARTHRITIS INVOLVING MULTIPLE JOINTS: ICD-10-CM

## 2022-06-24 DIAGNOSIS — Z12.31 SCREENING MAMMOGRAM FOR BREAST CANCER: ICD-10-CM

## 2022-06-24 DIAGNOSIS — R53.81 DEBILITY: ICD-10-CM

## 2022-06-24 DIAGNOSIS — Z12.11 COLON CANCER SCREENING: ICD-10-CM

## 2022-06-24 DIAGNOSIS — I70.0 AORTIC ATHEROSCLEROSIS: ICD-10-CM

## 2022-06-24 DIAGNOSIS — R26.2 DIFFICULTY WALKING: ICD-10-CM

## 2022-06-24 DIAGNOSIS — F33.0 MILD EPISODE OF RECURRENT MAJOR DEPRESSIVE DISORDER: ICD-10-CM

## 2022-06-24 DIAGNOSIS — Z23 NEED FOR SHINGLES VACCINE: ICD-10-CM

## 2022-06-24 PROCEDURE — 99999 PR PBB SHADOW E&M-EST. PATIENT-LVL V: CPT | Mod: PBBFAC,,, | Performed by: NURSE PRACTITIONER

## 2022-06-24 PROCEDURE — G0439 PR MEDICARE ANNUAL WELLNESS SUBSEQUENT VISIT: ICD-10-PCS | Mod: ,,, | Performed by: NURSE PRACTITIONER

## 2022-06-24 PROCEDURE — 99999 PR PBB SHADOW E&M-EST. PATIENT-LVL V: ICD-10-PCS | Mod: PBBFAC,,, | Performed by: NURSE PRACTITIONER

## 2022-06-24 PROCEDURE — 77063 BREAST TOMOSYNTHESIS BI: CPT | Mod: TC

## 2022-06-24 PROCEDURE — 77063 BREAST TOMOSYNTHESIS BI: CPT | Mod: 26,,, | Performed by: RADIOLOGY

## 2022-06-24 PROCEDURE — 77067 MAMMO DIGITAL SCREENING BILAT WITH TOMO: ICD-10-PCS | Mod: 26,,, | Performed by: RADIOLOGY

## 2022-06-24 PROCEDURE — 77063 MAMMO DIGITAL SCREENING BILAT WITH TOMO: ICD-10-PCS | Mod: 26,,, | Performed by: RADIOLOGY

## 2022-06-24 PROCEDURE — 77067 SCR MAMMO BI INCL CAD: CPT | Mod: 26,,, | Performed by: RADIOLOGY

## 2022-06-24 PROCEDURE — G0439 PPPS, SUBSEQ VISIT: HCPCS | Mod: ,,, | Performed by: NURSE PRACTITIONER

## 2022-06-24 PROCEDURE — 99215 OFFICE O/P EST HI 40 MIN: CPT | Mod: PBBFAC | Performed by: NURSE PRACTITIONER

## 2022-06-24 NOTE — PATIENT INSTRUCTIONS
Counseling and Referral of Other Preventative  (Italic type indicates deductible and co-insurance are waived)    Patient Name: Isael Salmon  Today's Date: 6/24/2022    Health Maintenance         Date Due Completion Date    Shingles Vaccine (3 of 3) Ordered today 10/6/2020    Colonoscopy Ordered already with PCP Dr. Montoya 5/26/2016    Lipid Panel Scheduled 9-23-22 4/30/2021    COVID-19 Vaccine (5 - Booster for Pfizer series) 09/26/2022 5/26/2022    Aspirin/Antiplatelet Therapy 06/03/2023 6/3/2022    DEXA Scan 01/21/2024 1/21/2022    Override on 7/14/2011: Done    TETANUS VACCINE 10/14/2029 10/14/2019        Endoscopy will contact you to schedule your colonoscopy. You can call [phone]    Orders Placed This Encounter   Procedures    (In Office Administered) Zoster Recombinant Vaccine     The following information is provided to all patients.  This information is to help you find resources for any of the problems found today that may be affecting your health:                Living healthy guide: www.[facility].[place].gov      Understanding Diabetes: www.diabetes.org      Eating healthy: www.cdc.gov/healthyweight      CDC home safety checklist: www.cdc.gov/steadi/patient.html      Agency on Aging: www.goea.[place].gov      Alcoholics anonymous (AA): www.aa.org      Physical Activity: www.perry.nih.gov/fe7fgiq      Tobacco use: www.quitwithusla.org

## 2022-06-24 NOTE — PROGRESS NOTES
"  Isael Salmon presented for a  Medicare AWV and comprehensive Health Risk Assessment today. The following components were reviewed and updated:    · Medical history  · Family History  · Social history  · Allergies and Current Medications  · Health Risk Assessment  · Health Maintenance  · Care Team         ** See Completed Assessments for Annual Wellness Visit within the encounter summary.**         The following assessments were completed:  · Living Situation  · CAGE  · Depression Screening  · Timed Get Up and Go  · Whisper Test  · Cognitive Function Screening        · Nutrition Screening  · ADL Screening  · PAQ Screening    Vitals:    06/24/22 1028   BP: (!) 170/66   BP Location: Right arm   Patient Position: Sitting   BP Method: Medium (Manual)   Pulse: 79   Weight: 76.7 kg (169 lb 1.5 oz)   Height: 5' 5" (1.651 m)     Body mass index is 28.14 kg/m².  Physical Exam  Vitals and nursing note reviewed.   Constitutional:       Appearance: Normal appearance.   HENT:      Head: Normocephalic.      Nose: Nose normal.      Mouth/Throat:      Mouth: Mucous membranes are moist.   Eyes:      Conjunctiva/sclera: Conjunctivae normal.   Cardiovascular:      Rate and Rhythm: Normal rate.   Pulmonary:      Effort: Pulmonary effort is normal.   Musculoskeletal:         General: Normal range of motion.      Cervical back: Normal range of motion.   Neurological:      General: No focal deficit present.      Mental Status: She is alert and oriented to person, place, and time.   Psychiatric:         Mood and Affect: Mood normal.         Behavior: Behavior normal.         Thought Content: Thought content normal.         Judgment: Judgment normal.         Diagnoses and health risks identified today and associated recommendations/orders:    1. Encounter for preventive health examination  Exam done    Health Maintenance updated    Records reviewed    2. Aortic atherosclerosis  Chronic, followed by PCP    3. Bilateral carotid artery " disease, unspecified type  Chronic, followed by PCP    4. 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 PVD (peripheral vascular disease)  Chronic, followed by PCP    6. Essential hypertension  Chronic, followed by PCP    Take medications as prescribed.    Monitor BP at home, goal BP < or = 140/80, call office if consistently above this range.    Follow low salt DASH diet and exercise.    BMI reviewed.    Go to ED if Headaches, blurred vision, chest pain, or SOB occurs along with elevated readings > or = 160/90.    7. Mixed hyperlipidemia  Chronic, followed by PCP    Continue cholesterol med, low fat diet, and exercise. Check lipids.    8. Chronic obstructive pulmonary disease, unspecified COPD type  Chronic, followed by PCP    9. Calcified granuloma of lung  Chronic, followed by PCP    10. Mild episode of recurrent major depressive disorder  Chronic, followed by PCP    11. Mood disorder  Chronic, followed by PCP    12. Gastroesophageal reflux disease without esophagitis  Chronic, followed by PCP    GERD stable on meds, education on dietary triggers done    13. Idiopathic chronic gout of multiple sites without tophus  Chronic, followed by PCP    14. Primary osteoarthritis involving multiple joints  Chronic, followed by PCP    15. Difficulty walking  Chronic, followed by PCP    16. Debility  Chronic, followed by PCP    17. Colon cancer screening  Ordered by PCP already    18. Need for shingles vaccine  - (In Office Administered) Zoster Recombinant Vaccine    19. BMI 28.0-28.9,adult  BMI reviewed    20. Overweight (BMI 25.0-29.9)  BMI reviewed.    Diet and exercise to lose weight.    Provided Isael with a 5-10 year written screening schedule and personal prevention plan. Recommendations were developed using the USPSTF age appropriate recommendations. Education, counseling, and referrals were provided as needed. After Visit Summary printed and given to patient which includes a list of  additional screenings\tests needed.    Follow up in about 3 months (around 9/27/2022) for with PCP Dr. Montoya as scheduled.    Jolie Woodward, KIA  I offered to discuss advanced care planning, including how to pick a person who would make decisions for you if you were unable to make them for yourself, called a health care power of , and what kind of decisions you might make such as use of life sustaining treatments such as ventilators and tube feeding when faced with a life limiting illness recorded on a living will that they will need to know. (How you want to be cared for as you near the end of your natural life)     X Patient is interested in learning more about how to make advanced directives.  I provided them paperwork and offered to discuss this with them.

## 2022-06-30 ENCOUNTER — EXTERNAL CHRONIC CARE MANAGEMENT (OUTPATIENT)
Dept: PRIMARY CARE CLINIC | Facility: CLINIC | Age: 77
End: 2022-06-30
Payer: MEDICARE

## 2022-06-30 PROCEDURE — 99490 PR CHRONIC CARE MGMT, 1ST 20 MIN: ICD-10-PCS | Mod: S$PBB,,, | Performed by: INTERNAL MEDICINE

## 2022-06-30 PROCEDURE — 99490 CHRNC CARE MGMT STAFF 1ST 20: CPT | Mod: S$PBB,,, | Performed by: INTERNAL MEDICINE

## 2022-06-30 PROCEDURE — 99490 CHRNC CARE MGMT STAFF 1ST 20: CPT | Mod: PBBFAC | Performed by: INTERNAL MEDICINE

## 2022-07-05 ENCOUNTER — TELEPHONE (OUTPATIENT)
Dept: RADIOLOGY | Facility: OTHER | Age: 77
End: 2022-07-05
Payer: MEDICARE

## 2022-07-08 ENCOUNTER — TELEPHONE (OUTPATIENT)
Dept: RADIOLOGY | Facility: OTHER | Age: 77
End: 2022-07-08
Payer: MEDICARE

## 2022-07-11 ENCOUNTER — TELEPHONE (OUTPATIENT)
Dept: PODIATRY | Facility: CLINIC | Age: 77
End: 2022-07-11
Payer: MEDICARE

## 2022-07-11 ENCOUNTER — OFFICE VISIT (OUTPATIENT)
Dept: PODIATRY | Facility: CLINIC | Age: 77
End: 2022-07-11
Payer: MEDICARE

## 2022-07-11 VITALS
BODY MASS INDEX: 28.14 KG/M2 | HEART RATE: 87 BPM | SYSTOLIC BLOOD PRESSURE: 132 MMHG | DIASTOLIC BLOOD PRESSURE: 67 MMHG | HEIGHT: 65 IN

## 2022-07-11 DIAGNOSIS — G57.62 MORTON'S NEUROMA OF LEFT FOOT: Primary | ICD-10-CM

## 2022-07-11 DIAGNOSIS — Z98.890 HISTORY OF FOOT SURGERY: ICD-10-CM

## 2022-07-11 DIAGNOSIS — M20.12 HALLUX ABDUCTOVALGUS, LEFT: ICD-10-CM

## 2022-07-11 PROCEDURE — 99999 PR PBB SHADOW E&M-EST. PATIENT-LVL III: CPT | Mod: PBBFAC,,, | Performed by: PODIATRIST

## 2022-07-11 PROCEDURE — 99203 OFFICE O/P NEW LOW 30 MIN: CPT | Mod: 25,S$PBB,, | Performed by: PODIATRIST

## 2022-07-11 PROCEDURE — 64455 NJX AA&/STRD PLTR COM DG NRV: CPT | Mod: S$PBB,LT,, | Performed by: PODIATRIST

## 2022-07-11 PROCEDURE — 64455 NJX AA&/STRD PLTR COM DG NRV: CPT | Mod: LT,PBBFAC,PN | Performed by: PODIATRIST

## 2022-07-11 PROCEDURE — 64455 PR INJECT ANES/STEROID PLANTAR COMMON DIGITAL NERVE: ICD-10-PCS | Mod: S$PBB,LT,, | Performed by: PODIATRIST

## 2022-07-11 PROCEDURE — 99999 PR PBB SHADOW E&M-EST. PATIENT-LVL III: ICD-10-PCS | Mod: PBBFAC,,, | Performed by: PODIATRIST

## 2022-07-11 PROCEDURE — 99203 PR OFFICE/OUTPT VISIT, NEW, LEVL III, 30-44 MIN: ICD-10-PCS | Mod: 25,S$PBB,, | Performed by: PODIATRIST

## 2022-07-11 PROCEDURE — 99213 OFFICE O/P EST LOW 20 MIN: CPT | Mod: PBBFAC,PN | Performed by: PODIATRIST

## 2022-07-11 RX ORDER — METHYLPREDNISOLONE ACETATE 40 MG/ML
40 INJECTION, SUSPENSION INTRA-ARTICULAR; INTRALESIONAL; INTRAMUSCULAR; SOFT TISSUE
Status: COMPLETED | OUTPATIENT
Start: 2022-07-11 | End: 2022-07-11

## 2022-07-11 RX ADMIN — METHYLPREDNISOLONE ACETATE 40 MG: 40 INJECTION, SUSPENSION INTRA-ARTICULAR; INTRALESIONAL; INTRAMUSCULAR; INTRASYNOVIAL; SOFT TISSUE at 10:07

## 2022-07-11 NOTE — TELEPHONE ENCOUNTER
Spoke with pt in regards to her message on the portal. Pt stated she was approaching the [place] and will be here shortly. Pt also stated she wasn't lost anymore and that it have traffic. Pt was advise that she can be seen at 10:45 today by Dr. Jasso             ----- Message from Jean Ramirez sent at 7/11/2022  9:25 AM CDT -----  Regarding: running late  Type:  Patient Returning Call    Who Called:patient     Who Left Message for Patient:n/a    Does the patient know what this is regarding?:patient running late/ got lost     Would the patient rather a call back or a response via [facility]? Call back   Best Call Back Number:[phone]    Additional Information: n/a

## 2022-07-11 NOTE — PROGRESS NOTES
Subjective:      Patient ID: Isael Salmon is a 76 y.o. female.    Chief Complaint: Foot Pain (Bottom of left foot pt states she can't put weight on that foot ), Heel Pain (Left foot ), and Toe Pain (Right foot )    Isael is a 76 y.o. female who presents to the clinic upon referral from Dr. Baker  for evaluation and treatment of diabetic feet. Isael has a past medical history of Adjustment disorder (7/26/2012), Anemia (7/26/2012), AR (allergic rhinitis) (9/5/2014), Bronchitis, Cataract, Fibrocystic breast disease in female (7/26/2012), GERD (gastroesophageal reflux disease) (7/26/2012), Gout, Gout, arthritis (7/26/2012), History of blood transfusion (7/26/2012), History of colonic polyps (7/26/2012), Hypercalcemia (9/20/2012), Hyperlipidemia (7/26/2012), Hypertension (7/26/2012), Nuclear sclerosis (5/2/2014), Osteoarthritis (7/26/2012), Other hyperparathyroidism (9/20/2012), Postmenopausal status (7/26/2012), PVD (peripheral vascular disease) (7/26/2012), Stroke (1997), Superficial vein thrombosis, Transient ischemic attack (7/26/2012), Type 2 diabetes mellitus without complication, without long-term current use of insulin (3/30/2020), and Varicose veins (7/26/2012). History of left foot bunionectomy and 2nd PIPJ arthrodesis 7/8/21 by Dr Bunn. She reports minimal discomfort at the left 1st and 2nd toes as well as at the right foot bunion and 3rd toe PIPJ. She has moderate to severe pain at the left plantar forefoot, primarily in the area of the 3rd and 4th metatarsal heads. She has difficulty walking in most shoes, changes hoes multiple times throughout the day, has difficulty with stairs.     PCP: Janett Montoya MD    Date Last Seen by PCP:     Current shoe gear: Casual shoes    Hemoglobin A1C   Date Value Ref Range Status   12/20/2021 5.5 4.0 - 5.6 % Final     Comment:     ADA Screening Guidelines:  5.7-6.4%  Consistent with prediabetes  >or=6.5%  Consistent with diabetes    High levels of fetal  hemoglobin interfere with the HbA1C  assay. Heterozygous hemoglobin variants (HbS, HgC, etc)do  not significantly interfere with this assay.   However, presence of multiple variants may affect accuracy.     04/30/2021 5.4 4.0 - 5.6 % Final     Comment:     ADA Screening Guidelines:  5.7-6.4%  Consistent with prediabetes  >or=6.5%  Consistent with diabetes    High levels of fetal hemoglobin interfere with the HbA1C  assay. Heterozygous hemoglobin variants (HbS, HgC, etc)do  not significantly interfere with this assay.   However, presence of multiple variants may affect accuracy.     03/31/2020 5.8 (H) 4.0 - 5.6 % Final     Comment:     ADA Screening Guidelines:  5.7-6.4%  Consistent with prediabetes  >or=6.5%  Consistent with diabetes  High levels of fetal hemoglobin interfere with the HbA1C  assay. Heterozygous hemoglobin variants (HbS, HgC, etc)do  not significantly interfere with this assay.   However, presence of multiple variants may affect accuracy.             Review of Systems   Constitutional: Negative for chills, fever and malaise/fatigue.   HENT: Negative for congestion.    Cardiovascular: Negative for chest pain, claudication and leg swelling.   Respiratory: Negative for cough and shortness of breath.    Skin: Negative for color change, itching and poor wound healing.   Musculoskeletal: Negative for back pain, joint pain, muscle cramps and muscle weakness.   Gastrointestinal: Negative for nausea and vomiting.   Neurological: Negative for numbness, paresthesias and weakness.   Psychiatric/Behavioral: Negative for altered mental status.           Objective:      Physical Exam  Constitutional:       General: She is not in acute distress.     Appearance: Normal appearance. She is not ill-appearing.   Cardiovascular:      Pulses:           Dorsalis pedis pulses are 2+ on the right side and 2+ on the left side.        Posterior tibial pulses are 2+ on the right side and 2+ on the left side.   Musculoskeletal:       Comments: Pain on palpation to the distal 3rd and 2nd intermetatarsal space left foot.  Negative Lachman test left 2nd toe however the toe has some medial and dorsal deviation at the metatarsophalangeal joint.  There is some pain on palpation to the plantar 2nd met head left foot.  Track bound hallux abductovalgus left foot with mild localized pain on palpation overlying the previous surgical scar.   Skin:     General: Skin is warm.      Capillary Refill: Capillary refill takes less than 2 seconds.      Findings: No ecchymosis or erythema.      Nails: There is no clubbing.      Comments: Hypertrophic scar along the dorsal medial 1st ray left foot.   Neurological:      Mental Status: She is alert and oriented to person, place, and time.      Sensory: Sensation is intact.      Motor: Motor function is intact.               Assessment:       Encounter Diagnoses   Name Primary?    Díaz's neuroma of left foot Yes    History of foot surgery     Hallux abductovalgus, left          Plan:       Isael was seen today for foot pain, heel pain and toe pain.    Diagnoses and all orders for this visit:    Díaz's neuroma of left foot    History of foot surgery    Hallux abductovalgus, left    Other orders  -     methylPREDNISolone acetate injection 40 mg      I counseled the patient on her conditions, their implications and medical management.    Reviewed left foot x-ray completed in January 2022 noting residual increasing 1-2 intermetatarsal angle with prior opening base wedge osteotomy of the 1st metatarsal and residual hallux valgus.  There is medial deviation of the 2nd toe at the metatarsophalangeal joint with fused proximal interphalangeal joint.  There is some narrowing between the 3rd and 4th metatarsal heads on the left foot.    From a clinical standpoint patient appears to have a Díaz's neuroma to the distal 3rd intermetatarsal space and possibly the 2nd intermetatarsal space.  We discussed treatment  options for painful neuroma such as corticosteroid injections, metatarsal pads, shoe gear and activity modifications and surgical intervention for recalcitrant painful neuromas.  Patient elected receive corticosteroid injection to the distal 3rd intermetatarsal space today.  We discussed avoiding injection to the 2nd because this could affect the 2nd metatarsophalangeal joint and worsen the medial drift of the 2nd toe.    With the patient's verbal consent the skin was prepped overlying the dorsal 3rd intermetatarsal space with alcohol followed by skin anesthesia with ethyl chloride.  Injected mixture containing 40 mg of Depo-Medrol with 1 mL 0.25% plain Marcaine to the affected area.  She tolerated the procedure well without apparent complication.  Instructed rest, ice and elevate p.r.n..  A felt adhesive metatarsal pad was applied to the foot today.    RTC within 6 weeks or p.r.n. as discussed.  We also discussed reassessing for possible surgical intervention to help further correct the bunion deformity and realigned the 2nd toe at the metatarsophalangeal joint.    Assisted by Krish De Leon DPM PGY 3    A portion of this note was generated by voice recognition software and may contain spelling and grammar errors.

## 2022-07-21 ENCOUNTER — HOSPITAL ENCOUNTER (OUTPATIENT)
Dept: RADIOLOGY | Facility: OTHER | Age: 77
Discharge: HOME | End: 2022-07-21
Attending: INTERNAL MEDICINE
Payer: MEDICARE

## 2022-07-21 DIAGNOSIS — R92.8 ABNORMAL MAMMOGRAM: ICD-10-CM

## 2022-07-21 PROCEDURE — 77065 DX MAMMO INCL CAD UNI: CPT | Mod: TC,LT

## 2022-07-21 PROCEDURE — 77061 BREAST TOMOSYNTHESIS UNI: CPT | Mod: 26,LT,, | Performed by: RADIOLOGY

## 2022-07-21 PROCEDURE — 76642 US BREAST LEFT LIMITED: ICD-10-PCS | Mod: 26,LT,, | Performed by: RADIOLOGY

## 2022-07-21 PROCEDURE — 77061 MAMMO DIGITAL DIAGNOSTIC LEFT WITH TOMO: ICD-10-PCS | Mod: 26,LT,, | Performed by: RADIOLOGY

## 2022-07-21 PROCEDURE — 77065 MAMMO DIGITAL DIAGNOSTIC LEFT WITH TOMO: ICD-10-PCS | Mod: 26,LT,, | Performed by: RADIOLOGY

## 2022-07-21 PROCEDURE — 76642 ULTRASOUND BREAST LIMITED: CPT | Mod: 26,LT,, | Performed by: RADIOLOGY

## 2022-07-21 PROCEDURE — 76642 ULTRASOUND BREAST LIMITED: CPT | Mod: TC,LT

## 2022-07-21 PROCEDURE — 77065 DX MAMMO INCL CAD UNI: CPT | Mod: 26,LT,, | Performed by: RADIOLOGY

## 2022-07-31 ENCOUNTER — EXTERNAL CHRONIC CARE MANAGEMENT (OUTPATIENT)
Dept: PRIMARY CARE CLINIC | Facility: CLINIC | Age: 77
End: 2022-07-31
Payer: MEDICARE

## 2022-07-31 PROCEDURE — 99490 PR CHRONIC CARE MGMT, 1ST 20 MIN: ICD-10-PCS | Mod: S$PBB,,, | Performed by: INTERNAL MEDICINE

## 2022-07-31 PROCEDURE — 99490 CHRNC CARE MGMT STAFF 1ST 20: CPT | Mod: PBBFAC | Performed by: INTERNAL MEDICINE

## 2022-07-31 PROCEDURE — 99490 CHRNC CARE MGMT STAFF 1ST 20: CPT | Mod: S$PBB,,, | Performed by: INTERNAL MEDICINE

## 2022-08-22 ENCOUNTER — OFFICE VISIT (OUTPATIENT)
Dept: PODIATRY | Facility: CLINIC | Age: 77
End: 2022-08-22
Payer: MEDICARE

## 2022-08-22 ENCOUNTER — HOSPITAL ENCOUNTER (OUTPATIENT)
Dept: RADIOLOGY | Facility: HOSPITAL | Age: 77
Discharge: HOME | End: 2022-08-22
Attending: PODIATRIST
Payer: MEDICARE

## 2022-08-22 VITALS
HEART RATE: 78 BPM | HEIGHT: 65 IN | WEIGHT: 169 LBS | SYSTOLIC BLOOD PRESSURE: 141 MMHG | BODY MASS INDEX: 28.16 KG/M2 | DIASTOLIC BLOOD PRESSURE: 62 MMHG

## 2022-08-22 DIAGNOSIS — G57.62 MORTON'S NEUROMA OF THIRD INTERSPACE OF LEFT FOOT: ICD-10-CM

## 2022-08-22 DIAGNOSIS — M20.10 HALLUX ABDUCTOVALGUS WITH BUNIONS, UNSPECIFIED LATERALITY: ICD-10-CM

## 2022-08-22 DIAGNOSIS — M20.10 HALLUX ABDUCTOVALGUS WITH BUNIONS, UNSPECIFIED LATERALITY: Primary | ICD-10-CM

## 2022-08-22 DIAGNOSIS — M21.619 HALLUX ABDUCTOVALGUS WITH BUNIONS, UNSPECIFIED LATERALITY: ICD-10-CM

## 2022-08-22 DIAGNOSIS — S99.922D PLANTAR PLATE INJURY, LEFT, SUBSEQUENT ENCOUNTER: ICD-10-CM

## 2022-08-22 DIAGNOSIS — M21.619 HALLUX ABDUCTOVALGUS WITH BUNIONS, UNSPECIFIED LATERALITY: Primary | ICD-10-CM

## 2022-08-22 PROCEDURE — 99999 PR PBB SHADOW E&M-EST. PATIENT-LVL III: ICD-10-PCS | Mod: PBBFAC,,, | Performed by: PODIATRIST

## 2022-08-22 PROCEDURE — 99214 PR OFFICE/OUTPT VISIT, EST, LEVL IV, 30-39 MIN: ICD-10-PCS | Mod: S$PBB,,, | Performed by: PODIATRIST

## 2022-08-22 PROCEDURE — 73630 X-RAY EXAM OF FOOT: CPT | Mod: 26,50,, | Performed by: RADIOLOGY

## 2022-08-22 PROCEDURE — 73630 XR FOOT COMPLETE 3 VIEW BILATERAL: ICD-10-PCS | Mod: 26,50,, | Performed by: RADIOLOGY

## 2022-08-22 PROCEDURE — 99213 OFFICE O/P EST LOW 20 MIN: CPT | Mod: PBBFAC,PN | Performed by: PODIATRIST

## 2022-08-22 PROCEDURE — 73630 X-RAY EXAM OF FOOT: CPT | Mod: TC,50,FY

## 2022-08-22 PROCEDURE — 99999 PR PBB SHADOW E&M-EST. PATIENT-LVL III: CPT | Mod: PBBFAC,,, | Performed by: PODIATRIST

## 2022-08-22 PROCEDURE — 99214 OFFICE O/P EST MOD 30 MIN: CPT | Mod: S$PBB,,, | Performed by: PODIATRIST

## 2022-08-22 NOTE — PROGRESS NOTES
Subjective:      Patient ID: Isael Salmon is a 76 y.o. female.    Chief Complaint: Foot Pain (Follow up left foot pain had injection last visit)    Isael is a 76 y.o. female who presents to the clinic upon referral from Dr. Marah manning. provider found  for evaluation and treatment of diabetic feet. Isael has a past medical history of Adjustment disorder (7/26/2012), Anemia (7/26/2012), AR (allergic rhinitis) (9/5/2014), Bronchitis, Cataract, Fibrocystic breast disease in female (7/26/2012), GERD (gastroesophageal reflux disease) (7/26/2012), Gout, Gout, arthritis (7/26/2012), History of blood transfusion (7/26/2012), History of colonic polyps (7/26/2012), Hypercalcemia (9/20/2012), Hyperlipidemia (7/26/2012), Hypertension (7/26/2012), Nuclear sclerosis (5/2/2014), Osteoarthritis (7/26/2012), Other hyperparathyroidism (9/20/2012), Postmenopausal status (7/26/2012), PVD (peripheral vascular disease) (7/26/2012), Stroke (1997), Superficial vein thrombosis, Transient ischemic attack (7/26/2012), Type 2 diabetes mellitus without complication, without long-term current use of insulin (3/30/2020), and Varicose veins (7/26/2012). History of left foot bunionectomy and 2nd PIPJ arthrodesis 7/8/21 by Dr Bunn. She reports minimal discomfort at the left 1st and 2nd toes as well as at the right foot bunion and 3rd toe PIPJ. She has moderate to severe pain at the left plantar forefoot, primarily in the area of the 3rd and 4th metatarsal heads. She has difficulty walking in most shoes, changes hoes multiple times throughout the day, has difficulty with stairs.     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  States that the pain significantly improved.  She is not tried to wear an enclosed shoe.  Release that she is considering revision surgery to the left foot and is also complaining about her bunion to the right foot.    PCP: Janett Montoya MD    Date Last Seen by PCP:      Current shoe gear: Casual shoes    Hemoglobin A1C   Date Value Ref Range Status   12/20/2021 5.5 4.0 - 5.6 % Final     Comment:     ADA Screening Guidelines:  5.7-6.4%  Consistent with prediabetes  >or=6.5%  Consistent with diabetes    High levels of fetal hemoglobin interfere with the HbA1C  assay. Heterozygous hemoglobin variants (HbS, HgC, etc)do  not significantly interfere with this assay.   However, presence of multiple variants may affect accuracy.     04/30/2021 5.4 4.0 - 5.6 % Final     Comment:     ADA Screening Guidelines:  5.7-6.4%  Consistent with prediabetes  >or=6.5%  Consistent with diabetes    High levels of fetal hemoglobin interfere with the HbA1C  assay. Heterozygous hemoglobin variants (HbS, HgC, etc)do  not significantly interfere with this assay.   However, presence of multiple variants may affect accuracy.     03/31/2020 5.8 (H) 4.0 - 5.6 % Final     Comment:     ADA Screening Guidelines:  5.7-6.4%  Consistent with prediabetes  >or=6.5%  Consistent with diabetes  High levels of fetal hemoglobin interfere with the HbA1C  assay. Heterozygous hemoglobin variants (HbS, HgC, etc)do  not significantly interfere with this assay.   However, presence of multiple variants may affect accuracy.             Review of Systems   Constitutional: Negative for chills, fever and malaise/fatigue.   HENT: Negative for congestion.    Cardiovascular: Negative for chest pain, claudication and leg swelling.   Respiratory: Negative for cough and shortness of breath.    Skin: Negative for color change, itching and poor wound healing.   Musculoskeletal: Negative for back pain, joint pain, muscle cramps and muscle weakness.   Gastrointestinal: Negative for nausea and vomiting.   Neurological: Negative for numbness, paresthesias and weakness.   Psychiatric/Behavioral: Negative for altered mental status.           Objective:      Physical Exam  Constitutional:       General: She is not in acute distress.      Appearance: Normal appearance. She is not ill-appearing.   Cardiovascular:      Pulses:           Dorsalis pedis pulses are 2+ on the right side and 2+ on the left side.        Posterior tibial pulses are 2+ on the right side and 2+ on the left side.   Musculoskeletal:      Comments: Pain on palpation to the distal 3rd and 2nd intermetatarsal space left foot.  Negative Lachman test left 2nd toe however the toe has some medial and dorsal deviation at the metatarsophalangeal joint.  There is some pain on palpation to the plantar 2nd met head left foot.  Track bound hallux abductovalgus left foot with mild localized pain on palpation overlying the previous surgical scar.  Track bound hallux abductovalgus right foot under Lapping the 2nd toe.  One MTP range of motion right foot without pain and within normal limits.  No audible clicking or popping.   Skin:     General: Skin is warm.      Capillary Refill: Capillary refill takes less than 2 seconds.      Findings: No ecchymosis or erythema.      Nails: There is no clubbing.      Comments: Hypertrophic scar along the dorsal medial 1st ray left foot.   Neurological:      Mental Status: She is alert and oriented to person, place, and time.      Sensory: Sensation is intact.      Motor: Motor function is intact.               Assessment:       Encounter Diagnoses   Name Primary?    Hallux abductovalgus with bunions, unspecified laterality Yes    Plantar plate injury, left, subsequent encounter     Díaz's neuroma of third interspace of left foot          Plan:       Isael was seen today for foot pain.    Diagnoses and all orders for this visit:    Hallux abductovalgus with bunions, unspecified laterality  -     X-Ray Foot Complete 3 view Bilateral; Future    Plantar plate injury, left, subsequent encounter  -     X-Ray Foot Complete 3 view Bilateral; Future    Díaz's neuroma of third interspace of left foot  -     X-Ray Foot Complete 3 view Bilateral; Future      I  counseled the patient on her conditions, their implications and medical management.    Reviewed left foot x-ray completed in January 2022 noting residual increasing 1-2 intermetatarsal angle with prior opening base wedge osteotomy of the 1st metatarsal and residual hallux valgus.  There is medial deviation of the 2nd toe at the metatarsophalangeal joint with fused proximal interphalangeal joint.  There is some narrowing between the 3rd and 4th metatarsal heads on the left foot.    Dispensed, failing applied metatarsal pad to the left foot.  Patient referred to [facility].      Obtain follow-up weight-bearing bilateral foot x-rays include sesamoid axial views to assess underlying osseous pathology and for surgical planning.  We briefly discussed hardware removal to the 1st metatarsal with minimally invasive bunionectomy with 1st metatarsal osteotomy and possible Akin osteotomy.  She would need a plantar plate reconstruction 2nd metatarsophalangeal joint left foot.  Briefly discussed risks, benefits and anticipated postop course with the patient.      RTC within 4-6 weeks or prior as discussed.    A portion of this note was generated by voice recognition software and may contain spelling and grammar errors.

## 2022-08-31 ENCOUNTER — EXTERNAL CHRONIC CARE MANAGEMENT (OUTPATIENT)
Dept: PRIMARY CARE CLINIC | Facility: CLINIC | Age: 77
End: 2022-08-31
Payer: MEDICARE

## 2022-08-31 PROCEDURE — 99490 CHRNC CARE MGMT STAFF 1ST 20: CPT | Mod: PBBFAC | Performed by: INTERNAL MEDICINE

## 2022-08-31 PROCEDURE — 99490 PR CHRONIC CARE MGMT, 1ST 20 MIN: ICD-10-PCS | Mod: S$PBB,,, | Performed by: INTERNAL MEDICINE

## 2022-08-31 PROCEDURE — 99490 CHRNC CARE MGMT STAFF 1ST 20: CPT | Mod: S$PBB,,, | Performed by: INTERNAL MEDICINE

## 2022-09-23 ENCOUNTER — LAB VISIT (OUTPATIENT)
Dept: LAB | Facility: OTHER | Age: 77
End: 2022-09-23
Attending: INTERNAL MEDICINE
Payer: MEDICARE

## 2022-09-23 DIAGNOSIS — M10.9 GOUT, ARTHRITIS: ICD-10-CM

## 2022-09-23 DIAGNOSIS — D64.9 ANEMIA, UNSPECIFIED TYPE: ICD-10-CM

## 2022-09-23 DIAGNOSIS — E55.9 VITAMIN D DEFICIENCY DISEASE: ICD-10-CM

## 2022-09-23 DIAGNOSIS — I10 ESSENTIAL HYPERTENSION: ICD-10-CM

## 2022-09-23 DIAGNOSIS — E53.8 VITAMIN B12 DEFICIENCY: ICD-10-CM

## 2022-09-23 LAB
25(OH)D3+25(OH)D2 SERPL-MCNC: 24 NG/ML (ref 30–96)
ALBUMIN SERPL BCP-MCNC: 3.8 G/DL (ref 3.5–5.2)
ALP SERPL-CCNC: 85 U/L (ref 55–135)
ALT SERPL W/O P-5'-P-CCNC: 16 U/L (ref 10–44)
ANION GAP SERPL CALC-SCNC: 6 MMOL/L (ref 8–16)
AST SERPL-CCNC: 21 U/L (ref 10–40)
BASOPHILS # BLD AUTO: 0.01 K/UL (ref 0–0.2)
BASOPHILS NFR BLD: 0.2 % (ref 0–1.9)
BILIRUB SERPL-MCNC: 0.7 MG/DL (ref 0.1–1)
BUN SERPL-MCNC: 24 MG/DL (ref 8–23)
CALCIUM SERPL-MCNC: 10.2 MG/DL (ref 8.7–10.5)
CHLORIDE SERPL-SCNC: 110 MMOL/L (ref 95–110)
CHOLEST SERPL-MCNC: 164 MG/DL (ref 120–199)
CHOLEST/HDLC SERPL: 2.9 {RATIO} (ref 2–5)
CO2 SERPL-SCNC: 25 MMOL/L (ref 23–29)
CREAT SERPL-MCNC: 1 MG/DL (ref 0.5–1.4)
DIFFERENTIAL METHOD: ABNORMAL
EOSINOPHIL # BLD AUTO: 0.1 K/UL (ref 0–0.5)
EOSINOPHIL NFR BLD: 3.3 % (ref 0–8)
ERYTHROCYTE [DISTWIDTH] IN BLOOD BY AUTOMATED COUNT: 13 % (ref 11.5–14.5)
EST. GFR  (NO RACE VARIABLE): 58 ML/MIN/1.73 M^2
FERRITIN SERPL-MCNC: 26 NG/ML (ref 20–300)
GLUCOSE SERPL-MCNC: 88 MG/DL (ref 70–110)
HCT VFR BLD AUTO: 30.6 % (ref 37–48.5)
HDLC SERPL-MCNC: 57 MG/DL (ref 40–75)
HDLC SERPL: 34.8 % (ref 20–50)
HGB BLD-MCNC: 9.8 G/DL (ref 12–16)
IMM GRANULOCYTES # BLD AUTO: 0.01 K/UL (ref 0–0.04)
IMM GRANULOCYTES NFR BLD AUTO: 0.2 % (ref 0–0.5)
IRON SERPL-MCNC: 123 UG/DL (ref 30–160)
LDLC SERPL CALC-MCNC: 90 MG/DL (ref 63–159)
LYMPHOCYTES # BLD AUTO: 1 K/UL (ref 1–4.8)
LYMPHOCYTES NFR BLD: 22.8 % (ref 18–48)
MCH RBC QN AUTO: 31.3 PG (ref 27–31)
MCHC RBC AUTO-ENTMCNC: 32 G/DL (ref 32–36)
MCV RBC AUTO: 98 FL (ref 82–98)
MONOCYTES # BLD AUTO: 0.4 K/UL (ref 0.3–1)
MONOCYTES NFR BLD: 9.3 % (ref 4–15)
NEUTROPHILS # BLD AUTO: 2.7 K/UL (ref 1.8–7.7)
NEUTROPHILS NFR BLD: 64.2 % (ref 38–73)
NONHDLC SERPL-MCNC: 107 MG/DL
NRBC BLD-RTO: 0 /100 WBC
PLATELET # BLD AUTO: 217 K/UL (ref 150–450)
PMV BLD AUTO: 10.9 FL (ref 9.2–12.9)
POTASSIUM SERPL-SCNC: 4 MMOL/L (ref 3.5–5.1)
PROT SERPL-MCNC: 7 G/DL (ref 6–8.4)
RBC # BLD AUTO: 3.13 M/UL (ref 4–5.4)
SATURATED IRON: 34 % (ref 20–50)
SODIUM SERPL-SCNC: 141 MMOL/L (ref 136–145)
TOTAL IRON BINDING CAPACITY: 360 UG/DL (ref 250–450)
TRANSFERRIN SERPL-MCNC: 243 MG/DL (ref 200–375)
TRIGL SERPL-MCNC: 85 MG/DL (ref 30–150)
TSH SERPL DL<=0.005 MIU/L-ACNC: 1.52 UIU/ML (ref 0.4–4)
URATE SERPL-MCNC: 6 MG/DL (ref 2.4–5.7)
VIT B12 SERPL-MCNC: >2000 PG/ML (ref 210–950)
WBC # BLD AUTO: 4.21 K/UL (ref 3.9–12.7)

## 2022-09-23 PROCEDURE — 36415 COLL VENOUS BLD VENIPUNCTURE: CPT | Performed by: INTERNAL MEDICINE

## 2022-09-23 PROCEDURE — 82306 VITAMIN D 25 HYDROXY: CPT | Performed by: INTERNAL MEDICINE

## 2022-09-23 PROCEDURE — 84443 ASSAY THYROID STIM HORMONE: CPT | Performed by: INTERNAL MEDICINE

## 2022-09-23 PROCEDURE — 82728 ASSAY OF FERRITIN: CPT | Performed by: INTERNAL MEDICINE

## 2022-09-23 PROCEDURE — 82607 VITAMIN B-12: CPT | Performed by: INTERNAL MEDICINE

## 2022-09-23 PROCEDURE — 84466 ASSAY OF TRANSFERRIN: CPT | Performed by: INTERNAL MEDICINE

## 2022-09-23 PROCEDURE — 80061 LIPID PANEL: CPT | Performed by: INTERNAL MEDICINE

## 2022-09-23 PROCEDURE — 80053 COMPREHEN METABOLIC PANEL: CPT | Performed by: INTERNAL MEDICINE

## 2022-09-23 PROCEDURE — 84550 ASSAY OF BLOOD/URIC ACID: CPT | Performed by: INTERNAL MEDICINE

## 2022-09-23 PROCEDURE — 85025 COMPLETE CBC W/AUTO DIFF WBC: CPT | Performed by: INTERNAL MEDICINE

## 2022-09-27 ENCOUNTER — OFFICE VISIT (OUTPATIENT)
Dept: INTERNAL MEDICINE | Facility: CLINIC | Age: 77
End: 2022-09-27
Payer: MEDICARE

## 2022-09-27 ENCOUNTER — IMMUNIZATION (OUTPATIENT)
Dept: INTERNAL MEDICINE | Facility: CLINIC | Age: 77
End: 2022-09-27
Payer: MEDICARE

## 2022-09-27 VITALS
SYSTOLIC BLOOD PRESSURE: 130 MMHG | OXYGEN SATURATION: 100 % | BODY MASS INDEX: 28.48 KG/M2 | WEIGHT: 170.94 LBS | DIASTOLIC BLOOD PRESSURE: 60 MMHG | HEIGHT: 65 IN | HEART RATE: 57 BPM

## 2022-09-27 DIAGNOSIS — D50.0 IRON DEFICIENCY ANEMIA DUE TO CHRONIC BLOOD LOSS: ICD-10-CM

## 2022-09-27 DIAGNOSIS — Z00.00 ROUTINE GENERAL MEDICAL EXAMINATION AT A HEALTH CARE FACILITY: Primary | ICD-10-CM

## 2022-09-27 DIAGNOSIS — Z23 NEED FOR VACCINATION: Primary | ICD-10-CM

## 2022-09-27 DIAGNOSIS — I10 ESSENTIAL HYPERTENSION: ICD-10-CM

## 2022-09-27 PROCEDURE — G0008 ADMIN INFLUENZA VIRUS VAC: HCPCS | Mod: PBBFAC

## 2022-09-27 PROCEDURE — 99999 PR PBB SHADOW E&M-EST. PATIENT-LVL IV: ICD-10-PCS | Mod: PBBFAC,,, | Performed by: INTERNAL MEDICINE

## 2022-09-27 PROCEDURE — 99214 OFFICE O/P EST MOD 30 MIN: CPT | Mod: PBBFAC | Performed by: INTERNAL MEDICINE

## 2022-09-27 PROCEDURE — 91312 COVID-19, MRNA, LNP-S, BIVALENT BOOSTER, PF, 30 MCG/0.3 ML DOSE: CPT | Mod: PBBFAC

## 2022-09-27 PROCEDURE — 99999 PR PBB SHADOW E&M-EST. PATIENT-LVL IV: CPT | Mod: PBBFAC,,, | Performed by: INTERNAL MEDICINE

## 2022-09-27 PROCEDURE — 99397 PR PREVENTIVE VISIT,EST,65 & OVER: ICD-10-PCS | Mod: S$PBB,GZ,, | Performed by: INTERNAL MEDICINE

## 2022-09-27 PROCEDURE — 99397 PER PM REEVAL EST PAT 65+ YR: CPT | Mod: S$PBB,GZ,, | Performed by: INTERNAL MEDICINE

## 2022-09-27 PROCEDURE — 0124A COVID-19, MRNA, LNP-S, BIVALENT BOOSTER, PF, 30 MCG/0.3 ML DOSE: CPT | Mod: CV19,PBBFAC | Performed by: INTERNAL MEDICINE

## 2022-09-27 RX ORDER — ERGOCALCIFEROL 1.25 MG/1
CAPSULE ORAL
Qty: 24 CAPSULE | Refills: 5 | Status: SHIPPED | OUTPATIENT
Start: 2022-09-27 | End: 2024-04-03 | Stop reason: SDUPTHER

## 2022-09-27 RX ORDER — OMEPRAZOLE 40 MG/1
40 CAPSULE, DELAYED RELEASE ORAL 2 TIMES DAILY
Qty: 180 CAPSULE | Refills: 4 | Status: SHIPPED | OUTPATIENT
Start: 2022-09-27 | End: 2023-10-12

## 2022-09-27 RX ORDER — SPIRONOLACTONE 25 MG/1
25 TABLET ORAL DAILY
Qty: 90 TABLET | Refills: 4 | Status: SHIPPED | OUTPATIENT
Start: 2022-09-27 | End: 2023-08-08 | Stop reason: SDUPTHER

## 2022-09-27 NOTE — PROGRESS NOTES
CHIEF COMPLAINT: Annual exam and Follow up of multiple problems     HISTORY OF PRESENT ILLNESS: 76-year-old woman presents for follow up of above     She had a bunionectomy and hammertoe surgery on 7/8/21 with Dr Bunn. She has seen Dr Jasso who wants to redo the survery.  Dr Jasso gave her injections in her toes which helped with the pain. She cannot wear closed shoes. .  .      No gout attacks.  She takes allopurinol 100 mg 1 tablet nightly.      She has been taking Linzess 145 mg every day or other day. She  watery stool. She has a BM every other day.  If she does not take the Linzess, she is severely constipated.  No cramping or pain with the LInzess.     She graduated from the healthy back program. Neck and back are ok. She has stiffness. She does her exercises which help when she does them. SHe takes 2 tylenol twice daily.      Energy level is ok.  Breathing has been good.   She is sleeping well. She has not needed xanax at bedtime.  Appetite is good     Anxiety is controlled on Lexapro to 10 mg one tablet daily.       Heartburn is congroled on omperazole 20 mg twice daily     She continues to take Crestor 20 mg 1/2 tablet daily with co enzyme 10 200 mg  daily - leg achintess is better with lower dose of crestor. She also takes losartan 50 mg twice daily, and spironolactone 25 mg daily. BP has been doing well.        She had a normal cystoscope 12/17/14 due to recurrent UTI. No dysuria or hematuria now. She is no longer using estrogen vaginal cream for vaginal atrophy three times weekly       She is taking vitamin D 50,000 units twice weekly, sporadically     SHe is taking Vitron C once daily for her anemia. She takes one a day women's vitamin sporadically     Colonoscopy was cancelled on 3/30/20 due to COVID pandemic.      She takes tylenol arthritis 650 mg 2 tablets twice daily which helps her joint pain.  She did have some meloxicam that did help. She cannot take NSAIDS long term due to her stomach  issues.      No fever, chills, nausea, vomiting. She feels weak and light headed today. S  No gout. No indomethacin.    No dysuria or hematuria, cough, chest pain, shortness of breath.    PAST MEDICAL HISTORY:   1. Hypertension.   2. Hyperlipidemia.   3. Gout.   4. History of rashes.   5. TIA in   6. Fibrocystic breast disease.   7. Postmenopausal.   8. Osteoarthritis.   9. History of tobacco usage; quit in .   10. History of colon polyps; normal colonoscopy in 10/07 and normal 2010, due    11. Normal bone mineral density scan in .   12. Anxiety and depression - controlled on lorazepam very rarely.   13. Varicose veins.   14. Intermittent GERD.   15. Iron deficiency anemia 2010 - due to erosive gastropathy on EGD 2010     PAST SURGICAL HISTORY:   1. Right carotid endarterectomy in .   2. Breast reduction surgery.   3. Total abdominal hysterectomy.   4. Blood transfusion prior to .     SOCIAL HISTORY: Quit smoking in ; smoked 1 PPD since age 16. No   alcohol use.     FAMILY HISTORY:   Father had gout, hypertension and heart failure; is . Mother had hypertension and pancreatic cancer; is also . Has five brothers, one of whom  in a motor vehicle accident. All have gout and hypertension. One brother has Crohn's disease; another has bladder cancer.     MEDICATIONS and ALLERGIES: Updated on epic.     PHYSICAL EXAMINATION:         General: Alert, oriented. No apparent distress. Affect within normal   Limits.   Conjunctivae anicteric. Tympanic membranes clear. Oropharynx clear.   Neck supple.   Respiratory effort normal. Lungs clear  Heart: Regular rate and rhythm without murmurs, gallops or rubs.   No lower extremity edema.   ABDOMEN: soft, non distended, non tender, bowel sounds present, no hepatosplenomgaly  BREAST: no abn seen, no nodules palpated, no axillary lad     Labs 22 reviewed           ASSESSMENT AND PLAN:     Annual exam - discussed diet, exercise and  safety issues.   1.  Essential hypertension - stable   3. Heme positive stools - saw Dr Kingsley 8/13/19   4. Hyperlipidemia - on crestor to 20 mg 1/2 tablet daily.    5. Erosive gastropathy - on omeprazole to 40 mg twice daily.  6. Hypercalcemia - stable  7. Gout -  on allopurinol.  8. Situational stress/anxiety/mood disorder -stable lexapro to 10 mg 1 tablet daily. Sleeping better. Has not neeed xanax   8.  Carotid artery disease - on risk factor modification.   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 Constipation - on linzess  11. Neck pain -better  12. Anemia - make sure get ir on in  Screening - Colonscopy 10/15 with 4 polyps - it was incomplete. Repeat 5/26/16 - divericulosis otherwise normal  Needs repeat - ordered . MMG 7/2022 BMD normal January 2022.  I will see her back in 4 month in clinic , sooner if problems arise

## 2022-09-30 ENCOUNTER — EXTERNAL CHRONIC CARE MANAGEMENT (OUTPATIENT)
Dept: PRIMARY CARE CLINIC | Facility: CLINIC | Age: 77
End: 2022-09-30
Payer: MEDICARE

## 2022-09-30 PROCEDURE — 99490 CHRNC CARE MGMT STAFF 1ST 20: CPT | Mod: PBBFAC | Performed by: INTERNAL MEDICINE

## 2022-09-30 PROCEDURE — 99490 PR CHRONIC CARE MGMT, 1ST 20 MIN: ICD-10-PCS | Mod: S$PBB,,, | Performed by: INTERNAL MEDICINE

## 2022-09-30 PROCEDURE — 99490 CHRNC CARE MGMT STAFF 1ST 20: CPT | Mod: S$PBB,,, | Performed by: INTERNAL MEDICINE

## 2022-10-13 ENCOUNTER — OFFICE VISIT (OUTPATIENT)
Dept: PODIATRY | Facility: CLINIC | Age: 77
End: 2022-10-13
Payer: MEDICARE

## 2022-10-13 VITALS
BODY MASS INDEX: 28.45 KG/M2 | HEART RATE: 74 BPM | SYSTOLIC BLOOD PRESSURE: 138 MMHG | HEIGHT: 65 IN | DIASTOLIC BLOOD PRESSURE: 60 MMHG

## 2022-10-13 DIAGNOSIS — M20.12 HALLUX ABDUCTOVALGUS, LEFT: Primary | ICD-10-CM

## 2022-10-13 DIAGNOSIS — Z98.890 HISTORY OF FOOT SURGERY: ICD-10-CM

## 2022-10-13 PROCEDURE — 99999 PR PBB SHADOW E&M-EST. PATIENT-LVL IV: CPT | Mod: PBBFAC,,, | Performed by: PODIATRIST

## 2022-10-13 PROCEDURE — 99214 OFFICE O/P EST MOD 30 MIN: CPT | Mod: PBBFAC,PN | Performed by: PODIATRIST

## 2022-10-13 PROCEDURE — 99999 PR PBB SHADOW E&M-EST. PATIENT-LVL IV: ICD-10-PCS | Mod: PBBFAC,,, | Performed by: PODIATRIST

## 2022-10-13 PROCEDURE — 99214 OFFICE O/P EST MOD 30 MIN: CPT | Mod: S$PBB,,, | Performed by: PODIATRIST

## 2022-10-13 PROCEDURE — 99214 PR OFFICE/OUTPT VISIT, EST, LEVL IV, 30-39 MIN: ICD-10-PCS | Mod: S$PBB,,, | Performed by: PODIATRIST

## 2022-10-13 NOTE — PROGRESS NOTES
Subjective:      Patient ID: Isael Salmon is a 76 y.o. female.    Chief Complaint: Foot Problem (Left ft., had surgery w. Dr. Bunn, unable to wear close shoes) and Follow-up    Isael is a 76 y.o. female who presents to the clinic upon referral from Dr. Marah manning. provider found  for evaluation and treatment of diabetic feet. Isael has a past medical history of Adjustment disorder (7/26/2012), Anemia (7/26/2012), AR (allergic rhinitis) (9/5/2014), Bronchitis, Cataract, Fibrocystic breast disease in female (7/26/2012), GERD (gastroesophageal reflux disease) (7/26/2012), Gout, Gout, arthritis (7/26/2012), History of blood transfusion (7/26/2012), History of colonic polyps (7/26/2012), Hypercalcemia (9/20/2012), Hyperlipidemia (7/26/2012), Hypertension (7/26/2012), Nuclear sclerosis (5/2/2014), Osteoarthritis (7/26/2012), Other hyperparathyroidism (9/20/2012), Postmenopausal status (7/26/2012), PVD (peripheral vascular disease) (7/26/2012), Stroke (1997), Superficial vein thrombosis, Transient ischemic attack (7/26/2012), Type 2 diabetes mellitus without complication, without long-term current use of insulin (3/30/2020), and Varicose veins (7/26/2012). History of left foot bunionectomy and 2nd PIPJ arthrodesis 7/8/21 by Dr Bunn. She reports minimal discomfort at the left 1st and 2nd toes as well as at the right foot bunion and 3rd toe PIPJ. She has moderate to severe pain at the left plantar forefoot, primarily in the area of the 3rd and 4th metatarsal heads. She has difficulty walking in most shoes, changes hoes multiple times throughout the day, has difficulty with stairs.     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  States that the pain significantly improved.  She is not tried to wear an enclosed shoe.  Release that she is considering revision surgery to the left foot and is also complaining about her bunion to the right foot.    10/13/2022:  Presents to  discussed possible revision surgery to left foot.  She is concerned about her left hallux and 2nd toe rubbing together.    PCP: Janett Montoya MD    Date Last Seen by PCP:     Current shoe gear: Casual shoes    Hemoglobin A1C   Date Value Ref Range Status   12/20/2021 5.5 4.0 - 5.6 % Final     Comment:     ADA Screening Guidelines:  5.7-6.4%  Consistent with prediabetes  >or=6.5%  Consistent with diabetes    High levels of fetal hemoglobin interfere with the HbA1C  assay. Heterozygous hemoglobin variants (HbS, HgC, etc)do  not significantly interfere with this assay.   However, presence of multiple variants may affect accuracy.     04/30/2021 5.4 4.0 - 5.6 % Final     Comment:     ADA Screening Guidelines:  5.7-6.4%  Consistent with prediabetes  >or=6.5%  Consistent with diabetes    High levels of fetal hemoglobin interfere with the HbA1C  assay. Heterozygous hemoglobin variants (HbS, HgC, etc)do  not significantly interfere with this assay.   However, presence of multiple variants may affect accuracy.     03/31/2020 5.8 (H) 4.0 - 5.6 % Final     Comment:     ADA Screening Guidelines:  5.7-6.4%  Consistent with prediabetes  >or=6.5%  Consistent with diabetes  High levels of fetal hemoglobin interfere with the HbA1C  assay. Heterozygous hemoglobin variants (HbS, HgC, etc)do  not significantly interfere with this assay.   However, presence of multiple variants may affect accuracy.             Review of Systems   Constitutional: Negative for chills, fever and malaise/fatigue.   HENT:  Negative for congestion.    Cardiovascular:  Negative for chest pain, claudication and leg swelling.   Respiratory:  Negative for cough and shortness of breath.    Skin:  Negative for color change, itching and poor wound healing.   Musculoskeletal:  Negative for back pain, joint pain, muscle cramps and muscle weakness.   Gastrointestinal:  Negative for nausea and vomiting.   Neurological:  Negative for numbness, paresthesias and  weakness.   Psychiatric/Behavioral:  Negative for altered mental status.          Objective:      Physical Exam  Constitutional:       General: She is not in acute distress.     Appearance: Normal appearance. She is not ill-appearing.   Cardiovascular:      Pulses:           Dorsalis pedis pulses are 2+ on the right side and 2+ on the left side.        Posterior tibial pulses are 2+ on the right side and 2+ on the left side.   Musculoskeletal:      Comments: Pain on palpation to the distal 3rd and 2nd intermetatarsal space left foot.  Negative Lachman test left 2nd toe however the toe has some medial and dorsal deviation at the metatarsophalangeal joint.  There is some pain on palpation to the plantar 2nd met head left foot.  Track bound hallux abductovalgus left foot with mild localized pain on palpation overlying the previous surgical scar.  Track bound hallux abductovalgus right foot under Lapping the 2nd toe.  One MTP range of motion right foot without pain and within normal limits.  No audible clicking or popping.   Skin:     General: Skin is warm.      Capillary Refill: Capillary refill takes less than 2 seconds.      Findings: No ecchymosis or erythema.      Nails: There is no clubbing.      Comments: Hypertrophic scar along the dorsal medial 1st ray left foot.   Neurological:      Mental Status: She is alert and oriented to person, place, and time.      Sensory: Sensation is intact.      Motor: Motor function is intact.             Assessment:       Encounter Diagnoses   Name Primary?    Hallux abductovalgus, left Yes    History of foot surgery            Plan:       Isael was seen today for foot problem and follow-up.    Diagnoses and all orders for this visit:    Hallux abductovalgus, left    History of foot surgery      I counseled the patient on her conditions, their implications and medical management.    Reviewed bilateral foot weight-bearing x-ray focusing on the left foot.  Residual 1-2  intermetatarsal angle of 10°, hallux valgus angle of 20°, tibial sesamoid position of 3 with rounding of the lateral 1st metatarsal head indicating rotation of the 1st metatarsal.  Sesamoid view also shows the lateral sesamoid rubbing up against the Trish was of mild arthritic changes in this area.  Previous hardware from the opening base wedge osteotomy noted.  The left 2nd toe appears to be aligned with the 2nd metatarsal with prior arthrodesis of the PIPJ noted.    We discussed continued conservative care versus surgical intervention consisting of hardware removal with 1st metatarsal distal metaphyseal osteotomy to shift the capital fragment medially in de rotate the capital fragment to realign the sesamoids in addition with Davey osteotomy of the proximal phalanx.  Risks, benefits anticipate postop course discussed in detail.  No guarantees were given or implied.  Patient elected to continue conservative care at this time and consider surgical intervention in the new year.    RTC p.r.n. as discussed.    A portion of this note was generated by voice recognition software and may contain spelling and grammar errors.      A portion of this note was generated by voice recognition software and may contain spelling and grammar errors.

## 2022-10-31 ENCOUNTER — EXTERNAL CHRONIC CARE MANAGEMENT (OUTPATIENT)
Dept: PRIMARY CARE CLINIC | Facility: CLINIC | Age: 77
End: 2022-10-31
Payer: MEDICARE

## 2022-10-31 PROCEDURE — 99490 CHRNC CARE MGMT STAFF 1ST 20: CPT | Mod: S$PBB,,, | Performed by: INTERNAL MEDICINE

## 2022-10-31 PROCEDURE — 99490 PR CHRONIC CARE MGMT, 1ST 20 MIN: ICD-10-PCS | Mod: S$PBB,,, | Performed by: INTERNAL MEDICINE

## 2022-10-31 PROCEDURE — 99490 CHRNC CARE MGMT STAFF 1ST 20: CPT | Mod: PBBFAC | Performed by: INTERNAL MEDICINE

## 2022-10-31 RX ORDER — DOXYCYCLINE HYCLATE 100 MG
100 TABLET ORAL 2 TIMES DAILY
Qty: 14 TABLET | Refills: 0 | Status: SHIPPED | OUTPATIENT
Start: 2022-10-31 | End: 2022-11-07

## 2022-11-09 ENCOUNTER — TELEPHONE (OUTPATIENT)
Dept: INTERNAL MEDICINE | Facility: CLINIC | Age: 77
End: 2022-11-09
Payer: MEDICARE

## 2022-11-09 NOTE — TELEPHONE ENCOUNTER
Called and spoke to pt. Pt states she started with sx  and tested positive for covid yesterday. . Pt c/o sore throat, white productive cough, fatigue and body aches/HA. Pt denies fever, CP or SOB.    Reviewed symptomatic treatment as well as isolation protocol. Pt requesting medication to treat covid. Explained to pt, should she develop CP or SOB, she should report to the ED. Pt verbalized understanding.    Covid risk 4  eGFR 58 9/23/22

## 2022-11-09 NOTE — TELEPHONE ENCOUNTER
----- Message from Hailey Vaca sent at 11/9/2022 12:09 PM CST -----  Pt would like to be called back regarding   medication  tested positive for covid  Pt can be reached at  [phone]

## 2022-11-09 NOTE — TELEPHONE ENCOUNTER
Pt advised, she states that her  is now sick with cold symptoms however he is negative. She will  the Rx shortly.

## 2022-11-09 NOTE — TELEPHONE ENCOUNTER
Prescription for paxlovid sent to [facility]. She is to NOT take rosuvastatin (crestor) while on the paxlovid.

## 2022-11-18 ENCOUNTER — OFFICE VISIT (OUTPATIENT)
Dept: INTERNAL MEDICINE | Facility: CLINIC | Age: 77
End: 2022-11-18
Payer: MEDICARE

## 2022-11-18 VITALS
RESPIRATION RATE: 16 BRPM | OXYGEN SATURATION: 99 % | SYSTOLIC BLOOD PRESSURE: 130 MMHG | HEART RATE: 76 BPM | DIASTOLIC BLOOD PRESSURE: 80 MMHG

## 2022-11-18 DIAGNOSIS — K21.9 GASTROESOPHAGEAL REFLUX DISEASE WITHOUT ESOPHAGITIS: ICD-10-CM

## 2022-11-18 DIAGNOSIS — U07.1 COVID: Primary | ICD-10-CM

## 2022-11-18 DIAGNOSIS — I10 ESSENTIAL HYPERTENSION: ICD-10-CM

## 2022-11-18 PROCEDURE — 99999 PR PBB SHADOW E&M-EST. PATIENT-LVL II: ICD-10-PCS | Mod: PBBFAC,CR,, | Performed by: INTERNAL MEDICINE

## 2022-11-18 PROCEDURE — 99999 PR PBB SHADOW E&M-EST. PATIENT-LVL II: CPT | Mod: PBBFAC,CR,, | Performed by: INTERNAL MEDICINE

## 2022-11-18 PROCEDURE — 99214 OFFICE O/P EST MOD 30 MIN: CPT | Mod: S$PBB,CR,, | Performed by: INTERNAL MEDICINE

## 2022-11-18 PROCEDURE — 99214 PR OFFICE/OUTPT VISIT, EST, LEVL IV, 30-39 MIN: ICD-10-PCS | Mod: S$PBB,CR,, | Performed by: INTERNAL MEDICINE

## 2022-11-18 PROCEDURE — 99212 OFFICE O/P EST SF 10 MIN: CPT | Mod: PBBFAC | Performed by: INTERNAL MEDICINE

## 2022-11-18 RX ORDER — AZITHROMYCIN 250 MG/1
TABLET, FILM COATED ORAL
Qty: 6 TABLET | Refills: 0 | Status: SHIPPED | OUTPATIENT
Start: 2022-11-18 | End: 2023-02-14

## 2022-11-18 RX ORDER — PREDNISONE 10 MG/1
TABLET ORAL
Qty: 15 TABLET | Refills: 0 | Status: SHIPPED | OUTPATIENT
Start: 2022-11-18 | End: 2023-02-14

## 2022-11-18 NOTE — PROGRESS NOTES
CHIEF COMPLAINT: cogid     HISTORY OF PRESENT ILLNESS: 77-year-old woman presents for follow up of above    She tested positive for COVID on 11/9/2022. Seh had sinus congestion, sore throat, malase and cough. She took paxlovid from 11/9/22 yo 11/14/22.  She has worsened since off paxlovid. She feels weak. She has a cough with prodicutive white sputum.  Slight nausea. No constipation or diarrhea. Slight wheezing.          No gout attacks.  She takes allopurinol 100 mg 1 tablet nightly.      She has been taking Linzess 145 mg every day or other day. She  watery stool. She has a BM every other day.  If she does not take the Linzess, she is severely constipated.  No cramping or pain with the LInzess.     Neck and back are ok. She has stiffness. She does her exercises which help when she does them. SHe takes 2 tylenol twice daily.       Anxiety is controlled on Lexapro to 10 mg one tablet daily.       Heartburn is congroled on omperazole 20 mg twice daily     She continues to take Crestor 20 mg 1/2 tablet daily (on hold due to Paxlovid)  with co enzyme 10 200 mg  daily - leg achintess is better with lower dose of crestor. She also takes losartan 50 mg twice daily, and spironolactone 25 mg daily. BP has been doing well.        She had a normal cystoscope 12/17/14 due to recurrent UTI. No dysuria or hematuria now. She is no longer using estrogen vaginal cream for vaginal atrophy three times weekly       She is taking vitamin D 50,000 units twice weekly, sporadically     SHe is taking Vitron C once daily for her anemia. She takes one a day women's vitamin sporadically     Colonoscopy was cancelled on 3/30/20 due to COVID pandemic.      She takes tylenol arthritis 650 mg 2 tablets twice daily which helps her joint pain.  She did have some meloxicam that did help. She cannot take NSAIDS long term due to her stomach issues.      No fever, chills, nausea, vomiting. She feels weak and light headed today. S  No gout. No  indomethacin.    No dysuria or hematuria, cough, chest pain, shortness of breath.    PAST MEDICAL HISTORY:   1. Hypertension.   2. Hyperlipidemia.   3. Gout.   4. History of rashes.   5. TIA in   6. Fibrocystic breast disease.   7. Postmenopausal.   8. Osteoarthritis.   9. History of tobacco usage; quit in .   10. History of colon polyps; normal colonoscopy in 10/07 and normal 2010, due 2015   11. Normal bone mineral density scan in .   12. Anxiety and depression - controlled on lorazepam very rarely.   13. Varicose veins.   14. Intermittent GERD.   15. Iron deficiency anemia 2010 - due to erosive gastropathy on EGD 2010     PAST SURGICAL HISTORY:   1. Right carotid endarterectomy in .   2. Breast reduction surgery.   3. Total abdominal hysterectomy.   4. Blood transfusion prior to .     SOCIAL HISTORY: Quit smoking in ; smoked 1 PPD since age 16. No   alcohol use.     FAMILY HISTORY:   Father had gout, hypertension and heart failure; is . Mother had hypertension and pancreatic cancer; is also . Has five brothers, one of whom  in a motor vehicle accident. All have gout and hypertension. One brother has Crohn's disease; another has bladder cancer.     MEDICATIONS and ALLERGIES: Updated on epic.     PHYSICAL EXAMINATION:      /80   Pulse 76   Resp 16   LMP  (LMP Unknown)   SpO2 99%       General: Alert, oriented. No apparent distress. Affect within normal   Limits.   Conjunctivae anicteric. Tympanic membranes clear. Oropharynx clear.   Neck supple.   Respiratory effort normal. Lungs expiratory wheezing left upper lung.  Heart: Regular rate and rhythm without murmurs, gallops or rubs.   No lower extremity edema.            ASSESSMENT AND PLAN:      COVID - prednisone taper and z pack (hold lexapro on zpack) to prevent bacterial superinfection.  Mucinex twice daily   Essential hypertension - stable   3. Heme positive stools - saw Dr Kingsley 19   4.  Hyperlipidemia - on crestor to 20 mg 1/2 tablet daily.    5. Erosive gastropathy - on omeprazole to 40 mg twice daily.  6. Hypercalcemia - stable  7. Gout -  on allopurinol.  8. Situational stress/anxiety/mood disorder -stable lexapro to 10 mg 1 tablet daily. Sleeping better. Has not neeed xanax   8.  Carotid artery disease - on risk factor modification.   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 Constipation - on linzess  11. Neck pain -better  12. Anemia - make sure get ir on in  Screening - Colonscopy 10/15 with 4 polyps - it was incomplete. Repeat 5/26/16 - divericulosis otherwise normal  Needs repeat - ordered . MMG 7/2022 BMD normal January 2022.  I will see her back ias scheduled , sooner if problems arise

## 2022-11-30 ENCOUNTER — EXTERNAL CHRONIC CARE MANAGEMENT (OUTPATIENT)
Dept: PRIMARY CARE CLINIC | Facility: CLINIC | Age: 77
End: 2022-11-30
Payer: MEDICARE

## 2022-11-30 PROCEDURE — 99490 PR CHRONIC CARE MGMT, 1ST 20 MIN: ICD-10-PCS | Mod: S$PBB,,, | Performed by: INTERNAL MEDICINE

## 2022-11-30 PROCEDURE — 99490 CHRNC CARE MGMT STAFF 1ST 20: CPT | Mod: PBBFAC | Performed by: INTERNAL MEDICINE

## 2022-11-30 PROCEDURE — 99490 CHRNC CARE MGMT STAFF 1ST 20: CPT | Mod: S$PBB,,, | Performed by: INTERNAL MEDICINE

## 2022-12-31 ENCOUNTER — EXTERNAL CHRONIC CARE MANAGEMENT (OUTPATIENT)
Dept: PRIMARY CARE CLINIC | Facility: CLINIC | Age: 77
End: 2022-12-31
Payer: MEDICARE

## 2022-12-31 PROCEDURE — 99490 CHRNC CARE MGMT STAFF 1ST 20: CPT | Mod: PBBFAC,27 | Performed by: INTERNAL MEDICINE

## 2022-12-31 PROCEDURE — 99439 CHRNC CARE MGMT STAF EA ADDL: CPT | Mod: S$PBB,,, | Performed by: INTERNAL MEDICINE

## 2022-12-31 PROCEDURE — 99439 CHRNC CARE MGMT STAF EA ADDL: CPT | Mod: PBBFAC | Performed by: INTERNAL MEDICINE

## 2022-12-31 PROCEDURE — 99490 PR CHRONIC CARE MGMT, 1ST 20 MIN: ICD-10-PCS | Mod: S$PBB,,, | Performed by: INTERNAL MEDICINE

## 2022-12-31 PROCEDURE — 99490 CHRNC CARE MGMT STAFF 1ST 20: CPT | Mod: S$PBB,,, | Performed by: INTERNAL MEDICINE

## 2022-12-31 PROCEDURE — 99439 PR CHRONIC CARE MGMT, EA ADDTL 20 MIN: ICD-10-PCS | Mod: S$PBB,,, | Performed by: INTERNAL MEDICINE

## 2023-01-31 ENCOUNTER — EXTERNAL CHRONIC CARE MANAGEMENT (OUTPATIENT)
Dept: PRIMARY CARE CLINIC | Facility: CLINIC | Age: 78
End: 2023-01-31
Payer: MEDICARE

## 2023-01-31 PROCEDURE — 99490 CHRNC CARE MGMT STAFF 1ST 20: CPT | Mod: S$PBB,,, | Performed by: INTERNAL MEDICINE

## 2023-01-31 PROCEDURE — 99490 CHRNC CARE MGMT STAFF 1ST 20: CPT | Mod: PBBFAC | Performed by: INTERNAL MEDICINE

## 2023-01-31 PROCEDURE — 99490 PR CHRONIC CARE MGMT, 1ST 20 MIN: ICD-10-PCS | Mod: S$PBB,,, | Performed by: INTERNAL MEDICINE

## 2023-02-09 ENCOUNTER — LAB VISIT (OUTPATIENT)
Dept: LAB | Facility: OTHER | Age: 78
End: 2023-02-09
Attending: INTERNAL MEDICINE
Payer: MEDICARE

## 2023-02-09 DIAGNOSIS — D50.0 IRON DEFICIENCY ANEMIA DUE TO CHRONIC BLOOD LOSS: ICD-10-CM

## 2023-02-09 DIAGNOSIS — I10 ESSENTIAL HYPERTENSION: ICD-10-CM

## 2023-02-09 LAB
ALBUMIN SERPL BCP-MCNC: 3.8 G/DL (ref 3.5–5.2)
ALP SERPL-CCNC: 83 U/L (ref 55–135)
ALT SERPL W/O P-5'-P-CCNC: 15 U/L (ref 10–44)
ANION GAP SERPL CALC-SCNC: 7 MMOL/L (ref 8–16)
AST SERPL-CCNC: 18 U/L (ref 10–40)
BASOPHILS # BLD AUTO: 0.03 K/UL (ref 0–0.2)
BASOPHILS NFR BLD: 0.5 % (ref 0–1.9)
BILIRUB SERPL-MCNC: 0.6 MG/DL (ref 0.1–1)
BUN SERPL-MCNC: 34 MG/DL (ref 8–23)
CALCIUM SERPL-MCNC: 10.4 MG/DL (ref 8.7–10.5)
CHLORIDE SERPL-SCNC: 108 MMOL/L (ref 95–110)
CO2 SERPL-SCNC: 25 MMOL/L (ref 23–29)
CREAT SERPL-MCNC: 1.1 MG/DL (ref 0.5–1.4)
DIFFERENTIAL METHOD: ABNORMAL
EOSINOPHIL # BLD AUTO: 0.1 K/UL (ref 0–0.5)
EOSINOPHIL NFR BLD: 2.1 % (ref 0–8)
ERYTHROCYTE [DISTWIDTH] IN BLOOD BY AUTOMATED COUNT: 13.1 % (ref 11.5–14.5)
EST. GFR  (NO RACE VARIABLE): 51 ML/MIN/1.73 M^2
FERRITIN SERPL-MCNC: 28 NG/ML (ref 20–300)
GLUCOSE SERPL-MCNC: 80 MG/DL (ref 70–110)
HCT VFR BLD AUTO: 29.6 % (ref 37–48.5)
HGB BLD-MCNC: 9.5 G/DL (ref 12–16)
IMM GRANULOCYTES # BLD AUTO: 0.04 K/UL (ref 0–0.04)
IMM GRANULOCYTES NFR BLD AUTO: 0.7 % (ref 0–0.5)
IRON SERPL-MCNC: 108 UG/DL (ref 30–160)
LYMPHOCYTES # BLD AUTO: 1 K/UL (ref 1–4.8)
LYMPHOCYTES NFR BLD: 17 % (ref 18–48)
MCH RBC QN AUTO: 30.9 PG (ref 27–31)
MCHC RBC AUTO-ENTMCNC: 32.1 G/DL (ref 32–36)
MCV RBC AUTO: 96 FL (ref 82–98)
MONOCYTES # BLD AUTO: 0.6 K/UL (ref 0.3–1)
MONOCYTES NFR BLD: 10.1 % (ref 4–15)
NEUTROPHILS # BLD AUTO: 4 K/UL (ref 1.8–7.7)
NEUTROPHILS NFR BLD: 69.6 % (ref 38–73)
NRBC BLD-RTO: 0 /100 WBC
PLATELET # BLD AUTO: 207 K/UL (ref 150–450)
PMV BLD AUTO: 10.8 FL (ref 9.2–12.9)
POTASSIUM SERPL-SCNC: 4.9 MMOL/L (ref 3.5–5.1)
PROT SERPL-MCNC: 7.1 G/DL (ref 6–8.4)
RBC # BLD AUTO: 3.07 M/UL (ref 4–5.4)
SATURATED IRON: 32 % (ref 20–50)
SODIUM SERPL-SCNC: 140 MMOL/L (ref 136–145)
TOTAL IRON BINDING CAPACITY: 337 UG/DL (ref 250–450)
TRANSFERRIN SERPL-MCNC: 228 MG/DL (ref 200–375)
TSH SERPL DL<=0.005 MIU/L-ACNC: 0.92 UIU/ML (ref 0.4–4)
URATE SERPL-MCNC: 5.5 MG/DL (ref 2.4–5.7)
WBC # BLD AUTO: 5.75 K/UL (ref 3.9–12.7)

## 2023-02-09 PROCEDURE — 80053 COMPREHEN METABOLIC PANEL: CPT | Performed by: INTERNAL MEDICINE

## 2023-02-09 PROCEDURE — 84443 ASSAY THYROID STIM HORMONE: CPT | Performed by: INTERNAL MEDICINE

## 2023-02-09 PROCEDURE — 85025 COMPLETE CBC W/AUTO DIFF WBC: CPT | Performed by: INTERNAL MEDICINE

## 2023-02-09 PROCEDURE — 84466 ASSAY OF TRANSFERRIN: CPT | Performed by: INTERNAL MEDICINE

## 2023-02-09 PROCEDURE — 84550 ASSAY OF BLOOD/URIC ACID: CPT | Performed by: INTERNAL MEDICINE

## 2023-02-09 PROCEDURE — 82728 ASSAY OF FERRITIN: CPT | Performed by: INTERNAL MEDICINE

## 2023-02-09 PROCEDURE — 36415 COLL VENOUS BLD VENIPUNCTURE: CPT | Performed by: INTERNAL MEDICINE

## 2023-02-14 ENCOUNTER — OFFICE VISIT (OUTPATIENT)
Dept: INTERNAL MEDICINE | Facility: CLINIC | Age: 78
End: 2023-02-14
Payer: MEDICARE

## 2023-02-14 ENCOUNTER — HOSPITAL ENCOUNTER (OUTPATIENT)
Dept: RADIOLOGY | Facility: HOSPITAL | Age: 78
Discharge: HOME | End: 2023-02-14
Attending: INTERNAL MEDICINE
Payer: MEDICARE

## 2023-02-14 VITALS
BODY MASS INDEX: 28.32 KG/M2 | DIASTOLIC BLOOD PRESSURE: 66 MMHG | HEART RATE: 76 BPM | HEIGHT: 65 IN | SYSTOLIC BLOOD PRESSURE: 124 MMHG | WEIGHT: 170 LBS | OXYGEN SATURATION: 100 %

## 2023-02-14 DIAGNOSIS — M17.11 OSTEOARTHRITIS OF RIGHT KNEE, UNSPECIFIED OSTEOARTHRITIS TYPE: ICD-10-CM

## 2023-02-14 DIAGNOSIS — J44.9 CHRONIC OBSTRUCTIVE PULMONARY DISEASE, UNSPECIFIED COPD TYPE: ICD-10-CM

## 2023-02-14 DIAGNOSIS — F39 MOOD DISORDER: ICD-10-CM

## 2023-02-14 DIAGNOSIS — R19.5 HEME POSITIVE STOOL: ICD-10-CM

## 2023-02-14 DIAGNOSIS — M17.11 OSTEOARTHRITIS OF RIGHT KNEE, UNSPECIFIED OSTEOARTHRITIS TYPE: Primary | ICD-10-CM

## 2023-02-14 DIAGNOSIS — N18.31 CHRONIC KIDNEY DISEASE, STAGE 3A: ICD-10-CM

## 2023-02-14 DIAGNOSIS — D50.0 IRON DEFICIENCY ANEMIA DUE TO CHRONIC BLOOD LOSS: ICD-10-CM

## 2023-02-14 DIAGNOSIS — I10 ESSENTIAL HYPERTENSION: ICD-10-CM

## 2023-02-14 DIAGNOSIS — Z12.31 SCREENING MAMMOGRAM FOR BREAST CANCER: ICD-10-CM

## 2023-02-14 DIAGNOSIS — I70.0 AORTIC ATHEROSCLEROSIS: ICD-10-CM

## 2023-02-14 DIAGNOSIS — K21.9 GASTROESOPHAGEAL REFLUX DISEASE WITHOUT ESOPHAGITIS: ICD-10-CM

## 2023-02-14 PROCEDURE — 99999 PR PBB SHADOW E&M-EST. PATIENT-LVL V: CPT | Mod: PBBFAC,,, | Performed by: INTERNAL MEDICINE

## 2023-02-14 PROCEDURE — 99999 PR PBB SHADOW E&M-EST. PATIENT-LVL V: ICD-10-PCS | Mod: PBBFAC,,, | Performed by: INTERNAL MEDICINE

## 2023-02-14 PROCEDURE — 73562 X-RAY EXAM OF KNEE 3: CPT | Mod: TC,50

## 2023-02-14 PROCEDURE — 99215 OFFICE O/P EST HI 40 MIN: CPT | Mod: PBBFAC | Performed by: INTERNAL MEDICINE

## 2023-02-14 PROCEDURE — 99214 OFFICE O/P EST MOD 30 MIN: CPT | Mod: S$PBB,,, | Performed by: INTERNAL MEDICINE

## 2023-02-14 PROCEDURE — 73562 X-RAY EXAM OF KNEE 3: CPT | Mod: 26,50,, | Performed by: RADIOLOGY

## 2023-02-14 PROCEDURE — 73562 XR KNEE ORTHO BILAT: ICD-10-PCS | Mod: 26,50,, | Performed by: RADIOLOGY

## 2023-02-14 PROCEDURE — 99214 PR OFFICE/OUTPT VISIT, EST, LEVL IV, 30-39 MIN: ICD-10-PCS | Mod: S$PBB,,, | Performed by: INTERNAL MEDICINE

## 2023-02-14 NOTE — Clinical Note
DR Kingsley,  Pt saw you 8/13/2019. She was to have her colonoscopy with you March 2020 when covid pandemic started. Her blood counts have remained stable. Can you see her in clinic to discuss if she needs further evaluation with colonoscopy or egd.  Thanks Janett

## 2023-02-14 NOTE — Clinical Note
Can you see Mrs Salmon soon for an injection in her right knee?. She used to see Sheeba Vargas (cannot book an apt with her- not sure if she left) Thanks Janett

## 2023-02-14 NOTE — PROGRESS NOTES
CHIEF COMPLAINT: Follow up of multiple problems     HISTORY OF PRESENT ILLNESS: 77-year-old woman presents for follow up of above     She had a bunionectomy and hammertoe surgery on 7/8/21 with Dr Bunn. She has seen Dr Jasso who wants to redo the survery.  Dr Jasso gave her injections in her toes which helped with the pain. She cannot wear closed shoes. Has not had the surgery yet.      No gout attacks.  She takes allopurinol 100 mg 1 tablet nightly.      She has been taking Linzess 145 mg every day or other day. She  watery stool. She has a BM every other day.  If she does not take the Linzess, she is severely constipated.  No cramping or pain with the LInzess.     She graduated from the healthy back program. Neck and back are ok. She has stiffness. She does her exercises which help when she does them. SHe takes 2 tylenol twice daily.      Energy level is ok.  Breathing has been good.   She is sleeping well. She has not needed xanax at bedtime.  Appetite is good     Anxiety is controlled on Lexapro to 10 mg one tablet daily.       Heartburn is congroled on omperazole 20 mg twice daily     She continues to take Crestor 20 mg 1/2 tablet daily with co enzyme 10 200 mg  daily - leg achintess is better with lower dose of crestor. She also takes losartan 50 mg twice daily, and spironolactone 25 mg daily. BP has been doing well.        She had a normal cystoscope 12/17/14 due to recurrent UTI. No dysuria or hematuria now. She is no longer using estrogen vaginal cream for vaginal atrophy three times weekly       She is taking vitamin D 50,000 units twice weekly, sporadically     SHe is NOT taking Vitron C once daily for her anemia. She takes one a day women's vitamin sporadically     Colonoscopy was cancelled on 3/30/20 due to COVID pandemic.      She takes tylenol arthritis 650 mg 2 tablets twice daily which helps her joint pain.  She did have some meloxicam that did help. She cannot take NSAIDS long term due to her  "stomach issues.      No fever, chills, nausea, vomiting. She feels weak and light headed today. S  No gout. No indomethacin.    No dysuria or hematuria, cough, chest pain, shortness of breath.    PAST MEDICAL HISTORY:   1. Hypertension.   2. Hyperlipidemia.   3. Gout.   4. History of rashes.   5. TIA in   6. Fibrocystic breast disease.   7. Postmenopausal.   8. Osteoarthritis.   9. History of tobacco usage; quit in .   10. History of colon polyps; normal colonoscopy in 10/07 and normal 2010, due    11. Normal bone mineral density scan in .   12. Anxiety and depression - controlled on lorazepam very rarely.   13. Varicose veins.   14. Intermittent GERD.   15. Iron deficiency anemia 2010 - due to erosive gastropathy on EGD 2010     PAST SURGICAL HISTORY:   1. Right carotid endarterectomy in .   2. Breast reduction surgery.   3. Total abdominal hysterectomy.   4. Blood transfusion prior to .     SOCIAL HISTORY: Quit smoking in ; smoked 1 PPD since age 16. No   alcohol use.     FAMILY HISTORY:   Father had gout, hypertension and heart failure; is . Mother had hypertension and pancreatic cancer; is also . Has five brothers, one of whom  in a motor vehicle accident. All have gout and hypertension. One brother has Crohn's disease; another has bladder cancer.     MEDICATIONS and ALLERGIES: Updated on epic.     PHYSICAL EXAMINATION:      /66 (BP Location: Right arm, Patient Position: Sitting)   Pulse 76   Ht 5' 5" (1.651 m)   Wt 77.1 kg (169 lb 15.6 oz)   LMP  (LMP Unknown)   SpO2 100%   BMI 28.29 kg/m²       General: Alert, oriented. No apparent distress. Affect within normal   Limits.   Conjunctivae anicteric. Tympanic membranes clear. Oropharynx clear.   Neck supple.   Respiratory effort normal. Lungs clear  Heart: Regular rate and rhythm without murmurs, gallops or rubs.   No lower extremity edema.   ABDOMEN: soft, non distended, non tender, bowel sounds " present, no hepatosplenomgaly  BREAST: no abn seen, no nodules palpated, no axillary lad     Labs 2/9/23 reviewed and stable           ASSESSMENT AND PLAN:       1.  Essential hypertension - stable   3. Heme positive stools - saw Dr Kingsley 8/13/19   4. Hyperlipidemia - on crestor to 20 mg 1/2 tablet daily.    5. Erosive gastropathy - on omeprazole to 40 mg twice daily.  6. Hypercalcemia - stable  7. Gout -  on allopurinol.  8. Situational stress/anxiety/mood disorder -stable lexapro to 10 mg 1 tablet daily. Sleeping better. Has not neeed xanax   8.  Carotid artery disease - on risk factor modification.   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 Constipation - on linzess  11. Neck pain -better  12. Anemia - make sure get ir on in  13. OA knee - to orth for injection  Screening - Colonscopy 10/15 with 4 polyps - it was incomplete. Repeat 5/26/16 - divericulosis otherwise normal  Needs repeat - ordered . MMG 7/2022 BMD normal January 2022.  I will see her back in 4 month in clinic , sooner if problems arise

## 2023-02-17 ENCOUNTER — OFFICE VISIT (OUTPATIENT)
Dept: ORTHOPEDICS | Facility: CLINIC | Age: 78
End: 2023-02-17
Payer: MEDICARE

## 2023-02-17 VITALS — WEIGHT: 169 LBS | HEIGHT: 66 IN | BODY MASS INDEX: 27.16 KG/M2

## 2023-02-17 DIAGNOSIS — M17.11 OSTEOARTHRITIS OF RIGHT KNEE, UNSPECIFIED OSTEOARTHRITIS TYPE: ICD-10-CM

## 2023-02-17 PROCEDURE — 99999 PR PBB SHADOW E&M-EST. PATIENT-LVL V: ICD-10-PCS | Mod: PBBFAC,,, | Performed by: NURSE PRACTITIONER

## 2023-02-17 PROCEDURE — 99999 PR PBB SHADOW E&M-EST. PATIENT-LVL V: CPT | Mod: PBBFAC,,, | Performed by: NURSE PRACTITIONER

## 2023-02-17 PROCEDURE — 99213 PR OFFICE/OUTPT VISIT, EST, LEVL III, 20-29 MIN: ICD-10-PCS | Mod: S$PBB,,, | Performed by: NURSE PRACTITIONER

## 2023-02-17 PROCEDURE — 99213 OFFICE O/P EST LOW 20 MIN: CPT | Mod: S$PBB,,, | Performed by: NURSE PRACTITIONER

## 2023-02-17 PROCEDURE — 99215 OFFICE O/P EST HI 40 MIN: CPT | Mod: PBBFAC | Performed by: NURSE PRACTITIONER

## 2023-02-17 NOTE — PROGRESS NOTES
Subjective:       Patient ID: Isael Salmon is a 77 y.o. female.    Chief Complaint: right knee pain    HPI      Review of Systems   Constitutional:  Negative for chills and fever.   HENT:  Negative for ear pain and sore throat.    Eyes:  Negative for pain.   Respiratory:  Negative for chest tightness and shortness of breath.    Cardiovascular:  Negative for chest pain and palpitations.   Gastrointestinal:  Negative for abdominal pain.   Endocrine: Negative for cold intolerance and heat intolerance.   Genitourinary:  Negative for difficulty urinating.   Musculoskeletal:  Positive for arthralgias.   Integumentary:  Negative for rash and wound.   Allergic/Immunologic: Negative for environmental allergies.   Neurological:  Negative for weakness and numbness.   Psychiatric/Behavioral:  The patient is not nervous/anxious.        Objective:      Physical Exam  Constitutional:       General: She is not in acute distress.     Appearance: Normal appearance. She is not ill-appearing.   HENT:      Head: Normocephalic and atraumatic.      Nose: Nose normal.   Eyes:      Extraocular Movements: Extraocular movements intact.   Cardiovascular:      Rate and Rhythm: Normal rate.   Pulmonary:      Effort: Pulmonary effort is normal.   Musculoskeletal:      Cervical back: Normal range of motion.      Comments: AROM Left Knee: E:   PROM Left Knee: E:   AROM Right Knee: E:   PROM Right Knee: E:     Graded /5     Normal sensation Bilaterally. Normal quad set bilaterally.        Skin:     General: Skin is warm.   Neurological:      General: No focal deficit present.      Mental Status: She is alert and oriented to person, place, and time.   Psychiatric:         Mood and Affect: Mood normal.         Behavior: Behavior normal.         RADIOGRAPHS:  FINDINGS:   X-ray knee Ortho Bilateral (2/14/23):  Skeletal structures are intact with satisfactory alignment.  Degenerative joint disease is again seen at the knees with small spurring at  several positions and moderate narrowing of bilateral medial tibiofemoral joint spaces.  Other joint spaces are better preserved.  No erosion or joint effusion is seen.  Cartilage calcifications are present bilaterally indicating chondrocalcinosis.  Small exostosis is noted at the proximal left fibula.  Assessment:       Problem List Items Addressed This Visit    None      Plan:       - Radiographs were taken on 2/14/23 and were personally reviewed. Findings show no acute abnormality, DJD both knees.       - pt declines an injection at this time  - Continue to ice, rest, and elevate knees as needed for symptoms relief.   - Pt is to call back if symptoms worsen or if any new complaint arises.       SONIA Marcano-S3  [facility]

## 2023-02-22 NOTE — PROGRESS NOTES
Pt seen and examined with the PA student. She isn't able to take nsaids due to gfr of 51. She declines an injection and states that she will continue her gabapentin for pain. She will f/u as needed

## 2023-02-28 ENCOUNTER — EXTERNAL CHRONIC CARE MANAGEMENT (OUTPATIENT)
Dept: PRIMARY CARE CLINIC | Facility: CLINIC | Age: 78
End: 2023-02-28
Payer: MEDICARE

## 2023-02-28 PROCEDURE — 99490 CHRNC CARE MGMT STAFF 1ST 20: CPT | Mod: PBBFAC | Performed by: INTERNAL MEDICINE

## 2023-02-28 PROCEDURE — 99490 CHRNC CARE MGMT STAFF 1ST 20: CPT | Mod: S$PBB,,, | Performed by: INTERNAL MEDICINE

## 2023-02-28 PROCEDURE — 99490 PR CHRONIC CARE MGMT, 1ST 20 MIN: ICD-10-PCS | Mod: S$PBB,,, | Performed by: INTERNAL MEDICINE

## 2023-03-28 ENCOUNTER — TELEPHONE (OUTPATIENT)
Dept: ENDOSCOPY | Facility: HOSPITAL | Age: 78
End: 2023-03-28
Payer: MEDICARE

## 2023-03-28 ENCOUNTER — OFFICE VISIT (OUTPATIENT)
Dept: GASTROENTEROLOGY | Facility: CLINIC | Age: 78
End: 2023-03-28
Payer: MEDICARE

## 2023-03-28 VITALS
BODY MASS INDEX: 26.96 KG/M2 | WEIGHT: 167.75 LBS | SYSTOLIC BLOOD PRESSURE: 145 MMHG | HEART RATE: 74 BPM | DIASTOLIC BLOOD PRESSURE: 62 MMHG | HEIGHT: 66 IN

## 2023-03-28 DIAGNOSIS — K55.20 ANGIODYSPLASIA OF SMALL INTESTINE: ICD-10-CM

## 2023-03-28 DIAGNOSIS — R19.5 HEME POSITIVE STOOL: ICD-10-CM

## 2023-03-28 DIAGNOSIS — K21.9 GASTROESOPHAGEAL REFLUX DISEASE WITHOUT ESOPHAGITIS: ICD-10-CM

## 2023-03-28 DIAGNOSIS — Z86.010 HISTORY OF COLON POLYPS: ICD-10-CM

## 2023-03-28 DIAGNOSIS — K59.09 CHRONIC CONSTIPATION: ICD-10-CM

## 2023-03-28 DIAGNOSIS — D50.0 IRON DEFICIENCY ANEMIA DUE TO CHRONIC BLOOD LOSS: Primary | ICD-10-CM

## 2023-03-28 DIAGNOSIS — K57.30 DIVERTICULOSIS OF COLON: ICD-10-CM

## 2023-03-28 PROCEDURE — 99204 PR OFFICE/OUTPT VISIT, NEW, LEVL IV, 45-59 MIN: ICD-10-PCS | Mod: S$PBB,,, | Performed by: INTERNAL MEDICINE

## 2023-03-28 PROCEDURE — 99999 PR PBB SHADOW E&M-EST. PATIENT-LVL V: CPT | Mod: PBBFAC,,, | Performed by: INTERNAL MEDICINE

## 2023-03-28 PROCEDURE — 99999 PR PBB SHADOW E&M-EST. PATIENT-LVL V: ICD-10-PCS | Mod: PBBFAC,,, | Performed by: INTERNAL MEDICINE

## 2023-03-28 PROCEDURE — 99215 OFFICE O/P EST HI 40 MIN: CPT | Mod: PBBFAC | Performed by: INTERNAL MEDICINE

## 2023-03-28 PROCEDURE — 99204 OFFICE O/P NEW MOD 45 MIN: CPT | Mod: S$PBB,,, | Performed by: INTERNAL MEDICINE

## 2023-03-28 NOTE — TELEPHONE ENCOUNTER
----- Message from Jaziel Kingsley MD sent at 3/28/2023  4:42 PM CDT -----  Regarding: balloon procedure  I ordered upper single balloon enteroscopy and lower single balloon colonoscopy due to prior difficult colonoscopy.

## 2023-03-28 NOTE — PROGRESS NOTES
[facility] Consultation Note    Reason for Consult:    Chief Complaint   Patient presents with    Follow-up    Anemia       PCP:   Janett Montoya    Referring MD:  Janett Montoya Md  [address] [place] [address]      HPI:  Isael Salmon is a 77 y.o. female here for evaluation of anemia and history of colon polyps.  She has not been seen in our clinic in a little over 3 years.  She was last seen by my nurse practitioner in March of 2020 for abdominal cramping and constipation.  I saw her in August of 2019 for history of iron deficiency anemia with angiectasia of the GI tract and prior difficult colonoscopy.  She was supposed to have a colonoscopy with me, but this was interrupted by the COVID-19 pandemic.  She continues to have problems with constipation.  She is been using Linzess since her last visit with my nurse practitioner.  She finds that this causes significant loose bowel movements.  She is using it every other day.  Her bowel movements are still thin.  She denies seeing blood in stool.  She is still on Prilosec daily and denies any heart burn symptoms.  No other new GI complaints.    Prior endoscopic procedure reports reviewed again.          ROS:  Constitutional: No fevers, chills, No weight loss, normal appetite  GI: see HPI      Medical History:  has a past medical history of Adjustment disorder (7/26/2012), Anemia (7/26/2012), AR (allergic rhinitis) (9/5/2014), Bronchitis, Cataract, Fibrocystic breast disease in female (7/26/2012), GERD (gastroesophageal reflux disease) (7/26/2012), Gout, Gout, arthritis (7/26/2012), History of blood transfusion (7/26/2012), History of colonic polyps (7/26/2012), Hypercalcemia (9/20/2012), Hyperlipidemia (7/26/2012), Hypertension (7/26/2012), Nuclear sclerosis (5/2/2014), Osteoarthritis (7/26/2012), Other hyperparathyroidism (9/20/2012), Postmenopausal status (7/26/2012), PVD (peripheral vascular disease) (7/26/2012),  Stroke (1997), Superficial vein thrombosis, Transient ischemic attack (7/26/2012), Type 2 diabetes mellitus without complication, without long-term current use of insulin (3/30/2020), and Varicose veins (7/26/2012).    Surgical History:  has a past surgical history that includes carotid endarterectomy (1997); Carotid endarterectomy (Right, 1997); Vascular surgery; Hysterectomy; Colonoscopy (N/A, 10/26/2015); ORIF finger fracture (12/18/15); Breast surgery; broken second finger (Right, 12/2015); TIA; Cataract extraction w/  intraocular lens implant (Right, 05/01/2018); Cataract extraction w/  intraocular lens implant (Left, 05/15/2018); Breast cyst excision (Left); Breast cyst aspiration (Bilateral); Eye surgery; Surgical removal of bunion with osteotomy of metatarsal bone (Left, 7/8/2021); and Correction of hammer toe (Left, 7/8/2021).    Family History: family history includes Bone cancer in her brother; Cancer in her brother and mother; Cataracts in her brother, brother, brother, brother, and mother; Crohn's disease in her brother; Gout in her brother, brother, and brother; Heart failure in her father; Hypertension in her brother, brother, brother, brother, and mother.    Social History:  reports that she quit smoking about 33 years ago. Her smoking use included cigarettes. She has never used smokeless tobacco. She reports current alcohol use of about 1.0 standard drink per week. She reports that she does not use drugs.    Review of patient's allergies indicates:   Allergen Reactions    Latex      Other reaction(s): Rash    Pcn [penicillins]      Other reaction(s): rash    Sulfa (sulfonamide antibiotics)      Other reaction(s): blisters    Avelox [moxifloxacin]      Other reaction(s): racing heart  Other reaction(s): shakes    Ciprofloxacin Other (See Comments)     Room starts to spin , nausea and dizziness    Codeine      Other reaction(s): nausea  Other reaction(s): weakness       Prior to Admission medications     Medication Sig Start Date End Date Taking? Authorizing Provider   acetaminophen (TYLENOL) 325 MG tablet Take 2 tablets (650 mg total) by mouth every 4 (four) hours as needed for Pain (Fever). 4/2/20  Yes Solange Hernandes MD   allopurinoL (ZYLOPRIM) 100 MG tablet TAKE 2 TABLETS(200 MG) BY MOUTH EVERY DAY 1/16/23  Yes Janett Montoya MD   ALPRAZolam (XANAX) 0.25 MG tablet Take 1 tablet (0.25 mg total) by mouth 2 (two) times daily as needed for Anxiety. 7/22/21  Yes Janett Montoya MD   ascorbic acid, vitamin C, (VITAMIN C) 500 MG tablet Take 500 mg by mouth once daily.   Yes Historical Provider   aspirin (ECOTRIN) 81 MG EC tablet Take 81 mg by mouth once daily.    Yes Historical Provider   augmented betamethasone dipropionate (DIPROLENE-AF) 0.05 % cream Apply topically 2 (two) times daily. 5/26/22  Yes Janett Montoya MD   b complex vitamins capsule Take 1 capsule by mouth once daily.   Yes Historical Provider   BIOTIN ORAL Take 1 tablet by mouth once daily.   Yes Historical Provider   co-enzyme Q-10 30 mg capsule Take 200 mg by mouth once daily.   Yes Historical Provider   ergocalciferol (ERGOCALCIFEROL) 50,000 unit Cap TAKE 1 CAPSULE BY MOUTH 2 TIMES A WEEK 9/27/22  Yes Janett Montoya MD   EScitalopram oxalate (LEXAPRO) 10 MG tablet Take 1 tablet (10 mg total) by mouth once daily. 2/2/23  Yes Janett Montoya MD   fluticasone (FLONASE) 50 mcg/actuation nasal spray 2 sprays by Each Nare route daily as needed. 2 Aerosol, Spray Nasal Every day 2/24/14  Yes Kimberlee Duque PA-C   FOLBIC 2.5-25-2 mg Tab TAKE 1 TABLET BY MOUTH EVERY DAY 10/17/22  Yes Kimberlee Duque PA-C   indomethacin (INDOCIN) 50 MG capsule Take 1 capsule (50 mg total) by mouth 3 (three) times daily as needed. 4/15/20  Yes Janett Montoya MD   ipratropium (ATROVENT) 0.02 % nebulizer solution Take 2.5 mLs (500 mcg total) by nebulization 3 (three) times daily as needed for Wheezing. 11/7/18  Yes Sasha Ying, TYRELL   loratadine  "(CLARITIN) 10 mg tablet Take 10 mg by mouth once daily.   Yes Historical Provider   losartan (COZAAR) 50 MG tablet TAKE 1 TABLET(50 MG) BY MOUTH TWICE DAILY 4/2/22  Yes Janett Montoya MD   omeprazole (PRILOSEC) 40 MG capsule Take 1 capsule (40 mg total) by mouth 2 (two) times daily. 9/27/22  Yes Janett Montoya MD   rosuvastatin (CRESTOR) 10 MG tablet Take 1 tablet (10 mg total) by mouth once daily. 5/26/22 5/26/23 Yes Janett Montoya MD   spironolactone (ALDACTONE) 25 MG tablet Take 1 tablet (25 mg total) by mouth once daily. 9/27/22  Yes Janett Montoya MD   LINZESS 145 mcg Cap capsule TAKE 1 CAPSULE(145 MCG) BY MOUTH EVERY DAY 3/18/22 3/28/23 Yes Janett Montoya MD   desoximetasone (TOPICORT) 0.25 % cream Apply topically 2 (two) times daily. 10/6/20   Janett Montoya MD   multivit,calc,mins/iron/folic (ONE-A-DAY WOMENS FORMULA ORAL) Take 1 tablet by mouth once daily.    Historical Provider   peg-electrolyte soln (NULYTELY WITH FLAVOR PACKS) 420 gram SolR TK 4000 MLS PO ONCE 3/8/20 3/28/23  Historical Provider   salsalate (DISALCID) 750 MG Tab Take 1 tablet (750 mg total) by mouth 3 (three) times daily. 11/4/13 3/14/16  Anil Gaytan MD       Objective Findings:  Vital Signs:  BP (!) 145/62   Pulse 74   Ht 5' 5.5" (1.664 m)   Wt 76.1 kg (167 lb 12.3 oz)   LMP  (LMP Unknown)   BMI 27.49 kg/m²   Body mass index is 27.49 kg/m².      Physical Exam:  General Appearance:  Well appearing in no acute distress, appears stated age  Head:  Normocephalic, atraumatic  Eyes:  No scleral icterus or pallor, EOMI        Labs:  Lab Results   Component Value Date    WBC 5.75 02/09/2023    HGB 9.5 (L) 02/09/2023    HCT 29.6 (L) 02/09/2023    MCV 96 02/09/2023    RDW 13.1 02/09/2023     02/09/2023    GRAN 4.0 02/09/2023    GRAN 69.6 02/09/2023    LYMPH 1.0 02/09/2023    LYMPH 17.0 (L) 02/09/2023    MONO 0.6 02/09/2023    MONO 10.1 02/09/2023    EOS 0.1 02/09/2023    BASO 0.03 02/09/2023     Lab " Results   Component Value Date     02/09/2023    K 4.9 02/09/2023     02/09/2023    CO2 25 02/09/2023    GLU 80 02/09/2023    BUN 34 (H) 02/09/2023    CREATININE 1.1 02/09/2023    CALCIUM 10.4 02/09/2023    PROT 7.1 02/09/2023    ALBUMIN 3.8 02/09/2023    BILITOT 0.6 02/09/2023    ALKPHOS 83 02/09/2023    AST 18 02/09/2023    ALT 15 02/09/2023                       Assessment:  Isael Salmon is a 77 y.o. female with:  1. Iron deficiency anemia due to chronic blood loss    2. Heme positive stool    3. Angiodysplasia of small intestine    4. History of colon polyps    5. Gastroesophageal reflux disease without esophagitis    6. Diverticulosis of colon    7. Chronic constipation      She has several GI issues that need to be addressed.  She has ongoing chronic iron deficiency anemia with borderline ferritin level.  She has no overt signs of GI bleeding.  Angiodysplasia have been seen in the jejunum during previous wireless capsule endoscopy in 2017.  Her blood counts have remained relatively stable, only mildly below her baseline of around hemoglobin of 10.  I see that her BUN and creatinine have both been slightly elevated recently.  She is overdue for routine surveillance colonoscopy for history of colon polyps.  She has history of prior difficult colonoscopy due to restricted mobility of the colon.  Her last colonoscopy was completed with a double balloon enteroscope.  Her reflux is controlled with daily Prilosec.  No alarm symptoms noted.    As for constipation, this is an ongoing problem.  She is experiencing diarrhea with her current dose of Linzess 145 mcg daily, which has resulted in her using it every other day.  She still has diarrhea when taken.  This dose may be too high for her.      Recommendations/Plan:  1. I will reduce her Linzess to 72 mcg daily  2. I will arrange for an antegrade single balloon enteroscopy to assess for angiodysplasia of the small bowel.  At the same visit, I will  plan on doing a colonoscopy.  The colonoscopy may be done also using the single balloon enteroscope.      Follow-up pending endoscopic evaluation.      Order summary:  Orders Placed This Encounter    linaCLOtide (LINZESS) 72 mcg Cap capsule    Case Request Endoscopy: ENTEROSCOPY, SINGLE BALLOON, ANTEGRADE, COLONOSCOPY         Thank you so much for allowing me to participate in the care of Isael Kingsley MD      Visit time 45 minutes with time spent reviewing prior records, discussing treatment for constipation, and further endoscopic evaluation due to history of polyps and anemia with angiectasia in the GI tract.  Discussed prognosis.

## 2023-03-31 ENCOUNTER — EXTERNAL CHRONIC CARE MANAGEMENT (OUTPATIENT)
Dept: PRIMARY CARE CLINIC | Facility: CLINIC | Age: 78
End: 2023-03-31
Payer: MEDICARE

## 2023-03-31 PROCEDURE — 99439 CHRNC CARE MGMT STAF EA ADDL: CPT | Mod: PBBFAC,27 | Performed by: INTERNAL MEDICINE

## 2023-03-31 PROCEDURE — 99490 PR CHRONIC CARE MGMT, 1ST 20 MIN: ICD-10-PCS | Mod: S$PBB,,, | Performed by: INTERNAL MEDICINE

## 2023-03-31 PROCEDURE — 99439 CHRNC CARE MGMT STAF EA ADDL: CPT | Mod: S$PBB,,, | Performed by: INTERNAL MEDICINE

## 2023-03-31 PROCEDURE — 99490 CHRNC CARE MGMT STAFF 1ST 20: CPT | Mod: S$PBB,,, | Performed by: INTERNAL MEDICINE

## 2023-03-31 PROCEDURE — 99439 PR CHRONIC CARE MGMT, EA ADDTL 20 MIN: ICD-10-PCS | Mod: S$PBB,,, | Performed by: INTERNAL MEDICINE

## 2023-03-31 PROCEDURE — 99490 CHRNC CARE MGMT STAFF 1ST 20: CPT | Mod: PBBFAC,25 | Performed by: INTERNAL MEDICINE

## 2023-04-06 DIAGNOSIS — K59.00 CONSTIPATION, UNSPECIFIED CONSTIPATION TYPE: ICD-10-CM

## 2023-04-06 RX ORDER — LINACLOTIDE 145 UG/1
CAPSULE, GELATIN COATED ORAL
Qty: 30 CAPSULE | Refills: 3 | OUTPATIENT
Start: 2023-04-06

## 2023-04-06 NOTE — TELEPHONE ENCOUNTER
Refill Decision Note   Isael Salmon  is requesting a refill authorization.  Brief Assessment and Rationale for Refill:        Medication Therapy Plan:  discontinued on 3/28/2023 by Jaziel Kingsley MD for the following reason: Dose adjustment to 72 mcg      Pharmacist review requested: Yes   Extended chart review required: Yes   Comments:     Note composed:11:16 AM 04/06/2023             Appointments     Last Visit   2/14/2023 Janett Montoya MD   Next Visit   8/8/2023 Janett Montoya MD

## 2023-04-06 NOTE — TELEPHONE ENCOUNTER
Refill Routing Note   Medication(s) are not appropriate for processing by [facility] for the following reason(s):      Medication outside of protocol    [facility] action(s):  Route       Medication Therapy Plan: discontinued on 3/28/2023 by Jaziel Kingsley MD for the following reason: Dose adjustment  Medication reconciliation completed: No     Appointments  past 12m or future 3m with PCP    Date Provider   Last Visit   2/14/2023 Janett Montoya MD   Next Visit   8/8/2023 Janett Montoya MD   ED visits in past 90 days: 0        Note composed:8:44 AM 04/06/2023

## 2023-04-27 ENCOUNTER — HOSPITAL ENCOUNTER (EMERGENCY)
Facility: OTHER | Age: 78
Discharge: HOME | End: 2023-04-27
Attending: EMERGENCY MEDICINE
Payer: MEDICARE

## 2023-04-27 VITALS
OXYGEN SATURATION: 99 % | BODY MASS INDEX: 27.49 KG/M2 | DIASTOLIC BLOOD PRESSURE: 60 MMHG | RESPIRATION RATE: 17 BRPM | HEIGHT: 65 IN | WEIGHT: 165 LBS | HEART RATE: 82 BPM | SYSTOLIC BLOOD PRESSURE: 133 MMHG | TEMPERATURE: 98 F

## 2023-04-27 DIAGNOSIS — S16.1XXA CERVICAL STRAIN, ACUTE, INITIAL ENCOUNTER: Primary | ICD-10-CM

## 2023-04-27 DIAGNOSIS — M25.511 RIGHT SHOULDER PAIN: ICD-10-CM

## 2023-04-27 PROCEDURE — 25000003 PHARM REV CODE 250

## 2023-04-27 PROCEDURE — 99284 EMERGENCY DEPT VISIT MOD MDM: CPT | Mod: 25

## 2023-04-27 RX ORDER — METHOCARBAMOL 500 MG/1
500 TABLET, FILM COATED ORAL 4 TIMES DAILY
Qty: 60 TABLET | Refills: 0 | Status: SHIPPED | OUTPATIENT
Start: 2023-04-27 | End: 2023-05-12

## 2023-04-27 RX ORDER — METHOCARBAMOL 500 MG/1
500 TABLET, FILM COATED ORAL
Status: COMPLETED | OUTPATIENT
Start: 2023-04-27 | End: 2023-04-27

## 2023-04-27 RX ORDER — NAPROXEN 500 MG/1
500 TABLET ORAL 2 TIMES DAILY WITH MEALS
Qty: 28 TABLET | Refills: 0 | Status: SHIPPED | OUTPATIENT
Start: 2023-04-27 | End: 2023-05-11

## 2023-04-27 RX ORDER — NAPROXEN 500 MG/1
500 TABLET ORAL
Status: COMPLETED | OUTPATIENT
Start: 2023-04-27 | End: 2023-04-27

## 2023-04-27 RX ADMIN — METHOCARBAMOL 500 MG: 500 TABLET ORAL at 09:04

## 2023-04-27 RX ADMIN — NAPROXEN 500 MG: 500 TABLET ORAL at 09:04

## 2023-04-27 NOTE — ED PROVIDER NOTES
"Encounter Date: 4/27/2023       History     Chief Complaint   Patient presents with    Neck Pain     C/o right neck pain 9/10 that radiates to right shoulder that began yesterday. -trauma/ injury. Stated PMH  PT for neck pain last year. Denies any other complaints. ROSALINDA     77-year-old female with history of hypertension, diabetes mellitus, and gout presents emergency department for evaluation of right-sided neck pain that began yesterday evening.  She describes pain as "throbbing and spasms". She reports the pain radiates down her right arm.  States that she sustained a fall out of her bed 6 months ago and injured her right shoulder. She feels like her right clavicle is larger than her left. She was never evaluated after that fall. Patient states she has been applying ice and took 2 gabapentin last night with no relief of symptoms.  She denies fever, chills, paresthesias, shortness of breath, or chest pain. She did undergo physical therapy a year ago for nuchal rigidity and neck pain, which did provide relief.     The history is provided by the patient (friend at bedside).   Review of patient's allergies indicates:   Allergen Reactions    Latex      Other reaction(s): Rash    Pcn [penicillins]      Other reaction(s): rash    Sulfa (sulfonamide antibiotics)      Other reaction(s): blisters    Avelox [moxifloxacin]      Other reaction(s): racing heart  Other reaction(s): shakes    Ciprofloxacin Other (See Comments)     Room starts to spin , nausea and dizziness    Codeine      Other reaction(s): nausea  Other reaction(s): weakness     Past Medical History:   Diagnosis Date    Adjustment disorder 7/26/2012    Anemia 7/26/2012    AR (allergic rhinitis) 9/5/2014    Bronchitis     Cataract     Fibrocystic breast disease in female 7/26/2012    GERD (gastroesophageal reflux disease) 7/26/2012    Gout     Gout, arthritis 7/26/2012    History of blood transfusion 7/26/2012    History of colonic polyps 7/26/2012    " Hypercalcemia 9/20/2012    Hyperlipidemia 7/26/2012    Hypertension 7/26/2012    Nuclear sclerosis 5/2/2014    Osteoarthritis 7/26/2012    Other hyperparathyroidism 9/20/2012    Postmenopausal status 7/26/2012    PVD (peripheral vascular disease) 7/26/2012    left leg laser of vein with injection    Stroke 1997    TIA    Superficial vein thrombosis     Transient ischemic attack 7/26/2012    Type 2 diabetes mellitus without complication, without long-term current use of insulin 3/30/2020    Varicose veins 7/26/2012     Past Surgical History:   Procedure Laterality Date    BREAST CYST ASPIRATION Bilateral     multiple ones over the years    BREAST CYST EXCISION Left     BREAST SURGERY      breast reduction    broken second finger Right 12/2015    pins placed    carotid endarterectomy  1997    right    CAROTID ENDARTERECTOMY Right 1997    CATARACT EXTRACTION W/  INTRAOCULAR LENS IMPLANT Right 05/01/2018    Dr. Yost    CATARACT EXTRACTION W/  INTRAOCULAR LENS IMPLANT Left 05/15/2018    Dr. Lester    COLONOSCOPY N/A 10/26/2015    Procedure: COLONOSCOPY;  Surgeon: Manuel Alanis MD;  Location: [address]);  Service: Endoscopy;  Laterality: N/A;    CORRECTION OF HAMMER TOE Left 7/8/2021    Procedure: CORRECTION, HAMMER TOE Second toe;  Surgeon: Silver Bunn MD;  Location: [facility] OR;  Service: Orthopedics;  Laterality: Left;  Ossiofiber hammertoe implant    EYE SURGERY      HYSTERECTOMY      ORIF FINGER FRACTURE  12/18/15    SURGICAL REMOVAL OF BUNION WITH OSTEOTOMY OF METATARSAL BONE Left 7/8/2021    Procedure: BUNIONECTOMY, WITH METATARSAL OSTEOTOMY Proximal opening wedge;  Surgeon: Silver Bunn MD;  Location: [facility] OR;  Service: Orthopedics;  Laterality: Left;  Arthrex opening wedge plate    TIA      VASCULAR SURGERY      left leg vein laser     Family History   Problem Relation Age of Onset    Heart failure Father     Cancer Mother         pancreas    Cataracts Mother     Hypertension  Mother     Cancer Brother         bladder    Cataracts Brother     Hypertension Brother     Crohn's disease Brother     Gout Brother     Cataracts Brother     Hypertension Brother     Gout Brother     Cataracts Brother     Hypertension Brother     Gout Brother     Bone cancer Brother     Cataracts Brother     Hypertension Brother     Breast cancer Neg Hx     Ovarian cancer Neg Hx     Allergies Neg Hx     Asthma Neg Hx     Eczema Neg Hx     Cervical cancer Neg Hx     Endometrial cancer Neg Hx     Vaginal cancer Neg Hx     Amblyopia Neg Hx     Blindness Neg Hx     Diabetes Neg Hx     Glaucoma Neg Hx     Macular degeneration Neg Hx     Retinal detachment Neg Hx     Strabismus Neg Hx     Stroke Neg Hx     Thyroid disease Neg Hx     Celiac disease Neg Hx     Colon cancer Neg Hx     Esophageal cancer Neg Hx     Liver disease Neg Hx     Liver cancer Neg Hx     Rectal cancer Neg Hx     Stomach cancer Neg Hx      Social History     Tobacco Use    Smoking status: Former     Types: Cigarettes     Quit date: 1989     Years since quittin.6    Smokeless tobacco: Never    Tobacco comments:     quit  - 1 pack per day since age 16   Substance Use Topics    Alcohol use: Yes     Alcohol/week: 1.0 standard drink     Types: 1 Glasses of wine per week     Comment: maybe one glass of wine monthly, if that    Drug use: No     Review of Systems    Physical Exam     Initial Vitals [23 0829]   BP Pulse Resp Temp SpO2   (!) 171/71 92 17 97.9 °F (36.6 °C) 99 %      MAP       --         Physical Exam    Nursing note and vitals reviewed.  Constitutional: She appears well-developed and well-nourished. No distress.   HENT:   Head: Normocephalic and atraumatic.   Mouth/Throat: Oropharynx is clear and moist.   Eyes: Conjunctivae are normal.   Neck:   Nuchal rigidity present.   Cardiovascular:  Normal rate, regular rhythm, normal heart sounds and intact distal pulses.           Pulmonary/Chest: Breath sounds normal. No respiratory  distress. She has no wheezes. She has no rhonchi. She has no rales.   Musculoskeletal:      Comments: Right paraspinal tenderness to palpation of cervical spine. No crepitus, step-offs or deformities. Tenderness to palpation of right shoulder shoulder.  No crepitus, step-offs, or deformities.  No overlying skin changes.  Good range of motion of the right shoulder.  Strength 5/5.  Radial pulses 2+.     Neurological: She is alert and oriented to person, place, and time. She has normal strength. No cranial nerve deficit or sensory deficit.   Neurovascularly intact.  Sensation intact to light touch.   Skin: Skin is warm and dry.   Psychiatric: She has a normal mood and affect. Her behavior is normal. Judgment and thought content normal.       ED Course   Procedures  Labs Reviewed - No data to display       Imaging Results              CT Cervical Spine Without Contrast (Final result)  Result time 04/27/23 10:19:18      Final result by Mariluz Cosme MD (04/27/23 10:19:18)                   Impression:      No acute osseous abnormality seen.  Degenerative change at C4-5 and C5-6 resulting in moderate neural foraminal narrowing and mild canal narrowing as given in detail above.      Electronically signed by: Mariluz Cosme  Date:    04/27/2023  Time:    10:19               Narrative:    EXAMINATION:  CT CERVICAL SPINE WITHOUT CONTRAST    CLINICAL HISTORY:  Neck pain, acute, no red flags;    TECHNIQUE:  Low dose axial images, sagittal and coronal reformations were performed though the cervical spine.  Contrast was not administered.    COMPARISON:  10/14/2019    FINDINGS:  Alignment: Grade 1 retrolisthesis C5 with respect to C6.    Vertebrae: No fracture.  No lytic or blastic lesion.    Discs: Disc space narrowing and endplate changes C4-5 and C5-6.    C1-2: Dens is intact.  Pre-dens space is maintained.    Skull base and craniocervical junction: Normal.    Degenerative findings:    C2-C3: Posterior disc osteophyte  complex with no canal or foraminal stenosis.    C3-C4: Posterior disc osteophyte complex with no significant canal stenosis or foraminal narrowing.    C4-C5: Uncovertebral spurring and facet arthropathy result in moderate right neural foraminal narrowing.  Posterior disc osteophyte complex without significant canal narrowing by CT.    C5-C6: Posterior disc osteophyte complex and bilateral uncovertebral spur result in mild canal narrowing and moderate bilateral neural foraminal narrowing.    C6-C7: No apparent canal or foraminal stenosis.    Paraspinal muscles & soft tissues: Unremarkable.                                       X-Ray Shoulder Complete 2 View Right (Final result)  Result time 04/27/23 10:06:08      Final result by Mariluz Cosme MD (04/27/23 10:06:08)                   Impression:      No acute osseous abnormality seen.  Calcific tendinitis.      Electronically signed by: Mariluz Cosme  Date:    04/27/2023  Time:    10:06               Narrative:    EXAMINATION:  XR SHOULDER COMPLETE 2 OR MORE VIEWS RIGHT    CLINICAL HISTORY:  Pain in right shoulder    TECHNIQUE:  Two or three views of the right shoulder were performed.    COMPARISON:  None    FINDINGS:  No acute fracture or dislocation seen.  Calcifications along the insertion of the rotator cuff tendons as can be seen with calcific tendinitis.  No soft tissue edema or radiopaque retained foreign body.                                    X-Rays:   Independently Interpreted Readings:   Other Readings:  Right shoulder:  No acute fracture or dislocation.    CT Cervical spine:  Deferred to Radiology.  Medications   methocarbamoL tablet 500 mg (500 mg Oral Given 4/27/23 0923)   naproxen tablet 500 mg (500 mg Oral Given 4/27/23 0924)     Medical Decision Making:   Initial Assessment:   Urgent evaluation of 77-year-old female with right-sided neck spasms and pain to the right arm that began yesterday evening. No falls or injury yesterday but does report  fall out of bed onto the right shoulder 6 months ago.  No evaluation after this fall.  No symptom relief with ice packs or gabapentin.  On exam, which she does exhibit nuchal rigidity in pain with ranging the neck.  Tenderness to palpation of the right shoulder.  She does have good range of motion.  She is neurovascularly intact.  Strength 5/5.  Will check x-ray of the right shoulder and CT cervical spine.  Will give Robaxin and naproxen and reassess.  Clinical Tests:   Radiological Study: Ordered and Reviewed  ED Management:  On review of films, x-ray of the right shoulder is negative for fracture or dislocation.  CT of the cervical spine shows no acute abnormality.  On reassessment, patient feeling much better after Robaxin and naproxen.  Will discharge home with prescriptions for naproxen and Robaxin.  Will provide ambulatory referrals to physical therapy and back and spine for further evaluation and management.  Patient verbalized understanding and agreement with this plan of care.  She was given specific return precautions.  All questions and concerns answered.  She is stable for discharge.    I discussed this case with my supervising physician.                        Clinical Impression:   Final diagnoses:  [M25.511] Right shoulder pain  [S16.1XXA] Cervical strain, acute, initial encounter (Primary)        ED Disposition Condition    Discharge Stable          ED Prescriptions       Medication Sig Dispense Start Date End Date Auth. Provider    naproxen (NAPROSYN) 500 MG tablet Take 1 tablet (500 mg total) by mouth 2 (two) times daily with meals. for 14 days 28 tablet 4/27/2023 5/11/2023 Arnoldo Vanegas PA-C    methocarbamoL (ROBAXIN) 500 MG Tab Take 1 tablet (500 mg total) by mouth 4 (four) times daily. for 15 days 60 tablet 4/27/2023 5/12/2023 Arnoldo Vanegas PA-C          Follow-up Information    None          Arnoldo Vanegas PA-C  04/27/23 1038

## 2023-04-27 NOTE — PROGRESS NOTES
The Patient granting permission, Mrs. Salmon was called today regarding the patient's participation in (IRB #[id number] PI: Katerin).   The Verbal Informed Consent was read and discussed by the consenter. The following was discussed:   Types of specimens to be collected   All medical information released to researchers will be stripped of identifiers and no patient information will be given to anyone outside of this research project.    Participating in a research study is not the same as getting regular medical care and will not improve the patient's health. The purpose of a research study is to gather information.  Being in this study does not interfere with your regular medical care.   The patient/LAR does not have to participate in this study. If they do not join, their care at [facility] will not be affected.  The person granting permission was provided adequate time to ask questions regarding the scope and purpose of the study.  Permission was obtained by telephone.   The above statements were read by the person obtaining permission to the person granting permission and witnessed by Mary Manuel, who also confirmed the patient/LAR's consent to the study. The witness information was documented on the verbal consent form as well.  This Verbal Informed Consent process was conducted prior to initiation of any study procedures.  
No risk alerts present

## 2023-04-27 NOTE — ED TRIAGE NOTES
76 yo female reports to ed with R sided neck pain radiating down  to R shoulder onset yesterday upon waking up. Denies trauma/injury. Reports being seen in Ed once before for these symptoms. Denies numbness/tingling/chest pain/sob. Aaox4.

## 2023-04-27 NOTE — DISCHARGE INSTRUCTIONS
Please follow up with orthopedics and physical therapy. Please take Naproxen OR ibuprofen and Robaxin as needed for symptoms.

## 2023-04-30 ENCOUNTER — EXTERNAL CHRONIC CARE MANAGEMENT (OUTPATIENT)
Dept: PRIMARY CARE CLINIC | Facility: CLINIC | Age: 78
End: 2023-04-30
Payer: MEDICARE

## 2023-04-30 PROCEDURE — 99490 CHRNC CARE MGMT STAFF 1ST 20: CPT | Mod: S$PBB,,, | Performed by: INTERNAL MEDICINE

## 2023-04-30 PROCEDURE — 99490 PR CHRONIC CARE MGMT, 1ST 20 MIN: ICD-10-PCS | Mod: S$PBB,,, | Performed by: INTERNAL MEDICINE

## 2023-04-30 PROCEDURE — 99490 CHRNC CARE MGMT STAFF 1ST 20: CPT | Mod: PBBFAC | Performed by: INTERNAL MEDICINE

## 2023-05-02 ENCOUNTER — CLINICAL SUPPORT (OUTPATIENT)
Dept: REHABILITATION | Facility: HOSPITAL | Age: 78
End: 2023-05-02
Payer: MEDICARE

## 2023-05-02 DIAGNOSIS — S16.1XXA CERVICAL STRAIN, ACUTE, INITIAL ENCOUNTER: Primary | ICD-10-CM

## 2023-05-02 DIAGNOSIS — M53.82 DECREASED ROM OF INTERVERTEBRAL DISCS OF CERVICAL SPINE: ICD-10-CM

## 2023-05-02 PROCEDURE — 97161 PT EVAL LOW COMPLEX 20 MIN: CPT | Mod: PO

## 2023-05-02 PROCEDURE — 97110 THERAPEUTIC EXERCISES: CPT | Mod: PO

## 2023-05-02 NOTE — PROGRESS NOTES
DATE: 5/11/2023  PATIENT: Isael Salmon    Supervising Physician: Rajiv Razo M.D.    CHIEF COMPLAINT: neck pain    HISTORY:  Isael Salmon is a 77 y.o. female here for initial evaluation of neck and right arm pain (Neck - 6, Arm - 4). The pain has been present for about 2 weeks. The patient went to the ED on 4/27 and was given naproxen and robaxin. She states her pain has reduced significantly since taking these medications. The patient describes the pain as dull. The pain is worse with neck rotation to the right and improved by massage. There is associated numbness and tingling. There is subjective weakness. Prior treatments have included aleve, robaxin and PT, but no NEAL or surgery.   The patient endorses myelopathic symptoms such as handwriting changes or difficulty with buttons/coins/keys. Denies perineal paresthesias, bowel/bladder dysfunction.    PAST MEDICAL/SURGICAL HISTORY:  Past Medical History:   Diagnosis Date    Adjustment disorder 7/26/2012    Anemia 7/26/2012    AR (allergic rhinitis) 9/5/2014    Bronchitis     Cataract     Fibrocystic breast disease in female 7/26/2012    GERD (gastroesophageal reflux disease) 7/26/2012    Gout     Gout, arthritis 7/26/2012    History of blood transfusion 7/26/2012    History of colonic polyps 7/26/2012    Hypercalcemia 9/20/2012    Hyperlipidemia 7/26/2012    Hypertension 7/26/2012    Nuclear sclerosis 5/2/2014    Osteoarthritis 7/26/2012    Other hyperparathyroidism 9/20/2012    Postmenopausal status 7/26/2012    PVD (peripheral vascular disease) 7/26/2012    left leg laser of vein with injection    Stroke 1997    TIA    Superficial vein thrombosis     Transient ischemic attack 7/26/2012    Type 2 diabetes mellitus without complication, without long-term current use of insulin 3/30/2020    Varicose veins 7/26/2012     Past Surgical History:   Procedure Laterality Date    BREAST CYST ASPIRATION Bilateral     multiple ones over the years    BREAST  CYST EXCISION Left     BREAST SURGERY      breast reduction    broken second finger Right 12/2015    pins placed    carotid endarterectomy  1997    right    CAROTID ENDARTERECTOMY Right 1997    CATARACT EXTRACTION W/  INTRAOCULAR LENS IMPLANT Right 05/01/2018    Dr. Yost    CATARACT EXTRACTION W/  INTRAOCULAR LENS IMPLANT Left 05/15/2018    Dr. Lester    COLONOSCOPY N/A 10/26/2015    Procedure: COLONOSCOPY;  Surgeon: Manuel Alanis MD;  Location: [address]);  Service: Endoscopy;  Laterality: N/A;    CORRECTION OF HAMMER TOE Left 7/8/2021    Procedure: CORRECTION, HAMMER TOE Second toe;  Surgeon: Silver Bunn MD;  Location: [facility];  Service: Orthopedics;  Laterality: Left;  Ossiofiber hammertoe implant    EYE SURGERY      HYSTERECTOMY      ORIF FINGER FRACTURE  12/18/15    SURGICAL REMOVAL OF BUNION WITH OSTEOTOMY OF METATARSAL BONE Left 7/8/2021    Procedure: BUNIONECTOMY, WITH METATARSAL OSTEOTOMY Proximal opening wedge;  Surgeon: Silver Bunn MD;  Location: [facility];  Service: Orthopedics;  Laterality: Left;  Arthrex opening wedge plate    TIA      VASCULAR SURGERY      left leg vein laser       Medications:  Current Outpatient Medications on File Prior to Visit   Medication Sig Dispense Refill    acetaminophen (TYLENOL) 325 MG tablet Take 2 tablets (650 mg total) by mouth every 4 (four) hours as needed for Pain (Fever). 30 tablet 1    allopurinoL (ZYLOPRIM) 100 MG tablet TAKE 2 TABLETS(200 MG) BY MOUTH EVERY  tablet 2    ALPRAZolam (XANAX) 0.25 MG tablet Take 1 tablet (0.25 mg total) by mouth 2 (two) times daily as needed for Anxiety. 30 tablet 0    ascorbic acid, vitamin C, (VITAMIN C) 500 MG tablet Take 500 mg by mouth once daily.      aspirin (ECOTRIN) 81 MG EC tablet Take 81 mg by mouth once daily.       augmented betamethasone dipropionate (DIPROLENE-AF) 0.05 % cream Apply topically 2 (two) times daily. 60 g 3    b complex vitamins capsule Take 1 capsule by mouth  once daily.      BIOTIN ORAL Take 1 tablet by mouth once daily.      co-enzyme Q-10 30 mg capsule Take 200 mg by mouth once daily.      desoximetasone (TOPICORT) 0.25 % cream Apply topically 2 (two) times daily. 100 g 3    ergocalciferol (ERGOCALCIFEROL) 50,000 unit Cap TAKE 1 CAPSULE BY MOUTH 2 TIMES A WEEK 24 capsule 5    EScitalopram oxalate (LEXAPRO) 10 MG tablet Take 1 tablet (10 mg total) by mouth once daily. 90 tablet 3    fluticasone (FLONASE) 50 mcg/actuation nasal spray 2 sprays by Each Nare route daily as needed. 2 Aerosol, Spray Nasal Every day 16 g 2    FOLBIC 2.5-25-2 mg Tab TAKE 1 TABLET BY MOUTH EVERY DAY 30 tablet 11    indomethacin (INDOCIN) 50 MG capsule Take 1 capsule (50 mg total) by mouth 3 (three) times daily as needed. 30 capsule 3    ipratropium (ATROVENT) 0.02 % nebulizer solution Take 2.5 mLs (500 mcg total) by nebulization 3 (three) times daily as needed for Wheezing. 62.5 mL 0    linaCLOtide (LINZESS) 72 mcg Cap capsule Take 1 capsule (72 mcg total) by mouth before breakfast. 90 capsule 3    loratadine (CLARITIN) 10 mg tablet Take 10 mg by mouth once daily.      losartan (COZAAR) 50 MG tablet TAKE 1 TABLET(50 MG) BY MOUTH TWICE DAILY 180 tablet 3    methocarbamoL (ROBAXIN) 500 MG Tab Take 1 tablet (500 mg total) by mouth 4 (four) times daily. for 15 days 60 tablet 0    multivit,calc,mins/iron/folic (ONE-A-DAY WOMENS FORMULA ORAL) Take 1 tablet by mouth once daily.      naproxen (NAPROSYN) 500 MG tablet Take 1 tablet (500 mg total) by mouth 2 (two) times daily with meals. for 14 days 28 tablet 0    omeprazole (PRILOSEC) 40 MG capsule Take 1 capsule (40 mg total) by mouth 2 (two) times daily. 180 capsule 4    rosuvastatin (CRESTOR) 10 MG tablet Take 1 tablet (10 mg total) by mouth once daily. 90 tablet 3    spironolactone (ALDACTONE) 25 MG tablet Take 1 tablet (25 mg total) by mouth once daily. 90 tablet 4    rosuvastatin (CRESTOR) 20 MG tablet Take 20 mg by mouth every evening.       [DISCONTINUED] salsalate (DISALCID) 750 MG Tab Take 1 tablet (750 mg total) by mouth 3 (three) times daily. 90 tablet 9     No current facility-administered medications on file prior to visit.       Social History:   Social History     Socioeconomic History    Marital status:     Number of children: 1   Occupational History    Occupation: Retired   Tobacco Use    Smoking status: Former     Types: Cigarettes     Quit date: 1989     Years since quittin.6    Smokeless tobacco: Never    Tobacco comments:     quit  -  pack per day since age 16   Substance and Sexual Activity    Alcohol use: Yes     Alcohol/week: 1.0 standard drink     Types: 1 Glasses of wine per week     Comment: maybe one glass of wine monthly, if that    Drug use: No    Sexual activity: Not Currently     Partners: Male     Birth control/protection: Surgical     Social Determinants of Health     Financial Resource Strain: Low Risk     Difficulty of Paying Living Expenses: Not hard at all   Food Insecurity: No Food Insecurity    Worried About Running Out of Food in the Last Year: Never true    Ran Out of Food in the Last Year: Never true   Transportation Needs: No Transportation Needs    Lack of Transportation (Medical): No    Lack of Transportation (Non-Medical): No   Physical Activity: Inactive    Days of Exercise per Week: 0 days    Minutes of Exercise per Session: 0 min   Stress: No Stress Concern Present    Feeling of Stress : Not at all   Social Connections: Moderately Integrated    Frequency of Communication with Friends and Family: More than three times a week    Frequency of Social Gatherings with Friends and Family: More than three times a week    Attends [employer] Services: More than 4 times per year    Active Member of Clubs or Organizations: No    Attends Club or Organization Meetings: Never    Marital Status:    Housing Stability: Low Risk     Unable to Pay for Housing in the Last Year: No    Number of Places  "Lived in the Last Year: 1    Unstable Housing in the Last Year: No       REVIEW OF SYSTEMS:  Constitution: Negative. Negative for chills, fever and night sweats.   Cardiovascular: Negative for chest pain and syncope.   Respiratory: Negative for cough and shortness of breath.   Gastrointestinal: See HPI. Negative for nausea/vomiting. Negative for abdominal pain.  Genitourinary: See HPI. Negative for discoloration or dysuria.  Skin: Negative for dry skin, itching and rash.   Hematologic/Lymphatic: Negative for bleeding problem. Does not bruise/bleed easily.   Musculoskeletal: Negative for falls and muscle weakness.   Neurological: See HPI. No seizures.   Endocrine: Negative for polydipsia, polyphagia and polyuria.   Allergic/Immunologic: Negative for hives and persistent infections.  Psychiatric/Behavioral: Negative for depression and insomnia.         EXAM:  Ht 5' 5" (1.651 m)   Wt 76.8 kg (169 lb 3.3 oz)   LMP  (LMP Unknown)   BMI 28.16 kg/m²     General: The patient is a very pleasant 77 y.o. female in no apparent distress, the patient is oriented to person, place and time.  Psych: Normal mood and affect  HEENT: Vision grossly intact, hearing intact to the spoken word.  Lungs: Respirations unlabored.  Gait: wide station and waddling gait, no difficulty with toe or heel walk.   Skin: Cervical skin negative for rashes, lesions, hairy patches and surgical scars.  Range of motion: Cervical range of motion is acceptable. There is mild tenderness to palpation.  Spinal Balance: Global saggital and coronal spinal balance acceptable, no significant for scoliosis and kyphosis.  Musculoskeletal: No pain with the range of motion of the bilateral shoulders and elbows. Normal bulk and contour of the bilateral hands.  Vascular: Bilateral hands warm and well perfused, radial pulses 2+ bilaterally.  Neurological: Normal strength and tone in all major motor groups in the bilateral upper and lower extremities. Normal sensation to " light touch in the C5-T1 and L2-S1 dermatomes bilaterally.  Deep tendon reflexes symmetric 2+ in the bilateral upper and lower extremities.  Negative Inverted Radial Reflex and Ambrocio's bilaterally. Negative Babinski bilaterally.     IMAGING:   Today I personally reviewed AP, Lat and Flex/Ex  upright C-spine films that demonstrate retrolisthesis of C5 on C6.      Body mass index is 28.16 kg/m².    Hemoglobin A1C   Date Value Ref Range Status   12/20/2021 5.5 4.0 - 5.6 % Final     Comment:     ADA Screening Guidelines:  5.7-6.4%  Consistent with prediabetes  >or=6.5%  Consistent with diabetes    High levels of fetal hemoglobin interfere with the HbA1C  assay. Heterozygous hemoglobin variants (HbS, HgC, etc)do  not significantly interfere with this assay.   However, presence of multiple variants may affect accuracy.     04/30/2021 5.4 4.0 - 5.6 % Final     Comment:     ADA Screening Guidelines:  5.7-6.4%  Consistent with prediabetes  >or=6.5%  Consistent with diabetes    High levels of fetal hemoglobin interfere with the HbA1C  assay. Heterozygous hemoglobin variants (HbS, HgC, etc)do  not significantly interfere with this assay.   However, presence of multiple variants may affect accuracy.     03/31/2020 5.8 (H) 4.0 - 5.6 % Final     Comment:     ADA Screening Guidelines:  5.7-6.4%  Consistent with prediabetes  >or=6.5%  Consistent with diabetes  High levels of fetal hemoglobin interfere with the HbA1C  assay. Heterozygous hemoglobin variants (HbS, HgC, etc)do  not significantly interfere with this assay.   However, presence of multiple variants may affect accuracy.             ASSESSMENT/PLAN:    Isael was seen today for neck pain.    Diagnoses and all orders for this visit:    Cervical radiculopathy  -     MRI Cervical Spine Without Contrast; Future    Myelopathy  -     Ambulatory referral/consult to Orthopedics  -     MRI Cervical Spine Without Contrast; Future      Today we discussed at length all of the  different treatment options including anti-inflammatories, acetaminophen, rest, ice, heat, physical therapy including strengthening and stretching exercises, home exercises, ROM, aerobic conditioning, aqua therapy, other modalities including ultrasound, massage, and dry needling, epidural steroid injections and finally surgical intervention.  cervical MRI ordered, she will follow up in the clinic for results.

## 2023-05-04 ENCOUNTER — CLINICAL SUPPORT (OUTPATIENT)
Dept: REHABILITATION | Facility: HOSPITAL | Age: 78
End: 2023-05-04
Payer: MEDICARE

## 2023-05-04 DIAGNOSIS — M53.82 DECREASED ROM OF INTERVERTEBRAL DISCS OF CERVICAL SPINE: Primary | ICD-10-CM

## 2023-05-04 PROCEDURE — 97110 THERAPEUTIC EXERCISES: CPT | Mod: PO,CQ

## 2023-05-04 NOTE — PLAN OF CARE
[facility]   Physical Therapy Initial Evaluation      Name: Isael Salmon  Clinic Number: [id number]    Therapy Diagnosis:   Encounter Diagnoses   Name Primary?    Cervical strain, acute, initial encounter Yes    Decreased ROM of intervertebral discs of cervical spine         Physician: Arnlodo Vanegas PA-C    Physician Orders: PT Eval and Treat   Medical Diagnosis from Referral: S16.1XXA (ICD-10-CM) - Cervical strain, acute, initial encounter   Evaluation Date: 5/2/2023  Authorization Period Expiration: 11/2/2023  Plan of Care Expiration: 8/2/2023  Progress Note Due: 6/2/2023  Visit # / Visits authorized: 1/ pending   FOTO: 1/3    Precautions: Standard and Fall     Time In: 200  Time Out: 230  Total Appointment Time (timed & untimed codes): 30 minutes    Subjective     Date of onset: last week    History of current condition - Isael reports: second time this has happened, She reports that it starts with a stiff neck  that leads to sharpness and difficulty with any movement of lifting of the head. She does not have an additional headache because of her neck and shoulder pain. She feels a cracking sensation but no pain on the right side. Reaching upward, carrying anything of any weight up her stairs, and turning the head while driving are the most challenging activity. Her  recently had knee surgery and the weekend before she had to carry heavier things since he was not able to help her before his knee surgery. No N&T present    Falls: none    Imaging: MRI studies: No acute osseous abnormality seen. Degenerative change at C4-5 and C5-6 resulting in moderate neural foraminal narrowing and mild canal narrowing as given in detail above.     Prior Therapy: yes at [facility]  Social History: lives upstairs with rail to hold onto lives with their spouse  Occupation: retired  Prior Level of Function: able to lift and use the arm  Current Level of Function: careful lifting  and not carrying heavy items up her stairs    Pain:  Current 0/10, worst 10/10, best 0/10   Location: right neck  and shoulder  and into shoulder blade  Description: Aching, Throbbing, and Sharp  Aggravating Factors: Laying, Night Time, and Lifting  Easing Factors: ice    Patients goals: relieve discomfort and be able to lift the arm and carry in groceries without pain     Medical History:   Past Medical History:   Diagnosis Date    Adjustment disorder 7/26/2012    Anemia 7/26/2012    AR (allergic rhinitis) 9/5/2014    Bronchitis     Cataract     Fibrocystic breast disease in female 7/26/2012    GERD (gastroesophageal reflux disease) 7/26/2012    Gout     Gout, arthritis 7/26/2012    History of blood transfusion 7/26/2012    History of colonic polyps 7/26/2012    Hypercalcemia 9/20/2012    Hyperlipidemia 7/26/2012    Hypertension 7/26/2012    Nuclear sclerosis 5/2/2014    Osteoarthritis 7/26/2012    Other hyperparathyroidism 9/20/2012    Postmenopausal status 7/26/2012    PVD (peripheral vascular disease) 7/26/2012    left leg laser of vein with injection    Stroke 1997    TIA    Superficial vein thrombosis     Transient ischemic attack 7/26/2012    Type 2 diabetes mellitus without complication, without long-term current use of insulin 3/30/2020    Varicose veins 7/26/2012       Surgical History:   Isael Salmon  has a past surgical history that includes carotid endarterectomy (1997); Carotid endarterectomy (Right, 1997); Vascular surgery; Hysterectomy; Colonoscopy (N/A, 10/26/2015); ORIF finger fracture (12/18/15); Breast surgery; broken second finger (Right, 12/2015); TIA; Cataract extraction w/  intraocular lens implant (Right, 05/01/2018); Cataract extraction w/  intraocular lens implant (Left, 05/15/2018); Breast cyst excision (Left); Breast cyst aspiration (Bilateral); Eye surgery; Surgical removal of bunion with osteotomy of metatarsal bone (Left, 7/8/2021); and Correction of hammer toe (Left,  7/8/2021).    Medications:   Isael has a current medication list which includes the following prescription(s): acetaminophen, allopurinol, alprazolam, ascorbic acid (vitamin c), aspirin, augmented betamethasone dipropionate, b complex vitamins, biotin, co-enzyme q-10, desoximetasone, ergocalciferol, escitalopram oxalate, fluticasone propionate, folbic, indomethacin, ipratropium, linaclotide, loratadine, losartan, methocarbamol, multivit,calc,mins/iron/folic, naproxen, omeprazole, rosuvastatin, spironolactone, and [DISCONTINUED] salsalate.    Allergies:   Review of patient's allergies indicates:   Allergen Reactions    Latex      Other reaction(s): Rash    Pcn [penicillins]      Other reaction(s): rash    Sulfa (sulfonamide antibiotics)      Other reaction(s): blisters    Avelox [moxifloxacin]      Other reaction(s): racing heart  Other reaction(s): shakes    Ciprofloxacin Other (See Comments)     Room starts to spin , nausea and dizziness    Codeine      Other reaction(s): nausea  Other reaction(s): weakness        Objective      Observation: resting head in right side bent position    Posture: bilateral rounded shoulders and FHP    Cervical Range of Motion:    Degrees   Flexion 35   Extension 30   Right Rotation 50   Left Rotation 50   Right Side Bend 20   Left Side Bend 15      Shoulder Range of Motion:   Shoulder Left Right   Flexion 25% limited 25% limited   Abduction 25% limited 25% limited   ER WFL WFL   IR WFL WFL     Shoulder Strength:  Shoulder Left Right   Flexion 4/5 4/5   Abduction 4/5 4-/5   ER 4/5 4-/5   IR WFL WFL     Special Tests:  Distraction negative   Compression negative   Spurlings negative     Joint Mobility: mod up and down glides restrictions throughout cervical spine with mod muscle guarding present with grade 1-2 mobilizations. Bilateral first rib elevation present    Palpation: significant tenderness present greater on the right side compared to the left throughout the suboccipitals,  scalenes and posterior paraspinals    Sensation: WFL    Flexibility: mod tightness throughout upper trap, scalenes, paraspinals    Limitation/Restriction for FOTO  Survey    Therapist reviewed FOTO scores for Isael Salmon on 5/2/2023.   FOTO documents entered into [employer] - see Media section.    Limitation Score: NP%         Treatment     Total Treatment time (time-based codes) separate from Evaluation: 10 minutes      Isael received the treatments listed below:      therapeutic exercises to develop strength, endurance, ROM, flexibility, posture, and core stabilization for 10 minutes including:  Education  Scap pinches  UT stretch review  Postural education        Patient Education and Home Exercises     Education provided:   - educated on anatomy of the spine, arthritic changes and how the body can compensate    Written Home Exercises Provided: verbal instruction given. Exercises were reviewed and Isael was able to demonstrate them prior to the end of the session.  Isael demonstrated good  understanding of the education provided. See EMR under Patient Instructions for exercises provided during therapy sessions.    Assessment     Isael is a 77 y.o. female referred to outpatient Physical Therapy with a medical diagnosis of S16.1XXA (ICD-10-CM) - Cervical strain, acute, initial encounter . Patient presents with right side cervical and shoulder stiffness after an acute flare up recently. She has trouble overhead lifting and will benefit from strengthening and manual therapy focused on improving left side cervical and shoulder mobility to prevent flare ups in the future    Patient prognosis is Fair.   Patient will benefit from skilled outpatient Physical Therapy to address the deficits stated above and in the chart below, provide patient /family education, and to maximize patientt's level of independence.     Plan of care discussed with patient: Yes  Patient's spiritual, cultural and educational needs considered  and patient is agreeable to the plan of care and goals as stated below:     Anticipated Barriers for therapy: age, chronicity of neck pain    Medical Necessity is demonstrated by the following  History  Co-morbidities and personal factors that may impact the plan of care Co-morbidities:   Past Medical History:   Diagnosis Date    Adjustment disorder 7/26/2012    Anemia 7/26/2012    AR (allergic rhinitis) 9/5/2014    Bronchitis     Cataract     Fibrocystic breast disease in female 7/26/2012    GERD (gastroesophageal reflux disease) 7/26/2012    Gout     Gout, arthritis 7/26/2012    History of blood transfusion 7/26/2012    History of colonic polyps 7/26/2012    Hypercalcemia 9/20/2012    Hyperlipidemia 7/26/2012    Hypertension 7/26/2012    Nuclear sclerosis 5/2/2014    Osteoarthritis 7/26/2012    Other hyperparathyroidism 9/20/2012    Postmenopausal status 7/26/2012    PVD (peripheral vascular disease) 7/26/2012    left leg laser of vein with injection    Stroke 1997    TIA    Superficial vein thrombosis     Transient ischemic attack 7/26/2012    Type 2 diabetes mellitus without complication, without long-term current use of insulin 3/30/2020    Varicose veins 7/26/2012       Personal Factors:   no deficits     low   Examination  Body Structures and Functions, activity limitations and participation restrictions that may impact the plan of care Body Regions:   neck  upper extremities    Body Systems:    gross symmetry  ROM  strength    Participation Restrictions:   Pain limiting    Activity limitations:   Learning and applying knowledge  no deficits    General Tasks and Commands  no deficits    Communication  no deficits    Mobility  lifting and carrying objects    Self care  washing oneself (bathing, drying, washing hands)  dressing    Domestic Life  doing house work (cleaning house, washing dishes, laundry)    Interactions/Relationships  no deficits    Life Areas  no deficits    Community and Social Life  no  deficits         low   Clinical Presentation stable and uncomplicated low   Decision Making/ Complexity Score: low     Goals:  Short Term Goals: 4 weeks   Patient will show 10 degree improvement in cervical spine mobility  Patient will show 10 degree improvement in shoulder mobility  Patient will show 50% reduction in cervical and shoulder tone    Long Term Goals: 8 weeks   50% FOTO score improvement  Patient will be able to lift > 15# overhead without pain increasing  Patient will be able to lift > 25# from the ground without pain increasing  Plan     Plan of care Certification: 5/2/2023 to 8/2/2023.    Outpatient Physical Therapy 2 times weekly for 8 weeks to include the following interventions: Gait Training, Manual Therapy, Moist Heat/ Ice, Neuromuscular Re-ed, Patient Education, Therapeutic Activities, and Therapeutic Exercise.     Rena Metz, PT

## 2023-05-04 NOTE — PROGRESS NOTES
"[facility]   Physical Therapy Treatment Note      Name: Isael Salmon  Clinic Number: [id number]    Therapy Diagnosis:        Encounter Diagnoses   Name Primary?    Cervical strain, acute, initial encounter Yes    Decreased ROM of intervertebral discs of cervical spine          Physician: Arnoldo Vanegas PA-C  Physician Orders: PT Eval and Treat   Medical Diagnosis from Referral: S16.1XXA (ICD-10-CM) - Cervical strain, acute, initial encounter   Evaluation Date: 5/2/2023  Authorization Period Expiration: 11/2/2023  Plan of Care Expiration: 8/2/2023  Progress Note Due: 6/2/2023  Visit # / Visits authorized: 1/ 20  FOTO: 1/3     Precautions: Standard and Fall   PTA Visit #: 1/5     Time In: 10:20 am  Time Out: 11:00 am  Total Billable Time: 40 minutes    Subjective     Pt reports: "The pain did get better after last session, but yesterday it starting hurting again."  She was not compliant with home exercise program.  Response to previous treatment: ongoing  Functional change: ongoing    Pain: 7/10  Location: right neck      Objective      Objective Measures updated at progress report unless specified.     Treatment     Isael received the following treatments:    THERAPEUTIC EXERCISES to develop strength, ROM, and posture for 40 minutes including   Scap pinches x30  UT stretch 3x30"   SNAG cervical rotation 20x5" hold   Chin tucks x20   Shoulder flexion with wooden dowel 2x12  Standing Open books 20x3" hold       Patient Education and Home Exercises       Education provided:   - educated on anatomy of the spine, arthritic changes and how the body can compensate    Written Home Exercises Provided: yes. Exercises were reviewed and Isael was able to demonstrate them prior to the end of the session.  Isael demonstrated good  understanding of the education provided. See EMR under Patient Instructions for exercises provided during therapy sessions    Assessment     Today's treatment focused on " improving cervical and shoulder ROM and strength R>L which pt tolerate fair secondary to pain. Pt received HEP, and was encouraged to performed daily/weekly to maintain strength and increase therapeutic gains to work towards goals quicker, and pt verbalized understanding. Will follow up next visit for tolerance to today's treatment    Isael Is progressing well towards her goals.   Pt prognosis is Good.     Pt will continue to benefit from skilled outpatient physical therapy to address the deficits listed in the problem list box on initial evaluation, provide pt/family education and to maximize pt's level of independence in the home and community environment.     Pt's spiritual, cultural and educational needs considered and pt agreeable to plan of care and goals.     Anticipated barriers to physical therapy: age, chronicity of neck pain    Goals: Short Term Goals: 4 weeks   Patient will show 10 degree improvement in cervical spine mobility  Patient will show 10 degree improvement in shoulder mobility  Patient will show 50% reduction in cervical and shoulder tone     Long Term Goals: 8 weeks   50% FOTO score improvement  Patient will be able to lift > 15# overhead without pain increasing  Patient will be able to lift > 25# from the ground without pain increasing    Plan     Plan of care Certification: 5/2/2023 to 8/2/2023.     Outpatient Physical Therapy 2 times weekly for 8 weeks to include the following interventions: Gait Training, Manual Therapy, Moist Heat/ Ice, Neuromuscular Re-ed, Patient Education, Therapeutic Activities, and Therapeutic Exercise.     Lauryn Fine, PTA

## 2023-05-10 ENCOUNTER — CLINICAL SUPPORT (OUTPATIENT)
Dept: REHABILITATION | Facility: HOSPITAL | Age: 78
End: 2023-05-10
Payer: MEDICARE

## 2023-05-10 DIAGNOSIS — M53.82 DECREASED ROM OF INTERVERTEBRAL DISCS OF CERVICAL SPINE: Primary | ICD-10-CM

## 2023-05-10 PROCEDURE — 97140 MANUAL THERAPY 1/> REGIONS: CPT | Mod: PO,CQ

## 2023-05-10 PROCEDURE — 97110 THERAPEUTIC EXERCISES: CPT | Mod: PO,CQ

## 2023-05-10 NOTE — PROGRESS NOTES
"[facility]   Physical Therapy Treatment Note      Name: Isael Salmon  Clinic Number: [id number]    Therapy Diagnosis:        Encounter Diagnoses   Name Primary?    Cervical strain, acute, initial encounter Yes    Decreased ROM of intervertebral discs of cervical spine          Physician: Arnoldo Vanegas PA-C  Physician Orders: PT Eval and Treat   Medical Diagnosis from Referral: S16.1XXA (ICD-10-CM) - Cervical strain, acute, initial encounter   Evaluation Date: 5/2/2023  Authorization Period Expiration: 11/2/2023  Plan of Care Expiration: 8/2/2023  Progress Note Due: 6/2/2023  Visit # / Visits authorized: 2/ 20   FOTO: 1/3     Precautions: Standard and Fall   PTA Visit #: 2/5     Time In: 2:09 pm (late arrival)  Time Out: 2:47 pm  Total Billable Time: 38 minutes    Subjective     Pt reports: She has been doing her exercises at home  She was compliant with home exercise program.  Response to previous treatment: ongoing  Functional change: ongoing    Pain: 6/10  Location: right neck      Objective      Objective Measures updated at progress report unless specified.     Treatment     Isael received the following treatments:    THERAPEUTIC EXERCISES to develop strength, ROM, and posture for 28 minutes including   Scap pinches x30  UT stretch 3x30"   SNAG cervical rotation 20x5" hold   Chin tucks x20   Shoulder flexion with wooden dowel 2x12  Standing Open books 20x3" hold       manual therapy techniques: Joint mobilizations and Soft tissue Mobilization were applied to the: neck for 10 minutes, including:  Sub-occipital release  STM to cervical paraspinals, R upper trap      Patient Education and Home Exercises       Education provided:   - educated on anatomy of the spine, arthritic changes and how the body can compensate    Written Home Exercises Provided: yes. Exercises were reviewed and Isael was able to demonstrate them prior to the end of the session.  Isael demonstrated good  " understanding of the education provided. See EMR under Patient Instructions for exercises provided during therapy sessions    Assessment     Isael presents to treatment with continued right shoulder and neck soreness. Favorable response to manual therapy interventions for decreasing soreness and tightness. She reports compliance to home exercises since last session. Good tolerance to exercises with no increase in pain. Continue to progress as tolerated.     Isael Is progressing well towards her goals.   Pt prognosis is Good.     Pt will continue to benefit from skilled outpatient physical therapy to address the deficits listed in the problem list box on initial evaluation, provide pt/family education and to maximize pt's level of independence in the home and community environment.     Pt's spiritual, cultural and educational needs considered and pt agreeable to plan of care and goals.     Anticipated barriers to physical therapy: age, chronicity of neck pain    Goals: Short Term Goals: 4 weeks   Patient will show 10 degree improvement in cervical spine mobility  Patient will show 10 degree improvement in shoulder mobility  Patient will show 50% reduction in cervical and shoulder tone     Long Term Goals: 8 weeks   50% FOTO score improvement  Patient will be able to lift > 15# overhead without pain increasing  Patient will be able to lift > 25# from the ground without pain increasing    Plan     Plan of care Certification: 5/2/2023 to 8/2/2023.     Outpatient Physical Therapy 2 times weekly for 8 weeks to include the following interventions: Gait Training, Manual Therapy, Moist Heat/ Ice, Neuromuscular Re-ed, Patient Education, Therapeutic Activities, and Therapeutic Exercise.     Triny Howe, PTA

## 2023-05-11 ENCOUNTER — HOSPITAL ENCOUNTER (OUTPATIENT)
Dept: RADIOLOGY | Facility: HOSPITAL | Age: 78
Discharge: HOME | End: 2023-05-11
Attending: REGISTERED NURSE
Payer: MEDICARE

## 2023-05-11 ENCOUNTER — OFFICE VISIT (OUTPATIENT)
Dept: ORTHOPEDICS | Facility: CLINIC | Age: 78
End: 2023-05-11
Payer: MEDICARE

## 2023-05-11 VITALS — BODY MASS INDEX: 28.19 KG/M2 | WEIGHT: 169.19 LBS | HEIGHT: 65 IN

## 2023-05-11 DIAGNOSIS — G95.9 MYELOPATHY: ICD-10-CM

## 2023-05-11 DIAGNOSIS — M54.12 CERVICAL RADICULOPATHY: Primary | ICD-10-CM

## 2023-05-11 DIAGNOSIS — M50.30 DDD (DEGENERATIVE DISC DISEASE), CERVICAL: ICD-10-CM

## 2023-05-11 PROCEDURE — 99999 PR PBB SHADOW E&M-EST. PATIENT-LVL V: CPT | Mod: PBBFAC,,, | Performed by: REGISTERED NURSE

## 2023-05-11 PROCEDURE — 72050 XR CERVICAL SPINE AP LAT WITH FLEX EXTEN: ICD-10-PCS | Mod: 26,,, | Performed by: RADIOLOGY

## 2023-05-11 PROCEDURE — 99999 PR PBB SHADOW E&M-EST. PATIENT-LVL V: ICD-10-PCS | Mod: PBBFAC,,, | Performed by: REGISTERED NURSE

## 2023-05-11 PROCEDURE — 99215 OFFICE O/P EST HI 40 MIN: CPT | Mod: PBBFAC | Performed by: REGISTERED NURSE

## 2023-05-11 PROCEDURE — 72050 X-RAY EXAM NECK SPINE 4/5VWS: CPT | Mod: 26,,, | Performed by: RADIOLOGY

## 2023-05-11 PROCEDURE — 99214 OFFICE O/P EST MOD 30 MIN: CPT | Mod: S$PBB,,, | Performed by: REGISTERED NURSE

## 2023-05-11 PROCEDURE — 72050 X-RAY EXAM NECK SPINE 4/5VWS: CPT | Mod: TC

## 2023-05-11 PROCEDURE — 99214 PR OFFICE/OUTPT VISIT, EST, LEVL IV, 30-39 MIN: ICD-10-PCS | Mod: S$PBB,,, | Performed by: REGISTERED NURSE

## 2023-05-11 RX ORDER — ROSUVASTATIN CALCIUM 20 MG/1
20 TABLET, COATED ORAL NIGHTLY
COMMUNITY
Start: 2023-04-15 | End: 2023-07-14

## 2023-05-12 ENCOUNTER — CLINICAL SUPPORT (OUTPATIENT)
Dept: REHABILITATION | Facility: HOSPITAL | Age: 78
End: 2023-05-12
Payer: MEDICARE

## 2023-05-12 DIAGNOSIS — M53.82 DECREASED ROM OF INTERVERTEBRAL DISCS OF CERVICAL SPINE: Primary | ICD-10-CM

## 2023-05-12 PROCEDURE — 97140 MANUAL THERAPY 1/> REGIONS: CPT | Mod: PO

## 2023-05-12 PROCEDURE — 97110 THERAPEUTIC EXERCISES: CPT | Mod: PO

## 2023-05-12 NOTE — PROGRESS NOTES
"[facility]   Physical Therapy Treatment Note      [facility]  [facility] [id number]    Therapy Diagnosis:        Encounter Diagnoses   Name Primary?    Cervical strain, acute, initial encounter Yes    Decreased ROM of intervertebral discs of cervical spine          Physician: Arnoldo Vanegas PA-C  Physician Orders: PT Eval and Treat   Medical Diagnosis from Referral: S16.1XXA (ICD-10-CM) - Cervical strain, acute, initial encounter   Evaluation Date: 5/2/2023  Authorization Period Expiration: 11/2/2023  Plan of Care Expiration: 8/2/2023  Progress Note Due: 6/2/2023  Visit # / Visits authorized: 2/ 20   FOTO: 1/3     Precautions: Standard and Fall   PTA Visit #: 0/5     Time In: 9:05 aung  Time Out: 945 am  Total Billable Time: 40 minutes    Subjective     Pt reports: went to the doctor and was discouraged and fearful that she will need surgery  She was compliant with home exercise program.  Response to previous treatment: ongoing  Functional change: ongoing    Pain: 6/10  Location: right neck      Objective      Objective Measures updated at progress report unless specified.     Treatment     Isael received the following treatments:    THERAPEUTIC EXERCISES to develop strength, ROM, and posture for 28 minutes including   Scap pinches x30  UT stretch 3x30"   SNAG cervical rotation 20x5" hold   Chin tucks x20 with 3 second hold  Shoulder flexion with wooden dowel 2x12  Standing Open books 20x3" hold - both directions      manual therapy techniques: Joint mobilizations and Soft tissue Mobilization were applied to the: neck for 10 minutes, including:  Attempted manual traction -had to stop manual today secondary to increased extension from the patient and inability to relax muscles for manual traction  Sub-occipital release-   STM to cervical paraspinals, R upper trap      Patient Education and Home Exercises       Education provided:   - educated on anatomy of the spine, " arthritic changes and how the body can compensate    Written Home Exercises Provided: yes. Exercises were reviewed and Isael was able to demonstrate them prior to the end of the session.  Isael demonstrated good  understanding of the education provided. See EMR under Patient Instructions for exercises provided during therapy sessions    Assessment     Isael had increased challenges with pulling into the neck today with manual therapy. She presented with a strong cervical extension today and made traction and soft tissue work difficult as the tissues were not able to relax. She had increased challenges with overhead lifting today and will benefit from continued strengthening of the mid back muscles in order to prevent right side upper trap compensations    Isael Is progressing well towards her goals.   Pt prognosis is Good.     Pt will continue to benefit from skilled outpatient physical therapy to address the deficits listed in the problem list box on initial evaluation, provide pt/family education and to maximize pt's level of independence in the home and community environment.     Pt's spiritual, cultural and educational needs considered and pt agreeable to plan of care and goals.     Anticipated barriers to physical therapy: age, chronicity of neck pain    Goals: Short Term Goals: 4 weeks   Patient will show 10 degree improvement in cervical spine mobility  Patient will show 10 degree improvement in shoulder mobility  Patient will show 50% reduction in cervical and shoulder tone     Long Term Goals: 8 weeks   50% FOTO score improvement  Patient will be able to lift > 15# overhead without pain increasing  Patient will be able to lift > 25# from the ground without pain increasing    Plan     Plan of care Certification: 5/2/2023 to 8/2/2023.     Outpatient Physical Therapy 2 times weekly for 8 weeks to include the following interventions: Gait Training, Manual Therapy, Moist Heat/ Ice, Neuromuscular Re-ed,  Patient Education, Therapeutic Activities, and Therapeutic Exercise.     Rena Metz, PT

## 2023-05-15 ENCOUNTER — PES CALL (OUTPATIENT)
Dept: ADMINISTRATIVE | Facility: CLINIC | Age: 78
End: 2023-05-15
Payer: MEDICARE

## 2023-05-16 ENCOUNTER — CLINICAL SUPPORT (OUTPATIENT)
Dept: REHABILITATION | Facility: HOSPITAL | Age: 78
End: 2023-05-16
Payer: MEDICARE

## 2023-05-16 DIAGNOSIS — M53.82 DECREASED ROM OF INTERVERTEBRAL DISCS OF CERVICAL SPINE: Primary | ICD-10-CM

## 2023-05-16 PROCEDURE — 97140 MANUAL THERAPY 1/> REGIONS: CPT | Mod: PO,CQ

## 2023-05-16 PROCEDURE — 97110 THERAPEUTIC EXERCISES: CPT | Mod: PO,CQ

## 2023-05-16 NOTE — PROGRESS NOTES
"[facility]   Physical Therapy Treatment Note      [facility]  [facility] [id number]    Therapy Diagnosis:        Encounter Diagnoses   Name Primary?    Cervical strain, acute, initial encounter Yes    Decreased ROM of intervertebral discs of cervical spine          Physician: Arnoldo Vanegas PA-C  Physician Orders: PT Eval and Treat   Medical Diagnosis from Referral: S16.1XXA (ICD-10-CM) - Cervical strain, acute, initial encounter   Evaluation Date: 5/2/2023  Authorization Period Expiration: 11/2/2023  Plan of Care Expiration: 8/2/2023  Progress Note Due: 6/2/2023  Visit # / Visits authorized: 4/ 20   FOTO: 1/3     Precautions: Standard and Fall   PTA Visit #: 1/5     Time In: 10:42 aung  Time Out: 11:15 am  Total Billable Time: 33 minutes    Subjective     Pt reports: "It was hurting yesterday, but I took my muscle relaxer and it feels a little better. I'm scheduled for my MRI Monday."  She was compliant with home exercise program.  Response to previous treatment: good  Functional change: ongoing    Pain: 2/10  Location: right neck      Objective      Objective Measures updated at progress report unless specified.     Treatment     Isael received the following treatments:    THERAPEUTIC EXERCISES to develop strength, ROM, and posture for 25 minutes including   SNAG cervical rotation 20x5" hold   SNAG cervical flexion 20x5" hold   Chin tucks x20 with 3 second hold  Doorway pec stretch 3x30"  Scap pinches x30  UT stretch 3x30"   Shoulder flexion with wooden dowel 2x12  Standing Open books 20x3" hold - both directions    manual therapy techniques: Joint mobilizations and Soft tissue Mobilization were applied to the: neck for 8 minutes, including:  Hypervolt to R cervical paraspinals/UT seated  Attempted manual traction -had to stop manual today secondary to increased extension from the patient and inability to relax muscles for manual traction  Sub-occipital release-   STM to " cervical paraspinals, R upper trap      Patient Education and Home Exercises       Education provided:   - educated on anatomy of the spine, arthritic changes and how the body can compensate    Written Home Exercises Provided: yes. Exercises were reviewed and Isael was able to demonstrate them prior to the end of the session.  Isael demonstrated good  understanding of the education provided. See EMR under Patient Instructions for exercises provided during therapy sessions    Assessment     Today treatment was somewhat limited secondary to pt arriving late, so treatment mainly focused on cervicothoracic ROM and postural correction.  Pt responded positive to manual techniques to cervical paraspinals and UT as noted by decreased muscle guarding and improved the ability to rotate her neck. Following todays session, the patient verbalized how pleased she was with the improvements in her ROM.     Isael Is progressing well towards her goals.   Pt prognosis is Good.     Pt will continue to benefit from skilled outpatient physical therapy to address the deficits listed in the problem list box on initial evaluation, provide pt/family education and to maximize pt's level of independence in the home and community environment.     Pt's spiritual, cultural and educational needs considered and pt agreeable to plan of care and goals.     Anticipated barriers to physical therapy: age, chronicity of neck pain    Goals: Short Term Goals: 4 weeks   Patient will show 10 degree improvement in cervical spine mobility  Patient will show 10 degree improvement in shoulder mobility  Patient will show 50% reduction in cervical and shoulder tone     Long Term Goals: 8 weeks   50% FOTO score improvement  Patient will be able to lift > 15# overhead without pain increasing  Patient will be able to lift > 25# from the ground without pain increasing    Plan     Plan of care Certification: 5/2/2023 to 8/2/2023.     Outpatient Physical Therapy 2  times weekly for 8 weeks to include the following interventions: Gait Training, Manual Therapy, Moist Heat/ Ice, Neuromuscular Re-ed, Patient Education, Therapeutic Activities, and Therapeutic Exercise.     Lauryn Fine, PTA

## 2023-05-17 NOTE — PROGRESS NOTES
"DATE: 5/31/2023  PATIENT: Isael Salmon    Attending Physician: Rajiv Razo M.D.    HISTORY:  Isael Salmon is a 77 y.o. female who returns to me today for MRI results.  She was last seen by me 5/11/2023.  Today she is doing well but notes a decrease in her neck and right arm pain since working with PT. She is very pleased with her outcome.    The Patient denies myelopathic symptoms such as handwriting changes or difficulty with buttons/coins/keys. Denies perineal paresthesias, bowel/bladder dysfunction.    PMH/PSH/FamHx/SocHx:  Unchanged from prior visit    ROS:  REVIEW OF SYSTEMS:  Constitution: Negative. Negative for chills, fever and night sweats.   HENT: Negative for congestion and headaches.    Eyes: Negative for blurred vision, left vision loss and right vision loss.   Cardiovascular: Negative for chest pain and syncope.   Respiratory: Negative for cough and shortness of breath.    Endocrine: Negative for polydipsia, polyphagia and polyuria.   Hematologic/Lymphatic: Negative for bleeding problem. Does not bruise/bleed easily.   Skin: Negative for dry skin, itching and rash.   Musculoskeletal: Negative for falls and muscle weakness.   Gastrointestinal: Negative for abdominal pain and bowel incontinence.   Allergic/Immunologic: Negative for hives and persistent infections.  Genitourinary: Negative for urinary retention/incontinence and nocturia.   Neurological: negative for disturbances in coordination, no myelopathic symptoms such as handwriting changes or difficulty with buttons, coins, keys or small objects. No loss of balance and seizures.   Psychiatric/Behavioral: Negative for depression. The patient does not have insomnia.   Denies myelopathic symptoms, perineal paresthesias, bowel or bladder incontinence    EXAM:  Ht 5' 5" (1.651 m)   Wt 76.8 kg (169 lb 5 oz)   LMP  (LMP Unknown)   BMI 28.18 kg/m²   Stable.     IMAGING:    Today I personally re-reviewed AP, Lat and Flex/Ex  upright " C-spine films that demonstrateretrolisthesis of C5 on C6.      Cervical MRI shows moderate stenosis from C4-6 with multilevel foraminal narrowing.     Body mass index is 28.18 kg/m².    Hemoglobin A1C   Date Value Ref Range Status   12/20/2021 5.5 4.0 - 5.6 % Final     Comment:     ADA Screening Guidelines:  5.7-6.4%  Consistent with prediabetes  >or=6.5%  Consistent with diabetes    High levels of fetal hemoglobin interfere with the HbA1C  assay. Heterozygous hemoglobin variants (HbS, HgC, etc)do  not significantly interfere with this assay.   However, presence of multiple variants may affect accuracy.     04/30/2021 5.4 4.0 - 5.6 % Final     Comment:     ADA Screening Guidelines:  5.7-6.4%  Consistent with prediabetes  >or=6.5%  Consistent with diabetes    High levels of fetal hemoglobin interfere with the HbA1C  assay. Heterozygous hemoglobin variants (HbS, HgC, etc)do  not significantly interfere with this assay.   However, presence of multiple variants may affect accuracy.     03/31/2020 5.8 (H) 4.0 - 5.6 % Final     Comment:     ADA Screening Guidelines:  5.7-6.4%  Consistent with prediabetes  >or=6.5%  Consistent with diabetes  High levels of fetal hemoglobin interfere with the HbA1C  assay. Heterozygous hemoglobin variants (HbS, HgC, etc)do  not significantly interfere with this assay.   However, presence of multiple variants may affect accuracy.           ASSESSMENT/PLAN:    Isael was seen today for neck pain.    Diagnoses and all orders for this visit:    Cervical radiculopathy        Today we discussed at length all of the different treatment options including anti-inflammatories, acetaminophen, rest, ice, heat, physical therapy including strengthening and stretching exercises, home exercises, ROM, aerobic conditioning, aqua therapy, other modalities including ultrasound, massage, and dry needling, epidural steroid injections and finally surgical intervention.    The patient would like to hold off on an  NEAL for now. She will continue PT and follow up as needed.     I had a sit down discussion with the patient regarding cervical myelopathy. I discussed the known stepwise degeneration of cervical myelopathy. I discussed the risks and benefits of decompressive surgery, including the concept that surgery would be done to prevent further neurological injury/degeneration rather than to ameliorate any existing deficits.

## 2023-05-18 ENCOUNTER — CLINICAL SUPPORT (OUTPATIENT)
Dept: REHABILITATION | Facility: HOSPITAL | Age: 78
End: 2023-05-18
Payer: MEDICARE

## 2023-05-18 DIAGNOSIS — M53.82 DECREASED ROM OF INTERVERTEBRAL DISCS OF CERVICAL SPINE: Primary | ICD-10-CM

## 2023-05-18 PROCEDURE — 97110 THERAPEUTIC EXERCISES: CPT | Mod: PO,CQ

## 2023-05-18 PROCEDURE — 97140 MANUAL THERAPY 1/> REGIONS: CPT | Mod: PO,CQ

## 2023-05-18 NOTE — PROGRESS NOTES
"[facility]   Physical Therapy Treatment Note      [facility]  [facility] Number: [id number]    Therapy Diagnosis:        Encounter Diagnoses   Name Primary?    Cervical strain, acute, initial encounter Yes    Decreased ROM of intervertebral discs of cervical spine          Physician: Arnoldo Vanegas PA-C  Physician Orders: PT Eval and Treat   Medical Diagnosis from Referral: S16.1XXA (ICD-10-CM) - Cervical strain, acute, initial encounter   Evaluation Date: 5/2/2023  Authorization Period Expiration: 11/2/2023  Plan of Care Expiration: 8/2/2023  Progress Note Due: 6/2/2023  Visit # / Visits authorized: 5/ 20   FOTO: 1/3     Precautions: Standard and Fall   PTA Visit #: 2/5     Time In: 2:00 pm  Time Out: 2:45 pm  Total Billable Time: 45 minutes    Subjective     Pt reports: "I feel pretty good today."  She was compliant with home exercise program.  Response to previous treatment: good  Functional change: ongoing    Pain: 0/10  Location: right neck      Objective      Objective Measures updated at progress report unless specified.     Treatment     Isael received the following treatments:    THERAPEUTIC EXERCISES to develop strength, ROM, and posture for 37 minutes including   Thoracic extension in chair over 1/2 foam roll 20x5" hold  SNAG cervical flexion 20x5" hold   SNAG cervical rotation 20x5" hold   Pec stretch on towel roll 2'  Chin tucks x20 with 3 second hold  Doorway pec stretch 3x30"  Scap pinches x30  UT stretch 3x30"   Shoulder flexion with wooden dowel 2x12  Standing Open books 20x3" hold - both directions    manual therapy techniques: Joint mobilizations and Soft tissue Mobilization were applied to the: neck for 8 minutes, including:  Hypervolt to R cervical paraspinals/UT seated  Attempted manual traction -had to stop manual today secondary to increased extension from the patient and inability to relax muscles for manual traction  Sub-occipital release-   STM to " cervical paraspinals, R upper trap      Patient Education and Home Exercises       Education provided:   - educated on anatomy of the spine, arthritic changes and how the body can compensate    Written Home Exercises Provided: yes. Exercises were reviewed and Isael was able to demonstrate them prior to the end of the session.  Isael demonstrated good  understanding of the education provided. See EMR under Patient Instructions for exercises provided during therapy sessions    Assessment     Increased guarding of the upper trapezius muscle is preventing the patient from rotating her neck fully. Performance of manual to the upper trapezius decreased muscle guarding and improved the patients ability to rotate her neck to the right. Following todays session, the patient verbalized how pleased she was with the improvements in her ROM.    Isael Is progressing well towards her goals.   Pt prognosis is Good.     Pt will continue to benefit from skilled outpatient physical therapy to address the deficits listed in the problem list box on initial evaluation, provide pt/family education and to maximize pt's level of independence in the home and community environment.     Pt's spiritual, cultural and educational needs considered and pt agreeable to plan of care and goals.     Anticipated barriers to physical therapy: age, chronicity of neck pain    Goals: Short Term Goals: 4 weeks   Patient will show 10 degree improvement in cervical spine mobility  Patient will show 10 degree improvement in shoulder mobility  Patient will show 50% reduction in cervical and shoulder tone     Long Term Goals: 8 weeks   50% FOTO score improvement  Patient will be able to lift > 15# overhead without pain increasing  Patient will be able to lift > 25# from the ground without pain increasing    Plan     Plan of care Certification: 5/2/2023 to 8/2/2023.     Outpatient Physical Therapy 2 times weekly for 8 weeks to include the following  interventions: Gait Training, Manual Therapy, Moist Heat/ Ice, Neuromuscular Re-ed, Patient Education, Therapeutic Activities, and Therapeutic Exercise.     Lauryn Fine, PTA

## 2023-05-22 ENCOUNTER — HOSPITAL ENCOUNTER (OUTPATIENT)
Dept: RADIOLOGY | Facility: HOSPITAL | Age: 78
Discharge: HOME | End: 2023-05-22
Attending: REGISTERED NURSE
Payer: MEDICARE

## 2023-05-22 DIAGNOSIS — G95.9 MYELOPATHY: ICD-10-CM

## 2023-05-22 DIAGNOSIS — M54.12 CERVICAL RADICULOPATHY: ICD-10-CM

## 2023-05-22 PROCEDURE — 72141 MRI NECK SPINE W/O DYE: CPT | Mod: 26,,, | Performed by: RADIOLOGY

## 2023-05-22 PROCEDURE — 72141 MRI NECK SPINE W/O DYE: CPT | Mod: TC

## 2023-05-22 PROCEDURE — 72141 MRI CERVICAL SPINE WITHOUT CONTRAST: ICD-10-PCS | Mod: 26,,, | Performed by: RADIOLOGY

## 2023-05-23 ENCOUNTER — CLINICAL SUPPORT (OUTPATIENT)
Dept: REHABILITATION | Facility: HOSPITAL | Age: 78
End: 2023-05-23
Payer: MEDICARE

## 2023-05-23 DIAGNOSIS — M53.82 DECREASED ROM OF INTERVERTEBRAL DISCS OF CERVICAL SPINE: Primary | ICD-10-CM

## 2023-05-23 PROCEDURE — 97110 THERAPEUTIC EXERCISES: CPT | Mod: PO

## 2023-05-23 PROCEDURE — 97140 MANUAL THERAPY 1/> REGIONS: CPT | Mod: PO

## 2023-05-23 NOTE — PROGRESS NOTES
"[facility]   Physical Therapy Treatment Note      [facility]  [facility] [id number]    Therapy Diagnosis:        Encounter Diagnoses   Name Primary?    Cervical strain, acute, initial encounter Yes    Decreased ROM of intervertebral discs of cervical spine          Physician: Arnoldo Vanegas PA-C  Physician Orders: PT Eval and Treat   Medical Diagnosis from Referral: S16.1XXA (ICD-10-CM) - Cervical strain, acute, initial encounter   Evaluation Date: 5/2/2023  Authorization Period Expiration: 11/2/2023  Plan of Care Expiration: 8/2/2023  Progress Note Due: 6/2/2023  Visit # / Visits authorized: 6/ 20   FOTO: 1/3     Precautions: Standard and Fall   PTA Visit #: 2/5     Time In: 2:30 pm  Time Out: 310 pm  Total Billable Time: 40 minutes    Subjective     Pt reports: had MRI this past week and the neck hurt more after lying down for that long. She feels like exercises are getting better  She was compliant with home exercise program.  Response to previous treatment: good  Functional change: ongoing    Pain: 1/10  Location: right neck      Objective      Objective Measures updated at progress report unless specified.     Treatment     Isael received the following treatments:    THERAPEUTIC EXERCISES to develop strength, ROM, and posture for 37 minutes including:  Thoracic extension in chair over 1/2 foam roll 20x5" hold  SNAG cervical flexion 20x5" hold   SNAG cervical rotation 20x5" hold   Pec stretch on towel roll 2'  Chin tucks x20 with 3 second hold  Doorway pec stretch 3x30"  Scap pinches x30  Standing rows / extension RTB X 30 with cueing   UT stretch 3x30"   Shoulder flexion with wooden dowel 2x12  Standing Open books 20x3" hold - both directions    manual therapy techniques: Joint mobilizations and Soft tissue Mobilization were applied to the: neck for 8 minutes, including:  Hypervolt to R cervical paraspinals/UT seated  Attempted manual traction -had to stop manual " today secondary to increased extension from the patient and inability to relax muscles for manual traction  Sub-occipital release-   STM to cervical paraspinals, R upper trap - in seated position      Patient Education and Home Exercises       Education provided:   - educated on anatomy of the spine, arthritic changes and how the body can compensate    Written Home Exercises Provided: yes. Exercises were reviewed and Isael was able to demonstrate them prior to the end of the session.  Isael demonstrated good  understanding of the education provided. See EMR under Patient Instructions for exercises provided during therapy sessions    Assessment     Pt responded well to soft tissue release and scapular strengthening. Fine tuned self SNAG at home with crossed arms for positioning and more smooth motions. She is awaiting MRI results to see what or if her next moves are.     Isael Is progressing well towards her goals.   Pt prognosis is Good.     Pt will continue to benefit from skilled outpatient physical therapy to address the deficits listed in the problem list box on initial evaluation, provide pt/family education and to maximize pt's level of independence in the home and community environment.     Pt's spiritual, cultural and educational needs considered and pt agreeable to plan of care and goals.     Anticipated barriers to physical therapy: age, chronicity of neck pain    Goals: Short Term Goals: 4 weeks   Patient will show 10 degree improvement in cervical spine mobility  Patient will show 10 degree improvement in shoulder mobility  Patient will show 50% reduction in cervical and shoulder tone     Long Term Goals: 8 weeks   50% FOTO score improvement  Patient will be able to lift > 15# overhead without pain increasing  Patient will be able to lift > 25# from the ground without pain increasing    Plan     Plan of care Certification: 5/2/2023 to 8/2/2023.     Outpatient Physical Therapy 2 times weekly for 8  weeks to include the following interventions: Gait Training, Manual Therapy, Moist Heat/ Ice, Neuromuscular Re-ed, Patient Education, Therapeutic Activities, and Therapeutic Exercise.     Rena Metz, PT

## 2023-05-25 ENCOUNTER — CLINICAL SUPPORT (OUTPATIENT)
Dept: REHABILITATION | Facility: HOSPITAL | Age: 78
End: 2023-05-25
Payer: MEDICARE

## 2023-05-25 ENCOUNTER — PATIENT MESSAGE (OUTPATIENT)
Dept: ENDOSCOPY | Facility: HOSPITAL | Age: 78
End: 2023-05-25
Payer: MEDICARE

## 2023-05-25 DIAGNOSIS — M53.82 DECREASED ROM OF INTERVERTEBRAL DISCS OF CERVICAL SPINE: Primary | ICD-10-CM

## 2023-05-25 PROCEDURE — 97110 THERAPEUTIC EXERCISES: CPT | Mod: PO,CQ

## 2023-05-25 PROCEDURE — 97140 MANUAL THERAPY 1/> REGIONS: CPT | Mod: PO,CQ

## 2023-05-25 NOTE — PROGRESS NOTES
"[facility]   Physical Therapy Treatment Note      [facility]  [facility] Number: [id number]    Therapy Diagnosis:        Encounter Diagnoses   Name Primary?    Cervical strain, acute, initial encounter Yes    Decreased ROM of intervertebral discs of cervical spine          Physician: Arnoldo Vanegas PA-C  Physician Orders: PT Eval and Treat   Medical Diagnosis from Referral: S16.1XXA (ICD-10-CM) - Cervical strain, acute, initial encounter   Evaluation Date: 5/2/2023  Authorization Period Expiration: 11/2/2023  Plan of Care Expiration: 8/2/2023  Progress Note Due: 6/2/2023  Visit # / Visits authorized: 7/ 20   FOTO: 1/3     Precautions: Standard and Fall   PTA Visit #: 1/5     Time In: 11:00 am  Time Out: 11:45 am  Total Billable Time: 45 minutes    Subjective     Pt reports: she's has more soreness than pain  She was compliant with home exercise program.  Response to previous treatment: good  Functional change: ongoing    Pain: 2/10  Location: right neck      Objective      Objective Measures updated at progress report unless specified.     Treatment     Isael received the following treatments:    THERAPEUTIC EXERCISES to develop strength, ROM, and posture for 35 minutes including:  Thoracic extension in chair over 1/2 foam roll 20x5" hold  SNAG cervical flexion 20x5" hold   SNAG cervical rotation 20x5" hold   Pec stretch on towel roll 2'  Chin tucks x20 with 3 second hold  Doorway pec stretch 3x30"  Scap pinches x30  Standing rows / extension RTB X 30 with cueing   UT stretch 3x30"   Shoulder flexion with wooden dowel 2x12  Standing Open books 20x3" hold - both directions    manual therapy techniques: Joint mobilizations and Soft tissue Mobilization were applied to the: neck for 10 minutes, including:  Hypervolt to R cervical paraspinals/UT seated  Attempted manual traction -had to stop manual today secondary to increased extension from the patient and inability to relax muscles " for manual traction  Sub-occipital release  STM to cervical paraspinals, R upper trap - in seated position      Patient Education and Home Exercises       Education provided:   - educated on anatomy of the spine, arthritic changes and how the body can compensate    Written Home Exercises Provided: yes. Exercises were reviewed and Isael was able to demonstrate them prior to the end of the session.  Isael demonstrated good  understanding of the education provided. See EMR under Patient Instructions for exercises provided during therapy sessions    Assessment     Isael presents to treatment with minimal pain. She had a favorable response to manual therapy interventions. She was able to properly complete self SNAG today with minimal cueing for proper technique. She has an appointment Monday to follow up regarding her MRI results. Continue to progress as tolerated.     Isael Is progressing well towards her goals.   Pt prognosis is Good.     Pt will continue to benefit from skilled outpatient physical therapy to address the deficits listed in the problem list box on initial evaluation, provide pt/family education and to maximize pt's level of independence in the home and community environment.     Pt's spiritual, cultural and educational needs considered and pt agreeable to plan of care and goals.     Anticipated barriers to physical therapy: age, chronicity of neck pain    Goals: Short Term Goals: 4 weeks   Patient will show 10 degree improvement in cervical spine mobility  Patient will show 10 degree improvement in shoulder mobility  Patient will show 50% reduction in cervical and shoulder tone     Long Term Goals: 8 weeks   50% FOTO score improvement  Patient will be able to lift > 15# overhead without pain increasing  Patient will be able to lift > 25# from the ground without pain increasing    Plan     Plan of care Certification: 5/2/2023 to 8/2/2023.     Outpatient Physical Therapy 2 times weekly for 8 weeks to  include the following interventions: Gait Training, Manual Therapy, Moist Heat/ Ice, Neuromuscular Re-ed, Patient Education, Therapeutic Activities, and Therapeutic Exercise.     Triny Howe, PTA

## 2023-05-30 ENCOUNTER — CLINICAL SUPPORT (OUTPATIENT)
Dept: REHABILITATION | Facility: HOSPITAL | Age: 78
End: 2023-05-30
Payer: MEDICARE

## 2023-05-30 DIAGNOSIS — M53.82 DECREASED ROM OF INTERVERTEBRAL DISCS OF CERVICAL SPINE: Primary | ICD-10-CM

## 2023-05-30 PROCEDURE — 97112 NEUROMUSCULAR REEDUCATION: CPT | Mod: PO,CQ

## 2023-05-30 PROCEDURE — 97140 MANUAL THERAPY 1/> REGIONS: CPT | Mod: PO,CQ

## 2023-05-30 PROCEDURE — 97110 THERAPEUTIC EXERCISES: CPT | Mod: PO,CQ

## 2023-05-30 NOTE — PROGRESS NOTES
"[facility]   Physical Therapy Treatment Note      [facility]  Clinic Number: [id number]    Therapy Diagnosis:        Encounter Diagnoses   Name Primary?    Cervical strain, acute, initial encounter Yes    Decreased ROM of intervertebral discs of cervical spine          Physician: Arnoldo Vanegas PA-C  Physician Orders: PT Eval and Treat   Medical Diagnosis from Referral: S16.1XXA (ICD-10-CM) - Cervical strain, acute, initial encounter   Evaluation Date: 5/2/2023  Authorization Period Expiration: 11/2/2023  Plan of Care Expiration: 8/2/2023  Progress Note Due: 6/2/2023  Visit # / Visits authorized: 7/ 20   FOTO: 1/3     Precautions: Standard and Fall   PTA Visit #: 2/5     Time In: 1:05 pm  Time Out: 1:49 pm  Total Billable Time: 44 minutes    Subjective     Pt reports: she had to take the medicine last night because she had throbbing pain  She was compliant with home exercise program.  Response to previous treatment: tolerated well  Functional change: ongoing    Pain: 2/10  Location: right neck      Objective      Objective Measures updated at progress report unless specified.     Treatment     Isael received the following treatments:    THERAPEUTIC EXERCISES to develop strength, ROM, and posture for 24 minutes including:  Thoracic extension in chair over 1/2 foam roll 20x5" hold  SNAG cervical flexion 20x5" hold   SNAG cervical rotation 20x5" hold   Pec stretch on towel roll 2'  Doorway pec stretch 3x30"  Standing rows / extension GTB X 30 with cueing   UT stretch 3x30"   Shoulder flexion with wooden dowel 2x12  Standing Open books 20x3" hold - both directions    manual therapy techniques: Joint mobilizations and Soft tissue Mobilization were applied to the: neck for 10 minutes, including:  Hypervolt to B UT - in seated position  Attempted manual traction -had to stop manual today secondary to increased extension from the patient and inability to relax muscles for manual " traction  Sub-occipital release  STM to cervical paraspinals, R upper trap - in seated position      neuromuscular re-education activities to improve: Posture for 10 minutes. The following activities were included:  Chin tucks x20 with 3 second hold  Scap pinches x30  Serratus wall slides 2x10      Patient Education and Home Exercises       Education provided:   - educated on anatomy of the spine, arthritic changes and how the body can compensate    Written Home Exercises Provided: yes. Exercises were reviewed and Isael was able to demonstrate them prior to the end of the session.  Isael demonstrated good  understanding of the education provided. See EMR under Patient Instructions for exercises provided during therapy sessions    Assessment     Isael presents to treatment with continued pain in her neck. She reports increased pain last night, but improved symptoms after taking medication. She reports relief from SNAGs, but continues to require some cueing and instruction for proper technique. She is scheduled tomorrow for a follow up with the doctor regarding her MRI results. Progressed exercises today to include serratus wall slides and increased resistance on band exercises with fatigue noted, but no increase in pain. Continue to progress as tolerated.     Isael Is progressing well towards her goals.   Pt prognosis is Good.     Pt will continue to benefit from skilled outpatient physical therapy to address the deficits listed in the problem list box on initial evaluation, provide pt/family education and to maximize pt's level of independence in the home and community environment.     Pt's spiritual, cultural and educational needs considered and pt agreeable to plan of care and goals.     Anticipated barriers to physical therapy: age, chronicity of neck pain    Goals: Short Term Goals: 4 weeks   Patient will show 10 degree improvement in cervical spine mobility  Patient will show 10 degree improvement in  shoulder mobility  Patient will show 50% reduction in cervical and shoulder tone     Long Term Goals: 8 weeks   50% FOTO score improvement  Patient will be able to lift > 15# overhead without pain increasing  Patient will be able to lift > 25# from the ground without pain increasing    Plan     Plan of care Certification: 5/2/2023 to 8/2/2023.     Outpatient Physical Therapy 2 times weekly for 8 weeks to include the following interventions: Gait Training, Manual Therapy, Moist Heat/ Ice, Neuromuscular Re-ed, Patient Education, Therapeutic Activities, and Therapeutic Exercise.     Triny Howe, PTA

## 2023-05-31 ENCOUNTER — EXTERNAL CHRONIC CARE MANAGEMENT (OUTPATIENT)
Dept: PRIMARY CARE CLINIC | Facility: CLINIC | Age: 78
End: 2023-05-31
Payer: MEDICARE

## 2023-05-31 ENCOUNTER — OFFICE VISIT (OUTPATIENT)
Dept: ORTHOPEDICS | Facility: CLINIC | Age: 78
End: 2023-05-31
Payer: MEDICARE

## 2023-05-31 VITALS — BODY MASS INDEX: 28.21 KG/M2 | WEIGHT: 169.31 LBS | HEIGHT: 65 IN

## 2023-05-31 DIAGNOSIS — M54.12 CERVICAL RADICULOPATHY: Primary | ICD-10-CM

## 2023-05-31 PROCEDURE — 99999 PR PBB SHADOW E&M-EST. PATIENT-LVL IV: ICD-10-PCS | Mod: PBBFAC,,, | Performed by: REGISTERED NURSE

## 2023-05-31 PROCEDURE — 99490 CHRNC CARE MGMT STAFF 1ST 20: CPT | Mod: S$PBB,,, | Performed by: INTERNAL MEDICINE

## 2023-05-31 PROCEDURE — 99490 CHRNC CARE MGMT STAFF 1ST 20: CPT | Mod: PBBFAC | Performed by: INTERNAL MEDICINE

## 2023-05-31 PROCEDURE — 99214 OFFICE O/P EST MOD 30 MIN: CPT | Mod: PBBFAC,25 | Performed by: REGISTERED NURSE

## 2023-05-31 PROCEDURE — 99213 PR OFFICE/OUTPT VISIT, EST, LEVL III, 20-29 MIN: ICD-10-PCS | Mod: S$PBB,,, | Performed by: REGISTERED NURSE

## 2023-05-31 PROCEDURE — 99490 PR CHRONIC CARE MGMT, 1ST 20 MIN: ICD-10-PCS | Mod: S$PBB,,, | Performed by: INTERNAL MEDICINE

## 2023-05-31 PROCEDURE — 99213 OFFICE O/P EST LOW 20 MIN: CPT | Mod: S$PBB,,, | Performed by: REGISTERED NURSE

## 2023-05-31 PROCEDURE — 99999 PR PBB SHADOW E&M-EST. PATIENT-LVL IV: CPT | Mod: PBBFAC,,, | Performed by: REGISTERED NURSE

## 2023-05-31 NOTE — PROGRESS NOTES
"[facility]   Physical Therapy Treatment Note      [facility]  [facility] [id number]    Therapy Diagnosis:        Encounter Diagnoses   Name Primary?    Cervical strain, acute, initial encounter Yes    Decreased ROM of intervertebral discs of cervical spine          Physician: Arnoldo Vanegas PA-C  Physician Orders: PT Eval and Treat   Medical Diagnosis from Referral: S16.1XXA (ICD-10-CM) - Cervical strain, acute, initial encounter   Evaluation Date: 5/2/2023  Authorization Period Expiration: 12/31/23  Plan of Care Expiration: 8/2/2023  Progress Note Due: 6/2/2023  Visit # / Visits authorized: 9/ 20   FOTO: 1/3     Precautions: Standard and Fall   PTA Visit #: 2/5     Time In: 1:17 PM  Time Out: 2:00 PM  Total Billable Time: 43 minutes (TE-1, MT-1, NMR-1)    Subjective     Pt reports: she is a little sore today. Has been doing her exercises at home which are helping. She saw the Doctor who looked at the MRI who does not think it was as bad as she thought. Doc suggests to continue PT .   She was compliant with home exercise program.  Response to previous treatment: tolerated well  Functional change: ongoing    Pain: 3/10  Location:bilateral neck    Objective      Objective Measures updated at progress report unless specified.     Treatment     Isael received the following treatments:    Bold=performed today    THERAPEUTIC EXERCISES to develop strength, ROM, and posture for 27 minutes including:  Thoracic extension in chair over 1/2 foam roll 20x5" hold  SNAG cervical flexion 20x5" hold   SNAG cervical rotation 10x10" hold   Pec stretch on towel roll 2'  Doorway pec stretch 3x30"  Standing rows / extension GTB 2x10  with cueing   UT stretch 3x30" each  +LS stretch 3x30" each  Shoulder flexion with wooden dowel 2x12  Standing Open books 20x3" hold - both directions    manual therapy techniques: Joint mobilizations and Soft tissue Mobilization were applied to the: neck for 8 " minutes, including:  Hypervolt to B UT and B LS- in seated position          neuromuscular re-education activities to improve: Posture for 8 minutes. The following activities were included:  Chin tucks x20 with 3 second hold (seated)  Scap pinches x20 with 3 second hold  Serratus wall slides 2x10      Patient Education and Home Exercises       Education provided:   - pt educated on all interventions performe today    Written Home Exercises Provided: yes. Exercises were reviewed and Isael was able to demonstrate them prior to the end of the session.  Isael demonstrated good  understanding of the education provided. See EMR under Patient Instructions for exercises provided during therapy sessions    Assessment     Isael presents to session with continues pain at bilateral sides of neck. She has good relief with interventions, particularly SNAGs for which she requires verbal cuing for proper execution.Minimal verbal/ tactile cuing required for proper scapular depression and retraction with scap pinches as well as theraband rows/extensions. Patient with great response to session and denies pain upon completion of session. Reassessment to be performed next visit.    Isael Is progressing well towards her goals.   Pt prognosis is Good.     Pt will continue to benefit from skilled outpatient physical therapy to address the deficits listed in the problem list box on initial evaluation, provide pt/family education and to maximize pt's level of independence in the home and community environment.     Pt's spiritual, cultural and educational needs considered and pt agreeable to plan of care and goals.     Anticipated barriers to physical therapy: age, chronicity of neck pain    Goals: Short Term Goals: 4 weeks   Patient will show 10 degree improvement in cervical spine mobility  Patient will show 10 degree improvement in shoulder mobility  Patient will show 50% reduction in cervical and shoulder tone     Long Term Goals: 8  weeks   50% FOTO score improvement  Patient will be able to lift > 15# overhead without pain increasing  Patient will be able to lift > 25# from the ground without pain increasing    Plan     Plan of care Certification: 5/2/2023 to 8/2/2023.     Outpatient Physical Therapy 2 times weekly for 8 weeks to include the following interventions: Gait Training, Manual Therapy, Moist Heat/ Ice, Neuromuscular Re-ed, Patient Education, Therapeutic Activities, and Therapeutic Exercise.     Reassess next visit.    Wendy Ewing, PT

## 2023-06-01 ENCOUNTER — CLINICAL SUPPORT (OUTPATIENT)
Dept: REHABILITATION | Facility: HOSPITAL | Age: 78
End: 2023-06-01
Payer: MEDICARE

## 2023-06-01 DIAGNOSIS — M53.82 DECREASED ROM OF INTERVERTEBRAL DISCS OF CERVICAL SPINE: Primary | ICD-10-CM

## 2023-06-01 PROCEDURE — 97110 THERAPEUTIC EXERCISES: CPT | Mod: PO

## 2023-06-01 PROCEDURE — 97140 MANUAL THERAPY 1/> REGIONS: CPT | Mod: PO

## 2023-06-01 PROCEDURE — 97112 NEUROMUSCULAR REEDUCATION: CPT | Mod: PO

## 2023-06-08 ENCOUNTER — CLINICAL SUPPORT (OUTPATIENT)
Dept: REHABILITATION | Facility: HOSPITAL | Age: 78
End: 2023-06-08
Payer: MEDICARE

## 2023-06-08 DIAGNOSIS — M53.82 DECREASED ROM OF INTERVERTEBRAL DISCS OF CERVICAL SPINE: Primary | ICD-10-CM

## 2023-06-08 PROCEDURE — 97110 THERAPEUTIC EXERCISES: CPT | Mod: PO,CQ

## 2023-06-08 PROCEDURE — 97140 MANUAL THERAPY 1/> REGIONS: CPT | Mod: PO,CQ

## 2023-06-08 NOTE — PROGRESS NOTES
"[facility]   Physical Therapy Treatment Note      [facility]  [id number]    Therapy Diagnosis:        Encounter Diagnoses   Name Primary?    Cervical strain, acute, initial encounter Yes    Decreased ROM of intervertebral discs of cervical spine          Physician: Arnoldo Vanegas PA-C  Physician Orders: PT Eval and Treat   Medical Diagnosis from Referral: S16.1XXA (ICD-10-CM) - Cervical strain, acute, initial encounter   Evaluation Date: 5/2/2023  Authorization Period Expiration: 12/31/23  Plan of Care Expiration: 8/2/2023  Progress Note Due: 6/2/2023  Visit # / Visits authorized: 10/ 20   FOTO: 1/3     Precautions: Standard and Fall   PTA Visit #: 1/5     Time In: 1:15 PM  Time Out: 2:00 PM  Total Billable Time: 45 minutes     Subjective     Pt reports: "not much pain, just a little sore."  She was compliant with home exercise program.  Response to previous treatment: tolerated well  Functional change: ongoing    Pain: 0/10  Location:bilateral neck    Objective      Objective Measures updated at progress report unless specified.     Treatment     Isael received the following treatments:    Bold=performed today    THERAPEUTIC EXERCISES to develop strength, ROM, and posture for 37 minutes including:  Thoracic extension in chair over 1/2 foam roll 20x5" hold  UT stretch 3x30" each  LS stretch 3x30" each  SNAG cervical rotation 10x10" hold   Chin tucks x30 with 3 second hold (seated)  Standing rows / extension GTB 2x10  with cueing   SNAG cervical flexion 20x5" hold   Pec stretch on towel roll 2'  Doorway pec stretch 3x30"  Shoulder flexion with wooden dowel 2x12  Standing Open books 20x3" hold - both directions    manual therapy techniques: Joint mobilizations and Soft tissue Mobilization were applied to the: neck for 8 minutes, including:  Hypervolt to B UT and B LS- in seated position    neuromuscular re-education activities to improve: Posture for 00 minutes. " The following activities were included:  Scap pinches x20 with 3 second hold  Serratus wall slides 2x10    Patient Education and Home Exercises       Education provided:   - pt educated on all interventions performe today    Written Home Exercises Provided: yes. Exercises were reviewed and Isael was able to demonstrate them prior to the end of the session.  Isael demonstrated good  understanding of the education provided. See EMR under Patient Instructions for exercises provided during therapy sessions    Assessment     Isael presents to session without any pain at bilateral sides of neck. She has good relief with interventions, particularly SNAGs for which she requires verbal cuing for proper execution.Minimal verbal/ tactile cuing required for proper scapular depression and retraction with theraband rows/extensions.     Isael Is progressing well towards her goals.   Pt prognosis is Good.     Pt will continue to benefit from skilled outpatient physical therapy to address the deficits listed in the problem list box on initial evaluation, provide pt/family education and to maximize pt's level of independence in the home and community environment.     Pt's spiritual, cultural and educational needs considered and pt agreeable to plan of care and goals.     Anticipated barriers to physical therapy: age, chronicity of neck pain    Goals: Short Term Goals: 4 weeks   Patient will show 10 degree improvement in cervical spine mobility  Patient will show 10 degree improvement in shoulder mobility  Patient will show 50% reduction in cervical and shoulder tone     Long Term Goals: 8 weeks   50% FOTO score improvement  Patient will be able to lift > 15# overhead without pain increasing  Patient will be able to lift > 25# from the ground without pain increasing    Plan     Plan of care Certification: 5/2/2023 to 8/2/2023.     Outpatient Physical Therapy 2 times weekly for 8 weeks to include the following interventions: Gait  Training, Manual Therapy, Moist Heat/ Ice, Neuromuscular Re-ed, Patient Education, Therapeutic Activities, and Therapeutic Exercise.     Reassess next visit.    Lauryn Fine, PTA

## 2023-06-13 ENCOUNTER — CLINICAL SUPPORT (OUTPATIENT)
Dept: REHABILITATION | Facility: HOSPITAL | Age: 78
End: 2023-06-13
Payer: MEDICARE

## 2023-06-13 DIAGNOSIS — M53.82 DECREASED ROM OF INTERVERTEBRAL DISCS OF CERVICAL SPINE: Primary | ICD-10-CM

## 2023-06-13 PROCEDURE — 97140 MANUAL THERAPY 1/> REGIONS: CPT | Mod: PO,CQ

## 2023-06-13 PROCEDURE — 97110 THERAPEUTIC EXERCISES: CPT | Mod: PO,CQ

## 2023-06-13 NOTE — PROGRESS NOTES
"[facility]   Physical Therapy Treatment Note      [facility]  [id number]    Therapy Diagnosis:        Encounter Diagnoses   Name Primary?    Cervical strain, acute, initial encounter Yes    Decreased ROM of intervertebral discs of cervical spine          Physician: Arnoldo Vanegas PA-C  Physician Orders: PT Eval and Treat   Medical Diagnosis from Referral: S16.1XXA (ICD-10-CM) - Cervical strain, acute, initial encounter   Evaluation Date: 5/2/2023  Authorization Period Expiration: 12/31/23  Plan of Care Expiration: 8/2/2023  Progress Note Due: 6/2/2023  Visit # / Visits authorized: 11/ 20   FOTO: 1/3     Precautions: Standard and Fall   PTA Visit #: 1/5     Time In: 1:34 PM  Time Out: 2:16 PM  Total Billable Time: 42 minutes     Subjective     Pt reports: "a little sore on the right side."  She was compliant with home exercise program.  Response to previous treatment: good  Functional change: ongoing    Pain: 3/10  Location:bilateral neck    Objective      Objective Measures updated at progress report unless specified.     Treatment     Isael received the following treatments:  Bold=performed today    THERAPEUTIC EXERCISES to develop strength, ROM, and posture for 34 minutes including:    UT stretch 3x30" each  LS stretch 3x30" each  SNAG cervical rotation 20x5" hold   Thoracic extension in chair over 1/2 foam roll 20x5" hold  Chin tucks x30 with 3 second hold (seated)  Standing rows / extension GTB 3x10  with cueing   SNAG cervical flexion 20x5" hold   Pec stretch on towel roll 2'  Doorway pec stretch 3x30"  Shoulder flexion with wooden dowel 2x12  Standing Open books 20x3" hold - both directions    manual therapy techniques: Joint mobilizations and Soft tissue Mobilization were applied to the: neck for 8 minutes, including:  Hypervolt to B UT and B LS- in seated position    neuromuscular re-education activities to improve: Posture for 00 minutes. The following " activities were included:  Scap pinches x20 with 3 second hold  Serratus wall slides 2x10    Patient Education and Home Exercises       Education provided:   - pt educated on all interventions performe today    Written Home Exercises Provided: yes. Exercises were reviewed and Isael was able to demonstrate them prior to the end of the session.  Isael demonstrated good  understanding of the education provided. See EMR under Patient Instructions for exercises provided during therapy sessions    Assessment     Isael did well today. Increased guarding of the upper trapezius muscle is preventing the patient from rotating her neck fully. Performance of STM with hypervolt to the upper trapezius decreased muscle guarding and improved the patients ability to rotate her neck. Pt is progressing well towards goals at this time, and will continue to be progress as tolerated.     Isael Is progressing well towards her goals.   Pt prognosis is Good.     Pt will continue to benefit from skilled outpatient physical therapy to address the deficits listed in the problem list box on initial evaluation, provide pt/family education and to maximize pt's level of independence in the home and community environment.     Pt's spiritual, cultural and educational needs considered and pt agreeable to plan of care and goals.     Anticipated barriers to physical therapy: age, chronicity of neck pain    Goals: Short Term Goals: 4 weeks   Patient will show 10 degree improvement in cervical spine mobility  Patient will show 10 degree improvement in shoulder mobility  Patient will show 50% reduction in cervical and shoulder tone     Long Term Goals: 8 weeks   50% FOTO score improvement  Patient will be able to lift > 15# overhead without pain increasing  Patient will be able to lift > 25# from the ground without pain increasing    Plan     Plan of care Certification: 5/2/2023 to 8/2/2023.     Outpatient Physical Therapy 2 times weekly for 8 weeks  to include the following interventions: Gait Training, Manual Therapy, Moist Heat/ Ice, Neuromuscular Re-ed, Patient Education, Therapeutic Activities, and Therapeutic Exercise.     Reassess next visit.    Lauryn Fine, PTA

## 2023-06-15 RX ORDER — ESCITALOPRAM OXALATE 10 MG/1
10 TABLET ORAL DAILY
Qty: 90 TABLET | Refills: 0 | Status: SHIPPED | OUTPATIENT
Start: 2023-06-15 | End: 2023-07-15

## 2023-06-15 NOTE — TELEPHONE ENCOUNTER
No care due was identified.  Health system Embedded Care Due Messages. Reference number: [id number].   6/15/2023 12:46:47 PM CDT

## 2023-06-20 ENCOUNTER — CLINICAL SUPPORT (OUTPATIENT)
Dept: REHABILITATION | Facility: HOSPITAL | Age: 78
End: 2023-06-20
Payer: MEDICARE

## 2023-06-20 DIAGNOSIS — M53.82 DECREASED ROM OF INTERVERTEBRAL DISCS OF CERVICAL SPINE: Primary | ICD-10-CM

## 2023-06-20 PROCEDURE — 97110 THERAPEUTIC EXERCISES: CPT | Mod: PO

## 2023-06-20 PROCEDURE — 97140 MANUAL THERAPY 1/> REGIONS: CPT | Mod: PO

## 2023-06-20 NOTE — PROGRESS NOTES
"[facility]   Physical Therapy Treatment Note / Discharge Note     [facility]  [facility] [id number]    Therapy Diagnosis:        Encounter Diagnoses   Name Primary?    Cervical strain, acute, initial encounter Yes    Decreased ROM of intervertebral discs of cervical spine          Physician: Arnoldo Vanegas PA-C  Physician Orders: PT Eval and Treat   Medical Diagnosis from Referral: S16.1XXA (ICD-10-CM) - Cervical strain, acute, initial encounter   Evaluation Date: 5/2/2023  Authorization Period Expiration: 12/31/23  Plan of Care Expiration: 8/2/2023  Progress Note Due: 6/2/2023  Visit # / Visits authorized: 11/ 20   FOTO: 1/3     Precautions: Standard and Fall   PTA Visit #: 0/5     Time In: 1:05 PM  Time Out: 1:45 PM  Total Billable Time: 40 minutes     Subjective     Pt reports: feeling better overall and she has not noticed the neck bothering as much anymore and ready for discharge today  She was compliant with home exercise program.  Response to previous treatment: good  Functional change: ongoing    Pain: 3/10  Location:bilateral neck    Objective      Cervical Range of Motion:     Degrees   Flexion 38   Extension 36   Right Rotation 60   Left Rotation 60   Right Side Bend 20   Left Side Bend 15     Min tenderness remains over the right side UT and levator scap     Treatment     Isael received the following treatments:  Bold=performed today    THERAPEUTIC EXERCISES to develop strength, ROM, and posture for 30 minutes including:   Home exercise program review  UT stretch 3x30" each  LS stretch 3x30" each  SNAG cervical rotation 20x5" hold   Thoracic extension in chair over 1/2 foam roll 20x5" hold  Chin tucks x30 with 3 second hold (seated)  Standing rows / extension GTB 3x10  with cueing   SNAG cervical flexion 20x5" hold   Pec stretch on towel roll 2'  Doorway pec stretch 3x30"  Shoulder flexion with wooden dowel 2x12  Standing Open books 20x3" hold - both " directions    manual therapy techniques: Joint mobilizations and Soft tissue Mobilization were applied to the: neck for 10 minutes, including:  Objective measures taken for DC  Hypervolt to B UT and B LS- in seated position    neuromuscular re-education activities to improve: Posture for 00 minutes. The following activities were included:  Scap pinches x20 with 3 second hold  Serratus wall slides 2x10    Patient Education and Home Exercises       Education provided:   - pt educated on all interventions performe today    Written Home Exercises Provided: yes. Exercises were reviewed and Isael was able to demonstrate them prior to the end of the session.  Isael demonstrated good  understanding of the education provided. See EMR under Patient Instructions for exercises provided during therapy sessions    Assessment     Isael has met 85% of goals at this point and she is ready for discharge at this time. She feels comfortable performing exercises at home and we reviewed her home exercise program in order to make sure she was comfortable with her program    Isael Is progressing well towards her goals.   Pt prognosis is Good.     Pt will continue to benefit from skilled outpatient physical therapy to address the deficits listed in the problem list box on initial evaluation, provide pt/family education and to maximize pt's level of independence in the home and community environment.     Pt's spiritual, cultural and educational needs considered and pt agreeable to plan of care and goals.     Anticipated barriers to physical therapy: age, chronicity of neck pain    Goals: Short Term Goals: 4 weeks - MET  Patient will show 10 degree improvement in cervical spine mobility  Patient will show 10 degree improvement in shoulder mobility  Patient will show 50% reduction in cervical and shoulder tone     Long Term Goals: 8 weeks   50% FOTO score improvement - NP  Patient will be able to lift > 15# overhead without pain increasing  - MET  Patient will be able to lift > 25# from the ground without pain increasing - not met    Plan     Plan of care Certification: 5/2/2023 to 8/2/2023.     Outpatient Physical Therapy 2 times weekly for 8 weeks to include the following interventions: Gait Training, Manual Therapy, Moist Heat/ Ice, Neuromuscular Re-ed, Patient Education, Therapeutic Activities, and Therapeutic Exercise.     Reassess next visit.    Rena Metz, PT

## 2023-06-27 ENCOUNTER — PATIENT MESSAGE (OUTPATIENT)
Dept: DERMATOLOGY | Facility: CLINIC | Age: 78
End: 2023-06-27
Payer: MEDICARE

## 2023-06-30 ENCOUNTER — EXTERNAL CHRONIC CARE MANAGEMENT (OUTPATIENT)
Dept: PRIMARY CARE CLINIC | Facility: CLINIC | Age: 78
End: 2023-06-30
Payer: MEDICARE

## 2023-06-30 PROCEDURE — 99490 PR CHRONIC CARE MGMT, 1ST 20 MIN: ICD-10-PCS | Mod: S$PBB,,, | Performed by: INTERNAL MEDICINE

## 2023-06-30 PROCEDURE — 99490 CHRNC CARE MGMT STAFF 1ST 20: CPT | Mod: S$PBB,,, | Performed by: INTERNAL MEDICINE

## 2023-06-30 PROCEDURE — 99490 CHRNC CARE MGMT STAFF 1ST 20: CPT | Mod: PBBFAC | Performed by: INTERNAL MEDICINE

## 2023-07-11 ENCOUNTER — PATIENT MESSAGE (OUTPATIENT)
Dept: DERMATOLOGY | Facility: CLINIC | Age: 78
End: 2023-07-11
Payer: MEDICARE

## 2023-07-11 ENCOUNTER — PATIENT MESSAGE (OUTPATIENT)
Dept: ADMINISTRATIVE | Facility: OTHER | Age: 78
End: 2023-07-11
Payer: MEDICARE

## 2023-07-11 ENCOUNTER — OFFICE VISIT (OUTPATIENT)
Dept: URGENT CARE | Facility: CLINIC | Age: 78
End: 2023-07-11
Payer: MEDICARE

## 2023-07-11 VITALS
DIASTOLIC BLOOD PRESSURE: 63 MMHG | TEMPERATURE: 98 F | RESPIRATION RATE: 18 BRPM | OXYGEN SATURATION: 100 % | BODY MASS INDEX: 27.49 KG/M2 | HEART RATE: 86 BPM | HEIGHT: 65 IN | WEIGHT: 165 LBS | SYSTOLIC BLOOD PRESSURE: 148 MMHG

## 2023-07-11 DIAGNOSIS — R05.9 COUGH, UNSPECIFIED TYPE: Primary | ICD-10-CM

## 2023-07-11 DIAGNOSIS — B96.89 ACUTE BACTERIAL SINUSITIS: ICD-10-CM

## 2023-07-11 DIAGNOSIS — J01.90 ACUTE BACTERIAL SINUSITIS: ICD-10-CM

## 2023-07-11 LAB
CTP QC/QA: YES
SARS-COV-2 AG RESP QL IA.RAPID: NEGATIVE

## 2023-07-11 PROCEDURE — 99213 OFFICE O/P EST LOW 20 MIN: CPT | Mod: S$GLB,,,

## 2023-07-11 PROCEDURE — 87811 SARS-COV-2 COVID19 W/OPTIC: CPT | Mod: QW,S$GLB,,

## 2023-07-11 PROCEDURE — 87811 SARS CORONAVIRUS 2 ANTIGEN POCT, MANUAL READ: ICD-10-PCS | Mod: QW,S$GLB,,

## 2023-07-11 PROCEDURE — 99213 PR OFFICE/OUTPT VISIT, EST, LEVL III, 20-29 MIN: ICD-10-PCS | Mod: S$GLB,,,

## 2023-07-11 RX ORDER — AZITHROMYCIN 250 MG/1
TABLET, FILM COATED ORAL
Qty: 6 TABLET | Refills: 0 | Status: SHIPPED | OUTPATIENT
Start: 2023-07-11 | End: 2023-07-16

## 2023-07-11 RX ORDER — FLUTICASONE PROPIONATE 50 MCG
1 SPRAY, SUSPENSION (ML) NASAL DAILY
Qty: 11.1 ML | Refills: 0 | Status: SHIPPED | OUTPATIENT
Start: 2023-07-11

## 2023-07-11 RX ORDER — LORATADINE 10 MG/1
10 TABLET ORAL DAILY
Qty: 30 TABLET | Refills: 0 | Status: SHIPPED | OUTPATIENT
Start: 2023-07-11

## 2023-07-11 RX ORDER — BENZONATATE 200 MG/1
200 CAPSULE ORAL 3 TIMES DAILY PRN
Qty: 30 CAPSULE | Refills: 0 | Status: SHIPPED | OUTPATIENT
Start: 2023-07-11 | End: 2023-07-21

## 2023-07-11 NOTE — PATIENT INSTRUCTIONS
Take Z Elijah as prescribed. Tessalon Perles 3 times a day as needed for cough. Take Claritin and Flonase daily for sinus.    What care is needed at home?     To help you feel better:  Use a cool mist humidifier to avoid breathing dry air.  Use hard candy or cough drops to soothe sore throat and cough.  Gargle with salt water (mix 1/2 teaspoon salt with 1 cup warm water) a few times a day.  Spray saltwater mist in each nostril. Any normal saline spray works.  Sip warm liquids to keep your throat moist.  Take warm, steamy showers to help soothe the cough.  Do not smoke or be in smoke-filled places. Avoid things that may cause breathing problems like vaping, fumes, pollution, dust, and other common allergens.  You may want to use over-the-counter medicines for allergies or acid reflux if your cough is due to one of these problems.  You can also use an over-the-counter cough medicine.  When do I need to get emergency help?   Return to the ED if:   You have chest pain when you cough or trouble breathing.  You start to cough up blood or yellow or green mucus.  When do I need to call the doctor?   You have a fever of 100.4°F (38°C) or higher or chills.  You cough so hard you throw up.  You are still coughing in 10 days.  You have new or worsening symptoms.  - Rest.    - Drink plenty of fluids.    - Acetaminophen (tylenol) or Ibuprofen (advil,motrin) as directed as needed for fever/pain. Avoid tylenol if you have a history of liver disease. Do not take ibuprofen if you have a history of GI bleeding, kidney disease, or if you take blood thinners.     - Follow up with your PCP or specialty clinic as directed in the next 1-2 weeks if not improved or as needed.  You can call [phone] to schedule an appointment with the appropriate provider.    - Go to the ER or seek medical attention immediately if you develop new or worsening symptoms.     - You must understand that you have received an Urgent Care treatment only and that  you may be released before all of your medical problems are known or treated.   - You, the patient, will arrange for follow up care as instructed.   - If your condition worsens or fails to improve we recommend that you receive another evaluation at the ER immediately or contact your PCP to discuss your concerns or return here.

## 2023-07-11 NOTE — PROGRESS NOTES
"Subjective:      Patient ID: Isael Salmon is a 77 y.o. female.    Vitals:  height is 5' 5" (1.651 m) and weight is 74.8 kg (165 lb). Her oral temperature is 97.9 °F (36.6 °C). Her blood pressure is 148/63 (abnormal) and her pulse is 86. Her respiration is 18 and oxygen saturation is 100%.     Chief Complaint: Cough    77-year-old female patient reports of dry cough, sinus pressure, sinus pain, runny nose for the last 8 days.  Patient reports she has been taking Coricidin daily for her symptoms.  Patient denies any fever, shortness of breath, chest pain, GI complaints or any other associated symptoms.  Patient is requesting COVID test due to her lost of taste and smell over the last week.         Cough  This is a new problem. The current episode started 1 to 4 weeks ago. The problem has been gradually worsening. The problem occurs constantly. The cough is Non-productive. Associated symptoms include nasal congestion. Pertinent negatives include no chest pain, chills, ear congestion, ear pain, eye redness, fever, hemoptysis, postnasal drip, rash, sore throat or shortness of breath. The symptoms are aggravated by lying down. She has tried OTC cough suppressant for the symptoms. The treatment provided mild relief. Her past medical history is significant for bronchitis. There is no history of asthma, COPD, environmental allergies or pneumonia.     Constitution: Negative for activity change, appetite change, chills, sweating, fatigue and fever.   HENT:  Positive for congestion, sinus pain and sinus pressure. Negative for ear pain, ear discharge, foreign body in ear, tinnitus, hearing loss, dental problem, nosebleeds, foreign body in nose, postnasal drip, sore throat, trouble swallowing and voice change.    Neck: Negative for neck pain, neck stiffness and painful lymph nodes.   Cardiovascular:  Negative for chest trauma, chest pain and leg swelling.   Eyes:  Negative for eye trauma, foreign body in eye, eye " discharge, eye itching, eye pain and eye redness.   Respiratory:  Positive for cough. Negative for sleep apnea, chest tightness, sputum production, bloody sputum, COPD, shortness of breath and asthma.    Gastrointestinal:  Negative for abdominal trauma, abdominal pain, abdominal bloating, history of abdominal surgery, nausea, vomiting, constipation and diarrhea.   Endocrine: hair loss, cold intolerance, heat intolerance and excessive thirst.   Genitourinary:  Negative for dysuria, frequency, urgency, urine decreased, flank pain, bladder incontinence and bed wetting.   Musculoskeletal:  Negative for pain, trauma, joint pain, joint swelling and abnormal ROM of joint.   Skin:  Negative for color change, pale, rash, wound and abrasion.   Allergic/Immunologic: Negative for environmental allergies, seasonal allergies, food allergies, eczema, asthma and immunocompromised state.   Neurological:  Negative for dizziness, history of vertigo, light-headedness, passing out, facial drooping, disorientation and altered mental status.   Hematologic/Lymphatic: Negative for swollen lymph nodes, easy bruising/bleeding, trouble clotting and history of blood clots. Does not bruise/bleed easily.   Psychiatric/Behavioral:  Negative for altered mental status, disorientation, confusion, agitation, nervous/anxious and sleep disturbance. The patient is not nervous/anxious.     Objective:     Physical Exam   Constitutional: She is oriented to person, place, and time. She appears well-developed. She is cooperative.  Non-toxic appearance. She does not appear ill. No distress.   HENT:   Head: Normocephalic and atraumatic.   Ears:   Right Ear: Hearing, tympanic membrane, external ear and ear canal normal.   Left Ear: Hearing, tympanic membrane, external ear and ear canal normal.   Nose: Congestion present. No mucosal edema, rhinorrhea or nasal deformity. No epistaxis. Right sinus exhibits no maxillary sinus tenderness and no frontal sinus  tenderness. Left sinus exhibits no maxillary sinus tenderness and no frontal sinus tenderness.   Mouth/Throat: Uvula is midline, oropharynx is clear and moist and mucous membranes are normal. No trismus in the jaw. Normal dentition. No uvula swelling. No oropharyngeal exudate, posterior oropharyngeal edema or posterior oropharyngeal erythema.   Eyes: Conjunctivae and lids are normal. No scleral icterus.   Neck: Trachea normal and phonation normal. Neck supple. No edema present. No erythema present. No neck rigidity present.   Cardiovascular: Normal rate, regular rhythm, normal heart sounds and normal pulses.   Pulmonary/Chest: Effort normal and breath sounds normal. No stridor. No respiratory distress. She has no decreased breath sounds. She has no wheezes. She has no rhonchi. She has no rales. She exhibits no tenderness.   Abdominal: Normal appearance.   Musculoskeletal: Normal range of motion.         General: No deformity. Normal range of motion.   Neurological: She is alert and oriented to person, place, and time. She exhibits normal muscle tone. Coordination normal.   Skin: Skin is warm, dry, intact, not diaphoretic and not pale.   Psychiatric: Her speech is normal and behavior is normal. Judgment and thought content normal.   Nursing note and vitals reviewed.  Results for orders placed or performed in visit on 07/11/23   SARS Coronavirus 2 Antigen, POCT Manual Read   Result Value Ref Range    SARS Coronavirus 2 Antigen Negative Negative     Acceptable Yes      *Note: Due to a large number of results and/or encounters for the requested time period, some results have not been displayed. A complete set of results can be found in Results Review.        Assessment:     1. Cough, unspecified type    2. Acute bacterial sinusitis        Plan:       Cough, unspecified type  -     SARS Coronavirus 2 Antigen, POCT Manual Read  -     benzonatate (TESSALON) 200 MG capsule; Take 1 capsule (200 mg total) by  mouth 3 (three) times daily as needed for Cough.  Dispense: 30 capsule; Refill: 0    Acute bacterial sinusitis  -     azithromycin (Z-YAZMIN) 250 MG tablet; Take 2 tablets by mouth on day 1; Take 1 tablet by mouth on days 2-5  Dispense: 6 tablet; Refill: 0  -     loratadine (CLARITIN) 10 mg tablet; Take 1 tablet (10 mg total) by mouth once daily.  Dispense: 30 tablet; Refill: 0  -     fluticasone propionate (FLONASE) 50 mcg/actuation nasal spray; 1 spray (50 mcg total) by Each Nostril route once daily.  Dispense: 11.1 mL; Refill: 0             Additional MDM:     Heart Failure Score:   COPD = No

## 2023-07-12 ENCOUNTER — TELEPHONE (OUTPATIENT)
Dept: INTERNAL MEDICINE | Facility: CLINIC | Age: 78
End: 2023-07-12
Payer: MEDICARE

## 2023-07-12 NOTE — TELEPHONE ENCOUNTER
----- Message from Constantino Rodas sent at 7/12/2023  7:10 AM CDT -----  Contact: Patient Portual  .Type:  Same Day Appointment Request    Caller is requesting a same day appointment.  Caller declined first available appointment listed below.    Name of Caller:pt  When is the first available appointment?  Symptoms:Have a terrible cold since July 3.  Coughing, no taste or smell. Have taken Allegra and Coricidin   for a week  Best Call Back Number:[phone]  Additional Information:

## 2023-07-14 RX ORDER — ROSUVASTATIN CALCIUM 20 MG/1
TABLET, COATED ORAL
Qty: 90 TABLET | Refills: 3 | Status: SHIPPED | OUTPATIENT
Start: 2023-07-14

## 2023-07-14 NOTE — TELEPHONE ENCOUNTER
Refill Routing Note     Refill Routing Note   Medication(s) are not appropriate for processing by [facility] for the following reason(s):      Patient seen in ED/Hospital since LOV with provider  No active prescription written by provider    [facility] action(s):  Defer Care Due:  None identified            Appointments  past 12m or future 3m with PCP    Date Provider   Last Visit   2/14/2023 Janett Montoya MD   Next Visit   8/8/2023 Janett Montoya MD   ED visits in past 90 days: 1        Note composed:8:31 AM 07/14/2023

## 2023-07-14 NOTE — TELEPHONE ENCOUNTER
No care due was identified.  [facility] Embedded Care Due Messages. Reference number: [id number].   7/14/2023 5:13:20 AM CDT

## 2023-07-17 ENCOUNTER — TELEPHONE (OUTPATIENT)
Dept: ENDOSCOPY | Facility: HOSPITAL | Age: 78
End: 2023-07-17
Payer: MEDICARE

## 2023-07-17 NOTE — TELEPHONE ENCOUNTER
Telephoned pt to schedule upper and lower single balloon enteroscopies.  Spoke with pt, however she would not like to schedule at this time.  She has an appt with her PCP, Dr. Montoya tomorrow and will ask if Dr. Montoya still would like her to have the procedures.  Direct contact number provided for pt to call back tomorrow.

## 2023-07-18 ENCOUNTER — OFFICE VISIT (OUTPATIENT)
Dept: INTERNAL MEDICINE | Facility: CLINIC | Age: 78
End: 2023-07-18
Payer: MEDICARE

## 2023-07-18 VITALS
HEART RATE: 74 BPM | BODY MASS INDEX: 27.49 KG/M2 | OXYGEN SATURATION: 99 % | HEIGHT: 65 IN | DIASTOLIC BLOOD PRESSURE: 54 MMHG | SYSTOLIC BLOOD PRESSURE: 114 MMHG | WEIGHT: 165 LBS | TEMPERATURE: 98 F

## 2023-07-18 DIAGNOSIS — I10 ESSENTIAL HYPERTENSION: ICD-10-CM

## 2023-07-18 DIAGNOSIS — J44.9 CHRONIC OBSTRUCTIVE PULMONARY DISEASE, UNSPECIFIED COPD TYPE: ICD-10-CM

## 2023-07-18 DIAGNOSIS — J30.1 ALLERGIC RHINITIS DUE TO POLLEN, UNSPECIFIED SEASONALITY: ICD-10-CM

## 2023-07-18 DIAGNOSIS — J01.90 ACUTE BACTERIAL SINUSITIS: Primary | ICD-10-CM

## 2023-07-18 DIAGNOSIS — B96.89 ACUTE BACTERIAL SINUSITIS: Primary | ICD-10-CM

## 2023-07-18 DIAGNOSIS — I70.0 AORTIC ATHEROSCLEROSIS: ICD-10-CM

## 2023-07-18 PROCEDURE — 99214 PR OFFICE/OUTPT VISIT, EST, LEVL IV, 30-39 MIN: ICD-10-PCS | Mod: S$PBB,,, | Performed by: INTERNAL MEDICINE

## 2023-07-18 PROCEDURE — 99999 PR PBB SHADOW E&M-EST. PATIENT-LVL III: CPT | Mod: PBBFAC,,, | Performed by: INTERNAL MEDICINE

## 2023-07-18 PROCEDURE — 99999 PR PBB SHADOW E&M-EST. PATIENT-LVL III: ICD-10-PCS | Mod: PBBFAC,,, | Performed by: INTERNAL MEDICINE

## 2023-07-18 PROCEDURE — 99214 OFFICE O/P EST MOD 30 MIN: CPT | Mod: S$PBB,,, | Performed by: INTERNAL MEDICINE

## 2023-07-18 PROCEDURE — 99213 OFFICE O/P EST LOW 20 MIN: CPT | Mod: PBBFAC | Performed by: INTERNAL MEDICINE

## 2023-07-18 RX ORDER — PREDNISONE 10 MG/1
TABLET ORAL
Qty: 10 TABLET | Refills: 0 | Status: SHIPPED | OUTPATIENT
Start: 2023-07-18 | End: 2023-08-08

## 2023-07-18 RX ORDER — DOXYCYCLINE HYCLATE 100 MG
100 TABLET ORAL 2 TIMES DAILY
Qty: 14 TABLET | Refills: 0 | Status: SHIPPED | OUTPATIENT
Start: 2023-07-18 | End: 2023-07-18 | Stop reason: SDUPTHER

## 2023-07-18 RX ORDER — PREDNISONE 10 MG/1
TABLET ORAL
Qty: 10 TABLET | Refills: 0 | Status: SHIPPED | OUTPATIENT
Start: 2023-07-18 | End: 2023-07-18 | Stop reason: SDUPTHER

## 2023-07-18 RX ORDER — DOXYCYCLINE HYCLATE 100 MG
100 TABLET ORAL 2 TIMES DAILY
Qty: 14 TABLET | Refills: 0 | Status: SHIPPED | OUTPATIENT
Start: 2023-07-18 | End: 2023-07-25

## 2023-07-18 NOTE — PROGRESS NOTES
CHIEF COMPLAINT: Follow up of multiple problems     HISTORY OF PRESENT ILLNESS: 77-year-old woman presents for follow up of above    She has had a URI for the last 2 weeks. Seh went to urgent care a week ago. COVID negative. She took a Z pack, Claritin and tessalon perles. She is slightly better. She is till coughing. She has white mucous.  She has a scratchy throat.   No shortness of breath. No wheezing. No fever or chills.      She had a bunionectomy and hammertoe surgery on 7/8/21 with Dr Bunn. She has seen Dr Jasso who wants to redo the survery.  Dr Jasso gave her injections in her toes which helped with the pain. She cannot wear closed shoes. Has not had the surgery yet.      No gout attacks.  She takes allopurinol 100 mg 1 tablet nightly.      She has been taking Linzess 145 mg every day or other day. She  watery stool. She has a BM every other day.  If she does not take the Linzess, she is severely constipated.  No cramping or pain with the LInzess.     She has muscle spasms in her neck on 4/27/23.  She went to physical therapy which has helped. She is doing exercises at home.       Anxiety is controlled on Lexapro to 10 mg one tablet daily.       Heartburn is congroled on omperazole 20 mg twice daily     She continues to take Crestor 20 mg 1 tablet daily with co enzyme 10 200 mg  daily - leg achintess is better with lower dose of crestor. She also takes losartan 50 mg twice daily, and spironolactone 25 mg daily. BP has been doing well.        She had a normal cystoscope 12/17/14 due to recurrent UTI. No dysuria or hematuria now. She is no longer using estrogen vaginal cream for vaginal atrophy three times weekly       She is NOT taking vitamin D 50,000 units twice weekly     SHe is NOT taking Vitron C once daily for her anemia. She takes one a day women's vitamin sporadically     Colonoscopy was cancelled on 3/30/20 due to COVID pandemic.      She takes tylenol arthritis 650 mg 2 tablets twice daily as  "needed which helps her joint pain.  She did have some meloxicam that did help. She cannot take NSAIDS long term due to her stomach issues.      PAST MEDICAL HISTORY:   1. Hypertension.   2. Hyperlipidemia.   3. Gout.   4. History of rashes.   5. TIA in   6. Fibrocystic breast disease.   7. Postmenopausal.   8. Osteoarthritis.   9. History of tobacco usage; quit in .   10. History of colon polyps; normal colonoscopy in 10/07 and normal 2010, due    11. Normal bone mineral density scan in .   12. Anxiety and depression - controlled on lorazepam very rarely.   13. Varicose veins.   14. Intermittent GERD.   15. Iron deficiency anemia 2010 - due to erosive gastropathy on EGD 2010     PAST SURGICAL HISTORY:   1. Right carotid endarterectomy in .   2. Breast reduction surgery.   3. Total abdominal hysterectomy.   4. Blood transfusion prior to .     SOCIAL HISTORY: Quit smoking in ; smoked 1 PPD since age 16. No   alcohol use.     FAMILY HISTORY:   Father had gout, hypertension and heart failure; is . Mother had hypertension and pancreatic cancer; is also . Has five brothers, one of whom  in a motor vehicle accident. All have gout and hypertension. One brother has Crohn's disease; another has bladder cancer.     MEDICATIONS and ALLERGIES: Updated on epic.     PHYSICAL EXAMINATION:      /66 (BP Location: Right arm, Patient Position: Sitting)   Pulse 76   Ht 5' 5" (1.651 m)   Wt 77.1 kg (169 lb 15.6 oz)   LMP  (LMP Unknown)   SpO2 100%   BMI 28.29 kg/m²        General: Alert, oriented. No apparent distress. Affect within normal   Limits.   Conjunctivae anicteric. Tympanic membranes clear. Oropharynx clear.   Neck supple.   Respiratory effort normal. Lungs clear  Heart: Regular rate and rhythm without murmurs, gallops or rubs.   No lower extremity edema.   ABDOMEN: soft, non distended, non tender, bowel sounds present, no hepatosplenomgaly  BREAST: no abn seen, " no nodules palpated, no axillary lad     Labs 2/9/23 reviewed and stable           ASSESSMENT AND PLAN:    1. Acute bacterial sinusitis - d oxycycline 100 mg twice daily for 7 days and prednisone.   2.  Essential hypertension - stable   3. Heme positive stools - saw Dr Kingsley 3/2023  4. Hyperlipidemia - on crestor to 20 mg 1 tablet daily.    5. Erosive gastropathy - on omeprazole to 40 mg twice daily.  6. Hypercalcemia - stable  7. Gout -  on allopurinol.  8. Situational stress/anxiety/mood disorder -stable lexapro to 10 mg 1 tablet daily. Sleeping better. Has not neeed xanax   8.  Carotid artery disease - on risk factor modification.   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 Constipation - on linzess  11. Neck pain -better  12. Anemia - make sure get ir on in  13. OA knee - to orth for injection  Screening - Colonscopy 10/15 with 4 polyps - it was incomplete. Repeat 5/26/16 - divericulosis otherwise normal  Needs repeat - ordered . MMG 7/2022 BMD normal January 2022.  I will see her back as scheduledsooner if problems arise

## 2023-07-25 ENCOUNTER — TELEPHONE (OUTPATIENT)
Dept: ENDOSCOPY | Facility: HOSPITAL | Age: 78
End: 2023-07-25
Payer: MEDICARE

## 2023-07-25 ENCOUNTER — PATIENT MESSAGE (OUTPATIENT)
Dept: ENDOSCOPY | Facility: HOSPITAL | Age: 78
End: 2023-07-25
Payer: MEDICARE

## 2023-07-25 DIAGNOSIS — Z12.11 SCREEN FOR COLON CANCER: Primary | ICD-10-CM

## 2023-07-25 NOTE — TELEPHONE ENCOUNTER
Telephoned pt to schedule Antegrade and Retrograde Single Balloon Enteroscopies.  Spoke with pt and procedures scheduled for 9/18/23 at 7:30am.  Reviewed history and medications.  Instructed on procedure and preparation.  Pt verbalized understanding.  Instructions sent via patient portal.  Instructed pt on how to locate prep instructions within the portal.  Pt verbalized understanding.

## 2023-07-27 ENCOUNTER — PES CALL (OUTPATIENT)
Dept: ADMINISTRATIVE | Facility: CLINIC | Age: 78
End: 2023-07-27
Payer: MEDICARE

## 2023-07-31 ENCOUNTER — EXTERNAL CHRONIC CARE MANAGEMENT (OUTPATIENT)
Dept: PRIMARY CARE CLINIC | Facility: CLINIC | Age: 78
End: 2023-07-31
Payer: MEDICARE

## 2023-07-31 PROCEDURE — 99490 PR CHRONIC CARE MGMT, 1ST 20 MIN: ICD-10-PCS | Mod: S$PBB,,, | Performed by: INTERNAL MEDICINE

## 2023-07-31 PROCEDURE — 99490 CHRNC CARE MGMT STAFF 1ST 20: CPT | Mod: S$PBB,,, | Performed by: INTERNAL MEDICINE

## 2023-07-31 PROCEDURE — 99490 CHRNC CARE MGMT STAFF 1ST 20: CPT | Mod: PBBFAC | Performed by: INTERNAL MEDICINE

## 2023-08-08 ENCOUNTER — OFFICE VISIT (OUTPATIENT)
Dept: INTERNAL MEDICINE | Facility: CLINIC | Age: 78
End: 2023-08-08
Payer: MEDICARE

## 2023-08-08 ENCOUNTER — LAB VISIT (OUTPATIENT)
Dept: LAB | Facility: HOSPITAL | Age: 78
End: 2023-08-08
Attending: INTERNAL MEDICINE
Payer: MEDICARE

## 2023-08-08 VITALS
OXYGEN SATURATION: 98 % | HEIGHT: 65 IN | BODY MASS INDEX: 27.49 KG/M2 | DIASTOLIC BLOOD PRESSURE: 62 MMHG | SYSTOLIC BLOOD PRESSURE: 130 MMHG | WEIGHT: 165 LBS | HEART RATE: 79 BPM

## 2023-08-08 DIAGNOSIS — I70.0 AORTIC ATHEROSCLEROSIS: ICD-10-CM

## 2023-08-08 DIAGNOSIS — E55.9 VITAMIN D DEFICIENCY DISEASE: ICD-10-CM

## 2023-08-08 DIAGNOSIS — R21 RASH: ICD-10-CM

## 2023-08-08 DIAGNOSIS — I10 ESSENTIAL HYPERTENSION: ICD-10-CM

## 2023-08-08 DIAGNOSIS — D50.0 IRON DEFICIENCY ANEMIA DUE TO CHRONIC BLOOD LOSS: ICD-10-CM

## 2023-08-08 DIAGNOSIS — M10.9 GOUT, ARTHRITIS: ICD-10-CM

## 2023-08-08 DIAGNOSIS — K21.9 GASTROESOPHAGEAL REFLUX DISEASE WITHOUT ESOPHAGITIS: ICD-10-CM

## 2023-08-08 DIAGNOSIS — E55.9 VITAMIN D DEFICIENCY DISEASE: Primary | ICD-10-CM

## 2023-08-08 LAB
25(OH)D3+25(OH)D2 SERPL-MCNC: 23 NG/ML (ref 30–96)
ALBUMIN SERPL BCP-MCNC: 3.7 G/DL (ref 3.5–5.2)
ALP SERPL-CCNC: 76 U/L (ref 55–135)
ALT SERPL W/O P-5'-P-CCNC: 15 U/L (ref 10–44)
ANION GAP SERPL CALC-SCNC: 14 MMOL/L (ref 8–16)
AST SERPL-CCNC: 21 U/L (ref 10–40)
BASOPHILS # BLD AUTO: 0.02 K/UL (ref 0–0.2)
BASOPHILS NFR BLD: 0.5 % (ref 0–1.9)
BILIRUB SERPL-MCNC: 0.3 MG/DL (ref 0.1–1)
BUN SERPL-MCNC: 22 MG/DL (ref 8–23)
CALCIUM SERPL-MCNC: 10.1 MG/DL (ref 8.7–10.5)
CHLORIDE SERPL-SCNC: 107 MMOL/L (ref 95–110)
CO2 SERPL-SCNC: 23 MMOL/L (ref 23–29)
CREAT SERPL-MCNC: 1 MG/DL (ref 0.5–1.4)
CRP SERPL-MCNC: 6.5 MG/L (ref 0–8.2)
DIFFERENTIAL METHOD: ABNORMAL
EOSINOPHIL # BLD AUTO: 0.1 K/UL (ref 0–0.5)
EOSINOPHIL NFR BLD: 2.3 % (ref 0–8)
ERYTHROCYTE [DISTWIDTH] IN BLOOD BY AUTOMATED COUNT: 14 % (ref 11.5–14.5)
ERYTHROCYTE [SEDIMENTATION RATE] IN BLOOD BY PHOTOMETRIC METHOD: 52 MM/HR (ref 0–36)
EST. GFR  (NO RACE VARIABLE): 57.3 ML/MIN/1.73 M^2
FERRITIN SERPL-MCNC: 22 NG/ML (ref 20–300)
GLUCOSE SERPL-MCNC: 78 MG/DL (ref 70–110)
HCT VFR BLD AUTO: 28 % (ref 37–48.5)
HGB BLD-MCNC: 8.4 G/DL (ref 12–16)
IMM GRANULOCYTES # BLD AUTO: 0.01 K/UL (ref 0–0.04)
IMM GRANULOCYTES NFR BLD AUTO: 0.2 % (ref 0–0.5)
IRON SERPL-MCNC: 31 UG/DL (ref 30–160)
LYMPHOCYTES # BLD AUTO: 0.9 K/UL (ref 1–4.8)
LYMPHOCYTES NFR BLD: 20.9 % (ref 18–48)
MCH RBC QN AUTO: 28.8 PG (ref 27–31)
MCHC RBC AUTO-ENTMCNC: 30 G/DL (ref 32–36)
MCV RBC AUTO: 96 FL (ref 82–98)
MONOCYTES # BLD AUTO: 0.4 K/UL (ref 0.3–1)
MONOCYTES NFR BLD: 9.5 % (ref 4–15)
NEUTROPHILS # BLD AUTO: 2.9 K/UL (ref 1.8–7.7)
NEUTROPHILS NFR BLD: 66.6 % (ref 38–73)
NRBC BLD-RTO: 0 /100 WBC
PLATELET # BLD AUTO: 215 K/UL (ref 150–450)
PMV BLD AUTO: 11.5 FL (ref 9.2–12.9)
POTASSIUM SERPL-SCNC: 4.3 MMOL/L (ref 3.5–5.1)
PROT SERPL-MCNC: 7.2 G/DL (ref 6–8.4)
RBC # BLD AUTO: 2.92 M/UL (ref 4–5.4)
SATURATED IRON: 9 % (ref 20–50)
SODIUM SERPL-SCNC: 144 MMOL/L (ref 136–145)
TOTAL IRON BINDING CAPACITY: 363 UG/DL (ref 250–450)
TRANSFERRIN SERPL-MCNC: 245 MG/DL (ref 200–375)
TSH SERPL DL<=0.005 MIU/L-ACNC: 1.08 UIU/ML (ref 0.4–4)
WBC # BLD AUTO: 4.31 K/UL (ref 3.9–12.7)

## 2023-08-08 PROCEDURE — 99999 PR PBB SHADOW E&M-EST. PATIENT-LVL V: ICD-10-PCS | Mod: PBBFAC,,, | Performed by: INTERNAL MEDICINE

## 2023-08-08 PROCEDURE — 80053 COMPREHEN METABOLIC PANEL: CPT | Performed by: INTERNAL MEDICINE

## 2023-08-08 PROCEDURE — 86140 C-REACTIVE PROTEIN: CPT | Performed by: INTERNAL MEDICINE

## 2023-08-08 PROCEDURE — 99214 OFFICE O/P EST MOD 30 MIN: CPT | Mod: S$PBB,,, | Performed by: INTERNAL MEDICINE

## 2023-08-08 PROCEDURE — 84466 ASSAY OF TRANSFERRIN: CPT | Performed by: INTERNAL MEDICINE

## 2023-08-08 PROCEDURE — 99214 PR OFFICE/OUTPT VISIT, EST, LEVL IV, 30-39 MIN: ICD-10-PCS | Mod: S$PBB,,, | Performed by: INTERNAL MEDICINE

## 2023-08-08 PROCEDURE — 82306 VITAMIN D 25 HYDROXY: CPT | Performed by: INTERNAL MEDICINE

## 2023-08-08 PROCEDURE — 36415 COLL VENOUS BLD VENIPUNCTURE: CPT | Performed by: INTERNAL MEDICINE

## 2023-08-08 PROCEDURE — 85652 RBC SED RATE AUTOMATED: CPT | Performed by: INTERNAL MEDICINE

## 2023-08-08 PROCEDURE — 85025 COMPLETE CBC W/AUTO DIFF WBC: CPT | Performed by: INTERNAL MEDICINE

## 2023-08-08 PROCEDURE — 99215 OFFICE O/P EST HI 40 MIN: CPT | Mod: PBBFAC | Performed by: INTERNAL MEDICINE

## 2023-08-08 PROCEDURE — 82728 ASSAY OF FERRITIN: CPT | Performed by: INTERNAL MEDICINE

## 2023-08-08 PROCEDURE — 99999 PR PBB SHADOW E&M-EST. PATIENT-LVL V: CPT | Mod: PBBFAC,,, | Performed by: INTERNAL MEDICINE

## 2023-08-08 PROCEDURE — 84443 ASSAY THYROID STIM HORMONE: CPT | Performed by: INTERNAL MEDICINE

## 2023-08-08 RX ORDER — BETAMETHASONE DIPROPIONATE 0.5 MG/G
CREAM TOPICAL 2 TIMES DAILY
Qty: 90 G | Refills: 3 | Status: SHIPPED | OUTPATIENT
Start: 2023-08-08

## 2023-08-08 RX ORDER — SPIRONOLACTONE 25 MG/1
25 TABLET ORAL DAILY
Qty: 90 TABLET | Refills: 4 | Status: SHIPPED | OUTPATIENT
Start: 2023-08-08

## 2023-08-08 NOTE — PROGRESS NOTES
CHIEF COMPLAINT: Follow up of multiple problems     HISTORY OF PRESENT ILLNESS: 77-year-old woman presents for follow up of above    She has had a rash, abdomen, arms, legs, hands and one spot on the chest for last 2 weeks. Does not really itch.  She started Super Fatted Basis soap yesterday. She has a similar rash intermittently and had a big outbreak  years ago and saw Dr Herrera in [facility]erm 11/3/2017 and was diagnosed with erythema annulare centrifugum - biopsy was done at that time- SUBCORNEAL PUSTULAR DERMATITIS WITH ASSOCIATED EPIDERMAL SPONGIOSIS (     She has had a URI for the last 2 weeks. Se went to urgent care a week ago. COVID negative. She took a Z pack, Claritin and tessalon perles. She is slightly better. She is till coughing. She has white mucous.  She has a scratchy throat.   No shortness of breath. No wheezing. No fever or chills.      She had a bunionectomy and hammertoe surgery on 7/8/21 with Dr Bunn. She has seen Dr Jasso who wants to redo the survery.  Dr Jasso gave her injections in her toes which helped with the pain. She cannot wear closed shoes. Has not had the surgery yet.      No gout attacks.  She takes allopurinol 100 mg 1 tablet nightly.      She has been taking Linzess 145 mg every day or other day. She  watery stool. She has a BM every other day.  If she does not take the Linzess, she is severely constipated.  No cramping or pain with the LInzess.     She has muscle spasms in her neck on 4/27/23.  She went to physical therapy which has helped. She is doing exercises at home.       Anxiety is controlled on Lexapro to 10 mg one tablet daily.       Heartburn is congroled on omperazole 20 mg twice daily     She continues to take Crestor 20 mg 1 tablet daily with co enzyme 10 200 mg  daily - leg achintess is better with lower dose of crestor. She also takes losartan 50 mg twice daily, and spironolactone 25 mg daily. BP has been doing well.        She had a normal cystoscope 12/17/14 due to  recurrent UTI. No dysuria or hematuria now. She is no longer using estrogen vaginal cream for vaginal atrophy three times weekly       She is NOT taking vitamin D 50,000 units twice weekly     SHe is NOT taking Vitron C once daily for her anemia. She takes one a day women's vitamin sporadically     Colonoscopy was cancelled on 3/30/20 due to COVID pandemic.      She takes tylenol arthritis 650 mg 2 tablets twice daily as needed which helps her joint pain.  She did have some meloxicam that did help. She cannot take NSAIDS long term due to her stomach issues.      PAST MEDICAL HISTORY:   1. Hypertension.   2. Hyperlipidemia.   3. Gout.   4. History of rashes.   5. TIA in   6. Fibrocystic breast disease.   7. Postmenopausal.   8. Osteoarthritis.   9. History of tobacco usage; quit in .   10. History of colon polyps; normal colonoscopy in 10/07 and normal 2010, due    11. Normal bone mineral density scan in .   12. Anxiety and depression - controlled on lorazepam very rarely.   13. Varicose veins.   14. Intermittent GERD.   15. Iron deficiency anemia 2010 - due to erosive gastropathy on EGD 2010     PAST SURGICAL HISTORY:   1. Right carotid endarterectomy in .   2. Breast reduction surgery.   3. Total abdominal hysterectomy.   4. Blood transfusion prior to .     SOCIAL HISTORY: Quit smoking in ; smoked 1 PPD since age 16. No   alcohol use.     FAMILY HISTORY:   Father had gout, hypertension and heart failure; is . Mother had hypertension and pancreatic cancer; is also . Has five brothers, one of whom  in a motor vehicle accident. All have gout and hypertension. One brother has Crohn's disease; another has bladder cancer.     MEDICATIONS and ALLERGIES: Updated on epic.     PHYSICAL EXAMINATION:           General: Alert, oriented. No apparent distress. Affect within normal   Limits.   Conjunctivae anicteric. Tympanic membranes clear. Oropharynx clear.   Neck supple.    Respiratory effort normal. Lungs clear  Heart: Regular rate and rhythm without murmurs, gallops or rubs.   No lower extremity edema.   ABDOMEN: soft, non distended, non tender, bowel sounds present, no hepatosplenomgaly  BREAST: no abn seen, no nodules palpated, no axillary lad     Labs 2/9/23 reviewed and stable           ASSESSMENT AND PLAN:    1.  Rash - betamethasone cream twice daily. Has an apt in derm  2.  Essential hypertension - stable   3. Heme positive stools - saw Dr Kingsley 3/2023 - to have scope 9/18/23  4. Hyperlipidemia - on crestor to 20 mg 1 tablet daily.    5. Erosive gastropathy - on omeprazole to 40 mg twice daily.  6. Hypercalcemia - stable  7. Gout -  on allopurinol.  8. Situational stress/anxiety/mood disorder -stable lexapro to 10 mg 1 tablet daily. Sleeping better. Has not neeed xanax   8.  Carotid artery disease - on risk factor modification.   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 Constipation - on linzess  11. Neck pain -better  12. Anemia - make sure get ir on in  13. OA knee - to orth for injection  Screening - Colonscopy 10/15 with 4 polyps - it was incomplete. Repeat 5/26/16 - divericulosis otherwise normal  Needs repeat - ordered . MMG 7/2022 BMD normal January 2022.  I will see her back in 4 months sooner if problems arise

## 2023-08-14 ENCOUNTER — HOSPITAL ENCOUNTER (OUTPATIENT)
Dept: RADIOLOGY | Facility: OTHER | Age: 78
Discharge: HOME | End: 2023-08-14
Attending: INTERNAL MEDICINE
Payer: MEDICARE

## 2023-08-14 DIAGNOSIS — Z12.31 SCREENING MAMMOGRAM FOR BREAST CANCER: ICD-10-CM

## 2023-08-14 PROCEDURE — 77067 MAMMO DIGITAL SCREENING BILAT WITH TOMO: ICD-10-PCS | Mod: 26,,, | Performed by: RADIOLOGY

## 2023-08-14 PROCEDURE — 77067 SCR MAMMO BI INCL CAD: CPT | Mod: TC

## 2023-08-14 PROCEDURE — 77067 SCR MAMMO BI INCL CAD: CPT | Mod: 26,,, | Performed by: RADIOLOGY

## 2023-08-14 PROCEDURE — 77063 MAMMO DIGITAL SCREENING BILAT WITH TOMO: ICD-10-PCS | Mod: 26,,, | Performed by: RADIOLOGY

## 2023-08-14 PROCEDURE — 77063 BREAST TOMOSYNTHESIS BI: CPT | Mod: 26,,, | Performed by: RADIOLOGY

## 2023-08-15 ENCOUNTER — OFFICE VISIT (OUTPATIENT)
Dept: ORTHOPEDICS | Facility: CLINIC | Age: 78
End: 2023-08-15
Payer: MEDICARE

## 2023-08-15 VITALS — HEIGHT: 65 IN | WEIGHT: 164.38 LBS | BODY MASS INDEX: 27.39 KG/M2

## 2023-08-15 DIAGNOSIS — M17.0 PRIMARY OSTEOARTHRITIS OF BOTH KNEES: Primary | ICD-10-CM

## 2023-08-15 PROCEDURE — 99999PBSHW PR PBB SHADOW TECHNICAL ONLY FILED TO HB: ICD-10-PCS | Mod: PBBFAC,,,

## 2023-08-15 PROCEDURE — 20610 LARGE JOINT ASPIRATION/INJECTION: BILATERAL KNEE: ICD-10-PCS | Mod: 50,S$PBB,, | Performed by: PHYSICIAN ASSISTANT

## 2023-08-15 PROCEDURE — 99214 OFFICE O/P EST MOD 30 MIN: CPT | Mod: PBBFAC,25 | Performed by: PHYSICIAN ASSISTANT

## 2023-08-15 PROCEDURE — 99214 OFFICE O/P EST MOD 30 MIN: CPT | Mod: S$PBB,25,, | Performed by: PHYSICIAN ASSISTANT

## 2023-08-15 PROCEDURE — 99214 PR OFFICE/OUTPT VISIT, EST, LEVL IV, 30-39 MIN: ICD-10-PCS | Mod: S$PBB,25,, | Performed by: PHYSICIAN ASSISTANT

## 2023-08-15 PROCEDURE — 99999 PR PBB SHADOW E&M-EST. PATIENT-LVL IV: ICD-10-PCS | Mod: PBBFAC,,, | Performed by: PHYSICIAN ASSISTANT

## 2023-08-15 PROCEDURE — 20610 DRAIN/INJ JOINT/BURSA W/O US: CPT | Mod: 50,S$PBB,, | Performed by: PHYSICIAN ASSISTANT

## 2023-08-15 PROCEDURE — 20610 DRAIN/INJ JOINT/BURSA W/O US: CPT | Mod: 50,PBBFAC | Performed by: PHYSICIAN ASSISTANT

## 2023-08-15 PROCEDURE — 99999 PR PBB SHADOW E&M-EST. PATIENT-LVL IV: CPT | Mod: PBBFAC,,, | Performed by: PHYSICIAN ASSISTANT

## 2023-08-15 PROCEDURE — 99999PBSHW PR PBB SHADOW TECHNICAL ONLY FILED TO HB: Mod: PBBFAC,,,

## 2023-08-15 RX ORDER — TRIAMCINOLONE ACETONIDE 40 MG/ML
40 INJECTION, SUSPENSION INTRA-ARTICULAR; INTRAMUSCULAR
Status: DISCONTINUED | OUTPATIENT
Start: 2023-08-15 | End: 2023-08-15 | Stop reason: HOSPADM

## 2023-08-15 RX ORDER — LIDOCAINE HYDROCHLORIDE 10 MG/ML
2 INJECTION INFILTRATION; PERINEURAL
Status: DISCONTINUED | OUTPATIENT
Start: 2023-08-15 | End: 2023-08-15 | Stop reason: HOSPADM

## 2023-08-15 RX ADMIN — LIDOCAINE HYDROCHLORIDE 2 ML: 10 INJECTION, SOLUTION INFILTRATION; PERINEURAL at 02:08

## 2023-08-15 RX ADMIN — TRIAMCINOLONE ACETONIDE 40 MG: 40 INJECTION, SUSPENSION INTRA-ARTICULAR; INTRAMUSCULAR at 02:08

## 2023-08-15 NOTE — PROCEDURES
Large Joint Aspiration/Injection: bilateral knee    Date/Time: 8/15/2023 2:00 PM    Performed by: Olga Salinas PA-C  Authorized by: Olga Salinas PA-C    Consent Done?:  Yes (Verbal)  Indications:  Pain  Prep: patient was prepped and draped in usual sterile fashion    Local anesthetic:  Topical anesthetic    Details:  Needle Size:  22 G  Approach:  Anterolateral  Location:  Knee  Laterality:  Bilateral  Site:  Bilateral knee  Medications (Right):  40 mg triamcinolone acetonide 40 mg/mL; 2 mL LIDOcaine HCL 10 mg/ml (1%) 10 mg/mL (1 %)  Medications (Left):  40 mg triamcinolone acetonide 40 mg/mL; 2 mL LIDOcaine HCL 10 mg/ml (1%) 10 mg/mL (1 %)  Patient tolerance:  Patient tolerated the procedure well with no immediate complications

## 2023-08-15 NOTE — PROGRESS NOTES
SUBJECTIVE:     Chief Complaint   Patient presents with    Right Knee - Pain, Injections    Left Knee - Pain, Injections       History of Present Illness:  Isael Salmon is a 77 y.o. year old female here with complaints of constant bilateral knee pain which started several days ago.  There is not a history of trauma.  The pain is located in the global aspect of the knee.  The pain is described as achy.  There is not radiation.  There is not catching or locking.  Aggravating factors include going up and down stairs, standing, and walking.  Associated symptoms include swelling.  Previous treatments include rest and tylenol which have provided minimal relief.  There is not a history of previous injury or surgery to the knee.  She has had injections in her knees previously. The patient does not use an assistive device.    Review of patient's allergies indicates:   Allergen Reactions    Latex      Other reaction(s): Rash    Pcn [penicillins]      Other reaction(s): rash    Sulfa (sulfonamide antibiotics)      Other reaction(s): blisters    Avelox [moxifloxacin]      Other reaction(s): racing heart  Other reaction(s): shakes    Ciprofloxacin Other (See Comments)     Room starts to spin , nausea and dizziness    Codeine      Other reaction(s): nausea  Other reaction(s): weakness         Current Outpatient Medications   Medication Sig Dispense Refill    acetaminophen (TYLENOL) 325 MG tablet Take 2 tablets (650 mg total) by mouth every 4 (four) hours as needed for Pain (Fever). 30 tablet 1    allopurinoL (ZYLOPRIM) 100 MG tablet TAKE 2 TABLETS(200 MG) BY MOUTH EVERY  tablet 2    ALPRAZolam (XANAX) 0.25 MG tablet Take 1 tablet (0.25 mg total) by mouth 2 (two) times daily as needed for Anxiety. 30 tablet 0    ascorbic acid, vitamin C, (VITAMIN C) 500 MG tablet Take 500 mg by mouth once daily.      aspirin (ECOTRIN) 81 MG EC tablet Take 81 mg by mouth once daily.       b complex vitamins capsule Take 1 capsule by  mouth once daily.      betamethasone dipropionate 0.05 % cream Apply topically 2 (two) times daily. 90 g 3    BIOTIN ORAL Take 1 tablet by mouth once daily.      co-enzyme Q-10 30 mg capsule Take 200 mg by mouth once daily.      ergocalciferol (ERGOCALCIFEROL) 50,000 unit Cap TAKE 1 CAPSULE BY MOUTH 2 TIMES A WEEK 24 capsule 5    EScitalopram oxalate (LEXAPRO) 10 MG tablet TAKE 1 TABLET(10 MG) BY MOUTH EVERY DAY 90 tablet 1    fluticasone (FLONASE) 50 mcg/actuation nasal spray 2 sprays by Each Nare route daily as needed. 2 Aerosol, Spray Nasal Every day 16 g 2    fluticasone propionate (FLONASE) 50 mcg/actuation nasal spray 1 spray (50 mcg total) by Each Nostril route once daily. 11.1 mL 0    FOLBIC 2.5-25-2 mg Tab TAKE 1 TABLET BY MOUTH EVERY DAY 30 tablet 11    indomethacin (INDOCIN) 50 MG capsule Take 1 capsule (50 mg total) by mouth 3 (three) times daily as needed. 30 capsule 3    ipratropium (ATROVENT) 0.02 % nebulizer solution Take 2.5 mLs (500 mcg total) by nebulization 3 (three) times daily as needed for Wheezing. 62.5 mL 0    linaCLOtide (LINZESS) 72 mcg Cap capsule Take 1 capsule (72 mcg total) by mouth before breakfast. 90 capsule 3    loratadine (CLARITIN) 10 mg tablet Take 1 tablet (10 mg total) by mouth once daily. 30 tablet 0    losartan (COZAAR) 50 MG tablet TAKE 1 TABLET(50 MG) BY MOUTH TWICE DAILY 180 tablet 3    multivit,calc,mins/iron/folic (ONE-A-DAY WOMENS FORMULA ORAL) Take 1 tablet by mouth once daily.      omeprazole (PRILOSEC) 40 MG capsule Take 1 capsule (40 mg total) by mouth 2 (two) times daily. 180 capsule 4    rosuvastatin (CRESTOR) 20 MG tablet TAKE 1 TABLET(20 MG) BY MOUTH EVERY DAY 90 tablet 3    spironolactone (ALDACTONE) 25 MG tablet Take 1 tablet (25 mg total) by mouth once daily. 90 tablet 4     No current facility-administered medications for this visit.       Past Medical History:   Diagnosis Date    Adjustment disorder 07/26/2012    Anemia 07/26/2012    AR (allergic rhinitis)  09/05/2014    Bronchitis     Cataract     Fibrocystic breast disease in female 07/26/2012    GERD (gastroesophageal reflux disease) 07/26/2012    Gout     Gout, arthritis 07/26/2012    History of blood transfusion 07/26/2012    History of colonic polyps 07/26/2012    Hypercalcemia 09/20/2012    Hyperlipidemia 07/26/2012    Hypertension 07/26/2012    Nuclear sclerosis 05/02/2014    Osteoarthritis 07/26/2012    Other hyperparathyroidism 09/20/2012    Postmenopausal status 07/26/2012    PVD (peripheral vascular disease) 07/26/2012    left leg laser of vein with injection    Stroke 1997    TIA    Superficial vein thrombosis     Transient ischemic attack 07/26/2012    Varicose veins 07/26/2012       Past Surgical History:   Procedure Laterality Date    BREAST CYST ASPIRATION Bilateral     multiple ones over the years    BREAST CYST EXCISION Left     BREAST SURGERY      breast reduction    broken second finger Right 12/2015    pins placed    carotid endarterectomy  1997    right    CAROTID ENDARTERECTOMY Right 1997    CATARACT EXTRACTION W/  INTRAOCULAR LENS IMPLANT Right 05/01/2018    Dr. Yost    CATARACT EXTRACTION W/  INTRAOCULAR LENS IMPLANT Left 05/15/2018    Dr. Lester    COLONOSCOPY N/A 10/26/2015    Procedure: COLONOSCOPY;  Surgeon: Manuel Alanis MD;  Location: [address]);  Service: Endoscopy;  Laterality: N/A;    CORRECTION OF HAMMER TOE Left 07/08/2021    Procedure: CORRECTION, HAMMER TOE Second toe;  Surgeon: Silver Bunn MD;  Location: [facility];  Service: Orthopedics;  Laterality: Left;  Ossiofiber hammertoe implant    EYE SURGERY      HYSTERECTOMY      ORIF FINGER FRACTURE  12/18/2015    SURGICAL REMOVAL OF BUNION WITH OSTEOTOMY OF METATARSAL BONE Left 07/08/2021    Procedure: BUNIONECTOMY, WITH METATARSAL OSTEOTOMY Proximal opening wedge;  Surgeon: Silver Bunn MD;  Location: [facility] OR;  Service: Orthopedics;  Laterality: Left;  Arthrex opening wedge plate    TIA      TOTAL  "REDUCTION MAMMOPLASTY Bilateral     VASCULAR SURGERY      left leg vein laser         Review of Systems:  ROS:  Constitutional: no fever or chills  Eyes: no visual changes  ENT: no nasal congestion or sore throat  Respiratory: no cough or shortness of breath  Musculoskeletal: no arthralgias or myalgias      OBJECTIVE:     PHYSICAL EXAM:  Height: 5' 5" (165.1 cm) Weight: 74.6 kg (164 lb 5.7 oz)   General: Well developed, well nourished, in no acute distress.  Neurological: Mood & affect are normal.  HEENT: NCAT, sclera nonicteric   Lungs: Respirations are equal and unlabored.   CV: 2+ bilateral upper and lower extremity pulses.   Skin: Intact throughout with no rashes, erythema, or lesions  Extremities: No LE edema, no erythema or warmth of the skin in either lower extremity.    bilateral Knee Exam:  Knee Range of Motion: 5-110 right 0-130 left   Effusion: none  Condition of skin: intact  Location of tenderness: popliteal fossa right, no TTP left   Strength: 5 of 5 quadriceps strength and 5 of 5 hamstring strength  Stability:  stable to testing  Varus /Valgus stress:  normal    Bilateral Hip Examination:  full painless range of motion, without tenderness    IMAGING:    X-rays of the bilateral knee, personally reviewed by me, demonstrate moderate degenerative changes with joint space narrowing, osteophyte formation and subchondral sclerosis.  No fracture or dislocation.      ASSESSMENT/PLAN:   77 y.o. year old female with bilateral knee osteoarthritis    Plan: We discussed with the patient at length all the different treatment options available for the knee including NSAIDS, acetaminophen, rest, ice, knee strengthening exercise, steroid injections for temporary relief, Viscosupplementation, and knee arthroplasty.   - Bilateral knee CSI today  - Rest, ice as needed  - Follow up if symptoms worsen or fail to improve       "

## 2023-08-23 ENCOUNTER — OFFICE VISIT (OUTPATIENT)
Dept: DERMATOLOGY | Facility: CLINIC | Age: 78
End: 2023-08-23
Payer: MEDICARE

## 2023-08-23 VITALS — WEIGHT: 164 LBS | BODY MASS INDEX: 27.29 KG/M2

## 2023-08-23 DIAGNOSIS — L30.9 DERMATITIS: Primary | ICD-10-CM

## 2023-08-23 DIAGNOSIS — R21 RASH: ICD-10-CM

## 2023-08-23 PROCEDURE — 99999 PR PBB SHADOW E&M-EST. PATIENT-LVL III: ICD-10-PCS | Mod: PBBFAC,,, | Performed by: DERMATOLOGY

## 2023-08-23 PROCEDURE — 99202 OFFICE O/P NEW SF 15 MIN: CPT | Mod: S$PBB,,, | Performed by: DERMATOLOGY

## 2023-08-23 PROCEDURE — 99999 PR PBB SHADOW E&M-EST. PATIENT-LVL III: CPT | Mod: PBBFAC,,, | Performed by: DERMATOLOGY

## 2023-08-23 PROCEDURE — 99202 PR OFFICE/OUTPT VISIT, NEW, LEVL II, 15-29 MIN: ICD-10-PCS | Mod: S$PBB,,, | Performed by: DERMATOLOGY

## 2023-08-23 PROCEDURE — 99213 OFFICE O/P EST LOW 20 MIN: CPT | Mod: PBBFAC,PO | Performed by: DERMATOLOGY

## 2023-08-23 NOTE — PROGRESS NOTES
Subjective:      Patient ID:  Isael Salmon is a 77 y.o. female who presents for   Chief Complaint   Patient presents with    Follow-up     Follow up dermatitis, bx showed possible SCPD.  Last visit 5 years ago had been clear until she went to [place] to see sick relative, flared on arms and legs but has cleared with diprolene cream.  Using cetaphil lotion and cleanser now doing well     Rash - Initial  Affected locations: face, left arm, right arm, chest, torso, back, abdomen, left upper leg, right upper leg, right lower leg and left lower leg  Signs / symptoms: asymptomatic      Review of Systems   Constitutional:  Negative for fever, chills, weight loss, weight gain, fatigue, night sweats and malaise.   Skin:  Positive for rash. Negative for daily sunscreen use, activity-related sunscreen use and wears hat.   Hematologic/Lymphatic: Does not bruise/bleed easily.       Objective:   Physical Exam   Constitutional: She appears well-developed and well-nourished.   Neurological: She is alert and oriented to person, place, and time.   Psychiatric: She has a normal mood and affect.   Skin:   Areas Examined (abnormalities noted in diagram):   RUE Inspected  LUE Inspection Performed  RLE Inspected  LLE Inspection Performed            Diagram Legend     Erythematous scaling macule/papule c/w actinic keratosis       Vascular papule c/w angioma      Pigmented verrucoid papule/plaque c/w seborrheic keratosis      Yellow umbilicated papule c/w sebaceous hyperplasia      Irregularly shaped tan macule c/w lentigo     1-2 mm smooth white papules consistent with Milia      Movable subcutaneous cyst with punctum c/w epidermal inclusion cyst      Subcutaneous movable cyst c/w pilar cyst      Firm pink to brown papule c/w dermatofibroma      Pedunculated fleshy papule(s) c/w skin tag(s)      Evenly pigmented macule c/w junctional nevus     Mildly variegated pigmented, slightly irregular-bordered macule c/w mildly atypical  nevus      Flesh colored to evenly pigmented papule c/w intradermal nevus       Pink pearly papule/plaque c/w basal cell carcinoma      Erythematous hyperkeratotic cursted plaque c/w SCC      Surgical scar with no sign of skin cancer recurrence      Open and closed comedones      Inflammatory papules and pustules      Verrucoid papule consistent consistent with wart     Erythematous eczematous patches and plaques     Dystrophic onycholytic nail with subungual debris c/w onychomycosis     Umbilicated papule    Erythematous-base heme-crusted tan verrucoid plaque consistent with inflamed seborrheic keratosis     Erythematous Silvery Scaling Plaque c/w Psoriasis     See annotation      Assessment / Plan:        Dermatitis, in remission   Cont cetaphil lotion and cleanser  Call if recurs              Follow up if symptoms worsen or fail to improve.

## 2023-08-27 ENCOUNTER — PATIENT MESSAGE (OUTPATIENT)
Dept: INTERNAL MEDICINE | Facility: CLINIC | Age: 78
End: 2023-08-27
Payer: MEDICARE

## 2023-08-27 DIAGNOSIS — D64.9 ANEMIA, UNSPECIFIED TYPE: Primary | ICD-10-CM

## 2023-08-27 NOTE — TELEPHONE ENCOUNTER
Please notify pt  Your anemia is slightly worse on blood work on 8/8/23 - DR DON wants to repeat your blood counts in several weeks.    Please book CBC for 3-4 weeks

## 2023-08-28 ENCOUNTER — PATIENT MESSAGE (OUTPATIENT)
Dept: INTERNAL MEDICINE | Facility: CLINIC | Age: 78
End: 2023-08-28
Payer: MEDICARE

## 2023-08-31 ENCOUNTER — EXTERNAL CHRONIC CARE MANAGEMENT (OUTPATIENT)
Dept: PRIMARY CARE CLINIC | Facility: CLINIC | Age: 78
End: 2023-08-31
Payer: MEDICARE

## 2023-08-31 PROCEDURE — 99490 CHRNC CARE MGMT STAFF 1ST 20: CPT | Mod: S$PBB,,, | Performed by: INTERNAL MEDICINE

## 2023-08-31 PROCEDURE — 99490 PR CHRONIC CARE MGMT, 1ST 20 MIN: ICD-10-PCS | Mod: S$PBB,,, | Performed by: INTERNAL MEDICINE

## 2023-08-31 PROCEDURE — 99490 CHRNC CARE MGMT STAFF 1ST 20: CPT | Mod: PBBFAC | Performed by: INTERNAL MEDICINE

## 2023-09-13 ENCOUNTER — TELEPHONE (OUTPATIENT)
Dept: ENDOSCOPY | Facility: HOSPITAL | Age: 78
End: 2023-09-13
Payer: MEDICARE

## 2023-09-13 NOTE — TELEPHONE ENCOUNTER
----- Message from Matheus Steve sent at 9/13/2023 12:00 PM CDT -----  Regarding: Pt advice  Contact: [phone]  Pt scheduled for an a procedure 9/18 and would like direction on how to take the liquid medication. Please call

## 2023-09-14 ENCOUNTER — OFFICE VISIT (OUTPATIENT)
Dept: OPHTHALMOLOGY | Facility: CLINIC | Age: 78
End: 2023-09-14
Payer: MEDICARE

## 2023-09-14 DIAGNOSIS — H04.123 DRY EYE SYNDROME OF BOTH EYES: ICD-10-CM

## 2023-09-14 DIAGNOSIS — H00.011 HORDEOLUM EXTERNUM OF RIGHT UPPER EYELID: ICD-10-CM

## 2023-09-14 DIAGNOSIS — H00.11 CHALAZION OF RIGHT UPPER EYELID: Primary | ICD-10-CM

## 2023-09-14 DIAGNOSIS — Z96.1 PSEUDOPHAKIA: ICD-10-CM

## 2023-09-14 PROCEDURE — 99999 PR PBB SHADOW E&M-EST. PATIENT-LVL IV: CPT | Mod: PBBFAC,,, | Performed by: OPHTHALMOLOGY

## 2023-09-14 PROCEDURE — 99999 PR PBB SHADOW E&M-EST. PATIENT-LVL IV: ICD-10-PCS | Mod: PBBFAC,,, | Performed by: OPHTHALMOLOGY

## 2023-09-14 PROCEDURE — 99214 PR OFFICE/OUTPT VISIT, EST, LEVL IV, 30-39 MIN: ICD-10-PCS | Mod: S$PBB,,, | Performed by: OPHTHALMOLOGY

## 2023-09-14 PROCEDURE — 99214 OFFICE O/P EST MOD 30 MIN: CPT | Mod: PBBFAC | Performed by: OPHTHALMOLOGY

## 2023-09-14 PROCEDURE — 99214 OFFICE O/P EST MOD 30 MIN: CPT | Mod: S$PBB,,, | Performed by: OPHTHALMOLOGY

## 2023-09-14 RX ORDER — GENTAMICIN SULFATE 3 MG/ML
1 SOLUTION/ DROPS OPHTHALMIC EVERY 4 HOURS
Qty: 5 ML | Refills: 0 | Status: SHIPPED | OUTPATIENT
Start: 2023-09-14

## 2023-09-14 NOTE — TELEPHONE ENCOUNTER
Telephoned pt to follow up from telephone request,  no answer.  Voicemail message unable to be left due to mailbox being full.

## 2023-09-14 NOTE — PROGRESS NOTES
"HPI     Eye Problem            Comments: Pt c/o painful swollen RUL with discharge x 1-2 days          Comments    URGENT TODAY    Pt states that her RUL has been swollen and hurting with discharge x 1-2   days. Pt states it is "stuck together" with crust when she wakes up in the   mornings. Pt has been using OTC stye ointment and AT's but it is not   helping too much.          Last edited by Coby Hansen MA on 9/14/2023  1:50 PM.            Assessment /Plan     For exam results, see Encounter Report.    Chalazion of right upper eyelid    Hordeolum externum of right upper eyelid    Dry eye syndrome of both eyes    Pseudophakia          Rec warm compress // 4 x day   Ok to use "stye ointment"  Give Rx gent eye gtts - QID to right eye and eye lid  - (allergic to several other antibiotics)     F/U if no improvement in 1-2 weeks or if gets a lot bigger     "

## 2023-09-18 ENCOUNTER — ANESTHESIA EVENT (OUTPATIENT)
Dept: ENDOSCOPY | Facility: HOSPITAL | Age: 78
End: 2023-09-18
Payer: MEDICARE

## 2023-09-18 ENCOUNTER — ANESTHESIA (OUTPATIENT)
Dept: ENDOSCOPY | Facility: HOSPITAL | Age: 78
End: 2023-09-18
Payer: MEDICARE

## 2023-09-18 ENCOUNTER — HOSPITAL ENCOUNTER (OUTPATIENT)
Facility: HOSPITAL | Age: 78
Discharge: HOME | End: 2023-09-18
Attending: INTERNAL MEDICINE | Admitting: INTERNAL MEDICINE
Payer: MEDICARE

## 2023-09-18 VITALS
BODY MASS INDEX: 27.49 KG/M2 | HEART RATE: 77 BPM | TEMPERATURE: 98 F | SYSTOLIC BLOOD PRESSURE: 143 MMHG | RESPIRATION RATE: 17 BRPM | HEIGHT: 65 IN | WEIGHT: 165 LBS | OXYGEN SATURATION: 100 % | DIASTOLIC BLOOD PRESSURE: 67 MMHG

## 2023-09-18 DIAGNOSIS — Z86.010 HISTORY OF COLON POLYPS: ICD-10-CM

## 2023-09-18 DIAGNOSIS — K55.20 ANGIODYSPLASIA OF SMALL INTESTINE: Primary | ICD-10-CM

## 2023-09-18 PROCEDURE — D9220A PRA ANESTHESIA: Mod: CRNA,,, | Performed by: NURSE ANESTHETIST, CERTIFIED REGISTERED

## 2023-09-18 PROCEDURE — 27201089 HC SNARE, DISP (ANY): Performed by: INTERNAL MEDICINE

## 2023-09-18 PROCEDURE — 27201028 HC NEEDLE, SCLERO: Performed by: INTERNAL MEDICINE

## 2023-09-18 PROCEDURE — 44799 UNLISTED PX SMALL INTESTINE: CPT | Performed by: INTERNAL MEDICINE

## 2023-09-18 PROCEDURE — D9220A PRA ANESTHESIA: ICD-10-PCS | Mod: ANES,,, | Performed by: ANESTHESIOLOGY

## 2023-09-18 PROCEDURE — D9220A PRA ANESTHESIA: ICD-10-PCS | Mod: CRNA,,, | Performed by: NURSE ANESTHETIST, CERTIFIED REGISTERED

## 2023-09-18 PROCEDURE — 37000008 HC ANESTHESIA 1ST 15 MINUTES: Performed by: INTERNAL MEDICINE

## 2023-09-18 PROCEDURE — 88305 TISSUE EXAM BY PATHOLOGIST: CPT | Performed by: PATHOLOGY

## 2023-09-18 PROCEDURE — 44799 ENTEROSCOPY WITH SUBMUCOSAL INJECTIONS: ICD-10-PCS | Mod: 51,,, | Performed by: INTERNAL MEDICINE

## 2023-09-18 PROCEDURE — 25000003 PHARM REV CODE 250: Performed by: INTERNAL MEDICINE

## 2023-09-18 PROCEDURE — 44376 SMALL BOWEL ENDOSCOPY: CPT | Performed by: INTERNAL MEDICINE

## 2023-09-18 PROCEDURE — 63600175 PHARM REV CODE 636 W HCPCS: Performed by: NURSE ANESTHETIST, CERTIFIED REGISTERED

## 2023-09-18 PROCEDURE — 45385 PR COLONOSCOPY,REMV LESN,SNARE: ICD-10-PCS | Mod: PT,ICN,, | Performed by: INTERNAL MEDICINE

## 2023-09-18 PROCEDURE — 44799 UNLISTED PX SMALL INTESTINE: CPT | Mod: 51,,, | Performed by: INTERNAL MEDICINE

## 2023-09-18 PROCEDURE — 44376 SMALL BOWEL ENDOSCOPY: CPT | Mod: 51,ICN,, | Performed by: INTERNAL MEDICINE

## 2023-09-18 PROCEDURE — 44376 PR SB SCOPE,TO ILEUM,DIAGNOSTIC: ICD-10-PCS | Mod: 51,ICN,, | Performed by: INTERNAL MEDICINE

## 2023-09-18 PROCEDURE — 88305 TISSUE EXAM BY PATHOLOGIST: ICD-10-PCS | Mod: 26,,, | Performed by: PATHOLOGY

## 2023-09-18 PROCEDURE — 45385 COLONOSCOPY W/LESION REMOVAL: CPT | Mod: PT,ICN,, | Performed by: INTERNAL MEDICINE

## 2023-09-18 PROCEDURE — D9220A PRA ANESTHESIA: Mod: ANES,,, | Performed by: ANESTHESIOLOGY

## 2023-09-18 PROCEDURE — 45385 COLONOSCOPY W/LESION REMOVAL: CPT | Performed by: INTERNAL MEDICINE

## 2023-09-18 PROCEDURE — 27201030 HC OVERTUBE: Performed by: INTERNAL MEDICINE

## 2023-09-18 PROCEDURE — 37000009 HC ANESTHESIA EA ADD 15 MINS: Performed by: INTERNAL MEDICINE

## 2023-09-18 PROCEDURE — 88305 TISSUE EXAM BY PATHOLOGIST: CPT | Mod: 26,,, | Performed by: PATHOLOGY

## 2023-09-18 PROCEDURE — 25000003 PHARM REV CODE 250: Performed by: NURSE ANESTHETIST, CERTIFIED REGISTERED

## 2023-09-18 RX ORDER — ONDANSETRON 4 MG/1
4 TABLET, ORALLY DISINTEGRATING ORAL ONCE
Status: COMPLETED | OUTPATIENT
Start: 2023-09-18 | End: 2023-09-18

## 2023-09-18 RX ORDER — LIDOCAINE HYDROCHLORIDE 20 MG/ML
INJECTION INTRAVENOUS
Status: DISCONTINUED | OUTPATIENT
Start: 2023-09-18 | End: 2023-09-18

## 2023-09-18 RX ORDER — PROPOFOL 10 MG/ML
VIAL (ML) INTRAVENOUS
Status: DISCONTINUED | OUTPATIENT
Start: 2023-09-18 | End: 2023-09-18

## 2023-09-18 RX ORDER — ONDANSETRON 2 MG/ML
4 INJECTION INTRAMUSCULAR; INTRAVENOUS DAILY PRN
Status: DISCONTINUED | OUTPATIENT
Start: 2023-09-18 | End: 2023-09-18 | Stop reason: HOSPADM

## 2023-09-18 RX ORDER — FENTANYL CITRATE 50 UG/ML
25 INJECTION, SOLUTION INTRAMUSCULAR; INTRAVENOUS EVERY 5 MIN PRN
Status: DISCONTINUED | OUTPATIENT
Start: 2023-09-18 | End: 2023-09-18 | Stop reason: HOSPADM

## 2023-09-18 RX ORDER — FENTANYL CITRATE 50 UG/ML
INJECTION, SOLUTION INTRAMUSCULAR; INTRAVENOUS
Status: DISCONTINUED | OUTPATIENT
Start: 2023-09-18 | End: 2023-09-18

## 2023-09-18 RX ORDER — PROPOFOL 10 MG/ML
VIAL (ML) INTRAVENOUS CONTINUOUS PRN
Status: DISCONTINUED | OUTPATIENT
Start: 2023-09-18 | End: 2023-09-18

## 2023-09-18 RX ORDER — SODIUM CHLORIDE 9 MG/ML
INJECTION, SOLUTION INTRAVENOUS CONTINUOUS
Status: DISCONTINUED | OUTPATIENT
Start: 2023-09-18 | End: 2023-09-18 | Stop reason: HOSPADM

## 2023-09-18 RX ORDER — PHENYLEPHRINE HYDROCHLORIDE 10 MG/ML
INJECTION INTRAVENOUS
Status: DISCONTINUED | OUTPATIENT
Start: 2023-09-18 | End: 2023-09-18

## 2023-09-18 RX ADMIN — PROPOFOL 150 MCG/KG/MIN: 10 INJECTION, EMULSION INTRAVENOUS at 08:09

## 2023-09-18 RX ADMIN — LIDOCAINE HYDROCHLORIDE 100 MG: 20 INJECTION INTRAVENOUS at 08:09

## 2023-09-18 RX ADMIN — PROPOFOL 10 MG: 10 INJECTION, EMULSION INTRAVENOUS at 08:09

## 2023-09-18 RX ADMIN — PHENYLEPHRINE HYDROCHLORIDE 200 MCG: 10 INJECTION INTRAVENOUS at 09:09

## 2023-09-18 RX ADMIN — ONDANSETRON 4 MG: 4 TABLET, ORALLY DISINTEGRATING ORAL at 10:09

## 2023-09-18 RX ADMIN — FENTANYL CITRATE 12.5 MCG: 0.05 INJECTION, SOLUTION INTRAMUSCULAR; INTRAVENOUS at 08:09

## 2023-09-18 RX ADMIN — FENTANYL CITRATE 25 MCG: 0.05 INJECTION, SOLUTION INTRAMUSCULAR; INTRAVENOUS at 08:09

## 2023-09-18 RX ADMIN — PHENYLEPHRINE HYDROCHLORIDE 300 MCG: 10 INJECTION INTRAVENOUS at 09:09

## 2023-09-18 RX ADMIN — FENTANYL CITRATE 50 MCG: 0.05 INJECTION, SOLUTION INTRAMUSCULAR; INTRAVENOUS at 08:09

## 2023-09-18 RX ADMIN — SODIUM CHLORIDE: 0.9 INJECTION, SOLUTION INTRAVENOUS at 08:09

## 2023-09-18 RX ADMIN — PROPOFOL 20 MG: 10 INJECTION, EMULSION INTRAVENOUS at 08:09

## 2023-09-18 NOTE — PROVATION PATIENT INSTRUCTIONS
Discharge Summary/Instructions after an Endoscopic Procedure  Patient Name: Isael Salmon  Patient MRN: [id number]  Patient YOB: 1945  Monday, September 18, 2023  Jaziel Kingsley MD  Dear patient,  As a result of recent federal legislation (The Federal Cures Act), you may   receive lab or pathology results from your procedure in your [facility]   account before your physician is able to contact you. Your physician or   their representative will relay the results to you with their   recommendations at their soonest availability.  Thank you,  RESTRICTIONS:  During your procedure today, you received medications for sedation.  These   medications may affect your judgment, balance and coordination.  Therefore,   for 24 hours, you have the following restrictions:   - DO NOT drive a car, operate machinery, make legal/financial decisions,   sign important papers or drink alcohol.    ACTIVITY:  Today: no heavy lifting, straining or running due to procedural   sedation/anesthesia.  The following day: return to full activity including work.  DIET:  Eat and drink normally unless instructed otherwise.     TREATMENT FOR COMMON SIDE EFFECTS:  - Mild abdominal pain, nausea, belching, bloating or excessive gas:  rest,   eat lightly and use a heating pad.  - Sore Throat: treat with throat lozenges and/or gargle with warm salt   water.  - Because air was used during the procedure, expelling large amounts of air   from your rectum or belching is normal.  - If a bowel prep was taken, you may not have a bowel movement for 1-3 days.    This is normal.  SYMPTOMS TO WATCH FOR AND REPORT TO YOUR PHYSICIAN:  1. Abdominal pain or bloating, other than gas cramps.  2. Chest pain.  3. Back pain.  4. Signs of infection such as: chills or fever occurring within 24 hours   after the procedure.  5. Rectal bleeding, which would show as bright red, maroon, or black stools.   (A tablespoon of blood from the rectum is not serious,  especially if   hemorrhoids are present.)  6. Vomiting.  7. Weakness or dizziness.  GO DIRECTLY TO THE NEAREST EMERGENCY ROOM IF YOU HAVE ANY OF THE FOLLOWING:      Difficulty breathing              Chills and/or fever over 101 F   Persistent vomiting and/or vomiting blood   Severe abdominal pain   Severe chest pain   Black, tarry stools   Bleeding- more than one tablespoon   Any other symptom or condition that you feel may need urgent attention  Your doctor recommends these additional instructions:  If any biopsies were taken, your doctors clinic will contact you in 1 to 2   weeks with any results.  - Perform a colonoscopy now.   - See colonoscopy report for further recommendations.  For questions, problems or results please call your physician - Jaziel Kingsley MD at Work:  [phone].  [facility], EMERGENCY ROOM PHONE NUMBER: [phone]  IF A COMPLICATION OR EMERGENCY SITUATION ARISES AND YOU ARE UNABLE TO REACH   YOUR PHYSICIAN - GO DIRECTLY TO THE EMERGENCY ROOM.  Jaziel Kingsley MD  9/18/2023 9:41:08 AM  This report has been verified and signed electronically.  Dear patient,  As a result of recent federal legislation (The Federal Cures Act), you may   receive lab or pathology results from your procedure in your [facility]   account before your physician is able to contact you. Your physician or   their representative will relay the results to you with their   recommendations at their soonest availability.  Thank you,  PROVATION

## 2023-09-18 NOTE — PROVATION PATIENT INSTRUCTIONS
Discharge Summary/Instructions after an Endoscopic Procedure  Patient Name: Isael Salmon  Patient MRN: [id number]  Patient YOB: 1945  Monday, September 18, 2023  Jaziel Kingsley MD  Dear patient,  As a result of recent federal legislation (The Federal Cures Act), you may   receive lab or pathology results from your procedure in your [facility]   account before your physician is able to contact you. Your physician or   their representative will relay the results to you with their   recommendations at their soonest availability.  Thank you,  RESTRICTIONS:  During your procedure today, you received medications for sedation.  These   medications may affect your judgment, balance and coordination.  Therefore,   for 24 hours, you have the following restrictions:   - DO NOT drive a car, operate machinery, make legal/financial decisions,   sign important papers or drink alcohol.    ACTIVITY:  Today: no heavy lifting, straining or running due to procedural   sedation/anesthesia.  The following day: return to full activity including work.  DIET:  Eat and drink normally unless instructed otherwise.     TREATMENT FOR COMMON SIDE EFFECTS:  - Mild abdominal pain, nausea, belching, bloating or excessive gas:  rest,   eat lightly and use a heating pad.  - Sore Throat: treat with throat lozenges and/or gargle with warm salt   water.  - Because air was used during the procedure, expelling large amounts of air   from your rectum or belching is normal.  - If a bowel prep was taken, you may not have a bowel movement for 1-3 days.    This is normal.  SYMPTOMS TO WATCH FOR AND REPORT TO YOUR PHYSICIAN:  1. Abdominal pain or bloating, other than gas cramps.  2. Chest pain.  3. Back pain.  4. Signs of infection such as: chills or fever occurring within 24 hours   after the procedure.  5. Rectal bleeding, which would show as bright red, maroon, or black stools.   (A tablespoon of blood from the rectum is not serious,  especially if   hemorrhoids are present.)  6. Vomiting.  7. Weakness or dizziness.  GO DIRECTLY TO THE NEAREST EMERGENCY ROOM IF YOU HAVE ANY OF THE FOLLOWING:      Difficulty breathing              Chills and/or fever over 101 F   Persistent vomiting and/or vomiting blood   Severe abdominal pain   Severe chest pain   Black, tarry stools   Bleeding- more than one tablespoon   Any other symptom or condition that you feel may need urgent attention  Your doctor recommends these additional instructions:  If any biopsies were taken, your doctors clinic will contact you in 1 to 2   weeks with any results.  - Discharge patient to home.   - Patient has a contact number available for emergencies.  The signs and   symptoms of potential delayed complications were discussed with the   patient.  Return to normal activities tomorrow.  Written discharge   instructions were provided to the patient.   - Resume previous diet.   - Continue present medications.   - Await pathology results.   - No repeat colonoscopy due to age.   - Return to primary care physician.   - Return to GI clinic PRN.  For questions, problems or results please call your physician - Jaziel Kingsley MD at Work:  [phone].  [facility], EMERGENCY ROOM PHONE NUMBER: [phone]  IF A COMPLICATION OR EMERGENCY SITUATION ARISES AND YOU ARE UNABLE TO REACH   YOUR PHYSICIAN - GO DIRECTLY TO THE EMERGENCY ROOM.  Jaziel Kingsley MD  9/18/2023 9:44:27 AM  This report has been verified and signed electronically.  Dear patient,  As a result of recent federal legislation (The Federal Cures Act), you may   receive lab or pathology results from your procedure in your [facility]   account before your physician is able to contact you. Your physician or   their representative will relay the results to you with their   recommendations at their soonest availability.  Thank you,  PROVATION

## 2023-09-18 NOTE — ANESTHESIA POSTPROCEDURE EVALUATION
Anesthesia Post Evaluation    Patient: Isael Salmon    Procedure(s) Performed: Procedure(s) (LRB):  ENTEROSCOPY, SINGLE BALLOON, ANTEGRADE (N/A)  COLONOSCOPY (N/A)    Final Anesthesia Type: general      Patient location during evaluation: PACU  Patient participation: Yes- Able to Participate  Level of consciousness: awake and alert  Post-procedure vital signs: reviewed and stable  Pain management: adequate  Airway patency: patent    PONV status at discharge: No PONV  Anesthetic complications: no      Cardiovascular status: hemodynamically stable  Respiratory status: spontaneous ventilation  Follow-up not needed.          Vitals Value Taken Time   /62 09/18/23 0947   Temp 36.5 °C (97.7 °F) 09/18/23 0940   Pulse 77 09/18/23 0947   Resp 13 09/18/23 0947   SpO2 99 % 09/18/23 0947   Vitals shown include unvalidated device data.      No case tracking events are documented in the log.      Pain/Alfonzo Score: Alfonzo Score: 8 (9/18/2023  9:45 AM)

## 2023-09-18 NOTE — PLAN OF CARE
Patient being discharged to home via wheelchair, escorted by her , s/p enteroscopy with Dr. Kingsley. Pt alert and talkative, vitals stable on room air. Discharge instructions (written and verbal) and follow-up information given to patient who verbalized understanding, as well as a readiness for discharge. [facility] contact info provided for additional questions following discharge.     Pt did report some nausea after ambulating to restroom just prior to discharge. Zofran 4mg ODT was administered, after which pt did report that this helped to alleviate her nausea. She was advised to report to the ER for any persistent nausea and/or vomiting. She verbalized understanding of this.

## 2023-09-18 NOTE — ANESTHESIA PREPROCEDURE EVALUATION
09/18/2023  Isael Salmon is a 77 y.o., female.  Pre-operative evaluation for Procedure(s) (LRB):  ENTEROSCOPY, SINGLE BALLOON, ANTEGRADE (N/A)  COLONOSCOPY (N/A)    Isael Salmon is a 77 y.o. female       Patient Active Problem List   Diagnosis    Essential hypertension    Hyperlipidemia    Gout, arthritis    Gastroesophageal reflux disease without esophagitis    History of transient ischemic attack (TIA)    Fibrocystic breast disease in female    Postmenopausal status    Osteoarthritis    Adjustment disorder    Asymptomatic varicose veins of both lower extremities    PVD (peripheral vascular disease)    History of blood transfusion    Hypercalcemia    Chronic constipation    Chronic cough    AR (allergic rhinitis)    Recurrent UTI    Kidney stones    History of colon polyps    Idiopathic chronic gout of multiple sites without tophus    Primary osteoarthritis involving multiple joints    Mallet finger    Mild chronic anemia    Post concussive syndrome    Iron deficiency anemia    Amaurosis fugax    Acute right-sided low back pain with sciatica    Chronic pain    Bilateral carotid artery disease    Vitreous detachment of both eyes    Refractive error    Senile nuclear sclerosis    Pseudophakia    Dry eye syndrome of both eyes    Calcified granuloma of lung    Aortic atherosclerosis    COPD (chronic obstructive pulmonary disease)    History of 2019 novel coronavirus disease (COVID-19)    Mild episode of recurrent major depressive disorder    Debility    Mood disorder    Ectasis aorta-Noted on imaging 1/13/2017    Poor posture    Decreased ROM of intervertebral discs of cervical spine    Decreased strength of trunk and back    Left foot pain    Difficulty walking    Hammertoe of left foot    Chronic kidney disease, stage 3a    Angiodysplasia of small  intestine    Cervical strain, acute, initial encounter       Past Surgical History:   Procedure Laterality Date    BREAST CYST ASPIRATION Bilateral     multiple ones over the years    BREAST CYST EXCISION Left     BREAST SURGERY      breast reduction    broken second finger Right 2015    pins placed    carotid endarterectomy  1997    right    CAROTID ENDARTERECTOMY Right     CATARACT EXTRACTION W/  INTRAOCULAR LENS IMPLANT Right 2018    Dr. Yost    CATARACT EXTRACTION W/  INTRAOCULAR LENS IMPLANT Left 05/15/2018    Dr. Lester    COLONOSCOPY N/A 10/26/2015    Procedure: COLONOSCOPY;  Surgeon: Manuel Alanis MD;  Location: [facility] ENDO ([address] [place] [address]);  Service: Endoscopy;  Laterality: N/A;    CORRECTION OF HAMMER TOE Left 2021    Procedure: CORRECTION, HAMMER TOE Second toe;  Surgeon: Silver Bunn MD;  Location: [facility];  Service: Orthopedics;  Laterality: Left;  Ossiofiber hammertoe implant    EYE SURGERY      HYSTERECTOMY      ORIF FINGER FRACTURE  2015    SURGICAL REMOVAL OF BUNION WITH OSTEOTOMY OF METATARSAL BONE Left 2021    Procedure: BUNIONECTOMY, WITH METATARSAL OSTEOTOMY Proximal opening wedge;  Surgeon: Silver Bunn MD;  Location: [facility];  Service: Orthopedics;  Laterality: Left;  Arthrex opening wedge plate    TIA      TOTAL REDUCTION MAMMOPLASTY Bilateral     VASCULAR SURGERY      left leg vein laser       Social History     Socioeconomic History    Marital status:     Number of children: 1   Occupational History    Occupation: Retired   Tobacco Use    Smoking status: Former     Current packs/day: 0.00     Types: Cigarettes     Quit date: 1989     Years since quittin.0    Smokeless tobacco: Never    Tobacco comments:     quit  - 1 pack per day since age 16   Substance and Sexual Activity    Alcohol use: Yes     Alcohol/week: 1.0 standard drink of alcohol     Types: 1 Glasses of wine per week     Comment:  maybe one glass of wine monthly, if that    Drug use: No    Sexual activity: Not Currently     Partners: Male     Birth control/protection: Surgical     Social Determinants of Health     Financial Resource Strain: Low Risk  (6/24/2022)    Overall Financial Resource Strain (CARDIA)     Difficulty of Paying Living Expenses: Not hard at all   Food Insecurity: No Food Insecurity (6/24/2022)    Hunger Vital Sign     Worried About Running Out of Food in the Last Year: Never true     Ran Out of Food in the Last Year: Never true   Transportation Needs: No Transportation Needs (6/24/2022)    PRAPARE - Transportation     Lack of Transportation (Medical): No     Lack of Transportation (Non-Medical): No   Physical Activity: Inactive (6/24/2022)    Exercise Vital Sign     Days of Exercise per Week: 0 days     Minutes of Exercise per Session: 0 min   Stress: No Stress Concern Present (6/24/2022)    [employer] [place] [employer] - Occupational Stress Questionnaire     Feeling of Stress : Not at all   Recent Concern: Stress - Stress Concern Present (4/26/2022)    [employer] [place] [employer] - Occupational Stress Questionnaire     Feeling of Stress : Rather much   Social Connections: Moderately Integrated (6/24/2022)    Social Connection and Isolation Panel [NHANES]     Frequency of Communication with Friends and Family: More than three times a week     Frequency of Social Gatherings with Friends and Family: More than three times a week     Attends Jainism Services: More than 4 times per year     Active Member of Clubs or Organizations: No     Attends Club or Organization Meetings: Never     Marital Status:    Housing Stability: Low Risk  (6/24/2022)    Housing Stability Vital Sign     Unable to Pay for Housing in the Last Year: No     Number of Places Lived in the Last Year: 1     Unstable Housing in the Last Year: No       No current facility-administered medications on file  prior to encounter.     Current Outpatient Medications on File Prior to Encounter   Medication Sig Dispense Refill    acetaminophen (TYLENOL) 325 MG tablet Take 2 tablets (650 mg total) by mouth every 4 (four) hours as needed for Pain (Fever). 30 tablet 1    allopurinoL (ZYLOPRIM) 100 MG tablet TAKE 2 TABLETS(200 MG) BY MOUTH EVERY  tablet 2    ALPRAZolam (XANAX) 0.25 MG tablet Take 1 tablet (0.25 mg total) by mouth 2 (two) times daily as needed for Anxiety. 30 tablet 0    ascorbic acid, vitamin C, (VITAMIN C) 500 MG tablet Take 500 mg by mouth once daily.      aspirin (ECOTRIN) 81 MG EC tablet Take 81 mg by mouth once daily.       b complex vitamins capsule Take 1 capsule by mouth once daily.      BIOTIN ORAL Take 1 tablet by mouth once daily.      co-enzyme Q-10 30 mg capsule Take 200 mg by mouth once daily.      ergocalciferol (ERGOCALCIFEROL) 50,000 unit Cap TAKE 1 CAPSULE BY MOUTH 2 TIMES A WEEK 24 capsule 5    FOLBIC 2.5-25-2 mg Tab TAKE 1 TABLET BY MOUTH EVERY DAY 30 tablet 11    indomethacin (INDOCIN) 50 MG capsule Take 1 capsule (50 mg total) by mouth 3 (three) times daily as needed. 30 capsule 3    ipratropium (ATROVENT) 0.02 % nebulizer solution Take 2.5 mLs (500 mcg total) by nebulization 3 (three) times daily as needed for Wheezing. 62.5 mL 0    linaCLOtide (LINZESS) 72 mcg Cap capsule Take 1 capsule (72 mcg total) by mouth before breakfast. 90 capsule 3    multivit,calc,mins/iron/folic (ONE-A-DAY WOMENS FORMULA ORAL) Take 1 tablet by mouth once daily.      omeprazole (PRILOSEC) 40 MG capsule Take 1 capsule (40 mg total) by mouth 2 (two) times daily. 180 capsule 4    [DISCONTINUED] salsalate (DISALCID) 750 MG Tab Take 1 tablet (750 mg total) by mouth 3 (three) times daily. 90 tablet 9       Review of patient's allergies indicates:   Allergen Reactions    Avelox [moxifloxacin] Palpitations     Racing heart and gets the shakes    Latex Rash    Pcn [penicillins] Rash    Sulfa  "(sulfonamide antibiotics) Blisters    Ciprofloxacin      Room starts to spin  and nausea and dizziness    Codeine Nausea Only     nausea and weakness         CBC: No results for input(s): "WBC", "RBC", "HGB", "HCT", "PLT", "MCV", "MCH", "MCHC" in the last 72 hours.    CMP: No results for input(s): "NA", "K", "CL", "CO2", "BUN", "CREATININE", "GLU", "MG", "PHOS", "CALCIUM", "ALBUMIN", "PROT", "ALKPHOS", "ALT", "AST", "BILITOT" in the last 72 hours.    INR  No results for input(s): "PT", "INR", "PROTIME", "APTT" in the last 72 hours.      Diagnostic Studies:    EKG:   Results for orders placed or performed during the hospital encounter of 03/27/20   EKG 12-lead    Collection Time: 03/27/20  1:31 PM    Narrative    Test Reason : R05,    Vent. Rate : 084 BPM     Atrial Rate : 084 BPM     P-R Int : 184 ms          QRS Dur : 082 ms      QT Int : 358 ms       P-R-T Axes : 023 -27 012 degrees     QTc Int : 423 ms    Normal sinus rhythm  Low voltage QRS  Borderline Abnormal ECG  When compared with ECG of 01-MAY-2019 13:43,  No significant change was found  Confirmed by ELLIE POWELL MD ([id number]) on 3/27/2020 2:00:25 PM    Referred By: [email or web address]   SELF           Confirmed By:ELLIE POWELL MD       TTE:  No results found. However, due to the size of the patient record, not all encounters were searched. Please check Results Review for a complete set of results.  No results found for: "EF"   Results for orders placed or performed during the hospital encounter of 02/24/17   Echo doppler color flow   Result Value Ref Range    EF + QEF 65 55 - 65    Diastolic Dysfunction Yes (A)     Est. PA Systolic Pressure 39     Tricuspid Valve Regurgitation MILD        ROME:  No results found. However, due to the size of the patient record, not all encounters were searched. Please check Results Review for a complete set of results.    Stress Test:  No results found for this or any previous visit.       LHC:  No results found for this or any " "previous visit.       PFT:  No results found for: "FEV1", "FVC", "TMK1NFP", "TLC", "DLCO"     ALLERGIES:     Review of patient's allergies indicates:   Allergen Reactions    Avelox [moxifloxacin] Palpitations     Racing heart and gets the shakes    Latex Rash    Pcn [penicillins] Rash    Sulfa (sulfonamide antibiotics) Blisters    Ciprofloxacin      Room starts to spin  and nausea and dizziness    Codeine Nausea Only     nausea and weakness     LDA:          Lines/Drains/Airways     None                Anesthesia Evaluation      Airway   Mallampati: II  TM distance: > 6 cm  Neck ROM: Normal ROM  Dental    (+) Intact    Pulmonary    (+) COPD,   Cardiovascular   (+) hypertension,     Rate: Normal    Neuro/Psych    (+) TIA,     GI/Hepatic/Renal    (+) GERD,     Endo/Other    Abdominal                         Pre-op Assessment    I have reviewed the Patient Summary Reports.     I have reviewed the Nursing Notes. I have reviewed the NPO Status.   I have reviewed the Medications.     Review of Systems  Anesthesia Hx:  No problems with previous Anesthesia  Denies Family Hx of Anesthesia complications.   Denies Personal Hx of Anesthesia complications.   Cardiovascular:   Hypertension    Pulmonary:   COPD    Renal/:  Renal/ Normal     Hepatic/GI:   GERD    Neurological:   TIA,    Endocrine:  Endocrine Normal        Physical Exam  General: Well nourished    Airway:  Mallampati: II   Mouth Opening: Normal  TM Distance: > 6 cm  Tongue: Normal  Neck ROM: Normal ROM    Dental:  Intact    Chest/Lungs:  Normal Respiratory Rate    Heart:  Rate: Normal        Anesthesia Plan  Type of Anesthesia, risks & benefits discussed:    Anesthesia Type: Gen Natural Airway, MAC  Intra-op Monitoring Plan: Standard ASA Monitors  Post Op Pain Control Plan: multimodal analgesia and IV/PO Opioids PRN  Induction:  IV  Airway Plan: Direct, Post-Induction  Informed Consent: Informed consent signed with the Patient and all parties understand " the risks and agree with anesthesia plan.  All questions answered. Patient consented to blood products? Yes  ASA Score: 3  Day of Surgery Review of History & Physical: H&P Update referred to the surgeon/provider.    Ready For Surgery From Anesthesia Perspective.     .

## 2023-09-18 NOTE — TRANSFER OF CARE
"Anesthesia Transfer of Care Note    Patient: Isael Salmon    Procedure(s) Performed: Procedure(s) (LRB):  ENTEROSCOPY, SINGLE BALLOON, ANTEGRADE (N/A)  COLONOSCOPY (N/A)    Patient location: PACU    Anesthesia Type: general    Transport from OR: Transported from OR on 6-10 L/min O2 by face mask with adequate spontaneous ventilation    Post pain: adequate analgesia    Post assessment: no apparent anesthetic complications and tolerated procedure well    Post vital signs: stable    Level of consciousness: awake    Nausea/Vomiting: no nausea/vomiting    Complications: none    Transfer of care protocol was followed      Last vitals:   Visit Vitals  BP (!) 140/60 (BP Location: Left arm, Patient Position: Lying)   Pulse 79   Temp 36.5 °C (97.7 °F) (Temporal)   Resp 19   Ht 5' 5" (1.651 m)   Wt 74.8 kg (165 lb)   LMP  (LMP Unknown)   SpO2 100%   Breastfeeding No   BMI 27.46 kg/m²     "

## 2023-09-18 NOTE — H&P
Short Stay Endoscopy History and Physical    PCP - Janett Montoya MD    Procedure - EGD/Colonoscopy  ASA - per anesthesia  Mallampati - per anesthesia  History of Anesthesia problems - no  Family history Anesthesia problems -  no   Plan of anesthesia - MAC    HPI:  This is a 77 y.o. female here for evaluation of POLO and history of colon polyps.       ROS:  Constitutional: No fevers, chills  CV: No chest pain  Pulm: No cough, No shortness of breath  Ophtho: No vision changes  GI: see HPI    Medical History:  has a past medical history of Adjustment disorder (07/26/2012), Anemia (07/26/2012), AR (allergic rhinitis) (09/05/2014), Bronchitis, Fibrocystic breast disease in female (07/26/2012), GERD (gastroesophageal reflux disease) (07/26/2012), Gout, Gout, arthritis (07/26/2012), History of blood transfusion (07/26/2012), History of colonic polyps (07/26/2012), Hypercalcemia (09/20/2012), Hyperlipidemia (07/26/2012), Hypertension (07/26/2012), Nuclear sclerosis (05/02/2014), Osteoarthritis (07/26/2012), Other hyperparathyroidism (09/20/2012), Postmenopausal status (07/26/2012), PVD (peripheral vascular disease) (07/26/2012), Stroke (1997), Superficial vein thrombosis, Transient ischemic attack (07/26/2012), and Varicose veins (07/26/2012).    Surgical History:  has a past surgical history that includes carotid endarterectomy (1997); Carotid endarterectomy (Right, 1997); Vascular surgery; Hysterectomy; Colonoscopy (N/A, 10/26/2015); ORIF finger fracture (12/18/2015); Breast surgery; broken second finger (Right, 12/2015); TIA; Cataract extraction w/  intraocular lens implant (Right, 05/01/2018); Cataract extraction w/  intraocular lens implant (Left, 05/15/2018); Breast cyst excision (Left); Breast cyst aspiration (Bilateral); Eye surgery; Surgical removal of bunion with osteotomy of metatarsal bone (Left, 07/08/2021); Correction of hammer toe (Left, 07/08/2021); and Total Reduction Mammoplasty (Bilateral).    Family  History: family history includes Bone cancer in her brother; Cancer in her brother and mother; Cataracts in her brother, brother, brother, brother, and mother; Crohn's disease in her brother; Gout in her brother, brother, and brother; Heart failure in her father; Hypertension in her brother, brother, brother, brother, and mother.. Otherwise no colon cancer, inflammatory bowel disease, or GI malignancies.    Social History:  reports that she quit smoking about 34 years ago. Her smoking use included cigarettes. She has never used smokeless tobacco. She reports current alcohol use of about 1.0 standard drink of alcohol per week. She reports that she does not use drugs.    Review of patient's allergies indicates:   Allergen Reactions    Avelox [moxifloxacin] Palpitations     Racing heart and gets the shakes    Latex Rash    Pcn [penicillins] Rash    Sulfa (sulfonamide antibiotics) Blisters    Ciprofloxacin      Room starts to spin  and nausea and dizziness    Codeine Nausea Only     nausea and weakness       Medications:   Medications Prior to Admission   Medication Sig Dispense Refill Last Dose    acetaminophen (TYLENOL) 325 MG tablet Take 2 tablets (650 mg total) by mouth every 4 (four) hours as needed for Pain (Fever). 30 tablet 1 Past Month    ascorbic acid, vitamin C, (VITAMIN C) 500 MG tablet Take 500 mg by mouth once daily.   Past Week    BIOTIN ORAL Take 1 tablet by mouth once daily.   Past Week    EScitalopram oxalate (LEXAPRO) 10 MG tablet TAKE 1 TABLET(10 MG) BY MOUTH EVERY DAY 90 tablet 1 Past Week    FOLBIC 2.5-25-2 mg Tab TAKE 1 TABLET BY MOUTH EVERY DAY 30 tablet 11 Past Week    rosuvastatin (CRESTOR) 20 MG tablet TAKE 1 TABLET(20 MG) BY MOUTH EVERY DAY 90 tablet 3 Past Week    spironolactone (ALDACTONE) 25 MG tablet Take 1 tablet (25 mg total) by mouth once daily. 90 tablet 4 Past Week    allopurinoL (ZYLOPRIM) 100 MG tablet TAKE 2 TABLETS(200 MG) BY MOUTH EVERY  tablet 2     ALPRAZolam (XANAX)  0.25 MG tablet Take 1 tablet (0.25 mg total) by mouth 2 (two) times daily as needed for Anxiety. 30 tablet 0 More than a month    aspirin (ECOTRIN) 81 MG EC tablet Take 81 mg by mouth once daily.    9/16/2023    b complex vitamins capsule Take 1 capsule by mouth once daily.       betamethasone dipropionate 0.05 % cream Apply topically 2 (two) times daily. 90 g 3     co-enzyme Q-10 30 mg capsule Take 200 mg by mouth once daily.       ergocalciferol (ERGOCALCIFEROL) 50,000 unit Cap TAKE 1 CAPSULE BY MOUTH 2 TIMES A WEEK 24 capsule 5     fluticasone propionate (FLONASE) 50 mcg/actuation nasal spray 1 spray (50 mcg total) by Each Nostril route once daily. 11.1 mL 0     gentamicin (GARAMYCIN) 0.3 % ophthalmic solution Place 1 drop into the right eye every 4 (four) hours. 5 mL 0     indomethacin (INDOCIN) 50 MG capsule Take 1 capsule (50 mg total) by mouth 3 (three) times daily as needed. 30 capsule 3     ipratropium (ATROVENT) 0.02 % nebulizer solution Take 2.5 mLs (500 mcg total) by nebulization 3 (three) times daily as needed for Wheezing. 62.5 mL 0     linaCLOtide (LINZESS) 72 mcg Cap capsule Take 1 capsule (72 mcg total) by mouth before breakfast. 90 capsule 3 9/15/2023    loratadine (CLARITIN) 10 mg tablet Take 1 tablet (10 mg total) by mouth once daily. 30 tablet 0 More than a month    losartan (COZAAR) 50 MG tablet TAKE 1 TABLET(50 MG) BY MOUTH TWICE DAILY 180 tablet 3     multivit,calc,mins/iron/folic (ONE-A-DAY WOMENS FORMULA ORAL) Take 1 tablet by mouth once daily.       omeprazole (PRILOSEC) 40 MG capsule Take 1 capsule (40 mg total) by mouth 2 (two) times daily. 180 capsule 4 9/16/2023       Physical Exam:    Vital Signs:   Vitals:    09/18/23 0723   BP: (!) 162/70   Pulse: 82   Resp: 16   Temp: 97.4 °F (36.3 °C)       General Appearance: Well appearing in no acute distress  Eyes:    No scleral icterus  Lungs: CTA anteriorly  Heart:  Regular rate and rhythm  Abdomen: Soft, non tender, non distended with  normal bowel sounds.    Labs:  Lab Results   Component Value Date    WBC 4.31 08/08/2023    HGB 8.4 (L) 08/08/2023    HCT 28.0 (L) 08/08/2023    MCV 96 08/08/2023     08/08/2023        BMP  Lab Results   Component Value Date     08/08/2023    K 4.3 08/08/2023     08/08/2023    CO2 23 08/08/2023    BUN 22 08/08/2023    CREATININE 1.0 08/08/2023    CALCIUM 10.1 08/08/2023    ANIONGAP 14 08/08/2023    ESTGFRAFRICA >60.0 02/09/2022    EGFRNONAA >60.0 02/09/2022     Lab Results   Component Value Date    INR 1.0 01/14/2014          Assessment:  77 y.o. female with POLO, angiectasia of the small bowel, and history of colon polyps.     Plan:  Proceed with balloon assisted enteroscopy and colonoscopy today using single-balloon enteroscope.  I have explained the risks and benefits of endoscopy procedures to the patient including but not limited to bleeding, perforation, infection, and death.  All questions answered.        Jaziel Kingsley MD

## 2023-09-22 LAB
FINAL PATHOLOGIC DIAGNOSIS: NORMAL
GROSS: NORMAL
Lab: NORMAL

## 2023-09-30 ENCOUNTER — TELEPHONE (OUTPATIENT)
Dept: INTERNAL MEDICINE | Facility: CLINIC | Age: 78
End: 2023-09-30
Payer: MEDICARE

## 2023-09-30 ENCOUNTER — EXTERNAL CHRONIC CARE MANAGEMENT (OUTPATIENT)
Dept: PRIMARY CARE CLINIC | Facility: CLINIC | Age: 78
End: 2023-09-30
Payer: MEDICARE

## 2023-09-30 PROCEDURE — 99439 PR CHRONIC CARE MGMT, EA ADDTL 20 MIN: ICD-10-PCS | Mod: S$PBB,,, | Performed by: INTERNAL MEDICINE

## 2023-09-30 PROCEDURE — 99490 CHRNC CARE MGMT STAFF 1ST 20: CPT | Mod: PBBFAC,25 | Performed by: INTERNAL MEDICINE

## 2023-09-30 PROCEDURE — 99490 CHRNC CARE MGMT STAFF 1ST 20: CPT | Mod: S$PBB,,, | Performed by: INTERNAL MEDICINE

## 2023-09-30 PROCEDURE — 99439 CHRNC CARE MGMT STAF EA ADDL: CPT | Mod: PBBFAC,27 | Performed by: INTERNAL MEDICINE

## 2023-09-30 PROCEDURE — 99490 PR CHRONIC CARE MGMT, 1ST 20 MIN: ICD-10-PCS | Mod: S$PBB,,, | Performed by: INTERNAL MEDICINE

## 2023-09-30 PROCEDURE — 99439 CHRNC CARE MGMT STAF EA ADDL: CPT | Mod: S$PBB,,, | Performed by: INTERNAL MEDICINE

## 2023-09-30 NOTE — TELEPHONE ENCOUNTER
Pt is calling she has a tooth ache/infection. She can only get in on 10-16. Pt would like to get a doxy rx called in to help her until she can get in to be seen.

## 2023-10-01 RX ORDER — CLINDAMYCIN HYDROCHLORIDE 300 MG/1
300 CAPSULE ORAL 3 TIMES DAILY
Qty: 30 CAPSULE | Refills: 0 | Status: SHIPPED | OUTPATIENT
Start: 2023-10-01 | End: 2023-10-11

## 2023-10-12 DIAGNOSIS — M10.9 GOUT, ARTHRITIS: ICD-10-CM

## 2023-10-12 RX ORDER — ALLOPURINOL 100 MG/1
200 TABLET ORAL DAILY
Qty: 180 TABLET | Refills: 1 | Status: SHIPPED | OUTPATIENT
Start: 2023-10-12

## 2023-10-12 RX ORDER — FOLIC ACID-PYRIDOXINE-CYANOCOBALAMIN TAB 2.5-25-2 MG 2.5-25-2 MG
TAB ORAL
Qty: 30 TABLET | Refills: 11 | Status: SHIPPED | OUTPATIENT
Start: 2023-10-12

## 2023-10-12 RX ORDER — OMEPRAZOLE 40 MG/1
40 CAPSULE, DELAYED RELEASE ORAL 2 TIMES DAILY
Qty: 180 CAPSULE | Refills: 4 | Status: SHIPPED | OUTPATIENT
Start: 2023-10-12

## 2023-10-12 NOTE — TELEPHONE ENCOUNTER
Care Due:                  Date            Visit Type   Department     Provider  --------------------------------------------------------------------------------                                EP -                              PRIMARY      [facility] INTERNAL  Last Visit: 08-      CARE ([facility])   MEDICINE       MITUL MARSHALL                              EP                               PRIMARY      [facility] INTERNAL  Next Visit: 12-      CARE ([facility])   MEDICINE       MITUL MARSHALL                                                            Last  Test          Frequency    Reason                     Performed    Due Date  --------------------------------------------------------------------------------    Lipid Panel.  12 months..  rosuvastatin.............  09- 09-    [facility] Embedded Care Due Messages. Reference number: [id number].   10/12/2023 6:44:13 AM CDT

## 2023-10-12 NOTE — TELEPHONE ENCOUNTER
Refill Routing Note     Refill Routing Note   Medication(s) are not appropriate for processing by [facility] for the following reason(s):      Medication outside of protocol    [facility] action(s):  Route Care Due:  None identified            Appointments  past 12m or future 3m with PCP    Date Provider   Last Visit   8/8/2023 Janett Montoya MD   Next Visit   12/20/2023 Janett Montoya MD   ED visits in past 90 days: 0        Note composed:9:20 AM 10/12/2023

## 2023-10-31 ENCOUNTER — EXTERNAL CHRONIC CARE MANAGEMENT (OUTPATIENT)
Dept: PRIMARY CARE CLINIC | Facility: CLINIC | Age: 78
End: 2023-10-31
Payer: MEDICARE

## 2023-10-31 PROCEDURE — 99490 CHRNC CARE MGMT STAFF 1ST 20: CPT | Mod: S$PBB,,, | Performed by: INTERNAL MEDICINE

## 2023-10-31 PROCEDURE — 99490 PR CHRONIC CARE MGMT, 1ST 20 MIN: ICD-10-PCS | Mod: S$PBB,,, | Performed by: INTERNAL MEDICINE

## 2023-10-31 PROCEDURE — 99490 CHRNC CARE MGMT STAFF 1ST 20: CPT | Mod: PBBFAC | Performed by: INTERNAL MEDICINE

## 2023-11-27 ENCOUNTER — TELEPHONE (OUTPATIENT)
Dept: INTERNAL MEDICINE | Facility: CLINIC | Age: 78
End: 2023-11-27
Payer: MEDICARE

## 2023-11-27 NOTE — TELEPHONE ENCOUNTER
----- Message from Rosana Up sent at 11/27/2023 12:42 PM CST -----  Contact: Isael liz  [phone]  Patient is returning a phone call.  Who left a message for the patient: Haleigh  Does patient know what this is regarding:    Would you like a call back, or a response through your [facility] portal?: call back  Comments: Pt was returning the nurses call

## 2023-11-28 ENCOUNTER — IMMUNIZATION (OUTPATIENT)
Dept: INTERNAL MEDICINE | Facility: CLINIC | Age: 78
End: 2023-11-28
Payer: MEDICARE

## 2023-11-28 ENCOUNTER — LAB VISIT (OUTPATIENT)
Dept: LAB | Facility: HOSPITAL | Age: 78
End: 2023-11-28
Attending: INTERNAL MEDICINE
Payer: MEDICARE

## 2023-11-28 DIAGNOSIS — M10.9 GOUT, ARTHRITIS: ICD-10-CM

## 2023-11-28 DIAGNOSIS — D64.9 ANEMIA, UNSPECIFIED TYPE: ICD-10-CM

## 2023-11-28 DIAGNOSIS — D64.9 ANEMIA, UNSPECIFIED TYPE: Primary | ICD-10-CM

## 2023-11-28 DIAGNOSIS — Z23 NEED FOR VACCINATION: Primary | ICD-10-CM

## 2023-11-28 LAB
BASOPHILS # BLD AUTO: 0.04 K/UL (ref 0–0.2)
BASOPHILS NFR BLD: 0.6 % (ref 0–1.9)
DIFFERENTIAL METHOD: ABNORMAL
EOSINOPHIL # BLD AUTO: 0.1 K/UL (ref 0–0.5)
EOSINOPHIL NFR BLD: 1.7 % (ref 0–8)
ERYTHROCYTE [DISTWIDTH] IN BLOOD BY AUTOMATED COUNT: 13.2 % (ref 11.5–14.5)
HCT VFR BLD AUTO: 26 % (ref 37–48.5)
HGB BLD-MCNC: 7.7 G/DL (ref 12–16)
IMM GRANULOCYTES # BLD AUTO: 0.02 K/UL (ref 0–0.04)
IMM GRANULOCYTES NFR BLD AUTO: 0.3 % (ref 0–0.5)
LYMPHOCYTES # BLD AUTO: 1 K/UL (ref 1–4.8)
LYMPHOCYTES NFR BLD: 13.8 % (ref 18–48)
MCH RBC QN AUTO: 28.3 PG (ref 27–31)
MCHC RBC AUTO-ENTMCNC: 29.6 G/DL (ref 32–36)
MCV RBC AUTO: 96 FL (ref 82–98)
MONOCYTES # BLD AUTO: 0.6 K/UL (ref 0.3–1)
MONOCYTES NFR BLD: 8.5 % (ref 4–15)
NEUTROPHILS # BLD AUTO: 5.4 K/UL (ref 1.8–7.7)
NEUTROPHILS NFR BLD: 75.1 % (ref 38–73)
NRBC BLD-RTO: 0 /100 WBC
PLATELET # BLD AUTO: 272 K/UL (ref 150–450)
PMV BLD AUTO: 11.3 FL (ref 9.2–12.9)
RBC # BLD AUTO: 2.72 M/UL (ref 4–5.4)
WBC # BLD AUTO: 7.19 K/UL (ref 3.9–12.7)

## 2023-11-28 PROCEDURE — 85025 COMPLETE CBC W/AUTO DIFF WBC: CPT | Performed by: INTERNAL MEDICINE

## 2023-11-28 PROCEDURE — 36415 COLL VENOUS BLD VENIPUNCTURE: CPT | Performed by: INTERNAL MEDICINE

## 2023-11-28 PROCEDURE — G0008 ADMIN INFLUENZA VIRUS VAC: HCPCS | Mod: PBBFAC

## 2023-11-28 PROCEDURE — 99999PBSHW FLU VACCINE - QUADRIVALENT - ADJUVANTED: ICD-10-PCS | Mod: PBBFAC,,,

## 2023-11-28 PROCEDURE — 99999PBSHW FLU VACCINE - QUADRIVALENT - ADJUVANTED: Mod: PBBFAC,,,

## 2023-11-29 ENCOUNTER — TELEPHONE (OUTPATIENT)
Dept: INTERNAL MEDICINE | Facility: CLINIC | Age: 78
End: 2023-11-29
Payer: MEDICARE

## 2023-11-29 NOTE — TELEPHONE ENCOUNTER
Spoke w/ pt verbalized understanding, not currently taking Iron and will get recommended OTC iron supplement. Notified pt of f/u labs, pt verbalized understanding

## 2023-11-29 NOTE — TELEPHONE ENCOUNTER
----- Message from Janett Montoya MD sent at 11/28/2023 10:21 PM CST -----  Please notify pt  YOur blood work reveals that you are very anemic?  Are you taking your iron supplement?  If you are not taking your iron supplement, DR DON wants you to take over the counter iron supplemnet called Vitron C - one tablet daily  DR DON wants to repeat blood work in one week  Scheduled for MOnday December 4  Janett Montoya M.D.

## 2023-11-30 ENCOUNTER — EXTERNAL CHRONIC CARE MANAGEMENT (OUTPATIENT)
Dept: PRIMARY CARE CLINIC | Facility: CLINIC | Age: 78
End: 2023-11-30
Payer: MEDICARE

## 2023-11-30 PROCEDURE — 99490 CHRNC CARE MGMT STAFF 1ST 20: CPT | Mod: PBBFAC | Performed by: INTERNAL MEDICINE

## 2023-11-30 PROCEDURE — 99490 CHRNC CARE MGMT STAFF 1ST 20: CPT | Mod: S$PBB,,, | Performed by: INTERNAL MEDICINE

## 2023-11-30 PROCEDURE — 99490 PR CHRONIC CARE MGMT, 1ST 20 MIN: ICD-10-PCS | Mod: S$PBB,,, | Performed by: INTERNAL MEDICINE

## 2023-12-02 ENCOUNTER — TELEPHONE (OUTPATIENT)
Dept: INTERNAL MEDICINE | Facility: CLINIC | Age: 78
End: 2023-12-02
Payer: MEDICARE

## 2023-12-04 ENCOUNTER — LAB VISIT (OUTPATIENT)
Dept: LAB | Facility: OTHER | Age: 78
End: 2023-12-04
Attending: INTERNAL MEDICINE
Payer: MEDICARE

## 2023-12-04 DIAGNOSIS — D64.9 ANEMIA, UNSPECIFIED TYPE: ICD-10-CM

## 2023-12-04 DIAGNOSIS — D50.9 IRON DEFICIENCY ANEMIA, UNSPECIFIED IRON DEFICIENCY ANEMIA TYPE: Primary | ICD-10-CM

## 2023-12-04 DIAGNOSIS — M10.9 GOUT, ARTHRITIS: ICD-10-CM

## 2023-12-04 LAB
ALBUMIN SERPL BCP-MCNC: 3.8 G/DL (ref 3.5–5.2)
ALP SERPL-CCNC: 74 U/L (ref 55–135)
ALT SERPL W/O P-5'-P-CCNC: 13 U/L (ref 10–44)
ANION GAP SERPL CALC-SCNC: 11 MMOL/L (ref 8–16)
AST SERPL-CCNC: 20 U/L (ref 10–40)
BASOPHILS # BLD AUTO: 0.02 K/UL (ref 0–0.2)
BASOPHILS NFR BLD: 0.4 % (ref 0–1.9)
BILIRUB SERPL-MCNC: 0.3 MG/DL (ref 0.1–1)
BUN SERPL-MCNC: 28 MG/DL (ref 8–23)
CALCIUM SERPL-MCNC: 9.8 MG/DL (ref 8.7–10.5)
CHLORIDE SERPL-SCNC: 109 MMOL/L (ref 95–110)
CO2 SERPL-SCNC: 23 MMOL/L (ref 23–29)
CREAT SERPL-MCNC: 1.4 MG/DL (ref 0.5–1.4)
CRP SERPL-MCNC: 2.8 MG/L (ref 0–8.2)
DIFFERENTIAL METHOD: ABNORMAL
EOSINOPHIL # BLD AUTO: 0.1 K/UL (ref 0–0.5)
EOSINOPHIL NFR BLD: 2 % (ref 0–8)
ERYTHROCYTE [DISTWIDTH] IN BLOOD BY AUTOMATED COUNT: 13.5 % (ref 11.5–14.5)
EST. GFR  (NO RACE VARIABLE): 38 ML/MIN/1.73 M^2
GLUCOSE SERPL-MCNC: 128 MG/DL (ref 70–110)
HCT VFR BLD AUTO: 24.7 % (ref 37–48.5)
HGB BLD-MCNC: 7.6 G/DL (ref 12–16)
IMM GRANULOCYTES # BLD AUTO: 0.02 K/UL (ref 0–0.04)
IMM GRANULOCYTES NFR BLD AUTO: 0.4 % (ref 0–0.5)
LYMPHOCYTES # BLD AUTO: 1.1 K/UL (ref 1–4.8)
LYMPHOCYTES NFR BLD: 19.4 % (ref 18–48)
MCH RBC QN AUTO: 28.6 PG (ref 27–31)
MCHC RBC AUTO-ENTMCNC: 30.8 G/DL (ref 32–36)
MCV RBC AUTO: 93 FL (ref 82–98)
MONOCYTES # BLD AUTO: 0.4 K/UL (ref 0.3–1)
MONOCYTES NFR BLD: 7.9 % (ref 4–15)
NEUTROPHILS # BLD AUTO: 3.8 K/UL (ref 1.8–7.7)
NEUTROPHILS NFR BLD: 69.9 % (ref 38–73)
NRBC BLD-RTO: 0 /100 WBC
PLATELET # BLD AUTO: 262 K/UL (ref 150–450)
PMV BLD AUTO: 10.6 FL (ref 9.2–12.9)
POTASSIUM SERPL-SCNC: 4.4 MMOL/L (ref 3.5–5.1)
PROT SERPL-MCNC: 7.3 G/DL (ref 6–8.4)
RBC # BLD AUTO: 2.66 M/UL (ref 4–5.4)
SODIUM SERPL-SCNC: 143 MMOL/L (ref 136–145)
URATE SERPL-MCNC: 4.7 MG/DL (ref 2.4–5.7)
WBC # BLD AUTO: 5.47 K/UL (ref 3.9–12.7)

## 2023-12-04 PROCEDURE — 82728 ASSAY OF FERRITIN: CPT | Performed by: INTERNAL MEDICINE

## 2023-12-04 PROCEDURE — 84550 ASSAY OF BLOOD/URIC ACID: CPT | Performed by: INTERNAL MEDICINE

## 2023-12-04 PROCEDURE — 84466 ASSAY OF TRANSFERRIN: CPT | Performed by: INTERNAL MEDICINE

## 2023-12-04 PROCEDURE — 80053 COMPREHEN METABOLIC PANEL: CPT | Performed by: INTERNAL MEDICINE

## 2023-12-04 PROCEDURE — 86140 C-REACTIVE PROTEIN: CPT | Performed by: INTERNAL MEDICINE

## 2023-12-04 PROCEDURE — 85025 COMPLETE CBC W/AUTO DIFF WBC: CPT | Performed by: INTERNAL MEDICINE

## 2023-12-04 PROCEDURE — 36415 COLL VENOUS BLD VENIPUNCTURE: CPT | Performed by: INTERNAL MEDICINE

## 2023-12-04 PROCEDURE — 83540 ASSAY OF IRON: CPT | Performed by: INTERNAL MEDICINE

## 2023-12-05 ENCOUNTER — OFFICE VISIT (OUTPATIENT)
Dept: HEMATOLOGY/ONCOLOGY | Facility: CLINIC | Age: 78
End: 2023-12-05
Payer: MEDICARE

## 2023-12-05 VITALS
BODY MASS INDEX: 27.4 KG/M2 | HEART RATE: 83 BPM | DIASTOLIC BLOOD PRESSURE: 61 MMHG | HEIGHT: 65 IN | RESPIRATION RATE: 16 BRPM | SYSTOLIC BLOOD PRESSURE: 130 MMHG | OXYGEN SATURATION: 98 % | WEIGHT: 164.44 LBS

## 2023-12-05 DIAGNOSIS — Z71.89 ADVANCE CARE PLANNING: ICD-10-CM

## 2023-12-05 DIAGNOSIS — K55.20 ANGIODYSPLASIA OF SMALL INTESTINE: ICD-10-CM

## 2023-12-05 DIAGNOSIS — D50.0 IRON DEFICIENCY ANEMIA DUE TO CHRONIC BLOOD LOSS: Primary | ICD-10-CM

## 2023-12-05 LAB
FERRITIN SERPL-MCNC: 32 NG/ML (ref 20–300)
IRON SERPL-MCNC: 266 UG/DL (ref 30–160)
SATURATED IRON: 75 % (ref 20–50)
TOTAL IRON BINDING CAPACITY: 357 UG/DL (ref 250–450)
TRANSFERRIN SERPL-MCNC: 241 MG/DL (ref 200–375)

## 2023-12-05 PROCEDURE — 99215 OFFICE O/P EST HI 40 MIN: CPT | Mod: PBBFAC,PO | Performed by: INTERNAL MEDICINE

## 2023-12-05 PROCEDURE — 99204 OFFICE O/P NEW MOD 45 MIN: CPT | Mod: S$PBB,,, | Performed by: INTERNAL MEDICINE

## 2023-12-05 PROCEDURE — 99999 PR PBB SHADOW E&M-EST. PATIENT-LVL V: CPT | Mod: PBBFAC,,, | Performed by: INTERNAL MEDICINE

## 2023-12-05 PROCEDURE — 99999 PR PBB SHADOW E&M-EST. PATIENT-LVL V: ICD-10-PCS | Mod: PBBFAC,,, | Performed by: INTERNAL MEDICINE

## 2023-12-05 PROCEDURE — 99204 PR OFFICE/OUTPT VISIT, NEW, LEVL IV, 45-59 MIN: ICD-10-PCS | Mod: S$PBB,,, | Performed by: INTERNAL MEDICINE

## 2023-12-05 RX ORDER — HEPARIN 100 UNIT/ML
500 SYRINGE INTRAVENOUS
Status: CANCELLED | OUTPATIENT
Start: 2024-01-18

## 2023-12-05 RX ORDER — SODIUM CHLORIDE 0.9 % (FLUSH) 0.9 %
10 SYRINGE (ML) INJECTION
Status: CANCELLED | OUTPATIENT
Start: 2023-12-12

## 2023-12-05 RX ORDER — SODIUM CHLORIDE 0.9 % (FLUSH) 0.9 %
10 SYRINGE (ML) INJECTION
Status: CANCELLED | OUTPATIENT
Start: 2024-01-04

## 2023-12-05 RX ORDER — DIPHENHYDRAMINE HYDROCHLORIDE 50 MG/ML
50 INJECTION INTRAMUSCULAR; INTRAVENOUS ONCE AS NEEDED
Status: CANCELLED | OUTPATIENT
Start: 2024-01-04

## 2023-12-05 RX ORDER — HEPARIN 100 UNIT/ML
500 SYRINGE INTRAVENOUS
Status: CANCELLED | OUTPATIENT
Start: 2024-01-04

## 2023-12-05 RX ORDER — HEPARIN 100 UNIT/ML
500 SYRINGE INTRAVENOUS
Status: CANCELLED | OUTPATIENT
Start: 2023-12-12

## 2023-12-05 RX ORDER — DIPHENHYDRAMINE HYDROCHLORIDE 50 MG/ML
50 INJECTION INTRAMUSCULAR; INTRAVENOUS ONCE AS NEEDED
Status: CANCELLED | OUTPATIENT
Start: 2024-01-11

## 2023-12-05 RX ORDER — SODIUM CHLORIDE 0.9 % (FLUSH) 0.9 %
10 SYRINGE (ML) INJECTION
Status: CANCELLED | OUTPATIENT
Start: 2024-01-11

## 2023-12-05 RX ORDER — EPINEPHRINE 0.3 MG/.3ML
0.3 INJECTION SUBCUTANEOUS ONCE AS NEEDED
Status: CANCELLED | OUTPATIENT
Start: 2023-12-12

## 2023-12-05 RX ORDER — EPINEPHRINE 0.3 MG/.3ML
0.3 INJECTION SUBCUTANEOUS ONCE AS NEEDED
Status: CANCELLED | OUTPATIENT
Start: 2024-01-04

## 2023-12-05 RX ORDER — EPINEPHRINE 0.3 MG/.3ML
0.3 INJECTION SUBCUTANEOUS ONCE AS NEEDED
Status: CANCELLED | OUTPATIENT
Start: 2024-01-11

## 2023-12-05 RX ORDER — DIPHENHYDRAMINE HYDROCHLORIDE 50 MG/ML
50 INJECTION INTRAMUSCULAR; INTRAVENOUS ONCE AS NEEDED
Status: CANCELLED | OUTPATIENT
Start: 2023-12-12

## 2023-12-05 RX ORDER — SODIUM CHLORIDE 0.9 % (FLUSH) 0.9 %
10 SYRINGE (ML) INJECTION
Status: CANCELLED | OUTPATIENT
Start: 2024-01-18

## 2023-12-05 RX ORDER — EPINEPHRINE 0.3 MG/.3ML
0.3 INJECTION SUBCUTANEOUS ONCE AS NEEDED
Status: CANCELLED | OUTPATIENT
Start: 2024-01-18

## 2023-12-05 RX ORDER — DIPHENHYDRAMINE HYDROCHLORIDE 50 MG/ML
50 INJECTION INTRAMUSCULAR; INTRAVENOUS ONCE AS NEEDED
Status: CANCELLED | OUTPATIENT
Start: 2024-01-18

## 2023-12-05 RX ORDER — HEPARIN 100 UNIT/ML
500 SYRINGE INTRAVENOUS
Status: CANCELLED | OUTPATIENT
Start: 2024-01-11

## 2023-12-05 NOTE — PROGRESS NOTES
PATIENT: Isael Salmon  MRN: [id number]  DATE: 12/5/2023    Diagnosis:   1. Iron deficiency anemia due to chronic blood loss    2. Angiodysplasia of small intestine    3. Advance care planning      Chief Complaint: Anemia      Oncologic History:      Oncologic History     Oncologic Treatment     Pathology       Subjective:    History of Present Illness: Ms. Salmon is a 78 y.o. female who presents for evaluation and management of iron deficiency anemia. She is referred by Dr. Montoya.    - she has angiodysplasia of her small intestine  - labs since 2006 reveal a mild-to-moderate anemia.  - iron studies (12/4/23) reveal a low-normal ferritin.  - she restarted oral iron last week.  - today, she endorses fatigue, dyspnea upon exertion. He denies chest pain, nausea, vomiting, diarrhea, constipation.      Past medical, surgical, family, and social histories have been reviewed and updated below.    Past Medical History:   Past Medical History:   Diagnosis Date    Adjustment disorder 07/26/2012    Anemia 07/26/2012    AR (allergic rhinitis) 09/05/2014    Bronchitis     Fibrocystic breast disease in female 07/26/2012    GERD (gastroesophageal reflux disease) 07/26/2012    Gout     Gout, arthritis 07/26/2012    History of blood transfusion 07/26/2012    History of colonic polyps 07/26/2012    Hypercalcemia 09/20/2012    Hyperlipidemia 07/26/2012    Hypertension 07/26/2012    Nuclear sclerosis 05/02/2014    Osteoarthritis 07/26/2012    Other hyperparathyroidism 09/20/2012    Postmenopausal status 07/26/2012    PVD (peripheral vascular disease) 07/26/2012    left leg laser of vein with injection    Stroke 1997    TIA    Superficial vein thrombosis     Transient ischemic attack 07/26/2012    Varicose veins 07/26/2012       Past Surgical History:   Past Surgical History:   Procedure Laterality Date    ANTEGRADE SINGLE BALLOON ENTEROSCOPY N/A 9/18/2023    Procedure: ENTEROSCOPY, SINGLE BALLOON, ANTEGRADE;  Surgeon: Sancho  Jaziel IBRAHIM MD;  Location: [facility] ENDO (2ND FLR);  Service: Endoscopy;  Laterality: N/A;  7/25/23-PEG prep, Instructions via portal-DS    BREAST CYST ASPIRATION Bilateral     multiple ones over the years    BREAST CYST EXCISION Left     BREAST SURGERY      breast reduction    broken second finger Right 12/2015    pins placed    carotid endarterectomy  1997    right    CAROTID ENDARTERECTOMY Right 1997    CATARACT EXTRACTION W/  INTRAOCULAR LENS IMPLANT Right 05/01/2018    Dr. Yost    CATARACT EXTRACTION W/  INTRAOCULAR LENS IMPLANT Left 05/15/2018    Dr. Lester    COLONOSCOPY N/A 10/26/2015    Procedure: COLONOSCOPY;  Surgeon: Manuel Alanis MD;  Location: [facility] ENDO (4TH FLR);  Service: Endoscopy;  Laterality: N/A;    COLONOSCOPY N/A 9/18/2023    Procedure: COLONOSCOPY;  Surgeon: Jaziel Kingsley MD;  Location: [facility] ENDO (2ND FLR);  Service: Endoscopy;  Laterality: N/A;  can use single-balloon for this part as well    CORRECTION OF HAMMER TOE Left 07/08/2021    Procedure: CORRECTION, HAMMER TOE Second toe;  Surgeon: Silver Bunn MD;  Location: [facility] OR;  Service: Orthopedics;  Laterality: Left;  Ossiofiber hammertoe implant    EYE SURGERY      HYSTERECTOMY      ORIF FINGER FRACTURE  12/18/2015    SURGICAL REMOVAL OF BUNION WITH OSTEOTOMY OF METATARSAL BONE Left 07/08/2021    Procedure: BUNIONECTOMY, WITH METATARSAL OSTEOTOMY Proximal opening wedge;  Surgeon: Silver Bunn MD;  Location: [facility] OR;  Service: Orthopedics;  Laterality: Left;  Arthrex opening wedge plate    TIA      TOTAL REDUCTION MAMMOPLASTY Bilateral     VASCULAR SURGERY      left leg vein laser       Family History:   Family History   Problem Relation Age of Onset    Heart failure Father     Cancer Mother         pancreas    Cataracts Mother     Hypertension Mother     Cancer Brother         bladder    Cataracts Brother     Hypertension Brother     Crohn's disease Brother     Gout Brother     Cataracts Brother     Hypertension  Brother     Gout Brother     Cataracts Brother     Hypertension Brother     Gout Brother     Bone cancer Brother     Cataracts Brother     Hypertension Brother     Breast cancer Neg Hx     Ovarian cancer Neg Hx     Allergies Neg Hx     Asthma Neg Hx     Eczema Neg Hx     Cervical cancer Neg Hx     Endometrial cancer Neg Hx     Vaginal cancer Neg Hx     Amblyopia Neg Hx     Blindness Neg Hx     Diabetes Neg Hx     Glaucoma Neg Hx     Macular degeneration Neg Hx     Retinal detachment Neg Hx     Strabismus Neg Hx     Stroke Neg Hx     Thyroid disease Neg Hx     Celiac disease Neg Hx     Colon cancer Neg Hx     Esophageal cancer Neg Hx     Liver disease Neg Hx     Liver cancer Neg Hx     Rectal cancer Neg Hx     Stomach cancer Neg Hx        Social History:  reports that she quit smoking about 34 years ago. Her smoking use included cigarettes. She has never used smokeless tobacco. She reports current alcohol use of about 1.0 standard drink of alcohol per week. She reports that she does not use drugs.    Allergies:  Review of patient's allergies indicates:   Allergen Reactions    Avelox [moxifloxacin] Palpitations     Racing heart and gets the shakes    Latex Rash    Pcn [penicillins] Rash    Sulfa (sulfonamide antibiotics) Blisters    Ciprofloxacin      Room starts to spin  and nausea and dizziness    Codeine Nausea Only     nausea and weakness       Medications:  Current Outpatient Medications   Medication Sig Dispense Refill    acetaminophen (TYLENOL) 325 MG tablet Take 2 tablets (650 mg total) by mouth every 4 (four) hours as needed for Pain (Fever). 30 tablet 1    allopurinoL (ZYLOPRIM) 100 MG tablet Take 2 tablets (200 mg total) by mouth Daily. 180 tablet 1    ALPRAZolam (XANAX) 0.25 MG tablet Take 1 tablet (0.25 mg total) by mouth 2 (two) times daily as needed for Anxiety. 30 tablet 0    ascorbic acid, vitamin C, (VITAMIN C) 500 MG tablet Take 500 mg by mouth once daily.      aspirin (ECOTRIN) 81 MG EC tablet Take  81 mg by mouth once daily.       b complex vitamins capsule Take 1 capsule by mouth once daily.      betamethasone dipropionate 0.05 % cream Apply topically 2 (two) times daily. 90 g 3    BIOTIN ORAL Take 1 tablet by mouth once daily.      co-enzyme Q-10 30 mg capsule Take 200 mg by mouth once daily.      ergocalciferol (ERGOCALCIFEROL) 50,000 unit Cap TAKE 1 CAPSULE BY MOUTH 2 TIMES A WEEK 24 capsule 5    EScitalopram oxalate (LEXAPRO) 10 MG tablet TAKE 1 TABLET(10 MG) BY MOUTH EVERY DAY 90 tablet 1    fluticasone propionate (FLONASE) 50 mcg/actuation nasal spray 1 spray (50 mcg total) by Each Nostril route once daily. 11.1 mL 0    FOLBIC 2.5-25-2 mg Tab TAKE 1 TABLET BY MOUTH EVERY DAY 30 tablet 11    gentamicin (GARAMYCIN) 0.3 % ophthalmic solution Place 1 drop into the right eye every 4 (four) hours. 5 mL 0    indomethacin (INDOCIN) 50 MG capsule Take 1 capsule (50 mg total) by mouth 3 (three) times daily as needed. 30 capsule 3    ipratropium (ATROVENT) 0.02 % nebulizer solution Take 2.5 mLs (500 mcg total) by nebulization 3 (three) times daily as needed for Wheezing. 62.5 mL 0    linaCLOtide (LINZESS) 72 mcg Cap capsule Take 1 capsule (72 mcg total) by mouth before breakfast. 90 capsule 3    loratadine (CLARITIN) 10 mg tablet Take 1 tablet (10 mg total) by mouth once daily. 30 tablet 0    losartan (COZAAR) 50 MG tablet TAKE 1 TABLET(50 MG) BY MOUTH TWICE DAILY 180 tablet 3    multivit,calc,mins/iron/folic (ONE-A-DAY WOMENS FORMULA ORAL) Take 1 tablet by mouth once daily.      omeprazole (PRILOSEC) 40 MG capsule TAKE 1 CAPSULE(40 MG) BY MOUTH TWICE DAILY 180 capsule 4    rosuvastatin (CRESTOR) 20 MG tablet TAKE 1 TABLET(20 MG) BY MOUTH EVERY DAY 90 tablet 3    spironolactone (ALDACTONE) 25 MG tablet Take 1 tablet (25 mg total) by mouth once daily. 90 tablet 4     No current facility-administered medications for this visit.       Review of Systems   Constitutional:  Positive for fatigue.   HENT:  Negative for  "sore throat.    Eyes:  Negative for visual disturbance.   Respiratory:  Positive for shortness of breath. Negative for cough.    Cardiovascular:  Negative for chest pain.   Gastrointestinal:  Negative for abdominal pain, constipation, diarrhea, nausea and vomiting.   Genitourinary:  Negative for dysuria.   Musculoskeletal:  Negative for back pain.   Skin:  Negative for rash.   Neurological:  Negative for headaches.   Hematological:  Negative for adenopathy.   Psychiatric/Behavioral:  The patient is not nervous/anxious.        ECOG Performance Status:   ECOG SCORE    1 - Restricted in strenuous activity-ambulatory and able to carry out work of a light nature         Objective:      Vitals:   Vitals:    12/05/23 0953   BP: 130/61   BP Location: Right arm   Patient Position: Sitting   BP Method: Medium (Automatic)   Pulse: 83   Resp: 16   SpO2: 98%   Weight: 74.6 kg (164 lb 7.4 oz)   Height: 5' 5" (1.651 m)     BMI: Body mass index is 27.37 kg/m².    Physical Exam  Vitals and nursing note reviewed.   Constitutional:       Appearance: She is well-developed.   HENT:      Head: Normocephalic and atraumatic.   Eyes:      Pupils: Pupils are equal, round, and reactive to light.   Cardiovascular:      Rate and Rhythm: Normal rate and regular rhythm.   Pulmonary:      Effort: Pulmonary effort is normal.      Breath sounds: Normal breath sounds.   Abdominal:      General: Bowel sounds are normal.      Palpations: Abdomen is soft.   Musculoskeletal:         General: Normal range of motion.      Cervical back: Normal range of motion and neck supple.   Skin:     General: Skin is warm and dry.   Neurological:      Mental Status: She is alert and oriented to person, place, and time.   Psychiatric:         Behavior: Behavior normal.         Thought Content: Thought content normal.         Judgment: Judgment normal.         Laboratory Data:  Labs have been reviewed.    Lab Results   Component Value Date    WBC 5.47 12/04/2023    HGB " 7.6 (L) 12/04/2023    HCT 24.7 (L) 12/04/2023    MCV 93 12/04/2023     12/04/2023           Imaging:    Upper GI enteroscopy (9/18/23):   - Normal esophagus.                          - Esophagogastric landmarks identified.                          - Normal stomach.                          - Normal examined duodenum.                          - The examined portion of the jejunum was normal.                          - The examined portion of the ileum was normal.                          Tattooed maximal insertion.                          - No specimens collected.     Colonoscopy (9/18/23):   - Hemorrhoids found on perianal exam.                          - Diverticulosis in the entire examined colon.                          - One 3 mm polyp in the transverse colon, removed                          with a cold snare. Resected and retrieved.                          - The examination was otherwise normal on direct                          and retroflexion views.       Assessment:       1. Iron deficiency anemia due to chronic blood loss    2. Angiodysplasia of small intestine    3. Advance care planning           Plan:     Iron deficiency anemia / angiodysplasia of small intestine  - I have reviewed her chart  - she has angiodysplasia of her small intestine  - labs since 2006 reveal a mild-to-moderate anemia.  - iron studies (12/4/23) reveal a low-normal ferritin.  - she takes oral iron  - I recommend iron sucrose x 4 doses   - return to clinic in 3 months with repeat labs.    2. Advance Care Planning     Date: 12/05/2023    Power of   I initiated the process of voluntary advance care planning today and explained the importance of this process to the patient.  I introduced the concept of advance directives to the patient, as well. Then the patient received detailed information about the importance of designating a Health Care Power of  (HCPOA). She was also instructed to communicate with this  person about their wishes for future healthcare, should she become sick and lose decision-making capacity. The patient has not previously appointed a HCPOA. After our discussion, the patient has decided not to complete a HCPOA.        - return to clinic in 3 months with repeat labs.      Kem Bianchi M.D.  Hematology/Oncology  [facility] - [address], [address]  [place] [address]  Phone: [phone]  Fax: [phone]

## 2023-12-08 ENCOUNTER — TELEPHONE (OUTPATIENT)
Dept: INTERNAL MEDICINE | Facility: CLINIC | Age: 78
End: 2023-12-08
Payer: MEDICARE

## 2023-12-08 RX ORDER — TIZANIDINE 4 MG/1
4 TABLET ORAL EVERY 8 HOURS PRN
Qty: 30 TABLET | Refills: 3 | Status: SHIPPED | OUTPATIENT
Start: 2023-12-08

## 2023-12-08 NOTE — TELEPHONE ENCOUNTER
Pt having muscle spasms in her neck. Spasm is so bad at times she is unable to  her head. Pt took meds given last time (methocarbamol) this happened however no rellief.

## 2023-12-08 NOTE — TELEPHONE ENCOUNTER
She is to get on a heating pad and sent in a prescription for tizanidine 4 mg that she can take three times daliy for the muscle spasms

## 2023-12-13 ENCOUNTER — PATIENT MESSAGE (OUTPATIENT)
Dept: ADMINISTRATIVE | Facility: HOSPITAL | Age: 78
End: 2023-12-13
Payer: MEDICARE

## 2023-12-20 ENCOUNTER — OFFICE VISIT (OUTPATIENT)
Dept: INTERNAL MEDICINE | Facility: CLINIC | Age: 78
End: 2023-12-20
Payer: MEDICARE

## 2023-12-20 ENCOUNTER — LAB VISIT (OUTPATIENT)
Dept: LAB | Facility: HOSPITAL | Age: 78
End: 2023-12-20
Attending: INTERNAL MEDICINE
Payer: MEDICARE

## 2023-12-20 VITALS
BODY MASS INDEX: 26.66 KG/M2 | HEIGHT: 65 IN | HEART RATE: 85 BPM | DIASTOLIC BLOOD PRESSURE: 60 MMHG | SYSTOLIC BLOOD PRESSURE: 130 MMHG | WEIGHT: 160 LBS | OXYGEN SATURATION: 99 %

## 2023-12-20 DIAGNOSIS — D64.9 ANEMIA, UNSPECIFIED TYPE: ICD-10-CM

## 2023-12-20 DIAGNOSIS — Z00.00 ROUTINE GENERAL MEDICAL EXAMINATION AT A HEALTH CARE FACILITY: Primary | ICD-10-CM

## 2023-12-20 LAB
BASOPHILS # BLD AUTO: 0.02 K/UL (ref 0–0.2)
BASOPHILS NFR BLD: 0.3 % (ref 0–1.9)
DIFFERENTIAL METHOD: ABNORMAL
EOSINOPHIL # BLD AUTO: 0.1 K/UL (ref 0–0.5)
EOSINOPHIL NFR BLD: 2.3 % (ref 0–8)
ERYTHROCYTE [DISTWIDTH] IN BLOOD BY AUTOMATED COUNT: 16.6 % (ref 11.5–14.5)
HCT VFR BLD AUTO: 26 % (ref 37–48.5)
HGB BLD-MCNC: 8 G/DL (ref 12–16)
IMM GRANULOCYTES # BLD AUTO: 0.03 K/UL (ref 0–0.04)
IMM GRANULOCYTES NFR BLD AUTO: 0.5 % (ref 0–0.5)
LYMPHOCYTES # BLD AUTO: 0.8 K/UL (ref 1–4.8)
LYMPHOCYTES NFR BLD: 12.9 % (ref 18–48)
MCH RBC QN AUTO: 30 PG (ref 27–31)
MCHC RBC AUTO-ENTMCNC: 30.8 G/DL (ref 32–36)
MCV RBC AUTO: 97 FL (ref 82–98)
MONOCYTES # BLD AUTO: 0.6 K/UL (ref 0.3–1)
MONOCYTES NFR BLD: 9.1 % (ref 4–15)
NEUTROPHILS # BLD AUTO: 4.5 K/UL (ref 1.8–7.7)
NEUTROPHILS NFR BLD: 74.9 % (ref 38–73)
NRBC BLD-RTO: 0 /100 WBC
PLATELET # BLD AUTO: 245 K/UL (ref 150–450)
PMV BLD AUTO: 11.2 FL (ref 9.2–12.9)
RBC # BLD AUTO: 2.67 M/UL (ref 4–5.4)
WBC # BLD AUTO: 6.06 K/UL (ref 3.9–12.7)

## 2023-12-20 PROCEDURE — 99397 PR PREVENTIVE VISIT,EST,65 & OVER: ICD-10-PCS | Mod: GZ,S$PBB,, | Performed by: INTERNAL MEDICINE

## 2023-12-20 PROCEDURE — 99999 PR PBB SHADOW E&M-EST. PATIENT-LVL V: ICD-10-PCS | Mod: PBBFAC,,, | Performed by: INTERNAL MEDICINE

## 2023-12-20 PROCEDURE — 99397 PER PM REEVAL EST PAT 65+ YR: CPT | Mod: GZ,S$PBB,, | Performed by: INTERNAL MEDICINE

## 2023-12-20 PROCEDURE — 36415 COLL VENOUS BLD VENIPUNCTURE: CPT | Performed by: INTERNAL MEDICINE

## 2023-12-20 PROCEDURE — 99215 OFFICE O/P EST HI 40 MIN: CPT | Mod: PBBFAC | Performed by: INTERNAL MEDICINE

## 2023-12-20 PROCEDURE — 85025 COMPLETE CBC W/AUTO DIFF WBC: CPT | Performed by: INTERNAL MEDICINE

## 2023-12-20 PROCEDURE — 99999 PR PBB SHADOW E&M-EST. PATIENT-LVL V: CPT | Mod: PBBFAC,,, | Performed by: INTERNAL MEDICINE

## 2023-12-20 RX ORDER — ESCITALOPRAM OXALATE 10 MG/1
15 TABLET ORAL DAILY
Qty: 135 TABLET | Refills: 1 | Status: SHIPPED | OUTPATIENT
Start: 2023-12-20

## 2023-12-20 NOTE — PROGRESS NOTES
CHIEF COMPLAINT: Annual exam and  Follow up of multiple problems     HISTORY OF PRESENT ILLNESS: 78-year-old woman presents for follow up of above    She is taking oral iron with vitamin C daily. She is not as tired.  She is having some constipation - BM every other day. She is taking Linzess every other day and then has a BM - hard then soft BM.      Rash has improved. She is using betamethasone cream twice daily as needed for the rash which helps.   She saw Dr Herrera on 8/23/23 She uses cetaphil sopa and lotion and Super Fatted Basis whic h helps.  She has a similar rash intermittently and had a big years ago and saw Dr Herrera in Derm 11/3/2017 and was diagnosed with erythema annulare centrifugum - biopsy was done at that time- SUBCORNEAL PUSTULAR DERMATITIS WITH ASSOCIATED EPIDERMAL SPONGIOSIS      Sinuses are doing better with claritin.     She had a bunionectomy and hammertoe surgery on 7/8/21 with Dr Bunn. She has seen Dr Jasso who wants to redo the survery.  Dr Jasso gave her injections in her toes which helped with the pain. She cannot wear closed shoes. Has not had the surgery yet.      No gout attacks.  She takes allopurinol 100 mg 1 tablet nightly.      She has been taking Linzess 145 mg every day or other day. She  watery stool. She has a BM every other day.  If she does not take the Linzess, she is severely constipated.  No cramping or pain with the LInzess.     She has muscle spasms in her neck that come and go. She has to watch how she sleep.   She went to physical therapy which has helped. She is doing exercises at home.  THe tried tizanidine 4 mg - which was too strong for her.      Anxiety is controlled on Lexapro to 10 mg one tablet daily.  Sister in law is in inpatient hospice.       Heartburn is congroled on omperazole 20 mg twice daily     She continues to take Crestor 20 mg 1 tablet daily with co enzyme 10 200 mg  daily - leg achintess is better with lower dose of crestor. She also takes  "losartan 50 mg twice daily, and spironolactone 25 mg daily. BP has been doing well.        She had a normal cystoscope 14 due to recurrent UTI. No dysuria or hematuria now. She is no longer using estrogen vaginal cream for vaginal atrophy three times weekly       She takes tylenol arthritis 650 mg 2 tablets twice daily as needed which helps her joint pain.  She did have some meloxicam that did help. She cannot take NSAIDS long term due to her stomach issues.      PAST MEDICAL HISTORY:   1. Hypertension.   2. Hyperlipidemia.   3. Gout.   4. History of rashes.   5. TIA in   6. Fibrocystic breast disease.   7. Postmenopausal.   8. Osteoarthritis.   9. History of tobacco usage; quit in .   10. History of colon polyps; normal colonoscopy in 10/07 and normal 2010, due    11. Normal bone mineral density scan in .   12. Anxiety and depression - controlled on lorazepam very rarely.   13. Varicose veins.   14. Intermittent GERD.   15. Iron deficiency anemia 2010 - due to erosive gastropathy on EGD 2010     PAST SURGICAL HISTORY:   1. Right carotid endarterectomy in .   2. Breast reduction surgery.   3. Total abdominal hysterectomy.   4. Blood transfusion prior to .     SOCIAL HISTORY: Quit smoking in ; smoked 1 PPD since age 16. No   alcohol use.     FAMILY HISTORY:   Father had gout, hypertension and heart failure; is . Mother had hypertension and pancreatic cancer; is also . Has five brothers, one of whom  in a motor vehicle accident. All have gout and hypertension. One brother has Crohn's disease; another has bladder cancer.     MEDICATIONS and ALLERGIES: Updated on epic.     PHYSICAL EXAMINATION:       /60 (BP Location: Right arm, Patient Position: Sitting, BP Method: Large (Manual))   Pulse 85   Ht 5' 5" (1.651 m)   Wt 72.6 kg (160 lb)   LMP  (LMP Unknown)   SpO2 99%   BMI 26.63 kg/m²       General: Alert, oriented. No apparent distress. Affect " within normal   Limits.   Conjunctivae anicteric. Tympanic membranes clear. Oropharynx clear.   Neck supple.   Respiratory effort normal. Lungs clear  Heart: Regular rate and rhythm without murmurs, gallops or rubs.   No lower extremity edema.   ABDOMEN: soft, non distended, non tender, bowel sounds present, no hepatosplenomgaly  BREAST: no abn seen, no nodules palpated, no axillary lad     Labs 12/23 reviewed and stable           ASSESSMENT AND PLAN:     Annual exam - discussed diet, exercise and safety issues.     1.  iron deficiency anemia - to have iron infusions  2. Mood disorder- increase lexapro to 10 mg 1.5 tablet daily. Rash - betamethasone cream twice daily. Has an apt in derm    2.  Essential hypertension - stable   3. Heme positive stools - saw Dr Kingsley 3/2023 -had colonoscopy 9/18/23  4. Hyperlipidemia - on crestor to 20 mg 1 tablet daily.    5. Erosive gastropathy - on omeprazole to 40 mg twice daily.  6. Hypercalcemia - stable  7. Gout -  on allopurinol.  8.  Carotid artery disease - on risk factor modification.   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 Constipation - on linzess  11. Neck pain -better  12. . MMG 8/13 BMD normal January 2022.  I will see her back in 4 months sooner if problems arise

## 2023-12-31 ENCOUNTER — EXTERNAL CHRONIC CARE MANAGEMENT (OUTPATIENT)
Dept: PRIMARY CARE CLINIC | Facility: CLINIC | Age: 78
End: 2023-12-31
Payer: MEDICARE

## 2023-12-31 PROCEDURE — 99490 CHRNC CARE MGMT STAFF 1ST 20: CPT | Mod: S$PBB,,, | Performed by: INTERNAL MEDICINE

## 2023-12-31 PROCEDURE — 99490 CHRNC CARE MGMT STAFF 1ST 20: CPT | Mod: PBBFAC | Performed by: INTERNAL MEDICINE

## 2024-01-03 ENCOUNTER — INFUSION (OUTPATIENT)
Dept: INFUSION THERAPY | Facility: OTHER | Age: 79
End: 2024-01-03
Attending: INTERNAL MEDICINE
Payer: MEDICARE

## 2024-01-03 VITALS
TEMPERATURE: 98 F | SYSTOLIC BLOOD PRESSURE: 141 MMHG | OXYGEN SATURATION: 96 % | HEART RATE: 78 BPM | RESPIRATION RATE: 17 BRPM | DIASTOLIC BLOOD PRESSURE: 65 MMHG

## 2024-01-03 DIAGNOSIS — D50.0 IRON DEFICIENCY ANEMIA DUE TO CHRONIC BLOOD LOSS: Primary | ICD-10-CM

## 2024-01-03 PROCEDURE — 25000003 PHARM REV CODE 250: Performed by: INTERNAL MEDICINE

## 2024-01-03 PROCEDURE — 63600175 PHARM REV CODE 636 W HCPCS: Performed by: INTERNAL MEDICINE

## 2024-01-03 PROCEDURE — 96374 THER/PROPH/DIAG INJ IV PUSH: CPT

## 2024-01-03 RX ORDER — HEPARIN 100 UNIT/ML
500 SYRINGE INTRAVENOUS
Status: DISCONTINUED | OUTPATIENT
Start: 2024-01-03 | End: 2024-01-03 | Stop reason: HOSPADM

## 2024-01-03 RX ORDER — EPINEPHRINE 0.3 MG/.3ML
0.3 INJECTION SUBCUTANEOUS ONCE AS NEEDED
Status: DISCONTINUED | OUTPATIENT
Start: 2024-01-03 | End: 2024-01-03 | Stop reason: HOSPADM

## 2024-01-03 RX ORDER — DIPHENHYDRAMINE HYDROCHLORIDE 50 MG/ML
50 INJECTION, SOLUTION INTRAMUSCULAR; INTRAVENOUS ONCE AS NEEDED
Status: DISCONTINUED | OUTPATIENT
Start: 2024-01-03 | End: 2024-01-03 | Stop reason: HOSPADM

## 2024-01-03 RX ORDER — SODIUM CHLORIDE 0.9 % (FLUSH) 0.9 %
10 SYRINGE (ML) INJECTION
Status: DISCONTINUED | OUTPATIENT
Start: 2024-01-03 | End: 2024-01-03 | Stop reason: HOSPADM

## 2024-01-03 RX ADMIN — SODIUM CHLORIDE: 9 INJECTION, SOLUTION INTRAVENOUS at 02:01

## 2024-01-03 RX ADMIN — IRON SUCROSE 200 MG: 20 INJECTION, SOLUTION INTRAVENOUS at 02:01

## 2024-01-03 NOTE — PLAN OF CARE
Problem: Anemia  Goal: Anemia Symptom Improvement  Outcome: Ongoing, Progressing     Problem: Adult Inpatient Plan of Care  Goal: Plan of Care Review  Outcome: Ongoing, Progressing  Goal: Patient-Specific Goal (Individualized)  Outcome: Ongoing, Progressing  Goal: Optimal Comfort and Wellbeing  Outcome: Ongoing, Progressing       Patient presented today for iv venofer injection. PIV started to RAC and IV push given with no issues. VS remained stable throughout visit and assessment provided no issues. PIV removed w/o complications and all questions answered. Patient scheduled for next appt on 1-10-24, AVS printed and given to patient and left clinic ambulatory in no acute distress.

## 2024-01-10 ENCOUNTER — INFUSION (OUTPATIENT)
Dept: INFUSION THERAPY | Facility: OTHER | Age: 79
End: 2024-01-10
Attending: INTERNAL MEDICINE
Payer: MEDICARE

## 2024-01-10 VITALS
SYSTOLIC BLOOD PRESSURE: 150 MMHG | TEMPERATURE: 98 F | DIASTOLIC BLOOD PRESSURE: 65 MMHG | RESPIRATION RATE: 18 BRPM | HEART RATE: 79 BPM | OXYGEN SATURATION: 99 %

## 2024-01-10 DIAGNOSIS — D50.0 IRON DEFICIENCY ANEMIA DUE TO CHRONIC BLOOD LOSS: Primary | ICD-10-CM

## 2024-01-10 PROCEDURE — 25000003 PHARM REV CODE 250: Performed by: INTERNAL MEDICINE

## 2024-01-10 PROCEDURE — 63600175 PHARM REV CODE 636 W HCPCS: Performed by: INTERNAL MEDICINE

## 2024-01-10 PROCEDURE — 96374 THER/PROPH/DIAG INJ IV PUSH: CPT

## 2024-01-10 RX ORDER — SODIUM CHLORIDE 0.9 % (FLUSH) 0.9 %
10 SYRINGE (ML) INJECTION
Status: DISCONTINUED | OUTPATIENT
Start: 2024-01-10 | End: 2024-01-10 | Stop reason: HOSPADM

## 2024-01-10 RX ORDER — DIPHENHYDRAMINE HYDROCHLORIDE 50 MG/ML
50 INJECTION, SOLUTION INTRAMUSCULAR; INTRAVENOUS ONCE AS NEEDED
Status: DISCONTINUED | OUTPATIENT
Start: 2024-01-10 | End: 2024-01-10 | Stop reason: HOSPADM

## 2024-01-10 RX ORDER — EPINEPHRINE 0.3 MG/.3ML
0.3 INJECTION SUBCUTANEOUS ONCE AS NEEDED
Status: DISCONTINUED | OUTPATIENT
Start: 2024-01-10 | End: 2024-01-10 | Stop reason: HOSPADM

## 2024-01-10 RX ORDER — HEPARIN 100 UNIT/ML
500 SYRINGE INTRAVENOUS
Status: DISCONTINUED | OUTPATIENT
Start: 2024-01-10 | End: 2024-01-10 | Stop reason: HOSPADM

## 2024-01-10 RX ADMIN — IRON SUCROSE 200 MG: 20 INJECTION, SOLUTION INTRAVENOUS at 03:01

## 2024-01-10 RX ADMIN — SODIUM CHLORIDE: 9 INJECTION, SOLUTION INTRAVENOUS at 03:01

## 2024-01-10 NOTE — PLAN OF CARE
Problem: Anemia  Goal: Anemia Symptom Improvement  Outcome: Ongoing, Progressing     Problem: Adult Inpatient Plan of Care  Goal: Plan of Care Review  Outcome: Ongoing, Progressing  Goal: Patient-Specific Goal (Individualized)  Outcome: Ongoing, Progressing  Goal: Optimal Comfort and Wellbeing  Outcome: Ongoing, Progressing       Patient presented today for iv venofer injection. PIV started to RAC and IV push given with no issues. VS remained stable throughout visit and assessment provided no issues. PIV removed w/o complications and all questions answered. Patient scheduled for next appt on 1-17-24, sent via [facility], and left clinic ambulatory in no acute distress.

## 2024-01-17 ENCOUNTER — INFUSION (OUTPATIENT)
Dept: INFUSION THERAPY | Facility: OTHER | Age: 79
End: 2024-01-17
Attending: INTERNAL MEDICINE
Payer: MEDICARE

## 2024-01-17 VITALS
TEMPERATURE: 98 F | SYSTOLIC BLOOD PRESSURE: 117 MMHG | OXYGEN SATURATION: 98 % | RESPIRATION RATE: 17 BRPM | HEART RATE: 81 BPM | DIASTOLIC BLOOD PRESSURE: 51 MMHG

## 2024-01-17 DIAGNOSIS — D50.0 IRON DEFICIENCY ANEMIA DUE TO CHRONIC BLOOD LOSS: Primary | ICD-10-CM

## 2024-01-17 PROCEDURE — 96374 THER/PROPH/DIAG INJ IV PUSH: CPT

## 2024-01-17 PROCEDURE — 25000003 PHARM REV CODE 250: Performed by: INTERNAL MEDICINE

## 2024-01-17 PROCEDURE — 63600175 PHARM REV CODE 636 W HCPCS: Performed by: INTERNAL MEDICINE

## 2024-01-17 RX ORDER — EPINEPHRINE 0.3 MG/.3ML
0.3 INJECTION SUBCUTANEOUS ONCE AS NEEDED
Status: DISCONTINUED | OUTPATIENT
Start: 2024-01-17 | End: 2024-01-17 | Stop reason: HOSPADM

## 2024-01-17 RX ORDER — SODIUM CHLORIDE 0.9 % (FLUSH) 0.9 %
10 SYRINGE (ML) INJECTION
Status: DISCONTINUED | OUTPATIENT
Start: 2024-01-17 | End: 2024-01-17 | Stop reason: HOSPADM

## 2024-01-17 RX ORDER — HEPARIN 100 UNIT/ML
500 SYRINGE INTRAVENOUS
Status: DISCONTINUED | OUTPATIENT
Start: 2024-01-17 | End: 2024-01-17 | Stop reason: HOSPADM

## 2024-01-17 RX ORDER — DIPHENHYDRAMINE HYDROCHLORIDE 50 MG/ML
50 INJECTION INTRAMUSCULAR; INTRAVENOUS ONCE AS NEEDED
Status: DISCONTINUED | OUTPATIENT
Start: 2024-01-17 | End: 2024-01-17 | Stop reason: HOSPADM

## 2024-01-17 RX ADMIN — IRON SUCROSE 200 MG: 20 INJECTION, SOLUTION INTRAVENOUS at 02:01

## 2024-01-17 RX ADMIN — SODIUM CHLORIDE: 9 INJECTION, SOLUTION INTRAVENOUS at 02:01

## 2024-01-17 NOTE — PLAN OF CARE
Problem: Anemia  Goal: Anemia Symptom Improvement  Outcome: Ongoing, Progressing     Problem: Adult Inpatient Plan of Care  Goal: Plan of Care Review  Outcome: Ongoing, Progressing  Goal: Patient-Specific Goal (Individualized)  Outcome: Ongoing, Progressing  Goal: Optimal Comfort and Wellbeing  Outcome: Ongoing, Progressing         Patient presented today for iv venofer injection. PIV started to RAC and infusion given with no issues. VS remained stable throughout visit and assessment provided no issues. PIV removed w/o complications and all questions answered. Patient scheduled for next appt on 1-24-24, sent via [facility], and left clinic ambulatory in no acute distress.

## 2024-01-24 ENCOUNTER — INFUSION (OUTPATIENT)
Dept: INFUSION THERAPY | Facility: OTHER | Age: 79
End: 2024-01-24
Attending: INTERNAL MEDICINE
Payer: MEDICARE

## 2024-01-24 VITALS
SYSTOLIC BLOOD PRESSURE: 143 MMHG | HEART RATE: 83 BPM | OXYGEN SATURATION: 99 % | DIASTOLIC BLOOD PRESSURE: 63 MMHG | RESPIRATION RATE: 16 BRPM | TEMPERATURE: 98 F

## 2024-01-24 DIAGNOSIS — D50.0 IRON DEFICIENCY ANEMIA DUE TO CHRONIC BLOOD LOSS: Primary | ICD-10-CM

## 2024-01-24 PROCEDURE — 96374 THER/PROPH/DIAG INJ IV PUSH: CPT

## 2024-01-24 PROCEDURE — 25000003 PHARM REV CODE 250: Performed by: INTERNAL MEDICINE

## 2024-01-24 PROCEDURE — 63600175 PHARM REV CODE 636 W HCPCS: Performed by: INTERNAL MEDICINE

## 2024-01-24 RX ORDER — DIPHENHYDRAMINE HYDROCHLORIDE 50 MG/ML
50 INJECTION INTRAMUSCULAR; INTRAVENOUS ONCE AS NEEDED
Status: DISCONTINUED | OUTPATIENT
Start: 2024-01-24 | End: 2024-01-24 | Stop reason: HOSPADM

## 2024-01-24 RX ORDER — SODIUM CHLORIDE 0.9 % (FLUSH) 0.9 %
10 SYRINGE (ML) INJECTION
Status: DISCONTINUED | OUTPATIENT
Start: 2024-01-24 | End: 2024-01-24 | Stop reason: HOSPADM

## 2024-01-24 RX ORDER — HEPARIN 100 UNIT/ML
500 SYRINGE INTRAVENOUS
Status: DISCONTINUED | OUTPATIENT
Start: 2024-01-24 | End: 2024-01-24 | Stop reason: HOSPADM

## 2024-01-24 RX ORDER — EPINEPHRINE 0.3 MG/.3ML
0.3 INJECTION SUBCUTANEOUS ONCE AS NEEDED
Status: DISCONTINUED | OUTPATIENT
Start: 2024-01-24 | End: 2024-01-24 | Stop reason: HOSPADM

## 2024-01-24 RX ADMIN — IRON SUCROSE 200 MG: 20 INJECTION, SOLUTION INTRAVENOUS at 02:01

## 2024-01-24 RX ADMIN — SODIUM CHLORIDE: 9 INJECTION, SOLUTION INTRAVENOUS at 02:01

## 2024-01-24 NOTE — PLAN OF CARE
Venofer IV push administered, no reaction. Patient tolerated well. 24 gauge IV removed, catheter intact. No apparent distress noted. Discharge instructions given to patient. Patient understands instructions.

## 2024-01-31 ENCOUNTER — EXTERNAL CHRONIC CARE MANAGEMENT (OUTPATIENT)
Dept: PRIMARY CARE CLINIC | Facility: CLINIC | Age: 79
End: 2024-01-31
Payer: MEDICARE

## 2024-01-31 PROCEDURE — 99490 CHRNC CARE MGMT STAFF 1ST 20: CPT | Mod: PBBFAC | Performed by: INTERNAL MEDICINE

## 2024-01-31 PROCEDURE — 99490 CHRNC CARE MGMT STAFF 1ST 20: CPT | Mod: S$PBB,,, | Performed by: INTERNAL MEDICINE

## 2024-02-29 ENCOUNTER — EXTERNAL CHRONIC CARE MANAGEMENT (OUTPATIENT)
Dept: PRIMARY CARE CLINIC | Facility: CLINIC | Age: 79
End: 2024-02-29
Payer: MEDICARE

## 2024-02-29 PROCEDURE — 99490 CHRNC CARE MGMT STAFF 1ST 20: CPT | Mod: S$PBB,,, | Performed by: INTERNAL MEDICINE

## 2024-02-29 PROCEDURE — 99490 CHRNC CARE MGMT STAFF 1ST 20: CPT | Mod: PBBFAC | Performed by: INTERNAL MEDICINE

## 2024-03-03 NOTE — PROGRESS NOTES
PATIENT: Isael Salmon  MRN: [id number]  DATE: 3/6/2024    Diagnosis:   1. Iron deficiency anemia due to chronic blood loss    2. Angiodysplasia of small intestine    3. Anemia due to stage 3b chronic kidney disease    4. Hyperglycemia    5. Normocytic anemia      Chief Complaint: iron deficiency anemia    Oncologic History:      Oncologic History     Oncologic Treatment     Pathology       Subjective:    History of Present Illness: Ms. Salmon is a 78 y.o. female who presented in December 2023 for evaluation and management of iron deficiency anemia. She was referred by Dr. Montoya.    - she has angiodysplasia of her small intestine  - labs since 2006 reveal a mild-to-moderate anemia.  - iron studies (12/4/23) reveal a low-normal ferritin.  - she restarted oral iron last week.    Interval history:  - she presents for a follow-up appointment for her iron deficiency anemia.  - she received iron sucrose x 4 doses in January 2024. She did note notice a big improvement in her symptoms.  - she endorses fatigue. She denies chest pain, nausea, vomiting, diarrhea, constipation. She notes a mild skin rash.      Past medical, surgical, family, and social histories have been reviewed and updated below.    Past Medical History:   Past Medical History:   Diagnosis Date    Adjustment disorder 07/26/2012    Anemia 07/26/2012    AR (allergic rhinitis) 09/05/2014    Bronchitis     Fibrocystic breast disease in female 07/26/2012    GERD (gastroesophageal reflux disease) 07/26/2012    Gout     Gout, arthritis 07/26/2012    History of blood transfusion 07/26/2012    History of colonic polyps 07/26/2012    Hypercalcemia 09/20/2012    Hyperlipidemia 07/26/2012    Hypertension 07/26/2012    Nuclear sclerosis 05/02/2014    Osteoarthritis 07/26/2012    Other hyperparathyroidism 09/20/2012    Postmenopausal status 07/26/2012    PVD (peripheral vascular disease) 07/26/2012    left leg laser of vein with injection    Stroke 1997    TIA     Superficial vein thrombosis     Transient ischemic attack 07/26/2012    Varicose veins 07/26/2012       Past Surgical History:   Past Surgical History:   Procedure Laterality Date    ANTEGRADE SINGLE BALLOON ENTEROSCOPY N/A 9/18/2023    Procedure: ENTEROSCOPY, SINGLE BALLOON, ANTEGRADE;  Surgeon: Jaziel Kingsley MD;  Location: [facility] ENDO (2ND FLR);  Service: Endoscopy;  Laterality: N/A;  7/25/23-PEG prep, Instructions via portal-DS    BREAST CYST ASPIRATION Bilateral     multiple ones over the years    BREAST CYST EXCISION Left     BREAST SURGERY      breast reduction    broken second finger Right 12/2015    pins placed    carotid endarterectomy  1997    right    CAROTID ENDARTERECTOMY Right 1997    CATARACT EXTRACTION W/  INTRAOCULAR LENS IMPLANT Right 05/01/2018    Dr. Yost    CATARACT EXTRACTION W/  INTRAOCULAR LENS IMPLANT Left 05/15/2018    Dr. Lester    COLONOSCOPY N/A 10/26/2015    Procedure: COLONOSCOPY;  Surgeon: Manuel Alanis MD;  Location: [facility] ENDO (4TH FLR);  Service: Endoscopy;  Laterality: N/A;    COLONOSCOPY N/A 9/18/2023    Procedure: COLONOSCOPY;  Surgeon: Jaziel Kingsley MD;  Location: [facility] ENDO (2ND FLR);  Service: Endoscopy;  Laterality: N/A;  can use single-balloon for this part as well    CORRECTION OF HAMMER TOE Left 07/08/2021    Procedure: CORRECTION, HAMMER TOE Second toe;  Surgeon: Silver Bunn MD;  Location: [facility] OR;  Service: Orthopedics;  Laterality: Left;  Ossiofiber hammertoe implant    EYE SURGERY      HYSTERECTOMY      ORIF FINGER FRACTURE  12/18/2015    SURGICAL REMOVAL OF BUNION WITH OSTEOTOMY OF METATARSAL BONE Left 07/08/2021    Procedure: BUNIONECTOMY, WITH METATARSAL OSTEOTOMY Proximal opening wedge;  Surgeon: Silver Bunn MD;  Location: [facility] OR;  Service: Orthopedics;  Laterality: Left;  Arthrex opening wedge plate    TIA      TOTAL REDUCTION MAMMOPLASTY Bilateral     VASCULAR SURGERY      left leg vein laser       Family History:   Family  History   Problem Relation Age of Onset    Heart failure Father     Cancer Mother         pancreas    Cataracts Mother     Hypertension Mother     Cancer Brother         bladder    Cataracts Brother     Hypertension Brother     Crohn's disease Brother     Gout Brother     Cataracts Brother     Hypertension Brother     Gout Brother     Cataracts Brother     Hypertension Brother     Gout Brother     Bone cancer Brother     Cataracts Brother     Hypertension Brother     Breast cancer Neg Hx     Ovarian cancer Neg Hx     Allergies Neg Hx     Asthma Neg Hx     Eczema Neg Hx     Cervical cancer Neg Hx     Endometrial cancer Neg Hx     Vaginal cancer Neg Hx     Amblyopia Neg Hx     Blindness Neg Hx     Diabetes Neg Hx     Glaucoma Neg Hx     Macular degeneration Neg Hx     Retinal detachment Neg Hx     Strabismus Neg Hx     Stroke Neg Hx     Thyroid disease Neg Hx     Celiac disease Neg Hx     Colon cancer Neg Hx     Esophageal cancer Neg Hx     Liver disease Neg Hx     Liver cancer Neg Hx     Rectal cancer Neg Hx     Stomach cancer Neg Hx        Social History:  reports that she quit smoking about 34 years ago. Her smoking use included cigarettes. She has never used smokeless tobacco. She reports current alcohol use of about 1.0 standard drink of alcohol per week. She reports that she does not use drugs.    Allergies:  Review of patient's allergies indicates:   Allergen Reactions    Avelox [moxifloxacin] Palpitations     Racing heart and gets the shakes    Latex Rash    Pcn [penicillins] Rash    Sulfa (sulfonamide antibiotics) Blisters    Ciprofloxacin      Room starts to spin  and nausea and dizziness    Codeine Nausea Only     nausea and weakness       Medications:  Current Outpatient Medications   Medication Sig Dispense Refill    acetaminophen (TYLENOL) 325 MG tablet Take 2 tablets (650 mg total) by mouth every 4 (four) hours as needed for Pain (Fever). 30 tablet 1    allopurinoL (ZYLOPRIM) 100 MG tablet Take 2  tablets (200 mg total) by mouth Daily. 180 tablet 1    ALPRAZolam (XANAX) 0.25 MG tablet Take 1 tablet (0.25 mg total) by mouth 2 (two) times daily as needed for Anxiety. 30 tablet 0    ascorbic acid, vitamin C, (VITAMIN C) 500 MG tablet Take 500 mg by mouth once daily.      aspirin (ECOTRIN) 81 MG EC tablet Take 81 mg by mouth once daily.       b complex vitamins capsule Take 1 capsule by mouth once daily.      betamethasone dipropionate 0.05 % cream Apply topically 2 (two) times daily. 90 g 3    BIOTIN ORAL Take 1 tablet by mouth once daily.      co-enzyme Q-10 30 mg capsule Take 200 mg by mouth once daily.      ergocalciferol (ERGOCALCIFEROL) 50,000 unit Cap TAKE 1 CAPSULE BY MOUTH 2 TIMES A WEEK 24 capsule 5    EScitalopram oxalate (LEXAPRO) 10 MG tablet Take 1.5 tablets (15 mg total) by mouth once daily. 135 tablet 1    fluticasone propionate (FLONASE) 50 mcg/actuation nasal spray 1 spray (50 mcg total) by Each Nostril route once daily. 11.1 mL 0    FOLBIC 2.5-25-2 mg Tab TAKE 1 TABLET BY MOUTH EVERY DAY 30 tablet 11    gentamicin (GARAMYCIN) 0.3 % ophthalmic solution Place 1 drop into the right eye every 4 (four) hours. 5 mL 0    indomethacin (INDOCIN) 50 MG capsule Take 1 capsule (50 mg total) by mouth 3 (three) times daily as needed. 30 capsule 3    ipratropium (ATROVENT) 0.02 % nebulizer solution Take 2.5 mLs (500 mcg total) by nebulization 3 (three) times daily as needed for Wheezing. 62.5 mL 0    linaCLOtide (LINZESS) 72 mcg Cap capsule Take 1 capsule (72 mcg total) by mouth before breakfast. 90 capsule 3    loratadine (CLARITIN) 10 mg tablet Take 1 tablet (10 mg total) by mouth once daily. 30 tablet 0    losartan (COZAAR) 50 MG tablet TAKE 1 TABLET(50 MG) BY MOUTH TWICE DAILY 180 tablet 3    multivit,calc,mins/iron/folic (ONE-A-DAY WOMENS FORMULA ORAL) Take 1 tablet by mouth once daily.      omeprazole (PRILOSEC) 40 MG capsule TAKE 1 CAPSULE(40 MG) BY MOUTH TWICE DAILY 180 capsule 4    rosuvastatin  (CRESTOR) 20 MG tablet TAKE 1 TABLET(20 MG) BY MOUTH EVERY DAY 90 tablet 3    spironolactone (ALDACTONE) 25 MG tablet Take 1 tablet (25 mg total) by mouth once daily. 90 tablet 4    tiZANidine (ZANAFLEX) 4 MG tablet Take 1 tablet (4 mg total) by mouth every 8 (eight) hours as needed. 30 tablet 3     No current facility-administered medications for this visit.       Review of Systems   Constitutional:  Positive for fatigue.   HENT:  Negative for sore throat.    Eyes:  Negative for visual disturbance.   Respiratory:  Negative for cough.    Cardiovascular:  Negative for chest pain.   Gastrointestinal:  Negative for abdominal pain, constipation, diarrhea, nausea and vomiting.   Genitourinary:  Negative for dysuria.   Musculoskeletal:  Negative for back pain.   Skin:  Positive for rash.   Neurological:  Negative for headaches.   Hematological:  Negative for adenopathy.   Psychiatric/Behavioral:  The patient is not nervous/anxious.        ECOG Performance Status:   ECOG SCORE    1 - Restricted in strenuous activity-ambulatory and able to carry out work of a light nature         Objective:      Vitals:   Vitals:    03/06/24 0958   BP: 137/65   Pulse: 75   SpO2: 99%   Weight: 74.8 kg (164 lb 14.5 oz)     BMI: Body mass index is 27.44 kg/m².    Physical Exam  Vitals and nursing note reviewed.   Constitutional:       Appearance: She is well-developed.   HENT:      Head: Normocephalic and atraumatic.   Eyes:      Pupils: Pupils are equal, round, and reactive to light.   Cardiovascular:      Rate and Rhythm: Normal rate and regular rhythm.   Pulmonary:      Effort: Pulmonary effort is normal.      Breath sounds: Normal breath sounds.   Abdominal:      General: Bowel sounds are normal.      Palpations: Abdomen is soft.   Musculoskeletal:         General: Normal range of motion.      Cervical back: Normal range of motion and neck supple.   Skin:     General: Skin is warm and dry.   Neurological:      Mental Status: She is alert  and oriented to person, place, and time.   Psychiatric:         Behavior: Behavior normal.         Thought Content: Thought content normal.         Judgment: Judgment normal.         Laboratory Data:  Labs have been reviewed.    Lab Results   Component Value Date    WBC 4.27 03/05/2024    HGB 9.9 (L) 03/05/2024    HCT 30.9 (L) 03/05/2024    MCV 95 03/05/2024     03/05/2024           Imaging:    Upper GI enteroscopy (9/18/23):   - Normal esophagus.                          - Esophagogastric landmarks identified.                          - Normal stomach.                          - Normal examined duodenum.                          - The examined portion of the jejunum was normal.                          - The examined portion of the ileum was normal.                          Tattooed maximal insertion.                          - No specimens collected.     Colonoscopy (9/18/23):   - Hemorrhoids found on perianal exam.                          - Diverticulosis in the entire examined colon.                          - One 3 mm polyp in the transverse colon, removed                          with a cold snare. Resected and retrieved.                          - The examination was otherwise normal on direct                          and retroflexion views.       Assessment:       1. Iron deficiency anemia due to chronic blood loss    2. Angiodysplasia of small intestine    3. Anemia due to stage 3b chronic kidney disease    4. Hyperglycemia    5. Normocytic anemia           Plan:     Iron deficiency anemia / angiodysplasia of small intestine / normocytic anemia / anemia in CKD / anemia of chronic disease  - I have reviewed her chart  - she has angiodysplasia of her small intestine  - labs since 2006 reveal a mild-to-moderate anemia.  - iron studies (12/4/23) reveal a low-normal ferritin.  - she takes oral iron  - I recommended iron sucrose x 4 doses   - she received iron sucrose x 4 doses in January 2024.  - Labs  have been reviewed. Hemoglobin has increased to 10 g/dL. Ferritin has increased from 32 ng/mL to 182 ng/mL  - part of her anemia may be anemia in CKD, anemia of chronic disease  - I will check a few more labs today.  - return to clinic in 4 months with repeat labs.    2. Hyperglycemia  - check hemoglobin A1c per her request.    3. Advance Care Planning       Power of   After our discussion (at previous visit), she decided not to complete a HCPOA.        - return to clinic in 4 months with repeat labs.      Kem Bianchi M.D.  Hematology/Oncology  [facility] - [address], [address]  [place] [address]  Phone: [phone]  Fax: [phone]

## 2024-03-05 ENCOUNTER — LAB VISIT (OUTPATIENT)
Dept: LAB | Facility: OTHER | Age: 79
End: 2024-03-05
Attending: INTERNAL MEDICINE
Payer: MEDICARE

## 2024-03-05 DIAGNOSIS — D50.0 IRON DEFICIENCY ANEMIA DUE TO CHRONIC BLOOD LOSS: ICD-10-CM

## 2024-03-05 LAB
BASOPHILS # BLD AUTO: 0.02 K/UL (ref 0–0.2)
BASOPHILS NFR BLD: 0.5 % (ref 0–1.9)
DIFFERENTIAL METHOD BLD: ABNORMAL
EOSINOPHIL # BLD AUTO: 0.1 K/UL (ref 0–0.5)
EOSINOPHIL NFR BLD: 3.3 % (ref 0–8)
ERYTHROCYTE [DISTWIDTH] IN BLOOD BY AUTOMATED COUNT: 14.3 % (ref 11.5–14.5)
FERRITIN SERPL-MCNC: 182 NG/ML (ref 20–300)
HCT VFR BLD AUTO: 30.9 % (ref 37–48.5)
HGB BLD-MCNC: 9.9 G/DL (ref 12–16)
IMM GRANULOCYTES # BLD AUTO: 0.02 K/UL (ref 0–0.04)
IMM GRANULOCYTES NFR BLD AUTO: 0.5 % (ref 0–0.5)
IRON SERPL-MCNC: 88 UG/DL (ref 30–160)
LYMPHOCYTES # BLD AUTO: 0.7 K/UL (ref 1–4.8)
LYMPHOCYTES NFR BLD: 16.6 % (ref 18–48)
MCH RBC QN AUTO: 30.6 PG (ref 27–31)
MCHC RBC AUTO-ENTMCNC: 32 G/DL (ref 32–36)
MCV RBC AUTO: 95 FL (ref 82–98)
MONOCYTES # BLD AUTO: 0.4 K/UL (ref 0.3–1)
MONOCYTES NFR BLD: 9.8 % (ref 4–15)
NEUTROPHILS # BLD AUTO: 3 K/UL (ref 1.8–7.7)
NEUTROPHILS NFR BLD: 69.3 % (ref 38–73)
NRBC BLD-RTO: 0 /100 WBC
PLATELET # BLD AUTO: 181 K/UL (ref 150–450)
PMV BLD AUTO: 10.6 FL (ref 9.2–12.9)
RBC # BLD AUTO: 3.24 M/UL (ref 4–5.4)
SATURATED IRON: 30 % (ref 20–50)
TOTAL IRON BINDING CAPACITY: 296 UG/DL (ref 250–450)
TRANSFERRIN SERPL-MCNC: 200 MG/DL (ref 200–375)
WBC # BLD AUTO: 4.27 K/UL (ref 3.9–12.7)

## 2024-03-05 PROCEDURE — 83540 ASSAY OF IRON: CPT | Performed by: INTERNAL MEDICINE

## 2024-03-05 PROCEDURE — 85025 COMPLETE CBC W/AUTO DIFF WBC: CPT | Performed by: INTERNAL MEDICINE

## 2024-03-05 PROCEDURE — 82728 ASSAY OF FERRITIN: CPT | Performed by: INTERNAL MEDICINE

## 2024-03-05 PROCEDURE — 36415 COLL VENOUS BLD VENIPUNCTURE: CPT | Performed by: INTERNAL MEDICINE

## 2024-03-06 ENCOUNTER — LAB VISIT (OUTPATIENT)
Dept: LAB | Facility: HOSPITAL | Age: 79
End: 2024-03-06
Payer: MEDICARE

## 2024-03-06 ENCOUNTER — OFFICE VISIT (OUTPATIENT)
Dept: HEMATOLOGY/ONCOLOGY | Facility: CLINIC | Age: 79
End: 2024-03-06
Payer: MEDICARE

## 2024-03-06 VITALS
HEART RATE: 75 BPM | BODY MASS INDEX: 27.44 KG/M2 | WEIGHT: 164.88 LBS | DIASTOLIC BLOOD PRESSURE: 65 MMHG | SYSTOLIC BLOOD PRESSURE: 137 MMHG | OXYGEN SATURATION: 99 %

## 2024-03-06 DIAGNOSIS — D63.1 ANEMIA DUE TO STAGE 3B CHRONIC KIDNEY DISEASE: ICD-10-CM

## 2024-03-06 DIAGNOSIS — D64.9 NORMOCYTIC ANEMIA: ICD-10-CM

## 2024-03-06 DIAGNOSIS — R73.9 HYPERGLYCEMIA: ICD-10-CM

## 2024-03-06 DIAGNOSIS — D50.0 IRON DEFICIENCY ANEMIA DUE TO CHRONIC BLOOD LOSS: Primary | ICD-10-CM

## 2024-03-06 DIAGNOSIS — K55.20 ANGIODYSPLASIA OF SMALL INTESTINE: ICD-10-CM

## 2024-03-06 DIAGNOSIS — N18.32 ANEMIA DUE TO STAGE 3B CHRONIC KIDNEY DISEASE: ICD-10-CM

## 2024-03-06 DIAGNOSIS — Z78.0 MENOPAUSE: ICD-10-CM

## 2024-03-06 LAB
ALBUMIN SERPL BCP-MCNC: 3.8 G/DL (ref 3.5–5.2)
ALP SERPL-CCNC: 82 U/L (ref 55–135)
ALT SERPL W/O P-5'-P-CCNC: 12 U/L (ref 10–44)
ANION GAP SERPL CALC-SCNC: 11 MMOL/L (ref 8–16)
AST SERPL-CCNC: 18 U/L (ref 10–40)
BILIRUB SERPL-MCNC: 0.5 MG/DL (ref 0.1–1)
BUN SERPL-MCNC: 27 MG/DL (ref 8–23)
CALCIUM SERPL-MCNC: 10.2 MG/DL (ref 8.7–10.5)
CHLORIDE SERPL-SCNC: 111 MMOL/L (ref 95–110)
CO2 SERPL-SCNC: 24 MMOL/L (ref 23–29)
CREAT SERPL-MCNC: 1.3 MG/DL (ref 0.5–1.4)
CRP SERPL-MCNC: 3.1 MG/L (ref 0–8.2)
ERYTHROCYTE [SEDIMENTATION RATE] IN BLOOD BY PHOTOMETRIC METHOD: 24 MM/HR (ref 0–36)
EST. GFR  (NO RACE VARIABLE): 42 ML/MIN/1.73 M^2
ESTIMATED AVG GLUCOSE: 105 MG/DL (ref 68–131)
GLUCOSE SERPL-MCNC: 87 MG/DL (ref 70–110)
HBA1C MFR BLD: 5.3 % (ref 4–5.6)
POTASSIUM SERPL-SCNC: 4.1 MMOL/L (ref 3.5–5.1)
PROT SERPL-MCNC: 7.1 G/DL (ref 6–8.4)
SODIUM SERPL-SCNC: 146 MMOL/L (ref 136–145)

## 2024-03-06 PROCEDURE — 80053 COMPREHEN METABOLIC PANEL: CPT | Performed by: INTERNAL MEDICINE

## 2024-03-06 PROCEDURE — 86140 C-REACTIVE PROTEIN: CPT | Performed by: INTERNAL MEDICINE

## 2024-03-06 PROCEDURE — 99999 PR PBB SHADOW E&M-EST. PATIENT-LVL III: CPT | Mod: PBBFAC,,, | Performed by: INTERNAL MEDICINE

## 2024-03-06 PROCEDURE — 83036 HEMOGLOBIN GLYCOSYLATED A1C: CPT | Performed by: INTERNAL MEDICINE

## 2024-03-06 PROCEDURE — 85652 RBC SED RATE AUTOMATED: CPT | Performed by: INTERNAL MEDICINE

## 2024-03-06 PROCEDURE — 99214 OFFICE O/P EST MOD 30 MIN: CPT | Mod: S$PBB,,, | Performed by: INTERNAL MEDICINE

## 2024-03-06 PROCEDURE — 99213 OFFICE O/P EST LOW 20 MIN: CPT | Mod: PBBFAC,PO | Performed by: INTERNAL MEDICINE

## 2024-03-22 ENCOUNTER — HOSPITAL ENCOUNTER (OUTPATIENT)
Dept: RADIOLOGY | Facility: OTHER | Age: 79
Discharge: HOME | End: 2024-03-22
Attending: INTERNAL MEDICINE
Payer: MEDICARE

## 2024-03-22 DIAGNOSIS — Z78.0 MENOPAUSE: ICD-10-CM

## 2024-03-22 PROCEDURE — 77080 DXA BONE DENSITY AXIAL: CPT | Mod: 26,,, | Performed by: RADIOLOGY

## 2024-03-22 PROCEDURE — 77080 DXA BONE DENSITY AXIAL: CPT | Mod: TC

## 2024-03-23 LAB
ALPHA GLOBIN RELEASED BY: NORMAL
ALPHA-GLOBIN SPECIMEN: NORMAL
GENETICIST REVIEW: NORMAL
HBA1 GENE MUT ANL BLD/T: NEGATIVE
HBA1 GENE MUT ANL BLD/T: NORMAL
REF LAB TEST METHOD: NORMAL
SERVICE CMNT-IMP: NORMAL
SPECIMEN SOURCE: NORMAL

## 2024-03-25 ENCOUNTER — OFFICE VISIT (OUTPATIENT)
Dept: ORTHOPEDICS | Facility: CLINIC | Age: 79
End: 2024-03-25
Payer: MEDICARE

## 2024-03-25 DIAGNOSIS — M17.0 PRIMARY OSTEOARTHRITIS OF BOTH KNEES: Primary | ICD-10-CM

## 2024-03-25 PROCEDURE — 20610 DRAIN/INJ JOINT/BURSA W/O US: CPT | Mod: 50,PBBFAC | Performed by: PHYSICIAN ASSISTANT

## 2024-03-25 PROCEDURE — 99499 UNLISTED E&M SERVICE: CPT | Mod: S$PBB,,, | Performed by: PHYSICIAN ASSISTANT

## 2024-03-25 PROCEDURE — 99213 OFFICE O/P EST LOW 20 MIN: CPT | Mod: PBBFAC,25 | Performed by: PHYSICIAN ASSISTANT

## 2024-03-25 PROCEDURE — 99999PBSHW PR PBB SHADOW TECHNICAL ONLY FILED TO HB: Mod: PBBFAC,,,

## 2024-03-25 PROCEDURE — 20610 DRAIN/INJ JOINT/BURSA W/O US: CPT | Mod: 50,S$PBB,, | Performed by: PHYSICIAN ASSISTANT

## 2024-03-25 PROCEDURE — 99999 PR PBB SHADOW E&M-EST. PATIENT-LVL III: CPT | Mod: PBBFAC,,, | Performed by: PHYSICIAN ASSISTANT

## 2024-03-25 RX ORDER — LIDOCAINE HYDROCHLORIDE 10 MG/ML
2 INJECTION INFILTRATION; PERINEURAL
Status: DISCONTINUED | OUTPATIENT
Start: 2024-03-25 | End: 2024-03-25 | Stop reason: HOSPADM

## 2024-03-25 RX ORDER — TRIAMCINOLONE ACETONIDE 40 MG/ML
40 INJECTION, SUSPENSION INTRA-ARTICULAR; INTRAMUSCULAR
Status: DISCONTINUED | OUTPATIENT
Start: 2024-03-25 | End: 2024-03-25 | Stop reason: HOSPADM

## 2024-03-25 RX ADMIN — TRIAMCINOLONE ACETONIDE 40 MG: 40 INJECTION, SUSPENSION INTRA-ARTICULAR; INTRAMUSCULAR at 02:03

## 2024-03-25 RX ADMIN — LIDOCAINE HYDROCHLORIDE 2 ML: 10 INJECTION, SOLUTION INFILTRATION; PERINEURAL at 02:03

## 2024-03-25 NOTE — PROCEDURES
Large Joint Aspiration/Injection: bilateral knee    Date/Time: 3/25/2024 2:30 PM    Performed by: Olga Salinas PA-C  Authorized by: Olga Salinas PA-C    Consent Done?:  Yes (Verbal)  Indications:  Pain  Timeout: prior to procedure the correct patient, procedure, and site was verified    Prep: patient was prepped and draped in usual sterile fashion    Local anesthetic:  Topical anesthetic    Details:  Needle Size:  22 G  Approach:  Anterolateral  Location:  Knee  Laterality:  Bilateral  Site:  Bilateral knee  Medications (Right):  40 mg triamcinolone acetonide 40 mg/mL; 2 mL LIDOcaine HCL 10 mg/ml (1%) 10 mg/mL (1 %)  Medications (Left):  40 mg triamcinolone acetonide 40 mg/mL; 2 mL LIDOcaine HCL 10 mg/ml (1%) 10 mg/mL (1 %)  Patient tolerance:  Patient tolerated the procedure well with no immediate complications

## 2024-03-25 NOTE — PROGRESS NOTES
Patient ID: Isael Salmon is a 78 y.o. female.    Chief Complaint: Pain of the Left Knee and Pain of the Right Knee      HISTORY:  Isael Salmon is a 78 y.o. female who returns to me today for follow up of bilateral knee pain.  She was last seen by me 8/15/2023.  She had injections at the time which were very helpful.  She uses tylenol, topical creams, advil.  She has pain worse with bending, standing, kneeling, prolonged sitting and standing.  She is not ready for TKA at this time      PMH/PSH/FamHx/SocHx:    Unchanged from prior visit.    ROS:  Constitution: Negative for chills, fever and weakness.   Respiratory: Negative for cough and shortness of breath.   Musculoskeletal: Positive for bilateral knee pain  Psychiatric/Behavioral: The patient is not nervous/anxious.       PHYSICAL EXAM:   Bilateral knee  Skin intact  No warmth or effusion  ROM 0-110  Stable to testing  TTP medial joint      ASSESSMENT/PLAN:    Isael was seen today for pain and pain.    Diagnoses and all orders for this visit:    Primary osteoarthritis of both knees  -     Large Joint Aspiration/Injection: bilateral knee    - Bilateral knee CSI today  - Rest, ice as needed  - follow up prn

## 2024-03-31 ENCOUNTER — EXTERNAL CHRONIC CARE MANAGEMENT (OUTPATIENT)
Dept: PRIMARY CARE CLINIC | Facility: CLINIC | Age: 79
End: 2024-03-31
Payer: MEDICARE

## 2024-03-31 PROCEDURE — 99490 CHRNC CARE MGMT STAFF 1ST 20: CPT | Mod: S$PBB,,, | Performed by: INTERNAL MEDICINE

## 2024-03-31 PROCEDURE — 99490 CHRNC CARE MGMT STAFF 1ST 20: CPT | Mod: PBBFAC | Performed by: INTERNAL MEDICINE

## 2024-04-03 ENCOUNTER — OFFICE VISIT (OUTPATIENT)
Dept: INTERNAL MEDICINE | Facility: CLINIC | Age: 79
End: 2024-04-03
Payer: MEDICARE

## 2024-04-03 VITALS
SYSTOLIC BLOOD PRESSURE: 114 MMHG | OXYGEN SATURATION: 99 % | BODY MASS INDEX: 27.47 KG/M2 | HEART RATE: 77 BPM | DIASTOLIC BLOOD PRESSURE: 64 MMHG | WEIGHT: 164.88 LBS | HEIGHT: 65 IN

## 2024-04-03 DIAGNOSIS — I10 ESSENTIAL HYPERTENSION: ICD-10-CM

## 2024-04-03 DIAGNOSIS — E53.8 VITAMIN B12 DEFICIENCY: ICD-10-CM

## 2024-04-03 DIAGNOSIS — I77.819 ECTASIS AORTA: ICD-10-CM

## 2024-04-03 DIAGNOSIS — M10.9 GOUT, ARTHRITIS: ICD-10-CM

## 2024-04-03 DIAGNOSIS — F33.0 MILD EPISODE OF RECURRENT MAJOR DEPRESSIVE DISORDER: ICD-10-CM

## 2024-04-03 DIAGNOSIS — E55.9 VITAMIN D DEFICIENCY DISEASE: Primary | ICD-10-CM

## 2024-04-03 DIAGNOSIS — J44.9 CHRONIC OBSTRUCTIVE PULMONARY DISEASE, UNSPECIFIED COPD TYPE: ICD-10-CM

## 2024-04-03 DIAGNOSIS — F39 MOOD DISORDER: ICD-10-CM

## 2024-04-03 DIAGNOSIS — Z12.31 SCREENING MAMMOGRAM FOR BREAST CANCER: ICD-10-CM

## 2024-04-03 DIAGNOSIS — K59.09 CHRONIC CONSTIPATION: ICD-10-CM

## 2024-04-03 DIAGNOSIS — E78.2 MIXED HYPERLIPIDEMIA: ICD-10-CM

## 2024-04-03 DIAGNOSIS — E11.9 TYPE 2 DIABETES MELLITUS WITHOUT COMPLICATION, WITHOUT LONG-TERM CURRENT USE OF INSULIN: ICD-10-CM

## 2024-04-03 PROCEDURE — 99214 OFFICE O/P EST MOD 30 MIN: CPT | Mod: PBBFAC | Performed by: INTERNAL MEDICINE

## 2024-04-03 PROCEDURE — 99999 PR PBB SHADOW E&M-EST. PATIENT-LVL IV: CPT | Mod: PBBFAC,,, | Performed by: INTERNAL MEDICINE

## 2024-04-03 PROCEDURE — 99214 OFFICE O/P EST MOD 30 MIN: CPT | Mod: S$PBB,,, | Performed by: INTERNAL MEDICINE

## 2024-04-03 RX ORDER — ERGOCALCIFEROL 1.25 MG/1
CAPSULE ORAL
Qty: 24 CAPSULE | Refills: 5 | Status: SHIPPED | OUTPATIENT
Start: 2024-04-03

## 2024-04-03 NOTE — PROGRESS NOTES
CHIEF COMPLAINT: Follow up of multiple problems     HISTORY OF PRESENT ILLNESS: 78-year-old woman presents for follow up of above     She had 4 infusions of IV iron from 1/3/24 to 1/24/24. She is followed by Dr Bianchi. She is off oral iron.      She is having some constipation - BM every other day. She is taking Linzess every day or every other day.  She has a BM after taking the Linzess.       Rash on her forearms comes and goes.  She is using betamethasone cream twice daily as needed for the rash which helps.   She saw Dr Herrera on 8/23/23 She uses cetaphil soap and lotion and Super Fatted Basis which helps.  She has a similar rash intermittently and had a big years ago and saw Dr Herrera in Derm 11/3/2017 and was diagnosed with erythema annulare centrifugum - biopsy was done at that time- SUBCORNEAL PUSTULAR DERMATITIS WITH ASSOCIATED EPIDERMAL SPONGIOSIS      Sinuses are doing ok. She is taking claritin.      She had a bunionectomy and hammertoe surgery on 7/8/21 with Dr Bunn. She has seen Dr Jasso who wants to redo the surgery.  Dr Jasso gave her injections in her toes which helped with the pain. She cannot wear closed shoes. Has not had the surgery yet.      No gout attacks.  She takes allopurinol 100 mg 1 tablet nightly.      She had an injection in both of her knees on 3/25/24.   The cortisone injections last 3 months.     She has muscle spasms in her neck that come and go. She has to watch how she sleep.   She went to physical therapy which has helped. She is doing exercises at home.  THe tried tizanidine 4 mg - which was too strong for her.      Anxiety is controlled on Lexapro to 10 ,g 1.5 tablets daily.  Sister in law passed away.  Her  gets on her nerves.        Heartburn is congroled on omperazole 20 mg twice daily     She continues to take Crestor 20 mg 1 tablet daily with co enzyme 10 200 mg  daily - leg achintess is better with lower dose of crestor. She also takes losartan 50 mg twice daily,  "and spironolactone 25 mg daily. BP has been doing well.        She had a normal cystoscope 14 due to recurrent UTI. No dysuria or hematuria now. She is no longer using estrogen vaginal cream for vaginal atrophy three times weekly       She takes tylenol arthritis 650 mg 2 tablets twice daily as needed which helps her joint pain.  She did have some meloxicam that did help. She cannot take NSAIDS long term due to her stomach issues.      PAST MEDICAL HISTORY:   1. Hypertension.   2. Hyperlipidemia.   3. Gout.   4. History of rashes.   5. TIA in   6. Fibrocystic breast disease.   7. Postmenopausal.   8. Osteoarthritis.   9. History of tobacco usage; quit in .   10. History of colon polyps; normal colonoscopy in 10/07 and normal 2010, due    11. Normal bone mineral density scan in .   12. Anxiety and depression - controlled on lorazepam very rarely.   13. Varicose veins.   14. Intermittent GERD.   15. Iron deficiency anemia 2010 - due to erosive gastropathy on EGD 2010     PAST SURGICAL HISTORY:   1. Right carotid endarterectomy in .   2. Breast reduction surgery.   3. Total abdominal hysterectomy.   4. Blood transfusion prior to .     SOCIAL HISTORY: Quit smoking in ; smoked 1 PPD since age 16. No   alcohol use.     FAMILY HISTORY:   Father had gout, hypertension and heart failure; is . Mother had hypertension and pancreatic cancer; is also . Has five brothers, one of whom  in a motor vehicle accident. All have gout and hypertension. One brother has Crohn's disease; another has bladder cancer.     MEDICATIONS and ALLERGIES: Updated on epic.     PHYSICAL EXAMINATION:        /64 (BP Location: Right arm, Patient Position: Sitting, BP Method: Large (Manual))   Pulse 77   Ht 5' 5" (1.651 m)   Wt 74.8 kg (164 lb 14.5 oz)   LMP  (LMP Unknown)   SpO2 99%   BMI 27.44 kg/m²       General: Alert, oriented. No apparent distress. Affect within normal "   Limits.   Conjunctivae anicteric. Tympanic membranes clear. Oropharynx clear.   Neck supple.   Respiratory effort normal. Lungs clear  Heart: Regular rate and rhythm without murmurs, gallops or rubs.   No lower extremity edema.      Labs 3/6/24 reviewed and stable           ASSESSMENT AND PLAN:            1.  iron deficiency anemia - to have iron infusions  2. Mood disorder-on lexapro to 10 mg 1.5 tablet daily.   3. Rash - betamethasone cream twice daily. Has an apt in derm     4.  Essential hypertension - stable   5. Heme positive stools - saw Dr Kingsley 3/2023 -had colonoscopy 9/18/23  5. Hyperlipidemia - on crestor to 20 mg 1 tablet daily.    6. Erosive gastropathy - on omeprazole to 40 mg twice daily.  7. Hypercalcemia - stable  8. Gout -  on allopurinol.  8.  Carotid artery disease - on risk factor modification.   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 Constipation - on linzess  11. Neck pain -better  12. . MMG 8/14/23 BMD normal March 2024.  I will see her back in 4 months sooner if problems arise

## 2024-04-12 DIAGNOSIS — G45.9 TRANSIENT CEREBRAL ISCHEMIA, UNSPECIFIED TYPE: ICD-10-CM

## 2024-04-12 DIAGNOSIS — I73.9 PVD (PERIPHERAL VASCULAR DISEASE): ICD-10-CM

## 2024-04-12 DIAGNOSIS — Z86.39 HISTORY OF HYPERLIPIDEMIA: ICD-10-CM

## 2024-04-12 DIAGNOSIS — M10.9 GOUT, ARTHRITIS: ICD-10-CM

## 2024-04-12 RX ORDER — LOSARTAN POTASSIUM 50 MG/1
TABLET ORAL
Qty: 180 TABLET | Refills: 3 | Status: SHIPPED | OUTPATIENT
Start: 2024-04-12

## 2024-04-12 NOTE — TELEPHONE ENCOUNTER
Care Due:                  Date            Visit Type   Department     Provider  --------------------------------------------------------------------------------                                EP -                              PRIMARY      [facility] INTERNAL  Last Visit: 04-      CARE ([facility])   MEDICINE       MITUL MARSHALL                              EP                               PRIMARY      [facility] INTERNAL  Next Visit: 08-      CARE ([facility])   MEDICINE       MITUL MARSHALL                                                            Last  Test          Frequency    Reason                     Performed    Due Date  --------------------------------------------------------------------------------    Lipid Panel.  12 months..  rosuvastatin.............  09- 09-    [facility] Embedded Care Due Messages. Reference number: [id number].   4/12/2024 5:15:08 AM CDT

## 2024-04-13 NOTE — TELEPHONE ENCOUNTER
Provider Staff:  Action required for this patient    Requires labs      Please see care gap opportunities below in Care Due Message.    Thanks!  [facility]     Appointments      Date Provider   Last Visit   4/3/2024 Janett Montoya MD   Next Visit   8/29/2024 Janett Montoya MD     Refill Decision Note   Isael Salmon  is requesting a refill authorization.  Brief Assessment and Rationale for Refill:  Approve     Medication Therapy Plan:         Comments:     Note composed:11:40 PM 04/12/2024

## 2024-04-30 ENCOUNTER — EXTERNAL CHRONIC CARE MANAGEMENT (OUTPATIENT)
Dept: PRIMARY CARE CLINIC | Facility: CLINIC | Age: 79
End: 2024-04-30
Payer: MEDICARE

## 2024-04-30 PROCEDURE — 99490 CHRNC CARE MGMT STAFF 1ST 20: CPT | Mod: PBBFAC | Performed by: INTERNAL MEDICINE

## 2024-04-30 PROCEDURE — 99490 CHRNC CARE MGMT STAFF 1ST 20: CPT | Mod: S$PBB,,, | Performed by: INTERNAL MEDICINE

## 2024-05-02 NOTE — H&P
[facility] Medicine  History & Physical    Patient Name: Isael Salmon  MRN: [id number]  Admission Date: 3/30/2020  Attending Physician: Peggy Santana MD   Primary Care Provider: Janett Montoya MD         Patient information was obtained from ER records.     Subjective:     Principal Problem:Suspected Covid-19 Virus Infection    Chief Complaint:   Chief Complaint   Patient presents with    Shortness of Breath     generalized weakness with diarrhea and fever        HPI: Ms. Salmon is a 75 yo F with PMHx HTN, HLD, well controlled DM2, POLO, constipation (was planning on outpatient cscope with Dr. Kingsley 3/30/20), depression, history of gout, h/o TIA who presented to the ED d/t worsened weakness.  One week ago, she did not have symptoms, but since her daughter was sick, her daughter insisted that she be tested for COVID at the [facility].  She has not received COVID test results yet.  Her daughter was not tested for COVID.  Three days ago, she presented to the ED due to decreased appetite without nausea or vomiting.  +Subjective fever and has been taking Tylenol for malaise.  No myalgia.  She has productive cough without difficulty expectorating sputum.  No SOB, CP.  Was discharged with Zofran and loperamide for diarrhea.  Chest x-ray in the ED was concerning for right middle and lower lobe opacity.  Decreased appetite and weakness has continued to worsen.  She had a virtual visit yesterday and was planning on drive-through COVID testing, but she presented to the ED today due to worsened weakness and decreased appetite.    She was afebrile in the ED, but was 89% on room air with improvement on 2 L nasal cannula.  Procalcitonin was elevated.  Lactic acid was negative.  Repeat CXR with worsened RLL infiltrate.  Repeat COVID testing in process.    Past Medical History:   Diagnosis Date    Adjustment disorder 7/26/2012    Anemia 7/26/2012    AR (allergic rhinitis) 9/5/2014     Bronchitis     Cataract     Fibrocystic breast disease in female 7/26/2012    GERD (gastroesophageal reflux disease) 7/26/2012    Gout     Gout, arthritis 7/26/2012    History of blood transfusion 7/26/2012    History of colonic polyps 7/26/2012    Hypercalcemia 9/20/2012    Hyperlipidemia 7/26/2012    Hypertension 7/26/2012    Nuclear sclerosis 5/2/2014    Osteoarthritis 7/26/2012    Other hyperparathyroidism 9/20/2012    Postmenopausal status 7/26/2012    PVD (peripheral vascular disease) 7/26/2012    left leg laser of vein with injection    Stroke 1997    TIA    Superficial vein thrombosis     Transient ischemic attack 7/26/2012    Type 2 diabetes mellitus without complication, without long-term current use of insulin 3/30/2020    Varicose veins 7/26/2012       Past Surgical History:   Procedure Laterality Date    BREAST CYST ASPIRATION Bilateral     multiple ones over the years    BREAST CYST EXCISION Left     BREAST SURGERY      breast reduction    broken second finger Right 12/2015    pins placed    carotid endarterectomy  1997    right    CAROTID ENDARTERECTOMY Right 1997    CATARACT EXTRACTION W/  INTRAOCULAR LENS IMPLANT Right 05/01/2018    Dr. Yost    CATARACT EXTRACTION W/  INTRAOCULAR LENS IMPLANT Left 05/15/2018    Dr. Lester    COLONOSCOPY N/A 10/26/2015    Procedure: COLONOSCOPY;  Surgeon: Manuel Alanis MD;  Location: [address]);  Service: Endoscopy;  Laterality: N/A;    EYE SURGERY      HYSTERECTOMY      ORIF FINGER FRACTURE  12/18/15    TIA      VASCULAR SURGERY      left leg vein laser       Review of patient's allergies indicates:   Allergen Reactions    Latex      Other reaction(s): Rash    Pcn [penicillins]      Other reaction(s): rash    Sulfa (sulfonamide antibiotics)      Other reaction(s): blisters    Avelox [moxifloxacin]      Other reaction(s): racing heart  Other reaction(s): shakes    Ciprofloxacin Other (See Comments)      Room starts to spin , nausea and dizziness    Codeine      Other reaction(s): nausea  Other reaction(s): weakness       No current facility-administered medications on file prior to encounter.      Current Outpatient Medications on File Prior to Encounter   Medication Sig    allopurinol (ZYLOPRIM) 100 MG tablet Take 2 tablets (200 mg total) by mouth once daily.    ascorbic acid, vitamin C, (VITAMIN C) 500 MG tablet Take 500 mg by mouth once daily.    aspirin (ECOTRIN) 81 MG EC tablet Take 81 mg by mouth once daily.     augmented betamethasone dipropionate (DIPROLENE-AF) 0.05 % cream Apply topically 2 (two) times daily. (Patient taking differently: Apply topically 2 (two) times daily as needed. )    b complex vitamins capsule Take 1 capsule by mouth once daily.    BIOTIN ORAL Take 1 tablet by mouth once daily.    co-enzyme Q-10 30 mg capsule Take 200 mg by mouth once daily.    colchicine 0.6 mg tablet One tablet every hour during gouty flare until have diarrhea. Max 6 tablets daily    ergocalciferol (ERGOCALCIFEROL) 50,000 unit Cap TAKE 1 CAPSULE BY MOUTH 2 TIMES A WEEK    escitalopram oxalate (LEXAPRO) 10 MG tablet Take 0.5 tablets (5 mg total) by mouth once daily.    estradiol (ESTRACE) 0.01 % (0.1 mg/gram) vaginal cream Place vaginally as needed.     fluticasone (FLONASE) 50 mcg/actuation nasal spray 2 sprays by Each Nare route daily as needed. 2 Aerosol, Spray Nasal Every day    FOLBIC 2.5-25-2 mg Tab TAKE 1 TABLET BY MOUTH DAILY    indomethacin (INDOCIN) 50 MG capsule Take 1 capsule (50 mg total) by mouth 3 (three) times daily as needed.    ipratropium (ATROVENT) 0.02 % nebulizer solution Take 2.5 mLs (500 mcg total) by nebulization 3 (three) times daily as needed for Wheezing.    linaCLOtide (LINZESS) 145 mcg Cap capsule Take 1 capsule (145 mcg total) by mouth once daily.    loperamide (IMODIUM) 2 mg capsule Take 1 capsule (2 mg total) by mouth 4 (four) times daily as needed for Diarrhea.     losartan (COZAAR) 50 MG tablet Take 1 tablet (50 mg total) by mouth 2 (two) times daily.    multivit,calc,mins/iron/folic (ONE-A-DAY WOMENS FORMULA ORAL) Take 1 tablet by mouth once daily.    omeprazole (PRILOSEC) 20 MG capsule Take 1 capsule (20 mg total) by mouth 2 (two) times daily.    ondansetron (ZOFRAN) 4 MG tablet Take 1 tablet (4 mg total) by mouth every 6 (six) hours.    rosuvastatin (CRESTOR) 20 MG tablet Take 1 tablet (20 mg total) by mouth once daily.    spironolactone (ALDACTONE) 25 MG tablet Take 1 tablet (25 mg total) by mouth once daily.    [DISCONTINUED] salsalate (DISALCID) 750 MG Tab Take 1 tablet (750 mg total) by mouth 3 (three) times daily.     Family History     Problem Relation (Age of Onset)    Bone cancer Brother    Cancer Mother, Brother    Cataracts Mother, Brother, Brother, Brother, Brother    Crohn's disease Brother    Heart failure Father    Hypertension Mother, Brother, Brother, Brother, Brother        Tobacco Use    Smoking status: Former Smoker     Last attempt to quit: 1989     Years since quittin.5    Smokeless tobacco: Never Used    Tobacco comment: quit  - 1 pack per day since age 16   Substance and Sexual Activity    Alcohol use: Yes     Alcohol/week: 1.0 standard drinks     Types: 1 Glasses of wine per week     Frequency: Monthly or less     Drinks per session: 1 or 2     Binge frequency: Never     Comment: maybe one glass of wine monthly    Drug use: No    Sexual activity: Not Currently     Partners: Male     Birth control/protection: Surgical     Review of Systems   Constitutional: Positive for appetite change and fever.   HENT: Negative for rhinorrhea and sneezing.    Eyes: Negative for redness and itching.   Respiratory: Positive for cough. Negative for shortness of breath.    Cardiovascular: Negative for chest pain and leg swelling.   Gastrointestinal: Positive for diarrhea. Negative for vomiting.   Genitourinary: Negative for dysuria and  hematuria.   Musculoskeletal: Negative for gait problem and myalgias.   Skin: Negative for color change and rash.   Neurological: Negative for dizziness and light-headedness.   Hematological: Negative for adenopathy.   Psychiatric/Behavioral: Negative for confusion. The patient is not nervous/anxious.      Objective:     Vital Signs (Most Recent):  Temp: 98.9 °F (37.2 °C) (03/30/20 1456)  Pulse: 80 (03/30/20 1601)  Resp: 20 (03/30/20 1033)  BP: (!) 146/68 (03/30/20 1601)  SpO2: 100 % (03/30/20 1601) Vital Signs (24h Range):  Temp:  [98.9 °F (37.2 °C)-99.6 °F (37.6 °C)] 98.9 °F (37.2 °C)  Pulse:  [] 80  Resp:  [20] 20  SpO2:  [89 %-100 %] 100 %  BP: (131-160)/(60-68) 146/68        There is no height or weight on file to calculate BMI.    Physical Exam   Constitutional: She appears well-developed and well-nourished. No distress.   HENT:   Head: Normocephalic and atraumatic.   Nose: Nose normal.   Mouth/Throat: Oropharynx is clear and moist. No oropharyngeal exudate.   Eyes: Pupils are equal, round, and reactive to light. EOM are normal. Right eye exhibits no discharge. Left eye exhibits no discharge. No scleral icterus.   Neck: Neck supple. No tracheal deviation present.   Cardiovascular: Normal rate, regular rhythm, normal heart sounds and intact distal pulses.   No murmur heard.  Pulmonary/Chest: Effort normal. No respiratory distress. She has no wheezes. She has rales in the right upper field, the right middle field, the right lower field, the left upper field, the left middle field and the left lower field.   Abdominal: Soft. Bowel sounds are normal. She exhibits no distension. There is no tenderness.   Musculoskeletal: She exhibits no edema or deformity.   Neurological: She is alert. No cranial nerve deficit. Gait normal.   Skin: Skin is warm and dry. She is not diaphoretic. No erythema.   Psychiatric: She has a normal mood and affect. Her behavior is normal.         CRANIAL NERVES     CN III, IV, VI    Pupils are equal, round, and reactive to light.  Extraocular motions are normal.        Significant Labs:   A1C: No results for input(s): HGBA1C in the last 4320 hours.  CBC:   Recent Labs   Lab 03/30/20  1126   WBC 6.46   HGB 10.6*   HCT 32.5*        CMP:   Recent Labs   Lab 03/30/20  1126      K 4.5      CO2 21*      BUN 15   CREATININE 1.0   CALCIUM 9.2   PROT 7.2   ALBUMIN 3.1*   BILITOT 0.3   ALKPHOS 78   AST 40   ALT 20   ANIONGAP 11   EGFRNONAA 55*     Cardiac Markers:   Recent Labs   Lab 03/30/20  1126   BNP 84       Significant Imaging: CXR: I have reviewed all pertinent results/findings within the past 24 hours and my personal findings are:  Worsened right-sided haziness    Assessment/Plan:     * Suspected Covid-19 Virus Infection  -desatted to 89% on room air.  Currently doing well on 2 L nasal cannula  -started Rocephin, azithromycin due to elevated procalcitonin and worsened right-sided opacity on CXR  -blood cultures pending  -continue isolation pending COVID results    Mild episode of recurrent major depressive disorder  -continue home Lexapro 5 daily    Type 2 diabetes mellitus without complication, without long-term current use of insulin  -diet controlled.  Last A1c 5.6  -recheck A1c    Chronic constipation  -CT  with diverticulosis  -was planning on outpatient C scope 03-; will need to reschedule GI follow-up  -stool softeners p.r.n.    History of transient ischemic attack (TIA)  -continue home aspirin, Crestor 20      Gout, arthritis  -no renal dysfunction  -continue home allopurinol    Hyperlipidemia  -given history of TIA, will resume home Crestor 20      Essential hypertension  -BP elevated  -will restart home losartan 50 b.i.d., spironolactone 25 daily      VTE Risk Mitigation (From admission, onward)    None             Solange Hernandes MD  Department of Hospital Medicine   [facility]   [employer]

## 2024-05-07 DIAGNOSIS — K59.09 CHRONIC CONSTIPATION: ICD-10-CM

## 2024-05-07 RX ORDER — LINACLOTIDE 72 UG/1
CAPSULE, GELATIN COATED ORAL
Qty: 90 CAPSULE | Refills: 3 | Status: SHIPPED | OUTPATIENT
Start: 2024-05-07

## 2024-05-31 ENCOUNTER — EXTERNAL CHRONIC CARE MANAGEMENT (OUTPATIENT)
Dept: PRIMARY CARE CLINIC | Facility: CLINIC | Age: 79
End: 2024-05-31
Payer: MEDICARE

## 2024-05-31 PROCEDURE — 99490 CHRNC CARE MGMT STAFF 1ST 20: CPT | Mod: PBBFAC | Performed by: INTERNAL MEDICINE

## 2024-05-31 PROCEDURE — 99490 CHRNC CARE MGMT STAFF 1ST 20: CPT | Mod: S$PBB,,, | Performed by: INTERNAL MEDICINE

## 2024-06-10 ENCOUNTER — PATIENT MESSAGE (OUTPATIENT)
Dept: INTERNAL MEDICINE | Facility: CLINIC | Age: 79
End: 2024-06-10
Payer: MEDICARE

## 2024-06-12 ENCOUNTER — OFFICE VISIT (OUTPATIENT)
Dept: INTERNAL MEDICINE | Facility: CLINIC | Age: 79
End: 2024-06-12
Payer: MEDICARE

## 2024-06-12 VITALS
WEIGHT: 162.94 LBS | OXYGEN SATURATION: 97 % | HEIGHT: 65 IN | DIASTOLIC BLOOD PRESSURE: 78 MMHG | HEART RATE: 79 BPM | BODY MASS INDEX: 27.15 KG/M2 | SYSTOLIC BLOOD PRESSURE: 126 MMHG

## 2024-06-12 DIAGNOSIS — I73.9 PVD (PERIPHERAL VASCULAR DISEASE): ICD-10-CM

## 2024-06-12 DIAGNOSIS — I10 ESSENTIAL HYPERTENSION: ICD-10-CM

## 2024-06-12 DIAGNOSIS — M15.9 PRIMARY OSTEOARTHRITIS INVOLVING MULTIPLE JOINTS: ICD-10-CM

## 2024-06-12 DIAGNOSIS — F33.0 MILD EPISODE OF RECURRENT MAJOR DEPRESSIVE DISORDER: ICD-10-CM

## 2024-06-12 DIAGNOSIS — E78.5 HYPERLIPIDEMIA, UNSPECIFIED HYPERLIPIDEMIA TYPE: ICD-10-CM

## 2024-06-12 DIAGNOSIS — J44.9 CHRONIC OBSTRUCTIVE PULMONARY DISEASE, UNSPECIFIED COPD TYPE: ICD-10-CM

## 2024-06-12 DIAGNOSIS — D50.9 IRON DEFICIENCY ANEMIA, UNSPECIFIED IRON DEFICIENCY ANEMIA TYPE: ICD-10-CM

## 2024-06-12 DIAGNOSIS — I70.0 AORTIC ATHEROSCLEROSIS: ICD-10-CM

## 2024-06-12 DIAGNOSIS — N18.31 CHRONIC KIDNEY DISEASE, STAGE 3A: ICD-10-CM

## 2024-06-12 DIAGNOSIS — I83.93 ASYMPTOMATIC VARICOSE VEINS OF BOTH LOWER EXTREMITIES: ICD-10-CM

## 2024-06-12 DIAGNOSIS — J84.10 CALCIFIED GRANULOMA OF LUNG: ICD-10-CM

## 2024-06-12 DIAGNOSIS — R26.9 ABNORMALITY OF GAIT AND MOBILITY: ICD-10-CM

## 2024-06-12 DIAGNOSIS — Z00.00 ENCOUNTER FOR PREVENTIVE HEALTH EXAMINATION: Primary | ICD-10-CM

## 2024-06-12 DIAGNOSIS — K21.9 GASTROESOPHAGEAL REFLUX DISEASE WITHOUT ESOPHAGITIS: ICD-10-CM

## 2024-06-12 DIAGNOSIS — M1A.09X0 IDIOPATHIC CHRONIC GOUT OF MULTIPLE SITES WITHOUT TOPHUS: ICD-10-CM

## 2024-06-12 PROBLEM — M79.672 LEFT FOOT PAIN: Status: RESOLVED | Noted: 2021-03-02 | Resolved: 2024-06-12

## 2024-06-12 PROBLEM — R29.3 POOR POSTURE: Status: RESOLVED | Noted: 2021-02-08 | Resolved: 2024-06-12

## 2024-06-12 PROBLEM — S16.1XXA CERVICAL STRAIN, ACUTE, INITIAL ENCOUNTER: Status: RESOLVED | Noted: 2023-05-02 | Resolved: 2024-06-12

## 2024-06-12 PROBLEM — R26.2 DIFFICULTY WALKING: Status: RESOLVED | Noted: 2021-03-02 | Resolved: 2024-06-12

## 2024-06-12 PROBLEM — R53.81 DEBILITY: Status: RESOLVED | Noted: 2020-03-31 | Resolved: 2024-06-12

## 2024-06-12 PROBLEM — R29.898 DECREASED STRENGTH OF TRUNK AND BACK: Status: RESOLVED | Noted: 2021-02-08 | Resolved: 2024-06-12

## 2024-06-12 PROCEDURE — G0439 PPPS, SUBSEQ VISIT: HCPCS | Mod: ,,, | Performed by: NURSE PRACTITIONER

## 2024-06-12 PROCEDURE — 99215 OFFICE O/P EST HI 40 MIN: CPT | Mod: PBBFAC | Performed by: NURSE PRACTITIONER

## 2024-06-12 PROCEDURE — 99999 PR PBB SHADOW E&M-EST. PATIENT-LVL V: CPT | Mod: PBBFAC,,, | Performed by: NURSE PRACTITIONER

## 2024-06-12 NOTE — PATIENT INSTRUCTIONS
Counseling and Referral of Other Preventative  (Italic type indicates deductible and co-insurance are waived)    Patient Name: Isael Slamon  Today's Date: 6/12/2024    Health Maintenance       Date Due Completion Date    COVID-19 Vaccine (6 - 2023-24 season) 09/01/2023 9/27/2022    Lipid Panel 09/23/2023 9/23/2022    DEXA Scan 03/22/2026 3/22/2024    Override on 7/14/2011: Done    Colonoscopy 09/18/2028 9/18/2023    TETANUS VACCINE 10/14/2029 10/14/2019        No orders of the defined types were placed in this encounter.    The following information is provided to all patients.  This information is to help you find resources for any of the problems found today that may be affecting your health:                  Living healthy guide: www.[facility].[place].gov      Understanding Diabetes: www.diabetes.org      Eating healthy: www.cdc.gov/healthyweight      [facility] home safety checklist: www.cdc.gov/steadi/patient.html      Agency on Aging: www.goea.[place].[facility]      Alcoholics anonymous (AA): www.aa.org      Physical Activity: www.perry.nih.gov/jp2iswu      Tobacco use: www.quitwithusla.org

## 2024-06-12 NOTE — PROGRESS NOTES
"  Isael Salmon presented for a follow-up Medicare AWV today. The following components were reviewed and updated:    Medical history  Family History  Social history  Allergies and Current Medications  Health Risk Assessment  Health Maintenance  Care Team    **See Completed Assessments for Annual Wellness visit with in the encounter summary    The following assessments were completed:  Depression Screening  Cognitive function Screening - spelled world backwards  Timed Get Up Test  Whisper Test      Opioid documentation:      Patient does not have a current opioid prescription.          Vitals:    06/12/24 1445   BP: 126/78   BP Location: Left arm   Pulse: 79   SpO2: 97%   Weight: 73.9 kg (162 lb 14.7 oz)   Height: 5' 5" (1.651 m)     Body mass index is 27.11 kg/m².       Physical Exam  Vitals and nursing note reviewed.   Constitutional:       Appearance: She is well-developed.   HENT:      Head: Normocephalic.   Cardiovascular:      Rate and Rhythm: Normal rate and regular rhythm.      Heart sounds: Normal heart sounds. No murmur heard.  Pulmonary:      Effort: Pulmonary effort is normal.      Breath sounds: Normal breath sounds.   Abdominal:      General: Bowel sounds are normal.      Palpations: Abdomen is soft.   Musculoskeletal:         General: Normal range of motion.   Neurological:      Mental Status: She is alert and oriented to person, place, and time.      Motor: No abnormal muscle tone.            Diagnoses and health risks identified today and associated recommendations/orders:    1. Encounter for preventive health examination  Here for Health Risk Assessment/Annual Wellness Visit.  Health maintenance reviewed and updated. Follow up in one year.     2. Essential hypertension  Chronic, at goal on current medications. Followed by PCP.     3. Aortic atherosclerosis  Chronic, stable on current medications. Noted CT Abdomen/Pelvis 2/28/20. Followed by PCP.     4. PVD (peripheral vascular disease)  Chronic, " stable on current medications. No C/O claudication. Followed by PCP.     5. Hyperlipidemia, unspecified hyperlipidemia type  Chronic, at goal on current medication. Followed by PCP.     6. Asymptomatic varicose veins of both lower extremities  Chronic, stable. No C/O pain. Followed by PCP.     7. Chronic obstructive pulmonary disease, unspecified COPD type  Chronic, stable. No C/O dyspnea/cough. Followed by PCP.     8. Calcified granuloma of lung  Noted CXR 11/06/18.     9. Mild episode of recurrent major depressive disorder  Chronic, stable on current medication. PHQ-2 score 0. Followed by PCP.     10. Chronic kidney disease, stage 3a  Chronic, stable on current medications. Followed by PCP.     11. Iron deficiency anemia, unspecified iron deficiency anemia type  Chronic, stable with iron infusions. Followed by Hematology, PCP.     12. Gastroesophageal reflux disease without esophagitis  Chronic, stable on current medication. No C/O reflux. Followed by PCP.     13. Primary osteoarthritis involving multiple joints  Chronic, reports persistent knee pain. Followed by PC, Orthopedics..     14. Idiopathic chronic gout of multiple sites without tophus  Chronic, reports increase pain/edema/erythema bilateral wrists. She will contact PCP for treatment. Followed by PCP.     15. Abnormality of gait and mobility  Chronic, no reported falls, no assistive device for ambulation. Followed by PCP.      Provided Isael with a 5-10 year written screening schedule and personal prevention plan. Recommendations were developed using the USPSTF age appropriate recommendations. Education, counseling, and referrals were provided as needed.  After Visit Summary printed and given to patient which includes a list of additional screenings\tests needed.    Follow up in 3 months (on 8/29/2024).with PCP      Jinny Martinez NP

## 2024-06-29 NOTE — PROGRESS NOTES
PATIENT: Isael Salmon  MRN: [id number]  DATE: 7/5/2024    Diagnosis:   1. Iron deficiency anemia due to chronic blood loss    2. Angiodysplasia of small intestine    3. Anemia due to stage 3b chronic kidney disease    4. Macrocytic anemia      Chief Complaint: iron deficiency anemia    Oncologic History:      Oncologic History     Oncologic Treatment     Pathology       Subjective:    History of Present Illness: Ms. Salmon is a 78 y.o. female who presented in December 2023 for evaluation and management of iron deficiency anemia. She was referred by Dr. Montoya.    - she has angiodysplasia of her small intestine  - labs since 2006 reveal a mild-to-moderate anemia.  - iron studies (12/4/23) reveal a low-normal ferritin.  - she restarted oral iron last week.  - she received iron sucrose x 4 doses in January 2024. She did note notice a big improvement in her symptoms.      Interval history:  - she presents for a follow-up appointment for her iron deficiency anemia.  - today, she is doing well. She endorses knee pain, which has improved with injection earlier this week.  - She denies chest pain, nausea, vomiting, diarrhea, constipation.        Past medical, surgical, family, and social histories have been reviewed and updated below.    Past Medical History:   Past Medical History:   Diagnosis Date    Adjustment disorder 07/26/2012    Anemia 07/26/2012    AR (allergic rhinitis) 09/05/2014    Bronchitis     Fibrocystic breast disease in female 07/26/2012    GERD (gastroesophageal reflux disease) 07/26/2012    Gout     Gout, arthritis 07/26/2012    History of blood transfusion 07/26/2012    History of colonic polyps 07/26/2012    Hypercalcemia 09/20/2012    Hyperlipidemia 07/26/2012    Hypertension 07/26/2012    Nuclear sclerosis 05/02/2014    Osteoarthritis 07/26/2012    Other hyperparathyroidism 09/20/2012    Postmenopausal status 07/26/2012    PVD (peripheral vascular disease) 07/26/2012    left leg laser of  vein with injection    Stroke 1997    TIA    Superficial vein thrombosis     Transient ischemic attack 07/26/2012    Varicose veins 07/26/2012       Past Surgical History:   Past Surgical History:   Procedure Laterality Date    ANTEGRADE SINGLE BALLOON ENTEROSCOPY N/A 9/18/2023    Procedure: ENTEROSCOPY, SINGLE BALLOON, ANTEGRADE;  Surgeon: Jaziel Kingsley MD;  Location: [facility] END[facility] (2ND FLR);  Service: Endoscopy;  Laterality: N/A;  7/25/23-PEG prep, Instructions via portal-DS    BREAST CYST ASPIRATION Bilateral     multiple ones over the years    BREAST CYST EXCISION Left     BREAST SURGERY      breast reduction    broken second finger Right 12/2015    pins placed    carotid endarterectomy  1997    right    CAROTID ENDARTERECTOMY Right 1997    CATARACT EXTRACTION W/  INTRAOCULAR LENS IMPLANT Right 05/01/2018    Dr. Yost    CATARACT EXTRACTION W/  INTRAOCULAR LENS IMPLANT Left 05/15/2018    Dr. Lester    COLONOSCOPY N/A 10/26/2015    Procedure: COLONOSCOPY;  Surgeon: Manuel Alanis MD;  Location: [facility] END[facility] (4TH FLR);  Service: Endoscopy;  Laterality: N/A;    COLONOSCOPY N/A 9/18/2023    Procedure: COLONOSCOPY;  Surgeon: aJziel Kingsley MD;  Location: [facility] END[facility] (2ND FLR);  Service: Endoscopy;  Laterality: N/A;  can use single-balloon for this part as well    CORRECTION OF HAMMER TOE Left 07/08/2021    Procedure: CORRECTION, HAMMER TOE Second toe;  Surgeon: Silver Bunn MD;  Location: [facility] OR;  Service: Orthopedics;  Laterality: Left;  Ossiofiber hammertoe implant    EYE SURGERY      HYSTERECTOMY      ORIF FINGER FRACTURE  12/18/2015    SURGICAL REMOVAL OF BUNION WITH OSTEOTOMY OF METATARSAL BONE Left 07/08/2021    Procedure: BUNIONECTOMY, WITH METATARSAL OSTEOTOMY Proximal opening wedge;  Surgeon: Silver Bunn MD;  Location: [facility] OR;  Service: Orthopedics;  Laterality: Left;  Arthrex opening wedge plate    TIA      TOTAL REDUCTION MAMMOPLASTY Bilateral     VASCULAR SURGERY      left leg  vein laser       Family History:   Family History   Problem Relation Name Age of Onset    Heart failure Father      Cancer Mother          pancreas    Cataracts Mother      Hypertension Mother      Cancer Brother          bladder    Cataracts Brother      Hypertension Brother      Crohn's disease Brother      Gout Brother      Cataracts Brother      Hypertension Brother      Gout Brother      Cataracts Brother      Hypertension Brother      Gout Brother      Bone cancer Brother      Cataracts Brother      Hypertension Brother      Breast cancer Neg Hx      Ovarian cancer Neg Hx      Allergies Neg Hx      Asthma Neg Hx      Eczema Neg Hx      Cervical cancer Neg Hx      Endometrial cancer Neg Hx      Vaginal cancer Neg Hx      Amblyopia Neg Hx      Blindness Neg Hx      Diabetes Neg Hx      Glaucoma Neg Hx      Macular degeneration Neg Hx      Retinal detachment Neg Hx      Strabismus Neg Hx      Stroke Neg Hx      Thyroid disease Neg Hx      Celiac disease Neg Hx      Colon cancer Neg Hx      Esophageal cancer Neg Hx      Liver disease Neg Hx      Liver cancer Neg Hx      Rectal cancer Neg Hx      Stomach cancer Neg Hx         Social History:  reports that she quit smoking about 34 years ago. Her smoking use included cigarettes. She has never used smokeless tobacco. She reports that she does not currently use alcohol after a past usage of about 1.0 standard drink of alcohol per week. She reports that she does not use drugs.    Allergies:  Review of patient's allergies indicates:   Allergen Reactions    Avelox [moxifloxacin] Palpitations     Racing heart and gets the shakes    Latex Rash    Pcn [penicillins] Rash    Sulfa (sulfonamide antibiotics) Blisters    Ciprofloxacin      Room starts to spin  and nausea and dizziness    Codeine Nausea Only     nausea and weakness       Medications:  Current Outpatient Medications   Medication Sig Dispense Refill    acetaminophen (TYLENOL) 325 MG tablet Take 2 tablets (650 mg  total) by mouth every 4 (four) hours as needed for Pain (Fever). 30 tablet 1    allopurinoL (ZYLOPRIM) 100 MG tablet Take 2 tablets (200 mg total) by mouth Daily. 180 tablet 1    ascorbic acid, vitamin C, (VITAMIN C) 500 MG tablet Take 500 mg by mouth once daily.      aspirin (ECOTRIN) 81 MG EC tablet Take 81 mg by mouth once daily.       b complex vitamins capsule Take 1 capsule by mouth once daily.      betamethasone dipropionate 0.05 % cream Apply topically 2 (two) times daily. 90 g 3    BIOTIN ORAL Take 1 tablet by mouth once daily.      co-enzyme Q-10 30 mg capsule Take 200 mg by mouth once daily.      ergocalciferol (ERGOCALCIFEROL) 50,000 unit Cap TAKE 1 CAPSULE BY MOUTH 2 TIMES A WEEK 24 capsule 5    EScitalopram oxalate (LEXAPRO) 10 MG tablet Take 1.5 tablets (15 mg total) by mouth once daily. 135 tablet 1    fluticasone propionate (FLONASE) 50 mcg/actuation nasal spray 1 spray (50 mcg total) by Each Nostril route once daily. (Patient taking differently: 1 spray by Each Nostril route daily as needed.) 11.1 mL 0    FOLBIC 2.5-25-2 mg Tab TAKE 1 TABLET BY MOUTH EVERY DAY 30 tablet 11    indomethacin (INDOCIN) 50 MG capsule Take 1 capsule (50 mg total) by mouth 3 (three) times daily as needed. (Patient not taking: Reported on 6/12/2024) 30 capsule 3    ipratropium (ATROVENT) 0.02 % nebulizer solution Take 2.5 mLs (500 mcg total) by nebulization 3 (three) times daily as needed for Wheezing. (Patient not taking: Reported on 6/12/2024) 62.5 mL 0    LINZESS 72 mcg Cap capsule TAKE 1 CAPSULE(72 MCG) BY MOUTH BEFORE BREAKFAST (Patient taking differently: every other day.) 90 capsule 3    loratadine (CLARITIN) 10 mg tablet Take 1 tablet (10 mg total) by mouth once daily. 30 tablet 0    losartan (COZAAR) 50 MG tablet TAKE 1 TABLET(50 MG) BY MOUTH TWICE DAILY 180 tablet 3    multivit,calc,mins/iron/folic (ONE-A-DAY WOMENS FORMULA ORAL) Take 1 tablet by mouth once daily.      omeprazole (PRILOSEC) 40 MG capsule TAKE 1  CAPSULE(40 MG) BY MOUTH TWICE DAILY 180 capsule 4    rosuvastatin (CRESTOR) 20 MG tablet TAKE 1 TABLET(20 MG) BY MOUTH EVERY DAY 90 tablet 3    spironolactone (ALDACTONE) 25 MG tablet Take 1 tablet (25 mg total) by mouth once daily. 90 tablet 4    tiZANidine (ZANAFLEX) 4 MG tablet Take 1 tablet (4 mg total) by mouth every 8 (eight) hours as needed. (Patient not taking: Reported on 6/12/2024) 30 tablet 3     No current facility-administered medications for this visit.       Review of Systems   Constitutional:  Positive for fatigue.   HENT:  Negative for sore throat.    Eyes:  Negative for visual disturbance.   Respiratory:  Negative for cough.    Cardiovascular:  Negative for chest pain.   Gastrointestinal:  Negative for abdominal pain, constipation, diarrhea, nausea and vomiting.   Genitourinary:  Negative for dysuria.   Musculoskeletal:  Negative for back pain.   Skin:  Positive for rash.   Neurological:  Negative for headaches.   Hematological:  Negative for adenopathy.   Psychiatric/Behavioral:  The patient is not nervous/anxious.        ECOG Performance Status:   ECOG SCORE             Objective:      Vitals:   There were no vitals filed for this visit.    BMI: There is no height or weight on file to calculate BMI.    Physical Exam  Vitals and nursing note reviewed.   Constitutional:       Appearance: She is well-developed.   HENT:      Head: Normocephalic and atraumatic.   Eyes:      Pupils: Pupils are equal, round, and reactive to light.   Cardiovascular:      Rate and Rhythm: Normal rate and regular rhythm.   Pulmonary:      Effort: Pulmonary effort is normal.      Breath sounds: Normal breath sounds.   Abdominal:      General: Bowel sounds are normal.      Palpations: Abdomen is soft.   Musculoskeletal:         General: Normal range of motion.      Cervical back: Normal range of motion and neck supple.   Skin:     General: Skin is warm and dry.   Neurological:      Mental Status: She is alert and oriented to  person, place, and time.   Psychiatric:         Behavior: Behavior normal.         Thought Content: Thought content normal.         Judgment: Judgment normal.         Laboratory Data:  Labs have been reviewed.    Lab Results   Component Value Date    WBC 7.47 07/03/2024    HGB 9.8 (L) 07/03/2024    HCT 30.6 (L) 07/03/2024     (H) 07/03/2024     07/03/2024           Imaging:    Upper GI enteroscopy (9/18/23):   - Normal esophagus.                          - Esophagogastric landmarks identified.                          - Normal stomach.                          - Normal examined duodenum.                          - The examined portion of the jejunum was normal.                          - The examined portion of the ileum was normal.                          Tattooed maximal insertion.                          - No specimens collected.     Colonoscopy (9/18/23):   - Hemorrhoids found on perianal exam.                          - Diverticulosis in the entire examined colon.                          - One 3 mm polyp in the transverse colon, removed                          with a cold snare. Resected and retrieved.                          - The examination was otherwise normal on direct                          and retroflexion views.       Assessment:       1. Iron deficiency anemia due to chronic blood loss    2. Angiodysplasia of small intestine    3. Anemia due to stage 3b chronic kidney disease    4. Macrocytic anemia           Plan:     Iron deficiency anemia / angiodysplasia of small intestine / macrocytic anemia / anemia in CKD / anemia of chronic disease  - I have reviewed her chart  - bone marrow biopsy (2012) was negative for malignancy.  - she has angiodysplasia of her small intestine  - labs since 2006 reveal a mild-to-moderate anemia.  - iron studies (12/4/23) reveal a low-normal ferritin.  - she takes oral iron  - I recommended iron sucrose x 4 doses   - she received iron sucrose x 4 doses  in January 2024.  - Labs have been reviewed. Hemoglobin is stable at 9.8 g/dL. Ferritin has decreased from 182 ng/mL to 71 ng/mL  - part of her anemia may be anemia in CKD, anemia of chronic disease  - return to clinic in 6 months with repeat labs.    2. Advance Care Planning   Power of   After our discussion (at previous visit), she decided not to complete a HCPOA.        - return to clinic in 6 months with repeat labs.    Kem Bianchi M.D.  Hematology/Oncology  [facility] - [address], [address]  [place] [address]  Phone: [phone]  Fax: [phone]

## 2024-07-03 ENCOUNTER — OFFICE VISIT (OUTPATIENT)
Dept: ORTHOPEDICS | Facility: CLINIC | Age: 79
End: 2024-07-03
Payer: MEDICARE

## 2024-07-03 ENCOUNTER — LAB VISIT (OUTPATIENT)
Dept: LAB | Facility: OTHER | Age: 79
End: 2024-07-03
Attending: INTERNAL MEDICINE
Payer: MEDICARE

## 2024-07-03 DIAGNOSIS — E55.9 VITAMIN D DEFICIENCY DISEASE: ICD-10-CM

## 2024-07-03 DIAGNOSIS — M17.0 PRIMARY OSTEOARTHRITIS OF BOTH KNEES: Primary | ICD-10-CM

## 2024-07-03 DIAGNOSIS — M10.9 GOUT, ARTHRITIS: ICD-10-CM

## 2024-07-03 DIAGNOSIS — I10 ESSENTIAL HYPERTENSION: ICD-10-CM

## 2024-07-03 DIAGNOSIS — D50.0 IRON DEFICIENCY ANEMIA DUE TO CHRONIC BLOOD LOSS: ICD-10-CM

## 2024-07-03 DIAGNOSIS — E53.8 VITAMIN B12 DEFICIENCY: ICD-10-CM

## 2024-07-03 LAB
25(OH)D3+25(OH)D2 SERPL-MCNC: 24 NG/ML (ref 30–96)
BASOPHILS # BLD AUTO: 0.02 K/UL (ref 0–0.2)
BASOPHILS NFR BLD: 0.3 % (ref 0–1.9)
CRP SERPL-MCNC: 3.7 MG/L (ref 0–8.2)
DIFFERENTIAL METHOD BLD: ABNORMAL
EOSINOPHIL # BLD AUTO: 0.1 K/UL (ref 0–0.5)
EOSINOPHIL NFR BLD: 1.5 % (ref 0–8)
ERYTHROCYTE [DISTWIDTH] IN BLOOD BY AUTOMATED COUNT: 12.9 % (ref 11.5–14.5)
ERYTHROCYTE [SEDIMENTATION RATE] IN BLOOD BY PHOTOMETRIC METHOD: 13 MM/HR (ref 0–36)
FERRITIN SERPL-MCNC: 71 NG/ML (ref 20–300)
HCT VFR BLD AUTO: 30.6 % (ref 37–48.5)
HGB BLD-MCNC: 9.8 G/DL (ref 12–16)
IMM GRANULOCYTES # BLD AUTO: 0.02 K/UL (ref 0–0.04)
IMM GRANULOCYTES NFR BLD AUTO: 0.3 % (ref 0–0.5)
IRON SERPL-MCNC: 82 UG/DL (ref 30–160)
LYMPHOCYTES # BLD AUTO: 0.8 K/UL (ref 1–4.8)
LYMPHOCYTES NFR BLD: 10.8 % (ref 18–48)
MCH RBC QN AUTO: 31.9 PG (ref 27–31)
MCHC RBC AUTO-ENTMCNC: 32 G/DL (ref 32–36)
MCV RBC AUTO: 100 FL (ref 82–98)
MONOCYTES # BLD AUTO: 0.5 K/UL (ref 0.3–1)
MONOCYTES NFR BLD: 7.2 % (ref 4–15)
NEUTROPHILS # BLD AUTO: 6 K/UL (ref 1.8–7.7)
NEUTROPHILS NFR BLD: 79.9 % (ref 38–73)
NRBC BLD-RTO: 0 /100 WBC
PLATELET # BLD AUTO: 202 K/UL (ref 150–450)
PMV BLD AUTO: 10.7 FL (ref 9.2–12.9)
RBC # BLD AUTO: 3.07 M/UL (ref 4–5.4)
SATURATED IRON: 28 % (ref 20–50)
TOTAL IRON BINDING CAPACITY: 295 UG/DL (ref 250–450)
TRANSFERRIN SERPL-MCNC: 199 MG/DL (ref 200–375)
TSH SERPL DL<=0.005 MIU/L-ACNC: 1.4 UIU/ML (ref 0.4–4)
URATE SERPL-MCNC: 4.7 MG/DL (ref 2.4–5.7)
VIT B12 SERPL-MCNC: 1398 PG/ML (ref 210–950)
WBC # BLD AUTO: 7.47 K/UL (ref 3.9–12.7)

## 2024-07-03 PROCEDURE — 82607 VITAMIN B-12: CPT | Performed by: INTERNAL MEDICINE

## 2024-07-03 PROCEDURE — 36415 COLL VENOUS BLD VENIPUNCTURE: CPT | Performed by: INTERNAL MEDICINE

## 2024-07-03 PROCEDURE — 99999 PR PBB SHADOW E&M-EST. PATIENT-LVL III: CPT | Mod: PBBFAC,,, | Performed by: PHYSICIAN ASSISTANT

## 2024-07-03 PROCEDURE — 83540 ASSAY OF IRON: CPT | Performed by: INTERNAL MEDICINE

## 2024-07-03 PROCEDURE — 99999PBSHW PR PBB SHADOW TECHNICAL ONLY FILED TO HB: Mod: PBBFAC,,,

## 2024-07-03 PROCEDURE — 85025 COMPLETE CBC W/AUTO DIFF WBC: CPT | Performed by: INTERNAL MEDICINE

## 2024-07-03 PROCEDURE — 84550 ASSAY OF BLOOD/URIC ACID: CPT | Performed by: INTERNAL MEDICINE

## 2024-07-03 PROCEDURE — 85652 RBC SED RATE AUTOMATED: CPT | Performed by: INTERNAL MEDICINE

## 2024-07-03 PROCEDURE — 82728 ASSAY OF FERRITIN: CPT | Performed by: INTERNAL MEDICINE

## 2024-07-03 PROCEDURE — 20610 DRAIN/INJ JOINT/BURSA W/O US: CPT | Mod: 50,PBBFAC | Performed by: PHYSICIAN ASSISTANT

## 2024-07-03 PROCEDURE — 82306 VITAMIN D 25 HYDROXY: CPT | Performed by: INTERNAL MEDICINE

## 2024-07-03 PROCEDURE — 86140 C-REACTIVE PROTEIN: CPT | Performed by: INTERNAL MEDICINE

## 2024-07-03 PROCEDURE — 99213 OFFICE O/P EST LOW 20 MIN: CPT | Mod: PBBFAC | Performed by: PHYSICIAN ASSISTANT

## 2024-07-03 PROCEDURE — 84443 ASSAY THYROID STIM HORMONE: CPT | Performed by: INTERNAL MEDICINE

## 2024-07-03 RX ADMIN — BETAMETHASONE SODIUM PHOSPHATE AND BETAMETHASONE ACETATE 12 MG: 3; 3 INJECTION, SUSPENSION INTRA-ARTICULAR; INTRALESIONAL; INTRAMUSCULAR at 08:07

## 2024-07-05 ENCOUNTER — OFFICE VISIT (OUTPATIENT)
Dept: HEMATOLOGY/ONCOLOGY | Facility: CLINIC | Age: 79
End: 2024-07-05
Payer: MEDICARE

## 2024-07-05 VITALS
OXYGEN SATURATION: 100 % | BODY MASS INDEX: 27.18 KG/M2 | DIASTOLIC BLOOD PRESSURE: 67 MMHG | SYSTOLIC BLOOD PRESSURE: 150 MMHG | WEIGHT: 163.38 LBS | HEART RATE: 79 BPM

## 2024-07-05 DIAGNOSIS — N18.32 ANEMIA DUE TO STAGE 3B CHRONIC KIDNEY DISEASE: ICD-10-CM

## 2024-07-05 DIAGNOSIS — K55.20 ANGIODYSPLASIA OF SMALL INTESTINE: ICD-10-CM

## 2024-07-05 DIAGNOSIS — D53.9 MACROCYTIC ANEMIA: ICD-10-CM

## 2024-07-05 DIAGNOSIS — D50.0 IRON DEFICIENCY ANEMIA DUE TO CHRONIC BLOOD LOSS: Primary | ICD-10-CM

## 2024-07-05 DIAGNOSIS — D63.1 ANEMIA DUE TO STAGE 3B CHRONIC KIDNEY DISEASE: ICD-10-CM

## 2024-07-05 PROCEDURE — 99214 OFFICE O/P EST MOD 30 MIN: CPT | Mod: PBBFAC,PO | Performed by: INTERNAL MEDICINE

## 2024-07-05 PROCEDURE — 99999 PR PBB SHADOW E&M-EST. PATIENT-LVL IV: CPT | Mod: PBBFAC,,, | Performed by: INTERNAL MEDICINE

## 2024-07-05 RX ORDER — BETAMETHASONE SODIUM PHOSPHATE AND BETAMETHASONE ACETATE 3; 3 MG/ML; MG/ML
12 INJECTION, SUSPENSION INTRA-ARTICULAR; INTRALESIONAL; INTRAMUSCULAR; SOFT TISSUE
Status: COMPLETED | OUTPATIENT
Start: 2024-07-05 | End: 2024-07-03

## 2024-07-05 NOTE — PROGRESS NOTES
SUBJECTIVE:     Chief Complaint & History of Present Illness:  Isael Salmon is a Established patient 78 y.o. female who is seen here today with a complaint of    Chief Complaint   Patient presents with    Right Knee - Pain    Left Knee - Pain    .  She is patient well-known to us was last seen treated the clinic for this condition 03/25/2024 by Adelia Salinas PA-C that time she would undergone cortisone injection of bilateral knees with good results.  She is begun to have return of pain soreness in the knees as requesting repeat injection therapy.  She is aware she is in need of knee replacement surgery and would like to be scheduled to discuss this with adult reconstructive surgery in the near future  On a scale of 1-10, with 10 being worst pain imaginable, he rates this pain as 5 on good days and 7 on bad days.  she describes the pain as sore and achy.    Review of patient's allergies indicates:   Allergen Reactions    Avelox [moxifloxacin] Palpitations     Racing heart and gets the shakes    Latex Rash    Pcn [penicillins] Rash    Sulfa (sulfonamide antibiotics) Blisters    Ciprofloxacin      Room starts to spin  and nausea and dizziness    Codeine Nausea Only     nausea and weakness         Current Outpatient Medications   Medication Sig Dispense Refill    acetaminophen (TYLENOL) 325 MG tablet Take 2 tablets (650 mg total) by mouth every 4 (four) hours as needed for Pain (Fever). 30 tablet 1    allopurinoL (ZYLOPRIM) 100 MG tablet Take 2 tablets (200 mg total) by mouth Daily. 180 tablet 1    ascorbic acid, vitamin C, (VITAMIN C) 500 MG tablet Take 500 mg by mouth once daily.      aspirin (ECOTRIN) 81 MG EC tablet Take 81 mg by mouth once daily.       b complex vitamins capsule Take 1 capsule by mouth once daily.      betamethasone dipropionate 0.05 % cream Apply topically 2 (two) times daily. 90 g 3    BIOTIN ORAL Take 1 tablet by mouth once daily.      co-enzyme Q-10 30 mg capsule Take 200 mg by mouth  once daily.      ergocalciferol (ERGOCALCIFEROL) 50,000 unit Cap TAKE 1 CAPSULE BY MOUTH 2 TIMES A WEEK 24 capsule 5    EScitalopram oxalate (LEXAPRO) 10 MG tablet Take 1.5 tablets (15 mg total) by mouth once daily. 135 tablet 1    fluticasone propionate (FLONASE) 50 mcg/actuation nasal spray 1 spray (50 mcg total) by Each Nostril route once daily. (Patient taking differently: 1 spray by Each Nostril route daily as needed.) 11.1 mL 0    FOLBIC 2.5-25-2 mg Tab TAKE 1 TABLET BY MOUTH EVERY DAY 30 tablet 11    indomethacin (INDOCIN) 50 MG capsule Take 1 capsule (50 mg total) by mouth 3 (three) times daily as needed. (Patient not taking: Reported on 6/12/2024) 30 capsule 3    ipratropium (ATROVENT) 0.02 % nebulizer solution Take 2.5 mLs (500 mcg total) by nebulization 3 (three) times daily as needed for Wheezing. (Patient not taking: Reported on 6/12/2024) 62.5 mL 0    LINZESS 72 mcg Cap capsule TAKE 1 CAPSULE(72 MCG) BY MOUTH BEFORE BREAKFAST (Patient taking differently: every other day.) 90 capsule 3    loratadine (CLARITIN) 10 mg tablet Take 1 tablet (10 mg total) by mouth once daily. 30 tablet 0    losartan (COZAAR) 50 MG tablet TAKE 1 TABLET(50 MG) BY MOUTH TWICE DAILY 180 tablet 3    multivit,calc,mins/iron/folic (ONE-A-DAY WOMENS FORMULA ORAL) Take 1 tablet by mouth once daily.      omeprazole (PRILOSEC) 40 MG capsule TAKE 1 CAPSULE(40 MG) BY MOUTH TWICE DAILY 180 capsule 4    rosuvastatin (CRESTOR) 20 MG tablet TAKE 1 TABLET(20 MG) BY MOUTH EVERY DAY 90 tablet 3    spironolactone (ALDACTONE) 25 MG tablet Take 1 tablet (25 mg total) by mouth once daily. 90 tablet 4    tiZANidine (ZANAFLEX) 4 MG tablet Take 1 tablet (4 mg total) by mouth every 8 (eight) hours as needed. (Patient not taking: Reported on 6/12/2024) 30 tablet 3     Current Facility-Administered Medications   Medication Dose Route Frequency Provider Last Rate Last Admin    betamethasone acetate-betamethasone sodium phosphate injection 12 mg  12 mg  Intra-articular 1 time in Clinic/HOD Jose Luis Craft PA-C           Past Medical History:   Diagnosis Date    Adjustment disorder 07/26/2012    Anemia 07/26/2012    AR (allergic rhinitis) 09/05/2014    Bronchitis     Fibrocystic breast disease in female 07/26/2012    GERD (gastroesophageal reflux disease) 07/26/2012    Gout     Gout, arthritis 07/26/2012    History of blood transfusion 07/26/2012    History of colonic polyps 07/26/2012    Hypercalcemia 09/20/2012    Hyperlipidemia 07/26/2012    Hypertension 07/26/2012    Nuclear sclerosis 05/02/2014    Osteoarthritis 07/26/2012    Other hyperparathyroidism 09/20/2012    Postmenopausal status 07/26/2012    PVD (peripheral vascular disease) 07/26/2012    left leg laser of vein with injection    Stroke 1997    TIA    Superficial vein thrombosis     Transient ischemic attack 07/26/2012    Varicose veins 07/26/2012       Past Surgical History:   Procedure Laterality Date    ANTEGRADE SINGLE BALLOON ENTEROSCOPY N/A 9/18/2023    Procedure: ENTEROSCOPY, SINGLE BALLOON, ANTEGRADE;  Surgeon: Jaziel Kingsley MD;  Location: [facility] (2ND FLR);  Service: Endoscopy;  Laterality: N/A;  7/25/23-PEG prep, Instructions via portal-DS    BREAST CYST ASPIRATION Bilateral     multiple ones over the years    BREAST CYST EXCISION Left     BREAST SURGERY      breast reduction    broken second finger Right 12/2015    pins placed    carotid endarterectomy  1997    right    CAROTID ENDARTERECTOMY Right 1997    CATARACT EXTRACTION W/  INTRAOCULAR LENS IMPLANT Right 05/01/2018    Dr. Yost    CATARACT EXTRACTION W/  INTRAOCULAR LENS IMPLANT Left 05/15/2018    Dr. Lester    COLONOSCOPY N/A 10/26/2015    Procedure: COLONOSCOPY;  Surgeon: Manuel Alanis MD;  Location: [facility] ENDO (4TH FLR);  Service: Endoscopy;  Laterality: N/A;    COLONOSCOPY N/A 9/18/2023    Procedure: COLONOSCOPY;  Surgeon: Jaziel Kingsley MD;  Location: [facility] (2ND FLR);  Service: Endoscopy;  Laterality:  N/A;  can use single-balloon for this part as well    CORRECTION OF HAMMER TOE Left 07/08/2021    Procedure: CORRECTION, HAMMER TOE Second toe;  Surgeon: Silver Bunn MD;  Location: [facility] OR;  Service: Orthopedics;  Laterality: Left;  Ossiofiber hammertoe implant    EYE SURGERY      HYSTERECTOMY      ORIF FINGER FRACTURE  12/18/2015    SURGICAL REMOVAL OF BUNION WITH OSTEOTOMY OF METATARSAL BONE Left 07/08/2021    Procedure: BUNIONECTOMY, WITH METATARSAL OSTEOTOMY Proximal opening wedge;  Surgeon: Silver Bunn MD;  Location: [facility] OR;  Service: Orthopedics;  Laterality: Left;  Arthrex opening wedge plate    TIA      TOTAL REDUCTION MAMMOPLASTY Bilateral     VASCULAR SURGERY      left leg vein laser       Vital Signs (Most Recent)  There were no vitals filed for this visit.        Review of Systems:  ROS:  Constitutional: no fever or chills  Eyes: no visual changes  ENT: no nasal congestion or sore throat  Respiratory: no cough or shortness of breath, positive COPD , calcified granuloma of the lung  Cardiovascular: no chest pain or palpitations, positive for hypertension, peripheral vascular disease, bilateral carotid artery disease, aortic atherosclerosis varicose veins lower extremities  Gastrointestinal: no nausea or vomiting, tolerating diet, positive for GERD, chronic constipation  Genitourinary: no hematuria or dysuria, positive for hypercalcemia, renal calculi CKD stage IIIA  Integument/Breast: no rash or pruritis  Hematologic/Lymphatic: no easy bruising or lymphadenopathy, positive for iron deficiency anemia  Musculoskeletal: no arthralgias or myalgias, positive for gout low back pain with sciatica hammertoe  Neurological: no seizures or tremors, positive history TIA, postconcussive syndrome chronic pain disorder decreased range of motion of intervertebral discs of the cervical spine  Behavioral/Psych: no auditory or visual hallucinations, positive for adjustment disorder, recurrent major  depressive disorder  Endocrine: no heat or cold intolerance                OBJECTIVE:     PHYSICAL EXAM:     , General Appearance: Well nourished, well developed, in no acute distress.  Neurological: Mood & affect are normal.    left  Knee Exam:  Knee Range of Motion:0-110 degrees flexion   Effusion:  Slight  Condition of skin:intact  Location of tenderness:Medial joint line   Strength:limited by pain and 5 of 5  Stability:  Lachman: stable, LCL: stable, MCL: stable, PCL: stable, and posteromedial (dial): stable  Varus /Valgus stress:  normal  Daniel:   negative/negative    right  Knee Exam:  Knee Range of Motion:limited by pain and 0-115 degrees flexion   Effusion:none  Condition of skin:intact  Location of tenderness:Medial joint line   Strength:limited by pain and 5 of 5  Stability:  Lachman: stable, LCL: stable, MCL: stable, PCL: stable, and posteromedial (dial): stable  Varus /Valgus stress:  normal  Daniel:   negative/negative      Hip Examination:  normal    RADIOGRAPHS:  X-rays of the knees from previous visit reviewed by me today demonstrate mild to moderate arthritic changes throughout both knees with significant medial joint space narrowing left more so than right sclerotic changes most notable in the medial compartment early osteophytic spurring chondrocalcinosis noted lateral compartments bilaterally no evidence of fracture dislocation or other bony abnormalities    ASSESSMENT/PLAN:       ICD-10-CM ICD-9-CM   1. Primary osteoarthritis of both knees  M17.0 715.16       Plan: We discussed with the patient at length all the different treatment options available for  the knee including anti-inflammatories, acetaminophen, rest, ice, knee strengthening exercise, occasional cortisone injections for temporary relief, Viscosupplimentation injections, arthroscopic debridement osteotomy, and finally knee arthroplasty.   Will proceed with repeat cortisone injection bilateral knees.  We will consult to adult  reconstructive surgery for discussion of TKA      The risks, benefits, pros, cons, and potential side effects of the procedure were discussed with the patient in detail all questions were answered.  The patient is comfortable and willing to proceed with the procedure. Verbal consent was obtained and the proper joint was identified by the patient and provider        The injection site was identified and the skin was prepared with a betadine solution. The   bilateral  knee was injected with 1 ml of Celestone and 5 ml Lidocaine under sterile technique. Isael Salmon tolerated the procedure well, she was advised to rest the knee today, ice and elevation. she did receive immediate relief of the pain in and about his knee she was told this would be short lived and is secondary to the lidocaine. she may have an increase in his discomfort tonight followed by steady improvement over the next several days. I may take 1-3 weeks following the injection to get the full benefit of the medication.  I will see her back in 3-6 months. Sooner if he has any problems or concerns.

## 2024-08-02 DIAGNOSIS — M17.0 PRIMARY OSTEOARTHRITIS OF BOTH KNEES: Primary | ICD-10-CM

## 2024-08-21 ENCOUNTER — LAB VISIT (OUTPATIENT)
Dept: LAB | Facility: HOSPITAL | Age: 79
End: 2024-08-21
Attending: INTERNAL MEDICINE
Payer: MEDICARE

## 2024-08-21 ENCOUNTER — OFFICE VISIT (OUTPATIENT)
Dept: INTERNAL MEDICINE | Facility: CLINIC | Age: 79
End: 2024-08-21
Payer: MEDICARE

## 2024-08-21 VITALS
BODY MASS INDEX: 27.56 KG/M2 | OXYGEN SATURATION: 97 % | SYSTOLIC BLOOD PRESSURE: 120 MMHG | HEART RATE: 92 BPM | HEIGHT: 65 IN | WEIGHT: 165.38 LBS | DIASTOLIC BLOOD PRESSURE: 52 MMHG

## 2024-08-21 DIAGNOSIS — E78.5 HYPERLIPIDEMIA, UNSPECIFIED HYPERLIPIDEMIA TYPE: ICD-10-CM

## 2024-08-21 DIAGNOSIS — I10 ESSENTIAL HYPERTENSION: ICD-10-CM

## 2024-08-21 DIAGNOSIS — M10.9 GOUT, ARTHRITIS: ICD-10-CM

## 2024-08-21 DIAGNOSIS — M25.50 ARTHRALGIA, UNSPECIFIED JOINT: Primary | ICD-10-CM

## 2024-08-21 DIAGNOSIS — M25.50 ARTHRALGIA, UNSPECIFIED JOINT: ICD-10-CM

## 2024-08-21 DIAGNOSIS — D50.9 IRON DEFICIENCY ANEMIA, UNSPECIFIED IRON DEFICIENCY ANEMIA TYPE: ICD-10-CM

## 2024-08-21 LAB
ALBUMIN SERPL BCP-MCNC: 3.8 G/DL (ref 3.5–5.2)
ALP SERPL-CCNC: 78 U/L (ref 55–135)
ALT SERPL W/O P-5'-P-CCNC: 14 U/L (ref 10–44)
ANION GAP SERPL CALC-SCNC: 10 MMOL/L (ref 8–16)
AST SERPL-CCNC: 20 U/L (ref 10–40)
BASOPHILS # BLD AUTO: 0.02 K/UL (ref 0–0.2)
BASOPHILS NFR BLD: 0.3 % (ref 0–1.9)
BILIRUB SERPL-MCNC: 0.8 MG/DL (ref 0.1–1)
BUN SERPL-MCNC: 18 MG/DL (ref 8–23)
CALCIUM SERPL-MCNC: 10.8 MG/DL (ref 8.7–10.5)
CCP AB SER IA-ACNC: <0.5 U/ML
CHLORIDE SERPL-SCNC: 105 MMOL/L (ref 95–110)
CO2 SERPL-SCNC: 26 MMOL/L (ref 23–29)
CREAT SERPL-MCNC: 1 MG/DL (ref 0.5–1.4)
CRP SERPL-MCNC: 28.1 MG/L (ref 0–8.2)
DIFFERENTIAL METHOD BLD: ABNORMAL
EOSINOPHIL # BLD AUTO: 0.1 K/UL (ref 0–0.5)
EOSINOPHIL NFR BLD: 1.4 % (ref 0–8)
ERYTHROCYTE [DISTWIDTH] IN BLOOD BY AUTOMATED COUNT: 12.9 % (ref 11.5–14.5)
ERYTHROCYTE [SEDIMENTATION RATE] IN BLOOD BY PHOTOMETRIC METHOD: 36 MM/HR (ref 0–36)
EST. GFR  (NO RACE VARIABLE): 57 ML/MIN/1.73 M^2
GLUCOSE SERPL-MCNC: 75 MG/DL (ref 70–110)
HCT VFR BLD AUTO: 30.8 % (ref 37–48.5)
HGB BLD-MCNC: 9.9 G/DL (ref 12–16)
IMM GRANULOCYTES # BLD AUTO: 0.02 K/UL (ref 0–0.04)
IMM GRANULOCYTES NFR BLD AUTO: 0.3 % (ref 0–0.5)
IRON SERPL-MCNC: 53 UG/DL (ref 30–160)
LYMPHOCYTES # BLD AUTO: 1 K/UL (ref 1–4.8)
LYMPHOCYTES NFR BLD: 15.2 % (ref 18–48)
MCH RBC QN AUTO: 32.4 PG (ref 27–31)
MCHC RBC AUTO-ENTMCNC: 32.1 G/DL (ref 32–36)
MCV RBC AUTO: 101 FL (ref 82–98)
MONOCYTES # BLD AUTO: 0.6 K/UL (ref 0.3–1)
MONOCYTES NFR BLD: 9.1 % (ref 4–15)
NEUTROPHILS # BLD AUTO: 4.6 K/UL (ref 1.8–7.7)
NEUTROPHILS NFR BLD: 73.7 % (ref 38–73)
NRBC BLD-RTO: 0 /100 WBC
PLATELET # BLD AUTO: 203 K/UL (ref 150–450)
PMV BLD AUTO: 11.9 FL (ref 9.2–12.9)
POTASSIUM SERPL-SCNC: 4.1 MMOL/L (ref 3.5–5.1)
PROT SERPL-MCNC: 7.2 G/DL (ref 6–8.4)
RBC # BLD AUTO: 3.06 M/UL (ref 4–5.4)
RHEUMATOID FACT SERPL-ACNC: 76 IU/ML (ref 0–15)
SATURATED IRON: 17 % (ref 20–50)
SODIUM SERPL-SCNC: 141 MMOL/L (ref 136–145)
TOTAL IRON BINDING CAPACITY: 311 UG/DL (ref 250–450)
TRANSFERRIN SERPL-MCNC: 210 MG/DL (ref 200–375)
URATE SERPL-MCNC: 5 MG/DL (ref 2.4–5.7)
WBC # BLD AUTO: 6.26 K/UL (ref 3.9–12.7)

## 2024-08-21 PROCEDURE — 84550 ASSAY OF BLOOD/URIC ACID: CPT | Performed by: INTERNAL MEDICINE

## 2024-08-21 PROCEDURE — 80053 COMPREHEN METABOLIC PANEL: CPT | Performed by: INTERNAL MEDICINE

## 2024-08-21 PROCEDURE — 85025 COMPLETE CBC W/AUTO DIFF WBC: CPT | Performed by: INTERNAL MEDICINE

## 2024-08-21 PROCEDURE — 86200 CCP ANTIBODY: CPT | Performed by: INTERNAL MEDICINE

## 2024-08-21 PROCEDURE — 99214 OFFICE O/P EST MOD 30 MIN: CPT | Mod: S$PBB,,, | Performed by: INTERNAL MEDICINE

## 2024-08-21 PROCEDURE — 99214 OFFICE O/P EST MOD 30 MIN: CPT | Mod: PBBFAC | Performed by: INTERNAL MEDICINE

## 2024-08-21 PROCEDURE — 36415 COLL VENOUS BLD VENIPUNCTURE: CPT | Performed by: INTERNAL MEDICINE

## 2024-08-21 PROCEDURE — 86140 C-REACTIVE PROTEIN: CPT | Performed by: INTERNAL MEDICINE

## 2024-08-21 PROCEDURE — 99999 PR PBB SHADOW E&M-EST. PATIENT-LVL IV: CPT | Mod: PBBFAC,,, | Performed by: INTERNAL MEDICINE

## 2024-08-21 PROCEDURE — 85652 RBC SED RATE AUTOMATED: CPT | Performed by: INTERNAL MEDICINE

## 2024-08-21 PROCEDURE — 86431 RHEUMATOID FACTOR QUANT: CPT | Performed by: INTERNAL MEDICINE

## 2024-08-21 PROCEDURE — 86038 ANTINUCLEAR ANTIBODIES: CPT | Performed by: INTERNAL MEDICINE

## 2024-08-21 PROCEDURE — 83540 ASSAY OF IRON: CPT | Performed by: INTERNAL MEDICINE

## 2024-08-21 PROCEDURE — 82728 ASSAY OF FERRITIN: CPT | Performed by: INTERNAL MEDICINE

## 2024-08-21 RX ORDER — SPIRONOLACTONE 25 MG/1
25 TABLET ORAL DAILY
Qty: 90 TABLET | Refills: 4 | Status: SHIPPED | OUTPATIENT
Start: 2024-08-21

## 2024-08-21 RX ORDER — OMEPRAZOLE 40 MG/1
40 CAPSULE, DELAYED RELEASE ORAL 2 TIMES DAILY
Qty: 180 CAPSULE | Refills: 4 | Status: SHIPPED | OUTPATIENT
Start: 2024-08-21

## 2024-08-21 RX ORDER — PREDNISONE 10 MG/1
TABLET ORAL
Qty: 16 TABLET | Refills: 0 | Status: SHIPPED | OUTPATIENT
Start: 2024-08-21

## 2024-08-21 RX ORDER — ALLOPURINOL 100 MG/1
200 TABLET ORAL DAILY
Qty: 180 TABLET | Refills: 1 | Status: SHIPPED | OUTPATIENT
Start: 2024-08-21

## 2024-08-21 RX ORDER — ROSUVASTATIN CALCIUM 20 MG/1
20 TABLET, COATED ORAL DAILY
Qty: 90 TABLET | Refills: 3 | Status: SHIPPED | OUTPATIENT
Start: 2024-08-21

## 2024-08-21 RX ORDER — ESCITALOPRAM OXALATE 10 MG/1
15 TABLET ORAL DAILY
Qty: 135 TABLET | Refills: 1 | Status: SHIPPED | OUTPATIENT
Start: 2024-08-21

## 2024-08-21 NOTE — PATIENT INSTRUCTIONS
Prednisone 10 mg - 3 tablets daily for 2 days then 2 tablets daily for 3 days then one tablet daily for 4 days

## 2024-08-21 NOTE — PROGRESS NOTES
CHIEF COMPLAINT: Follow up of multiple problems     HISTORY OF PRESENT ILLNESS: 78-year-old woman presents for follow up of above     She has had swelling of both wrists for the last 2 weeks.  Right wrist is more swollen than the left wrist. Her ankle joints (bilaterally) are swollen as well for the last week. No lower extremity edema. She has been cold for the last month which is unusual for her.      She had 4 infusions of IV iron from 1/3/24 to 1/24/24. She is followed by Dr Bianchi. She is off oral iron.       She is having some constipation - BM every other day. She is taking Linzess every day or every other day.  She has a BM after taking the Linzess.       Rash on her forearms comes and goes.  She is using betamethasone cream twice daily as needed for the rash which helps.   She saw Dr Herrera on 8/23/23 She uses cetaphil soap and lotion and Super Fatted Basis which helps.  She has a similar rash intermittently and had a big years ago and saw Dr Herrera in Derm 11/3/2017 and was diagnosed with erythema annulare centrifugum - biopsy was done at that time- SUBCORNEAL PUSTULAR DERMATITIS WITH ASSOCIATED EPIDERMAL SPONGIOSIS      Sinuses are congested. She has not been taking claritin.      She had a bunionectomy and hammertoe surgery on the left foot 7/8/21 with Dr Bunn. She has seen Dr Jasso who wants to redo the surgery.  Dr Jasso gave her injections in her toes which helped with the pain. She cannot wear closed shoes. Has not had the surgery yet. No toe pain     No gout attacks.  She takes allopurinol 100 mg 2 tablet nightly.      She had an injection in both of her knees on 7/3/24.   The cortisone injections lasted 2-3 weeks.       She has muscle spasms in her neck that come and go. She has to watch how she sleep.   She went to physical therapy which has helped. She is doing exercises at home.  THe tried tizanidine 4 mg - which was too strong for her.      Anxiety is controlled on Lexapro to 10 mg 1.5 tablets  daily.  Sister in law passed away.  Her  gets on her nerves.        Heartburn is congroled on omperazole 20 mg twice daily     She continues to take Crestor 20 mg 1 tablet daily with co enzyme 10 200 mg  daily - leg achintess is better with lower dose of crestor. She also takes losartan 50 mg twice daily, and spironolactone 25 mg daily. BP has been doing well.        She had a normal cystoscope 14 due to recurrent UTI. No dysuria or hematuria now. She is no longer using estrogen vaginal cream for vaginal atrophy three times weekly       She takes tylenol arthritis 650 mg 2 tablets twice daily as needed which helps her joint pain.  She did have some meloxicam that did help. She cannot take NSAIDS long term due to her stomach issues.      PAST MEDICAL HISTORY:   1. Hypertension.   2. Hyperlipidemia.   3. Gout.   4. History of rashes.   5. TIA in   6. Fibrocystic breast disease.   7. Postmenopausal.   8. Osteoarthritis.   9. History of tobacco usage; quit in .   10. History of colon polyps; normal colonoscopy in 10/07 and normal 2010, due    11. Normal bone mineral density scan in .   12. Anxiety and depression - controlled on lorazepam very rarely.   13. Varicose veins.   14. Intermittent GERD.   15. Iron deficiency anemia 2010 - due to erosive gastropathy on EGD 2010     PAST SURGICAL HISTORY:   1. Right carotid endarterectomy in .   2. Breast reduction surgery.   3. Total abdominal hysterectomy.   4. Blood transfusion prior to .     SOCIAL HISTORY: Quit smoking in ; smoked 1 PPD since age 16. No   alcohol use.     FAMILY HISTORY:   Father had gout, hypertension and heart failure; is . Mother had hypertension and pancreatic cancer; is also . Has five brothers, one of whom  in a motor vehicle accident. All have gout and hypertension. One brother has Crohn's disease; another has bladder cancer.     MEDICATIONS and ALLERGIES: Updated on epic.  "    PHYSICAL EXAMINATION:        BP (!) 120/52 (BP Location: Right arm, Patient Position: Sitting, BP Method: Large (Manual))   Pulse 92   Ht 5' 5" (1.651 m)   Wt 75 kg (165 lb 5.5 oz)   LMP  (LMP Unknown)   SpO2 97%   BMI 27.51 kg/m²          General: Alert, oriented. No apparent distress. Affect within normal   Limits.   Conjunctivae anicteric. Tympanic membranes clear. Oropharynx clear.   Neck supple.   Respiratory effort normal. Lungs clear  Heart: Regular rate and rhythm without murmurs, gallops or rubs.   No lower extremity edema.   Synovitis of bilateral wrists - right greater than left  Synovisitis of bilateral ankles.      Labs 7/3/24 reviewed and stable           ASSESSMENT AND PLAN:    Joint pain and swelling - labs today to look for autoimmune disorder. Prednisone taper  OA knees - to see ortho next week. EKG   iron deficiency anemia - labs  Mood disorder-on lexapro to 10 mg 1.5 tablet daily.   Rash - betamethasone cream twice daily. Has an apt in derm  Essential hypertension - stable   Heme positive stools - saw Dr Kingsley 3/2023 -had colonoscopy 9/18/23  Hyperlipidemia - on crestor to 20 mg 1 tablet daily.    Erosive gastropathy - on omeprazole to 40 mg twice daily.  Hypercalcemia - stable  Gout -  on allopurinol. Uric acid level  Carotid artery disease - on risk factor modification.   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  I will see her back in 2 weeks as scheduled  sooner if problems arise   "

## 2024-08-22 LAB
ANA SER QL IF: NORMAL
FERRITIN SERPL-MCNC: 82 NG/ML (ref 20–300)

## 2024-08-23 NOTE — PROGRESS NOTES
Subjective:     HPI:   Isael Salmon is a 78 y.o. female who presents for eval B knee ref by jose luis daniels    History of Present Illness  The patient presents for evaluation of knee pain.    She has been under the care of orthopedic PAs for several years, receiving injections for her knees. Her knee discomfort began around the time she started receiving these injections. She has tried steroid injections, compressive knee sleeves, and uses a cane for mobility. Steroids were prescribed by Dr. Montoya. On 07/05/2024, Jose Luis administered some injections and suggested discussing knee replacements with Dr. Carr. Her right knee is more troublesome than the left, causing throbbing pain at night. The pain was so severe that she could feel her heartbeat through her leg, prompting her visit to Dr. Montoya. She is unable to use the bathtub due to difficulty sitting low and only takes showers. She also struggles to reach her bottom kitchen cabinets. Lying on her back causes hip pain, making it difficult for her to sit up.    During a visit to her primary care physician, Dr. Montoya, she reported swelling in her ankles, wrists, and knees. Lab work revealed a positive rheumatoid factor and an elevated CRP, indicative of generalized arthritis. A steroid taper was initiated, which she reports as highly effective, eliminating pain after the first three doses. She is currently under the care of a rheumatologist, Dr. Espinoza, for gout and takes allopurinol nightly.    She has no history of heart attacks, no stents in her heart, and no diabetes. She has no history of blood clots in the veins of her lungs. She had a carotid procedure on one side. She has anemia and has been followed by a hematologist. They have given her iron, but it has not helped much. She had a series of transfusions recently, which did not help.  Has seen ortho APPs for B knee OA since 2021  Saw DS 7/5/24 B knee CSI and ref for TKA    In the meantime saw  Dr Montoya PCP 8/21/24: B wrist swelling, generalized joint pain  Labs ordered +RF and elevated CRP  Rx'd steroid taper - worked a miracle, energy and no pain     C/o R>L knee pain   Global  Legs throbs at night      Past surgical history: None.     Hx DVT: None.     Medications: Tylenol (650, nightly), Prednisone (10mg, for 1 week, rx by PCP for significant right knee pain)     Injections:   7/3/2024 bilateral knee iaCSI with Jose Luis Craft - significant relief for 2 weeks  3/25/2024 bilateral knee iaCSI with Olga Salinas - significant relief for at least 3 months  Multiple CSI since 2021    Physical Therapy: None.    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     Social support: The patient stated that they live at home with their . The patient stated that their  would be able to help take care of them if they were to have surgery.   The patient's daughter lives next door and would be able to help (same daughter had 2 knee replacements).        ROS:  The updated medical history is in the chart and has been reviewed. A review of systems is updated and there is no reported vision changes, ear/nose/mouth/throat complaints,  chest pain, shortness of breath, abdominal pain, urological complaints, fevers or chills, psychiatric complaints. Musculoskeletal and neurologcial symptoms are as documented. All other systems are negative.      Objective:   Exam:  There were no vitals filed for this visit.  Body mass index is 27.2 kg/m².    Physical examination included  assessment of the patient's general appearance with particular attention to development, nutrition, body habitus, attention to grooming, and any evidence of distress.  Constitutional: The patient is a well-developed, well-nourished patient in no acute distress.   Cardiovascular: Vascular examination included warmth and capillary refill as well inspection for edema and assessment of pedal pulses. Pulses are palpable and regular.  Musculoskeletal: Gait was assessed as to whether it was steady, non-antalgic, and/or required the use of an assist device. The patient was also asked to walk independently and get onto the examination table.  Skin: The skin was examined for any obvious rashes or lesions in the extremity.  Neurologic: Sensation is intact to light touch in the extremity. The patient has good coordination without hyperreflexia and is alert and oriented to person, place and time and has normal mood and affect.     All of the above were examined and found to be within normal limits except for the following pertinent clinical findings:    Physical Exam  Patient is able to rise independently. No significant limp or antalgic gait observed. No groin pain detected with straight leg raise. No pain experienced with expressive range of motion of hip joints. Distally, neurovascular integrity is maintained with good strength, sensation and pulses. Right knee exhibits a range of motion from 7 to 123 degrees, with 0 degrees of alignment. Tenderness is noted upon palpation of medial, lateral and patellofemoral joint lines, along with moderate effusion. Knee demonstrates stability to anterior, posterior varus and valgus stresses, despite flexion contracture and absence of extensor lag. Left knee presents a similar range of motion. Synovitis is present in both wrists, and was reported around ankles, although this has improved.    R 7-123, 0      Imaging:    Results  Laboratory Studies  Positive rheumatoid factor. Elevated  CRP.    Imaging  X-rays show arthritis in knees, with bone on bone arthritis on the inside of both knees.    KNEE R ARTHRITIS    Indication:  Right knee pain  Exam Ordered: Radiographs of the right knee include a standing anteroposterior view, a standing posterioanterior view, a lateral view in full flexion, and a sunrise view  Details of Examination: Exam shows evidence of joint space narrowing, osteophyte formation, and subchondral sclerosis, all consistent with degenerative arthritis of the knee.  No other significant findings are noted.  Impression:  Degenerative Arthritis, Right Knee and KNEE L ARTHRITIS     Indication:  Left knee pain  Exam Ordered: Radiographs of the left knee include a standing anteroposterior view, a standing posterioanterior view, a lateral view in full flexion, and a sunrise view  Details of Examination: Exam shows evidence of joint space narrowing, osteophyte formation, and subchondral sclerosis, all consistent with degenerative arthritis of the knee.  No other significant findings are noted.  Impression:  Degenerative Arthritis, Left Knee    B knee Klg4 varus arthritis, severe bone on bone      Assessment:       ICD-10-CM ICD-9-CM   1. Primary osteoarthritis of both knees  M17.0 715.16        All moxi, sulfa, cipro    Gout  TIA 1997  L leg laser vein  Superficial vein thrombosis  R CEA 1997  COPD  CKD 1.0/57  Fe def anemia 9.9, minimal response to iron transfusions    Labs 8/21/24:  +RF  CRP 28    DEXA 3/24 ok      Plan:     Assessment & Plan  1. Anemia.  She has been followed by a hematologist and received iron supplements and a series of transfusions, which have not significantly improved her condition. Her blood counts remain low, which is concerning. Consultation with a hematologist will be sought to improve her blood counts prior to any potential surgery, as her current low counts increase her risk for postoperative blood transfusion and complications.    2. Knee pain.  X-rays  reveal arthritis in her knees, specifically bone-on-bone arthritis on the inner part of both knees. She has been receiving steroid injections and using compressive knee sleeves and a cane. Physical therapy for her knees is recommended to strengthen her legs. Information about knee replacement will be provided. If knee replacement becomes necessary, preoperative therapy will be initiated.    3. Suspected Rheumatoid Arthritis.  She has a positive rheumatoid factor and elevated CRP, which are concerning for generalized arthritis. She reports joint pain all over, including swollen ankles and wrists. A referral will be made to Dr. Bright, a rheumatologist, for further evaluation and management. She will be started on medication to treat rheumatoid arthritis if confirmed.    4. Neck spasms.  She has a history of neck spasms and has previously undergone physical therapy for this condition. The suspected rheumatoid arthritis could be contributing to her neck pain. This will be evaluated further by the rheumatologist.    5. Medication Management.  She takes allopurinol every night for gout. This medication will be continued.    Follow-up  The patient will follow up in 6 weeks.    The above findings were discussed with patient length. We discussed the risks of conservative versus surgical management knee arthritis. Conservative management consisting of anti-inflammatory medications, glucosamine/chondroitin sulfate, weight loss, physical therapy, activity modification, as well as injections (lubricant versus corticosteroid) was discussed at length. At this point considering the patient's level of activity, pain, and radiographic findings I recommend continued conservative management of the knee arthritis until medically optimized    Step on:   Rheum: last f/u Dr Gaytan 2017 Dx OA + gout, now labs show + RF and elevated CRP, she would like to see someone else, will put in referral for Dr Bright     Long term, prior to TKA    Heme/anemia: Followed by Hematology Dr Kem Bianchi, will need hgb optimization prior to TKA    For now: Rx PT    TKA + velys info today    F/u 6 weeks or after rheum eval      Orders Placed This Encounter   Procedures    Ambulatory referral/consult to Rheumatology     Standing Status:   Future     Standing Expiration Date:   9/27/2025     Referral Priority:   Routine     Referral Type:   Consultation     Referral Reason:   Specialty Services Required     Referred to Provider:   Ajith Bright MD     Requested Specialty:   Rheumatology     Number of Visits Requested:   1    Ambulatory referral/consult to Physical/Occupational Therapy     Standing Status:   Future     Standing Expiration Date:   9/27/2025     Referral Priority:   Routine     Referral Type:   Physical Medicine     Referral Reason:   Specialty Services Required     Number of Visits Requested:   1       This note was generated with the assistance of ambient listening technology. Verbal consent was obtained by the patient and accompanying visitor(s) for the recording of patient appointment to facilitate this note. I attest to having reviewed and edited the generated note for accuracy, though some syntax or spelling errors may persist. Please contact the author of this note for any clarification.            Past Medical History:   Diagnosis Date    Adjustment disorder 07/26/2012    Anemia 07/26/2012    AR (allergic rhinitis) 09/05/2014    Bronchitis     Fibrocystic breast disease in female 07/26/2012    GERD (gastroesophageal reflux disease) 07/26/2012    Gout     Gout, arthritis 07/26/2012    History of blood transfusion 07/26/2012    History of colonic polyps 07/26/2012    Hypercalcemia 09/20/2012    Hyperlipidemia 07/26/2012    Hypertension 07/26/2012    Nuclear sclerosis 05/02/2014    Osteoarthritis 07/26/2012    Other hyperparathyroidism 09/20/2012    Postmenopausal status 07/26/2012    PVD (peripheral vascular disease) 07/26/2012    left  leg laser of vein with injection    Stroke 1997    TIA    Superficial vein thrombosis     Transient ischemic attack 07/26/2012    Varicose veins 07/26/2012       Past Surgical History:   Procedure Laterality Date    ANTEGRADE SINGLE BALLOON ENTEROSCOPY N/A 9/18/2023    Procedure: ENTEROSCOPY, SINGLE BALLOON, ANTEGRADE;  Surgeon: Jaziel Kingsley MD;  Location: [facility] ENDO (2ND FLR);  Service: Endoscopy;  Laterality: N/A;  7/25/23-PEG prep, Instructions via portal-DS    BREAST CYST ASPIRATION Bilateral     multiple ones over the years    BREAST CYST EXCISION Left     BREAST SURGERY      breast reduction    broken second finger Right 12/2015    pins placed    carotid endarterectomy  1997    right    CAROTID ENDARTERECTOMY Right 1997    CATARACT EXTRACTION W/  INTRAOCULAR LENS IMPLANT Right 05/01/2018    Dr. Yost    CATARACT EXTRACTION W/  INTRAOCULAR LENS IMPLANT Left 05/15/2018    Dr. Lester    COLONOSCOPY N/A 10/26/2015    Procedure: COLONOSCOPY;  Surgeon: Manuel Alanis MD;  Location: [facility] ENDO (4TH FLR);  Service: Endoscopy;  Laterality: N/A;    COLONOSCOPY N/A 9/18/2023    Procedure: COLONOSCOPY;  Surgeon: Jaziel Kingsley MD;  Location: [facility] ENDO (2ND FLR);  Service: Endoscopy;  Laterality: N/A;  can use single-balloon for this part as well    CORRECTION OF HAMMER TOE Left 07/08/2021    Procedure: CORRECTION, HAMMER TOE Second toe;  Surgeon: Silver Bunn MD;  Location: [facility] OR;  Service: Orthopedics;  Laterality: Left;  Ossiofiber hammertoe implant    EYE SURGERY      HYSTERECTOMY      ORIF FINGER FRACTURE  12/18/2015    SURGICAL REMOVAL OF BUNION WITH OSTEOTOMY OF METATARSAL BONE Left 07/08/2021    Procedure: BUNIONECTOMY, WITH METATARSAL OSTEOTOMY Proximal opening wedge;  Surgeon: Silver Bunn MD;  Location: [facility] OR;  Service: Orthopedics;  Laterality: Left;  Arthrex opening wedge plate    TIA      TOTAL REDUCTION MAMMOPLASTY Bilateral     VASCULAR SURGERY      left leg vein laser        Family History   Problem Relation Name Age of Onset    Heart failure Father      Cancer Mother          pancreas    Cataracts Mother      Hypertension Mother      Cancer Brother          bladder    Cataracts Brother      Hypertension Brother      Crohn's disease Brother      Gout Brother      Cataracts Brother      Hypertension Brother      Gout Brother      Cataracts Brother      Hypertension Brother      Gout Brother      Bone cancer Brother      Cataracts Brother      Hypertension Brother      Breast cancer Neg Hx      Ovarian cancer Neg Hx      Allergies Neg Hx      Asthma Neg Hx      Eczema Neg Hx      Cervical cancer Neg Hx      Endometrial cancer Neg Hx      Vaginal cancer Neg Hx      Amblyopia Neg Hx      Blindness Neg Hx      Diabetes Neg Hx      Glaucoma Neg Hx      Macular degeneration Neg Hx      Retinal detachment Neg Hx      Strabismus Neg Hx      Stroke Neg Hx      Thyroid disease Neg Hx      Celiac disease Neg Hx      Colon cancer Neg Hx      Esophageal cancer Neg Hx      Liver disease Neg Hx      Liver cancer Neg Hx      Rectal cancer Neg Hx      Stomach cancer Neg Hx         Social History     Socioeconomic History    Marital status:     Number of children: 1   Occupational History    Occupation: Retired   Tobacco Use    Smoking status: Former     Current packs/day: 0.00     Types: Cigarettes     Quit date: 1989     Years since quittin.9    Smokeless tobacco: Never    Tobacco comments:     quit  - 1 pack per day since age 16   Substance and Sexual Activity    Alcohol use: Not Currently     Alcohol/week: 1.0 standard drink of alcohol     Types: 1 Glasses of wine per week     Comment: maybe one glass of wine monthly, if that    Drug use: No    Sexual activity: Not Currently     Partners: Male     Birth control/protection: Surgical     Social Determinants of Health     Financial Resource Strain: Low Risk  (2024)    Overall Financial Resource Strain ([facility])      Difficulty of Paying Living Expenses: Not hard at all   Food Insecurity: No Food Insecurity (6/12/2024)    Hunger Vital Sign     Worried About Running Out of Food in the Last Year: Never true     Ran Out of Food in the Last Year: Never true   Transportation Needs: No Transportation Needs (6/12/2024)    PRAPARE - Transportation     Lack of Transportation (Medical): No     Lack of Transportation (Non-Medical): No   Physical Activity: Insufficiently Active (6/12/2024)    Exercise Vital Sign     Days of Exercise per Week: 1 day     Minutes of Exercise per Session: 10 min   Stress: Stress Concern Present (6/12/2024)    [employer] [place] [employer] - Occupational Stress Questionnaire     Feeling of Stress : To some extent   Housing Stability: Low Risk  (3/5/2024)    Housing Stability Vital Sign     Unable to Pay for Housing in the Last Year: No     Number of Places Lived in the Last Year: 1     Unstable Housing in the Last Year: No

## 2024-08-27 ENCOUNTER — HOSPITAL ENCOUNTER (OUTPATIENT)
Dept: RADIOLOGY | Facility: HOSPITAL | Age: 79
Discharge: HOME | End: 2024-08-27
Attending: ORTHOPAEDIC SURGERY
Payer: MEDICARE

## 2024-08-27 ENCOUNTER — OFFICE VISIT (OUTPATIENT)
Dept: ORTHOPEDICS | Facility: CLINIC | Age: 79
End: 2024-08-27
Payer: MEDICARE

## 2024-08-27 ENCOUNTER — HOSPITAL ENCOUNTER (OUTPATIENT)
Dept: CARDIOLOGY | Facility: CLINIC | Age: 79
Discharge: HOME | End: 2024-08-27
Payer: MEDICARE

## 2024-08-27 VITALS — HEIGHT: 65 IN | BODY MASS INDEX: 27.24 KG/M2 | WEIGHT: 163.5 LBS

## 2024-08-27 DIAGNOSIS — M17.0 PRIMARY OSTEOARTHRITIS OF BOTH KNEES: Primary | ICD-10-CM

## 2024-08-27 DIAGNOSIS — M17.0 PRIMARY OSTEOARTHRITIS OF BOTH KNEES: ICD-10-CM

## 2024-08-27 DIAGNOSIS — I10 ESSENTIAL HYPERTENSION: ICD-10-CM

## 2024-08-27 PROCEDURE — 73564 X-RAY EXAM KNEE 4 OR MORE: CPT | Mod: 26,50,, | Performed by: RADIOLOGY

## 2024-08-27 PROCEDURE — 93005 ELECTROCARDIOGRAM TRACING: CPT | Mod: PBBFAC | Performed by: INTERNAL MEDICINE

## 2024-08-27 PROCEDURE — 93010 ELECTROCARDIOGRAM REPORT: CPT | Mod: S$PBB,,, | Performed by: INTERNAL MEDICINE

## 2024-08-27 PROCEDURE — 73564 X-RAY EXAM KNEE 4 OR MORE: CPT | Mod: TC,50

## 2024-08-27 PROCEDURE — 99214 OFFICE O/P EST MOD 30 MIN: CPT | Mod: S$PBB,,, | Performed by: ORTHOPAEDIC SURGERY

## 2024-08-27 PROCEDURE — 99214 OFFICE O/P EST MOD 30 MIN: CPT | Mod: PBBFAC,25 | Performed by: ORTHOPAEDIC SURGERY

## 2024-08-27 PROCEDURE — 99999 PR PBB SHADOW E&M-EST. PATIENT-LVL IV: CPT | Mod: PBBFAC,,, | Performed by: ORTHOPAEDIC SURGERY

## 2024-08-28 LAB
OHS QRS DURATION: 94 MS
OHS QTC CALCULATION: 427 MS

## 2024-08-29 ENCOUNTER — OFFICE VISIT (OUTPATIENT)
Dept: INTERNAL MEDICINE | Facility: CLINIC | Age: 79
End: 2024-08-29
Payer: MEDICARE

## 2024-08-29 ENCOUNTER — HOSPITAL ENCOUNTER (OUTPATIENT)
Dept: RADIOLOGY | Facility: HOSPITAL | Age: 79
Discharge: HOME | End: 2024-08-29
Attending: INTERNAL MEDICINE
Payer: MEDICARE

## 2024-08-29 VITALS
SYSTOLIC BLOOD PRESSURE: 130 MMHG | HEIGHT: 65 IN | WEIGHT: 163.13 LBS | HEART RATE: 84 BPM | DIASTOLIC BLOOD PRESSURE: 56 MMHG | BODY MASS INDEX: 27.18 KG/M2 | OXYGEN SATURATION: 98 %

## 2024-08-29 DIAGNOSIS — M05.80 POLYARTHRITIS WITH POSITIVE RHEUMATOID FACTOR: Primary | ICD-10-CM

## 2024-08-29 DIAGNOSIS — I10 ESSENTIAL HYPERTENSION: ICD-10-CM

## 2024-08-29 DIAGNOSIS — N18.31 CHRONIC KIDNEY DISEASE, STAGE 3A: ICD-10-CM

## 2024-08-29 DIAGNOSIS — Z12.31 SCREENING MAMMOGRAM FOR BREAST CANCER: ICD-10-CM

## 2024-08-29 DIAGNOSIS — D50.0 IRON DEFICIENCY ANEMIA DUE TO CHRONIC BLOOD LOSS: ICD-10-CM

## 2024-08-29 PROCEDURE — 99999 PR PBB SHADOW E&M-EST. PATIENT-LVL III: CPT | Mod: PBBFAC,,, | Performed by: INTERNAL MEDICINE

## 2024-08-29 PROCEDURE — 77067 SCR MAMMO BI INCL CAD: CPT | Mod: TC

## 2024-08-29 PROCEDURE — 99213 OFFICE O/P EST LOW 20 MIN: CPT | Mod: PBBFAC | Performed by: INTERNAL MEDICINE

## 2024-08-29 NOTE — Clinical Note
Pt with positive RF with joint pain consistent with autoimmune disorder- responded to prednisone.  Can you assist in getting her an apt in rheumatology for evaluation Thanks Janett Montoya M.D.

## 2024-08-29 NOTE — PROGRESS NOTES
CHIEF COMPLAINT: Follow up of multiple problems     HISTORY OF PRESENT ILLNESS: 78-year-old woman presents for follow up of above     Joint pain is much improved with prednisone taper. Swelling of the wrists and ankles and knees are better.  She takes her last dose of prednisone today. She is taking tylenol extra strength 500 mg 2 twice daily which does help    She saw Dr Carr in ortho 2 days ago, 8/27/24 - no knee replacement needed right now. Referred to physcial therapy and rheumatology due to RA on labs.       She had 4 infusions of IV iron from 1/3/24 to 1/24/24. She is followed by Dr Bianchi. She is off oral iron.       She is having some constipation - BM every other day. She is taking Linzess every day or every other day.  She has a BM after taking the Linzess.       Rash on her forearms comes and goes.  She is using betamethasone cream twice daily as needed for the rash which helps.   She saw Dr Herrera on 8/23/23 She uses cetaphil soap and lotion and Super Fatted Basis which helps.  She has a similar rash intermittently and had a big years ago and saw Dr Herrera in Derm 11/3/2017 and was diagnosed with erythema annulare centrifugum - biopsy was done at that time- SUBCORNEAL PUSTULAR DERMATITIS WITH ASSOCIATED EPIDERMAL SPONGIOSIS      Sinuses are congested. She has not been taking claritin.      She had a bunionectomy and hammertoe surgery on the left foot 7/8/21 with Dr Bunn. She has seen Dr Jasso who wants to redo the surgery.  Dr Jasso gave her injections in her toes which helped with the pain. She cannot wear closed shoes. Has not had the surgery yet. No toe pain     No gout attacks.  She takes allopurinol 100 mg 2 tablet nightly.      She had an injection in both of her knees on 7/3/24.   The cortisone injections lasted 2-3 weeks.       She has muscle spasms in her neck that come and go. She has to watch how she sleep.   She went to physical therapy which has helped. She is doing exercises at home.   THe tried tizanidine 4 mg - which was too strong for her.      Anxiety is controlled on Lexapro to 10 mg 1.5 tablets daily.  Sister in law passed away.  Her  gets on her nerves.        Heartburn is congroled on omperazole 20 mg twice daily     She continues to take Crestor 20 mg 1 tablet daily with co enzyme 10 200 mg  daily - leg achintess is better with lower dose of crestor. She also takes losartan 50 mg twice daily, and spironolactone 25 mg daily. BP has been doing well.        She had a normal cystoscope 14 due to recurrent UTI. No dysuria or hematuria now. She is no longer using estrogen vaginal cream for vaginal atrophy three times weekly          PAST MEDICAL HISTORY:   1. Hypertension.   2. Hyperlipidemia.   3. Gout.   4. History of rashes.   5. TIA in   6. Fibrocystic breast disease.   7. Postmenopausal.   8. Osteoarthritis.   9. History of tobacco usage; quit in .   10. History of colon polyps; normal colonoscopy in 10/07 and normal 2010, due    11. Normal bone mineral density scan in .   12. Anxiety and depression - controlled on lorazepam very rarely.   13. Varicose veins.   14. Intermittent GERD.   15. Iron deficiency anemia 2010 - due to erosive gastropathy on EGD 2010     PAST SURGICAL HISTORY:   1. Right carotid endarterectomy in .   2. Breast reduction surgery.   3. Total abdominal hysterectomy.   4. Blood transfusion prior to .     SOCIAL HISTORY: Quit smoking in ; smoked 1 PPD since age 16. No   alcohol use.     FAMILY HISTORY:   Father had gout, hypertension and heart failure; is . Mother had hypertension and pancreatic cancer; is also . Has five brothers, one of whom  in a motor vehicle accident. All have gout and hypertension. One brother has Crohn's disease; another has bladder cancer.     MEDICATIONS and ALLERGIES: Updated on epic.     PHYSICAL EXAMINATION:        BP (!) 120/52 (BP Location: Right arm, Patient Position:  "Sitting, BP Method: Large (Manual))   Pulse 92   Ht 5' 5" (1.651 m)   Wt 75 kg (165 lb 5.5 oz)   LMP  (LMP Unknown)   SpO2 97%   BMI 27.51 kg/m²           General: Alert, oriented. No apparent distress. Affect within normal   Limits.   Conjunctivae anicteric. Tympanic membranes clear. Oropharynx clear.   Neck supple.   Respiratory effort normal. Lungs clear  Heart: Regular rate and rhythm without murmurs, gallops or rubs.   No lower extremity edema.     Labs 8/21/24 reviewed  - RF 76     ASSESSMENT AND PLAN:    Positive RF with joint pain - to rheumatology . Doing better after Prednisone taper  OA knees -physical thearpy   iron deficiency anemia - anemia.  Mood disorder-on lexapro to 10 mg 1.5 tablet daily.   Rash - betamethasone cream twice daily. Has an apt in derm  Essential hypertension - stable   Heme positive stools - saw Dr Kingsley 3/2023 -had colonoscopy 9/18/23  Hyperlipidemia - on crestor to 20 mg 1 tablet daily.    Erosive gastropathy - on omeprazole to 40 mg twice daily.  Hypercalcemia - stable  Gout -  on allopurinol. Uric acid level  Carotid artery disease - on risk factor modification.   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  I will see her back in 2 weeks as scheduled  sooner if problems arise   "

## 2024-08-31 ENCOUNTER — EXTERNAL CHRONIC CARE MANAGEMENT (OUTPATIENT)
Dept: PRIMARY CARE CLINIC | Facility: CLINIC | Age: 79
End: 2024-08-31
Payer: MEDICARE

## 2024-08-31 PROCEDURE — 99439 CHRNC CARE MGMT STAF EA ADDL: CPT | Mod: S$PBB,,, | Performed by: INTERNAL MEDICINE

## 2024-08-31 PROCEDURE — 99490 CHRNC CARE MGMT STAFF 1ST 20: CPT | Mod: PBBFAC | Performed by: INTERNAL MEDICINE

## 2024-08-31 PROCEDURE — 99490 CHRNC CARE MGMT STAFF 1ST 20: CPT | Mod: S$PBB,,, | Performed by: INTERNAL MEDICINE

## 2024-08-31 PROCEDURE — 99439 CHRNC CARE MGMT STAF EA ADDL: CPT | Mod: PBBFAC | Performed by: INTERNAL MEDICINE

## 2024-09-12 ENCOUNTER — TELEPHONE (OUTPATIENT)
Dept: OPTOMETRY | Facility: CLINIC | Age: 79
End: 2024-09-12
Payer: MEDICARE

## 2024-09-12 ENCOUNTER — TELEPHONE (OUTPATIENT)
Dept: OPHTHALMOLOGY | Facility: CLINIC | Age: 79
End: 2024-09-12
Payer: MEDICARE

## 2024-09-12 NOTE — TELEPHONE ENCOUNTER
----- Message from Miriam Ricketts sent at 9/12/2024  3:56 PM CDT -----  Regarding: Returning a Missed Call  Contact: Isael Salmon  Returning a Missed Call     Caller:Isael Salmon         Returning call to: Sania     Caller can be reached @:[phone] (home) [phone] (work)      Nature of the call:Patient is returning call to schedule. Requesting a call back

## 2024-09-13 ENCOUNTER — TELEPHONE (OUTPATIENT)
Dept: OPTOMETRY | Facility: CLINIC | Age: 79
End: 2024-09-13
Payer: MEDICARE

## 2024-09-13 NOTE — TELEPHONE ENCOUNTER
----- Message from Sheila Klein sent at 9/13/2024  9:55 AM CDT -----  Regarding: R/s Appt  Reschedule Existing Appointment     Current appt date:9/13       Type of appt :Ep     Physician:     Reason for rescheduling:Provider cancelled 9/13 appt. Pt is asking for a sooner appt date than Oct due to having issues with eye glasses.     Caller:Isael Salmon     Contact Preference: [phone]

## 2024-09-19 ENCOUNTER — LAB VISIT (OUTPATIENT)
Dept: LAB | Facility: HOSPITAL | Age: 79
End: 2024-09-19
Attending: INTERNAL MEDICINE
Payer: MEDICARE

## 2024-09-19 ENCOUNTER — CLINICAL SUPPORT (OUTPATIENT)
Dept: REHABILITATION | Facility: OTHER | Age: 79
End: 2024-09-19
Attending: ORTHOPAEDIC SURGERY
Payer: MEDICARE

## 2024-09-19 ENCOUNTER — OFFICE VISIT (OUTPATIENT)
Dept: RHEUMATOLOGY | Facility: CLINIC | Age: 79
End: 2024-09-19
Payer: MEDICARE

## 2024-09-19 VITALS
DIASTOLIC BLOOD PRESSURE: 68 MMHG | HEART RATE: 81 BPM | HEIGHT: 65 IN | BODY MASS INDEX: 27.44 KG/M2 | SYSTOLIC BLOOD PRESSURE: 114 MMHG | WEIGHT: 164.69 LBS

## 2024-09-19 DIAGNOSIS — M17.0 PRIMARY OSTEOARTHRITIS OF BOTH KNEES: ICD-10-CM

## 2024-09-19 DIAGNOSIS — R29.898 DECREASED STRENGTH OF LOWER EXTREMITY: Primary | ICD-10-CM

## 2024-09-19 DIAGNOSIS — M15.9 PRIMARY OSTEOARTHRITIS INVOLVING MULTIPLE JOINTS: Primary | ICD-10-CM

## 2024-09-19 DIAGNOSIS — M11.20 CHONDROCALCINOSIS: ICD-10-CM

## 2024-09-19 DIAGNOSIS — M79.604 PAIN IN BOTH LOWER EXTREMITIES: ICD-10-CM

## 2024-09-19 DIAGNOSIS — M79.605 PAIN IN BOTH LOWER EXTREMITIES: ICD-10-CM

## 2024-09-19 DIAGNOSIS — M10.9 GOUT, ARTHRITIS: ICD-10-CM

## 2024-09-19 DIAGNOSIS — R76.8 RHEUMATOID FACTOR POSITIVE: ICD-10-CM

## 2024-09-19 PROBLEM — M53.82 DECREASED ROM OF INTERVERTEBRAL DISCS OF CERVICAL SPINE: Status: RESOLVED | Noted: 2021-02-05 | Resolved: 2024-09-19

## 2024-09-19 PROBLEM — M54.40 ACUTE RIGHT-SIDED LOW BACK PAIN WITH SCIATICA: Status: RESOLVED | Noted: 2017-04-18 | Resolved: 2024-09-19

## 2024-09-19 LAB — CK SERPL-CCNC: 33 U/L (ref 20–180)

## 2024-09-19 PROCEDURE — 99204 OFFICE O/P NEW MOD 45 MIN: CPT | Mod: S$PBB,,, | Performed by: INTERNAL MEDICINE

## 2024-09-19 PROCEDURE — 99215 OFFICE O/P EST HI 40 MIN: CPT | Mod: PBBFAC | Performed by: INTERNAL MEDICINE

## 2024-09-19 PROCEDURE — 99999 PR PBB SHADOW E&M-EST. PATIENT-LVL V: CPT | Mod: PBBFAC,,, | Performed by: INTERNAL MEDICINE

## 2024-09-19 PROCEDURE — 36415 COLL VENOUS BLD VENIPUNCTURE: CPT | Performed by: INTERNAL MEDICINE

## 2024-09-19 PROCEDURE — 97161 PT EVAL LOW COMPLEX 20 MIN: CPT

## 2024-09-19 PROCEDURE — 82550 ASSAY OF CK (CPK): CPT | Performed by: INTERNAL MEDICINE

## 2024-09-19 PROCEDURE — 82085 ASSAY OF ALDOLASE: CPT | Performed by: INTERNAL MEDICINE

## 2024-09-19 PROCEDURE — 97530 THERAPEUTIC ACTIVITIES: CPT

## 2024-09-19 RX ORDER — TIZANIDINE 4 MG/1
4 TABLET ORAL NIGHTLY
Qty: 30 TABLET | Refills: 5 | Status: SHIPPED | OUTPATIENT
Start: 2024-09-19

## 2024-09-19 NOTE — PROGRESS NOTES
[facility]  Physical Therapy Initial Evaluation    Date: 9/19/2024   Name: Isael RAM [facility] Number: [id number]    Therapy Diagnosis:   Encounter Diagnoses   Name Primary?    Primary osteoarthritis of both knees     Decreased strength of lower extremity Yes     Physician: Anthony Carr III, *    Physician Orders: PT Eval and Treat   Medical Diagnosis from Referral: M17.0 (ICD-10-CM) - Primary osteoarthritis of both knees   Evaluation Date: 9/19/2024  Authorization Period Expiration: 8/27/24  Plan of Care Expiration: 11/28/24  Visit # / Visits authorized: 1/ 1   Progress Note Due: 10/19/24  FOTO: 1/ 1    Precautions: Fall    Time In: 12:05 pm  Time Out: 12:55 pm  Total Appointment Time (timed & untimed codes): 50 minutes    Subjective   Date of onset: over a year  History of current condition - Isael reports: that both of her knees have been hurting her for the last couple of months. She has gotten several injections in her knees. She got one 3 weeks ago and it did not last as long as they normally. Patient reports that the ortho wants to wait 6 months before doing surgery to do some tests and to work on strengthening. Patient reports difficulty with going up steep steps as well as getting in and out of the bath tub. Patient reports that she uses a cane now for longer distance walking, because her knees feel like they buckle 2-3 times a week. Patient reports that both of her knees swell and they have had to be aspirated before. Patient reports she has 22 steps to get into the house and has to use both rails.      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  Small joint effusion.  Severe tibiofemoral cartilage space loss osteophyte production and chondrocalcinosis.     Left: No fracture or dislocation.  Small joint effusion.  Severe tibiofemoral cartilage space loss osteophyte production and chondrocalcinosis.  Note made of sequela of previous MCL injury.     Medical History:   Past Medical History:   Diagnosis Date    Adjustment disorder 07/26/2012    Anemia 07/26/2012    AR (allergic rhinitis) 09/05/2014    Bronchitis     Fibrocystic breast disease in female 07/26/2012    GERD (gastroesophageal reflux disease) 07/26/2012    Gout     Gout, arthritis 07/26/2012    History of blood transfusion 07/26/2012    History of colonic polyps 07/26/2012    Hypercalcemia 09/20/2012    Hyperlipidemia 07/26/2012    Hypertension 07/26/2012    Nuclear sclerosis 05/02/2014    Osteoarthritis 07/26/2012    Other hyperparathyroidism 09/20/2012    Postmenopausal status 07/26/2012    PVD (peripheral vascular disease) 07/26/2012    left leg laser of vein with injection    Rheumatoid arthritis, unspecified     Stroke 1997    TIA    Superficial vein thrombosis     Transient ischemic attack 07/26/2012    Varicose veins 07/26/2012     Surgical History:   Isael RAM Viky  has a past surgical history that includes carotid endarterectomy (1997); Carotid endarterectomy (Right, 1997); Vascular surgery; Hysterectomy; Colonoscopy (N/A, 10/26/2015); ORIF finger fracture (12/18/2015); Breast surgery; broken second finger (Right, 12/2015); TIA; Cataract extraction  w/  intraocular lens implant (Right, 05/01/2018); Cataract extraction w/  intraocular lens implant (Left, 05/15/2018); Breast cyst excision (Left); Breast cyst aspiration (Bilateral); Eye surgery; Surgical removal of bunion with osteotomy of metatarsal bone (Left, 07/08/2021); Correction of hammer toe (Left, 07/08/2021); Total Reduction Mammoplasty (Bilateral); Antegrade single balloon enteroscopy (N/A, 9/18/2023); and Colonoscopy (N/A, 9/18/2023).    Medications:   Isael has a current medication list which includes the following prescription(s): acetaminophen, allopurinol, ascorbic acid (vitamin c), aspirin, b complex vitamins, betamethasone dipropionate, biotin, co-enzyme q-10, ergocalciferol, escitalopram oxalate, fluticasone propionate, folbic, indomethacin, ipratropium, linzess, loratadine, losartan, multivit,calc,mins/iron/folic, omeprazole, prednisone, rosuvastatin, spironolactone, tizanidine, and [DISCONTINUED] salsalate.    Allergies:   Review of patient's allergies indicates:   Allergen Reactions    Avelox [moxifloxacin] Palpitations     Racing heart and gets the shakes    Latex Rash    Pcn [penicillins] Rash    Sulfa (sulfonamide antibiotics) Blisters    Ciprofloxacin      Room starts to spin  and nausea and dizziness    Codeine Nausea Only     nausea and weakness      Objective     Observation: ***   Alignment:   Wound/ incision:    Posture Alignment: {AMB PT VESTIBULAR POSTURE ALIGNMENT: 16719};{AMB PT VESTIBULAR POSTURE ALIGNMENT: 45381};{AMB PT VESTIBULAR POSTURE ALIGNMENT: 23534}    Sensation: {AMB PT KNEE SENSATION:18444}    DTR: {AMB PT KNEE SENSATION:99136}    GAIT DEVIATIONS: Isael displays {AMB PT VESTIBULAR GAIT DEVIATIONS:49130};{AMB PT VESTIBULAR GAIT DEVIATIONS:26845};{AMB PT VESTIBULAR GAIT DEVIATIONS:74955};{AMB PT VESTIBULAR GAIT DEVIATIONS:75405}    Range of Motion:   Knee Left active Left Passive   Flexion 130 DNT   Extension 3 1     Knee Right active Right Passive   Flexion 110 DNT    Extension 3 1       Lower Extremity Strength   Right LE  Left LE    Knee extension: {AMB PT VESTIBULAR STRENGTH:26365} Knee extension: {AMB PT VESTIBULAR STRENGTH:87594}   Knee flexion: {AMB PT VESTIBULAR STRENGTH:22060} Knee flexion: {AMB PT VESTIBULAR STRENGTH:72569}   Hip flexion: {AMB PT VESTIBULAR STRENGTH:03047} Hip flexion: {AMB PT VESTIBULAR STRENGTH:46784}   Hip extension:  {AMB PT VESTIBULAR STRENGTH:69522} Hip extension: {AMB PT VESTIBULAR STRENGTH:75598}   Hip abduction: {AMB PT VESTIBULAR STRENGTH:23724} Hip abduction: {AMB PT VESTIBULAR STRENGTH:94649}   Hip adduction: {AMB PT VESTIBULAR STRENGTH:09787} Hip adduction {AMB PT VESTIBULAR STRENGTH:78870}   Ankle dorsiflexion: {AMB PT VESTIBULAR STRENGTH:02034} Ankle dorsiflexion: {AMB PT VESTIBULAR STRENGTH:08750}   Ankle plantarflexion: {AMB PT VESTIBULAR STRENGTH:38252} Ankle plantarflexion: {AMB PT VESTIBULAR STRENGTH:65512}       Special Tests:   Right Left   Valgus Stress Test {POSITIVE/NEGATIVE:43804} {POSITIVE/NEGATIVE:04165}   Varus Stress test {POSITIVE/NEGATIVE:06100} {POSITIVE/NEGATIVE:14102}   Lachman's test {POSITIVE/NEGATIVE:17034} {POSITIVE/NEGATIVE:61681}   Posterior Drawer {POSITIVE/NEGATIVE:89211} {POSITIVE/NEGATIVE:71999}   Anterior Drawer {POSITIVE/NEGATIVE:90401} {POSITIVE/NEGATIVE:78455}   Daniel's Test {POSITIVE/NEGATIVE:18819} {POSITIVE/NEGATIVE:08432}   Apley's Compression {POSITIVE/NEGATIVE:55515} {POSITIVE/NEGATIVE:20194}   Apley's Distraction {POSITIVE/NEGATIVE:79361} {POSITIVE/NEGATIVE:40626}   Mcmillan's compression test {POSITIVE/NEGATIVE:17995} {POSITIVE/NEGATIVE:61179}   Thessaly's Test {POSITIVE/NEGATIVE:84278} {POSITIVE/NEGATIVE:61703}   Patellar Grind Test {POSITIVE/NEGATIVE:78261} {POSITIVE/NEGATIVE:72961}     30 second sit<>stand: 6 reps with B UE support    Step down test: {POSITIVE/NEGATIVE:89652}  Squat: {POSITIVE/NEGATIVE:92610}  Single leg balance: {POSITIVE/NEGATIVE:75872}    Joint Mobility: ***     Patellar  "sup./inf:   Patellar med/lat:    Palpation: ***    Flexibility:    Westley test: R = *** ; L = ***    CMS Impairment/Limitation/Restriction for FOTO Knee Survey    Therapist reviewed FOTO scores for Isael Salmon on 9/19/2024.   FOTO documents entered into [employer] - see Media section.    Limitation Score: 25    Goal Score: 45       TREATMENT   Treatment Time In: ***  Treatment Time Out: ***  Total Treatment time separate from Evaluation: *** minutes    Isael received therapeutic exercises to develop {AMB PT PROGRESS OBJECTIVE:31857} for *** minutes including:  ***    Isael received the following manual therapy techniques: {AMB PT PROGRESS MANUAL THERAPY:90878} were applied to the: *** for *** minutes, including:  ***    Isael participated in dynamic functional therapeutic activities to improve functional performance for ***  minutes, including:  ***    Isael received hot or coldpack for *** minutes to ***.    Home Exercises and Patient Education Provided    Education provided:   - ***    Written Home Exercises Provided: {Blank single:94405::"yes","Patient instructed to cont prior HEP"}.  Exercises were reviewed and Isael was able to demonstrate them prior to the end of the session.  Isael demonstrated {Desc; good/fair/poor:22902} understanding of the education provided.     See EMR under {Blank single:23667::"Media","Patient Instructions"} for exercises provided {Blank single:64311::"9/19/2024","prior visit"}.    Assessment   Isael is a 78 y.o. female referred to outpatient Physical Therapy with a medical diagnosis of M17.0 (ICD-10-CM) - Primary osteoarthritis of both knees . Pt presents with ***    Pt prognosis is {REHAB PROGNOSIS OHS:57683}.   Pt will benefit from skilled outpatient Physical Therapy to address the deficits stated above and in the chart below, provide pt/family education, and to maximize pt's level of independence.     Plan of care discussed with patient: {YES:26871}  Pt's spiritual, " cultural and educational needs considered and patient is agreeable to the plan of care and goals as stated below:     Anticipated Barriers for therapy: ***    Medical Necessity is demonstrated by the following  History  Co-morbidities and personal factors that may impact the plan of care Co-morbidities:   Adjustment disorder, gout, history of stroke, OA, and HTN    Personal Factors:   {Personal Factors:97937}     low   Examination  Body Structures and Functions, activity limitations and participation restrictions that may impact the plan of care Body Regions:   {Body Regions:83269}    Body Systems:    {Body Systems:86114}    Participation Restrictions:   ***    Activity limitations:   Learning and applying knowledge  {Learning and applying knowledge:73912}    General Tasks and Commands  {Gen tasks and commands:87697}    Communication  {Communication:21478}    Mobility  {Mobility:63760}    Self care  {Self Care:24881}    Domestic Life  {Domestic Life:02663}    Interactions/Relationships  {Interactions/Relationships:84542}    Life Areas  {Life Areas:86281}    Community and Social Life  {Community/Social Life:00792}         Complexity: low   Clinical Presentation {Clinical Presentation :14214} low   Decision Making/ Complexity Score: low       GOALS: Short Term Goals:  4 weeks  1.Report decreased in pain at worse less than  <   / =  ***  /10  to increase tolerance for functional mobility.On going  2. Pt to improve *** range of motion by 25% to allow for improved functional mobility to allow for improvement in IADLs. .On going  3. Increased *** MMT 1/2 grade to increase tolerance for ADL and work activities.On going  4. Pt to report a ***  5. Pt to tolerate HEP to improve ROM and independence with ADL's.On going    Long Term Goals: 8 weeks  1.Report decreased in pain at worse less than  <   / =  ***  /10  to increase tolerance for functional mobility. On going  2.Pt to improve range of motion by 75% to allow for  "improved functional mobility to allow for improvement in IADLs. On going  3.Increased *** MMT 1 grade to increase tolerance for ADL and work activities.On going  4. Pt will report *** on FOTO knee survey  to demonstrate increase in LE function with every day tasks. On going  5. Pt to be Independent with HEP to improve ROM and independence with ADL's. On going    Plan   Plan of care Certification: 9/19/2024 to ***.    Outpatient Physical Therapy {NUMBERS 1-5:80182} times weekly for {0-10:01309::"0"} weeks to include the following interventions: {TX PLAN:03025}. Josh Rodriguez, PT      I CERTIFY THE NEED FOR THESE SERVICES FURNISHED UNDER THIS PLAN OF TREATMENT AND WHILE UNDER MY CARE   Physician's comments:     Physician's Signature: ___________________________________________________         "

## 2024-09-20 LAB — ALDOLASE SERPL-CCNC: 2.2 U/L (ref 1.2–7.6)

## 2024-09-21 NOTE — PROGRESS NOTES
History of present illness: 78-year-old female I had previously followed for gout.  She was last seen in 2017.  She remains on allopurinol which she gets from her primary doctor.    She comes in now because of a 6 month history of knee pain.  She has occasional swelling in the.  She has been seeing Orthopedics.  She had a prior injection in the knee in the knee.  Fluid was aspirated but not sent for studies.  This helped.  She now complains of some diffuse aching for several weeks.  The pain is bad at night.  She has some morning pain.  She has 2 hours of morning stiffness.  She has some pain with activity she complains of some swelling in the ankles and wrists.  She has had no erythema or increased warmth.    She has been taking ibuprofen 400 mg twice daily with some relief.  Topical medications have helped.  She is starting physical therapy.    She has had no unexplained fevers.  She complains of occipital headache.  She has a chronic rash diagnosed as erythema annulare.  She has no conjunctivitis, oral ulcers, dry eye or mouth, Raynaud's phenomena, pleurisy, vaginal discharge or ulcers, chronic or bloody diarrhea.  She has no numbness or tingling.    Physical examination:  ENT: Adequate tears in saliva.  No conjunctivitis or oral ulcers  Musculoskeletal:  She has decreased range of motion of the cervical spine.  She has decreased range of motion of the shoulders bilaterally, worse on the right than on the left.  She has no soft tissue swelling.  She has some tenderness to palpation.  Elbows and wrists are unremarkable.  She has bony hypertrophy of the 1st CMC bilaterally, tender on the right.  She has negative Finkelstein's test.  She has no synovitis.  She has tenderness to palpation of the lumbar spine.  She has decreased range of motion.  Hips are unremarkable.    She has pain on range of motion of the right knee.  Left knee is unremarkable.  She has no effusion.  Ankles and small joints the feet are  unremarkable  Laboratory:  Her rheumatoid factor is positive at 76.  It is been positive 16 years ago.  She had negative GORDON and CCP antibody.  CRP was elevated at 28 but she had a normal sed rate  Radiology:  X-ray of the knee shows osteoarthritic changes and also chondrocalcinosis    Assessment:   1. She has a history of gouty arthritis but has had no recent gout attacks.    2. She has osteoarthritis of the knees.  She also has chondrocalcinosis  3.  I wonder if the recent knee swelling was actually pseudogout.  This could account for the elevated CRP   4. She has a positive rheumatoid factor but negative CCP antibody.  Clinically she has no evidence to suggest rheumatoid arthritis.  Patients with gout can positive rheumatoid factor.  It could also be associated with her erythema annulare.    Plans:   1. Laboratory studies obtained   2. Continue Advil.  She can increase it up to 3 times daily   3.  I placed her on tizanidine 4 mg at bedtime   4. Return in 4 months

## 2024-09-23 PROBLEM — R29.898 DECREASED STRENGTH OF LOWER EXTREMITY: Status: ACTIVE | Noted: 2024-09-23

## 2024-09-23 NOTE — PLAN OF CARE
[facility]  Physical Therapy Initial Evaluation    Date: 9/19/2024   Name: Isael RAM [facility] Number: [id number]    Therapy Diagnosis:   Encounter Diagnoses   Name Primary?    Primary osteoarthritis of both knees     Decreased strength of lower extremity Yes     Physician: Anthony Carr III, *    Physician Orders: PT Eval and Treat   Medical Diagnosis from Referral: M17.0 (ICD-10-CM) - Primary osteoarthritis of both knees   Evaluation Date: 9/19/2024  Authorization Period Expiration: 8/27/24  Plan of Care Expiration: 11/28/24  Visit # / Visits authorized: 1/ 1   Progress Note Due: 10/19/24  FOTO: 1/3    Precautions: Fall    Time In: 12:05 pm  Time Out: 12:55 pm  Total Appointment Time (timed & untimed codes): 50 minutes    Subjective   Date of onset: over a year  History of current condition - Isael reports: that both of her knees have been hurting her for the last couple of months. She has gotten several injections in her knees. She got one 3 weeks ago and it did not last as long as they normally do. Patient reports that the ortho wants to wait 6 months before doing surgery to do some tests and to work on strengthening. Patient reports difficulty with going up steep steps as well as getting in and out of the bath tub. Patient reports that she uses a cane now for longer distance walking, because her knees feel like they buckle 2-3 times a week. Patient reports that both of her knees swell and they have had to be aspirated before. Patient reports she has 22 steps to get into the house and has to use both rails.      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  Small joint effusion.  Severe tibiofemoral cartilage space loss osteophyte production and chondrocalcinosis.     Left: No fracture or dislocation.  Small joint effusion.  Severe tibiofemoral cartilage space loss osteophyte production and chondrocalcinosis.  Note made of sequela of previous MCL injury.     Medical History:   Past Medical History:   Diagnosis Date    Adjustment disorder 07/26/2012    Anemia 07/26/2012    AR (allergic rhinitis) 09/05/2014    Bronchitis     Fibrocystic breast disease in female 07/26/2012    GERD (gastroesophageal reflux disease) 07/26/2012    Gout     Gout, arthritis 07/26/2012    History of blood transfusion 07/26/2012    History of colonic polyps 07/26/2012    Hypercalcemia 09/20/2012    Hyperlipidemia 07/26/2012    Hypertension 07/26/2012    Nuclear sclerosis 05/02/2014    Osteoarthritis 07/26/2012    Other hyperparathyroidism 09/20/2012    Postmenopausal status 07/26/2012    PVD (peripheral vascular disease) 07/26/2012    left leg laser of vein with injection    Rheumatoid arthritis, unspecified     Stroke 1997    TIA    Superficial vein thrombosis     Transient ischemic attack 07/26/2012    Varicose veins 07/26/2012     Surgical History:   Isael RAM Viky  has a past surgical history that includes carotid endarterectomy (1997); Carotid endarterectomy (Right, 1997); Vascular surgery; Hysterectomy; Colonoscopy (N/A, 10/26/2015); ORIF finger fracture (12/18/2015); Breast surgery; broken second finger (Right, 12/2015); TIA; Cataract extraction  w/  intraocular lens implant (Right, 05/01/2018); Cataract extraction w/  intraocular lens implant (Left, 05/15/2018); Breast cyst excision (Left); Breast cyst aspiration (Bilateral); Eye surgery; Surgical removal of bunion with osteotomy of metatarsal bone (Left, 07/08/2021); Correction of hammer toe (Left, 07/08/2021); Total Reduction Mammoplasty (Bilateral); Antegrade single balloon enteroscopy (N/A, 9/18/2023); and Colonoscopy (N/A, 9/18/2023).    Medications:   Isael has a current medication list which includes the following prescription(s): acetaminophen, allopurinol, ascorbic acid (vitamin c), aspirin, b complex vitamins, betamethasone dipropionate, biotin, co-enzyme q-10, ergocalciferol, escitalopram oxalate, fluticasone propionate, folbic, indomethacin, ipratropium, linzess, loratadine, losartan, multivit,calc,mins/iron/folic, omeprazole, prednisone, rosuvastatin, spironolactone, tizanidine, and [DISCONTINUED] salsalate.    Allergies:   Review of patient's allergies indicates:   Allergen Reactions    Avelox [moxifloxacin] Palpitations     Racing heart and gets the shakes    Latex Rash    Pcn [penicillins] Rash    Sulfa (sulfonamide antibiotics) Blisters    Ciprofloxacin      Room starts to spin  and nausea and dizziness    Codeine Nausea Only     nausea and weakness      Objective     Sensation: intact to light touch    DTR: normal    GAIT DEVIATIONS: Isael displays decreased weight shift;occasional unsteady gait;antalgic gait;decreased weight shift    Range of Motion:   Knee Left active Left Passive   Flexion 130 DNT   Extension 3 1     Knee Right active Right Passive   Flexion 110 DNT   Extension 3 1       Lower Extremity Strength   Right LE  Left LE    Knee extension: 4/5 Knee extension: 4/5   Knee flexion: 4/5 Knee flexion: 4/5   Hip flexion: 4/5 Hip flexion: 4/5   Hip extension:  4-/5 Hip extension: 4-/5   Hip abduction: 4-/5 Hip abduction: 4-/5   Hip adduction: 4-/5 Hip adduction 4-/5   Ankle  dorsiflexion: 4+/5 Ankle dorsiflexion: 4+/5   Ankle plantarflexion: 4+/5 Ankle plantarflexion: 4+/5     Special Tests:  30 second sit<>stand: 6 reps with B UE support  Single leg balance: not able to perform without 1 UE support  SLR: positive on R side    Joint Mobility: hypomobile to both knees, R more than left      Palpation: TTP to B knee joints, more so to R knee as well as quad trigger points, TTP to R gastroc/sciatic nerve glide    Flexibility:    Westley test: R = pos ; L = pos    CMS Impairment/Limitation/Restriction for FOTO Knee Survey    Therapist reviewed FOTO scores for Isael Salmon on 9/19/2024.   FOTO documents entered into [employer] - see Media section.    Limitation Score: 25    Goal Score: 45     TREATMENT   Treatment Time In: 12:40 pm  Treatment Time Out: 12:55 pm  Total Treatment time separate from Evaluation: 15 minutes    Next visit: HEP review (nerve glide, open book, clam shell, quad set) nustep, heel slides, bridges, SAQ, standing glut med    Isael participated in dynamic functional therapeutic activities to improve functional performance for 15  minutes, including:  HEP review  Rehab progression/potential    Home Exercises and Patient Education Provided    Education provided:   - See above    Written Home Exercises Provided: yes.  Exercises were reviewed and Isael was able to demonstrate them prior to the end of the session.  Isael demonstrated good  understanding of the education provided.     See EMR under Patient Instructions for exercises provided 9/19/2024.    Assessment   Isael is a 78 y.o. female referred to outpatient Physical Therapy with a medical diagnosis of M17.0 (ICD-10-CM) - Primary osteoarthritis of both knees . Pt presents with decreased pain/adverse symptoms, increased neural tension, impaired ROM, impairment in gait, decreased strength, and decreased tolerance to activity. Patient's symptoms are consistent with sciatic neural tension as well as knee OA related  symptoms.    Pt prognosis is Good.   Pt will benefit from skilled outpatient Physical Therapy to address the deficits stated above and in the chart below, provide pt/family education, and to maximize pt's level of independence.     Plan of care discussed with patient: Yes  Pt's spiritual, cultural and educational needs considered and patient is agreeable to the plan of care and goals as stated below:     Anticipated Barriers for therapy: chronicity of symptoms and pain in multiple joints    Medical Necessity is demonstrated by the following  History  Co-morbidities and personal factors that may impact the plan of care Co-morbidities:   Adjustment disorder, gout, history of stroke, OA, and HTN    Personal Factors:   no deficits     moderate   Examination  Body Structures and Functions, activity limitations and participation restrictions that may impact the plan of care Body Regions:   back  lower extremities    Body Systems:    ROM  strength  balance  gait  transfers  transitions  motor control  motor learning    Participation Restrictions:   ADLs, iADLs, and wanted activities    Activity limitations:   Learning and applying knowledge  no deficits    General Tasks and Commands  no deficits    Communication  no deficits    Mobility  lifting and carrying objects  walking  driving (bike, car, motorcycle)    Self care  looking after one's health    Domestic Life  shopping  cooking  doing house work (cleaning house, washing dishes, laundry)  assisting others    Interactions/Relationships  no deficits    Life Areas  no deficits    Community and Social Life  community life  recreation and leisure         Complexity: low   Clinical Presentation stable and uncomplicated low   Decision Making/ Complexity Score: low       GOALS: Short Term Goals:  4-5 weeks  1.Report decreased in pain at worse less than  <   / =  4  /10  to increase tolerance for functional mobility.On going  2. Pt to improve R knee range of motion by 75% to  allow for improved functional mobility to allow for improvement in IADLs. .On going  3. Increased B hip/LE MMT 1/2 grade to increase tolerance for ADL and work activities.On going  4. Pt to tolerate HEP to improve ROM and independence with ADL's.On going    Long Term Goals: 8-10 weeks  1.Report decreased in pain at worse less than  <   / =  2  /10  to increase tolerance for functional mobility. On going  2.Pt to improve range of motion by 90% to allow for improved functional mobility to allow for improvement in IADLs. On going  3.Increased B hip/LE MMT 1 grade to increase tolerance for ADL and work activities.On going  4. Pt will report 45 or greater on FOTO knee survey  to demonstrate increase in LE function with every day tasks. On going  5. Pt to be Independent with HEP to improve ROM and independence with ADL's. On going    Plan   Plan of care Certification: 9/19/2024 to 11/28/24.    Outpatient Physical Therapy 1-2 times weekly for 10 weeks to include the following interventions: Electrical Stimulation TENS/IFC/NMEs, Gait Training, Manual Therapy, Moist Heat/ Ice, Neuromuscular Re-ed, Self Care, Therapeutic Activities, and Therapeutic Exercise. Dry needjose Rodriguez, PT      I CERTIFY THE NEED FOR THESE SERVICES FURNISHED UNDER THIS PLAN OF TREATMENT AND WHILE UNDER MY CARE   Physician's comments:     Physician's Signature: ___________________________________________________

## 2024-09-24 ENCOUNTER — CLINICAL SUPPORT (OUTPATIENT)
Dept: REHABILITATION | Facility: OTHER | Age: 79
End: 2024-09-24
Payer: MEDICARE

## 2024-09-24 DIAGNOSIS — R29.898 DECREASED STRENGTH OF LOWER EXTREMITY: Primary | ICD-10-CM

## 2024-09-24 PROCEDURE — 97110 THERAPEUTIC EXERCISES: CPT

## 2024-09-24 PROCEDURE — 97140 MANUAL THERAPY 1/> REGIONS: CPT

## 2024-09-24 NOTE — PROGRESS NOTES
"[facility]   Physical Therapy Treatment Note     Name: Isael RAM [facility] Number: [id number]    Therapy Diagnosis:   Encounter Diagnosis   Name Primary?    Decreased strength of lower extremity Yes     Physician: Anthony Carr III, *    Visit Date: 9/24/2024    Physician Orders: PT Eval and Treat   Medical Diagnosis from Referral: M17.0 (ICD-10-CM) - Primary osteoarthritis of both knees   Evaluation Date: 9/19/2024  Authorization Period Expiration: 8/27/24  Plan of Care Expiration: 11/28/24  Visit # / Visits authorized: 1/ 1   Progress Note Due: 10/19/24  FOTO: 1/3    Precautions: Standard    Time In: 2:00 pm  Time Out: 3:00 pm  Total Billable Time: 55 minutes      SUBJECTIVE     Pt reports: ***.  She was compliant with home exercise program.  Response to previous treatment: No change   Functional change: None    Pain: {0-10:30484::"0"}/10  Location: {RIGHT/LEFT/BILATERAL:75399} {LOCATION ON BODY:66127}     OBJECTIVE     Objective Measures updated at progress report unless specified.       TREATMENT     Isael received the treatments listed below:        therapeutic activities to improve functional performance and functional mobility for ***  minutes, including:  ***    received therapeutic exercises to develop strength and ROM for ***  minutes including:  (nerve glide, open book, clam shell, quad set) nustep, heel slides, bridges, SAQ, standing glut med       neuromuscular re-education activities to improve: Balance, Proprioception, Posture, and muscles motor control for *** minutes. The following activities were included:  ***    received the following manual therapy techniques: Soft tissue Mobilization were applied to the: *** for *** minutes, including:      PATIENT EDUCATION AND HOME EXERCISES     Home Exercises Provided and Patient Education Provided     Education provided:       Written Home Exercises Provided: Patient instructed to cont prior HEP. Exercises were reviewed " and Isael was able to demonstrate them prior to the end of the session.  Isael demonstrated good  understanding of the education provided. See EMR under Patient Instructions for exercises provided during therapy sessions    ASSESSMENT     ***    Isael Is progressing well towards her goals.   Pt prognosis is Good.     Pt will continue to benefit from skilled outpatient physical therapy to address the deficits listed in the problem list box on initial evaluation, provide pt/family education and to maximize pt's level of independence in the home and community environment.     Pt's spiritual, cultural and educational needs considered and pt agreeable to plan of care and goals.     Anticipated barriers to physical therapy: None      GOALS: Short Term Goals:  4-5 weeks  1.Report decreased in pain at worse less than  <   / =  4  /10  to increase tolerance for functional mobility.On going  2. Pt to improve R knee range of motion by 75% to allow for improved functional mobility to allow for improvement in IADLs. .On going  3. Increased B hip/LE MMT 1/2 grade to increase tolerance for ADL and work activities.On going  4. Pt to tolerate HEP to improve ROM and independence with ADL's.On going     Long Term Goals: 8-10 weeks  1.Report decreased in pain at worse less than  <   / =  2  /10  to increase tolerance for functional mobility. On going  2.Pt to improve range of motion by 90% to allow for improved functional mobility to allow for improvement in IADLs. On going  3.Increased B hip/LE MMT 1 grade to increase tolerance for ADL and work activities.On going  4. Pt will report 45 or greater on FOTO knee survey  to demonstrate increase in LE function with every day tasks. On going  5. Pt to be Independent with HEP to improve ROM and independence with ADL's. On going    PLAN     Plan of care Certification: 9/19/2024 to 11/28/24.        Carlos Sen, PT

## 2024-09-24 NOTE — PROGRESS NOTES
"[facility]   Physical Therapy Treatment Note     Name: Isael RAM [facility] Number: [id number]    Therapy Diagnosis:   Encounter Diagnosis   Name Primary?    Decreased strength of lower extremity Yes     Physician: Anthony Carr III, *    Visit Date: 9/24/2024    Physician Orders: PT Eval and Treat   Medical Diagnosis from Referral: M17.0 (ICD-10-CM) - Primary osteoarthritis of both knees   Evaluation Date: 9/19/2024  Authorization Period Expiration: 8/27/24  Plan of Care Expiration: 11/28/24  Visit # / Visits authorized: 1/ 10   Progress Note Due: 10/19/24  FOTO: 1/3    Precautions: Standard    Time In: 2:00 pm  Time Out: 3:00 pm  Total Billable Time: 55 minutes      SUBJECTIVE     Pt reports: her knees are bothering her today. It as a struggle to walk from the parking garage   She was compliant with home exercise program.  Response to previous treatment: No change   Functional change: None    Pain: 7/10  Location: bilateral knee      OBJECTIVE     Objective Measures updated at progress report unless specified.       TREATMENT     Isael received the treatments listed below:        therapeutic activities to improve functional performance and functional mobility for 0  minutes, including:      received therapeutic exercises to develop strength and ROM for 45  minutes including:  -Nu-step for joint nutrition x 5 minutes  -LTR 10x5"  -Clamshells 2x10 RTB  -Open books 10x5"  -Quad set 10x10"  -Heel slides 10x5"  -SAQ 20x5"  -SLR w/ stability ball press down x 10 B  -Sciatic nerve glide x20 B  -Heel raises x20  -Standing hip abduction x20      neuromuscular re-education activities to improve: Balance, Proprioception, Posture, and muscles motor control for 3 minutes. The following activities were included:  -TrA activation 10x5""     received the following manual therapy techniques: Soft tissue Mobilization were applied to the:  for 10 minutes, including:  Bilateral tib femoral " distraction  Bilateral PF JM    PATIENT EDUCATION AND HOME EXERCISES     Home Exercises Provided and Patient Education Provided     Education provided:       Written Home Exercises Provided: Patient instructed to cont prior HEP. Exercises were reviewed and Isael was able to demonstrate them prior to the end of the session.  Isael demonstrated good  understanding of the education provided. See EMR under Patient Instructions for exercises provided during therapy sessions    ASSESSMENT    Patient performed new therapeutic exercise well with occasional pain. This could be modified by education pt on starting exercise in pain free range. Continue current POC working on LE strengthening and dynamic stability to help decrease risk of falling and improve overall quality of life    Isael Is progressing well towards her goals.   Pt prognosis is Good.     Pt will continue to benefit from skilled outpatient physical therapy to address the deficits listed in the problem list box on initial evaluation, provide pt/family education and to maximize pt's level of independence in the home and community environment.     Pt's spiritual, cultural and educational needs considered and pt agreeable to plan of care and goals.     Anticipated barriers to physical therapy: None      GOALS: Short Term Goals:  4-5 weeks  1.Report decreased in pain at worse less than  <   / =  4  /10  to increase tolerance for functional mobility.On going  2. Pt to improve R knee range of motion by 75% to allow for improved functional mobility to allow for improvement in IADLs. .On going  3. Increased B hip/LE MMT 1/2 grade to increase tolerance for ADL and work activities.On going  4. Pt to tolerate HEP to improve ROM and independence with ADL's.On going     Long Term Goals: 8-10 weeks  1.Report decreased in pain at worse less than  <   / =  2  /10  to increase tolerance for functional mobility. On going  2.Pt to improve range of motion by 90% to allow for  improved functional mobility to allow for improvement in IADLs. On going  3.Increased B hip/LE MMT 1 grade to increase tolerance for ADL and work activities.On going  4. Pt will report 45 or greater on FOTO knee survey  to demonstrate increase in LE function with every day tasks. On going  5. Pt to be Independent with HEP to improve ROM and independence with ADL's. On going    PLAN     Plan of care Certification: 9/19/2024 to 11/28/24.        Carlos Sen, PT

## 2024-09-25 NOTE — PROGRESS NOTES
[facility]   Physical Therapy Treatment Note     Name: Isael Salmon  Clinic Number: [id number]    Therapy Diagnosis:   Encounter Diagnosis   Name Primary?    Decreased strength of lower extremity Yes       Physician: Anthony Carr III, *    Visit Date: 9/26/2024    Physician Orders: PT Eval and Treat   Medical Diagnosis from Referral: M17.0 (ICD-10-CM) - Primary osteoarthritis of both knees   Evaluation Date: 9/19/2024  Authorization Period Expiration: 12/31/24  Plan of Care Expiration: 11/28/24  Visit # / Visits authorized: 3/ 10   Progress Note Due: 10/19/24  FOTO: 1/3    Precautions: Standard    Time In: 1:45 pm (patient late arrival)   Time Out: 2:30 pm  Total Billable Time: 30 minutes  1:1   Total Treatment Time:  45  min        SUBJECTIVE     Pt reports: no sig change in typical sx since last visit.    Patient endorse continue LBP /c difficulty laying supine for HEP.  Patient also note feeling need to take muscle relaxer to sleep and report difficulty /c bed mobility.         She was moderately compliant with home exercise program.  Response to previous treatment: well /c no c/o of excess discomfort   Functional change: None    Pain: 4/10   Location: bilateral knees (right side worse than left side)     OBJECTIVE     Objective Measures updated at progress report unless specified.        CMS Impairment/Limitation/Restriction for FOTO Knee Survey     Therapist reviewed FOTO scores for Isael Salmon on 9/19/2024.   FOTO documents entered into [employer] - see Media section.     INTAKE Score: 25     Goal Score: 45        TREATMENT     Isael received the treatments listed below:        therapeutic activities to improve functional performance and functional mobility for 00  minutes, including:    received therapeutic exercises to develop strength and ROM for 45 minutes including:    NuStep 5 min for joint nutrition     Quad set  x 5 5 sec hold ->  SAQ 2 x 10   Clamshell x 20 Y TB    Heel slides x 20 each side cue for full range and controlled eccentric slide out  Bridges 2  x 10     NP:   Standing hip AB   Seated Sciatic Nerve glide    neuromuscular re-education activities to improve: Balance, Proprioception, Posture, and muscles motor control for 00 minutes. The following activities were included:      received the following manual therapy techniques: Soft tissue Mobilization were applied to the: 00 for 00 minutes, including:      PATIENT EDUCATION AND HOME EXERCISES     Home Exercises Provided and Patient Education Provided     Education provided:   - Importance of daily HEP for safe tx progressions   -Discussed exertion scale, slow progressive resistance protocol to promote safe and healthy strengthening of supportive musculature  by performing 15-20 reps, 7 sec per rep, and increasing weight by 5 % at 20 reps only if there ex done safely, slowly, using correct musculature, exertion and no pain.  Patient expressed understanding.      Written Home Exercises Provided: Patient instructed to cont prior HEP. Exercises were reviewed and Isael was able to demonstrate them prior to the end of the session.  Isael demonstrated good  understanding of the education provided. See EMR under Patient Instructions for exercises provided during therapy sessions    ASSESSMENT     Patient subjective report indicate LB sx equally as limiting to functionality as B knee.  Patient educated that HEP can be affective for both pain concerns.  Patient note of HEP difficulty lead to tx focus on HEP for safe technique and inc participation at home.  Patient /c late arrival leading to min options for exercise variety however did attempt progressions via inc reps.  Patient complete /s inc discomfort indicating appropriateness of continue performance in prep for transition to standing exercises.       Isael Is progressing fair towards her goals.   Pt prognosis is Good.     Pt will continue to benefit from skilled  outpatient physical therapy to address the deficits listed in the problem list box on initial evaluation, provide pt/family education and to maximize pt's level of independence in the home and community environment.     Pt's spiritual, cultural and educational needs considered and pt agreeable to plan of care and goals.     Anticipated barriers to physical therapy: None      GOALS: Short Term Goals:  4-5 weeks  1.Report decreased in pain at worse less than  <   / =  4  /10  to increase tolerance for functional mobility.On going  2. Pt to improve R knee range of motion by 75% to allow for improved functional mobility to allow for improvement in IADLs. .On going  3. Increased B hip/LE MMT 1/2 grade to increase tolerance for ADL and work activities.On going  4. Pt to tolerate HEP to improve ROM and independence with ADL's.On going     Long Term Goals: 8-10 weeks  1.Report decreased in pain at worse less than  <   / =  2  /10  to increase tolerance for functional mobility. On going  2.Pt to improve range of motion by 90% to allow for improved functional mobility to allow for improvement in IADLs. On going  3.Increased B hip/LE MMT 1 grade to increase tolerance for ADL and work activities.On going  4. Pt will report 45 or greater on FOTO knee survey  to demonstrate increase in LE function with every day tasks. On going  5. Pt to be Independent with HEP to improve ROM and independence with ADL's. On going    PLAN     Plan of care Certification: 9/19/2024 to 11/28/24.        James Zelaya, PT

## 2024-09-26 ENCOUNTER — CLINICAL SUPPORT (OUTPATIENT)
Dept: REHABILITATION | Facility: OTHER | Age: 79
End: 2024-09-26
Payer: MEDICARE

## 2024-09-26 DIAGNOSIS — R29.898 DECREASED STRENGTH OF LOWER EXTREMITY: Primary | ICD-10-CM

## 2024-09-26 PROCEDURE — 97110 THERAPEUTIC EXERCISES: CPT

## 2024-10-01 NOTE — PROGRESS NOTES
"[facility]   Physical Therapy Treatment Note     Name: Isael Salmon  Clinic Number: [id number]    Therapy Diagnosis:   Encounter Diagnosis   Name Primary?    Decreased strength of lower extremity Yes         Physician: Anthony Carr III, *    Visit Date: 10/2/2024    Physician Orders: PT Eval and Treat   Medical Diagnosis from Referral: M17.0 (ICD-10-CM) - Primary osteoarthritis of both knees   Evaluation Date: 9/19/2024  Authorization Period Expiration: 12/31/24  Plan of Care Expiration: 11/28/24  Visit # / Visits authorized: 3/ 10   Progress Note Due: 10/19/24  FOTO: 1/3    Precautions: Standard    Time In: 1230 PM   Time Out: 130 PM   Total Billable Time: 30 minutes  1:1   Total Treatment Time:  60  min        SUBJECTIVE     Pt reports: She is hurting behind the knees today. Both knees are hurting today. She forgot her cane and it is making walking more difficult. She also suffers from sciatica.       She was moderately compliant with home exercise program.  Response to previous treatment: well /c no c/o of excess discomfort   Functional change: None    Pain: 8/10   Location: bilateral knees (right side worse than left side)     OBJECTIVE     Objective Measures updated at progress report unless specified.        CMS Impairment/Limitation/Restriction for FOTO Knee Survey     Therapist reviewed FOTO scores for Isael Salmon on 9/19/2024.   FOTO documents entered into [employer] - see Media section.     INTAKE Score: 25     Goal Score: 45        TREATMENT     Isael received the treatments listed below:      received therapeutic exercises to develop strength and ROM for 60 minutes including:    -Nu-step for joint nutrition x 5 minutes    Seated tailgaters 5# on both knee x 3'  -LTR 10x5"  -Clamshells 2x10 RTB  -Open books 10x5"  -Heel slides 10x5"  -SAQ 20x5"  -SLR w/ stability ball press down x 10 B  -Sciatic nerve glide x20 B  -Heel raises x20  -Standing hip abduction x20      "   neuromuscular re-education activities to improve: Balance, Proprioception, Posture, and muscles motor control for 3 minutes. The following activities were included:  -TrA activation 10x5"      received the following manual therapy techniques: Soft tissue Mobilization were applied to the:  for 00 minutes, including:  Bilateral tib femoral distraction  Bilateral PF JM    therapeutic activities to improve functional performance and functional mobility for 00  minutes, including:      PATIENT EDUCATION AND HOME EXERCISES     Home Exercises Provided and Patient Education Provided     Education provided:   - Importance of daily HEP for safe tx progressions   -Discussed exertion scale, slow progressive resistance protocol to promote safe and healthy strengthening of supportive musculature  by performing 15-20 reps, 7 sec per rep, and increasing weight by 5 % at 20 reps only if there ex done safely, slowly, using correct musculature, exertion and no pain.  Patient expressed understanding.      Written Home Exercises Provided: Patient instructed to cont prior HEP. Exercises were reviewed and Isael was able to demonstrate them prior to the end of the session.  Isael demonstrated good  understanding of the education provided. See EMR under Patient Instructions for exercises provided during therapy sessions    ASSESSMENT     Patient presents to therapy with marvin levels of pain. Reduction in symptoms following seated tibial distraction with weights. Continued previously prescribed with appropriate challenged.       Isael Is progressing fair towards her goals.   Pt prognosis is Good.     Pt will continue to benefit from skilled outpatient physical therapy to address the deficits listed in the problem list box on initial evaluation, provide pt/family education and to maximize pt's level of independence in the home and community environment.     Pt's spiritual, cultural and educational needs considered and pt agreeable to  plan of care and goals.     Anticipated barriers to physical therapy: None      GOALS: Short Term Goals:  4-5 weeks  1.Report decreased in pain at worse less than  <   / =  4  /10  to increase tolerance for functional mobility.On going  2. Pt to improve R knee range of motion by 75% to allow for improved functional mobility to allow for improvement in IADLs. .On going  3. Increased B hip/LE MMT 1/2 grade to increase tolerance for ADL and work activities.On going  4. Pt to tolerate HEP to improve ROM and independence with ADL's.On going     Long Term Goals: 8-10 weeks  1.Report decreased in pain at worse less than  <   / =  2  /10  to increase tolerance for functional mobility. On going  2.Pt to improve range of motion by 90% to allow for improved functional mobility to allow for improvement in IADLs. On going  3.Increased B hip/LE MMT 1 grade to increase tolerance for ADL and work activities.On going  4. Pt will report 45 or greater on FOTO knee survey  to demonstrate increase in LE function with every day tasks. On going  5. Pt to be Independent with HEP to improve ROM and independence with ADL's. On going    PLAN     Plan of care Certification: 9/19/2024 to 11/28/24.    Sandeep Jenkins, PTA

## 2024-10-02 ENCOUNTER — CLINICAL SUPPORT (OUTPATIENT)
Dept: REHABILITATION | Facility: OTHER | Age: 79
End: 2024-10-02
Payer: MEDICARE

## 2024-10-02 DIAGNOSIS — R29.898 DECREASED STRENGTH OF LOWER EXTREMITY: Primary | ICD-10-CM

## 2024-10-02 PROCEDURE — 97110 THERAPEUTIC EXERCISES: CPT | Mod: CQ

## 2024-10-02 NOTE — TELEPHONE ENCOUNTER
Care Due:                  Date            Visit Type   Department     Provider  --------------------------------------------------------------------------------                                EP -                              PRIMARY      [facility] INTERNAL  Last Visit: 08-      CARE ([facility])   MEDICINE       MITUL MARSHALL                              EP                               PRIMARY      [facility] INTERNAL  Next Visit: 11-      CARE ([facility])   MEDICINE       MITUL MARSHALL                                                            Last  Test          Frequency    Reason                     Performed    Due Date  --------------------------------------------------------------------------------    Lipid Panel.  12 months..  rosuvastatin.............  09- 09-    [facility] Embedded Care Due Messages. Reference number: [id number].   10/02/2024 5:47:46 PM CDT

## 2024-10-03 RX ORDER — BETAMETHASONE DIPROPIONATE 0.5 MG/G
CREAM TOPICAL 2 TIMES DAILY
Qty: 90 G | Refills: 0 | Status: SHIPPED | OUTPATIENT
Start: 2024-10-03

## 2024-10-03 NOTE — TELEPHONE ENCOUNTER
Refill Routing Note   Medication(s) are not appropriate for processing by [facility] for the following reason(s):        Due for refill >6 months ago    [facility] action(s):  Defer     Requires labs : Yes             Appointments  past 12m or future 3m with PCP    Date Provider   Last Visit   8/29/2024 Janett Montoya MD   Next Visit   11/12/2024 Janett Montoya MD   ED visits in past 90 days: 0        Note composed:1:20 PM 10/03/2024

## 2024-10-04 ENCOUNTER — CLINICAL SUPPORT (OUTPATIENT)
Dept: REHABILITATION | Facility: OTHER | Age: 79
End: 2024-10-04
Payer: MEDICARE

## 2024-10-04 DIAGNOSIS — R29.898 DECREASED STRENGTH OF LOWER EXTREMITY: Primary | ICD-10-CM

## 2024-10-04 PROCEDURE — 97110 THERAPEUTIC EXERCISES: CPT

## 2024-10-04 NOTE — PROGRESS NOTES
"[facility]   Physical Therapy Treatment Note     Name: Isael Salmon  Clinic Number: [id number]    Therapy Diagnosis:   Encounter Diagnosis   Name Primary?    Decreased strength of lower extremity Yes       Physician: Anthony Carr III, *    Visit Date: 10/4/2024    Physician Orders: PT Eval and Treat   Medical Diagnosis from Referral: M17.0 (ICD-10-CM) - Primary osteoarthritis of both knees   Evaluation Date: 9/19/2024  Authorization Period Expiration: 12/31/24  Plan of Care Expiration: 11/28/24  Visit # / Visits authorized: 4/ 10   Progress Note Due: 10/19/24  FOTO: 1/3    Precautions: Standard    Time In: 1:30 pm  Time Out: 2:25 pm   Total Billable Time: 30 minutes  1:1   Total Treatment Time: 55  min      SUBJECTIVE     Pt reports:  that she has good and bad days. Is having a better day today.    She was moderately compliant with home exercise program.  Response to previous treatment: good  Functional change: improvement in overall symptoms    Pain: 6/10   Location: bilateral knees (right side worse than left side)     OBJECTIVE     Objective Measures updated at progress report unless specified.      CMS Impairment/Limitation/Restriction for FOTO Knee Survey     Therapist reviewed FOTO scores for Isael Salmon on 9/19/2024.   FOTO documents entered into [employer] - see Media section.     INTAKE Score: 25     Goal Score: 45      TREATMENT     Isael received the treatments listed below:      Therapeutic exercises to develop strength and ROM for 45 minutes including:  Nu-step for joint nutrition x 6 minutes  Seated tailgaters 6# on right knee x 3'  LTR 10x5"  Clamshells 2x10 RTB- NP  Open books 10x5"- NP  Heel slides 2 minutes with strap  SAQ 2 x 10 3#  Double leg press 70# 2 x 8 reps  SLR w/ stability ball press down x 10 B- NP  Sciatic nerve glide x 15 each side  Heel raises x20- NP  Standing glut med x 15 reps Y TB 3 second holds     Neuromuscular re-education activities to " improve: Balance, Proprioception, Posture, and muscles motor control for 0 minutes. The following activities were included:  NA today    -TrA activation 10x5"      Received the following manual therapy techniques: Soft tissue Mobilization were applied to the:  for 5 minutes, including:  R tib femoral distraction in sitting  STM to R quad    Therapeutic activities to improve functional performance and functional mobility for 0 minutes, including:  NA today    Moist heat to R knee x 0 minutes in supine over bolster.     PATIENT EDUCATION AND HOME EXERCISES     Home Exercises Provided and Patient Education Provided     Education provided:   - Importance of daily HEP for safe tx progressions     Written Home Exercises Provided: Patient instructed to cont prior HEP. Exercises were reviewed and Isael was able to demonstrate them prior to the end of the session.  Isael demonstrated good  understanding of the education provided. See EMR under Patient Instructions for exercises provided during therapy sessions    ASSESSMENT   Patient demonstrated better tolerance to strengthening work this session. Improvement in R knee symptoms by the end of the session and improvement in gait.    Isael Is progressing fair towards her goals.   Pt prognosis is Good.     Pt will continue to benefit from skilled outpatient physical therapy to address the deficits listed in the problem list box on initial evaluation, provide pt/family education and to maximize pt's level of independence in the home and community environment.     Pt's spiritual, cultural and educational needs considered and pt agreeable to plan of care and goals.     Anticipated barriers to physical therapy: None      GOALS: Short Term Goals:  4-5 weeks  1.Report decreased in pain at worse less than  <   / =  4  /10  to increase tolerance for functional mobility.On going  2. Pt to improve R knee range of motion by 75% to allow for improved functional mobility to allow for  improvement in IADLs. .On going  3. Increased B hip/LE MMT 1/2 grade to increase tolerance for ADL and work activities.On going  4. Pt to tolerate HEP to improve ROM and independence with ADL's.On going     Long Term Goals: 8-10 weeks  1.Report decreased in pain at worse less than  <   / =  2  /10  to increase tolerance for functional mobility. On going  2.Pt to improve range of motion by 90% to allow for improved functional mobility to allow for improvement in IADLs. On going  3.Increased B hip/LE MMT 1 grade to increase tolerance for ADL and work activities.On going  4. Pt will report 45 or greater on FOTO knee survey  to demonstrate increase in LE function with every day tasks. On going  5. Pt to be Independent with HEP to improve ROM and independence with ADL's. On going    PLAN     Plan of care Certification: 9/19/2024 to 11/28/24.    Fede Rodriguez, PT               Statement Selected

## 2024-10-07 ENCOUNTER — CLINICAL SUPPORT (OUTPATIENT)
Dept: REHABILITATION | Facility: OTHER | Age: 79
End: 2024-10-07
Payer: MEDICARE

## 2024-10-07 DIAGNOSIS — R29.898 DECREASED STRENGTH OF LOWER EXTREMITY: Primary | ICD-10-CM

## 2024-10-07 PROCEDURE — 97110 THERAPEUTIC EXERCISES: CPT

## 2024-10-07 NOTE — PROGRESS NOTES
[facility]   Physical Therapy Treatment Note     [id number]    Therapy Diagnosis:   Encounter Diagnosis   Name Primary?    Decreased strength of lower extremity Yes       Physician: Anthony Carr III, *    Visit Date: 10/7/2024    Physician Orders: PT Eval and Treat   Medical Diagnosis from Referral: M17.0 (ICD-10-CM) - Primary osteoarthritis of both knees   Evaluation Date: 9/19/2024  Authorization Period Expiration: 12/31/24  Plan of Care Expiration: 11/28/24  Visit # / Visits authorized: 4/ 10   Progress Note Due: 10/19/24  FOTO: 1/3    Precautions: Standard    Time In: 2:30 pm  Time Out: 3:30 pm   Total Billable Time: 30 minutes  1:1   Total Treatment Time: 60  min      SUBJECTIVE     Pt reports: she is having more pain behind the knees today and they feel tighter. Her doctor wants her to get a lot stronger before considering knee surgery    She was moderately compliant with home exercise program.  Response to previous treatment: good  Functional change: improvement in overall symptoms    Pain: 6/10   Location: bilateral knees (right side worse than left side)     OBJECTIVE     Objective Measures updated at progress report unless specified.     Range of Motion:   Knee Left active Left Passive   Flexion 122 DNT   Extension 5 3      Knee Right active Right Passive   Flexion 115 DNT   Extension 8 5         Lower Extremity Strength   Right LE   Left LE     Knee extension: 4/5 Knee extension: 4/5   Knee flexion: 4/5 Knee flexion: 4/5   Hip flexion: 4/5 Hip flexion: 4/5   Hip extension:  4-/5 Hip extension: 4-/5   Hip abduction: 4-/5 Hip abduction: 4-/5   Hip adduction: 4-/5 Hip adduction 4-/5   Ankle dorsiflexion: 4+/5 Ankle dorsiflexion: 4+/5   Ankle plantarflexion: 4+/5 Ankle plantarflexion: 4+/5      Special Tests:  30 second sit<>stand: 7 reps with B UE support  Single leg balance: not able to perform without 1 UE support    Joint Mobility:  "hypomobile to both knees, R more than left       Palpation: TTP to B knee joints, especially B popliteal fossa     CMS Impairment/Limitation/Restriction for FOTO Knee Survey     Therapist reviewed FOTO scores for Isael Salmon on 10/7/2024.   FOTO documents entered into [employer] - see Media section.     INTAKE Score: 25     Goal Score: 45      TREATMENT     Isael received the treatments listed below:      Therapeutic exercises to develop strength and ROM for 50 minutes including:  Nu-step for joint nutrition x 6 minutes  Seated tailgaters 6# on right knee x 3'  Hamstring stretch 3x30" ea  LTR 10x5"  Clamshells 2x10 YTB  Open books 10x5"- NP  Heel slides 2 minutes with strap  SAQ 2 x 10 3#  Double leg press 70# 2 x 8 reps  SLR w/ stability ball press down x 10 B  Sciatic nerve glide x 15 each side  Heel raises x20  Standing glut med x 15 reps Y TB 3 second holds     Neuromuscular re-education activities to improve: Balance, Proprioception, Posture, and muscles motor control for 0 minutes. The following activities were included:  NA today    -TrA activation 10x5""      Received the following manual therapy techniques: Soft tissue Mobilization were applied to the:  for 5 minutes, including:  R tib femoral distraction in sitting  STM to R quad/hamstring    Therapeutic activities to improve functional performance and functional mobility for 0 minutes, including:  NA today    Moist heat to R knee x 0 minutes in supine over bolster.     PATIENT EDUCATION AND HOME EXERCISES     Home Exercises Provided and Patient Education Provided     Education provided:   - Importance of daily HEP for safe tx progressions     Written Home Exercises Provided: Patient instructed to cont prior HEP. Exercises were reviewed and Isael was able to demonstrate them prior to the end of the session.  Isael demonstrated good  understanding of the education provided. See EMR under Patient Instructions for exercises provided during therapy " sessions    ASSESSMENT   Isael presents today with reports of increased knee pain without incident. Decreased tightness and soreness reported after manual therapy techniques and stretching. Progressed strengthening as tolerated with fair tolerance, some modifications made to reduce pain so pt was able to complete. Continue to progress as able.     Isael Is progressing fair towards her goals.   Pt prognosis is Good.     Pt will continue to benefit from skilled outpatient physical therapy to address the deficits listed in the problem list box on initial evaluation, provide pt/family education and to maximize pt's level of independence in the home and community environment.     Pt's spiritual, cultural and educational needs considered and pt agreeable to plan of care and goals.     Anticipated barriers to physical therapy: None      GOALS: Short Term Goals:  4-5 weeks  1.Report decreased in pain at worse less than  <   / =  4  /10  to increase tolerance for functional mobility.On going  2. Pt to improve R knee range of motion by 75% to allow for improved functional mobility to allow for improvement in IADLs. .On going  3. Increased B hip/LE MMT 1/2 grade to increase tolerance for ADL and work activities.On going  4. Pt to tolerate HEP to improve ROM and independence with ADL's.On going     Long Term Goals: 8-10 weeks  1.Report decreased in pain at worse less than  <   / =  2  /10  to increase tolerance for functional mobility. On going  2.Pt to improve range of motion by 90% to allow for improved functional mobility to allow for improvement in IADLs. On going  3.Increased B hip/LE MMT 1 grade to increase tolerance for ADL and work activities.On going  4. Pt will report 45 or greater on FOTO knee survey  to demonstrate increase in LE function with every day tasks. On going  5. Pt to be Independent with HEP to improve ROM and independence with ADL's. On going    PLAN     Plan of care Certification: 9/19/2024 to  11/28/24.    Khanh Maynard, PT    10/7/2024

## 2024-10-11 RX ORDER — CYANOCOBALAMIN/FOLIC AC/VIT B6 2-2.5-25MG
1 TABLET ORAL
Qty: 30 TABLET | Refills: 11 | Status: SHIPPED | OUTPATIENT
Start: 2024-10-11

## 2024-10-16 ENCOUNTER — CLINICAL SUPPORT (OUTPATIENT)
Dept: REHABILITATION | Facility: OTHER | Age: 79
End: 2024-10-16
Payer: MEDICARE

## 2024-10-16 DIAGNOSIS — R29.898 DECREASED STRENGTH OF LOWER EXTREMITY: Primary | ICD-10-CM

## 2024-10-16 PROCEDURE — 97110 THERAPEUTIC EXERCISES: CPT | Mod: CQ

## 2024-10-16 NOTE — PROGRESS NOTES
"[facility]   Physical Therapy Treatment Note     Name: Isael RAM [facility]  Clinic Number: [id number]    Therapy Diagnosis:   Encounter Diagnosis   Name Primary?    Decreased strength of lower extremity Yes         Physician: Anthony Carr III, *    Visit Date: 10/16/2024    Physician Orders: PT Eval and Treat   Medical Diagnosis from Referral: M17.0 (ICD-10-CM) - Primary osteoarthritis of both knees   Evaluation Date: 9/19/2024  Authorization Period Expiration: 12/31/24  Plan of Care Expiration: 11/28/24  Visit # / Visits authorized: 4/ 10   Progress Note Due: 10/19/24  FOTO: 1/3    Precautions: Standard    Time In: 145 PM (pt late)  Time Out: 230 PM    Total Billable Time: 45 minutes  1:1   Total Treatment Time: 45  min      SUBJECTIVE     Pt reports: She is hurting today. She has not been performing HEP consistently. Her R leg throbs all night. She feels like her leg is starting to turn outward.     She was moderately compliant with home exercise program.  Response to previous treatment: good  Functional change: improvement in overall symptoms    Pain: 9/10   Location: bilateral knees (right side worse than left side)     OBJECTIVE     Objective Measures updated at progress report unless specified.        TREATMENT     Isael received the treatments listed below:      Therapeutic exercises to develop strength and ROM for 40 minutes including:  Nu-step for joint nutrition x 6 minutes  Seated tailgaters 6# on right knee x 3'  Hamstring stretch 3x30" ea  Piriformis stretch 10 x 10"  LTR 10x5"  +Bridge 2 x 10   Clamshells 2x10 +GTB  Heel slides 2 minutes with strap  SAQ 2 x 10 3#  Double leg press 70# 2 x 8 reps  SLR w/ stability ball press down x 10 B  Sciatic nerve glide x 15 each side  Heel raises x20  Standing glut med x 15 reps Y TB 3 second holds     Neuromuscular re-education activities to improve: Balance, Proprioception, Posture, and muscles motor control for 0 minutes. The " following activities were included:  NA today    -TrA activation 10x5"      Received the following manual therapy techniques: Soft tissue Mobilization were applied to the:  for 5 minutes, including:  Long axis distraction     Therapeutic activities to improve functional performance and functional mobility for 0 minutes, including:  NA today    Moist heat to R knee x 0 minutes in supine over bolster.     PATIENT EDUCATION AND HOME EXERCISES     Home Exercises Provided and Patient Education Provided     Education provided:   - Importance of daily HEP for safe tx progressions     Written Home Exercises Provided: Patient instructed to cont prior HEP. Exercises were reviewed and Isael was able to demonstrate them prior to the end of the session.  Isael demonstrated good  understanding of the education provided. See EMR under Patient Instructions for exercises provided during therapy sessions    ASSESSMENT   Pt  presents to therapy with inc low back and R hip and LE pain. Some relief of symptoms following long axis hip distraction. Progressed glut strengthening with good tolerance. Added piriformis stretch with some relief of R hip pain. Conitnue progressing pt to tolerance to address deficits and return to PLOF.        Isael Is progressing fair towards her goals.   Pt prognosis is Good.     Pt will continue to benefit from skilled outpatient physical therapy to address the deficits listed in the problem list box on initial evaluation, provide pt/family education and to maximize pt's level of independence in the home and community environment.     Pt's spiritual, cultural and educational needs considered and pt agreeable to plan of care and goals.     Anticipated barriers to physical therapy: None      GOALS: Short Term Goals:  4-5 weeks  1.Report decreased in pain at worse less than  <   / =  4  /10  to increase tolerance for functional mobility.On going  2. Pt to improve R knee range of motion by 75% to allow for  improved functional mobility to allow for improvement in IADLs. .On going  3. Increased B hip/LE MMT 1/2 grade to increase tolerance for ADL and work activities.On going  4. Pt to tolerate HEP to improve ROM and independence with ADL's.On going     Long Term Goals: 8-10 weeks  1.Report decreased in pain at worse less than  <   / =  2  /10  to increase tolerance for functional mobility. On going  2.Pt to improve range of motion by 90% to allow for improved functional mobility to allow for improvement in IADLs. On going  3.Increased B hip/LE MMT 1 grade to increase tolerance for ADL and work activities.On going  4. Pt will report 45 or greater on FOTO knee survey  to demonstrate increase in LE function with every day tasks. On going  5. Pt to be Independent with HEP to improve ROM and independence with ADL's. On going    PLAN     Plan of care Certification: 9/19/2024 to 11/28/24.    Sandeep Jenkins, PTA    10/16/2024

## 2024-10-17 ENCOUNTER — TELEPHONE (OUTPATIENT)
Dept: OPTOMETRY | Facility: CLINIC | Age: 79
End: 2024-10-17
Payer: MEDICARE

## 2024-10-18 ENCOUNTER — OFFICE VISIT (OUTPATIENT)
Dept: OPTOMETRY | Facility: CLINIC | Age: 79
End: 2024-10-18
Payer: MEDICARE

## 2024-10-18 DIAGNOSIS — Z96.1 PSEUDOPHAKIA OF BOTH EYES: Primary | ICD-10-CM

## 2024-10-18 DIAGNOSIS — H16.223 KERATOCONJUNCTIVITIS SICCA NOT SPECIFIED AS SJOGREN'S, BILATERAL: ICD-10-CM

## 2024-10-18 PROCEDURE — 99999 PR PBB SHADOW E&M-EST. PATIENT-LVL III: CPT | Mod: PBBFAC,,, | Performed by: OPTOMETRIST

## 2024-10-18 PROCEDURE — 99213 OFFICE O/P EST LOW 20 MIN: CPT | Mod: PBBFAC | Performed by: OPTOMETRIST

## 2024-10-18 RX ORDER — CYCLOSPORINE 0.5 MG/ML
1 EMULSION OPHTHALMIC EVERY 12 HOURS
Qty: 16.5 ML | Refills: 3 | Status: SHIPPED | OUTPATIENT
Start: 2024-10-18 | End: 2025-10-18

## 2024-10-18 NOTE — PROGRESS NOTES
"HPI    CC:VA OU decreased for near since a few months gradually.   ERYN: 11/19/21 Dr. Houston  (+) Changes in vision   (-) Pain  (+) Irritation   (+) Itching   (-) Flashes  (+) Floaters, longstanding  (+) Glasses wearer  (-) CL wearer  (+) Uses eye gtts: Systane OU PRN multiple times a day for constant   tearing  Does patient want a refraction today? yes  (-) Eye injury  (+) Eye surgery PHACO/IOL OU  (+) POHx amaurosis fugax "years ago" with TIA days following--> had   carotid sx  Last edited by Pura Avalos, OD on 10/18/2024 12:31 PM.            Assessment /Plan     For exam results, see Encounter Report.    Pseudophakia of both eyes    Keratoconjunctivitis sicca not specified as Sjogren's, bilateral  -     cycloSPORINE (RESTASIS MULTIDOSE) 0.05 % Drop; Apply 1 drop to eye every 12 (twelve) hours.  Dispense: 16.5 mL; Refill: 3      1. Monitor; pt educated on condition and visual status.    Eyeglass Final Rx       Eyeglass Final Rx         Sphere Cylinder Axis Add    Right -0.50 +1.00 175 +3.00    Left [place] +0.50 175 +3.00      Type: PAL    Expiration Date: 10/18/2025                   2. Educated pt on chronic nature of condition and need for long term prescription therapy for best maintenance. Rx Restasis bid OU long term. Continue Systane gtts/gel PRN    RTC in 1 year for annual eye exam unless changes noted sooner.            "

## 2024-10-23 ENCOUNTER — CLINICAL SUPPORT (OUTPATIENT)
Dept: REHABILITATION | Facility: OTHER | Age: 79
End: 2024-10-23
Payer: MEDICARE

## 2024-10-23 DIAGNOSIS — R29.898 DECREASED STRENGTH OF LOWER EXTREMITY: Primary | ICD-10-CM

## 2024-10-23 PROCEDURE — 97110 THERAPEUTIC EXERCISES: CPT | Mod: CQ

## 2024-10-23 PROCEDURE — 97140 MANUAL THERAPY 1/> REGIONS: CPT | Mod: CQ

## 2024-10-23 NOTE — PROGRESS NOTES
"[facility]   Physical Therapy Treatment Note     Name: Isael RAM [facility]  Clinic Number: [id number]    Therapy Diagnosis:   Encounter Diagnosis   Name Primary?    Decreased strength of lower extremity Yes         Physician: Anthony Carr III, *    Visit Date: 10/23/2024    Physician Orders: PT Eval and Treat   Medical Diagnosis from Referral: M17.0 (ICD-10-CM) - Primary osteoarthritis of both knees   Evaluation Date: 9/19/2024  Authorization Period Expiration: 12/31/24  Plan of Care Expiration: 11/28/24  Visit # / Visits authorized: 5/ 10   Progress Note Due: 10/19/24  FOTO: 1/3    Precautions: Standard    Time In: 1340  Time Out: 1430  Total Billable Time: 50 minutes  1:1   Total Treatment Time: 50  min      SUBJECTIVE     Pt reports: her right knee is really bothering her, mostly behind the knee     She was moderately compliant with home exercise program.  Response to previous treatment: good  Functional change: improvement in overall symptoms    Pain: 9/10   Location: bilateral knees (right side worse than left side)     OBJECTIVE     Objective Measures updated at progress report unless specified.        TREATMENT     Isael received the treatments listed below:      Therapeutic exercises to develop strength and ROM for 40 minutes including:  Nu-step for joint nutrition x 6 minutes  +Bike 3 minutes for joint nutrition   +EOM R knee distraction with ROM  Seated tailgaters 6# on right knee x 3'  Hamstring stretch 3x30" ea  Piriformis stretch 10 x 10"  LTR 10x5"  Bridge 2 x 10   Clamshells 2x10 +GTB  Heel slides 2 minutes with strap (manual)  +STS 2x10   SAQ 2 x 10 3#  Double leg press 70# 2 x 8 reps  SLR w/ stability ball press down x 10 B  Sciatic nerve glide x 15 each side  Heel raises x20  Standing glut med x 15 reps Y TB 3 second holds     Neuromuscular re-education activities to improve: Balance, Proprioception, Posture, and muscles motor control for 0 minutes. The following " activities were included:  NA today    -TrA activation 10x5"      Received the following manual therapy techniques: Soft tissue Mobilization were applied to the:  for 5 minutes, including:  Long axis distraction     Therapeutic activities to improve functional performance and functional mobility for 0 minutes, including:  NA today    Moist heat to R knee x 0 minutes in supine over bolster.     PATIENT EDUCATION AND HOME EXERCISES     Home Exercises Provided and Patient Education Provided     Education provided:   - Importance of daily HEP for safe tx progressions     Written Home Exercises Provided: Patient instructed to cont prior HEP. Exercises were reviewed and Isael was able to demonstrate them prior to the end of the session.  Isael demonstrated good  understanding of the education provided. See EMR under Patient Instructions for exercises provided during therapy sessions    ASSESSMENT   Pt  presents to therapy with reports of high R posterior knee pain. Pt cued for core activation with bed mobility for decreased pain in low back. Reports of improved pain after distraction of knee with ROM prior to strengthening. Added functional sit to stand for BLE strengthening          Isael Is progressing fair towards her goals.   Pt prognosis is Good.     Pt will continue to benefit from skilled outpatient physical therapy to address the deficits listed in the problem list box on initial evaluation, provide pt/family education and to maximize pt's level of independence in the home and community environment.     Pt's spiritual, cultural and educational needs considered and pt agreeable to plan of care and goals.     Anticipated barriers to physical therapy: None      GOALS: Short Term Goals:  4-5 weeks  1.Report decreased in pain at worse less than  <   / =  4  /10  to increase tolerance for functional mobility.On going  2. Pt to improve R knee range of motion by 75% to allow for improved functional mobility to allow  for improvement in IADLs. .On going  3. Increased B hip/LE MMT 1/2 grade to increase tolerance for ADL and work activities.On going  4. Pt to tolerate HEP to improve ROM and independence with ADL's.On going     Long Term Goals: 8-10 weeks  1.Report decreased in pain at worse less than  <   / =  2  /10  to increase tolerance for functional mobility. On going  2.Pt to improve range of motion by 90% to allow for improved functional mobility to allow for improvement in IADLs. On going  3.Increased B hip/LE MMT 1 grade to increase tolerance for ADL and work activities.On going  4. Pt will report 45 or greater on FOTO knee survey  to demonstrate increase in LE function with every day tasks. On going  5. Pt to be Independent with HEP to improve ROM and independence with ADL's. On going    PLAN     Plan of care Certification: 9/19/2024 to 11/28/24.    Robert Singh, PTA    10/23/2024

## 2024-10-30 ENCOUNTER — CLINICAL SUPPORT (OUTPATIENT)
Dept: REHABILITATION | Facility: OTHER | Age: 79
End: 2024-10-30
Payer: MEDICARE

## 2024-10-30 DIAGNOSIS — R29.898 DECREASED STRENGTH OF LOWER EXTREMITY: Primary | ICD-10-CM

## 2024-10-30 PROCEDURE — 97110 THERAPEUTIC EXERCISES: CPT | Mod: CQ

## 2024-10-30 PROCEDURE — 97140 MANUAL THERAPY 1/> REGIONS: CPT | Mod: CQ

## 2024-11-06 NOTE — PROGRESS NOTES
"[facility]   Physical Therapy Treatment Note     Name: Isael RAM [facility] Number: [id number]    Therapy Diagnosis:   Encounter Diagnosis   Name Primary?    Decreased strength of lower extremity Yes       Physician: Anthony Carr III, *    Visit Date: 11/7/2024    Physician Orders: PT Eval and Treat   Medical Diagnosis from Referral: M17.0 (ICD-10-CM) - Primary osteoarthritis of both knees   Evaluation Date: 9/19/2024  Authorization Period Expiration: 12/31/24  Plan of Care Expiration: 11/28/24  Visit # / Visits authorized: 8/ 10   Progress Note Due: 11/19/24  FOTO: 1/3    Precautions: Standard    Time In: 2:40 pm    Time Out: 3:35 pm   Total Billable Time: 55 minutes  1:1   Total Treatment Time: 55 min      SUBJECTIVE     Pt reports: continue B knee discomfort and limitation in amb tolerance.    Patient report moderate HEP performance and self progression /c adding 5# ankle weights for LAQ.    Patient report sx most intense at night interrupting her sleep.  Patient state "I don't feel right when walking" and has concern for balance.   Overall patient feels no sig change in sx presentation when at home.  However, patient clarify when leaving tx she often has sig dec sx presentation.    Patient tolerate last session well /s c/o of excess discomfort.  Patient agree to session today.       She was moderately compliant with home exercise program.  Response to previous treatment: good  Functional change: patient unable to describe any     Pain: 310    Location: bilateral knees (right side worse than left side)     OBJECTIVE     Objective Measures updated at progress report unless specified.        TREATMENT     Isael received the treatments listed below:      Therapeutic exercises to develop strength and ROM for 30 minutes including:    Nu-step for joint nutrition x 5 minutes  Seated tailgaters 6# on right knee x 3' patient educated on use for sx self management   MedX Knee Ext 20# x " "20 4/10 RPE      NP:   Piriformis stretch 10 x 10"  LTR 10x5"  Bridge 2 x 10   Clamshells 2x10 +GTB  Heel slides 2 minutes with strap (manual)  LAQ 5# 2 x 10  SLR w/ stability ball press down x 10 B  Sciatic nerve glide x 15 each side  Heel raises x20  Standing glut med x 15 reps Y TB 3 second holds  +Standing extension x 15     Neuromuscular re-education activities to improve: Balance, Proprioception, Posture, and muscles motor control for 0 minutes. The following activities were included:  NA today    -TrA activation 10x5""      Received the following manual therapy techniques: Soft tissue Mobilization were applied to the:  for 00 minutes, including:  Manual R HSS   Seated tibial distraction     Therapeutic activities to improve functional performance and functional mobility for 25 minutes, including:    STS from chair /c Airex pad x 5 BUE use   Patient educated on use of momentum "nose over toes" /c tactile and visual cue  STS from chair x 8 no BUE use   2 reps modA for momentum fwd   2 reps Margo for momentum fwd   4 reps Ind but /c hand visual cue for forehead position upon using momentum        Moist heat to R knee x 0 minutes in supine over bolster.     PATIENT EDUCATION AND HOME EXERCISES     Home Exercises Provided and Patient Education Provided     Education provided:   - Importance of daily HEP for safe tx progressions     Written Home Exercises Provided: Patient instructed to cont prior HEP. Exercises were reviewed and Isael was able to demonstrate them prior to the end of the session.  Isael demonstrated good  understanding of the education provided. See EMR under Patient Instructions for exercises provided during therapy sessions    ASSESSMENT     Patient subjective report indicate continue self limitation and fear avoidance verbiage.  Therefore considerable tx time spent discussing "hurt vs harm" and techniques for sx self management.  Patient initially report pain /c knee activity, however, after " muscle activation on MedX and STS, patient leave session reporting 0/10 pain indicating likely psychosocial drivers involved in fear and anticipatory pain.  Further evidence of this comes from last visit when patient note knee sx sig decline /c lumbar mobility exercises however today no lumbar mobility exercises completed yet sx /c sig decline.  Recommend continue tx mode highlight improvements to directly address patient's fear avoidance and self efficacy. During STS challenge, patient continue to demonstrate poor coordinated movement for most efficient technique and fear of movement, however, patient improved /c reps and will benefit from continue performance, especially because patient note ability to complete /s inc knee discomfort and this activity addresses patient concern for balance, strength and functionality and offers opportunity to inc patient's self efficacy.        Isael Is progressing fair towards her goals.   Pt prognosis is Good.     Pt will continue to benefit from skilled outpatient physical therapy to address the deficits listed in the problem list box on initial evaluation, provide pt/family education and to maximize pt's level of independence in the home and community environment.     Pt's spiritual, cultural and educational needs considered and pt agreeable to plan of care and goals.     Anticipated barriers to physical therapy: None      GOALS: Short Term Goals:  4-5 weeks  1.Report decreased in pain at worse less than  <   / =  4  /10  to increase tolerance for functional mobility.On going  2. Pt to improve R knee range of motion by 75% to allow for improved functional mobility to allow for improvement in IADLs. .On going  3. Increased B hip/LE MMT 1/2 grade to increase tolerance for ADL and work activities.On going  4. Pt to tolerate HEP to improve ROM and independence with ADL's.On going     Long Term Goals: 8-10 weeks  1.Report decreased in pain at worse less than  <   / =  2  /10  to  increase tolerance for functional mobility. On going  2.Pt to improve range of motion by 90% to allow for improved functional mobility to allow for improvement in IADLs. On going  3.Increased B hip/LE MMT 1 grade to increase tolerance for ADL and work activities.On going  4. Pt will report 45 or greater on FOTO knee survey  to demonstrate increase in LE function with every day tasks. On going  5. Pt to be Independent with HEP to improve ROM and independence with ADL's. On going    PLAN     Plan of care Certification: 9/19/2024 to 11/28/24.    James Zelaya, PT    11/12/2024

## 2024-11-07 ENCOUNTER — CLINICAL SUPPORT (OUTPATIENT)
Dept: REHABILITATION | Facility: OTHER | Age: 79
End: 2024-11-07
Payer: MEDICARE

## 2024-11-07 DIAGNOSIS — R29.898 DECREASED STRENGTH OF LOWER EXTREMITY: Primary | ICD-10-CM

## 2024-11-07 PROCEDURE — 97530 THERAPEUTIC ACTIVITIES: CPT

## 2024-11-07 PROCEDURE — 97110 THERAPEUTIC EXERCISES: CPT

## 2024-11-12 ENCOUNTER — IMMUNIZATION (OUTPATIENT)
Dept: INTERNAL MEDICINE | Facility: CLINIC | Age: 79
End: 2024-11-12
Payer: MEDICARE

## 2024-11-12 ENCOUNTER — OFFICE VISIT (OUTPATIENT)
Dept: INTERNAL MEDICINE | Facility: CLINIC | Age: 79
End: 2024-11-12
Payer: MEDICARE

## 2024-11-12 VITALS
HEART RATE: 80 BPM | OXYGEN SATURATION: 99 % | WEIGHT: 163.56 LBS | HEIGHT: 65 IN | BODY MASS INDEX: 27.25 KG/M2 | SYSTOLIC BLOOD PRESSURE: 132 MMHG | DIASTOLIC BLOOD PRESSURE: 60 MMHG

## 2024-11-12 DIAGNOSIS — D50.0 IRON DEFICIENCY ANEMIA DUE TO CHRONIC BLOOD LOSS: ICD-10-CM

## 2024-11-12 DIAGNOSIS — Z23 NEED FOR VACCINATION: Primary | ICD-10-CM

## 2024-11-12 DIAGNOSIS — Z00.00 ROUTINE GENERAL MEDICAL EXAMINATION AT A HEALTH CARE FACILITY: Primary | ICD-10-CM

## 2024-11-12 PROCEDURE — 90480 ADMN SARSCOV2 VAC 1/ONLY CMP: CPT | Mod: PBBFAC

## 2024-11-12 PROCEDURE — 99214 OFFICE O/P EST MOD 30 MIN: CPT | Mod: PBBFAC | Performed by: INTERNAL MEDICINE

## 2024-11-12 PROCEDURE — 99999PBSHW FLU VACCINE - ADJUVANTED: Mod: PBBFAC,,,

## 2024-11-12 PROCEDURE — 99999PBSHW COVID-19 VAC, TRIS 2023 (PFIZER)(PF) 30 MCG/0.3 ML IM SUSR (12+): Mod: PBBFAC,,,

## 2024-11-12 PROCEDURE — 99999 PR PBB SHADOW E&M-EST. PATIENT-LVL IV: CPT | Mod: PBBFAC,,, | Performed by: INTERNAL MEDICINE

## 2024-11-12 PROCEDURE — 91320 SARSCV2 VAC 30MCG TRS-SUC IM: CPT | Mod: PBBFAC

## 2024-11-12 PROCEDURE — G0008 ADMIN INFLUENZA VIRUS VAC: HCPCS | Mod: PBBFAC

## 2024-11-12 NOTE — PROGRESS NOTES
CHIEF COMPLAINT: Annual exam and Follow up of multiple problems     HISTORY OF PRESENT ILLNESS: 79-year-old woman presents for follow up of above     She saw Dr Gaytan in rheumatology on 9/19/24 and Dr Carr on 8/27/24 for her knees. She is doing physical therapy twice a week for leg strengthening which is good. She is trying to do exercises on her legs between therapy sessions.       She had 4 infusions of IV iron from 1/3/24 to 1/24/24. She is followed by Dr Bianchi. She is off oral iron.       She is having some constipation - BM every other day. She is taking Linzess every day or every other day.  She has a BM after taking the Linzess.       Rash on her forearms comes and goes.  Currently flared on the right forearm. She is using betamethasone cream twice daily as needed for the rash which helps.   She saw Dr Herrera on 8/23/23 She uses cetaphil soap and lotion and Super Fatted Basis which helps.  She has a similar rash intermittently and had a big years ago and saw Dr Herrera in Derm 11/3/2017 and was diagnosed with erythema annulare centrifugum - biopsy was done at that time- SUBCORNEAL PUSTULAR DERMATITIS WITH ASSOCIATED EPIDERMAL SPONGIOSIS       She had a bunionectomy and hammertoe surgery on the left foot 7/8/21 with Dr Bunn. She has seen Dr Jasso who wants to redo the surgery.  Dr Jasso gave her injections in her toes which helped with the pain. She cannot wear closed shoes. Has not had the surgery yet. No toe pain     No gout attacks.  She takes allopurinol 100 mg 2 tablet nightly.      She had an injection in both of her knees on 7/3/24.   The cortisone injections lasted 2-3 weeks.       She has muscle spasms in her neck that come and go. She has to watch how she sleep.   She went to physical therapy which has helped. She is doing exercises at home.  She is taking tizanidine 4 mg at bedtime.       Anxiety is controlled on Lexapro to 10 mg 1.5 tablets daily.  Mood is good. Her  can aggravate her.   " 61 years.        Heartburn is congroled on omperazole 20 mg twice daily     She continues to take Crestor 20 mg 1 tablet daily with co enzyme 10 200 mg  daily - leg achintess is better with lower dose of crestor. She also takes losartan 50 mg twice daily, and spironolactone 25 mg daily. BP has been doing well.        She had a normal cystoscope 14 due to recurrent UTI. No dysuria or hematuria now. She is no longer using estrogen vaginal cream for vaginal atrophy three times weekly          PAST MEDICAL HISTORY:   1. Hypertension.   2. Hyperlipidemia.   3. Gout.   4. History of rashes.   5. TIA in   6. Fibrocystic breast disease.   7. Postmenopausal.   8. Osteoarthritis.   9. History of tobacco usage; quit in .   10. History of colon polyps; normal colonoscopy in 10/07 and normal 2010, due    11. Normal bone mineral density scan in .   12. Anxiety and depression - controlled on lorazepam very rarely.   13. Varicose veins.   14. Intermittent GERD.   15. Iron deficiency anemia 2010 - due to erosive gastropathy on EGD 2010     PAST SURGICAL HISTORY:   1. Right carotid endarterectomy in .   2. Breast reduction surgery.   3. Total abdominal hysterectomy.   4. Blood transfusion prior to .     SOCIAL HISTORY: Quit smoking in ; smoked 1 PPD since age 16. No   alcohol use.     FAMILY HISTORY:   Father had gout, hypertension and heart failure; is . Mother had hypertension and pancreatic cancer; is also . Has five brothers, one of whom  in a motor vehicle accident. All have gout and hypertension. One brother has Crohn's disease; another has bladder cancer.     MEDICATIONS and ALLERGIES: Updated on epic.     PHYSICAL EXAMINATION:         /60 (BP Location: Right arm, Patient Position: Sitting)   Pulse 80   Ht 5' 5" (1.651 m)   Wt 74.2 kg (163 lb 9.3 oz)   LMP  (LMP Unknown)   SpO2 99%   BMI 27.22 kg/m²       General: Alert, oriented. No apparent " distress. Affect within normal   Limits.   Conjunctivae anicteric. Tympanic membranes clear. Oropharynx clear.   Neck supple.   Respiratory effort normal. Lungs clear  Heart: Regular rate and rhythm without murmurs, gallops or rubs.   No lower extremity edema.      Labs 10/30/24 reviewed      ASSESSMENT AND PLAN:     Annual exam - discussed diet, exercise and safety issues.     OA knees -in physical thearpy   iron deficiency anemia - better.   Soluble transferrin still low -   Mood disorder-on lexapro to 10 mg 1.5 tablet daily.   Rash - betamethasone cream twice daily. Has an apt in derm. Use gloves when wash dishes  Essential hypertension - stable   Heme positive stools - saw Dr Kingsley 3/2023 -had colonoscopy 9/18/23  Hyperlipidemia - on crestor to 20 mg 1 tablet daily.    Erosive gastropathy - on omeprazole to 40 mg twice daily.  Hypercalcemia - stable  Gout -  on allopurinol. Uric acid level  Carotid artery disease - on risk factor modification.   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  I will see her back 4 months,   sooner if problems arise

## 2024-11-21 ENCOUNTER — OFFICE VISIT (OUTPATIENT)
Dept: DERMATOLOGY | Facility: CLINIC | Age: 79
End: 2024-11-21
Payer: MEDICARE

## 2024-11-21 DIAGNOSIS — L13.1 SUBCORNEAL PUSTULAR DERMATITIS: Primary | ICD-10-CM

## 2024-11-21 PROCEDURE — 99214 OFFICE O/P EST MOD 30 MIN: CPT | Mod: S$PBB,,, | Performed by: STUDENT IN AN ORGANIZED HEALTH CARE EDUCATION/TRAINING PROGRAM

## 2024-11-21 PROCEDURE — 99213 OFFICE O/P EST LOW 20 MIN: CPT | Mod: PBBFAC | Performed by: STUDENT IN AN ORGANIZED HEALTH CARE EDUCATION/TRAINING PROGRAM

## 2024-11-21 PROCEDURE — 99999 PR PBB SHADOW E&M-EST. PATIENT-LVL III: CPT | Mod: PBBFAC,,, | Performed by: STUDENT IN AN ORGANIZED HEALTH CARE EDUCATION/TRAINING PROGRAM

## 2024-11-21 PROCEDURE — G2211 COMPLEX E/M VISIT ADD ON: HCPCS | Mod: S$PBB,,, | Performed by: STUDENT IN AN ORGANIZED HEALTH CARE EDUCATION/TRAINING PROGRAM

## 2024-11-21 RX ORDER — COLCHICINE 0.6 MG/1
TABLET ORAL
Qty: 60 TABLET | Refills: 3 | Status: SHIPPED | OUTPATIENT
Start: 2024-11-21

## 2024-11-21 RX ORDER — DAPSONE 75 MG/G
1 GEL TOPICAL DAILY
Qty: 60 G | Refills: 5 | Status: SHIPPED | OUTPATIENT
Start: 2024-11-21

## 2024-11-21 NOTE — PROGRESS NOTES
Subjective:      Patient ID:  Isael Salmon is a 79 y.o. female who presents for   Chief Complaint   Patient presents with    Rash     Arms and hands     Rash - Initial  Affected locations: left hand, right hand, right wrist, left wrist, right arm and left arm  Duration: Many years, recent flare has been months.  Signs and Symptoms: red circles.  Timing: intermittent  Treatments tried: Betamethasone Dipropionate 0.05% cream helps but not long-term.      Rash on hand for many years. Uses diprolene. Individual lesions resolve then new ones recur. Previously biopsied by Dr. 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  MICROSCOPIC DESCRIPTION: Sections show a punch biopsy of skin extending to the level of the deep reticular  dermis. The epidermis exhibits spongiosis with exocytosis of lymphocytes and neutrophils. There is focal  subcorneal pustule formation. The underlying superficial to mid dermis contains a perivascular and interstitial  lymphohistiocytic infiltrate with prominent scattered neutrophils. PAS stain is negative for fungi. Appropriately  reactive controls were reviewed. Multiple levels were examined.  COMMENT: These histologic features are suggestive of subcorneal pustular dermatosis. Similar features can be  seen in the context of bullous impetigo.    Review of Systems   Skin:  Positive for rash.       Objective:   Physical Exam   Skin:   Areas Examined (abnormalities noted in diagram):   RUE Inspected  LUE Inspection Performed  RLE Inspected  LLE Inspection Performed            Diagram Legend     Erythematous scaling macule/papule c/w actinic keratosis       Vascular papule c/w angioma      Pigmented verrucoid papule/plaque c/w seborrheic keratosis      Yellow umbilicated papule c/w sebaceous hyperplasia      Irregularly shaped tan macule c/w lentigo      1-2 mm smooth white papules consistent with Milia      Movable subcutaneous cyst with punctum c/w epidermal inclusion cyst      Subcutaneous movable cyst c/w pilar cyst      Firm pink to brown papule c/w dermatofibroma      Pedunculated fleshy papule(s) c/w skin tag(s)      Evenly pigmented macule c/w junctional nevus     Mildly variegated pigmented, slightly irregular-bordered macule c/w mildly atypical nevus      Flesh colored to evenly pigmented papule c/w intradermal nevus       Pink pearly papule/plaque c/w basal cell carcinoma      Erythematous hyperkeratotic cursted plaque c/w SCC      Surgical scar with no sign of skin cancer recurrence      Open and closed comedones      Inflammatory papules and pustules      Verrucoid papule consistent consistent with wart     Erythematous eczematous patches and plaques     Dystrophic onycholytic nail with subungual debris c/w onychomycosis     Umbilicated papule    Erythematous-base heme-crusted tan verrucoid plaque consistent with inflamed seborrheic keratosis     Erythematous Silvery Scaling Plaque c/w Psoriasis     See annotation      Assessment / Plan:        Subcorneal pustular dermatitis  -     dapsone (ACZONE) 7.5 % GlwP; Apply 1 Application topically once daily. Apply to rash on arms  Dispense: 60 g; Refill: 5  -     colchicine (COLCRYS) 0.6 mg tablet; Start with one pill once daily. If tolerating well after 2 weeks then increase to 1 pill twice daily  Dispense: 60 tablet; Refill: 3    - annular erythematous plaques on arms and hands. Prior biopsy with subcornal pustular dermatosis. Using betamethasone which helps but continues to get new lesions  - c/w diprolene bid to AA  - start topical dapsone qd  - start colchicine bid as above  - has chronic baseline anemia so would like to avoid dapsone         Follow up in about 2 months (around 1/21/2025).

## 2024-11-30 ENCOUNTER — EXTERNAL CHRONIC CARE MANAGEMENT (OUTPATIENT)
Dept: PRIMARY CARE CLINIC | Facility: CLINIC | Age: 79
End: 2024-11-30
Payer: MEDICARE

## 2024-11-30 PROCEDURE — 99490 CHRNC CARE MGMT STAFF 1ST 20: CPT | Mod: S$PBB,,, | Performed by: INTERNAL MEDICINE

## 2024-11-30 PROCEDURE — 99490 CHRNC CARE MGMT STAFF 1ST 20: CPT | Mod: PBBFAC | Performed by: INTERNAL MEDICINE

## 2024-11-30 PROCEDURE — 99439 CHRNC CARE MGMT STAF EA ADDL: CPT | Mod: PBBFAC | Performed by: INTERNAL MEDICINE

## 2024-11-30 PROCEDURE — 99439 CHRNC CARE MGMT STAF EA ADDL: CPT | Mod: S$PBB,,, | Performed by: INTERNAL MEDICINE

## 2024-12-04 ENCOUNTER — CLINICAL SUPPORT (OUTPATIENT)
Dept: REHABILITATION | Facility: OTHER | Age: 79
End: 2024-12-04
Payer: MEDICARE

## 2024-12-04 DIAGNOSIS — R29.898 DECREASED STRENGTH OF LOWER EXTREMITY: Primary | ICD-10-CM

## 2024-12-04 PROCEDURE — 97110 THERAPEUTIC EXERCISES: CPT

## 2024-12-04 PROCEDURE — 97530 THERAPEUTIC ACTIVITIES: CPT

## 2024-12-04 NOTE — PROGRESS NOTES
"[facility]   Physical Therapy Treatment Note     Name: Isael RAM [facility]  Clinic Number: [id number]    Therapy Diagnosis:   Encounter Diagnosis   Name Primary?    Decreased strength of lower extremity Yes         Physician: Anthony Carr III, *    Visit Date: 12/4/2024    Physician Orders: PT Eval and Treat   Medical Diagnosis from Referral: M17.0 (ICD-10-CM) - Primary osteoarthritis of both knees   Evaluation Date: 9/19/2024  Authorization Period Expiration: 12/31/24  Plan of Care Expiration: 11/28/24  Visit # / Visits authorized: 9/ 10   Progress Note Due: 11/19/24  FOTO: 1/3    Precautions: Standard    Time In: 3:30 pm   Time Out: 4:30 pm   Total Billable Time: 55 minutes  1:1   Total Treatment Time: 55 min      SUBJECTIVE     Pt reports: increased R sciatic nerve pain today. She felt better after last session.       She was moderately compliant with home exercise program.  Response to previous treatment: good  Functional change: patient unable to describe any     Pain: 3/10    Location: bilateral knees (right side worse than left side)     OBJECTIVE     Objective Measures updated at progress report unless specified.        TREATMENT     Isael received the treatments listed below:      Therapeutic exercises to develop strength and ROM for 30 minutes including:    Nu-step for joint nutrition x 5 minutes  Seated tailgaters 6# on right knee x 3' patient educated on use for sx self management   MedX Knee Ext 20# x 20 4/10 RPE    Piriformis stretch 10 x 10"  LTR 10x5"  Bridge 2 x 10   Clamshells 2x10 +GTB  Heel slides 2 minutes with strap (manual)  LAQ 5# 2 x 10  SLR w/ stability ball press down x 10 B  Sciatic nerve glide x 15 each side  Heel raises x20  Standing glut med x 15 reps Y TB 3 second holds  +Standing extension x 15     Neuromuscular re-education activities to improve: Balance, Proprioception, Posture, and muscles motor control for 0 minutes. The following activities were " "included:  NA today    -TrA activation 10x5"      Received the following manual therapy techniques: Soft tissue Mobilization were applied to the:  for 00 minutes, including:  Manual R HSS   Seated tibial distraction     Therapeutic activities to improve functional performance and functional mobility for 25 minutes, including:    STS from chair /c Airex pad x 5 BUE use   Patient educated on use of momentum "nose over toes" /c tactile and visual cue  STS from chair x 8 no BUE use   2 reps modA for momentum fwd   2 reps Margo for momentum fwd   4 reps Ind but /c hand visual cue for forehead position upon using momentum        Moist heat to R knee x 0 minutes in supine over bolster.     PATIENT EDUCATION AND HOME EXERCISES     Home Exercises Provided and Patient Education Provided     Education provided:   - Importance of daily HEP for safe tx progressions     Written Home Exercises Provided: Patient instructed to cont prior HEP. Exercises were reviewed and Isael was able to demonstrate them prior to the end of the session.  Isael demonstrated good  understanding of the education provided. See EMR under Patient Instructions for exercises provided during therapy sessions    ASSESSMENT     Patient presents to therapy session wit increased R sciatic nerve symptoms. She tolerated exercises well without exhibiting further exacerbation of symptoms. Noted relief at end of session. Will continue to monitor and progress exercises as tolerated by patient with appropriate challnege.       Isael Is progressing fair towards her goals.   Pt prognosis is Good.     Pt will continue to benefit from skilled outpatient physical therapy to address the deficits listed in the problem list box on initial evaluation, provide pt/family education and to maximize pt's level of independence in the home and community environment.     Pt's spiritual, cultural and educational needs considered and pt agreeable to plan of care and goals.   "   Anticipated barriers to physical therapy: None      GOALS: Short Term Goals:  4-5 weeks  1.Report decreased in pain at worse less than  <   / =  4  /10  to increase tolerance for functional mobility.On going  2. Pt to improve R knee range of motion by 75% to allow for improved functional mobility to allow for improvement in IADLs. .On going  3. Increased B hip/LE MMT 1/2 grade to increase tolerance for ADL and work activities.On going  4. Pt to tolerate HEP to improve ROM and independence with ADL's.On going     Long Term Goals: 8-10 weeks  1.Report decreased in pain at worse less than  <   / =  2  /10  to increase tolerance for functional mobility. On going  2.Pt to improve range of motion by 90% to allow for improved functional mobility to allow for improvement in IADLs. On going  3.Increased B hip/LE MMT 1 grade to increase tolerance for ADL and work activities.On going  4. Pt will report 45 or greater on FOTO knee survey  to demonstrate increase in LE function with every day tasks. On going  5. Pt to be Independent with HEP to improve ROM and independence with ADL's. On going    PLAN     Plan of care Certification: 9/19/2024 to 11/28/24.    Yoly Gallego, PT    12/4/2024

## 2024-12-11 ENCOUNTER — CLINICAL SUPPORT (OUTPATIENT)
Dept: REHABILITATION | Facility: OTHER | Age: 79
End: 2024-12-11
Payer: MEDICARE

## 2024-12-11 DIAGNOSIS — R29.898 DECREASED STRENGTH OF LOWER EXTREMITY: Primary | ICD-10-CM

## 2024-12-11 PROCEDURE — 97530 THERAPEUTIC ACTIVITIES: CPT | Mod: CQ

## 2024-12-11 PROCEDURE — 97110 THERAPEUTIC EXERCISES: CPT | Mod: CQ

## 2024-12-11 NOTE — PROGRESS NOTES
"[facility]   Physical Therapy Treatment Note     Name: Isael RAM [facility]  Clinic Number: [id number]    Therapy Diagnosis:   Encounter Diagnosis   Name Primary?    Decreased strength of lower extremity Yes         Physician: Anthony Carr III, *    Visit Date: 12/11/2024    Physician Orders: PT Eval and Treat   Medical Diagnosis from Referral: M17.0 (ICD-10-CM) - Primary osteoarthritis of both knees   Evaluation Date: 9/19/2024  Authorization Period Expiration: 12/31/24  Plan of Care Expiration: 11/28/24  Visit # / Visits authorized: 12/ 10   Progress Note Due: 12/19/24   FOTO: 1/3    Precautions: Standard    Time In: 12:30 pm   Time Out: 1:30 pm   Total Billable Time: 55 minutes  1:1   Total Treatment Time: 55 min      SUBJECTIVE     Pt reports: increased L sciatic nerve pain today. She felt better after last session.       She was moderately compliant with home exercise program.  Response to previous treatment: good  Functional change: patient unable to describe any     Pain: 3/10    Location: bilateral knees (right side worse than left side)     OBJECTIVE     Objective Measures updated at progress report unless specified.        TREATMENT     Isael received the treatments listed below:      Therapeutic exercises to develop strength and ROM for 40 minutes including:    Nu-step for joint nutrition x 5 minutes  Seated tailgaters 6# on right knee x 3' patient educated on use for sx self management   MedX Knee Ext 20# x 20 4/10 RPE    Piriformis stretch 10 x 10"  LTR 10x5"  Bridge 2 x 10   Clamshells 2x10 +GTB  Heel slides 2 minutes with strap (manual)NP  LAQ 5# 2 x 10  SLR w/ stability ball press down x 10 B  Sciatic nerve glide x 15 each side (L)  Heel raises x20  Standing glut med x 15 reps Y TB 3 second holds  Standing extension x 15     Neuromuscular re-education activities to improve: Balance, Proprioception, Posture, and muscles motor control for 0 minutes. The following " "activities were included:  NA today    -TrA activation 10x5"      Received the following manual therapy techniques: Soft tissue Mobilization were applied to the:  for 00 minutes, including:  Manual R HSS   Seated tibial distraction     Therapeutic activities to improve functional performance and functional mobility for 15 minutes, including:    STS from chair /c Airex pad x 5 BUE use   Patient educated on use of momentum "nose over toes" /c tactile and visual cue  STS from chair x 8 no BUE use   2 reps modA for momentum fwd   2 reps Margo for momentum fwd   4 reps Ind but /c hand visual cue for forehead position upon using momentum    Moist heat to R knee x 0 minutes in supine over bolster.     PATIENT EDUCATION AND HOME EXERCISES     Home Exercises Provided and Patient Education Provided     Education provided:   - Importance of daily HEP for safe tx progressions     Written Home Exercises Provided: Patient instructed to cont prior HEP. Exercises were reviewed and Isael was able to demonstrate them prior to the end of the session.  Isael demonstrated good  understanding of the education provided. See EMR under Patient Instructions for exercises provided during therapy sessions    ASSESSMENT     Patient presents to therapy session wit increased R sciatic nerve symptoms. She tolerated exercises well without exhibiting further exacerbation of symptoms. Noted relief at end of session. Will continue to monitor and progress exercises as tolerated by patient with appropriate challnege.       Isael Is progressing fair towards her goals.   Pt prognosis is Good.     Pt will continue to benefit from skilled outpatient physical therapy to address the deficits listed in the problem list box on initial evaluation, provide pt/family education and to maximize pt's level of independence in the home and community environment.     Pt's spiritual, cultural and educational needs considered and pt agreeable to plan of care and " goals.     Anticipated barriers to physical therapy: None      GOALS: Short Term Goals:  4-5 weeks  1.Report decreased in pain at worse less than  <   / =  4  /10  to increase tolerance for functional mobility.On going  2. Pt to improve R knee range of motion by 75% to allow for improved functional mobility to allow for improvement in IADLs. .On going  3. Increased B hip/LE MMT 1/2 grade to increase tolerance for ADL and work activities.On going  4. Pt to tolerate HEP to improve ROM and independence with ADL's.On going     Long Term Goals: 8-10 weeks  1.Report decreased in pain at worse less than  <   / =  2  /10  to increase tolerance for functional mobility. On going  2.Pt to improve range of motion by 90% to allow for improved functional mobility to allow for improvement in IADLs. On going  3.Increased B hip/LE MMT 1 grade to increase tolerance for ADL and work activities.On going  4. Pt will report 45 or greater on FOTO knee survey  to demonstrate increase in LE function with every day tasks. On going  5. Pt to be Independent with HEP to improve ROM and independence with ADL's. On going    PLAN     Plan of care Certification: 9/19/2024 to 11/28/24.    Iram Chilel, PTA    12/11/2024

## 2024-12-11 NOTE — PROGRESS NOTES
"[facility]   Physical Therapy Treatment Note     Name: Isael RAM [facility]  Clinic Number: [id number]    Therapy Diagnosis:   Encounter Diagnosis   Name Primary?    Decreased strength of lower extremity Yes         Physician: Anthony Carr III, *    Visit Date: 12/11/2024    Physician Orders: PT Eval and Treat   Medical Diagnosis from Referral: M17.0 (ICD-10-CM) - Primary osteoarthritis of both knees   Evaluation Date: 9/19/2024  Authorization Period Expiration: 12/31/24  Plan of Care Expiration: 11/28/24  Visit # / Visits authorized: 10/ 10   Progress Note Due: 11/19/24  FOTO: 1/3    Precautions: Standard    Time In: ***  Time Out: ***  Total Billable Time: 55 minutes  1:1   Total Treatment Time: 55 min      SUBJECTIVE     Pt reports: ***       She was moderately compliant with home exercise program.  Response to previous treatment: good  Functional change: patient unable to describe any     Pain: 3/10    Location: bilateral knees (right side worse than left side)     OBJECTIVE     Objective Measures updated at progress report unless specified.        TREATMENT     Isael received the treatments listed below:      Therapeutic exercises to develop strength and ROM for 30 minutes including:    Nu-step for joint nutrition x 5 minutes  Seated tailgaters 6# on right knee x 3' patient educated on use for sx self management   MedX Knee Ext 20# x 20 4/10 RPE    Piriformis stretch 10 x 10"  LTR 10x5"  Bridge 2 x 10   Clamshells 2x10 +GTB  Heel slides 2 minutes with strap (manual)  LAQ 5# 2 x 10  SLR w/ stability ball press down x 10 B  Sciatic nerve glide x 15 each side  Heel raises x20  Standing glut med x 15 reps Y TB 3 second holds  +Standing extension x 15     Neuromuscular re-education activities to improve: Balance, Proprioception, Posture, and muscles motor control for 0 minutes. The following activities were included:  NA today    -TrA activation 10x5""      Received the following manual " "therapy techniques: Soft tissue Mobilization were applied to the:  for 00 minutes, including:  Manual R HSS   Seated tibial distraction     Therapeutic activities to improve functional performance and functional mobility for 25 minutes, including:    STS from chair /c Airex pad x 5 BUE use   Patient educated on use of momentum "nose over toes" /c tactile and visual cue  STS from chair x 8 no BUE use   2 reps modA for momentum fwd   2 reps Margo for momentum fwd   4 reps Ind but /c hand visual cue for forehead position upon using momentum        Moist heat to R knee x 0 minutes in supine over bolster.     PATIENT EDUCATION AND HOME EXERCISES     Home Exercises Provided and Patient Education Provided     Education provided:   - Importance of daily HEP for safe tx progressions     Written Home Exercises Provided: Patient instructed to cont prior HEP. Exercises were reviewed and Isael was able to demonstrate them prior to the end of the session.  Isael demonstrated good  understanding of the education provided. See EMR under Patient Instructions for exercises provided during therapy sessions    ASSESSMENT     Patient presents to therapy session wit increased R sciatic nerve symptoms. She tolerated exercises well without exhibiting further exacerbation of symptoms. Noted relief at end of session. Will continue to monitor and progress exercises as tolerated by patient with appropriate challnege.       Isael Is progressing fair towards her goals.   Pt prognosis is Good.     Pt will continue to benefit from skilled outpatient physical therapy to address the deficits listed in the problem list box on initial evaluation, provide pt/family education and to maximize pt's level of independence in the home and community environment.     Pt's spiritual, cultural and educational needs considered and pt agreeable to plan of care and goals.     Anticipated barriers to physical therapy: None      GOALS: Short Term Goals:  4-5 " weeks  1.Report decreased in pain at worse less than  <   / =  4  /10  to increase tolerance for functional mobility.On going  2. Pt to improve R knee range of motion by 75% to allow for improved functional mobility to allow for improvement in IADLs. .On going  3. Increased B hip/LE MMT 1/2 grade to increase tolerance for ADL and work activities.On going  4. Pt to tolerate HEP to improve ROM and independence with ADL's.On going     Long Term Goals: 8-10 weeks  1.Report decreased in pain at worse less than  <   / =  2  /10  to increase tolerance for functional mobility. On going  2.Pt to improve range of motion by 90% to allow for improved functional mobility to allow for improvement in IADLs. On going  3.Increased B hip/LE MMT 1 grade to increase tolerance for ADL and work activities.On going  4. Pt will report 45 or greater on FOTO knee survey  to demonstrate increase in LE function with every day tasks. On going  5. Pt to be Independent with HEP to improve ROM and independence with ADL's. On going    PLAN     Plan of care Certification: 9/19/2024 to 11/28/24.    Robert Singh, PTA    12/11/2024

## 2024-12-30 ENCOUNTER — OFFICE VISIT (OUTPATIENT)
Dept: INTERNAL MEDICINE | Facility: CLINIC | Age: 79
End: 2024-12-30
Payer: MEDICARE

## 2024-12-30 VITALS
SYSTOLIC BLOOD PRESSURE: 138 MMHG | BODY MASS INDEX: 27.12 KG/M2 | OXYGEN SATURATION: 97 % | HEART RATE: 76 BPM | DIASTOLIC BLOOD PRESSURE: 60 MMHG | WEIGHT: 163 LBS

## 2024-12-30 DIAGNOSIS — I10 ESSENTIAL HYPERTENSION: ICD-10-CM

## 2024-12-30 DIAGNOSIS — G89.29 CHRONIC NECK PAIN: Primary | ICD-10-CM

## 2024-12-30 DIAGNOSIS — M54.2 CHRONIC NECK PAIN: Primary | ICD-10-CM

## 2024-12-30 DIAGNOSIS — M10.9 GOUT, ARTHRITIS: ICD-10-CM

## 2024-12-30 PROCEDURE — 99213 OFFICE O/P EST LOW 20 MIN: CPT | Mod: PBBFAC | Performed by: INTERNAL MEDICINE

## 2024-12-30 PROCEDURE — 99999 PR PBB SHADOW E&M-EST. PATIENT-LVL III: CPT | Mod: PBBFAC,,, | Performed by: INTERNAL MEDICINE

## 2024-12-30 PROCEDURE — 99214 OFFICE O/P EST MOD 30 MIN: CPT | Mod: S$PBB,,, | Performed by: INTERNAL MEDICINE

## 2024-12-30 RX ORDER — PREDNISONE 10 MG/1
TABLET ORAL
Qty: 10 TABLET | Refills: 0 | Status: SHIPPED | OUTPATIENT
Start: 2024-12-30

## 2024-12-30 RX ORDER — INDOMETHACIN 50 MG/1
50 CAPSULE ORAL 3 TIMES DAILY PRN
Qty: 30 CAPSULE | Refills: 3 | Status: SHIPPED | OUTPATIENT
Start: 2024-12-30

## 2024-12-30 NOTE — PATIENT INSTRUCTIONS
Neck pain - could be a gouty flare.   Prednisone taper.    Colchicine 0.6 mg once daily during flare.    Indomethacin 50 mg three times daily as needed.  Continue heating pad.   Continue Tizanidine 4 mg at bedtime

## 2024-12-30 NOTE — PROGRESS NOTES
CHIEF COMPLAINT:  Follow up of multiple problems     HISTORY OF PRESENT ILLNESS: 79-year-old woman presents for follow up of above     She has had a 3 day history of bilateral neck pain. She has been using a heating pad and taking tizanidine 4 mg in the evening.  Currently pain is on the right side of the neck and shoulder.  She took 2 advil last night.   She feels that her head is heavy on her body- no headaches. She has pain and stiffness if she tries to turn her head to the right or left. Without moving her head, pain is slight - 2/10.  If she moves her head to the right or left, pain is severe.   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  She has been dealing with taxes and finances.     She wonders if this neck pain is gout related- she cooked a roast and ate roast for clare and the day after.  She has not had seafood lately.      She saw Dr Gaytan in rheumatology on 9/19/24 and Dr Carr on 8/27/24 for her knees. She finished physical therapy in December twice a week for leg strengthening which is good. Her legs are doing better.  She is having some leg pain at night.        She had 4 infusions of IV iron from 1/3/24 to 1/24/24. She is followed by Dr Bianchi. She is off oral iron.       She is having some constipation - BM every other day. She is taking Linzess every day or every other day.  She has a BM after taking the Linzess.       Rash on her forearms comes and goes.  She saw Dermatology 11/21/24. She could not tolerate colchichine - gave her stomach cramps.  She tried dapsone which did not help.   She is using betamethasone cream twice daily as needed for the rash which helps.   She saw Dr Herrera on 8/23/23 She uses cetaphil soap and lotion and Super Fatted Basis which helps.  She has a similar rash intermittently and had a big years ago and saw Dr Herrera in Derm 11/3/2017 and was diagnosed with erythema annulare centrifugum - biopsy was done at that time- SUBCORNEAL PUSTULAR DERMATITIS WITH  ASSOCIATED EPIDERMAL SPONGIOSIS       She had a bunionectomy and hammertoe surgery on the left foot 7/8/21 with Dr Bunn. She has seen Dr Jasso who wants to redo the surgery.  Dr Jasso gave her injections in her toes which helped with the pain. She cannot wear closed shoes. Has not had the surgery yet. No toe pain     No gout attacks.  She takes allopurinol 100 mg 2 tablet nightly.      She had an injection in both of her knees on 7/3/24.   The cortisone injections lasted 2-3 weeks.       Anxiety is controlled on Lexapro to 10 mg 1.5 tablets daily.  Mood is good. Her  can aggravate her.   59 years.        Heartburn is congroled on omperazole 20 mg twice daily     She continues to take Crestor 20 mg 1 tablet daily with co enzyme 10 200 mg  daily - leg achintess is better with lower dose of crestor. She also takes losartan 50 mg twice daily, and spironolactone 25 mg daily. BP has been doing well.        She had a normal cystoscope 12/17/14 due to recurrent UTI. No dysuria or hematuria now. She is no longer using estrogen vaginal cream for vaginal atrophy three times weekly          PAST MEDICAL HISTORY:   1. Hypertension.   2. Hyperlipidemia.   3. Gout.   4. History of rashes.   5. TIA in 1997  6. Fibrocystic breast disease.   7. Postmenopausal.   8. Osteoarthritis.   9. History of tobacco usage; quit in 1997.   10. History of colon polyps; normal colonoscopy in 10/07 and normal 4/2010, due 2015   11. Normal bone mineral density scan in 6/06.   12. Anxiety and depression - controlled on lorazepam very rarely.   13. Varicose veins.   14. Intermittent GERD.   15. Iron deficiency anemia 4/2010 - due to erosive gastropathy on EGD 5/2010     PAST SURGICAL HISTORY:   1. Right carotid endarterectomy in 1997.   2. Breast reduction surgery.   3. Total abdominal hysterectomy.   4. Blood transfusion prior to 1983.     SOCIAL HISTORY: Quit smoking in 1997; smoked 1 PPD since age 16. No   alcohol use.     FAMILY  HISTORY:   Father had gout, hypertension and heart failure; is . Mother had hypertension and pancreatic cancer; is also . Has five brothers, one of whom  in a motor vehicle accident. All have gout and hypertension. One brother has Crohn's disease; another has bladder cancer.     MEDICATIONS and ALLERGIES: Updated on epic.     PHYSICAL EXAMINATION:       /60   Pulse 76   Wt 73.9 kg (163 lb)   LMP  (LMP Unknown)   SpO2 97%   BMI 27.12 kg/m²       General: Alert, oriented. No apparent distress. Affect within normal   Limits.   Conjunctivae anicteric. Tympanic membranes clear. Oropharynx clear.   Neck supple.   Respiratory effort normal. Lungs clear  Heart: Regular rate and rhythm without murmurs, gallops or rubs.   No lower extremity edema.   Neck with tenderness over the paraspinous muscles.      Labs 10/30/24 reviewed      ASSESSMENT AND PLAN:       Neck pain - could be a gouty flare. Prednisone taper.  Colchicine 0.6 mg once daily during flare.  Indomethacin 50 mg three times daily. Continue heating pad. Continue Tizanidine 4 mg at bedtime   OA knees -in physical thearpy   iron deficiency anemia - better.   Soluble transferrin still low -   Mood disorder-on lexapro to 10 mg 1.5 tablet daily.   Rash - betamethasone cream twice daily. Has an apt in derm. Use gloves when wash dishes  Essential hypertension - stable   Heme positive stools - saw Dr Kingsley 3/2023 -had colonoscopy 23  Hyperlipidemia - on crestor to 20 mg 1 tablet daily.    Erosive gastropathy - on omeprazole to 40 mg twice daily.  Hypercalcemia - stable  Gout -  on allopurinol. Uric acid level  Carotid artery disease - on risk factor modification.   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  I will see her back 4 months,   sooner if problems arise

## 2025-01-02 ENCOUNTER — OFFICE VISIT (OUTPATIENT)
Dept: DERMATOLOGY | Facility: CLINIC | Age: 80
End: 2025-01-02
Payer: MEDICARE

## 2025-01-02 DIAGNOSIS — L13.1 SUBCORNEAL PUSTULAR DERMATITIS: Primary | ICD-10-CM

## 2025-01-02 PROCEDURE — G2211 COMPLEX E/M VISIT ADD ON: HCPCS | Mod: S$PBB,,, | Performed by: STUDENT IN AN ORGANIZED HEALTH CARE EDUCATION/TRAINING PROGRAM

## 2025-01-02 PROCEDURE — 99214 OFFICE O/P EST MOD 30 MIN: CPT | Mod: S$PBB,,, | Performed by: STUDENT IN AN ORGANIZED HEALTH CARE EDUCATION/TRAINING PROGRAM

## 2025-01-02 PROCEDURE — 99213 OFFICE O/P EST LOW 20 MIN: CPT | Mod: PBBFAC | Performed by: STUDENT IN AN ORGANIZED HEALTH CARE EDUCATION/TRAINING PROGRAM

## 2025-01-02 PROCEDURE — 99999 PR PBB SHADOW E&M-EST. PATIENT-LVL III: CPT | Mod: PBBFAC,,, | Performed by: STUDENT IN AN ORGANIZED HEALTH CARE EDUCATION/TRAINING PROGRAM

## 2025-01-02 RX ORDER — BETAMETHASONE DIPROPIONATE 0.5 MG/G
CREAM TOPICAL 2 TIMES DAILY
Qty: 90 G | Refills: 3 | Status: SHIPPED | OUTPATIENT
Start: 2025-01-02

## 2025-01-02 RX ORDER — CALCIPOTRIENE 50 UG/G
CREAM TOPICAL 2 TIMES DAILY
Qty: 60 G | Refills: 6 | Status: SHIPPED | OUTPATIENT
Start: 2025-01-02 | End: 2026-01-02

## 2025-01-02 NOTE — PROGRESS NOTES
Subjective:      Patient ID:  sIael Salmon is a 79 y.o. female who presents for   Chief Complaint   Patient presents with    Follow-up     Rash      Pt presents for rash on arms  Pt states had a gout flare around [place] - saw PCP - given medicine and steroids.   Pt noticed improvement with rash       Rash on hand for many years. Uses diprolene. Individual lesions resolve then new ones recur. Previously biopsied by Dr. Herrera with result as below suggestive of subcornal pustular dermatosis     Skin, left chest, punch biopsy:  - SUBCORNEAL PUSTULAR DERMATITIS WITH ASSOCIATED EPIDERMAL SPONGIOSIS (SEE COMMENT).  MICROSCOPIC DESCRIPTION: Sections show a punch biopsy of skin extending to the level of the deep reticular  dermis. The epidermis exhibits spongiosis with exocytosis of lymphocytes and neutrophils. There is focal  subcorneal pustule formation. The underlying superficial to mid dermis contains a perivascular and interstitial  lymphohistiocytic infiltrate with prominent scattered neutrophils. PAS stain is negative for fungi. Appropriately  reactive controls were reviewed. Multiple levels were examined.  COMMENT: These histologic features are suggestive of subcorneal pustular dermatosis. Similar features can be  seen in the context of bullous impetigo.    Seen initially 11/21/24  Plan at last visit:  - c/w diprolene bid to AA  - start topical dapsone qd  - start colchicine bid as above    - She is only taking colchicine qd as unable to tolerate with diarrhea  - she did have one flare and used diprolene, which helped it resolve over several days    Review of Systems    Objective:   Physical Exam   Constitutional: She appears well-developed and well-nourished. No distress.   Neurological: She is alert and oriented to person, place, and time. She is not disoriented.   Psychiatric: She has a normal mood and affect.   Skin:   Areas Examined (abnormalities noted in diagram):   RUE Inspected  LUE Inspection  Performed            Diagram Legend     Erythematous scaling macule/papule c/w actinic keratosis       Vascular papule c/w angioma      Pigmented verrucoid papule/plaque c/w seborrheic keratosis      Yellow umbilicated papule c/w sebaceous hyperplasia      Irregularly shaped tan macule c/w lentigo     1-2 mm smooth white papules consistent with Milia      Movable subcutaneous cyst with punctum c/w epidermal inclusion cyst      Subcutaneous movable cyst c/w pilar cyst      Firm pink to brown papule c/w dermatofibroma      Pedunculated fleshy papule(s) c/w skin tag(s)      Evenly pigmented macule c/w junctional nevus     Mildly variegated pigmented, slightly irregular-bordered macule c/w mildly atypical nevus      Flesh colored to evenly pigmented papule c/w intradermal nevus       Pink pearly papule/plaque c/w basal cell carcinoma      Erythematous hyperkeratotic cursted plaque c/w SCC      Surgical scar with no sign of skin cancer recurrence      Open and closed comedones      Inflammatory papules and pustules      Verrucoid papule consistent consistent with wart     Erythematous eczematous patches and plaques     Dystrophic onycholytic nail with subungual debris c/w onychomycosis     Umbilicated papule    Erythematous-base heme-crusted tan verrucoid plaque consistent with inflamed seborrheic keratosis     Erythematous Silvery Scaling Plaque c/w Psoriasis     See annotation      Assessment / Plan:        Subcorneal pustular dermatitis  -     betamethasone dipropionate 0.05 % cream; Apply topically 2 (two) times daily. Use to affected areas for up to 2 weeks then take a 1 week break or decrease to 3 times weekly. Do not apply to groin or face. Use to rash on arms when needed  Dispense: 90 g; Refill: 3  -     calcipotriene (DOVONOX) 0.005 % cream; Apply topically 2 (two) times daily. Apply to rash on arms. Can use daily for maintenance  Dispense: 60 g; Refill: 6    - annular erythematous plaques on arms and hands.  Prior biopsy with subcornal pustular dermatosis. Using betamethasone which helps but continues to get new lesions  - c/w diprolene bid to AA prn flares  - c/w dapsone 7.5% qd and will start calcipotriene as above qd as steroid sparing agent  - c/w colchicine. Try to increase to bid if tolerates  - has chronic baseline anemia so would like to avoid dapsone. Other treatment options include acitretin, phototherapy, biologics (TNFalpha inhibitors, IL23, etc). Reports of otezla  - discussed treatment options and possible side effects. She would prefer to continue with topical therapy for now and will f/u if it worsens and she would like to start maintenance nbUVB or systemic therapy

## 2025-01-03 NOTE — PROGRESS NOTES
PATIENT: Isael Salmon  MRN: [id number]  DATE: 1/7/2025    Diagnosis:   1. Iron deficiency anemia due to chronic blood loss    2. Angiodysplasia of small intestine    3. Anemia due to stage 3a chronic kidney disease      Chief Complaint: Anemia    Oncologic History:      Oncologic History     Oncologic Treatment     Pathology       Telemedicine visit:  The patient location is: home  The chief complaint leading to consultation is: anemia    Visit type: audiovisual    Face to Face time with patient: 10 minutes  15 minutes of total time spent on the encounter, which includes face to face time and non-face to face time preparing to see the patient (eg, review of tests), Obtaining and/or reviewing separately obtained history, Documenting clinical information in the electronic or other health record, Independently interpreting results (not separately reported) and communicating results to the patient/family/caregiver, or Care coordination (not separately reported).     Each patient to whom he or she provides medical services by telemedicine is:  (1) informed of the relationship between the physician and patient and the respective role of any other health care provider with respect to management of the patient; and (2) notified that he or she may decline to receive medical services by telemedicine and may withdraw from such care at any time.    Notes: see below      Subjective:    History of Present Illness: Ms. Salmon is a 79 y.o. female who presented in December 2023 for evaluation and management of iron deficiency anemia. She was referred by Dr. Montoya.    - she has angiodysplasia of her small intestine  - labs since 2006 reveal a mild-to-moderate anemia.  - iron studies (12/4/23) reveal a low-normal ferritin.  - she restarted oral iron last week.  - she received iron sucrose x 4 doses in January 2024. She did note notice a big improvement in her symptoms.      Interval history:  - she presents for a follow-up  appointment for her iron deficiency anemia.  - today, she is doing well.she had been taking prednisone/indomethacin for a gout flare.  She endorses knee pain.  - She denies chest pain, nausea, vomiting, diarrhea, constipation.        Past medical, surgical, family, and social histories have been reviewed and updated below.    Past Medical History:   Past Medical History:   Diagnosis Date    Adjustment disorder 07/26/2012    Anemia 07/26/2012    AR (allergic rhinitis) 09/05/2014    Bronchitis     Fibrocystic breast disease in female 07/26/2012    GERD (gastroesophageal reflux disease) 07/26/2012    Gout     Gout, arthritis 07/26/2012    History of blood transfusion 07/26/2012    History of colonic polyps 07/26/2012    Hypercalcemia 09/20/2012    Hyperlipidemia 07/26/2012    Hypertension 07/26/2012    Nuclear sclerosis 05/02/2014    Osteoarthritis 07/26/2012    Other hyperparathyroidism 09/20/2012    Postmenopausal status 07/26/2012    PVD (peripheral vascular disease) 07/26/2012    left leg laser of vein with injection    Rheumatoid arthritis, unspecified     Stroke 1997    TIA    Superficial vein thrombosis     Transient ischemic attack 07/26/2012    Varicose veins 07/26/2012       Past Surgical History:   Past Surgical History:   Procedure Laterality Date    ANTEGRADE SINGLE BALLOON ENTEROSCOPY N/A 9/18/2023    Procedure: ENTEROSCOPY, SINGLE BALLOON, ANTEGRADE;  Surgeon: Jaziel Kingsley MD;  Location: [facility] ([address] [place] [address]);  Service: Endoscopy;  Laterality: N/A;  7/25/23-PEG prep, Instructions via portal-DS    BREAST CYST ASPIRATION Bilateral     multiple ones over the years    BREAST CYST EXCISION Left     BREAST SURGERY      breast reduction    broken second finger Right 12/2015    pins placed    carotid endarterectomy  1997    right    CAROTID ENDARTERECTOMY Right 1997    CATARACT EXTRACTION W/  INTRAOCULAR LENS IMPLANT Right 05/01/2018    Dr. Yost    CATARACT EXTRACTION W/  INTRAOCULAR LENS IMPLANT Left  05/15/2018    Dr. Lester    COLONOSCOPY N/A 10/26/2015    Procedure: COLONOSCOPY;  Surgeon: Manuel Alanis MD;  Location: [facility] ENDO (4TH FLR);  Service: Endoscopy;  Laterality: N/A;    COLONOSCOPY N/A 9/18/2023    Procedure: COLONOSCOPY;  Surgeon: Jaziel Kingsley MD;  Location: [facility] ENDO (2ND FLR);  Service: Endoscopy;  Laterality: N/A;  can use single-balloon for this part as well    CORRECTION OF HAMMER TOE Left 07/08/2021    Procedure: CORRECTION, HAMMER TOE Second toe;  Surgeon: Silver Bunn MD;  Location: [facility] OR;  Service: Orthopedics;  Laterality: Left;  Ossiofiber hammertoe implant    EYE SURGERY      HYSTERECTOMY      ORIF FINGER FRACTURE  12/18/2015    SURGICAL REMOVAL OF BUNION WITH OSTEOTOMY OF METATARSAL BONE Left 07/08/2021    Procedure: BUNIONECTOMY, WITH METATARSAL OSTEOTOMY Proximal opening wedge;  Surgeon: Silver Bunn MD;  Location: [facility] OR;  Service: Orthopedics;  Laterality: Left;  Arthrex opening wedge plate    TIA      TOTAL REDUCTION MAMMOPLASTY Bilateral     VASCULAR SURGERY      left leg vein laser       Family History:   Family History   Problem Relation Name Age of Onset    Heart failure Father      Cancer Mother          pancreas    Cataracts Mother      Hypertension Mother      Cancer Brother          bladder    Cataracts Brother      Hypertension Brother      Crohn's disease Brother      Gout Brother      Cataracts Brother      Hypertension Brother      Gout Brother      Cataracts Brother      Hypertension Brother      Gout Brother      Bone cancer Brother      Cataracts Brother      Hypertension Brother      Breast cancer Neg Hx      Ovarian cancer Neg Hx      Allergies Neg Hx      Asthma Neg Hx      Eczema Neg Hx      Cervical cancer Neg Hx      Endometrial cancer Neg Hx      Vaginal cancer Neg Hx      Amblyopia Neg Hx      Blindness Neg Hx      Diabetes Neg Hx      Glaucoma Neg Hx      Macular degeneration Neg Hx      Retinal detachment Neg Hx      Strabismus  Neg Hx      Stroke Neg Hx      Thyroid disease Neg Hx      Celiac disease Neg Hx      Colon cancer Neg Hx      Esophageal cancer Neg Hx      Liver disease Neg Hx      Liver cancer Neg Hx      Rectal cancer Neg Hx      Stomach cancer Neg Hx         Social History:  reports that she quit smoking about 35 years ago. Her smoking use included cigarettes. She has never used smokeless tobacco. She reports that she does not currently use alcohol after a past usage of about 1.0 standard drink of alcohol per week. She reports that she does not use drugs.    Allergies:  Review of patient's allergies indicates:   Allergen Reactions    Avelox [moxifloxacin] Palpitations     Racing heart and gets the shakes    Latex Rash    Pcn [penicillins] Rash    Sulfa (sulfonamide antibiotics) Blisters    Ciprofloxacin      Room starts to spin  and nausea and dizziness    Codeine Nausea Only     nausea and weakness       Medications:  Current Outpatient Medications   Medication Sig Dispense Refill    acetaminophen (TYLENOL) 325 MG tablet Take 2 tablets (650 mg total) by mouth every 4 (four) hours as needed for Pain (Fever). 30 tablet 1    allopurinoL (ZYLOPRIM) 100 MG tablet Take 2 tablets (200 mg total) by mouth Daily. 180 tablet 1    ascorbic acid, vitamin C, (VITAMIN C) 500 MG tablet Take 500 mg by mouth once daily.      aspirin (ECOTRIN) 81 MG EC tablet Take 81 mg by mouth once daily.       b complex vitamins capsule Take 1 capsule by mouth once daily.      betamethasone dipropionate 0.05 % cream Apply topically 2 (two) times daily. Use to affected areas for up to 2 weeks then take a 1 week break or decrease to 3 times weekly. Do not apply to groin or face. Use to rash on arms when needed 90 g 3    BIOTIN ORAL Take 1 tablet by mouth once daily.      calcipotriene (DOVONOX) 0.005 % cream Apply topically 2 (two) times daily. Apply to rash on arms. Can use daily for maintenance 60 g 6    co-enzyme Q-10 30 mg capsule Take 200 mg by mouth once  daily.      colchicine (COLCRYS) 0.6 mg tablet Start with one pill once daily. If tolerating well after 2 weeks then increase to 1 pill twice daily 60 tablet 3    cycloSPORINE (RESTASIS MULTIDOSE) 0.05 % Drop Apply 1 drop to eye every 12 (twelve) hours. 16.5 mL 3    dapsone (ACZONE) 7.5 % GlwP Apply 1 Application topically once daily. Apply to rash on arms 60 g 5    ergocalciferol (ERGOCALCIFEROL) 50,000 unit Cap TAKE 1 CAPSULE BY MOUTH 2 TIMES A WEEK 24 capsule 5    EScitalopram oxalate (LEXAPRO) 10 MG tablet Take 1.5 tablets (15 mg total) by mouth once daily. 135 tablet 1    fluticasone propionate (FLONASE) 50 mcg/actuation nasal spray 1 spray (50 mcg total) by Each Nostril route once daily. 11.1 mL 0    indomethacin (INDOCIN) 50 MG capsule Take 1 capsule (50 mg total) by mouth 3 (three) times daily as needed. 30 capsule 3    ipratropium (ATROVENT) 0.02 % nebulizer solution Take 2.5 mLs (500 mcg total) by nebulization 3 (three) times daily as needed for Wheezing. 62.5 mL 0    LINZESS 72 mcg Cap capsule TAKE 1 CAPSULE(72 MCG) BY MOUTH BEFORE BREAKFAST 90 capsule 3    loratadine (CLARITIN) 10 mg tablet Take 1 tablet (10 mg total) by mouth once daily. 30 tablet 0    losartan (COZAAR) 50 MG tablet TAKE 1 TABLET(50 MG) BY MOUTH TWICE DAILY 180 tablet 3    multivit,calc,mins/iron/folic (ONE-A-DAY WOMENS FORMULA ORAL) Take 1 tablet by mouth once daily.      omeprazole (PRILOSEC) 40 MG capsule Take 1 capsule (40 mg total) by mouth 2 (two) times daily. 180 capsule 4    predniSONE (DELTASONE) 10 MG tablet 2 tablets daily for 3 days then one tablet daily for 4 days 10 tablet 0    rosuvastatin (CRESTOR) 20 MG tablet Take 1 tablet (20 mg total) by mouth once daily. 90 tablet 3    spironolactone (ALDACTONE) 25 MG tablet Take 1 tablet (25 mg total) by mouth once daily. 90 tablet 4    tiZANidine (ZANAFLEX) 4 MG tablet Take 1 tablet (4 mg total) by mouth nightly. 30 tablet 5    WESTAB MAX 2.5-25-2 mg Tab TAKE 1 TABLET BY MOUTH  EVERY DAY 30 tablet 11     No current facility-administered medications for this visit.       Review of Systems   Constitutional:  Positive for fatigue.   HENT:  Negative for sore throat.    Eyes:  Negative for visual disturbance.   Respiratory:  Negative for cough.    Cardiovascular:  Negative for chest pain.   Gastrointestinal:  Negative for abdominal pain, constipation, diarrhea, nausea and vomiting.   Genitourinary:  Negative for dysuria.   Musculoskeletal:  Positive for neck pain. Negative for back pain.   Skin:  Negative for rash.   Neurological:  Negative for headaches.   Hematological:  Negative for adenopathy.   Psychiatric/Behavioral:  The patient is not nervous/anxious.        ECOG Performance Status:   ECOG SCORE    1 - Restricted in strenuous activity-ambulatory and able to carry out work of a light nature         Objective:      Vitals:   There were no vitals filed for this visit.    BMI: There is no height or weight on file to calculate BMI.  Deferred due to telemedicine visit.    Physical Exam  Deferred due to telemedicine visit.    Laboratory Data:  Labs have been reviewed.    Lab Results   Component Value Date    WBC 7.71 01/06/2025    HGB 9.8 (L) 01/06/2025    HCT 31.4 (L) 01/06/2025    MCV 98 01/06/2025     01/06/2025     Lab Results   Component Value Date    FERRITIN 34 01/06/2025     Lab Results   Component Value Date    UIBC 235 08/13/2012    IRON 106 01/06/2025    TRANSFERRIN 224 01/06/2025    TIBC 332 01/06/2025    FESATURATED 32 01/06/2025            Imaging:    Upper GI enteroscopy (9/18/23):   - Normal esophagus.                          - Esophagogastric landmarks identified.                          - Normal stomach.                          - Normal examined duodenum.                          - The examined portion of the jejunum was normal.                          - The examined portion of the ileum was normal.                          Tattooed maximal insertion.                           - No specimens collected.     Colonoscopy (9/18/23):   - Hemorrhoids found on perianal exam.                          - Diverticulosis in the entire examined colon.                          - One 3 mm polyp in the transverse colon, removed                          with a cold snare. Resected and retrieved.                          - The examination was otherwise normal on direct                          and retroflexion views.       Assessment:       1. Iron deficiency anemia due to chronic blood loss    2. Angiodysplasia of small intestine    3. Anemia due to stage 3a chronic kidney disease           Plan:     Iron deficiency anemia / angiodysplasia of small intestine  / anemia in CKD / anemia of chronic disease  - I have reviewed her chart  - bone marrow biopsy (2012) was negative for malignancy.  - she has angiodysplasia of her small intestine  - labs since 2006 reveal a mild-to-moderate anemia.  - iron studies (12/4/23) reveal a low-normal ferritin.  - she takes oral iron  - I recommended iron sucrose x 4 doses   - she received iron sucrose x 4 doses in January 2024.  - Labs have been reviewed. Hemoglobin is stable at 9.8 g/dL. Ferritin has decreased from 71 ng/mL to 34 ng/mL  - part of her anemia may be anemia in CKD, anemia of chronic disease  - we will schedule ferric gluconate at [facility].  - return to clinic in 6 months with repeat labs.    2. Advance Care Planning   Power of   After our discussion (at previous visit), she decided not to complete a HCPOA.        - return to clinic in 6 months with repeat labs.    Kem Bianchi M.D.  Hematology/Oncology  [facility] - [address], [address]  [place] [address]  Phone: [phone]  Fax: [phone]

## 2025-01-06 ENCOUNTER — LAB VISIT (OUTPATIENT)
Dept: LAB | Facility: OTHER | Age: 80
End: 2025-01-06
Attending: INTERNAL MEDICINE
Payer: MEDICARE

## 2025-01-06 DIAGNOSIS — D63.1 ANEMIA DUE TO STAGE 3B CHRONIC KIDNEY DISEASE: ICD-10-CM

## 2025-01-06 DIAGNOSIS — N18.32 ANEMIA DUE TO STAGE 3B CHRONIC KIDNEY DISEASE: ICD-10-CM

## 2025-01-06 DIAGNOSIS — D50.0 IRON DEFICIENCY ANEMIA DUE TO CHRONIC BLOOD LOSS: ICD-10-CM

## 2025-01-06 LAB
ALBUMIN SERPL BCP-MCNC: 3.9 G/DL (ref 3.5–5.2)
ALP SERPL-CCNC: 70 U/L (ref 40–150)
ALT SERPL W/O P-5'-P-CCNC: 15 U/L (ref 10–44)
ANION GAP SERPL CALC-SCNC: 8 MMOL/L (ref 8–16)
AST SERPL-CCNC: 14 U/L (ref 10–40)
BASOPHILS # BLD AUTO: 0.04 K/UL (ref 0–0.2)
BASOPHILS NFR BLD: 0.5 % (ref 0–1.9)
BILIRUB SERPL-MCNC: 0.7 MG/DL (ref 0.1–1)
BUN SERPL-MCNC: 34 MG/DL (ref 8–23)
CALCIUM SERPL-MCNC: 10.5 MG/DL (ref 8.7–10.5)
CHLORIDE SERPL-SCNC: 112 MMOL/L (ref 95–110)
CO2 SERPL-SCNC: 25 MMOL/L (ref 23–29)
CREAT SERPL-MCNC: 1.2 MG/DL (ref 0.5–1.4)
DIFFERENTIAL METHOD BLD: ABNORMAL
EOSINOPHIL # BLD AUTO: 0.1 K/UL (ref 0–0.5)
EOSINOPHIL NFR BLD: 1.8 % (ref 0–8)
ERYTHROCYTE [DISTWIDTH] IN BLOOD BY AUTOMATED COUNT: 13.3 % (ref 11.5–14.5)
EST. GFR  (NO RACE VARIABLE): 45 ML/MIN/1.73 M^2
FERRITIN SERPL-MCNC: 34 NG/ML (ref 20–300)
GLUCOSE SERPL-MCNC: 90 MG/DL (ref 70–110)
HCT VFR BLD AUTO: 31.4 % (ref 37–48.5)
HGB BLD-MCNC: 9.8 G/DL (ref 12–16)
IMM GRANULOCYTES # BLD AUTO: 0.04 K/UL (ref 0–0.04)
IMM GRANULOCYTES NFR BLD AUTO: 0.5 % (ref 0–0.5)
IRON SERPL-MCNC: 106 UG/DL (ref 30–160)
LYMPHOCYTES # BLD AUTO: 1.8 K/UL (ref 1–4.8)
LYMPHOCYTES NFR BLD: 23.1 % (ref 18–48)
MCH RBC QN AUTO: 30.7 PG (ref 27–31)
MCHC RBC AUTO-ENTMCNC: 31.2 G/DL (ref 32–36)
MCV RBC AUTO: 98 FL (ref 82–98)
MONOCYTES # BLD AUTO: 0.7 K/UL (ref 0.3–1)
MONOCYTES NFR BLD: 9.6 % (ref 4–15)
NEUTROPHILS # BLD AUTO: 5 K/UL (ref 1.8–7.7)
NEUTROPHILS NFR BLD: 64.5 % (ref 38–73)
NRBC BLD-RTO: 0 /100 WBC
PLATELET # BLD AUTO: 214 K/UL (ref 150–450)
PMV BLD AUTO: 10.9 FL (ref 9.2–12.9)
POTASSIUM SERPL-SCNC: 4.4 MMOL/L (ref 3.5–5.1)
PROT SERPL-MCNC: 7.1 G/DL (ref 6–8.4)
RBC # BLD AUTO: 3.19 M/UL (ref 4–5.4)
SATURATED IRON: 32 % (ref 20–50)
SODIUM SERPL-SCNC: 145 MMOL/L (ref 136–145)
TOTAL IRON BINDING CAPACITY: 332 UG/DL (ref 250–450)
TRANSFERRIN SERPL-MCNC: 224 MG/DL (ref 200–375)
WBC # BLD AUTO: 7.71 K/UL (ref 3.9–12.7)

## 2025-01-06 PROCEDURE — 85025 COMPLETE CBC W/AUTO DIFF WBC: CPT | Performed by: INTERNAL MEDICINE

## 2025-01-06 PROCEDURE — 84466 ASSAY OF TRANSFERRIN: CPT | Performed by: INTERNAL MEDICINE

## 2025-01-06 PROCEDURE — 36415 COLL VENOUS BLD VENIPUNCTURE: CPT | Performed by: INTERNAL MEDICINE

## 2025-01-06 PROCEDURE — 82728 ASSAY OF FERRITIN: CPT | Performed by: INTERNAL MEDICINE

## 2025-01-06 PROCEDURE — 80053 COMPREHEN METABOLIC PANEL: CPT | Performed by: INTERNAL MEDICINE

## 2025-01-07 ENCOUNTER — OFFICE VISIT (OUTPATIENT)
Dept: HEMATOLOGY/ONCOLOGY | Facility: CLINIC | Age: 80
End: 2025-01-07
Payer: MEDICARE

## 2025-01-07 DIAGNOSIS — D50.0 IRON DEFICIENCY ANEMIA DUE TO CHRONIC BLOOD LOSS: Primary | ICD-10-CM

## 2025-01-07 DIAGNOSIS — K55.20 ANGIODYSPLASIA OF SMALL INTESTINE: ICD-10-CM

## 2025-01-07 DIAGNOSIS — D63.1 ANEMIA DUE TO STAGE 3A CHRONIC KIDNEY DISEASE: ICD-10-CM

## 2025-01-07 DIAGNOSIS — N18.31 ANEMIA DUE TO STAGE 3A CHRONIC KIDNEY DISEASE: ICD-10-CM

## 2025-01-07 PROCEDURE — 98004 SYNCH AUDIO-VIDEO EST SF 10: CPT | Mod: 95,,, | Performed by: INTERNAL MEDICINE

## 2025-01-07 RX ORDER — SODIUM CHLORIDE 0.9 % (FLUSH) 0.9 %
10 SYRINGE (ML) INJECTION
OUTPATIENT
Start: 2025-01-14

## 2025-01-07 RX ORDER — SODIUM CHLORIDE 0.9 % (FLUSH) 0.9 %
10 SYRINGE (ML) INJECTION
OUTPATIENT
Start: 2025-01-21

## 2025-01-07 RX ORDER — HEPARIN 100 UNIT/ML
500 SYRINGE INTRAVENOUS
OUTPATIENT
Start: 2025-01-21

## 2025-01-07 RX ORDER — SODIUM CHLORIDE 0.9 % (FLUSH) 0.9 %
10 SYRINGE (ML) INJECTION
OUTPATIENT
Start: 2025-01-28

## 2025-01-07 RX ORDER — HEPARIN 100 UNIT/ML
500 SYRINGE INTRAVENOUS
OUTPATIENT
Start: 2025-02-04

## 2025-01-07 RX ORDER — EPINEPHRINE 0.3 MG/.3ML
0.3 INJECTION SUBCUTANEOUS ONCE AS NEEDED
OUTPATIENT
Start: 2025-01-14

## 2025-01-07 RX ORDER — EPINEPHRINE 0.3 MG/.3ML
0.3 INJECTION SUBCUTANEOUS ONCE AS NEEDED
OUTPATIENT
Start: 2025-02-04

## 2025-01-07 RX ORDER — DIPHENHYDRAMINE HYDROCHLORIDE 50 MG/ML
50 INJECTION INTRAMUSCULAR; INTRAVENOUS ONCE AS NEEDED
OUTPATIENT
Start: 2025-02-04

## 2025-01-07 RX ORDER — HEPARIN 100 UNIT/ML
500 SYRINGE INTRAVENOUS
OUTPATIENT
Start: 2025-01-28

## 2025-01-07 RX ORDER — EPINEPHRINE 0.3 MG/.3ML
0.3 INJECTION SUBCUTANEOUS ONCE AS NEEDED
OUTPATIENT
Start: 2025-01-21

## 2025-01-07 RX ORDER — DIPHENHYDRAMINE HYDROCHLORIDE 50 MG/ML
50 INJECTION INTRAMUSCULAR; INTRAVENOUS ONCE AS NEEDED
OUTPATIENT
Start: 2025-01-14

## 2025-01-07 RX ORDER — DIPHENHYDRAMINE HYDROCHLORIDE 50 MG/ML
50 INJECTION INTRAMUSCULAR; INTRAVENOUS ONCE AS NEEDED
OUTPATIENT
Start: 2025-01-21

## 2025-01-07 RX ORDER — EPINEPHRINE 0.3 MG/.3ML
0.3 INJECTION SUBCUTANEOUS ONCE AS NEEDED
OUTPATIENT
Start: 2025-01-28

## 2025-01-07 RX ORDER — SODIUM CHLORIDE 0.9 % (FLUSH) 0.9 %
10 SYRINGE (ML) INJECTION
OUTPATIENT
Start: 2025-02-04

## 2025-01-07 RX ORDER — DIPHENHYDRAMINE HYDROCHLORIDE 50 MG/ML
50 INJECTION INTRAMUSCULAR; INTRAVENOUS ONCE AS NEEDED
OUTPATIENT
Start: 2025-01-28

## 2025-01-07 RX ORDER — HEPARIN 100 UNIT/ML
500 SYRINGE INTRAVENOUS
OUTPATIENT
Start: 2025-01-14

## 2025-01-07 NOTE — Clinical Note
1. Schedule ferric gluconate x 4 doses at [facility] 2. Schedule cbc, cmp, ferritin, iron/tibc in 6 months. 3. Schedule virtual visit day after labs.  Thanks!

## 2025-01-15 ENCOUNTER — PATIENT MESSAGE (OUTPATIENT)
Dept: DERMATOLOGY | Facility: CLINIC | Age: 80
End: 2025-01-15
Payer: MEDICARE

## 2025-01-29 ENCOUNTER — INFUSION (OUTPATIENT)
Dept: INFUSION THERAPY | Facility: OTHER | Age: 80
End: 2025-01-29
Attending: INTERNAL MEDICINE
Payer: MEDICARE

## 2025-01-29 VITALS
RESPIRATION RATE: 16 BRPM | HEART RATE: 82 BPM | TEMPERATURE: 98 F | OXYGEN SATURATION: 98 % | SYSTOLIC BLOOD PRESSURE: 128 MMHG | DIASTOLIC BLOOD PRESSURE: 59 MMHG

## 2025-01-29 DIAGNOSIS — D50.0 IRON DEFICIENCY ANEMIA DUE TO CHRONIC BLOOD LOSS: Primary | ICD-10-CM

## 2025-01-29 PROCEDURE — 96365 THER/PROPH/DIAG IV INF INIT: CPT

## 2025-01-29 PROCEDURE — 25000003 PHARM REV CODE 250: Performed by: INTERNAL MEDICINE

## 2025-01-29 PROCEDURE — 63600175 PHARM REV CODE 636 W HCPCS: Performed by: INTERNAL MEDICINE

## 2025-01-29 RX ORDER — SODIUM CHLORIDE 0.9 % (FLUSH) 0.9 %
10 SYRINGE (ML) INJECTION
Status: DISCONTINUED | OUTPATIENT
Start: 2025-01-29 | End: 2025-01-29 | Stop reason: HOSPADM

## 2025-01-29 RX ORDER — DIPHENHYDRAMINE HYDROCHLORIDE 50 MG/ML
50 INJECTION INTRAMUSCULAR; INTRAVENOUS ONCE AS NEEDED
Status: DISCONTINUED | OUTPATIENT
Start: 2025-01-29 | End: 2025-01-29 | Stop reason: HOSPADM

## 2025-01-29 RX ORDER — HEPARIN 100 UNIT/ML
500 SYRINGE INTRAVENOUS
Status: DISCONTINUED | OUTPATIENT
Start: 2025-01-29 | End: 2025-01-29 | Stop reason: HOSPADM

## 2025-01-29 RX ORDER — EPINEPHRINE 0.3 MG/.3ML
0.3 INJECTION SUBCUTANEOUS ONCE AS NEEDED
Status: DISCONTINUED | OUTPATIENT
Start: 2025-01-29 | End: 2025-01-29 | Stop reason: HOSPADM

## 2025-01-29 RX ADMIN — SODIUM CHLORIDE 125 MG: 9 INJECTION, SOLUTION INTRAVENOUS at 01:01

## 2025-01-29 NOTE — PLAN OF CARE
Ferrlecit  infusion complete. Pt tolerated well. VSS. NAD.IF to left forearm DC'd.  Next infusion appointment confirmed. Pt verbalized understanding of discharge instructions before leaving.

## 2025-02-04 ENCOUNTER — HOSPITAL ENCOUNTER (EMERGENCY)
Facility: OTHER | Age: 80
Discharge: HOME | End: 2025-02-04
Attending: EMERGENCY MEDICINE
Payer: MEDICARE

## 2025-02-04 VITALS
SYSTOLIC BLOOD PRESSURE: 135 MMHG | HEIGHT: 65 IN | TEMPERATURE: 99 F | BODY MASS INDEX: 26.99 KG/M2 | RESPIRATION RATE: 19 BRPM | HEART RATE: 90 BPM | DIASTOLIC BLOOD PRESSURE: 60 MMHG | OXYGEN SATURATION: 99 % | WEIGHT: 162 LBS

## 2025-02-04 DIAGNOSIS — J10.1 INFLUENZA A: Primary | ICD-10-CM

## 2025-02-04 DIAGNOSIS — R05.9 COUGH: ICD-10-CM

## 2025-02-04 LAB
BILIRUB UR QL STRIP: NEGATIVE
CLARITY UR: CLEAR
COLOR UR: YELLOW
CTP QC/QA: YES
CTP QC/QA: YES
GLUCOSE UR QL STRIP: NEGATIVE
HGB UR QL STRIP: NEGATIVE
KETONES UR QL STRIP: NEGATIVE
LEUKOCYTE ESTERASE UR QL STRIP: NEGATIVE
NITRITE UR QL STRIP: NEGATIVE
PH UR STRIP: 6 [PH] (ref 5–8)
POC MOLECULAR INFLUENZA A AGN: POSITIVE
POC MOLECULAR INFLUENZA B AGN: NEGATIVE
PROT UR QL STRIP: NEGATIVE
SARS-COV-2 RDRP RESP QL NAA+PROBE: NEGATIVE
SP GR UR STRIP: 1.02 (ref 1–1.03)
URN SPEC COLLECT METH UR: NORMAL
UROBILINOGEN UR STRIP-ACNC: NEGATIVE EU/DL

## 2025-02-04 PROCEDURE — 87635 SARS-COV-2 COVID-19 AMP PRB: CPT | Performed by: EMERGENCY MEDICINE

## 2025-02-04 PROCEDURE — 99283 EMERGENCY DEPT VISIT LOW MDM: CPT | Mod: 25

## 2025-02-04 PROCEDURE — 81003 URINALYSIS AUTO W/O SCOPE: CPT | Performed by: EMERGENCY MEDICINE

## 2025-02-04 RX ORDER — OSELTAMIVIR PHOSPHATE 75 MG/1
75 CAPSULE ORAL 2 TIMES DAILY
Qty: 10 CAPSULE | Refills: 0 | Status: SHIPPED | OUTPATIENT
Start: 2025-02-04 | End: 2025-02-09

## 2025-02-04 NOTE — ED PROVIDER NOTES
Encounter Date: 2/4/2025       History     Chief Complaint   Patient presents with    Cough     Productive cough with malaise, fatigue x1 day.     Urinary Frequency     States she urinated on herself 3 times yesterday, denies Hx of incontinence.      78 y/o female with gout, HLD, HTN, and hx of TIA which presents to the ED with cough and fatigue for the past day. Denies fevers, SOB, or chest pain.     The history is provided by the patient.     Review of patient's allergies indicates:   Allergen Reactions    Avelox [moxifloxacin] Palpitations     Racing heart and gets the shakes    Latex Rash    Pcn [penicillins] Rash    Sulfa (sulfonamide antibiotics) Blisters    Ciprofloxacin      Room starts to spin  and nausea and dizziness    Codeine Nausea Only     nausea and weakness     Past Medical History:   Diagnosis Date    Adjustment disorder 07/26/2012    Anemia 07/26/2012    AR (allergic rhinitis) 09/05/2014    Bronchitis     Fibrocystic breast disease in female 07/26/2012    GERD (gastroesophageal reflux disease) 07/26/2012    Gout     Gout, arthritis 07/26/2012    History of blood transfusion 07/26/2012    History of colonic polyps 07/26/2012    Hypercalcemia 09/20/2012    Hyperlipidemia 07/26/2012    Hypertension 07/26/2012    Nuclear sclerosis 05/02/2014    Osteoarthritis 07/26/2012    Other hyperparathyroidism 09/20/2012    Postmenopausal status 07/26/2012    PVD (peripheral vascular disease) 07/26/2012    left leg laser of vein with injection    Rheumatoid arthritis, unspecified     Stroke 1997    TIA    Superficial vein thrombosis     Transient ischemic attack 07/26/2012    Varicose veins 07/26/2012     Past Surgical History:   Procedure Laterality Date    ANTEGRADE SINGLE BALLOON ENTEROSCOPY N/A 9/18/2023    Procedure: ENTEROSCOPY, SINGLE BALLOON, ANTEGRADE;  Surgeon: Jaziel Kingsley MD;  Location: [facility] ENDO ([address] [place] [address]);  Service: Endoscopy;  Laterality: N/A;  7/25/23-PEG prep, Instructions via portal-DS     BREAST CYST ASPIRATION Bilateral     multiple ones over the years    BREAST CYST EXCISION Left     BREAST SURGERY      breast reduction    broken second finger Right 12/2015    pins placed    carotid endarterectomy  1997    right    CAROTID ENDARTERECTOMY Right 1997    CATARACT EXTRACTION W/  INTRAOCULAR LENS IMPLANT Right 05/01/2018    Dr. Yost    CATARACT EXTRACTION W/  INTRAOCULAR LENS IMPLANT Left 05/15/2018    Dr. Lester    COLONOSCOPY N/A 10/26/2015    Procedure: COLONOSCOPY;  Surgeon: Manuel Alanis MD;  Location: [facility] ENDO (4TH FLR);  Service: Endoscopy;  Laterality: N/A;    COLONOSCOPY N/A 9/18/2023    Procedure: COLONOSCOPY;  Surgeon: Jaziel Kingsley MD;  Location: [facility] ENDO (2ND FLR);  Service: Endoscopy;  Laterality: N/A;  can use single-balloon for this part as well    CORRECTION OF HAMMER TOE Left 07/08/2021    Procedure: CORRECTION, HAMMER TOE Second toe;  Surgeon: Silver Bunn MD;  Location: [facility] OR;  Service: Orthopedics;  Laterality: Left;  Ossiofiber hammertoe implant    EYE SURGERY      HYSTERECTOMY      ORIF FINGER FRACTURE  12/18/2015    SURGICAL REMOVAL OF BUNION WITH OSTEOTOMY OF METATARSAL BONE Left 07/08/2021    Procedure: BUNIONECTOMY, WITH METATARSAL OSTEOTOMY Proximal opening wedge;  Surgeon: Silver Bunn MD;  Location: [facility] OR;  Service: Orthopedics;  Laterality: Left;  Arthrex opening wedge plate    TIA      TOTAL REDUCTION MAMMOPLASTY Bilateral     VASCULAR SURGERY      left leg vein laser     Family History   Problem Relation Name Age of Onset    Heart failure Father      Cancer Mother          pancreas    Cataracts Mother      Hypertension Mother      Cancer Brother          bladder    Cataracts Brother      Hypertension Brother      Crohn's disease Brother      Gout Brother      Cataracts Brother      Hypertension Brother      Gout Brother      Cataracts Brother      Hypertension Brother      Gout Brother      Bone cancer Brother      Cataracts Brother       Hypertension Brother      Breast cancer Neg Hx      Ovarian cancer Neg Hx      Allergies Neg Hx      Asthma Neg Hx      Eczema Neg Hx      Cervical cancer Neg Hx      Endometrial cancer Neg Hx      Vaginal cancer Neg Hx      Amblyopia Neg Hx      Blindness Neg Hx      Diabetes Neg Hx      Glaucoma Neg Hx      Macular degeneration Neg Hx      Retinal detachment Neg Hx      Strabismus Neg Hx      Stroke Neg Hx      Thyroid disease Neg Hx      Celiac disease Neg Hx      Colon cancer Neg Hx      Esophageal cancer Neg Hx      Liver disease Neg Hx      Liver cancer Neg Hx      Rectal cancer Neg Hx      Stomach cancer Neg Hx       Social History     Tobacco Use    Smoking status: Former     Current packs/day: 0.00     Types: Cigarettes     Quit date: 1989     Years since quittin.4    Smokeless tobacco: Never    Tobacco comments:     quit  - 1 pack per day since age 16   Substance Use Topics    Alcohol use: Not Currently     Alcohol/week: 1.0 standard drink of alcohol     Types: 1 Glasses of wine per week     Comment: maybe one glass of wine monthly, if that    Drug use: No     Review of Systems   Constitutional:  Positive for fatigue. Negative for fever.   HENT:  Negative for sore throat.    Respiratory:  Positive for cough. Negative for shortness of breath.    Cardiovascular:  Negative for chest pain.   Gastrointestinal:  Negative for nausea.   Genitourinary:  Negative for dysuria.   Musculoskeletal:  Negative for back pain.   Skin:  Negative for rash.   Neurological:  Negative for weakness.   Hematological:  Does not bruise/bleed easily.   All other systems reviewed and are negative.    Physical Exam     Initial Vitals [25 1119]   BP Pulse Resp Temp SpO2   131/63 89 19 99.4 °F (37.4 °C) 99 %      MAP       --         Physical Exam    Nursing note and vitals reviewed.  Constitutional: She appears well-developed and well-nourished.   HENT:   Head: Normocephalic and atraumatic.   Eyes: Conjunctivae  and EOM are normal. Pupils are equal, round, and reactive to light.   Neck:   Normal range of motion.  Cardiovascular:  Normal rate, regular rhythm, normal heart sounds and intact distal pulses.     Exam reveals no gallop and no friction rub.       No murmur heard.  Pulmonary/Chest: No respiratory distress. She has decreased breath sounds in the left upper field. She has wheezes (cleared with coughing). She has no rhonchi. She has no rales. She exhibits no tenderness.   Abdominal: Abdomen is soft. Bowel sounds are normal. She exhibits no distension and no mass. There is no abdominal tenderness. There is no rebound and no guarding.   Musculoskeletal:         General: No tenderness or edema. Normal range of motion.      Cervical back: Normal range of motion.     Neurological: She is alert and oriented to person, place, and time. She has normal strength. GCS score is 15. GCS eye subscore is 4. GCS verbal subscore is 5. GCS motor subscore is 6.   Skin: Skin is warm. Capillary refill takes less than 2 seconds. No rash noted. No erythema.   Psychiatric: She has a normal mood and affect.       ED Course   Procedures  Labs Reviewed   POCT INFLUENZA A/B MOLECULAR - Abnormal       Result Value    POC Molecular Influenza A Ag Positive (*)     POC Molecular Influenza B Ag Negative       Acceptable Yes     URINALYSIS, REFLEX TO URINE CULTURE    Specimen UA Urine, Clean Catch      Color, UA Yellow      Appearance, UA Clear      pH, UA 6.0      Specific Gravity, UA 1.020      Protein, UA Negative      Glucose, UA Negative      Ketones, UA Negative      Bilirubin (UA) Negative      Occult Blood UA Negative      Nitrite, UA Negative      Urobilinogen, UA Negative      Leukocytes, UA Negative      Narrative:     Specimen Source->Urine   SARS-COV-2 RDRP GENE    POC Rapid COVID Negative       Acceptable Yes            Imaging Results              X-Ray Chest PA And Lateral (Final result)  Result time  02/04/25 12:49:39      Final result by Catalino Camacho MD (02/04/25 12:49:39)                   Impression:      No acute abnormality.      Electronically signed by: Catalino Camacho MD  Date:    02/04/2025  Time:    12:49               Narrative:    EXAMINATION:  XR CHEST PA AND LATERAL    CLINICAL HISTORY:  Cough, unspecified    TECHNIQUE:  PA and lateral views of the chest were performed.    COMPARISON:  03/30/2020    FINDINGS:  The lungs are clear, with normal appearance of pulmonary vasculature and no pleural effusion or pneumothorax.    The cardiac silhouette is normal in size. The hilar and mediastinal contours are unremarkable.    Bones are intact.                                       Medications - No data to display  Medical Decision Making  80 y/o female with cough since yesterday. Pt states she has been having urinary incontinence since yesterday (occurred 3 times). Urinalysis negative. Influenza A positive. CXR without evidence of pneumonia. Pt discharged with tamiflu. I believe her incontinence is from her coughing. She does not have any back pain. Patient given strict return precautions and voiced understanding of all discharge instructions. Pt was stable at discharge.       Differential Diagnosis: influenza, covid, pneumonia, bronchitis, UTI, stress incontinence    Problems Addressed:  Cough: acute illness or injury  Influenza A: acute illness or injury    Amount and/or Complexity of Data Reviewed  Labs: ordered. Decision-making details documented in ED Course.  Radiology: ordered.    Risk  OTC drugs.  Prescription drug management.               ED Course as of 02/04/25 1646   Tue Feb 04, 2025   1209 POC Molecular Influenza A Ag(!): Positive [AT]   1209 BP: 131/63 [AT]   1209 Temp: 99.4 °F (37.4 °C) [AT]   1209 Temp Source: Oral [AT]   1209 Pulse: 89 [AT]   1209 Resp: 19 [AT]   1209 SpO2: 99 % [AT]   1243 SARS-CoV-2 RNA, Amplification, Qual: Negative [AT]      ED Course User Index  [AT] Héctor  ANNE Stewart                           Clinical Impression:  Final diagnoses:  [R05.9] Cough  [J10.1] Influenza A (Primary)          ED Disposition Condition    Discharge Stable          ED Prescriptions       Medication Sig Dispense Start Date End Date Auth. Provider    oseltamivir (TAMIFLU) 75 MG capsule Take 1 capsule (75 mg total) by mouth 2 (two) times daily. for 5 days 10 capsule 2/4/2025 2/9/2025 Yennifer Anaya FNP          Follow-up Information       Follow up With Specialties Details Why Contact Info    Janett Montoya MD Internal Medicine Schedule an appointment as soon as possible for a visit  As needed [id number]  [place] [address]  [phone]               Yennifer Anaya FNP  02/04/25 1675

## 2025-02-04 NOTE — ED NOTES
Pt to ED with c/o cough and malaise x1 day, noted to have Flu A at this encounter. NADN. RR even and unlabored.

## 2025-02-04 NOTE — ED NOTES
POCT Rapid Covid-19 swab currently in process. Awaiting results.   POCT Influenza A/B Molecular swab currently in process. Awaiting results.     Pt provided with urine specimen cup for UA collection, Pt educated on clean-catch technique, pt verbalized understanding.

## 2025-02-10 ENCOUNTER — TELEPHONE (OUTPATIENT)
Dept: INTERNAL MEDICINE | Facility: CLINIC | Age: 80
End: 2025-02-10
Payer: MEDICARE

## 2025-02-10 NOTE — TELEPHONE ENCOUNTER
----- Message from Constantino sent at 2/10/2025 10:35 AM CST -----  Contact: Pt. Portal  .Type:  Sooner Apoointment Request    Caller is requesting a sooner appointment.  Caller declined first available appointment listed below.  Caller will not accept being placed on the waitlist and is requesting a message be sent to doctor.  Name of Caller:pt. Portal  When is the first available appointment?   Symptoms: Office visit  Would the patient rather a call back or a response via [facility]?  Call back  Best Call Back Number: [phone]  Additional Information:  2/10/2025 - 2/11/2025

## 2025-02-11 NOTE — TELEPHONE ENCOUNTER
Spoke to her   Mr Woo is in the hospital at [facility] for a week now they both have the flu   Chilo sugar is very high, he had a lot a fluid was in congestive heart failure she thinks he was going to be discharged today but doesn't think so   she said they gave her Tamiflu she finished it Sunday   She has no fever  she is still coughing very bad   Mucus is white   Was taking the tessalon pearls but she felt like she was suffocating  Pam had to call EMS  Should we see her sooner?

## 2025-02-13 NOTE — TELEPHONE ENCOUNTER
Called her and left another message   Told her we had something on hold for her if possible for Friday the 14th at 1030   Spot is held

## 2025-02-14 ENCOUNTER — OFFICE VISIT (OUTPATIENT)
Dept: INTERNAL MEDICINE | Facility: CLINIC | Age: 80
End: 2025-02-14
Payer: MEDICARE

## 2025-02-14 VITALS
OXYGEN SATURATION: 99 % | DIASTOLIC BLOOD PRESSURE: 56 MMHG | HEIGHT: 65 IN | BODY MASS INDEX: 26.21 KG/M2 | WEIGHT: 157.31 LBS | SYSTOLIC BLOOD PRESSURE: 112 MMHG | HEART RATE: 60 BPM

## 2025-02-14 DIAGNOSIS — J44.9 CHRONIC OBSTRUCTIVE PULMONARY DISEASE, UNSPECIFIED COPD TYPE: ICD-10-CM

## 2025-02-14 DIAGNOSIS — I10 ESSENTIAL HYPERTENSION: ICD-10-CM

## 2025-02-14 DIAGNOSIS — J20.9 ACUTE BRONCHITIS, UNSPECIFIED ORGANISM: Primary | ICD-10-CM

## 2025-02-14 DIAGNOSIS — M10.9 GOUT, ARTHRITIS: ICD-10-CM

## 2025-02-14 DIAGNOSIS — D50.0 IRON DEFICIENCY ANEMIA DUE TO CHRONIC BLOOD LOSS: ICD-10-CM

## 2025-02-14 DIAGNOSIS — E78.2 MIXED HYPERLIPIDEMIA: ICD-10-CM

## 2025-02-14 DIAGNOSIS — I77.819 ECTASIS AORTA: ICD-10-CM

## 2025-02-14 DIAGNOSIS — F39 UNSPECIFIED MOOD (AFFECTIVE) DISORDER: ICD-10-CM

## 2025-02-14 PROCEDURE — 99214 OFFICE O/P EST MOD 30 MIN: CPT | Mod: PBBFAC | Performed by: INTERNAL MEDICINE

## 2025-02-14 PROCEDURE — 99999 PR PBB SHADOW E&M-EST. PATIENT-LVL IV: CPT | Mod: PBBFAC,,, | Performed by: INTERNAL MEDICINE

## 2025-02-14 RX ORDER — DOXYCYCLINE HYCLATE 100 MG
100 TABLET ORAL 2 TIMES DAILY
Qty: 20 TABLET | Refills: 0 | Status: SHIPPED | OUTPATIENT
Start: 2025-02-14 | End: 2025-02-24

## 2025-02-14 RX ORDER — PREDNISONE 10 MG/1
TABLET ORAL
Qty: 10 TABLET | Refills: 0 | Status: SHIPPED | OUTPATIENT
Start: 2025-02-14

## 2025-02-14 RX ORDER — BENZONATATE 200 MG/1
200 CAPSULE ORAL 3 TIMES DAILY PRN
Qty: 30 CAPSULE | Refills: 3 | Status: SHIPPED | OUTPATIENT
Start: 2025-02-14

## 2025-02-14 RX ORDER — BENZONATATE 200 MG/1
200 CAPSULE ORAL
COMMUNITY
Start: 2025-02-05 | End: 2025-02-14 | Stop reason: SDUPTHER

## 2025-02-14 NOTE — PROGRESS NOTES
CHIEF COMPLAINT:  Follow up of multiple problems     HISTORY OF PRESENT ILLNESS: 79-year-old woman presents for follow up of above     She had influenza. Went to the ED on 2/43/25. She took tamiflu 75 mg twice daily for 5 days.  She is feeling better.  She will get a coughing spell every now and then. Throat lozengers and vicks on her chest help the cough.  She is taking tesslon perles three times.  Moucous is white with a slight green color.  She feels that the cough is getting wetter.  Occasional wheezing. No shortness of breath.   She is weak.   Appetite is so so. Hot shower helps breathing.     Neck is doing ok.       She saw Dr Gaytan in rheumatology on 9/19/24 and Dr Carr on 8/27/24 for her knees. She finished physical therapy in December twice a week for leg strengthening which is good. Her legs are doing better.  She is having some leg pain at night.        She had 4 infusions of IV iron from 1/3/24 to 1/24/24. She is followed by Dr Bianchi. She is off oral iron.       She is having some constipation - BM every other day. She is taking Linzess every day or every other day.  She has a BM after taking the Linzess.  No diarrhea     Rash on her forearms comes and goes.  She saw Dermatology 11/21/24. She could not tolerate colchichine - gave her stomach cramps.  She tried dapsone which did not help.   She is using betamethasone cream twice daily as needed for the rash which helps.   She saw Dr Herrera on 8/23/23 She uses cetaphil soap and lotion and Super Fatted Basis which helps.  She has a similar rash intermittently and had a big years ago and saw Dr Herrera in Derm 11/3/2017 and was diagnosed with erythema annulare centrifugum - biopsy was done at that time- SUBCORNEAL PUSTULAR DERMATITIS WITH ASSOCIATED EPIDERMAL SPONGIOSIS       She had a bunionectomy and hammertoe surgery on the left foot 7/8/21 with Dr Bunn. She has seen Dr Jasso who wants to redo the surgery.  Dr Jasso gave her injections in her toes which  helped with the pain. She cannot wear closed shoes. Has not had the surgery yet. No toe pain     No gout attacks.  She takes allopurinol 100 mg 2 tablet nightly.      She had an injection in both of her knees on 7/3/24.   The cortisone injections lasted 2-3 weeks.       Anxiety is controlled on Lexapro to 10 mg 1.5 tablets daily.  Mood is good. Her  can aggravate her.   59 years.        Heartburn is congroled on omperazole 20 mg twice daily     She continues to take Crestor 20 mg 1 tablet daily with co enzyme 10 200 mg  daily - leg achintess is better with lower dose of crestor. She also takes losartan 50 mg twice daily, and spironolactone 25 mg daily. BP has been doing well.        She had a normal cystoscope 14 due to recurrent UTI. No dysuria or hematuria now. She is no longer using estrogen vaginal cream for vaginal atrophy three times weekly          PAST MEDICAL HISTORY:   1. Hypertension.   2. Hyperlipidemia.   3. Gout.   4. History of rashes.   5. TIA in   6. Fibrocystic breast disease.   7. Postmenopausal.   8. Osteoarthritis.   9. History of tobacco usage; quit in .   10. History of colon polyps; normal colonoscopy in 10/07 and normal 2010, due    11. Normal bone mineral density scan in .   12. Anxiety and depression - controlled on lorazepam very rarely.   13. Varicose veins.   14. Intermittent GERD.   15. Iron deficiency anemia 2010 - due to erosive gastropathy on EGD 2010     PAST SURGICAL HISTORY:   1. Right carotid endarterectomy in .   2. Breast reduction surgery.   3. Total abdominal hysterectomy.   4. Blood transfusion prior to .     SOCIAL HISTORY: Quit smoking in ; smoked 1 PPD since age 16. No   alcohol use.     FAMILY HISTORY:   Father had gout, hypertension and heart failure; is . Mother had hypertension and pancreatic cancer; is also . Has five brothers, one of whom  in a motor vehicle accident. All have gout and  hypertension. One brother has Crohn's disease; another has bladder cancer.     MEDICATIONS and ALLERGIES: Updated on epic.     PHYSICAL EXAMINATION:           General: Alert, oriented. No apparent distress. Affect within normal   Limits.   Conjunctivae anicteric. Tympanic membranes clear. Oropharynx clear.   Neck supple.   Respiratory effort normal. Lungs clear  Heart: Regular rate and rhythm without murmurs, gallops or rubs.   No lower extremity edema.   Neck with tenderness over the paraspinous muscles.      Labs 1/6/25 reviewed      ASSESSMENT AND PLAN:       Influenza with post viral bacterial bronchitisn and COPD exacerbation - doxycycline 100 mg twice daily for 10 days and prednsione taper  OA knees -better   iron deficiency anemia - better.   Soluble transferrin still low -   Mood disorder-on lexapro to 10 mg 1.5 tablet daily.   Rash - betamethasone cream twice daily. Has an apt in derm. Use gloves when wash dishes  Essential hypertension - stable   Heme positive stools - saw Dr Kingsley 3/2023 -had colonoscopy 9/18/23  Hyperlipidemia - on crestor to 20 mg 1 tablet daily.    Erosive gastropathy - on omeprazole to 40 mg twice daily.  Hypercalcemia - stable  Gout -  on allopurinol. Uric acid level  Carotid artery disease - on risk factor modification.   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  I will see her back 4 months,   sooner if problems arise

## 2025-02-20 ENCOUNTER — PATIENT MESSAGE (OUTPATIENT)
Dept: INFUSION THERAPY | Facility: OTHER | Age: 80
End: 2025-02-20

## 2025-02-28 ENCOUNTER — EXTERNAL CHRONIC CARE MANAGEMENT (OUTPATIENT)
Dept: PRIMARY CARE CLINIC | Facility: CLINIC | Age: 80
End: 2025-02-28
Payer: MEDICARE

## 2025-02-28 PROCEDURE — 99490 CHRNC CARE MGMT STAFF 1ST 20: CPT | Mod: S$PBB,,, | Performed by: INTERNAL MEDICINE

## 2025-02-28 PROCEDURE — 99490 CHRNC CARE MGMT STAFF 1ST 20: CPT | Mod: PBBFAC | Performed by: INTERNAL MEDICINE

## 2025-03-27 ENCOUNTER — LAB VISIT (OUTPATIENT)
Dept: LAB | Facility: HOSPITAL | Age: 80
End: 2025-03-27
Attending: INTERNAL MEDICINE
Payer: MEDICARE

## 2025-03-27 DIAGNOSIS — N18.31 ANEMIA DUE TO STAGE 3A CHRONIC KIDNEY DISEASE: ICD-10-CM

## 2025-03-27 DIAGNOSIS — D63.1 ANEMIA DUE TO STAGE 3A CHRONIC KIDNEY DISEASE: ICD-10-CM

## 2025-03-27 DIAGNOSIS — D50.0 IRON DEFICIENCY ANEMIA DUE TO CHRONIC BLOOD LOSS: ICD-10-CM

## 2025-03-27 LAB
ABSOLUTE EOSINOPHIL (OHS): 0.09 K/UL
ABSOLUTE MONOCYTE (OHS): 0.44 K/UL (ref 0.3–1)
ABSOLUTE NEUTROPHIL COUNT (OHS): 2.97 K/UL (ref 1.8–7.7)
ALBUMIN SERPL BCP-MCNC: 3.6 G/DL (ref 3.5–5.2)
ALP SERPL-CCNC: 75 UNIT/L (ref 40–150)
ALT SERPL W/O P-5'-P-CCNC: 13 UNIT/L (ref 10–44)
ANION GAP (OHS): 10 MMOL/L (ref 8–16)
AST SERPL-CCNC: 19 UNIT/L (ref 11–45)
BASOPHILS # BLD AUTO: 0.03 K/UL
BASOPHILS NFR BLD AUTO: 0.7 %
BILIRUB SERPL-MCNC: 0.4 MG/DL (ref 0.1–1)
BUN SERPL-MCNC: 29 MG/DL (ref 8–23)
CALCIUM SERPL-MCNC: 9.9 MG/DL (ref 8.7–10.5)
CHLORIDE SERPL-SCNC: 107 MMOL/L (ref 95–110)
CO2 SERPL-SCNC: 24 MMOL/L (ref 23–29)
CREAT SERPL-MCNC: 1.1 MG/DL (ref 0.5–1.4)
ERYTHROCYTE [DISTWIDTH] IN BLOOD BY AUTOMATED COUNT: 13.3 % (ref 11.5–14.5)
FERRITIN SERPL-MCNC: 50 NG/ML (ref 20–300)
GFR SERPLBLD CREATININE-BSD FMLA CKD-EPI: 51 ML/MIN/1.73/M2
GLUCOSE SERPL-MCNC: 75 MG/DL (ref 70–110)
HCT VFR BLD AUTO: 30.9 % (ref 37–48.5)
HGB BLD-MCNC: 9.8 GM/DL (ref 12–16)
IMM GRANULOCYTES # BLD AUTO: 0 K/UL (ref 0–0.04)
IMM GRANULOCYTES NFR BLD AUTO: 0 % (ref 0–0.5)
IRON SATN MFR SERPL: 15 % (ref 20–50)
IRON SERPL-MCNC: 48 UG/DL (ref 30–160)
LYMPHOCYTES # BLD AUTO: 0.85 K/UL (ref 1–4.8)
MCH RBC QN AUTO: 31.5 PG (ref 27–50)
MCHC RBC AUTO-ENTMCNC: 31.7 G/DL (ref 32–36)
MCV RBC AUTO: 99 FL (ref 82–98)
NUCLEATED RBC (/100WBC) (OHS): 0 /100 WBC
PLATELET # BLD AUTO: 207 K/UL (ref 150–450)
PMV BLD AUTO: 11.5 FL (ref 9.2–12.9)
POTASSIUM SERPL-SCNC: 4.5 MMOL/L (ref 3.5–5.1)
PROT SERPL-MCNC: 7.1 GM/DL (ref 6–8.4)
RBC # BLD AUTO: 3.11 M/UL (ref 4–5.4)
RELATIVE EOSINOPHIL (OHS): 2.1 %
RELATIVE LYMPHOCYTE (OHS): 19.4 % (ref 18–48)
RELATIVE MONOCYTE (OHS): 10 % (ref 4–15)
RELATIVE NEUTROPHIL (OHS): 67.8 % (ref 38–73)
SODIUM SERPL-SCNC: 141 MMOL/L (ref 136–145)
TIBC SERPL-MCNC: 317 UG/DL (ref 250–450)
TRANSFERRIN SERPL-MCNC: 214 MG/DL (ref 200–375)
WBC # BLD AUTO: 4.38 K/UL (ref 3.9–12.7)

## 2025-03-27 PROCEDURE — 36415 COLL VENOUS BLD VENIPUNCTURE: CPT

## 2025-03-27 PROCEDURE — 84466 ASSAY OF TRANSFERRIN: CPT

## 2025-03-27 PROCEDURE — 82728 ASSAY OF FERRITIN: CPT

## 2025-03-27 PROCEDURE — 80053 COMPREHEN METABOLIC PANEL: CPT

## 2025-03-27 PROCEDURE — 85025 COMPLETE CBC W/AUTO DIFF WBC: CPT

## 2025-03-28 ENCOUNTER — PATIENT MESSAGE (OUTPATIENT)
Dept: HEMATOLOGY/ONCOLOGY | Facility: CLINIC | Age: 80
End: 2025-03-28
Payer: MEDICARE

## 2025-04-01 ENCOUNTER — OFFICE VISIT (OUTPATIENT)
Dept: INTERNAL MEDICINE | Facility: CLINIC | Age: 80
End: 2025-04-01
Payer: MEDICARE

## 2025-04-01 VITALS
HEIGHT: 65 IN | WEIGHT: 163.13 LBS | BODY MASS INDEX: 27.18 KG/M2 | HEART RATE: 78 BPM | SYSTOLIC BLOOD PRESSURE: 114 MMHG | DIASTOLIC BLOOD PRESSURE: 74 MMHG | OXYGEN SATURATION: 99 %

## 2025-04-01 DIAGNOSIS — M10.9 GOUT, ARTHRITIS: ICD-10-CM

## 2025-04-01 DIAGNOSIS — D50.0 IRON DEFICIENCY ANEMIA DUE TO CHRONIC BLOOD LOSS: Primary | ICD-10-CM

## 2025-04-01 DIAGNOSIS — E78.2 MIXED HYPERLIPIDEMIA: ICD-10-CM

## 2025-04-01 DIAGNOSIS — D50.9 IRON DEFICIENCY ANEMIA, UNSPECIFIED IRON DEFICIENCY ANEMIA TYPE: ICD-10-CM

## 2025-04-01 DIAGNOSIS — J44.9 CHRONIC OBSTRUCTIVE PULMONARY DISEASE, UNSPECIFIED COPD TYPE: ICD-10-CM

## 2025-04-01 DIAGNOSIS — E78.5 HYPERLIPIDEMIA, UNSPECIFIED HYPERLIPIDEMIA TYPE: ICD-10-CM

## 2025-04-01 DIAGNOSIS — I70.0 AORTIC ATHEROSCLEROSIS: ICD-10-CM

## 2025-04-01 DIAGNOSIS — I10 ESSENTIAL HYPERTENSION: ICD-10-CM

## 2025-04-01 PROCEDURE — 99213 OFFICE O/P EST LOW 20 MIN: CPT | Mod: PBBFAC | Performed by: INTERNAL MEDICINE

## 2025-04-01 PROCEDURE — 99999 PR PBB SHADOW E&M-EST. PATIENT-LVL III: CPT | Mod: PBBFAC,,, | Performed by: INTERNAL MEDICINE

## 2025-04-01 PROCEDURE — 99214 OFFICE O/P EST MOD 30 MIN: CPT | Mod: S$PBB,,, | Performed by: INTERNAL MEDICINE

## 2025-04-01 PROCEDURE — G2211 COMPLEX E/M VISIT ADD ON: HCPCS | Mod: S$PBB,,, | Performed by: INTERNAL MEDICINE

## 2025-04-01 RX ORDER — METFORMIN HYDROCHLORIDE 500 MG/1
1000 TABLET, EXTENDED RELEASE ORAL
Qty: 180 TABLET | Refills: 3 | Status: SHIPPED | OUTPATIENT
Start: 2025-04-01 | End: 2026-04-01

## 2025-04-01 RX ORDER — GABAPENTIN 100 MG/1
200 CAPSULE ORAL 3 TIMES DAILY
Qty: 180 CAPSULE | Refills: 3 | Status: SHIPPED | OUTPATIENT
Start: 2025-04-01 | End: 2026-04-01

## 2025-04-01 RX ORDER — ESCITALOPRAM OXALATE 20 MG/1
20 TABLET ORAL DAILY
Qty: 90 TABLET | Refills: 3 | Status: SHIPPED | OUTPATIENT
Start: 2025-04-01

## 2025-04-01 NOTE — PATIENT INSTRUCTIONS
Gabapentin 100 mg 1-2 capsules three times daily as needed for sciatic nerve pain    Metformin ER one tablet daily at lunch for weight. If tolerates, may take 2 tablets daily at lunch

## 2025-04-01 NOTE — PROGRESS NOTES
CHIEF COMPLAINT:  Follow up of multiple problems     HISTORY OF PRESENT ILLNESS: 79-year-old woman presents for follow up of above      She got over the flu. She is breathing fine.     She is having buttocks pain that is shooting down her legs.   She is taking indomethacin 50 mg once daily for 2 days this week which did help    Neck is doing ok.       She saw Dr Gaytan in rheumatology on 9/19/24 and Dr Carr on 8/27/24 for her knees.        She had 4 infusions of IV iron from 1/3/24 to 1/24/24. She is followed by Dr Bianchi- last visit 1/7/25. She is off oral iron. She had one infusion of IV iron on 1/29/25.       She is having some constipation - BM every other day. She is taking Linzess  every other day.  She has a BM after taking the Linzess.  No diarrhea     Rash on her forearms comes and goes.  She saw Dermatology 1/2/25 She could not tolerate colchichine - gave her stomach cramps.  She tried dapsone which did not help.   She is using betamethasone cream twice daily as needed for the rash which helps.   She uses cetaphil soap and lotion and Super Fatted Basis which helps.  She has a similar rash intermittently and had a big years ago and saw Dr Herrera in Derm 11/3/2017 and was diagnosed with erythema annulare centrifugum - biopsy was done at that time- SUBCORNEAL PUSTULAR DERMATITIS WITH ASSOCIATED EPIDERMAL SPONGIOSIS       She had a bunionectomy and hammertoe surgery on the left foot 7/8/21 with Dr Bunn. She has seen Dr Jasso who wants to redo the surgery.  Dr Jasso gave her injections in her toes which helped with the pain. She cannot wear closed shoes. Has not had the surgery yet. No toe pain     No gout attacks.  She takes allopurinol 100 mg 2 tablet nightly.      She had an injection in both of her knees on 7/3/24.        Anxiety is controlled on Lexapro to 10 mg 1.5 tablets daily. She has been agitated easily Her  can aggravate her.   59 years.        Heartburn is congroled on omperazole  "20 mg twice daily     She continues to take Crestor 20 mg 1 tablet daily. She also takes losartan 50 mg twice daily, and spironolactone 25 mg daily. BP has been doing well.        She had a normal cystoscope 14 due to recurrent UTI. No dysuria or hematuria now. She is no longer using estrogen vaginal cream for vaginal atrophy three times weekly          PAST MEDICAL HISTORY:   1. Hypertension.   2. Hyperlipidemia.   3. Gout.   4. History of rashes.   5. TIA in   6. Fibrocystic breast disease.   7. Postmenopausal.   8. Osteoarthritis.   9. History of tobacco usage; quit in .   10. History of colon polyps; normal colonoscopy in 10/07 and normal 2010, due 2015   11. Normal bone mineral density scan in .   12. Anxiety and depression - controlled on lorazepam very rarely.   13. Varicose veins.   14. Intermittent GERD.   15. Iron deficiency anemia 2010 - due to erosive gastropathy on EGD 2010     PAST SURGICAL HISTORY:   1. Right carotid endarterectomy in .   2. Breast reduction surgery.   3. Total abdominal hysterectomy.   4. Blood transfusion prior to .     SOCIAL HISTORY: Quit smoking in ; smoked 1 PPD since age 16. No   alcohol use.     FAMILY HISTORY:   Father had gout, hypertension and heart failure; is . Mother had hypertension and pancreatic cancer; is also . Has five brothers, one of whom  in a motor vehicle accident. All have gout and hypertension. One brother has Crohn's disease; another has bladder cancer.     MEDICATIONS and ALLERGIES: Updated on epic.     PHYSICAL EXAMINATION:       /74 (BP Location: Left arm, Patient Position: Sitting)   Pulse 78   Ht 5' 5" (1.651 m)   Wt 74 kg (163 lb 2.3 oz)   LMP  (LMP Unknown)   SpO2 99%   BMI 27.15 kg/m²       General: Alert, oriented. No apparent distress. Affect within normal   Limits.   Conjunctivae anicteric. Tympanic membranes clear. Oropharynx clear.   Neck supple.   Respiratory effort normal. " Lungs clear  Heart: Regular rate and rhythm without murmurs, gallops or rubs.   No lower extremity edema.   Neck with tenderness over the paraspinous muscles.      Labs 3/27/25 reviewed      ASSESSMENT AND PLAN:       OA knees -better   iron deficiency anemia - Message sent to DR Bianchi for the 2 additional infusions    Mood disorder-increase lexapro to 20 mg daily    Rash - betamethasone cream twice daily. Has an apt in derm. Use gloves when wash dishes  Essential hypertension - stable   Heme positive stools - saw Dr Kingsley 3/2023 -had colonoscopy 9/18/23  Hyperlipidemia - on crestor to 20 mg 1 tablet daily.    Erosive gastropathy - on omeprazole to 40 mg twice daily.  Hypercalcemia - stable  Gout -  on allopurinol. Uric acid level  Carotid artery disease - on risk factor modification.   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      MMG 8/29/24   BMD normal March 2024.  I will see her back 4 months,   sooner if problems arise    Visit today included increased complexity associated with the care of the episodic problem rash addressed and managing the longitudinal care of the patient due to the serious and/or complex managed problem(s) iron deficiency anemia, hypertension, heme postiive stools, hyperlipidemia, gout, constipoatin, obesity.

## 2025-04-08 ENCOUNTER — INFUSION (OUTPATIENT)
Dept: INFUSION THERAPY | Facility: OTHER | Age: 80
End: 2025-04-08
Attending: INTERNAL MEDICINE
Payer: MEDICARE

## 2025-04-08 VITALS — HEART RATE: 72 BPM | OXYGEN SATURATION: 100 % | RESPIRATION RATE: 16 BRPM | TEMPERATURE: 98 F

## 2025-04-08 DIAGNOSIS — D50.0 IRON DEFICIENCY ANEMIA DUE TO CHRONIC BLOOD LOSS: Primary | ICD-10-CM

## 2025-04-08 PROCEDURE — 25000003 PHARM REV CODE 250: Performed by: INTERNAL MEDICINE

## 2025-04-08 PROCEDURE — 96365 THER/PROPH/DIAG IV INF INIT: CPT

## 2025-04-08 PROCEDURE — 63600175 PHARM REV CODE 636 W HCPCS: Performed by: INTERNAL MEDICINE

## 2025-04-08 RX ORDER — HEPARIN 100 UNIT/ML
500 SYRINGE INTRAVENOUS
Status: DISCONTINUED | OUTPATIENT
Start: 2025-04-08 | End: 2025-04-08 | Stop reason: HOSPADM

## 2025-04-08 RX ORDER — EPINEPHRINE 0.3 MG/.3ML
0.3 INJECTION SUBCUTANEOUS ONCE AS NEEDED
Status: DISCONTINUED | OUTPATIENT
Start: 2025-04-08 | End: 2025-04-08 | Stop reason: HOSPADM

## 2025-04-08 RX ORDER — DIPHENHYDRAMINE HYDROCHLORIDE 50 MG/ML
50 INJECTION, SOLUTION INTRAMUSCULAR; INTRAVENOUS ONCE AS NEEDED
Status: DISCONTINUED | OUTPATIENT
Start: 2025-04-08 | End: 2025-04-08 | Stop reason: HOSPADM

## 2025-04-08 RX ORDER — SODIUM CHLORIDE 0.9 % (FLUSH) 0.9 %
10 SYRINGE (ML) INJECTION
Status: DISCONTINUED | OUTPATIENT
Start: 2025-04-08 | End: 2025-04-08 | Stop reason: HOSPADM

## 2025-04-08 RX ADMIN — SODIUM CHLORIDE: 9 INJECTION, SOLUTION INTRAVENOUS at 10:04

## 2025-04-08 RX ADMIN — SODIUM CHLORIDE 125 MG: 9 INJECTION, SOLUTION INTRAVENOUS at 10:04

## 2025-04-08 NOTE — PLAN OF CARE
Vital Signs Stable, No apparent distress noted; Pt tolerated __Ferrlecit w/o difficulty.  24g piv removed;No bleeding,swelling or drainage noted to the site.  AVS/Discharge instructions reviewed;all questions answered ;Pt verbalizes understanding.   Next appointments scheduled;ambulatory from clinic in NAD

## 2025-04-15 ENCOUNTER — INFUSION (OUTPATIENT)
Dept: INFUSION THERAPY | Facility: OTHER | Age: 80
End: 2025-04-15
Attending: INTERNAL MEDICINE
Payer: MEDICARE

## 2025-04-15 VITALS
DIASTOLIC BLOOD PRESSURE: 72 MMHG | SYSTOLIC BLOOD PRESSURE: 166 MMHG | OXYGEN SATURATION: 98 % | HEART RATE: 79 BPM | RESPIRATION RATE: 17 BRPM

## 2025-04-15 DIAGNOSIS — D50.0 IRON DEFICIENCY ANEMIA DUE TO CHRONIC BLOOD LOSS: Primary | ICD-10-CM

## 2025-04-15 PROCEDURE — 63600175 PHARM REV CODE 636 W HCPCS: Performed by: INTERNAL MEDICINE

## 2025-04-15 PROCEDURE — 96365 THER/PROPH/DIAG IV INF INIT: CPT

## 2025-04-15 PROCEDURE — 25000003 PHARM REV CODE 250: Performed by: INTERNAL MEDICINE

## 2025-04-15 RX ORDER — HEPARIN 100 UNIT/ML
500 SYRINGE INTRAVENOUS
Status: DISCONTINUED | OUTPATIENT
Start: 2025-04-15 | End: 2025-04-15 | Stop reason: HOSPADM

## 2025-04-15 RX ORDER — EPINEPHRINE 0.3 MG/.3ML
0.3 INJECTION SUBCUTANEOUS ONCE AS NEEDED
Status: DISCONTINUED | OUTPATIENT
Start: 2025-04-15 | End: 2025-04-15 | Stop reason: HOSPADM

## 2025-04-15 RX ORDER — DIPHENHYDRAMINE HYDROCHLORIDE 50 MG/ML
50 INJECTION, SOLUTION INTRAMUSCULAR; INTRAVENOUS ONCE AS NEEDED
Status: DISCONTINUED | OUTPATIENT
Start: 2025-04-15 | End: 2025-04-15 | Stop reason: HOSPADM

## 2025-04-15 RX ORDER — SODIUM CHLORIDE 0.9 % (FLUSH) 0.9 %
10 SYRINGE (ML) INJECTION
Status: DISCONTINUED | OUTPATIENT
Start: 2025-04-15 | End: 2025-04-15 | Stop reason: HOSPADM

## 2025-04-15 RX ADMIN — SODIUM CHLORIDE 125 MG: 9 INJECTION, SOLUTION INTRAVENOUS at 10:04

## 2025-04-15 NOTE — PLAN OF CARE
Problem: Adult Inpatient Plan of Care  Goal: Plan of Care Review  Outcome: Progressing  Goal: Patient-Specific Goal (Individualized)  Outcome: Progressing  Goal: Optimal Comfort and Wellbeing  Outcome: Progressing     Problem: Anemia  Goal: Anemia Symptom Improvement  Outcome: Progressing       Patient presented today for ferrlicit infusion. PIV started and infusion given with no issues. VS remained stable throughout visit and assessment provided no issues. PIV noticed to have small amount of infiltrate upon DC of IV so site was massaged of infiltrated fluid and ice applied. Patient with no further complaints after ice was applied for 10 minutes, all questions answered and left clinic in no acute distress.

## 2025-04-25 ENCOUNTER — INFUSION (OUTPATIENT)
Dept: INFUSION THERAPY | Facility: OTHER | Age: 80
End: 2025-04-25
Attending: INTERNAL MEDICINE
Payer: MEDICARE

## 2025-04-25 VITALS
DIASTOLIC BLOOD PRESSURE: 70 MMHG | RESPIRATION RATE: 18 BRPM | TEMPERATURE: 98 F | SYSTOLIC BLOOD PRESSURE: 157 MMHG | OXYGEN SATURATION: 100 % | HEART RATE: 79 BPM

## 2025-04-25 DIAGNOSIS — D50.0 IRON DEFICIENCY ANEMIA DUE TO CHRONIC BLOOD LOSS: Primary | ICD-10-CM

## 2025-04-25 PROCEDURE — 96365 THER/PROPH/DIAG IV INF INIT: CPT

## 2025-04-25 PROCEDURE — 25000003 PHARM REV CODE 250: Performed by: INTERNAL MEDICINE

## 2025-04-25 PROCEDURE — 63600175 PHARM REV CODE 636 W HCPCS: Performed by: INTERNAL MEDICINE

## 2025-04-25 RX ORDER — SODIUM CHLORIDE 0.9 % (FLUSH) 0.9 %
10 SYRINGE (ML) INJECTION
Status: DISCONTINUED | OUTPATIENT
Start: 2025-04-25 | End: 2025-04-25 | Stop reason: HOSPADM

## 2025-04-25 RX ORDER — HEPARIN 100 UNIT/ML
500 SYRINGE INTRAVENOUS
Status: DISCONTINUED | OUTPATIENT
Start: 2025-04-25 | End: 2025-04-25 | Stop reason: HOSPADM

## 2025-04-25 RX ORDER — DIPHENHYDRAMINE HYDROCHLORIDE 50 MG/ML
50 INJECTION, SOLUTION INTRAMUSCULAR; INTRAVENOUS ONCE AS NEEDED
Status: DISCONTINUED | OUTPATIENT
Start: 2025-04-25 | End: 2025-04-25 | Stop reason: HOSPADM

## 2025-04-25 RX ORDER — EPINEPHRINE 0.3 MG/.3ML
0.3 INJECTION SUBCUTANEOUS ONCE AS NEEDED
Status: DISCONTINUED | OUTPATIENT
Start: 2025-04-25 | End: 2025-04-25 | Stop reason: HOSPADM

## 2025-04-25 RX ADMIN — SODIUM CHLORIDE: 9 INJECTION, SOLUTION INTRAVENOUS at 11:04

## 2025-04-25 RX ADMIN — SODIUM CHLORIDE 125 MG: 9 INJECTION, SOLUTION INTRAVENOUS at 11:04

## 2025-04-25 NOTE — PLAN OF CARE
Problem: Adult Inpatient Plan of Care  Goal: Plan of Care Review  Outcome: Progressing  Goal: Patient-Specific Goal (Individualized)  Outcome: Progressing  Goal: Optimal Comfort and Wellbeing  Outcome: Progressing     Problem: Anemia  Goal: Anemia Symptom Improvement  Outcome: Progressing       Patient presented today for ferrlicit infusion. PIV started and infusion given with no issues. VS remained stable throughout visit and assessment provided no issues. PIV removed w/o complications and all questions answered. Patient scheduled for next appt and left clinic in no acute distress.

## 2025-05-10 ENCOUNTER — OFFICE VISIT (OUTPATIENT)
Dept: URGENT CARE | Facility: CLINIC | Age: 80
End: 2025-05-10
Payer: MEDICARE

## 2025-05-10 VITALS
SYSTOLIC BLOOD PRESSURE: 128 MMHG | DIASTOLIC BLOOD PRESSURE: 58 MMHG | RESPIRATION RATE: 16 BRPM | BODY MASS INDEX: 27.16 KG/M2 | HEART RATE: 80 BPM | OXYGEN SATURATION: 99 % | WEIGHT: 163 LBS | HEIGHT: 65 IN | TEMPERATURE: 98 F

## 2025-05-10 DIAGNOSIS — R06.2 WHEEZING: ICD-10-CM

## 2025-05-10 DIAGNOSIS — J06.9 VIRAL URI WITH COUGH: Primary | ICD-10-CM

## 2025-05-10 DIAGNOSIS — R05.9 COUGH, UNSPECIFIED TYPE: ICD-10-CM

## 2025-05-10 LAB
CTP QC/QA: YES
CTP QC/QA: YES
POC MOLECULAR INFLUENZA A AGN: NEGATIVE
POC MOLECULAR INFLUENZA B AGN: NEGATIVE
SARS-COV+SARS-COV-2 AG RESP QL IA.RAPID: NEGATIVE

## 2025-05-10 PROCEDURE — 87811 SARS-COV-2 COVID19 W/OPTIC: CPT | Mod: QW,S$GLB,,

## 2025-05-10 PROCEDURE — 87502 INFLUENZA DNA AMP PROBE: CPT | Mod: QW,S$GLB,,

## 2025-05-10 PROCEDURE — 99213 OFFICE O/P EST LOW 20 MIN: CPT | Mod: S$GLB,,,

## 2025-05-10 RX ORDER — ALBUTEROL SULFATE 90 UG/1
1-2 INHALANT RESPIRATORY (INHALATION) EVERY 6 HOURS PRN
Qty: 18 G | Refills: 0 | Status: SHIPPED | OUTPATIENT
Start: 2025-05-10

## 2025-05-10 RX ORDER — FLUTICASONE PROPIONATE 50 MCG
1 SPRAY, SUSPENSION (ML) NASAL DAILY
Qty: 9.9 ML | Refills: 0 | Status: SHIPPED | OUTPATIENT
Start: 2025-05-10 | End: 2025-05-15

## 2025-05-10 NOTE — PROGRESS NOTES
"Subjective:      Patient ID: Isael Salmon is a 79 y.o. female.    Vitals:  height is 5' 5" (1.651 m) and weight is 73.9 kg (163 lb). Her temperature is 98.2 °F (36.8 °C). Her blood pressure is 128/58 (abnormal) and her pulse is 80. Her respiration is 16 and oxygen saturation is 99%.     Chief Complaint: Sinus Problem    79 y.o female presents to clinic complaining of sinus problem x 3 days. Patient states she feels congested, cough, sneezing. Declines any known fevers.     Sinus Problem  This is a new problem. The current episode started in the past 7 days. There has been no fever. Associated symptoms include congestion, coughing and sneezing. Pertinent negatives include no chills or diaphoresis. Past treatments include acetaminophen. The treatment provided no relief.       Constitution: Negative for chills, sweating, fatigue and fever.   HENT:  Positive for congestion.    Neck: neck negative.   Cardiovascular: Negative.    Eyes: Negative.    Respiratory:  Positive for cough.    Gastrointestinal: Negative.    Endocrine: negative.   Genitourinary: Negative.    Musculoskeletal: Negative.    Skin: Negative.    Allergic/Immunologic: Positive for sneezing.   Hematologic/Lymphatic: Negative.    Psychiatric/Behavioral: Negative.        Objective:     Physical Exam   Constitutional: She is oriented to person, place, and time.  Non-toxic appearance. No distress.   HENT:   Head: Normocephalic and atraumatic.   Ears:   Right Ear: Tympanic membrane, external ear and ear canal normal. no impacted cerumen  Left Ear: Tympanic membrane, external ear and ear canal normal. no impacted cerumen  Nose: Congestion present.   Mouth/Throat: Mucous membranes are moist. No oropharyngeal exudate or posterior oropharyngeal erythema.   Cardiovascular: Normal rate and normal heart sounds.   Pulmonary/Chest: Effort normal. No stridor. No respiratory distress. She has wheezes. She has no rhonchi. She has no rales.   Musculoskeletal: Normal " range of motion.         General: Normal range of motion.   Neurological: She is alert and oriented to person, place, and time.   Skin: Skin is warm, dry and not diaphoretic.   Psychiatric: Her behavior is normal. Mood, judgment and thought content normal.     Results for orders placed or performed in visit on 05/10/25   SARS Coronavirus 2 Antigen, POCT Manual Read    Collection Time: 05/10/25  4:38 PM   Result Value Ref Range    SARS Coronavirus 2 Antigen Negative Negative, Presumptive Negative     Acceptable Yes    POCT Influenza A/B MOLECULAR    Collection Time: 05/10/25  4:38 PM   Result Value Ref Range    POC Molecular Influenza A Ag Negative Negative    POC Molecular Influenza B Ag Negative Negative     Acceptable Yes      *Note: Due to a large number of results and/or encounters for the requested time period, some results have not been displayed. A complete set of results can be found in Results Review.       Assessment:     1. Viral URI with cough    2. Cough, unspecified type    3. Wheezing        Plan:       Viral URI with cough  -     fluticasone propionate (FLONASE) 50 mcg/actuation nasal spray; 1 spray (50 mcg total) by Each Nostril route once daily. for 5 days  Dispense: 9.9 mL; Refill: 0    Cough, unspecified type  -     SARS Coronavirus 2 Antigen, POCT Manual Read  -     POCT Influenza A/B MOLECULAR    Wheezing  -     albuterol (VENTOLIN HFA) 90 mcg/actuation inhaler; Inhale 1-2 puffs into the lungs every 6 (six) hours as needed for Wheezing or Shortness of Breath. Rescue  Dispense: 18 g; Refill: 0      Patient Instructions       If your condition worsens or fails to improve we recommend that you receive another evaluation at the urgent care/ER immediately or contact your PCP to discuss your concerns. You must understand that you've received an urgent care treatment only and that you may be released before all your medical problems are known or treated. You the patient  will arrange for follouwp care as instructed.     we discussed that I think your illness is viral, it will not respond to antibiotics and can last 10-14 days.    you can take plain zyrtec, claritin or allegra   Use Flonase for postnasal drip and nasal congestion  Rest and fluids are also important.   Salt water gargles, warm tea with honey and chloraseptic spray as needed for sore throat.   Tylenol or ibuprofen can also be used as directed for pain unless you have an allergy to them or medical condition such as stomach ulcers, kidney or liver disease or blood thinners etc for which you should not be taking these type of medications.

## 2025-05-10 NOTE — PATIENT INSTRUCTIONS
If your condition worsens or fails to improve we recommend that you receive another evaluation at the urgent care/ER immediately or contact your PCP to discuss your concerns. You must understand that you've received an urgent care treatment only and that you may be released before all your medical problems are known or treated. You the patient will arrange for follouwp care as instructed.     we discussed that I think your illness is viral, it will not respond to antibiotics and can last 10-14 days.    you can take plain zyrtec, claritin or allegra   Use Flonase for postnasal drip and nasal congestion  Rest and fluids are also important.   Salt water gargles, warm tea with honey and chloraseptic spray as needed for sore throat.   Tylenol or ibuprofen can also be used as directed for pain unless you have an allergy to them or medical condition such as stomach ulcers, kidney or liver disease or blood thinners etc for which you should not be taking these type of medications.

## 2025-05-12 ENCOUNTER — OFFICE VISIT (OUTPATIENT)
Dept: INTERNAL MEDICINE | Facility: CLINIC | Age: 80
End: 2025-05-12
Payer: MEDICARE

## 2025-05-12 VITALS
BODY MASS INDEX: 26.41 KG/M2 | HEIGHT: 65 IN | SYSTOLIC BLOOD PRESSURE: 142 MMHG | HEART RATE: 74 BPM | OXYGEN SATURATION: 99 % | DIASTOLIC BLOOD PRESSURE: 68 MMHG | WEIGHT: 158.5 LBS

## 2025-05-12 DIAGNOSIS — M10.9 ACUTE GOUT, UNSPECIFIED CAUSE, UNSPECIFIED SITE: ICD-10-CM

## 2025-05-12 DIAGNOSIS — M70.61 TROCHANTERIC BURSITIS OF RIGHT HIP: Primary | ICD-10-CM

## 2025-05-12 DIAGNOSIS — F43.9 SITUATIONAL STRESS: ICD-10-CM

## 2025-05-12 DIAGNOSIS — M79.604 PAIN OF RIGHT LOWER EXTREMITY: ICD-10-CM

## 2025-05-12 PROCEDURE — 96372 THER/PROPH/DIAG INJ SC/IM: CPT | Mod: PBBFAC

## 2025-05-12 PROCEDURE — 99214 OFFICE O/P EST MOD 30 MIN: CPT | Mod: S$PBB,,, | Performed by: INTERNAL MEDICINE

## 2025-05-12 PROCEDURE — 99215 OFFICE O/P EST HI 40 MIN: CPT | Mod: PBBFAC | Performed by: INTERNAL MEDICINE

## 2025-05-12 PROCEDURE — 99999 PR PBB SHADOW E&M-EST. PATIENT-LVL V: CPT | Mod: PBBFAC,,, | Performed by: INTERNAL MEDICINE

## 2025-05-12 PROCEDURE — 99999PBSHW PR PBB SHADOW TECHNICAL ONLY FILED TO HB: Mod: PBBFAC,,,

## 2025-05-12 RX ORDER — TRIAMCINOLONE ACETONIDE 40 MG/ML
40 INJECTION, SUSPENSION INTRA-ARTICULAR; INTRAMUSCULAR
Status: COMPLETED | OUTPATIENT
Start: 2025-05-12 | End: 2025-05-12

## 2025-05-12 RX ADMIN — TRIAMCINOLONE ACETONIDE 40 MG: 40 INJECTION, SUSPENSION INTRA-ARTICULAR; INTRAMUSCULAR at 03:05

## 2025-05-12 NOTE — PROGRESS NOTES
Subjective     Patient ID: Isael Salmon is a 79 y.o. female.    Chief Complaint: Hip Pain    Hip Pain       C/o severe pain R hip radiates into lateral leg.  Awoke with it today      Prevents her from resting.  No trauma.  Pain with walking and at rest.       No back pain.       She has applied biofreeze and took an Aleve, without much help.         SHe has h/o gout of hips, severe in past.             She is taking allopurinol and avoids offending foods.                  Uric Acid was 5 in August.         No tingling, numbness of leg; no weakness.     Viral URI, evaluated in .       FLu and covid ruled out.       She is taking albuterol and flonase, and is feeling better.    Lots of stress.  Brother hospitalized with encephalopathy.      Review of Systems   Constitutional:  Negative for fatigue and fever.   Respiratory:  Positive for cough.    Musculoskeletal:  Positive for back pain and leg pain.          Objective     Physical Exam  Constitutional:       General: She is not in acute distress.     Appearance: Normal appearance. She is not ill-appearing, toxic-appearing or diaphoretic.   Pulmonary:      Effort: Pulmonary effort is normal. No respiratory distress.      Breath sounds: Normal breath sounds. No stridor. No wheezing, rhonchi or rales.   Musculoskeletal:      Right lower leg: No edema.      Left lower leg: No edema.      Comments: Tenderness R head of femur   Neurological:      General: No focal deficit present.      Mental Status: She is alert and oriented to person, place, and time.      Motor: No weakness.      Coordination: Coordination is intact.      Gait: Gait normal.      Deep Tendon Reflexes:      Reflex Scores:       Patellar reflexes are 1+ on the right side and 1+ on the left side.       Achilles reflexes are 0 on the right side and 0 on the left side.     Comments: LE strength normal and =  Tender head of r femur     Psychiatric:         Mood and Affect: Mood normal.          Behavior: Behavior normal.         Thought Content: Thought content normal.            Assessment and Plan     1. Trochanteric bursitis of right hip  -     Ambulatory referral/consult to Orthopedics; Future; Expected date: 05/19/2025    2. Acute gout, unspecified cause, unspecified site  -     triamcinolone acetonide injection 40 mg    3. Pain of right lower extremity  -     Ambulatory referral/consult to Orthopedics; Future; Expected date: 05/19/2025      Pain most compatible with bursitis.    Due to h/o gout and severity of pain, will also tx with Kenalog    Lumbar radiculopathy is also in differential.      If injection not curative, try colchicine and indocin.    If pain resolves, cancel Ortho appt         No follow-ups on file.

## 2025-05-13 ENCOUNTER — OFFICE VISIT (OUTPATIENT)
Dept: ORTHOPEDICS | Facility: CLINIC | Age: 80
End: 2025-05-13
Payer: MEDICARE

## 2025-05-13 ENCOUNTER — HOSPITAL ENCOUNTER (OUTPATIENT)
Dept: RADIOLOGY | Facility: HOSPITAL | Age: 80
Discharge: HOME | End: 2025-05-13
Attending: NURSE PRACTITIONER
Payer: MEDICARE

## 2025-05-13 DIAGNOSIS — M54.50 LOW BACK PAIN, UNSPECIFIED BACK PAIN LATERALITY, UNSPECIFIED CHRONICITY, UNSPECIFIED WHETHER SCIATICA PRESENT: Primary | ICD-10-CM

## 2025-05-13 DIAGNOSIS — M25.551 RIGHT HIP PAIN: Primary | ICD-10-CM

## 2025-05-13 DIAGNOSIS — M25.551 RIGHT HIP PAIN: ICD-10-CM

## 2025-05-13 DIAGNOSIS — M54.50 DORSALGIA OF LUMBAR REGION: Primary | ICD-10-CM

## 2025-05-13 DIAGNOSIS — M70.61 TROCHANTERIC BURSITIS OF RIGHT HIP: ICD-10-CM

## 2025-05-13 PROCEDURE — 99214 OFFICE O/P EST MOD 30 MIN: CPT | Mod: PBBFAC,25 | Performed by: NURSE PRACTITIONER

## 2025-05-13 PROCEDURE — 99999 PR PBB SHADOW E&M-EST. PATIENT-LVL IV: CPT | Mod: PBBFAC,,, | Performed by: NURSE PRACTITIONER

## 2025-05-13 PROCEDURE — 73502 X-RAY EXAM HIP UNI 2-3 VIEWS: CPT | Mod: 26,RT,, | Performed by: INTERNAL MEDICINE

## 2025-05-13 PROCEDURE — 73502 X-RAY EXAM HIP UNI 2-3 VIEWS: CPT | Mod: TC,RT

## 2025-05-13 RX ORDER — METHYLPREDNISOLONE 4 MG/1
TABLET ORAL
Qty: 1 EACH | Refills: 0 | Status: SHIPPED | OUTPATIENT
Start: 2025-05-13 | End: 2025-06-03

## 2025-05-13 NOTE — PROGRESS NOTES
SUBJECTIVE:   History of Present Illness    CHIEF COMPLAINT:  - Right hip pain    HPI:  Isael presents with right hip pain that began yesterday, described as excruciating. Pain is constant and localized to the side of her hip. She reports chills but no fever. She has difficulty with stairs at home, pain when lying flat, trouble getting in and out of bed, and standing. She states that it feels like she always has to straighten her shoulder.    Initially, she sought care from her PCP but was seen by an associate who administered a shot. She took indomethacin and colchicine yesterday, which did not alleviate the pain. She has a history of gout in her hip, which her doctors have found unusual.    She has been seeing a chiropractor but has not kept appointments for the last two weeks. In October, she visited [facility], where Dr. Carr suggested she needed a knee replacement but recommended strengthening her legs first. Subsequently, she had increased leg pain and visited a chiropractor for about four visits on her 's recommendation.    She also reports a history of neck spasms. She denies any falls or injuries, groin pain, or problems with hip flexion, external rotation, internal rotation, abduction, or straight leg raise.    PREVIOUS TREATMENTS:  - Isael received a shot from an associate at their primary care office yesterday for hip pain  - Isael has been seeing a chiropractor, with the last 4 visits being to a new chiropractor recommended by their   - Isael attended [facility], with the last visit in October    MEDICATIONS:  - Indomethacin: for suspected gout, did not help  - Colchicine: for suspected gout  - Zanaflex (Tizanidine): 1 tablet as a muscle relaxer    SURGICAL HISTORY:  - Cataract surgery: both eyes      ROS:  Constitutional: -fever, +chills  Eyes: -visual changes  ENT: -nasal congestion, -sore throat  Respiratory: -cough, -shortness of breath  Cardiovascular: -chest  pain, -palpitations  Gastrointestinal: -nausea, -vomiting  Genitourinary: -hematuria, -dysuria  Integument/Breast: -rash, -pruritus, -breast skin changes  Hematologic/Lymphatic: -easy bruising, -lymphadenopathy  Musculoskeletal: -arthralgia, -myalgia, +limb pain, +muscle spasms, +difficulty standing up  Neurological: -seizures, -tremors  Behavioral/Psych: -hallucinations  Endocrine: -heat intolerance, -cold intolerance         Review of patient's allergies indicates:   Allergen Reactions    Avelox [moxifloxacin] Palpitations     Racing heart and gets the shakes    Latex Rash    Pcn [penicillins] Rash    Sulfa (sulfonamide antibiotics) Blisters    Ciprofloxacin      Room starts to spin  and nausea and dizziness    Codeine Nausea Only     nausea and weakness         Current Medications[1]    Past Medical History:   Diagnosis Date    Adjustment disorder 07/26/2012    Anemia 07/26/2012    AR (allergic rhinitis) 09/05/2014    Bronchitis     Fibrocystic breast disease in female 07/26/2012    GERD (gastroesophageal reflux disease) 07/26/2012    Gout     Gout, arthritis 07/26/2012    History of blood transfusion 07/26/2012    History of colonic polyps 07/26/2012    Hypercalcemia 09/20/2012    Hyperlipidemia 07/26/2012    Hypertension 07/26/2012    Nuclear sclerosis 05/02/2014    Osteoarthritis 07/26/2012    Other hyperparathyroidism 09/20/2012    Postmenopausal status 07/26/2012    PVD (peripheral vascular disease) 07/26/2012    left leg laser of vein with injection    Rheumatoid arthritis, unspecified     Stroke 1997    TIA    Superficial vein thrombosis     Transient ischemic attack 07/26/2012    Varicose veins 07/26/2012       Past Surgical History:   Procedure Laterality Date    ANTEGRADE SINGLE BALLOON ENTEROSCOPY N/A 9/18/2023    Procedure: ENTEROSCOPY, SINGLE BALLOON, ANTEGRADE;  Surgeon: Jaziel Kingsley MD;  Location: [facility] ENDO ([address] [place] [address]);  Service: Endoscopy;  Laterality: N/A;  7/25/23-PEG prep, Instructions via  portal-DS    BREAST CYST ASPIRATION Bilateral     multiple ones over the years    BREAST CYST EXCISION Left     BREAST SURGERY      breast reduction    broken second finger Right 12/2015    pins placed    carotid endarterectomy  1997    right    CAROTID ENDARTERECTOMY Right 1997    CATARACT EXTRACTION W/  INTRAOCULAR LENS IMPLANT Right 05/01/2018    Dr. Yost    CATARACT EXTRACTION W/  INTRAOCULAR LENS IMPLANT Left 05/15/2018    Dr. Lester    COLONOSCOPY N/A 10/26/2015    Procedure: COLONOSCOPY;  Surgeon: Manuel Alanis MD;  Location: [facility] ENDO (4TH FLR);  Service: Endoscopy;  Laterality: N/A;    COLONOSCOPY N/A 9/18/2023    Procedure: COLONOSCOPY;  Surgeon: Jaziel Kingsley MD;  Location: [facility] ENDO (2ND FLR);  Service: Endoscopy;  Laterality: N/A;  can use single-balloon for this part as well    CORRECTION OF HAMMER TOE Left 07/08/2021    Procedure: CORRECTION, HAMMER TOE Second toe;  Surgeon: Silver Bunn MD;  Location: [facility] OR;  Service: Orthopedics;  Laterality: Left;  Ossiofiber hammertoe implant    EYE SURGERY      HYSTERECTOMY      ORIF FINGER FRACTURE  12/18/2015    SURGICAL REMOVAL OF BUNION WITH OSTEOTOMY OF METATARSAL BONE Left 07/08/2021    Procedure: BUNIONECTOMY, WITH METATARSAL OSTEOTOMY Proximal opening wedge;  Surgeon: Silver Bunn MD;  Location: [facility] OR;  Service: Orthopedics;  Laterality: Left;  Arthrex opening wedge plate    TIA      TOTAL REDUCTION MAMMOPLASTY Bilateral     VASCULAR SURGERY      left leg vein laser       Family History   Problem Relation Name Age of Onset    Heart failure Father      Cancer Mother          pancreas    Cataracts Mother      Hypertension Mother      Cancer Brother          bladder    Cataracts Brother      Hypertension Brother      Crohn's disease Brother      Gout Brother      Cataracts Brother      Hypertension Brother      Gout Brother      Cataracts Brother      Hypertension Brother      Gout Brother      Bone cancer Brother       "Cataracts Brother      Hypertension Brother      Breast cancer Neg Hx      Ovarian cancer Neg Hx      Allergies Neg Hx      Asthma Neg Hx      Eczema Neg Hx      Cervical cancer Neg Hx      Endometrial cancer Neg Hx      Vaginal cancer Neg Hx      Amblyopia Neg Hx      Blindness Neg Hx      Diabetes Neg Hx      Glaucoma Neg Hx      Macular degeneration Neg Hx      Retinal detachment Neg Hx      Strabismus Neg Hx      Stroke Neg Hx      Thyroid disease Neg Hx      Celiac disease Neg Hx      Colon cancer Neg Hx      Esophageal cancer Neg Hx      Liver disease Neg Hx      Liver cancer Neg Hx      Rectal cancer Neg Hx      Stomach cancer Neg Hx           OBJECTIVE:     PHYSICAL EXAM:  Vital Signs (Most Recent)  There were no vitals filed for this visit.     ,   Estimated body mass index is 26.38 kg/m² as calculated from the following:    Height as of 5/12/25: 5' 5" (1.651 m).    Weight as of 5/12/25: 71.9 kg (158 lb 8.2 oz).   General Appearance: Well nourished, well developed, in no acute distress.  HENT: Normal cephalic, oropharynx pink and moist  Eyes: PERRLA bilaterally and EOM x 4  Respiratory: Even and unlabored  Skin: Warm and Dry.   Psychiatric: AAO x 4, Mood & affect are normal.    Physical Exam    MSK: Spine - Hip: Pain on pirformis of right hip. No pain on log roll. No pain on axial loading. Hip flexion to 100 degrees with mild discomfort behind knee. External rotation to 40 degrees without pain. Internal rotation to 20 degrees without pain. Abduction to 40 degrees with mild discomfort. Adduction to 10 degrees with pain. Straight leg raise without pain. Pulling sensation behind knee on higher straight leg raise.  IMAGING:  - XR Hip: mild degenerative changes, no fractures observed in the hip  - XR L Spine: degenerative disc disease         All radiographs were personally reviewed by me.    ASSESSMENT/PLAN:       ICD-10-CM ICD-9-CM   1. Dorsalgia of lumbar region  M54.50 724.2   2. Trochanteric bursitis of " right hip  M70.61 726.5     79-year-old female presents to the clinic with posterior right hip pain for the past 24 hours.  She reports she has a history of gout and hip in his currently taking the medication for gout but has no symptomatic relief.  She has been seen a chiropractor for adjustments.  She does endorse some neck spasms.  She is on tizanidine nightly.    X-ray of the right hip shows mild degenerative changes.  No acute fracture seen.  On clinical exam no pain with range of motion of the hip.  In review of the x-ray it appears the patient has some lumbar degenerative disc disease.  Patient does have some pain over the piriformis area.    I will give the patient a Medrol Dosepak and refer to back and spine team further evaluation.    Assessment & Plan    MEDICATIONS:  - Started Medrol Dosepak (steroids) to be taken as directed over 6 days.  - Continue taking Tizanidine as previously prescribed.    REFERRALS:  - Referred to back and spine specialist for lower back and neck pain.    PROCEDURES:  - Reviewed XRs of the hip and L spine.    FOLLOW UP:  - Follow up with back and spine specialist as scheduled by Jaz.         This note was generated with the assistance of ambient listening technology. Verbal consent was obtained by the patient and accompanying visitor(s) for the recording of patient appointment to facilitate this note. I attest to having reviewed and edited the generated note for accuracy, though some syntax or spelling errors may persist. Please contact the author of this note for any clarification.             [1]   Current Outpatient Medications   Medication Sig Dispense Refill    acetaminophen (TYLENOL) 325 MG tablet Take 2 tablets (650 mg total) by mouth every 4 (four) hours as needed for Pain (Fever). 30 tablet 1    albuterol (VENTOLIN HFA) 90 mcg/actuation inhaler Inhale 1-2 puffs into the lungs every 6 (six) hours as needed for Wheezing or Shortness of Breath. Rescue 18 g 0     allopurinoL (ZYLOPRIM) 100 MG tablet Take 2 tablets (200 mg total) by mouth Daily. 180 tablet 1    ascorbic acid, vitamin C, (VITAMIN C) 500 MG tablet Take 500 mg by mouth once daily.      aspirin (ECOTRIN) 81 MG EC tablet Take 81 mg by mouth once daily.       b complex vitamins capsule Take 1 capsule by mouth once daily.      benzonatate (TESSALON) 200 MG capsule Take 1 capsule (200 mg total) by mouth 3 (three) times daily as needed for Cough. 30 capsule 3    betamethasone dipropionate 0.05 % cream Apply topically 2 (two) times daily. Use to affected areas for up to 2 weeks then take a 1 week break or decrease to 3 times weekly. Do not apply to groin or face. Use to rash on arms when needed 90 g 3    BIOTIN ORAL Take 1 tablet by mouth once daily.      calcipotriene (DOVONOX) 0.005 % cream Apply topically 2 (two) times daily. Apply to rash on arms. Can use daily for maintenance 60 g 6    co-enzyme Q-10 30 mg capsule Take 200 mg by mouth once daily.      colchicine (COLCRYS) 0.6 mg tablet Start with one pill once daily. If tolerating well after 2 weeks then increase to 1 pill twice daily 60 tablet 3    cycloSPORINE (RESTASIS MULTIDOSE) 0.05 % Drop Apply 1 drop to eye every 12 (twelve) hours. 16.5 mL 3    dapsone (ACZONE) 7.5 % GlwP Apply 1 Application topically once daily. Apply to rash on arms 60 g 5    ergocalciferol (ERGOCALCIFEROL) 50,000 unit Cap TAKE 1 CAPSULE BY MOUTH 2 TIMES A WEEK 24 capsule 5    EScitalopram oxalate (LEXAPRO) 20 MG tablet Take 1 tablet (20 mg total) by mouth once daily. 90 tablet 3    fluticasone propionate (FLONASE) 50 mcg/actuation nasal spray 1 spray (50 mcg total) by Each Nostril route once daily. for 5 days 9.9 mL 0    gabapentin (NEURONTIN) 100 MG capsule Take 2 capsules (200 mg total) by mouth 3 (three) times daily. 180 capsule 3    indomethacin (INDOCIN) 50 MG capsule Take 1 capsule (50 mg total) by mouth 3 (three) times daily as needed. 30 capsule 3    ipratropium (ATROVENT) 0.02 %  nebulizer solution Take 2.5 mLs (500 mcg total) by nebulization 3 (three) times daily as needed for Wheezing. 62.5 mL 0    LINZESS 72 mcg Cap capsule TAKE 1 CAPSULE(72 MCG) BY MOUTH BEFORE BREAKFAST 90 capsule 3    loratadine (CLARITIN) 10 mg tablet Take 1 tablet (10 mg total) by mouth once daily. 30 tablet 0    losartan (COZAAR) 50 MG tablet TAKE 1 TABLET(50 MG) BY MOUTH TWICE DAILY 180 tablet 3    metFORMIN (GLUCOPHAGE-XR) 500 MG ER 24hr tablet Take 2 tablets (1,000 mg total) by mouth after lunch. 180 tablet 3    multivit,calc,mins/iron/folic (ONE-A-DAY WOMENS FORMULA ORAL) Take 1 tablet by mouth once daily.      omeprazole (PRILOSEC) 40 MG capsule Take 1 capsule (40 mg total) by mouth 2 (two) times daily. 180 capsule 4    rosuvastatin (CRESTOR) 20 MG tablet Take 1 tablet (20 mg total) by mouth once daily. 90 tablet 3    spironolactone (ALDACTONE) 25 MG tablet Take 1 tablet (25 mg total) by mouth once daily. 90 tablet 4    tiZANidine (ZANAFLEX) 4 MG tablet Take 1 tablet (4 mg total) by mouth nightly. 30 tablet 5    WESTAB MAX 2.5-25-2 mg Tab TAKE 1 TABLET BY MOUTH EVERY DAY 30 tablet 11    methylPREDNISolone (MEDROL DOSEPACK) 4 mg tablet use as directed 1 each 0     No current facility-administered medications for this visit.

## 2025-05-14 ENCOUNTER — HOSPITAL ENCOUNTER (OUTPATIENT)
Dept: RADIOLOGY | Facility: HOSPITAL | Age: 80
Discharge: HOME | End: 2025-05-14
Attending: PHYSICIAN ASSISTANT
Payer: MEDICARE

## 2025-05-14 ENCOUNTER — OFFICE VISIT (OUTPATIENT)
Dept: ORTHOPEDICS | Facility: CLINIC | Age: 80
End: 2025-05-14
Payer: MEDICARE

## 2025-05-14 DIAGNOSIS — M54.50 LOW BACK PAIN, UNSPECIFIED BACK PAIN LATERALITY, UNSPECIFIED CHRONICITY, UNSPECIFIED WHETHER SCIATICA PRESENT: ICD-10-CM

## 2025-05-14 DIAGNOSIS — M70.61 TROCHANTERIC BURSITIS OF RIGHT HIP: Primary | ICD-10-CM

## 2025-05-14 DIAGNOSIS — M54.16 LUMBAR RADICULOPATHY: ICD-10-CM

## 2025-05-14 PROCEDURE — 99999 PR PBB SHADOW E&M-EST. PATIENT-LVL III: CPT | Mod: PBBFAC,,, | Performed by: PHYSICIAN ASSISTANT

## 2025-05-14 PROCEDURE — 72110 X-RAY EXAM L-2 SPINE 4/>VWS: CPT | Mod: 26,,, | Performed by: RADIOLOGY

## 2025-05-14 PROCEDURE — 99213 OFFICE O/P EST LOW 20 MIN: CPT | Mod: PBBFAC,25 | Performed by: PHYSICIAN ASSISTANT

## 2025-05-14 PROCEDURE — 72110 X-RAY EXAM L-2 SPINE 4/>VWS: CPT | Mod: TC

## 2025-05-14 NOTE — PROGRESS NOTES
"  DATE: 5/14/2025  PATIENT: Isael Salmon    Supervising Physician: Primitivo Sanders M.D.    CHIEF COMPLAINT: neck pain and right hip pain    HISTORY:  Isael Salmon is a 79 y.o. female with PMH of OA, PVD, RA, and gout here for initial evaluation of neck, right hip, and right leg pain (Back - 0, Leg/Hip - 8).  The pain in the right hip is what bothers her most.     Patient reports chronic neck pain since 2005 that intermittently causes muscle spasms. No falls, injuries, or heavy lifting reported. Previously treated with prednisone, Zanaflex, heat, PT, and NSAID's by PCP. No numbness, tingling, or weakness to bilateral UE.    She also reports right hip pain that radiates into her right groin and occasionally down her right lateral leg to her ankle. She does note that the radiation down her leg occurred prior to the hip pain. No recent fall, injury, or heavy lifting reported. The patient describes the pain as "a deep burning."  The pain is worse with transitioning from sitting to standing and with prolonged sitting. Increased difficulty when attempting to sleep on affected side. No relieving factors reported. There is no associated numbness and tingling. There is some subjective weakness to RLE. She received an IM kenalog injection from her PCP's office on Monday and was then told to take her indomethacin and colchicine Rx (due to hx of gout). Yesterday, she received a medrol dose pack from [facility] that she began taking this morning.    Denies back pain.    The patient denies myelopathic symptoms such as handwriting changes or difficulty with buttons/coins/keys. Denies perineal paresthesias, bowel/bladder dysfunction.    PAST MEDICAL/SURGICAL HISTORY:  Past Medical History:   Diagnosis Date    Adjustment disorder 07/26/2012    Anemia 07/26/2012    AR (allergic rhinitis) 09/05/2014    Bronchitis     Fibrocystic breast disease in female 07/26/2012    GERD (gastroesophageal reflux disease) 07/26/2012    " Gout     Gout, arthritis 07/26/2012    History of blood transfusion 07/26/2012    History of colonic polyps 07/26/2012    Hypercalcemia 09/20/2012    Hyperlipidemia 07/26/2012    Hypertension 07/26/2012    Nuclear sclerosis 05/02/2014    Osteoarthritis 07/26/2012    Other hyperparathyroidism 09/20/2012    Postmenopausal status 07/26/2012    PVD (peripheral vascular disease) 07/26/2012    left leg laser of vein with injection    Rheumatoid arthritis, unspecified     Stroke 1997    TIA    Superficial vein thrombosis     Transient ischemic attack 07/26/2012    Varicose veins 07/26/2012     Past Surgical History:   Procedure Laterality Date    ANTEGRADE SINGLE BALLOON ENTEROSCOPY N/A 9/18/2023    Procedure: ENTEROSCOPY, SINGLE BALLOON, ANTEGRADE;  Surgeon: Jaziel Kingsley MD;  Location: [facility] (2ND FLR);  Service: Endoscopy;  Laterality: N/A;  7/25/23-PEG prep, Instructions via portal-DS    BREAST CYST ASPIRATION Bilateral     multiple ones over the years    BREAST CYST EXCISION Left     BREAST SURGERY      breast reduction    broken second finger Right 12/2015    pins placed    carotid endarterectomy  1997    right    CAROTID ENDARTERECTOMY Right 1997    CATARACT EXTRACTION W/  INTRAOCULAR LENS IMPLANT Right 05/01/2018    Dr. Yost    CATARACT EXTRACTION W/  INTRAOCULAR LENS IMPLANT Left 05/15/2018    Dr. Lester    COLONOSCOPY N/A 10/26/2015    Procedure: COLONOSCOPY;  Surgeon: Manuel Alanis MD;  Location: [facility] (4TH FLR);  Service: Endoscopy;  Laterality: N/A;    COLONOSCOPY N/A 9/18/2023    Procedure: COLONOSCOPY;  Surgeon: Jaziel Kingsley MD;  Location: [facility] (2ND FLR);  Service: Endoscopy;  Laterality: N/A;  can use single-balloon for this part as well    CORRECTION OF HAMMER TOE Left 07/08/2021    Procedure: CORRECTION, HAMMER TOE Second toe;  Surgeon: Silver Bunn MD;  Location: [facility];  Service: Orthopedics;  Laterality: Left;  Ossiofiber hammertoe implant    EYE SURGERY       HYSTERECTOMY      ORIF FINGER FRACTURE  12/18/2015    SURGICAL REMOVAL OF BUNION WITH OSTEOTOMY OF METATARSAL BONE Left 07/08/2021    Procedure: BUNIONECTOMY, WITH METATARSAL OSTEOTOMY Proximal opening wedge;  Surgeon: Silver Bunn MD;  Location: [facility];  Service: Orthopedics;  Laterality: Left;  Arthrex opening wedge plate    TIA      TOTAL REDUCTION MAMMOPLASTY Bilateral     VASCULAR SURGERY      left leg vein laser       Medications:   Medications Ordered Prior to Encounter[1]    Social History: Social History[2]    REVIEW OF SYSTEMS:  Constitution: Negative. Negative for chills, fever and night sweats.   Cardiovascular: Negative for chest pain and syncope.   Respiratory: Negative for cough and shortness of breath.   Gastrointestinal: See HPI. Negative for nausea/vomiting. Negative for abdominal pain.  Genitourinary: See HPI. Negative for discoloration or dysuria.  Skin: Negative for dry skin, itching and rash.   Hematologic/Lymphatic: Negative for bleeding problem. Does not bruise/bleed easily.   Musculoskeletal: Negative for falls and muscle weakness.   Neurological: See HPI. No seizures.   Endocrine: Negative for polydipsia, polyphagia and polyuria.   Allergic/Immunologic: Negative for hives and persistent infections.     EXAM:  LMP  (LMP Unknown)     General: The patient is a 79 y.o. female in no apparent distress, the patient is oriented to person, place and time.  Psych: Normal mood and affect  HEENT: Vision grossly intact, hearing intact to the spoken word.  Lungs: Respirations unlabored.  Gait: Normal station and gait, ambulating with cane. No difficulty with toe or heel walk.   Skin: Dorsal lumbar skin negative for rashes, lesions, hairy patches and surgical scars. There is no lumbar tenderness to palpation.   Range of motion: Lumbar range of motion is acceptable.  Spinal Balance: Global saggital and coronal spinal balance acceptable, not significant for scoliosis and kyphosis.  Musculoskeletal:  No pain with the range of motion of the bilateral hips. Positive trochanteric tenderness to palpation.  Vascular: Bilateral lower extremities warm and well perfused, dorsalis pedis pulses 2+ bilaterally.  Neurological: Normal strength and tone in all major motor groups in the bilateral lower extremities. Normal sensation to light touch in the L2-S1 dermatomes bilaterally.  Deep tendon reflexes symmetric in the bilateral lower extremities.  Negative Babinski bilaterally. Straight leg raise negative bilaterally.    IMAGING:      Today I personally reviewed AP, Lat and Flex/Ex  upright L-spine films that demonstrate mild degenerative changes.        There is no height or weight on file to calculate BMI.    Hemoglobin A1C   Date Value Ref Range Status   03/06/2024 5.3 4.0 - 5.6 % Final     Comment:     ADA Screening Guidelines:  5.7-6.4%  Consistent with prediabetes  >or=6.5%  Consistent with diabetes    High levels of fetal hemoglobin interfere with the HbA1C  assay. Heterozygous hemoglobin variants (HbS, HgC, etc)do  not significantly interfere with this assay.   However, presence of multiple variants may affect accuracy.     12/20/2021 5.5 4.0 - 5.6 % Final     Comment:     ADA Screening Guidelines:  5.7-6.4%  Consistent with prediabetes  >or=6.5%  Consistent with diabetes    High levels of fetal hemoglobin interfere with the HbA1C  assay. Heterozygous hemoglobin variants (HbS, HgC, etc)do  not significantly interfere with this assay.   However, presence of multiple variants may affect accuracy.     04/30/2021 5.4 4.0 - 5.6 % Final     Comment:     ADA Screening Guidelines:  5.7-6.4%  Consistent with prediabetes  >or=6.5%  Consistent with diabetes    High levels of fetal hemoglobin interfere with the HbA1C  assay. Heterozygous hemoglobin variants (HbS, HgC, etc)do  not significantly interfere with this assay.   However, presence of multiple variants may affect accuracy.           ASSESSMENT/PLAN:    Isael was seen  today for neck and right hip pain.    Diagnoses and all orders for this visit:    Trochanteric bursitis of right hip    Lumbar radiculopathy    I made the decision to obtain old records of the patient including previous notes and imaging. New imaging was ordered today of the extremity or extremities evaluated. I independently reviewed and interpreted the radiographs today as well as prior imaging. Reviewed imaging in detail with patient.     We discussed at length different treatment options including conservative vs surgical management. These include anti-inflammatories, acetaminophen, rest, ice, heat, physical therapy including strengthening and stretching exercises, home exercises, ROM, aerobic conditioning, aqua therapy, other modalities including ultrasound, massage, and dry needling, epidural steroid injections and finally surgical intervention.      Patient would like to proceed with:    Medications:  Prescription for methylprednisolone (MEDROL) provided yesterday by JOSÉ MANUEL Churchill NP, for pain management. Patient states that she began taking medication this morning. She was instructed to continue steroid dose pack as directed until complete. CSI into greater trochanter bursa deferred due to recent IM steroid injection and current PO steroids.       Follow up: Contact office after completing medrol dose pack for further follow up and management, sooner with worsening, injury, or severe pain.      All questions were answered and patient is agreeable to the above plan.          [1]   Current Outpatient Medications on File Prior to Visit   Medication Sig Dispense Refill    acetaminophen (TYLENOL) 325 MG tablet Take 2 tablets (650 mg total) by mouth every 4 (four) hours as needed for Pain (Fever). 30 tablet 1    albuterol (VENTOLIN HFA) 90 mcg/actuation inhaler Inhale 1-2 puffs into the lungs every 6 (six) hours as needed for Wheezing or Shortness of Breath. Rescue 18 g 0    allopurinoL (ZYLOPRIM) 100 MG tablet  Take 2 tablets (200 mg total) by mouth Daily. 180 tablet 1    ascorbic acid, vitamin C, (VITAMIN C) 500 MG tablet Take 500 mg by mouth once daily.      aspirin (ECOTRIN) 81 MG EC tablet Take 81 mg by mouth once daily.       b complex vitamins capsule Take 1 capsule by mouth once daily.      benzonatate (TESSALON) 200 MG capsule Take 1 capsule (200 mg total) by mouth 3 (three) times daily as needed for Cough. 30 capsule 3    betamethasone dipropionate 0.05 % cream Apply topically 2 (two) times daily. Use to affected areas for up to 2 weeks then take a 1 week break or decrease to 3 times weekly. Do not apply to groin or face. Use to rash on arms when needed 90 g 3    BIOTIN ORAL Take 1 tablet by mouth once daily.      calcipotriene (DOVONOX) 0.005 % cream Apply topically 2 (two) times daily. Apply to rash on arms. Can use daily for maintenance 60 g 6    co-enzyme Q-10 30 mg capsule Take 200 mg by mouth once daily.      colchicine (COLCRYS) 0.6 mg tablet Start with one pill once daily. If tolerating well after 2 weeks then increase to 1 pill twice daily 60 tablet 3    cycloSPORINE (RESTASIS MULTIDOSE) 0.05 % Drop Apply 1 drop to eye every 12 (twelve) hours. 16.5 mL 3    dapsone (ACZONE) 7.5 % GlwP Apply 1 Application topically once daily. Apply to rash on arms 60 g 5    ergocalciferol (ERGOCALCIFEROL) 50,000 unit Cap TAKE 1 CAPSULE BY MOUTH 2 TIMES A WEEK 24 capsule 5    EScitalopram oxalate (LEXAPRO) 20 MG tablet Take 1 tablet (20 mg total) by mouth once daily. 90 tablet 3    fluticasone propionate (FLONASE) 50 mcg/actuation nasal spray 1 spray (50 mcg total) by Each Nostril route once daily. for 5 days 9.9 mL 0    gabapentin (NEURONTIN) 100 MG capsule Take 2 capsules (200 mg total) by mouth 3 (three) times daily. 180 capsule 3    indomethacin (INDOCIN) 50 MG capsule Take 1 capsule (50 mg total) by mouth 3 (three) times daily as needed. 30 capsule 3    ipratropium (ATROVENT) 0.02 % nebulizer solution Take 2.5 mLs (500  mcg total) by nebulization 3 (three) times daily as needed for Wheezing. 62.5 mL 0    LINZESS 72 mcg Cap capsule TAKE 1 CAPSULE(72 MCG) BY MOUTH BEFORE BREAKFAST 90 capsule 3    loratadine (CLARITIN) 10 mg tablet Take 1 tablet (10 mg total) by mouth once daily. 30 tablet 0    losartan (COZAAR) 50 MG tablet TAKE 1 TABLET(50 MG) BY MOUTH TWICE DAILY 180 tablet 3    metFORMIN (GLUCOPHAGE-XR) 500 MG ER 24hr tablet Take 2 tablets (1,000 mg total) by mouth after lunch. 180 tablet 3    methylPREDNISolone (MEDROL DOSEPACK) 4 mg tablet use as directed 1 each 0    multivit,calc,mins/iron/folic (ONE-A-DAY WOMENS FORMULA ORAL) Take 1 tablet by mouth once daily.      omeprazole (PRILOSEC) 40 MG capsule Take 1 capsule (40 mg total) by mouth 2 (two) times daily. 180 capsule 4    rosuvastatin (CRESTOR) 20 MG tablet Take 1 tablet (20 mg total) by mouth once daily. 90 tablet 3    spironolactone (ALDACTONE) 25 MG tablet Take 1 tablet (25 mg total) by mouth once daily. 90 tablet 4    tiZANidine (ZANAFLEX) 4 MG tablet Take 1 tablet (4 mg total) by mouth nightly. 30 tablet 5    WESTAB MAX 2.5-25-2 mg Tab TAKE 1 TABLET BY MOUTH EVERY DAY 30 tablet 11    [DISCONTINUED] salsalate (DISALCID) 750 MG Tab Take 1 tablet (750 mg total) by mouth 3 (three) times daily. 90 tablet 9     No current facility-administered medications on file prior to visit.   [2]   Social History  Socioeconomic History    Marital status:     Number of children: 1   Occupational History    Occupation: Retired   Tobacco Use    Smoking status: Former     Current packs/day: 0.00     Types: Cigarettes     Quit date: 1989     Years since quittin.6    Smokeless tobacco: Never    Tobacco comments:     quit  - 1 pack per day since age 16   Substance and Sexual Activity    Alcohol use: Not Currently     Alcohol/week: 1.0 standard drink of alcohol     Types: 1 Glasses of wine per week     Comment: maybe one glass of wine monthly, if that    Drug use: No     Sexual activity: Not Currently     Partners: Male     Birth control/protection: Surgical     Social Drivers of Health     Financial Resource Strain: Low Risk  (6/12/2024)    Overall Financial Resource Strain (CARDIA)     Difficulty of Paying Living Expenses: Not hard at all   Food Insecurity: No Food Insecurity (6/12/2024)    Hunger Vital Sign     Worried About Running Out of Food in the Last Year: Never true     Ran Out of Food in the Last Year: Never true   Transportation Needs: No Transportation Needs (6/12/2024)    PRAPARE - Transportation     Lack of Transportation (Medical): No     Lack of Transportation (Non-Medical): No   Physical Activity: Insufficiently Active (6/12/2024)    Exercise Vital Sign     Days of Exercise per Week: 1 day     Minutes of Exercise per Session: 10 min   Stress: Stress Concern Present (6/12/2024)    [employer] - Occupational Stress Questionnaire     Feeling of Stress : To some extent   Housing Stability: Low Risk  (3/5/2024)    Housing Stability Vital Sign     Unable to Pay for Housing in the Last Year: No     Number of Places Lived in the Last Year: 1     Unstable Housing in the Last Year: No

## 2025-05-26 DIAGNOSIS — Z00.00 ENCOUNTER FOR MEDICARE ANNUAL WELLNESS EXAM: ICD-10-CM

## 2025-05-26 DIAGNOSIS — K59.09 CHRONIC CONSTIPATION: ICD-10-CM

## 2025-05-26 RX ORDER — LINACLOTIDE 72 UG/1
CAPSULE, GELATIN COATED ORAL
Qty: 90 CAPSULE | Refills: 3 | Status: SHIPPED | OUTPATIENT
Start: 2025-05-26

## 2025-05-31 ENCOUNTER — EXTERNAL CHRONIC CARE MANAGEMENT (OUTPATIENT)
Dept: PRIMARY CARE CLINIC | Facility: CLINIC | Age: 80
End: 2025-05-31
Payer: MEDICARE

## 2025-05-31 PROCEDURE — 99490 CHRNC CARE MGMT STAFF 1ST 20: CPT | Mod: PBBFAC | Performed by: INTERNAL MEDICINE

## 2025-05-31 PROCEDURE — 99490 CHRNC CARE MGMT STAFF 1ST 20: CPT | Mod: S$PBB,,, | Performed by: INTERNAL MEDICINE

## 2025-06-19 NOTE — TELEPHONE ENCOUNTER
----- Message from Miriam Ricketts sent at 9/12/2024  3:56 PM CDT -----  Regarding: Returning a Missed Call  Contact: Isael Salmon  Returning a Missed Call     Caller:Isael Salmon         Returning call to: Sania     Caller can be reached @:[phone] (home) [phone] (work)      Nature of the call:Patient is returning call to schedule. Requesting a call back  
sore throat/complains of pain/discomfort

## 2025-07-01 NOTE — PROGRESS NOTES
PATIENT: Isael Salmon  MRN: [id number]  DATE: 7/8/2025    Diagnosis:   1. Iron deficiency anemia due to chronic blood loss    2. Angiodysplasia of small intestine    3. Anemia due to stage 3a chronic kidney disease      Chief Complaint: Anemia    Oncologic History:      Oncologic History     Oncologic Treatment     Pathology       Telemedicine visit:  The patient location is: home  The chief complaint leading to consultation is: anemia    Visit type: audiovisual    Face to Face time with patient: 10 minutes  15 minutes of total time spent on the encounter, which includes face to face time and non-face to face time preparing to see the patient (eg, review of tests), Obtaining and/or reviewing separately obtained history, Documenting clinical information in the electronic or other health record, Independently interpreting results (not separately reported) and communicating results to the patient/family/caregiver, or Care coordination (not separately reported).     Each patient to whom he or she provides medical services by telemedicine is:  (1) informed of the relationship between the physician and patient and the respective role of any other health care provider with respect to management of the patient; and (2) notified that he or she may decline to receive medical services by telemedicine and may withdraw from such care at any time.    Notes: see below      Subjective:    History of Present Illness: Mrs. Salmon is a 79 y.o. female who presented in December 2023 for evaluation and management of iron deficiency anemia. She was referred by Dr. Montoya.    - she has angiodysplasia of her small intestine  - labs since 2006 reveal a mild-to-moderate anemia.  - iron studies (12/4/23) reveal a low-normal ferritin.  - she restarted oral iron last week.  - she received iron sucrose x 4 doses in January 2024. She did note notice a big improvement in her symptoms.      Interval history:  - she presents for a follow-up  appointment for her iron deficiency anemia.  - she received ferric gluconate x 4 doses in January/April 2025.  - today, she is doing well. She denies chest pain, nausea, vomiting, diarrhea, constipation.        Past medical, surgical, family, and social histories have been reviewed and updated below.    Past Medical History:   Past Medical History:   Diagnosis Date    Adjustment disorder 07/26/2012    Anemia 07/26/2012    AR (allergic rhinitis) 09/05/2014    Bronchitis     Fibrocystic breast disease in female 07/26/2012    GERD (gastroesophageal reflux disease) 07/26/2012    Gout     Gout, arthritis 07/26/2012    History of blood transfusion 07/26/2012    History of colonic polyps 07/26/2012    Hypercalcemia 09/20/2012    Hyperlipidemia 07/26/2012    Hypertension 07/26/2012    Nuclear sclerosis 05/02/2014    Osteoarthritis 07/26/2012    Other hyperparathyroidism 09/20/2012    Postmenopausal status 07/26/2012    PVD (peripheral vascular disease) 07/26/2012    left leg laser of vein with injection    Rheumatoid arthritis, unspecified     Stroke 1997    TIA    Superficial vein thrombosis     Transient ischemic attack 07/26/2012    Varicose veins 07/26/2012       Past Surgical History:   Past Surgical History:   Procedure Laterality Date    ANTEGRADE SINGLE BALLOON ENTEROSCOPY N/A 9/18/2023    Procedure: ENTEROSCOPY, SINGLE BALLOON, ANTEGRADE;  Surgeon: Jaziel Kingsley MD;  Location: [facility] ([address] [place] [address]);  Service: Endoscopy;  Laterality: N/A;  7/25/23-PEG prep, Instructions via portal-DS    BREAST CYST ASPIRATION Bilateral     multiple ones over the years    BREAST CYST EXCISION Left     BREAST SURGERY      breast reduction    broken second finger Right 12/2015    pins placed    carotid endarterectomy  1997    right    CAROTID ENDARTERECTOMY Right 1997    CATARACT EXTRACTION W/  INTRAOCULAR LENS IMPLANT Right 05/01/2018    Dr. Yost    CATARACT EXTRACTION W/  INTRAOCULAR LENS IMPLANT Left 05/15/2018      Trevon    COLONOSCOPY N/A 10/26/2015    Procedure: COLONOSCOPY;  Surgeon: Manuel Alanis MD;  Location: [facility] ENDO (4TH FLR);  Service: Endoscopy;  Laterality: N/A;    COLONOSCOPY N/A 9/18/2023    Procedure: COLONOSCOPY;  Surgeon: Jaziel Kingsley MD;  Location: [facility] ENDO (2ND FLR);  Service: Endoscopy;  Laterality: N/A;  can use single-balloon for this part as well    CORRECTION OF HAMMER TOE Left 07/08/2021    Procedure: CORRECTION, HAMMER TOE Second toe;  Surgeon: Silver Bunn MD;  Location: [facility] OR;  Service: Orthopedics;  Laterality: Left;  Ossiofiber hammertoe implant    EYE SURGERY      HYSTERECTOMY      ORIF FINGER FRACTURE  12/18/2015    SURGICAL REMOVAL OF BUNION WITH OSTEOTOMY OF METATARSAL BONE Left 07/08/2021    Procedure: BUNIONECTOMY, WITH METATARSAL OSTEOTOMY Proximal opening wedge;  Surgeon: Silver Bunn MD;  Location: [facility] OR;  Service: Orthopedics;  Laterality: Left;  Arthrex opening wedge plate    TIA      TOTAL REDUCTION MAMMOPLASTY Bilateral     VASCULAR SURGERY      left leg vein laser       Family History:   Family History   Problem Relation Name Age of Onset    Heart failure Father      Cancer Mother          pancreas    Cataracts Mother      Hypertension Mother      Cancer Brother          bladder    Cataracts Brother      Hypertension Brother      Crohn's disease Brother      Gout Brother      Cataracts Brother      Hypertension Brother      Gout Brother      Cataracts Brother      Hypertension Brother      Gout Brother      Bone cancer Brother      Cataracts Brother      Hypertension Brother      Breast cancer Neg Hx      Ovarian cancer Neg Hx      Allergies Neg Hx      Asthma Neg Hx      Eczema Neg Hx      Cervical cancer Neg Hx      Endometrial cancer Neg Hx      Vaginal cancer Neg Hx      Amblyopia Neg Hx      Blindness Neg Hx      Diabetes Neg Hx      Glaucoma Neg Hx      Macular degeneration Neg Hx      Retinal detachment Neg Hx      Strabismus Neg Hx      Stroke  Neg Hx      Thyroid disease Neg Hx      Celiac disease Neg Hx      Colon cancer Neg Hx      Esophageal cancer Neg Hx      Liver disease Neg Hx      Liver cancer Neg Hx      Rectal cancer Neg Hx      Stomach cancer Neg Hx         Social History:  reports that she quit smoking about 35 years ago. Her smoking use included cigarettes. She has never used smokeless tobacco. She reports that she does not currently use alcohol after a past usage of about 1.0 standard drink of alcohol per week. She reports that she does not use drugs.    Allergies:  Review of patient's allergies indicates:   Allergen Reactions    Avelox [moxifloxacin] Palpitations     Racing heart and gets the shakes    Latex Rash    Pcn [penicillins] Rash    Sulfa (sulfonamide antibiotics) Blisters    Ciprofloxacin      Room starts to spin  and nausea and dizziness    Codeine Nausea Only     nausea and weakness       Medications:  Current Outpatient Medications   Medication Sig Dispense Refill    acetaminophen (TYLENOL) 325 MG tablet Take 2 tablets (650 mg total) by mouth every 4 (four) hours as needed for Pain (Fever). 30 tablet 1    albuterol (VENTOLIN HFA) 90 mcg/actuation inhaler Inhale 1-2 puffs into the lungs every 6 (six) hours as needed for Wheezing or Shortness of Breath. Rescue 18 g 0    allopurinoL (ZYLOPRIM) 100 MG tablet Take 2 tablets (200 mg total) by mouth Daily. 180 tablet 1    ascorbic acid, vitamin C, (VITAMIN C) 500 MG tablet Take 500 mg by mouth once daily.      aspirin (ECOTRIN) 81 MG EC tablet Take 81 mg by mouth once daily.       b complex vitamins capsule Take 1 capsule by mouth once daily.      benzonatate (TESSALON) 200 MG capsule Take 1 capsule (200 mg total) by mouth 3 (three) times daily as needed for Cough. 30 capsule 3    betamethasone dipropionate 0.05 % cream Apply topically 2 (two) times daily. Use to affected areas for up to 2 weeks then take a 1 week break or decrease to 3 times weekly. Do not apply to groin or face. Use  to rash on arms when needed 90 g 3    BIOTIN ORAL Take 1 tablet by mouth once daily.      calcipotriene (DOVONOX) 0.005 % cream Apply topically 2 (two) times daily. Apply to rash on arms. Can use daily for maintenance 60 g 6    co-enzyme Q-10 30 mg capsule Take 200 mg by mouth once daily.      colchicine (COLCRYS) 0.6 mg tablet Start with one pill once daily. If tolerating well after 2 weeks then increase to 1 pill twice daily 60 tablet 3    cycloSPORINE (RESTASIS MULTIDOSE) 0.05 % Drop Apply 1 drop to eye every 12 (twelve) hours. 16.5 mL 3    dapsone (ACZONE) 7.5 % GlwP Apply 1 Application topically once daily. Apply to rash on arms 60 g 5    ergocalciferol (ERGOCALCIFEROL) 50,000 unit Cap TAKE 1 CAPSULE BY MOUTH 2 TIMES A WEEK 24 capsule 5    EScitalopram oxalate (LEXAPRO) 20 MG tablet Take 1 tablet (20 mg total) by mouth once daily. 90 tablet 3    gabapentin (NEURONTIN) 100 MG capsule Take 2 capsules (200 mg total) by mouth 3 (three) times daily. 180 capsule 3    indomethacin (INDOCIN) 50 MG capsule Take 1 capsule (50 mg total) by mouth 3 (three) times daily as needed. 30 capsule 3    ipratropium (ATROVENT) 0.02 % nebulizer solution Take 2.5 mLs (500 mcg total) by nebulization 3 (three) times daily as needed for Wheezing. 62.5 mL 0    LINZESS 72 mcg Cap capsule TAKE 1 CAPSULE(72 MCG) BY MOUTH BEFORE BREAKFAST 90 capsule 3    loratadine (CLARITIN) 10 mg tablet Take 1 tablet (10 mg total) by mouth once daily. 30 tablet 0    losartan (COZAAR) 50 MG tablet TAKE 1 TABLET(50 MG) BY MOUTH TWICE DAILY 180 tablet 3    metFORMIN (GLUCOPHAGE-XR) 500 MG ER 24hr tablet Take 2 tablets (1,000 mg total) by mouth after lunch. 180 tablet 3    multivit,calc,mins/iron/folic (ONE-A-DAY WOMENS FORMULA ORAL) Take 1 tablet by mouth once daily.      omeprazole (PRILOSEC) 40 MG capsule Take 1 capsule (40 mg total) by mouth 2 (two) times daily. 180 capsule 4    rosuvastatin (CRESTOR) 20 MG tablet Take 1 tablet (20 mg total) by mouth once  daily. 90 tablet 3    spironolactone (ALDACTONE) 25 MG tablet Take 1 tablet (25 mg total) by mouth once daily. 90 tablet 4    tiZANidine (ZANAFLEX) 4 MG tablet Take 1 tablet (4 mg total) by mouth nightly. 30 tablet 5    WESTAB MAX 2.5-25-2 mg Tab TAKE 1 TABLET BY MOUTH EVERY DAY 30 tablet 11     No current facility-administered medications for this visit.       Review of Systems   Constitutional:  Positive for fatigue.   HENT:  Negative for sore throat.    Eyes:  Negative for visual disturbance.   Respiratory:  Negative for cough.    Cardiovascular:  Negative for chest pain.   Gastrointestinal:  Negative for abdominal pain, constipation, diarrhea, nausea and vomiting.   Genitourinary:  Negative for dysuria.   Musculoskeletal:  Positive for neck pain. Negative for back pain.   Skin:  Negative for rash.   Neurological:  Negative for headaches.   Hematological:  Negative for adenopathy.   Psychiatric/Behavioral:  The patient is not nervous/anxious.        ECOG Performance Status:   ECOG SCORE    1 - Restricted in strenuous activity-ambulatory and able to carry out work of a light nature         Objective:      Vitals:   There were no vitals filed for this visit.    BMI: There is no height or weight on file to calculate BMI.  Deferred due to telemedicine visit.    Physical Exam  Deferred due to telemedicine visit.    Laboratory Data:  Labs have been reviewed.    Lab Results   Component Value Date    WBC 5.16 07/07/2025    HGB 10.0 (L) 07/07/2025    HCT 31.1 (L) 07/07/2025    MCV 99 (H) 07/07/2025     07/07/2025     Lab Results   Component Value Date    FERRITIN 97.0 07/07/2025     Lab Results   Component Value Date    UIBC 235 08/13/2012    IRON 103 07/07/2025    TRANSFERRIN 211 07/07/2025    TIBC 312 07/07/2025    LABIRON 33 07/07/2025    FESATURATED 32 01/06/2025            Imaging:    Upper GI enteroscopy (9/18/23):   - Normal esophagus.                          - Esophagogastric landmarks identified.                           - Normal stomach.                          - Normal examined duodenum.                          - The examined portion of the jejunum was normal.                          - The examined portion of the ileum was normal.                          Tattooed maximal insertion.                          - No specimens collected.     Colonoscopy (9/18/23):   - Hemorrhoids found on perianal exam.                          - Diverticulosis in the entire examined colon.                          - One 3 mm polyp in the transverse colon, removed                          with a cold snare. Resected and retrieved.                          - The examination was otherwise normal on direct                          and retroflexion views.       Assessment:       1. Iron deficiency anemia due to chronic blood loss    2. Angiodysplasia of small intestine    3. Anemia due to stage 3a chronic kidney disease           Plan:     Iron deficiency anemia / angiodysplasia of small intestine  / anemia in CKD / anemia of chronic disease  - I have reviewed her chart  - bone marrow biopsy (2012) was negative for malignancy.  - she has angiodysplasia of her small intestine  - labs since 2006 reveal a mild-to-moderate anemia.  - iron studies (12/4/23) reveal a low-normal ferritin.  - she takes oral iron  - I recommended iron sucrose x 4 doses   - she received iron sucrose x 4 doses in January 2024.  - she received ferric gluconate x 4 doses in January/April 2025.  - Labs have been reviewed. Hemoglobin is stable at 10 g/dL. Ferritin has increased from 50 ng/mL to 33 ng/mL  - part of her anemia may be anemia in CKD, anemia of chronic disease  - we will schedule ferric gluconate at [facility].  - return to clinic in 6 months with repeat labs.    2. Advance Care Planning   Power of   After our discussion (at previous visit), she decided not to complete a HCPOA.        - return to clinic in 6 months with repeat labs.    Kem  BLAIR Bianchi.  Hematology/Oncology  [facility]  [address], [address]  [place] [address]  Phone: [phone]  Fax: [phone]

## 2025-07-07 ENCOUNTER — LAB VISIT (OUTPATIENT)
Dept: LAB | Facility: OTHER | Age: 80
End: 2025-07-07
Attending: INTERNAL MEDICINE
Payer: MEDICARE

## 2025-07-07 DIAGNOSIS — D50.0 IRON DEFICIENCY ANEMIA DUE TO CHRONIC BLOOD LOSS: ICD-10-CM

## 2025-07-07 DIAGNOSIS — D63.1 ANEMIA DUE TO STAGE 3A CHRONIC KIDNEY DISEASE: ICD-10-CM

## 2025-07-07 DIAGNOSIS — N18.31 ANEMIA DUE TO STAGE 3A CHRONIC KIDNEY DISEASE: ICD-10-CM

## 2025-07-07 LAB
ABSOLUTE EOSINOPHIL (OHS): 0.09 K/UL
ABSOLUTE MONOCYTE (OHS): 0.41 K/UL (ref 0.3–1)
ABSOLUTE NEUTROPHIL COUNT (OHS): 3.88 K/UL (ref 1.8–7.7)
ALBUMIN SERPL BCP-MCNC: 3.6 G/DL (ref 3.5–5.2)
ALP SERPL-CCNC: 66 UNIT/L (ref 40–150)
ALT SERPL W/O P-5'-P-CCNC: 16 UNIT/L (ref 10–44)
ANION GAP (OHS): 10 MMOL/L (ref 8–16)
AST SERPL-CCNC: 17 UNIT/L (ref 11–45)
BASOPHILS # BLD AUTO: 0.02 K/UL
BASOPHILS NFR BLD AUTO: 0.4 %
BILIRUB SERPL-MCNC: 0.5 MG/DL (ref 0.1–1)
BUN SERPL-MCNC: 26 MG/DL (ref 8–23)
CALCIUM SERPL-MCNC: 10.6 MG/DL (ref 8.7–10.5)
CHLORIDE SERPL-SCNC: 108 MMOL/L (ref 95–110)
CO2 SERPL-SCNC: 24 MMOL/L (ref 23–29)
CREAT SERPL-MCNC: 1.1 MG/DL (ref 0.5–1.4)
ERYTHROCYTE [DISTWIDTH] IN BLOOD BY AUTOMATED COUNT: 13.4 % (ref 11.5–14.5)
FERRITIN SERPL-MCNC: 97 NG/ML (ref 20–300)
GFR SERPLBLD CREATININE-BSD FMLA CKD-EPI: 51 ML/MIN/1.73/M2
GLUCOSE SERPL-MCNC: 84 MG/DL (ref 70–110)
HCT VFR BLD AUTO: 31.1 % (ref 37–48.5)
HGB BLD-MCNC: 10 GM/DL (ref 12–16)
IMM GRANULOCYTES # BLD AUTO: 0.01 K/UL (ref 0–0.04)
IMM GRANULOCYTES NFR BLD AUTO: 0.2 % (ref 0–0.5)
IRON SATN MFR SERPL: 33 % (ref 20–50)
IRON SERPL-MCNC: 103 UG/DL (ref 30–160)
LYMPHOCYTES # BLD AUTO: 0.75 K/UL (ref 1–4.8)
MCH RBC QN AUTO: 31.7 PG (ref 27–31)
MCHC RBC AUTO-ENTMCNC: 32.2 G/DL (ref 32–36)
MCV RBC AUTO: 99 FL (ref 82–98)
NUCLEATED RBC (/100WBC) (OHS): 0 /100 WBC
PLATELET # BLD AUTO: 200 K/UL (ref 150–450)
PMV BLD AUTO: 10.6 FL (ref 9.2–12.9)
POTASSIUM SERPL-SCNC: 4.9 MMOL/L (ref 3.5–5.1)
PROT SERPL-MCNC: 7.1 GM/DL (ref 6–8.4)
RBC # BLD AUTO: 3.15 M/UL (ref 4–5.4)
RELATIVE EOSINOPHIL (OHS): 1.7 %
RELATIVE LYMPHOCYTE (OHS): 14.5 % (ref 18–48)
RELATIVE MONOCYTE (OHS): 7.9 % (ref 4–15)
RELATIVE NEUTROPHIL (OHS): 75.3 % (ref 38–73)
SODIUM SERPL-SCNC: 142 MMOL/L (ref 136–145)
TIBC SERPL-MCNC: 312 UG/DL (ref 250–450)
TRANSFERRIN SERPL-MCNC: 211 MG/DL (ref 200–375)
WBC # BLD AUTO: 5.16 K/UL (ref 3.9–12.7)

## 2025-07-07 PROCEDURE — 82728 ASSAY OF FERRITIN: CPT

## 2025-07-07 PROCEDURE — 85025 COMPLETE CBC W/AUTO DIFF WBC: CPT

## 2025-07-07 PROCEDURE — 36415 COLL VENOUS BLD VENIPUNCTURE: CPT

## 2025-07-07 PROCEDURE — 84466 ASSAY OF TRANSFERRIN: CPT

## 2025-07-07 PROCEDURE — 82247 BILIRUBIN TOTAL: CPT

## 2025-07-08 ENCOUNTER — OFFICE VISIT (OUTPATIENT)
Dept: HEMATOLOGY/ONCOLOGY | Facility: CLINIC | Age: 80
End: 2025-07-08
Payer: MEDICARE

## 2025-07-08 DIAGNOSIS — K55.20 ANGIODYSPLASIA OF SMALL INTESTINE: ICD-10-CM

## 2025-07-08 DIAGNOSIS — D50.0 IRON DEFICIENCY ANEMIA DUE TO CHRONIC BLOOD LOSS: Primary | ICD-10-CM

## 2025-07-08 DIAGNOSIS — D63.1 ANEMIA DUE TO STAGE 3A CHRONIC KIDNEY DISEASE: ICD-10-CM

## 2025-07-08 DIAGNOSIS — N18.31 ANEMIA DUE TO STAGE 3A CHRONIC KIDNEY DISEASE: ICD-10-CM

## 2025-07-08 PROCEDURE — 98006 SYNCH AUDIO-VIDEO EST MOD 30: CPT | Mod: 95,,, | Performed by: INTERNAL MEDICINE

## 2025-07-08 RX ORDER — HEPARIN 100 UNIT/ML
500 SYRINGE INTRAVENOUS
OUTPATIENT
Start: 2025-07-15

## 2025-07-08 RX ORDER — EPINEPHRINE 0.3 MG/.3ML
0.3 INJECTION SUBCUTANEOUS ONCE AS NEEDED
OUTPATIENT
Start: 2025-07-15

## 2025-07-08 RX ORDER — SODIUM FERRIC GLUCONATE COMPLEX IN SUCROSE 12.5 MG/ML
125 INJECTION INTRAVENOUS
OUTPATIENT
Start: 2025-07-15

## 2025-07-08 RX ORDER — SODIUM CHLORIDE 0.9 % (FLUSH) 0.9 %
10 SYRINGE (ML) INJECTION
OUTPATIENT
Start: 2025-07-15

## 2025-07-25 ENCOUNTER — PATIENT MESSAGE (OUTPATIENT)
Dept: OPTOMETRY | Facility: CLINIC | Age: 80
End: 2025-07-25
Payer: MEDICARE

## 2025-07-25 ENCOUNTER — TELEPHONE (OUTPATIENT)
Dept: OPTOMETRY | Facility: CLINIC | Age: 80
End: 2025-07-25
Payer: MEDICARE

## 2025-07-25 NOTE — TELEPHONE ENCOUNTER
Left pt message asking to call back to schedule appt.   ----- Message from Jayson Euceda sent at 7/25/2025  3:13 PM CDT -----    ----- Message -----  From: Jojo Wiley  Sent: 7/25/2025   1:50 PM CDT  To: [facility] Optometry Clinical Support; [facility]#    Patient is requesting a call back to schedule an appointment. She has been having irritation for 2 weeks and using eye drops. Please call patient back at [phone]

## 2025-07-31 ENCOUNTER — INFUSION (OUTPATIENT)
Dept: INFUSION THERAPY | Facility: OTHER | Age: 80
End: 2025-07-31
Attending: INTERNAL MEDICINE
Payer: MEDICARE

## 2025-07-31 VITALS
DIASTOLIC BLOOD PRESSURE: 61 MMHG | TEMPERATURE: 98 F | RESPIRATION RATE: 18 BRPM | OXYGEN SATURATION: 100 % | HEART RATE: 86 BPM | SYSTOLIC BLOOD PRESSURE: 106 MMHG

## 2025-07-31 DIAGNOSIS — D50.0 IRON DEFICIENCY ANEMIA DUE TO CHRONIC BLOOD LOSS: ICD-10-CM

## 2025-07-31 DIAGNOSIS — N18.31 ANEMIA DUE TO STAGE 3A CHRONIC KIDNEY DISEASE: Primary | ICD-10-CM

## 2025-07-31 DIAGNOSIS — D63.1 ANEMIA DUE TO STAGE 3A CHRONIC KIDNEY DISEASE: Primary | ICD-10-CM

## 2025-07-31 PROCEDURE — 96374 THER/PROPH/DIAG INJ IV PUSH: CPT

## 2025-07-31 PROCEDURE — 25000003 PHARM REV CODE 250: Performed by: INTERNAL MEDICINE

## 2025-07-31 PROCEDURE — 63600175 PHARM REV CODE 636 W HCPCS: Performed by: INTERNAL MEDICINE

## 2025-07-31 RX ORDER — HEPARIN 100 UNIT/ML
500 SYRINGE INTRAVENOUS
Status: DISCONTINUED | OUTPATIENT
Start: 2025-07-31 | End: 2025-07-31 | Stop reason: HOSPADM

## 2025-07-31 RX ORDER — SODIUM CHLORIDE 0.9 % (FLUSH) 0.9 %
10 SYRINGE (ML) INJECTION
OUTPATIENT
Start: 2025-08-07

## 2025-07-31 RX ORDER — SODIUM FERRIC GLUCONATE COMPLEX IN SUCROSE 12.5 MG/ML
125 INJECTION INTRAVENOUS
Status: COMPLETED | OUTPATIENT
Start: 2025-07-31 | End: 2025-07-31

## 2025-07-31 RX ORDER — SODIUM FERRIC GLUCONATE COMPLEX IN SUCROSE 12.5 MG/ML
125 INJECTION INTRAVENOUS
OUTPATIENT
Start: 2025-08-07

## 2025-07-31 RX ORDER — HEPARIN 100 UNIT/ML
500 SYRINGE INTRAVENOUS
OUTPATIENT
Start: 2025-08-07

## 2025-07-31 RX ORDER — EPINEPHRINE 0.3 MG/.3ML
0.3 INJECTION SUBCUTANEOUS ONCE AS NEEDED
Status: DISCONTINUED | OUTPATIENT
Start: 2025-07-31 | End: 2025-07-31 | Stop reason: HOSPADM

## 2025-07-31 RX ORDER — EPINEPHRINE 0.3 MG/.3ML
0.3 INJECTION SUBCUTANEOUS ONCE AS NEEDED
OUTPATIENT
Start: 2025-08-07

## 2025-07-31 RX ORDER — SODIUM CHLORIDE 0.9 % (FLUSH) 0.9 %
10 SYRINGE (ML) INJECTION
Status: DISCONTINUED | OUTPATIENT
Start: 2025-07-31 | End: 2025-07-31 | Stop reason: HOSPADM

## 2025-07-31 RX ADMIN — SODIUM FERRIC GLUCONATE COMPLEX IN SUCROSE 125 MG: 12.5 INJECTION INTRAVENOUS at 02:07

## 2025-07-31 RX ADMIN — SODIUM CHLORIDE: 9 INJECTION, SOLUTION INTRAVENOUS at 02:07

## 2025-07-31 NOTE — PLAN OF CARE
Problem: Anemia  Goal: Anemia Symptom Improvement  Outcome: Progressing       Patient presented today for ferrlicit infusion. PIV started and infusion given with no issues. VS remained stable throughout visit and assessment provided no issues. PIV removed w/o complications and all questions answered. Patient scheduled for next appt and left clinic in no acute distress.

## 2025-08-01 ENCOUNTER — OFFICE VISIT (OUTPATIENT)
Dept: DERMATOLOGY | Facility: CLINIC | Age: 80
End: 2025-08-01
Payer: MEDICARE

## 2025-08-01 DIAGNOSIS — L13.1 SUBCORNEAL PUSTULAR DERMATOSIS: Primary | ICD-10-CM

## 2025-08-01 DIAGNOSIS — L53.1 ERYTHEMA ANNULARE CENTRIFUGUM: ICD-10-CM

## 2025-08-01 PROCEDURE — 99214 OFFICE O/P EST MOD 30 MIN: CPT | Mod: PBBFAC,PO | Performed by: DERMATOLOGY

## 2025-08-01 PROCEDURE — 99999 PR PBB SHADOW E&M-EST. PATIENT-LVL IV: CPT | Mod: PBBFAC,,, | Performed by: DERMATOLOGY

## 2025-08-01 RX ORDER — PIMECROLIMUS 10 MG/G
CREAM TOPICAL
Qty: 60 G | Refills: 3 | Status: SHIPPED | OUTPATIENT
Start: 2025-08-01

## 2025-08-01 NOTE — PROGRESS NOTES
"  Subjective:      Patient ID:  Isael Salmon is a 79 y.o. 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  Previously had occurred on her legs some on arms and was associated with chronic UTIs.  The topical cortisone cream and nUVB light was helpful.  She was lost to follow up for several years but returned in 2016 with lesions on her arms and chest, the bx showed scpd vs impetigo.  Her chest lesion resolved but still has lesions on her arms off and on doing fairly well now using diprolene and topical dapsone.  See recent note Dr Qiu: 1/2025       betamethasone dipropionate 0.05 % cream; Apply topically 2 (two) times daily. Use to affected areas for up to 2 weeks then take a 1 week break or decrease to 3 times weekly. Do not apply to groin or face. Use to rash on arms when needed  Dispense: 90 g; Refill: 3  -     calcipotriene (DOVONOX) 0.005 % cream; Apply topically 2 (two) times daily. Apply to rash on arms. Can use daily for maintenance  Dispense: 60 g; Refill: 6     - annular erythematous plaques on arms and hands. Prior biopsy with subcornal pustular dermatosis. Using betamethasone which helps but continues to get new lesions  - c/w diprolene bid to AA prn flares  - c/w dapsone 7.5% qd and will start calcipotriene as above qd as steroid sparing agent  - c/w colchicine. Try to increase to bid if tolerates  - has chronic baseline anemia so would like to avoid dapsone. Other treatment options include acitretin, phototherapy, biologics (TNFalpha inhibitors, IL23, etc). Reports of otezla  - discussed treatment options and possible side effects. She would prefer to continue with topical therapy for now and will f/u if it worsens and she would like to start maintenance nbUVB or systemic therapy"    She does not tolerate the colchicine due to diarrhea, she does not " use calcipotriene.  She would like to try nUVB tx since it was effective previously over 10 years ago.      Dermatitis - Initial  Affected locations: left arm, right arm, right hand and left hand  Signs / symptoms: asymptomatic      Review of Systems   Constitutional:  Negative for fever, chills, weight gain, fatigue, night sweats and malaise.   Skin:  Positive for daily sunscreen use and activity-related sunscreen use. Negative for itching, rash and wears hat.   Hematologic/Lymphatic: Bruises/bleeds easily.       Objective:   Physical Exam   Constitutional: She appears well-developed and well-nourished.   Neurological: She is alert and oriented to person, place, and time.   Psychiatric: She has a normal mood and affect.   Skin:   Areas Examined (abnormalities noted in diagram):   Head / Face Inspection Performed  RUE Inspected  LUE Inspection Performed            Diagram Legend     Erythematous scaling macule/papule c/w actinic keratosis       Vascular papule c/w angioma      Pigmented verrucoid papule/plaque c/w seborrheic keratosis      Yellow umbilicated papule c/w sebaceous hyperplasia      Irregularly shaped tan macule c/w lentigo     1-2 mm smooth white papules consistent with Milia      Movable subcutaneous cyst with punctum c/w epidermal inclusion cyst      Subcutaneous movable cyst c/w pilar cyst      Firm pink to brown papule c/w dermatofibroma      Pedunculated fleshy papule(s) c/w skin tag(s)      Evenly pigmented macule c/w junctional nevus     Mildly variegated pigmented, slightly irregular-bordered macule c/w mildly atypical nevus      Flesh colored to evenly pigmented papule c/w intradermal nevus       Pink pearly papule/plaque c/w basal cell carcinoma      Erythematous hyperkeratotic cursted plaque c/w SCC      Surgical scar with no sign of skin cancer recurrence      Open and closed comedones      Inflammatory papules and pustules      Verrucoid papule consistent consistent with wart      Erythematous eczematous patches and plaques     Dystrophic onycholytic nail with subungual debris c/w onychomycosis     Umbilicated papule    Erythematous-base heme-crusted tan verrucoid plaque consistent with inflamed seborrheic keratosis     Erythematous Silvery Scaling Plaque c/w Psoriasis     See annotation      Assessment / Plan:        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

## 2025-08-04 ENCOUNTER — TELEPHONE (OUTPATIENT)
Dept: DERMATOLOGY | Facility: CLINIC | Age: 80
End: 2025-08-04
Payer: MEDICARE

## 2025-08-04 NOTE — TELEPHONE ENCOUNTER
Explained due her multiple photosensitizing meds would be best to try the elidel cream bid and hold off starting nUVB.

## 2025-08-07 ENCOUNTER — INFUSION (OUTPATIENT)
Dept: INFUSION THERAPY | Facility: OTHER | Age: 80
End: 2025-08-07
Attending: INTERNAL MEDICINE
Payer: MEDICARE

## 2025-08-07 VITALS
RESPIRATION RATE: 18 BRPM | HEART RATE: 71 BPM | DIASTOLIC BLOOD PRESSURE: 66 MMHG | OXYGEN SATURATION: 97 % | SYSTOLIC BLOOD PRESSURE: 133 MMHG | TEMPERATURE: 98 F

## 2025-08-07 DIAGNOSIS — N18.31 ANEMIA DUE TO STAGE 3A CHRONIC KIDNEY DISEASE: Primary | ICD-10-CM

## 2025-08-07 DIAGNOSIS — D63.1 ANEMIA DUE TO STAGE 3A CHRONIC KIDNEY DISEASE: Primary | ICD-10-CM

## 2025-08-07 DIAGNOSIS — D50.0 IRON DEFICIENCY ANEMIA DUE TO CHRONIC BLOOD LOSS: ICD-10-CM

## 2025-08-07 PROCEDURE — 96374 THER/PROPH/DIAG INJ IV PUSH: CPT

## 2025-08-07 PROCEDURE — 63600175 PHARM REV CODE 636 W HCPCS: Performed by: INTERNAL MEDICINE

## 2025-08-07 PROCEDURE — 25000003 PHARM REV CODE 250: Performed by: INTERNAL MEDICINE

## 2025-08-07 RX ORDER — SODIUM CHLORIDE 0.9 % (FLUSH) 0.9 %
10 SYRINGE (ML) INJECTION
OUTPATIENT
Start: 2025-08-14

## 2025-08-07 RX ORDER — SODIUM CHLORIDE 0.9 % (FLUSH) 0.9 %
10 SYRINGE (ML) INJECTION
Status: DISCONTINUED | OUTPATIENT
Start: 2025-08-07 | End: 2025-08-07 | Stop reason: HOSPADM

## 2025-08-07 RX ORDER — HEPARIN 100 UNIT/ML
500 SYRINGE INTRAVENOUS
OUTPATIENT
Start: 2025-08-14

## 2025-08-07 RX ORDER — SODIUM FERRIC GLUCONATE COMPLEX IN SUCROSE 12.5 MG/ML
125 INJECTION INTRAVENOUS
Status: COMPLETED | OUTPATIENT
Start: 2025-08-07 | End: 2025-08-07

## 2025-08-07 RX ORDER — SODIUM FERRIC GLUCONATE COMPLEX IN SUCROSE 12.5 MG/ML
125 INJECTION INTRAVENOUS
OUTPATIENT
Start: 2025-08-14

## 2025-08-07 RX ORDER — EPINEPHRINE 0.3 MG/.3ML
0.3 INJECTION SUBCUTANEOUS ONCE AS NEEDED
OUTPATIENT
Start: 2025-08-14

## 2025-08-07 RX ORDER — HEPARIN 100 UNIT/ML
500 SYRINGE INTRAVENOUS
Status: DISCONTINUED | OUTPATIENT
Start: 2025-08-07 | End: 2025-08-07 | Stop reason: HOSPADM

## 2025-08-07 RX ADMIN — SODIUM CHLORIDE: 9 INJECTION, SOLUTION INTRAVENOUS at 02:08

## 2025-08-07 RX ADMIN — SODIUM FERRIC GLUCONATE COMPLEX IN SUCROSE 125 MG: 12.5 INJECTION INTRAVENOUS at 02:08

## 2025-08-13 ENCOUNTER — PATIENT MESSAGE (OUTPATIENT)
Dept: DERMATOLOGY | Facility: CLINIC | Age: 80
End: 2025-08-13
Payer: MEDICARE

## 2025-08-13 DIAGNOSIS — L53.1 ERYTHEMA ANNULARE CENTRIFUGUM: Primary | ICD-10-CM

## 2025-08-13 RX ORDER — TACROLIMUS 1 MG/G
OINTMENT TOPICAL
Qty: 60 G | Refills: 3 | Status: SHIPPED | OUTPATIENT
Start: 2025-08-13

## 2025-08-14 ENCOUNTER — OFFICE VISIT (OUTPATIENT)
Dept: ORTHOPEDICS | Facility: CLINIC | Age: 80
End: 2025-08-14
Payer: MEDICARE

## 2025-08-14 ENCOUNTER — INFUSION (OUTPATIENT)
Dept: INFUSION THERAPY | Facility: OTHER | Age: 80
End: 2025-08-14
Attending: INTERNAL MEDICINE
Payer: MEDICARE

## 2025-08-14 ENCOUNTER — HOSPITAL ENCOUNTER (OUTPATIENT)
Dept: RADIOLOGY | Facility: HOSPITAL | Age: 80
Discharge: HOME | End: 2025-08-14
Attending: PHYSICIAN ASSISTANT
Payer: MEDICARE

## 2025-08-14 VITALS
HEART RATE: 82 BPM | RESPIRATION RATE: 17 BRPM | DIASTOLIC BLOOD PRESSURE: 72 MMHG | SYSTOLIC BLOOD PRESSURE: 145 MMHG | OXYGEN SATURATION: 99 % | TEMPERATURE: 98 F

## 2025-08-14 DIAGNOSIS — M17.0 PRIMARY OSTEOARTHRITIS OF BOTH KNEES: ICD-10-CM

## 2025-08-14 DIAGNOSIS — D50.0 IRON DEFICIENCY ANEMIA DUE TO CHRONIC BLOOD LOSS: ICD-10-CM

## 2025-08-14 DIAGNOSIS — N18.31 ANEMIA DUE TO STAGE 3A CHRONIC KIDNEY DISEASE: Primary | ICD-10-CM

## 2025-08-14 DIAGNOSIS — M17.0 PRIMARY OSTEOARTHRITIS OF BOTH KNEES: Primary | ICD-10-CM

## 2025-08-14 DIAGNOSIS — D63.1 ANEMIA DUE TO STAGE 3A CHRONIC KIDNEY DISEASE: Primary | ICD-10-CM

## 2025-08-14 PROCEDURE — 99999PBSHW PR PBB SHADOW TECHNICAL ONLY FILED TO HB: Mod: PBBFAC,,,

## 2025-08-14 PROCEDURE — 20610 DRAIN/INJ JOINT/BURSA W/O US: CPT | Mod: 50,PBBFAC | Performed by: PHYSICIAN ASSISTANT

## 2025-08-14 PROCEDURE — 63600175 PHARM REV CODE 636 W HCPCS: Performed by: INTERNAL MEDICINE

## 2025-08-14 PROCEDURE — 99214 OFFICE O/P EST MOD 30 MIN: CPT | Mod: PBBFAC | Performed by: PHYSICIAN ASSISTANT

## 2025-08-14 PROCEDURE — 96374 THER/PROPH/DIAG INJ IV PUSH: CPT

## 2025-08-14 PROCEDURE — 99999 PR PBB SHADOW E&M-EST. PATIENT-LVL IV: CPT | Mod: PBBFAC,,, | Performed by: PHYSICIAN ASSISTANT

## 2025-08-14 PROCEDURE — 25000003 PHARM REV CODE 250: Performed by: INTERNAL MEDICINE

## 2025-08-14 RX ORDER — EPINEPHRINE 0.3 MG/.3ML
0.3 INJECTION SUBCUTANEOUS ONCE AS NEEDED
Status: DISCONTINUED | OUTPATIENT
Start: 2025-08-14 | End: 2025-08-14 | Stop reason: HOSPADM

## 2025-08-14 RX ORDER — TRIAMCINOLONE ACETONIDE 40 MG/ML
40 INJECTION, SUSPENSION INTRA-ARTICULAR; INTRAMUSCULAR
Status: DISCONTINUED | OUTPATIENT
Start: 2025-08-14 | End: 2025-08-14 | Stop reason: HOSPADM

## 2025-08-14 RX ORDER — SODIUM FERRIC GLUCONATE COMPLEX IN SUCROSE 12.5 MG/ML
125 INJECTION INTRAVENOUS
Status: COMPLETED | OUTPATIENT
Start: 2025-08-14 | End: 2025-08-14

## 2025-08-14 RX ORDER — LIDOCAINE HYDROCHLORIDE 10 MG/ML
4 INJECTION, SOLUTION INFILTRATION; PERINEURAL
Status: DISCONTINUED | OUTPATIENT
Start: 2025-08-14 | End: 2025-08-14 | Stop reason: HOSPADM

## 2025-08-14 RX ORDER — HEPARIN 100 UNIT/ML
500 SYRINGE INTRAVENOUS
OUTPATIENT
Start: 2025-08-21

## 2025-08-14 RX ORDER — SODIUM FERRIC GLUCONATE COMPLEX IN SUCROSE 12.5 MG/ML
125 INJECTION INTRAVENOUS
OUTPATIENT
Start: 2025-08-21

## 2025-08-14 RX ORDER — HEPARIN 100 UNIT/ML
500 SYRINGE INTRAVENOUS
Status: DISCONTINUED | OUTPATIENT
Start: 2025-08-14 | End: 2025-08-14 | Stop reason: HOSPADM

## 2025-08-14 RX ORDER — SODIUM CHLORIDE 0.9 % (FLUSH) 0.9 %
10 SYRINGE (ML) INJECTION
OUTPATIENT
Start: 2025-08-21

## 2025-08-14 RX ORDER — SODIUM CHLORIDE 0.9 % (FLUSH) 0.9 %
10 SYRINGE (ML) INJECTION
Status: DISCONTINUED | OUTPATIENT
Start: 2025-08-14 | End: 2025-08-14 | Stop reason: HOSPADM

## 2025-08-14 RX ORDER — EPINEPHRINE 0.3 MG/.3ML
0.3 INJECTION SUBCUTANEOUS ONCE AS NEEDED
OUTPATIENT
Start: 2025-08-21

## 2025-08-14 RX ADMIN — LIDOCAINE HYDROCHLORIDE 4 ML: 10 INJECTION, SOLUTION INFILTRATION; PERINEURAL at 10:08

## 2025-08-14 RX ADMIN — SODIUM CHLORIDE: 9 INJECTION, SOLUTION INTRAVENOUS at 01:08

## 2025-08-14 RX ADMIN — SODIUM FERRIC GLUCONATE COMPLEX IN SUCROSE 125 MG: 12.5 INJECTION INTRAVENOUS at 01:08

## 2025-08-14 RX ADMIN — TRIAMCINOLONE ACETONIDE 40 MG: 40 INJECTION, SUSPENSION INTRA-ARTICULAR; INTRAMUSCULAR at 10:08

## 2025-08-21 ENCOUNTER — INFUSION (OUTPATIENT)
Dept: INFUSION THERAPY | Facility: OTHER | Age: 80
End: 2025-08-21
Attending: INTERNAL MEDICINE
Payer: MEDICARE

## 2025-08-21 VITALS
HEART RATE: 80 BPM | TEMPERATURE: 99 F | OXYGEN SATURATION: 98 % | DIASTOLIC BLOOD PRESSURE: 64 MMHG | SYSTOLIC BLOOD PRESSURE: 124 MMHG

## 2025-08-21 DIAGNOSIS — N18.31 ANEMIA DUE TO STAGE 3A CHRONIC KIDNEY DISEASE: Primary | ICD-10-CM

## 2025-08-21 DIAGNOSIS — D50.0 IRON DEFICIENCY ANEMIA DUE TO CHRONIC BLOOD LOSS: ICD-10-CM

## 2025-08-21 DIAGNOSIS — D63.1 ANEMIA DUE TO STAGE 3A CHRONIC KIDNEY DISEASE: Primary | ICD-10-CM

## 2025-08-21 PROCEDURE — 25000003 PHARM REV CODE 250: Performed by: INTERNAL MEDICINE

## 2025-08-21 PROCEDURE — 63600175 PHARM REV CODE 636 W HCPCS: Performed by: INTERNAL MEDICINE

## 2025-08-21 PROCEDURE — 96374 THER/PROPH/DIAG INJ IV PUSH: CPT

## 2025-08-21 RX ORDER — HEPARIN 100 UNIT/ML
500 SYRINGE INTRAVENOUS
OUTPATIENT
Start: 2025-08-21

## 2025-08-21 RX ORDER — EPINEPHRINE 0.3 MG/.3ML
0.3 INJECTION SUBCUTANEOUS ONCE AS NEEDED
OUTPATIENT
Start: 2025-08-21 | End: 2037-01-17

## 2025-08-21 RX ORDER — SODIUM FERRIC GLUCONATE COMPLEX IN SUCROSE 12.5 MG/ML
125 INJECTION INTRAVENOUS
Status: COMPLETED | OUTPATIENT
Start: 2025-08-21 | End: 2025-08-21

## 2025-08-21 RX ORDER — EPINEPHRINE 0.3 MG/.3ML
0.3 INJECTION SUBCUTANEOUS ONCE AS NEEDED
Status: DISCONTINUED | OUTPATIENT
Start: 2025-08-21 | End: 2025-08-21 | Stop reason: HOSPADM

## 2025-08-21 RX ORDER — SODIUM CHLORIDE 0.9 % (FLUSH) 0.9 %
10 SYRINGE (ML) INJECTION
Status: DISCONTINUED | OUTPATIENT
Start: 2025-08-21 | End: 2025-08-21 | Stop reason: HOSPADM

## 2025-08-21 RX ORDER — HEPARIN 100 UNIT/ML
500 SYRINGE INTRAVENOUS
Status: DISCONTINUED | OUTPATIENT
Start: 2025-08-21 | End: 2025-08-21 | Stop reason: HOSPADM

## 2025-08-21 RX ORDER — SODIUM CHLORIDE 0.9 % (FLUSH) 0.9 %
10 SYRINGE (ML) INJECTION
OUTPATIENT
Start: 2025-08-21

## 2025-08-21 RX ORDER — SODIUM FERRIC GLUCONATE COMPLEX IN SUCROSE 12.5 MG/ML
125 INJECTION INTRAVENOUS
Status: CANCELLED | OUTPATIENT
Start: 2025-08-21 | End: 2025-08-21

## 2025-08-21 RX ADMIN — SODIUM CHLORIDE: 9 INJECTION, SOLUTION INTRAVENOUS at 01:08

## 2025-08-21 RX ADMIN — SODIUM FERRIC GLUCONATE COMPLEX IN SUCROSE 125 MG: 12.5 INJECTION INTRAVENOUS at 01:08

## 2025-08-26 ENCOUNTER — CLINICAL SUPPORT (OUTPATIENT)
Dept: REHABILITATION | Facility: OTHER | Age: 80
End: 2025-08-26
Payer: MEDICARE

## 2025-08-26 DIAGNOSIS — G89.29 BILATERAL CHRONIC KNEE PAIN: Primary | ICD-10-CM

## 2025-08-26 DIAGNOSIS — R29.898 WEAKNESS OF BOTH HIPS: ICD-10-CM

## 2025-08-26 DIAGNOSIS — M25.562 BILATERAL CHRONIC KNEE PAIN: Primary | ICD-10-CM

## 2025-08-26 DIAGNOSIS — M25.561 BILATERAL CHRONIC KNEE PAIN: Primary | ICD-10-CM

## 2025-08-26 PROCEDURE — 97161 PT EVAL LOW COMPLEX 20 MIN: CPT | Mod: PN

## 2025-08-26 PROCEDURE — 97530 THERAPEUTIC ACTIVITIES: CPT | Mod: PN

## 2025-09-04 ENCOUNTER — CLINICAL SUPPORT (OUTPATIENT)
Dept: REHABILITATION | Facility: OTHER | Age: 80
End: 2025-09-04
Payer: MEDICARE

## 2025-09-04 DIAGNOSIS — G89.29 BILATERAL CHRONIC KNEE PAIN: Primary | ICD-10-CM

## 2025-09-04 DIAGNOSIS — M25.561 BILATERAL CHRONIC KNEE PAIN: Primary | ICD-10-CM

## 2025-09-04 DIAGNOSIS — M25.562 BILATERAL CHRONIC KNEE PAIN: Primary | ICD-10-CM

## 2025-09-04 DIAGNOSIS — R29.898 WEAKNESS OF BOTH HIPS: ICD-10-CM

## 2025-09-04 PROCEDURE — 97110 THERAPEUTIC EXERCISES: CPT | Mod: PN

## 2025-09-04 PROCEDURE — 97112 NEUROMUSCULAR REEDUCATION: CPT | Mod: PN

## 2025-09-04 RX ORDER — SPIRONOLACTONE 25 MG/1
25 TABLET ORAL DAILY
Qty: 90 TABLET | Refills: 4 | Status: SHIPPED | OUTPATIENT
Start: 2025-09-04

## (undated) DEVICE — HYDRODISSECTOR NUCLEUS 27GX7/8

## (undated) DEVICE — GOWN SURGICAL X-LARGE

## (undated) DEVICE — Device

## (undated) DEVICE — DRESSING XEROFORM FOIL PK 1X8

## (undated) DEVICE — SHEILD & GARTERS FOX METAL EYE

## (undated) DEVICE — GLOVE BIOGEL ORTHOPEDIC 7.5

## (undated) DEVICE — APPLICATOR CHLORAPREP ORN 26ML

## (undated) DEVICE — NDL FLTR 5MCRN BLNT TIP 18GX1

## (undated) DEVICE — BANDAGE ESMARK 6X12

## (undated) DEVICE — SOL 9P NACL IRR PIC IL

## (undated) DEVICE — STOCKINET TUBULAR 1 PLY 6X60IN

## (undated) DEVICE — SOL BETADINE 5%

## (undated) DEVICE — SEE MEDLINE ITEM 146308

## (undated) DEVICE — WIRE K SMALL BALL BB-TAK
Type: IMPLANTABLE DEVICE | Site: FOOT | Status: NON-FUNCTIONAL
Removed: 2021-07-08

## (undated) DEVICE — BLADE SAGITTA 5/BX

## (undated) DEVICE — SUT VICRYL CTD 2-0 GI 27 SH

## (undated) DEVICE — SOL WATER STRL IRR 1000ML

## (undated) DEVICE — PAD CAST SPECIALIST STRL 4

## (undated) DEVICE — BLADE SURG STAINLESS STEEL #11

## (undated) DEVICE — TOURNIQUET SB QC SP 18X4IN

## (undated) DEVICE — SUT ETHILON 3-0 PS2 18 BLK

## (undated) DEVICE — BIT DRILL SOLID 1.7 MM

## (undated) DEVICE — DRAPE STERI 32 X 50

## (undated) DEVICE — GAUZE SPONGE 4X4 12PLY

## (undated) DEVICE — UNDERGLOVES BIOGEL PI SIZE 8

## (undated) DEVICE — STRIP MG FML-GLO .06 - ORDER F

## (undated) DEVICE — HOSE DUAL W/CPC CONNECTORS

## (undated) DEVICE — SUT PROLENE 3-0 FS-2 18

## (undated) DEVICE — SPONGE GAUZE 16PLY 4X4

## (undated) DEVICE — KIT GREY EYE

## (undated) DEVICE — STOCKINETTE TUBULAR 2PL 6 X 4

## (undated) DEVICE — PENCIL ROCKER SWITCH 10FT CORD

## (undated) DEVICE — GLOVE BIOGEL PI MICRO SZ 7.5

## (undated) DEVICE — BIT DRILL BB TAK DISPOSABLE

## (undated) DEVICE — SHOE CAST POST-OP MEN SBUNION

## (undated) DEVICE — SUT CHROMIC 3-0 PS2 27IN BR

## (undated) DEVICE — STOCKINETTE DBL PLY ST 4X

## (undated) DEVICE — SOLUTION BSS PLUS

## (undated) DEVICE — NDL 22GA X1 1/2 REG BEVEL

## (undated) DEVICE — SEE MEDLINE ITEM 157150

## (undated) DEVICE — STOCKINET 4INX48

## (undated) DEVICE — SEE MEDLINE ITEM 146298

## (undated) DEVICE — ELECTRODE REM PLYHSV RETURN 9

## (undated) DEVICE — BIT DRILL F/LOW PROFILE PLATE

## (undated) DEVICE — BLADE SAGITTAL FINE 5.5 X 18.5

## (undated) DEVICE — SEE MEDLINE ITEM 157128

## (undated) DEVICE — SUT VICRYL 3-0 27 SH

## (undated) DEVICE — BANDAID STRIP PLASTIC 3/4X3

## (undated) DEVICE — BANDAGE DERMACEA STRETCH 4X1IN

## (undated) DEVICE — TRAY MINOR ORTHO

## (undated) DEVICE — SUT MONOCYRL 4-0 PS2 UND